# Patient Record
Sex: FEMALE | Race: WHITE | Employment: UNEMPLOYED | URBAN - METROPOLITAN AREA
[De-identification: names, ages, dates, MRNs, and addresses within clinical notes are randomized per-mention and may not be internally consistent; named-entity substitution may affect disease eponyms.]

---

## 2020-05-28 ENCOUNTER — HOME HEALTH ADMISSION (OUTPATIENT)
Dept: HOME HEALTH SERVICES | Facility: HOME HEALTH | Age: 70
End: 2020-05-28
Payer: COMMERCIAL

## 2020-05-31 ENCOUNTER — HOME CARE VISIT (OUTPATIENT)
Dept: HOME HEALTH SERVICES | Facility: HOME HEALTH | Age: 70
End: 2020-05-31
Payer: COMMERCIAL

## 2020-05-31 ENCOUNTER — HOME CARE VISIT (OUTPATIENT)
Dept: SCHEDULING | Facility: HOME HEALTH | Age: 70
End: 2020-05-31
Payer: COMMERCIAL

## 2020-05-31 PROCEDURE — G0299 HHS/HOSPICE OF RN EA 15 MIN: HCPCS

## 2020-05-31 PROCEDURE — 400013 HH SOC

## 2020-06-01 VITALS
SYSTOLIC BLOOD PRESSURE: 130 MMHG | DIASTOLIC BLOOD PRESSURE: 72 MMHG | OXYGEN SATURATION: 98 % | RESPIRATION RATE: 20 BRPM | TEMPERATURE: 98.8 F | HEART RATE: 98 BPM

## 2020-06-02 ENCOUNTER — HOME CARE VISIT (OUTPATIENT)
Dept: SCHEDULING | Facility: HOME HEALTH | Age: 70
End: 2020-06-02
Payer: COMMERCIAL

## 2020-06-02 ENCOUNTER — HOME CARE VISIT (OUTPATIENT)
Dept: HOME HEALTH SERVICES | Facility: HOME HEALTH | Age: 70
End: 2020-06-02
Payer: COMMERCIAL

## 2020-06-02 VITALS — DIASTOLIC BLOOD PRESSURE: 72 MMHG | SYSTOLIC BLOOD PRESSURE: 132 MMHG

## 2020-06-02 PROCEDURE — G0151 HHCP-SERV OF PT,EA 15 MIN: HCPCS

## 2020-06-02 PROCEDURE — G0152 HHCP-SERV OF OT,EA 15 MIN: HCPCS

## 2020-06-03 ENCOUNTER — HOME CARE VISIT (OUTPATIENT)
Dept: HOME HEALTH SERVICES | Facility: HOME HEALTH | Age: 70
End: 2020-06-03
Payer: COMMERCIAL

## 2020-06-03 ENCOUNTER — HOME CARE VISIT (OUTPATIENT)
Dept: SCHEDULING | Facility: HOME HEALTH | Age: 70
End: 2020-06-03
Payer: COMMERCIAL

## 2020-06-03 VITALS
RESPIRATION RATE: 16 BRPM | HEART RATE: 90 BPM | SYSTOLIC BLOOD PRESSURE: 130 MMHG | DIASTOLIC BLOOD PRESSURE: 70 MMHG | TEMPERATURE: 98 F | OXYGEN SATURATION: 95 %

## 2020-06-03 VITALS
SYSTOLIC BLOOD PRESSURE: 122 MMHG | HEART RATE: 89 BPM | TEMPERATURE: 98.5 F | DIASTOLIC BLOOD PRESSURE: 58 MMHG | OXYGEN SATURATION: 97 %

## 2020-06-03 PROCEDURE — A6252 ABSORPT DRG >16 <=48 W/O BDR: HCPCS

## 2020-06-03 PROCEDURE — A6213 FOAM DRG >16<=48 SQ IN W/BDR: HCPCS

## 2020-06-03 PROCEDURE — A6210 FOAM DRG >16<=48 SQ IN W/O B: HCPCS

## 2020-06-03 PROCEDURE — A6216 NON-STERILE GAUZE<=16 SQ IN: HCPCS

## 2020-06-03 PROCEDURE — A4216 STERILE WATER/SALINE, 10 ML: HCPCS

## 2020-06-03 PROCEDURE — A4452 WATERPROOF TAPE: HCPCS

## 2020-06-03 PROCEDURE — G0299 HHS/HOSPICE OF RN EA 15 MIN: HCPCS

## 2020-06-03 PROCEDURE — A6446 CONFORM BAND S W>=3" <5"/YD: HCPCS

## 2020-06-04 ENCOUNTER — HOME CARE VISIT (OUTPATIENT)
Dept: SCHEDULING | Facility: HOME HEALTH | Age: 70
End: 2020-06-04
Payer: COMMERCIAL

## 2020-06-04 VITALS
RESPIRATION RATE: 18 BRPM | SYSTOLIC BLOOD PRESSURE: 128 MMHG | OXYGEN SATURATION: 98 % | TEMPERATURE: 97.3 F | HEART RATE: 80 BPM | DIASTOLIC BLOOD PRESSURE: 70 MMHG

## 2020-06-04 PROCEDURE — G0151 HHCP-SERV OF PT,EA 15 MIN: HCPCS

## 2020-06-05 ENCOUNTER — HOME CARE VISIT (OUTPATIENT)
Dept: SCHEDULING | Facility: HOME HEALTH | Age: 70
End: 2020-06-05
Payer: COMMERCIAL

## 2020-06-05 VITALS
DIASTOLIC BLOOD PRESSURE: 68 MMHG | OXYGEN SATURATION: 97 % | TEMPERATURE: 99 F | HEART RATE: 85 BPM | SYSTOLIC BLOOD PRESSURE: 118 MMHG

## 2020-06-05 PROCEDURE — G0152 HHCP-SERV OF OT,EA 15 MIN: HCPCS

## 2020-06-09 ENCOUNTER — HOME CARE VISIT (OUTPATIENT)
Dept: SCHEDULING | Facility: HOME HEALTH | Age: 70
End: 2020-06-09
Payer: COMMERCIAL

## 2020-06-09 ENCOUNTER — HOME CARE VISIT (OUTPATIENT)
Dept: HOME HEALTH SERVICES | Facility: HOME HEALTH | Age: 70
End: 2020-06-09
Payer: COMMERCIAL

## 2020-06-09 VITALS
HEART RATE: 103 BPM | DIASTOLIC BLOOD PRESSURE: 62 MMHG | TEMPERATURE: 97 F | OXYGEN SATURATION: 96 % | SYSTOLIC BLOOD PRESSURE: 128 MMHG

## 2020-06-09 PROCEDURE — G0151 HHCP-SERV OF PT,EA 15 MIN: HCPCS

## 2020-06-09 PROCEDURE — G0299 HHS/HOSPICE OF RN EA 15 MIN: HCPCS

## 2020-06-10 VITALS — HEART RATE: 97 BPM | RESPIRATION RATE: 18 BRPM | TEMPERATURE: 97.9 F | OXYGEN SATURATION: 97 %

## 2020-06-11 ENCOUNTER — HOME CARE VISIT (OUTPATIENT)
Dept: SCHEDULING | Facility: HOME HEALTH | Age: 70
End: 2020-06-11
Payer: COMMERCIAL

## 2020-06-11 PROCEDURE — G0151 HHCP-SERV OF PT,EA 15 MIN: HCPCS

## 2020-06-12 ENCOUNTER — HOME CARE VISIT (OUTPATIENT)
Dept: SCHEDULING | Facility: HOME HEALTH | Age: 70
End: 2020-06-12
Payer: COMMERCIAL

## 2020-06-12 VITALS
HEART RATE: 91 BPM | TEMPERATURE: 98.2 F | OXYGEN SATURATION: 96 % | DIASTOLIC BLOOD PRESSURE: 58 MMHG | SYSTOLIC BLOOD PRESSURE: 110 MMHG

## 2020-06-12 PROCEDURE — A6454 SELF-ADHER BAND W>=3" <5"/YD: HCPCS

## 2020-06-12 PROCEDURE — G0299 HHS/HOSPICE OF RN EA 15 MIN: HCPCS

## 2020-06-13 VITALS
OXYGEN SATURATION: 95 % | TEMPERATURE: 98 F | HEART RATE: 90 BPM | DIASTOLIC BLOOD PRESSURE: 67 MMHG | RESPIRATION RATE: 18 BRPM | SYSTOLIC BLOOD PRESSURE: 118 MMHG

## 2020-06-14 ENCOUNTER — HOME CARE VISIT (OUTPATIENT)
Dept: HOME HEALTH SERVICES | Facility: HOME HEALTH | Age: 70
End: 2020-06-14
Payer: COMMERCIAL

## 2020-06-16 ENCOUNTER — HOME CARE VISIT (OUTPATIENT)
Dept: SCHEDULING | Facility: HOME HEALTH | Age: 70
End: 2020-06-16
Payer: COMMERCIAL

## 2020-06-16 VITALS
HEART RATE: 98 BPM | RESPIRATION RATE: 18 BRPM | TEMPERATURE: 96.8 F | DIASTOLIC BLOOD PRESSURE: 60 MMHG | SYSTOLIC BLOOD PRESSURE: 112 MMHG | OXYGEN SATURATION: 92 %

## 2020-06-16 VITALS
TEMPERATURE: 98.1 F | DIASTOLIC BLOOD PRESSURE: 60 MMHG | RESPIRATION RATE: 18 BRPM | OXYGEN SATURATION: 92 % | HEART RATE: 98 BPM | SYSTOLIC BLOOD PRESSURE: 112 MMHG

## 2020-06-16 PROCEDURE — A6216 NON-STERILE GAUZE<=16 SQ IN: HCPCS

## 2020-06-16 PROCEDURE — G0151 HHCP-SERV OF PT,EA 15 MIN: HCPCS

## 2020-06-16 PROCEDURE — G0299 HHS/HOSPICE OF RN EA 15 MIN: HCPCS

## 2020-06-18 ENCOUNTER — HOME CARE VISIT (OUTPATIENT)
Dept: SCHEDULING | Facility: HOME HEALTH | Age: 70
End: 2020-06-18
Payer: COMMERCIAL

## 2020-06-18 VITALS
HEART RATE: 94 BPM | TEMPERATURE: 97 F | SYSTOLIC BLOOD PRESSURE: 118 MMHG | OXYGEN SATURATION: 98 % | RESPIRATION RATE: 18 BRPM | DIASTOLIC BLOOD PRESSURE: 70 MMHG

## 2020-06-18 PROCEDURE — G0299 HHS/HOSPICE OF RN EA 15 MIN: HCPCS

## 2020-06-18 PROCEDURE — G0151 HHCP-SERV OF PT,EA 15 MIN: HCPCS

## 2020-06-19 PROCEDURE — A4216 STERILE WATER/SALINE, 10 ML: HCPCS

## 2020-06-19 PROCEDURE — A6454 SELF-ADHER BAND W>=3" <5"/YD: HCPCS

## 2020-06-19 PROCEDURE — A6210 FOAM DRG >16<=48 SQ IN W/O B: HCPCS

## 2020-06-19 PROCEDURE — A6213 FOAM DRG >16<=48 SQ IN W/BDR: HCPCS

## 2020-06-21 ENCOUNTER — HOME CARE VISIT (OUTPATIENT)
Dept: HOME HEALTH SERVICES | Facility: HOME HEALTH | Age: 70
End: 2020-06-21
Payer: COMMERCIAL

## 2020-06-21 VITALS — DIASTOLIC BLOOD PRESSURE: 58 MMHG | TEMPERATURE: 97.4 F | HEART RATE: 82 BPM | SYSTOLIC BLOOD PRESSURE: 106 MMHG

## 2020-06-23 ENCOUNTER — HOME CARE VISIT (OUTPATIENT)
Dept: SCHEDULING | Facility: HOME HEALTH | Age: 70
End: 2020-06-23
Payer: COMMERCIAL

## 2020-06-24 ENCOUNTER — HOME CARE VISIT (OUTPATIENT)
Dept: HOME HEALTH SERVICES | Facility: HOME HEALTH | Age: 70
End: 2020-06-24
Payer: COMMERCIAL

## 2020-12-16 ENCOUNTER — HOSPITAL ENCOUNTER (INPATIENT)
Age: 70
LOS: 6 days | Discharge: HOME HEALTH | DRG: 871 | End: 2020-12-22
Attending: EMERGENCY MEDICINE | Admitting: INTERNAL MEDICINE
Payer: MEDICARE

## 2020-12-16 ENCOUNTER — APPOINTMENT (OUTPATIENT)
Dept: GENERAL RADIOLOGY | Age: 70
DRG: 871 | End: 2020-12-16
Attending: EMERGENCY MEDICINE
Payer: MEDICARE

## 2020-12-16 DIAGNOSIS — R50.9 FEVER, UNSPECIFIED FEVER CAUSE: ICD-10-CM

## 2020-12-16 DIAGNOSIS — A41.9 SEPSIS WITHOUT ACUTE ORGAN DYSFUNCTION, DUE TO UNSPECIFIED ORGANISM (HCC): Primary | ICD-10-CM

## 2020-12-16 DIAGNOSIS — L89.153 PRESSURE INJURY OF SACRAL REGION, STAGE 3 (HCC): ICD-10-CM

## 2020-12-16 PROBLEM — L89.159 SACRAL DECUBITUS ULCER: Status: ACTIVE | Noted: 2020-12-16

## 2020-12-16 LAB
ALBUMIN SERPL-MCNC: 2.6 G/DL (ref 3.5–5)
ALBUMIN/GLOB SERPL: 0.5 {RATIO} (ref 1.1–2.2)
ALP SERPL-CCNC: 98 U/L (ref 45–117)
ALT SERPL-CCNC: 9 U/L (ref 12–78)
ANION GAP SERPL CALC-SCNC: 7 MMOL/L (ref 5–15)
APPEARANCE UR: ABNORMAL
AST SERPL-CCNC: 12 U/L (ref 15–37)
ATRIAL RATE: 95 BPM
BACTERIA URNS QL MICRO: ABNORMAL /HPF
BASOPHILS # BLD: 0 K/UL (ref 0–0.1)
BASOPHILS NFR BLD: 0 % (ref 0–1)
BILIRUB SERPL-MCNC: 0.5 MG/DL (ref 0.2–1)
BILIRUB UR QL: NEGATIVE
BUN SERPL-MCNC: 32 MG/DL (ref 6–20)
BUN/CREAT SERPL: 28 (ref 12–20)
CALCIUM SERPL-MCNC: 10.5 MG/DL (ref 8.5–10.1)
CALCULATED P AXIS, ECG09: 83 DEGREES
CALCULATED R AXIS, ECG10: -7 DEGREES
CALCULATED T AXIS, ECG11: 10 DEGREES
CHLORIDE SERPL-SCNC: 99 MMOL/L (ref 97–108)
CO2 SERPL-SCNC: 28 MMOL/L (ref 21–32)
COLOR UR: ABNORMAL
COVID-19 RAPID TEST, COVR: NOT DETECTED
CREAT SERPL-MCNC: 1.14 MG/DL (ref 0.55–1.02)
DIAGNOSIS, 93000: NORMAL
DIFFERENTIAL METHOD BLD: ABNORMAL
EOSINOPHIL # BLD: 0 K/UL (ref 0–0.4)
EOSINOPHIL NFR BLD: 0 % (ref 0–7)
EPITH CASTS URNS QL MICRO: ABNORMAL /LPF
ERYTHROCYTE [DISTWIDTH] IN BLOOD BY AUTOMATED COUNT: 13.7 % (ref 11.5–14.5)
GLOBULIN SER CALC-MCNC: 5.5 G/DL (ref 2–4)
GLUCOSE SERPL-MCNC: 128 MG/DL (ref 65–100)
GLUCOSE UR STRIP.AUTO-MCNC: NEGATIVE MG/DL
HCT VFR BLD AUTO: 36.6 % (ref 35–47)
HEALTH STATUS, XMCV2T: NORMAL
HGB BLD-MCNC: 11.8 G/DL (ref 11.5–16)
HGB UR QL STRIP: ABNORMAL
HYALINE CASTS URNS QL MICRO: ABNORMAL /LPF (ref 0–5)
IMM GRANULOCYTES # BLD AUTO: 0.1 K/UL (ref 0–0.04)
IMM GRANULOCYTES NFR BLD AUTO: 0 % (ref 0–0.5)
KETONES UR QL STRIP.AUTO: NEGATIVE MG/DL
LACTATE BLD-SCNC: 1.45 MMOL/L (ref 0.4–2)
LEUKOCYTE ESTERASE UR QL STRIP.AUTO: ABNORMAL
LYMPHOCYTES # BLD: 1 K/UL (ref 0.8–3.5)
LYMPHOCYTES NFR BLD: 7 % (ref 12–49)
MCH RBC QN AUTO: 27.2 PG (ref 26–34)
MCHC RBC AUTO-ENTMCNC: 32.2 G/DL (ref 30–36.5)
MCV RBC AUTO: 84.3 FL (ref 80–99)
MONOCYTES # BLD: 0.5 K/UL (ref 0–1)
MONOCYTES NFR BLD: 4 % (ref 5–13)
NEUTS SEG # BLD: 12.1 K/UL (ref 1.8–8)
NEUTS SEG NFR BLD: 89 % (ref 32–75)
NITRITE UR QL STRIP.AUTO: NEGATIVE
NRBC # BLD: 0 K/UL (ref 0–0.01)
NRBC BLD-RTO: 0 PER 100 WBC
P-R INTERVAL, ECG05: 188 MS
PH UR STRIP: 8.5 [PH] (ref 5–8)
PLATELET # BLD AUTO: 572 K/UL (ref 150–400)
PMV BLD AUTO: 8.9 FL (ref 8.9–12.9)
POTASSIUM SERPL-SCNC: 4 MMOL/L (ref 3.5–5.1)
PROT SERPL-MCNC: 8.1 G/DL (ref 6.4–8.2)
PROT UR STRIP-MCNC: 100 MG/DL
Q-T INTERVAL, ECG07: 360 MS
QRS DURATION, ECG06: 130 MS
QTC CALCULATION (BEZET), ECG08: 452 MS
RBC # BLD AUTO: 4.34 M/UL (ref 3.8–5.2)
RBC #/AREA URNS HPF: ABNORMAL /HPF (ref 0–5)
SODIUM SERPL-SCNC: 134 MMOL/L (ref 136–145)
SOURCE, COVRS: NORMAL
SP GR UR REFRACTOMETRY: 1.02 (ref 1–1.03)
SPECIMEN SOURCE, FCOV2M: NORMAL
SPECIMEN TYPE, XMCV1T: NORMAL
UA: UC IF INDICATED,UAUC: ABNORMAL
UROBILINOGEN UR QL STRIP.AUTO: 0.2 EU/DL (ref 0.2–1)
VENTRICULAR RATE, ECG03: 95 BPM
WBC # BLD AUTO: 13.7 K/UL (ref 3.6–11)
WBC URNS QL MICRO: >100 /HPF (ref 0–4)

## 2020-12-16 PROCEDURE — 74011000258 HC RX REV CODE- 258: Performed by: EMERGENCY MEDICINE

## 2020-12-16 PROCEDURE — 74011250636 HC RX REV CODE- 250/636: Performed by: EMERGENCY MEDICINE

## 2020-12-16 PROCEDURE — 36415 COLL VENOUS BLD VENIPUNCTURE: CPT

## 2020-12-16 PROCEDURE — 65660000000 HC RM CCU STEPDOWN

## 2020-12-16 PROCEDURE — 87186 SC STD MICRODIL/AGAR DIL: CPT

## 2020-12-16 PROCEDURE — 74011250636 HC RX REV CODE- 250/636: Performed by: INTERNAL MEDICINE

## 2020-12-16 PROCEDURE — 83605 ASSAY OF LACTIC ACID: CPT

## 2020-12-16 PROCEDURE — 87040 BLOOD CULTURE FOR BACTERIA: CPT

## 2020-12-16 PROCEDURE — 81001 URINALYSIS AUTO W/SCOPE: CPT

## 2020-12-16 PROCEDURE — 96375 TX/PRO/DX INJ NEW DRUG ADDON: CPT

## 2020-12-16 PROCEDURE — 96365 THER/PROPH/DIAG IV INF INIT: CPT

## 2020-12-16 PROCEDURE — 99285 EMERGENCY DEPT VISIT HI MDM: CPT

## 2020-12-16 PROCEDURE — 80053 COMPREHEN METABOLIC PANEL: CPT

## 2020-12-16 PROCEDURE — 74011250637 HC RX REV CODE- 250/637: Performed by: EMERGENCY MEDICINE

## 2020-12-16 PROCEDURE — 71045 X-RAY EXAM CHEST 1 VIEW: CPT

## 2020-12-16 PROCEDURE — 87077 CULTURE AEROBIC IDENTIFY: CPT

## 2020-12-16 PROCEDURE — 93005 ELECTROCARDIOGRAM TRACING: CPT

## 2020-12-16 PROCEDURE — 96366 THER/PROPH/DIAG IV INF ADDON: CPT

## 2020-12-16 PROCEDURE — 87086 URINE CULTURE/COLONY COUNT: CPT

## 2020-12-16 PROCEDURE — 85025 COMPLETE CBC W/AUTO DIFF WBC: CPT

## 2020-12-16 PROCEDURE — 87635 SARS-COV-2 COVID-19 AMP PRB: CPT

## 2020-12-16 RX ORDER — ONDANSETRON 2 MG/ML
4 INJECTION INTRAMUSCULAR; INTRAVENOUS
Status: DISCONTINUED | OUTPATIENT
Start: 2020-12-16 | End: 2020-12-22 | Stop reason: HOSPADM

## 2020-12-16 RX ORDER — ACETAMINOPHEN 325 MG/1
650 TABLET ORAL
Status: DISCONTINUED | OUTPATIENT
Start: 2020-12-16 | End: 2020-12-17 | Stop reason: SDUPTHER

## 2020-12-16 RX ORDER — SODIUM CHLORIDE 0.9 % (FLUSH) 0.9 %
5-40 SYRINGE (ML) INJECTION AS NEEDED
Status: DISCONTINUED | OUTPATIENT
Start: 2020-12-16 | End: 2020-12-22 | Stop reason: HOSPADM

## 2020-12-16 RX ORDER — SODIUM CHLORIDE 9 MG/ML
75 INJECTION, SOLUTION INTRAVENOUS CONTINUOUS
Status: DISCONTINUED | OUTPATIENT
Start: 2020-12-16 | End: 2020-12-16 | Stop reason: SDUPTHER

## 2020-12-16 RX ORDER — METRONIDAZOLE 500 MG/100ML
500 INJECTION, SOLUTION INTRAVENOUS EVERY 12 HOURS
Status: DISCONTINUED | OUTPATIENT
Start: 2020-12-16 | End: 2020-12-19

## 2020-12-16 RX ORDER — SODIUM CHLORIDE 0.9 % (FLUSH) 0.9 %
5-40 SYRINGE (ML) INJECTION EVERY 8 HOURS
Status: DISCONTINUED | OUTPATIENT
Start: 2020-12-16 | End: 2020-12-22 | Stop reason: HOSPADM

## 2020-12-16 RX ORDER — SODIUM CHLORIDE 9 MG/ML
125 INJECTION, SOLUTION INTRAVENOUS CONTINUOUS
Status: DISCONTINUED | OUTPATIENT
Start: 2020-12-16 | End: 2020-12-19

## 2020-12-16 RX ORDER — PROMETHAZINE HYDROCHLORIDE 25 MG/1
12.5 TABLET ORAL
Status: DISCONTINUED | OUTPATIENT
Start: 2020-12-16 | End: 2020-12-22 | Stop reason: HOSPADM

## 2020-12-16 RX ORDER — ACETAMINOPHEN 650 MG/1
650 SUPPOSITORY RECTAL
Status: DISCONTINUED | OUTPATIENT
Start: 2020-12-16 | End: 2020-12-22 | Stop reason: HOSPADM

## 2020-12-16 RX ORDER — AMLODIPINE BESYLATE 5 MG/1
10 TABLET ORAL DAILY
Status: DISCONTINUED | OUTPATIENT
Start: 2020-12-17 | End: 2020-12-22 | Stop reason: HOSPADM

## 2020-12-16 RX ORDER — POLYETHYLENE GLYCOL 3350 17 G/17G
17 POWDER, FOR SOLUTION ORAL DAILY PRN
Status: DISCONTINUED | OUTPATIENT
Start: 2020-12-16 | End: 2020-12-18

## 2020-12-16 RX ORDER — EZETIMIBE 10 MG/1
10 TABLET ORAL DAILY
Status: DISCONTINUED | OUTPATIENT
Start: 2020-12-17 | End: 2020-12-22 | Stop reason: HOSPADM

## 2020-12-16 RX ORDER — VANCOMYCIN/0.9 % SOD CHLORIDE 1.5G/250ML
1500 PLASTIC BAG, INJECTION (ML) INTRAVENOUS
Status: COMPLETED | OUTPATIENT
Start: 2020-12-16 | End: 2020-12-16

## 2020-12-16 RX ORDER — ENOXAPARIN SODIUM 100 MG/ML
40 INJECTION SUBCUTANEOUS DAILY
Status: DISCONTINUED | OUTPATIENT
Start: 2020-12-17 | End: 2020-12-22 | Stop reason: HOSPADM

## 2020-12-16 RX ORDER — ACETAMINOPHEN 325 MG/1
650 TABLET ORAL
Status: DISCONTINUED | OUTPATIENT
Start: 2020-12-16 | End: 2020-12-22 | Stop reason: HOSPADM

## 2020-12-16 RX ADMIN — SODIUM CHLORIDE 1000 ML: 900 INJECTION, SOLUTION INTRAVENOUS at 17:43

## 2020-12-16 RX ADMIN — ACETAMINOPHEN 650 MG: 325 TABLET ORAL at 20:06

## 2020-12-16 RX ADMIN — SODIUM CHLORIDE 1000 ML: 900 INJECTION, SOLUTION INTRAVENOUS at 13:54

## 2020-12-16 RX ADMIN — CEFEPIME HYDROCHLORIDE 2 G: 2 INJECTION, POWDER, FOR SOLUTION INTRAVENOUS at 13:54

## 2020-12-16 RX ADMIN — SODIUM CHLORIDE 75 ML/HR: 900 INJECTION, SOLUTION INTRAVENOUS at 21:56

## 2020-12-16 RX ADMIN — METRONIDAZOLE 500 MG: 500 INJECTION, SOLUTION INTRAVENOUS at 20:49

## 2020-12-16 RX ADMIN — SODIUM CHLORIDE 1500 MG: 9 INJECTION, SOLUTION INTRAVENOUS at 15:03

## 2020-12-16 NOTE — ED TRIAGE NOTES
Pt arrived to ED via EMS from a group home c/o lethgary and fever 103. Pt is AOX4 and monitored X3. Pt reports a hx of HTN and chronic knee pain. Pt reports that she has a wound on her bottom that she's been treating for about 1 month.

## 2020-12-16 NOTE — ED PROVIDER NOTES
EMERGENCY DEPARTMENT HISTORY AND PHYSICAL EXAM      Date: 12/16/2020  Patient Name: Eligah Gitelman    History of Presenting Illness     Chief Complaint   Patient presents with    Fever     Pt arrived by EMS from group home c/o fever 103 and lethergy since this morning. History Provided By: Patient    HPI: Eligah Gitelman, 78 y.o. female presents to the ED with cc of fever. Patient has a history of a sacral ulcer presents from a group home after a visiting nurse came to check on patient's wound and found patient to have a fever of 103. Patient reports she feels \"lethargic\" but denies any other complaints. Patient does report she has some bilateral \"knee pain\" as well as some pain in her buttock that has been there for weeks. Denies any headache, neck pain, chest pain, shortness of breath, cough, abdominal pain, nausea, vomiting or diarrhea. Does have chronic skin changes over her lower extremities. There are no other complaints, changes, or physical findings at this time. PCP: Abi Washburn, ANP-C    No current facility-administered medications on file prior to encounter. Current Outpatient Medications on File Prior to Encounter   Medication Sig Dispense Refill    ezetimibe (ZETIA) 10 mg tablet Take 10 mg by mouth daily.  furosemide (LASIX) 20 mg tablet Take 20 mg by mouth every other day.  amLODIPine (NORVASC) 10 mg tablet Take 10 mg by mouth daily. Past History     Past Medical History:  Past Medical History:   Diagnosis Date    HLD (hyperlipidemia)     Hypertension        Past Surgical History:  No past surgical history on file. Family History:  No family history on file. Social History:  Social History     Tobacco Use    Smoking status: Not on file   Substance Use Topics    Alcohol use: Not on file    Drug use: Not on file       Allergies:   Allergies   Allergen Reactions    Antihistamines - Alkylamine Unknown (comments)     reported on referral packet         Review of Systems   Review of Systems   Constitutional: Negative for activity change, chills and fever. HENT: Negative for facial swelling and voice change. Eyes: Negative for redness. Respiratory: Negative for cough, shortness of breath and wheezing. Cardiovascular: Negative for chest pain and leg swelling. Gastrointestinal: Negative for abdominal pain, diarrhea, nausea and vomiting. Genitourinary: Negative for decreased urine volume. Musculoskeletal: Positive for myalgias. Negative for gait problem. Skin: Positive for wound. Negative for pallor and rash. Neurological: Negative for tremors and facial asymmetry. Psychiatric/Behavioral: Negative for agitation. All other systems reviewed and are negative. Physical Exam   Physical Exam  Vitals signs and nursing note reviewed. Constitutional:       Comments: 78 YOF, resting in bed, no distress   HENT:      Head: Normocephalic and atraumatic. Neck:      Musculoskeletal: Normal range of motion. Cardiovascular:      Rate and Rhythm: Regular rhythm. Tachycardia present. Heart sounds: No murmur. No friction rub. No gallop. Pulmonary:      Effort: Pulmonary effort is normal.      Breath sounds: Normal breath sounds. Abdominal:      Palpations: Abdomen is soft. Tenderness: There is no abdominal tenderness. Musculoskeletal: Normal range of motion. Skin:     General: Skin is warm. Capillary Refill: Capillary refill takes less than 2 seconds. Comments: Over the sacrum there is a 3 x 5 cm stage III pressure ulcer with a purulent base and mild surrounding erythema. Over the bilateral lower extremities there is chronic appearing skin thickening, the left lower extremity is slightly more erythematous than the right lower extremity. Neurological:      General: No focal deficit present. Mental Status: She is alert.    Psychiatric:         Mood and Affect: Mood normal.         Diagnostic Study Results Labs -     Recent Results (from the past 12 hour(s))   CBC WITH AUTOMATED DIFF    Collection Time: 12/16/20  1:42 PM   Result Value Ref Range    WBC 13.7 (H) 3.6 - 11.0 K/uL    RBC 4.34 3.80 - 5.20 M/uL    HGB 11.8 11.5 - 16.0 g/dL    HCT 36.6 35.0 - 47.0 %    MCV 84.3 80.0 - 99.0 FL    MCH 27.2 26.0 - 34.0 PG    MCHC 32.2 30.0 - 36.5 g/dL    RDW 13.7 11.5 - 14.5 %    PLATELET 735 (H) 415 - 400 K/uL    MPV 8.9 8.9 - 12.9 FL    NRBC 0.0 0  WBC    ABSOLUTE NRBC 0.00 0.00 - 0.01 K/uL    NEUTROPHILS 89 (H) 32 - 75 %    LYMPHOCYTES 7 (L) 12 - 49 %    MONOCYTES 4 (L) 5 - 13 %    EOSINOPHILS 0 0 - 7 %    BASOPHILS 0 0 - 1 %    IMMATURE GRANULOCYTES 0 0.0 - 0.5 %    ABS. NEUTROPHILS 12.1 (H) 1.8 - 8.0 K/UL    ABS. LYMPHOCYTES 1.0 0.8 - 3.5 K/UL    ABS. MONOCYTES 0.5 0.0 - 1.0 K/UL    ABS. EOSINOPHILS 0.0 0.0 - 0.4 K/UL    ABS. BASOPHILS 0.0 0.0 - 0.1 K/UL    ABS. IMM. GRANS. 0.1 (H) 0.00 - 0.04 K/UL    DF AUTOMATED     METABOLIC PANEL, COMPREHENSIVE    Collection Time: 12/16/20  1:42 PM   Result Value Ref Range    Sodium 134 (L) 136 - 145 mmol/L    Potassium 4.0 3.5 - 5.1 mmol/L    Chloride 99 97 - 108 mmol/L    CO2 28 21 - 32 mmol/L    Anion gap 7 5 - 15 mmol/L    Glucose 128 (H) 65 - 100 mg/dL    BUN 32 (H) 6 - 20 MG/DL    Creatinine 1.14 (H) 0.55 - 1.02 MG/DL    BUN/Creatinine ratio 28 (H) 12 - 20      GFR est AA 57 (L) >60 ml/min/1.73m2    GFR est non-AA 47 (L) >60 ml/min/1.73m2    Calcium 10.5 (H) 8.5 - 10.1 MG/DL    Bilirubin, total 0.5 0.2 - 1.0 MG/DL    ALT (SGPT) 9 (L) 12 - 78 U/L    AST (SGOT) 12 (L) 15 - 37 U/L    Alk.  phosphatase 98 45 - 117 U/L    Protein, total 8.1 6.4 - 8.2 g/dL    Albumin 2.6 (L) 3.5 - 5.0 g/dL    Globulin 5.5 (H) 2.0 - 4.0 g/dL    A-G Ratio 0.5 (L) 1.1 - 2.2     POC LACTIC ACID    Collection Time: 12/16/20  1:49 PM   Result Value Ref Range    Lactic Acid (POC) 1.45 0.40 - 2.00 mmol/L   EKG, 12 LEAD, INITIAL    Collection Time: 12/16/20  2:17 PM   Result Value Ref Range Ventricular Rate 95 BPM    Atrial Rate 95 BPM    P-R Interval 188 ms    QRS Duration 130 ms    Q-T Interval 360 ms    QTC Calculation (Bezet) 452 ms    Calculated P Axis 83 degrees    Calculated R Axis -7 degrees    Calculated T Axis 10 degrees    Diagnosis       Normal sinus rhythm  Right bundle branch block  No previous ECGs available  Confirmed by Jennifer Chau MD, Sebastián Prim ([id number]) on 12/16/2020 4:24:20 PM     SARS-COV-2    Collection Time: 12/16/20  4:59 PM   Result Value Ref Range    Specimen source Nasopharyngeal      Specimen source Nasopharyngeal      COVID-19 rapid test Not detected NOTD      Specimen type NP Swab      Health status Symptomatic Testing     URINALYSIS W/ REFLEX CULTURE    Collection Time: 12/16/20  6:25 PM    Specimen: Urine   Result Value Ref Range    Color YELLOW/STRAW      Appearance TURBID (A) CLEAR      Specific gravity 1.019 1.003 - 1.030      pH (UA) 8.5 (H) 5.0 - 8.0      Protein 100 (A) NEG mg/dL    Glucose Negative NEG mg/dL    Ketone Negative NEG mg/dL    Bilirubin Negative NEG      Blood MODERATE (A) NEG      Urobilinogen 0.2 0.2 - 1.0 EU/dL    Nitrites Negative NEG      Leukocyte Esterase LARGE (A) NEG      WBC >100 (H) 0 - 4 /hpf    RBC 20-50 0 - 5 /hpf    Epithelial cells FEW FEW /lpf    Bacteria 1+ (A) NEG /hpf    UA:UC IF INDICATED URINE CULTURE ORDERED (A) CNI      Hyaline cast 2-5 0 - 5 /lpf       Radiologic Studies -   XR CHEST PORT   Final Result   IMPRESSION: Low lung volumes without acute cardiopulmonary process. CT Results  (Last 48 hours)    None        CXR Results  (Last 48 hours)               12/16/20 1415  XR CHEST PORT Final result    Impression:  IMPRESSION: Low lung volumes without acute cardiopulmonary process. Narrative:  EXAM:  XR CHEST PORT       INDICATION:   sepsis       COMPARISON: None. FINDINGS: AP radiograph of the chest was obtained. Low lung volumes. No evidence of focal consolidation. No pleural effusion or   pneumothorax. Heart, carroll, mediastinum are within normal limits. No acute   osseous abnormalities. Medical Decision Making   I am the first provider for this patient. I reviewed the vital signs, available nursing notes, past medical history, past surgical history, family history and social history. Vital Signs-Reviewed the patient's vital signs. Patient Vitals for the past 12 hrs:   Temp Pulse Resp BP SpO2   12/16/20 1900 100.2 °F (37.9 °C) (!) 101 23 (!) 137/38 100 %   12/16/20 1730  82 20 (!) 108/52 95 %   12/16/20 1623  99 20 (!) 109/51 97 %   12/16/20 1330  (!) 106 23 (!) 113/59 96 %   12/16/20 1319  (!) 113 23 118/61 96 %   12/16/20 1315  (!) 110 22 118/61 96 %   12/16/20 1311 99.4 °F (37.4 °C) (!) 109 20 116/62 96 %     Records Reviewed: Nursing Notes and Old Medical Records    Provider Notes (Medical Decision Making):     73-year-old female presents from a group home after being found to be febrile. Given patient's fever and tachycardia, with suspected skin infection, will call sepsis alert. Obtain labs and bolus fluids. Give cefepime and vancomycin empirically. Culture, chest x-ray. Anticipate admission. ED Course:   Initial assessment performed. The patients presenting problems have been discussed, and they are in agreement with the care plan formulated and outlined with them. I have encouraged them to ask questions as they arise throughout their visit. ED Course as of Dec 16 2134   Wed Dec 16, 2020   1456 There is a leukocytosis with left shift and thrombophilia likely acute phase reactant. CMP largely unremarkable. Lactic acid normal, given this we will not perform 30 cc/kg bolus. [MB]   1386 Patient's MAP was 66, will give second liter bolus. [MB]   2134 Also consider UTI as source given multiple white blood cells and 1+ bacteria. This will be covered with cefepime.     [MB]      ED Course User Index  [MB] Lesli Severance, MD       Critical Care Time:   CRITICAL CARE NOTE :    1:20 PM    IMPENDING DETERIORATION -Cardiovascular and Metabolic  ASSOCIATED RISK FACTORS - Hypotension and Dehydration  MANAGEMENT- Bedside Assessment  INTERPRETATION -  Xrays, ECG and Blood Pressure  INTERVENTIONS - hemodynamic mngmt, Metobolic interventions and 2 L IV fluid  CASE REVIEW - Hospitalist, Nursing and Family  TREATMENT RESPONSE -Stable  PERFORMED BY - Self    NOTES   :  I have spent 38 minutes of critical care time involved in lab review, consultations with specialist, family decision- making, bedside attention and documentation. This time excludes time spent in any separate billed procedures. During this entire length of time I was immediately available to the patient . Rizwana Andres MD      Disposition:    Admitted      Diagnosis     Clinical Impression:   1. Sepsis without acute organ dysfunction, due to unspecified organism ([address]    2. Pressure injury of sacral region, stage 3 ([address]    3. Fever, unspecified fever cause        Attestations:    Rizwana Andres MD    Please note that this dictation was completed with [employer], the computer voice recognition software. Quite often unanticipated grammatical, syntax, homophones, and other interpretive errors are inadvertently transcribed by the computer software. Please disregard these errors. Please excuse any errors that have escaped final proofreading. Thank you.

## 2020-12-17 ENCOUNTER — APPOINTMENT (OUTPATIENT)
Dept: GENERAL RADIOLOGY | Age: 70
DRG: 871 | End: 2020-12-17
Attending: ANESTHESIOLOGY
Payer: MEDICARE

## 2020-12-17 LAB
ANION GAP SERPL CALC-SCNC: 6 MMOL/L (ref 5–15)
BASOPHILS # BLD: 0 K/UL (ref 0–0.1)
BASOPHILS NFR BLD: 0 % (ref 0–1)
BUN SERPL-MCNC: 26 MG/DL (ref 6–20)
BUN/CREAT SERPL: 29 (ref 12–20)
CALCIUM SERPL-MCNC: 9.8 MG/DL (ref 8.5–10.1)
CHLORIDE SERPL-SCNC: 107 MMOL/L (ref 97–108)
CO2 SERPL-SCNC: 25 MMOL/L (ref 21–32)
CREAT SERPL-MCNC: 0.89 MG/DL (ref 0.55–1.02)
DIFFERENTIAL METHOD BLD: ABNORMAL
EOSINOPHIL # BLD: 0 K/UL (ref 0–0.4)
EOSINOPHIL NFR BLD: 0 % (ref 0–7)
ERYTHROCYTE [DISTWIDTH] IN BLOOD BY AUTOMATED COUNT: 14 % (ref 11.5–14.5)
GLUCOSE SERPL-MCNC: 83 MG/DL (ref 65–100)
HCT VFR BLD AUTO: 36.9 % (ref 35–47)
HGB BLD-MCNC: 11.3 G/DL (ref 11.5–16)
IMM GRANULOCYTES # BLD AUTO: 0 K/UL (ref 0–0.04)
IMM GRANULOCYTES NFR BLD AUTO: 0 % (ref 0–0.5)
INR PPP: 1.1 (ref 0.9–1.1)
LYMPHOCYTES # BLD: 0.8 K/UL (ref 0.8–3.5)
LYMPHOCYTES NFR BLD: 11 % (ref 12–49)
MAGNESIUM SERPL-MCNC: 2.1 MG/DL (ref 1.6–2.4)
MCH RBC QN AUTO: 26.8 PG (ref 26–34)
MCHC RBC AUTO-ENTMCNC: 30.6 G/DL (ref 30–36.5)
MCV RBC AUTO: 87.4 FL (ref 80–99)
MONOCYTES # BLD: 0.3 K/UL (ref 0–1)
MONOCYTES NFR BLD: 4 % (ref 5–13)
NEUTS SEG # BLD: 5.8 K/UL (ref 1.8–8)
NEUTS SEG NFR BLD: 85 % (ref 32–75)
NRBC # BLD: 0 K/UL (ref 0–0.01)
NRBC BLD-RTO: 0 PER 100 WBC
PLATELET # BLD AUTO: 418 K/UL (ref 150–400)
PMV BLD AUTO: 8.8 FL (ref 8.9–12.9)
POTASSIUM SERPL-SCNC: 3.7 MMOL/L (ref 3.5–5.1)
PROTHROMBIN TIME: 11.7 SEC (ref 9–11.1)
RBC # BLD AUTO: 4.22 M/UL (ref 3.8–5.2)
RBC MORPH BLD: ABNORMAL
SODIUM SERPL-SCNC: 138 MMOL/L (ref 136–145)
WBC # BLD AUTO: 6.9 K/UL (ref 3.6–11)

## 2020-12-17 PROCEDURE — 74011250636 HC RX REV CODE- 250/636: Performed by: INTERNAL MEDICINE

## 2020-12-17 PROCEDURE — C1751 CATH, INF, PER/CENT/MIDLINE: HCPCS

## 2020-12-17 PROCEDURE — 74011250637 HC RX REV CODE- 250/637: Performed by: INTERNAL MEDICINE

## 2020-12-17 PROCEDURE — 74011250636 HC RX REV CODE- 250/636: Performed by: NURSE PRACTITIONER

## 2020-12-17 PROCEDURE — 02HV33Z INSERTION OF INFUSION DEVICE INTO SUPERIOR VENA CAVA, PERCUTANEOUS APPROACH: ICD-10-PCS | Performed by: RADIOLOGY

## 2020-12-17 PROCEDURE — 36415 COLL VENOUS BLD VENIPUNCTURE: CPT

## 2020-12-17 PROCEDURE — 74011000258 HC RX REV CODE- 258: Performed by: INTERNAL MEDICINE

## 2020-12-17 PROCEDURE — 71045 X-RAY EXAM CHEST 1 VIEW: CPT

## 2020-12-17 PROCEDURE — 87205 SMEAR GRAM STAIN: CPT

## 2020-12-17 PROCEDURE — 85025 COMPLETE CBC W/AUTO DIFF WBC: CPT

## 2020-12-17 PROCEDURE — 2709999900 HC NON-CHARGEABLE SUPPLY

## 2020-12-17 PROCEDURE — 87077 CULTURE AEROBIC IDENTIFY: CPT

## 2020-12-17 PROCEDURE — 65660000000 HC RM CCU STEPDOWN

## 2020-12-17 PROCEDURE — 85610 PROTHROMBIN TIME: CPT

## 2020-12-17 PROCEDURE — 36556 INSERT NON-TUNNEL CV CATH: CPT

## 2020-12-17 PROCEDURE — 77030018842 HC SOL IRR SOD CL 9% BAXT -A

## 2020-12-17 PROCEDURE — 80048 BASIC METABOLIC PNL TOTAL CA: CPT

## 2020-12-17 PROCEDURE — 83735 ASSAY OF MAGNESIUM: CPT

## 2020-12-17 PROCEDURE — 74011250636 HC RX REV CODE- 250/636: Performed by: ANESTHESIOLOGY

## 2020-12-17 PROCEDURE — 87186 SC STD MICRODIL/AGAR DIL: CPT

## 2020-12-17 RX ORDER — ASCORBIC ACID 500 MG
1000 TABLET ORAL DAILY
COMMUNITY
End: 2022-06-16

## 2020-12-17 RX ORDER — OXYCODONE AND ACETAMINOPHEN 5; 325 MG/1; MG/1
1 TABLET ORAL
COMMUNITY
End: 2022-06-16

## 2020-12-17 RX ORDER — OXYCODONE AND ACETAMINOPHEN 10; 325 MG/1; MG/1
1 TABLET ORAL
Status: DISCONTINUED | OUTPATIENT
Start: 2020-12-17 | End: 2020-12-22 | Stop reason: HOSPADM

## 2020-12-17 RX ORDER — NOREPINEPHRINE BITARTRATE/D5W 8 MG/250ML
.5-12 PLASTIC BAG, INJECTION (ML) INTRAVENOUS
Status: DISCONTINUED | OUTPATIENT
Start: 2020-12-17 | End: 2020-12-19

## 2020-12-17 RX ORDER — MIDAZOLAM HYDROCHLORIDE 1 MG/ML
2 INJECTION, SOLUTION INTRAMUSCULAR; INTRAVENOUS ONCE
Status: COMPLETED | OUTPATIENT
Start: 2020-12-17 | End: 2020-12-17

## 2020-12-17 RX ORDER — NOREPINEPHRINE BITARTRATE/D5W 4MG/250ML
.5-16 PLASTIC BAG, INJECTION (ML) INTRAVENOUS
Status: DISCONTINUED | OUTPATIENT
Start: 2020-12-17 | End: 2020-12-17

## 2020-12-17 RX ORDER — MIDODRINE HYDROCHLORIDE 5 MG/1
5 TABLET ORAL EVERY 24 HOURS
COMMUNITY
End: 2020-12-22

## 2020-12-17 RX ORDER — TRAMADOL HYDROCHLORIDE 50 MG/1
50 TABLET ORAL
COMMUNITY
End: 2021-04-29

## 2020-12-17 RX ORDER — MIDAZOLAM HYDROCHLORIDE 1 MG/ML
2 INJECTION, SOLUTION INTRAMUSCULAR; INTRAVENOUS
Status: DISCONTINUED | OUTPATIENT
Start: 2020-12-17 | End: 2020-12-17

## 2020-12-17 RX ORDER — MIDAZOLAM HYDROCHLORIDE 1 MG/ML
INJECTION, SOLUTION INTRAMUSCULAR; INTRAVENOUS
Status: DISCONTINUED
Start: 2020-12-17 | End: 2020-12-17 | Stop reason: WASHOUT

## 2020-12-17 RX ORDER — DEXTROMETHORPHAN HYDROBROMIDE, GUAIFENESIN 5; 100 MG/5ML; MG/5ML
650 LIQUID ORAL
COMMUNITY
Start: 2021-04-29 | End: 2021-04-29 | Stop reason: CLARIF

## 2020-12-17 RX ORDER — LEVOTHYROXINE SODIUM 25 UG/1
25 TABLET ORAL
COMMUNITY
Start: 2021-04-29 | End: 2021-08-10

## 2020-12-17 RX ORDER — MIDAZOLAM HYDROCHLORIDE 1 MG/ML
1 INJECTION, SOLUTION INTRAMUSCULAR; INTRAVENOUS ONCE
Status: COMPLETED | OUTPATIENT
Start: 2020-12-17 | End: 2020-12-17

## 2020-12-17 RX ORDER — AMMONIUM LACTATE 12 G/100G
LOTION TOPICAL AS NEEDED
COMMUNITY
Start: 2021-04-29 | End: 2022-05-27 | Stop reason: ALTCHOICE

## 2020-12-17 RX ORDER — AMINO ACIDS/PROTEIN HYDROLYS 15G-100/30
30 LIQUID (ML) ORAL 2 TIMES DAILY
COMMUNITY
End: 2022-06-16

## 2020-12-17 RX ORDER — LANOLIN ALCOHOL/MO/W.PET/CERES
400 CREAM (GRAM) TOPICAL 2 TIMES DAILY
COMMUNITY
End: 2022-06-16

## 2020-12-17 RX ORDER — SENNOSIDES 8.6 MG/1
2 TABLET ORAL
COMMUNITY
End: 2022-06-16

## 2020-12-17 RX ADMIN — VANCOMYCIN HYDROCHLORIDE 1000 MG: 1 INJECTION, POWDER, LYOPHILIZED, FOR SOLUTION INTRAVENOUS at 09:33

## 2020-12-17 RX ADMIN — CEFEPIME HYDROCHLORIDE 2 G: 2 INJECTION, POWDER, FOR SOLUTION INTRAVENOUS at 00:31

## 2020-12-17 RX ADMIN — SODIUM CHLORIDE 1000 ML: 900 INJECTION, SOLUTION INTRAVENOUS at 00:38

## 2020-12-17 RX ADMIN — SODIUM CHLORIDE 1000 ML: 900 INJECTION, SOLUTION INTRAVENOUS at 11:02

## 2020-12-17 RX ADMIN — MIDAZOLAM HYDROCHLORIDE 1 MG: 1 INJECTION, SOLUTION INTRAMUSCULAR; INTRAVENOUS at 14:06

## 2020-12-17 RX ADMIN — Medication 10 ML: at 21:47

## 2020-12-17 RX ADMIN — OXYCODONE HYDROCHLORIDE AND ACETAMINOPHEN 1 TABLET: 10; 325 TABLET ORAL at 21:47

## 2020-12-17 RX ADMIN — ACETAMINOPHEN 650 MG: 325 TABLET ORAL at 06:49

## 2020-12-17 RX ADMIN — SODIUM CHLORIDE 125 ML/HR: 900 INJECTION, SOLUTION INTRAVENOUS at 17:59

## 2020-12-17 RX ADMIN — CEFEPIME HYDROCHLORIDE 2 G: 2 INJECTION, POWDER, FOR SOLUTION INTRAVENOUS at 14:36

## 2020-12-17 RX ADMIN — MIDAZOLAM HYDROCHLORIDE 2 MG: 1 INJECTION, SOLUTION INTRAMUSCULAR; INTRAVENOUS at 13:35

## 2020-12-17 RX ADMIN — Medication 40 ML: at 14:00

## 2020-12-17 RX ADMIN — METRONIDAZOLE 500 MG: 500 INJECTION, SOLUTION INTRAVENOUS at 09:39

## 2020-12-17 RX ADMIN — OXYCODONE HYDROCHLORIDE AND ACETAMINOPHEN 1 TABLET: 10; 325 TABLET ORAL at 14:32

## 2020-12-17 RX ADMIN — METRONIDAZOLE 500 MG: 500 INJECTION, SOLUTION INTRAVENOUS at 21:46
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
## 2020-12-17 NOTE — PROGRESS NOTES
TRANSFER - IN REPORT:    Verbal report received from Sneha (name) on Joslyn Villalta  being received from ED(unit) for routine progression of care      Report consisted of patients Situation, Background, Assessment and   Recommendations(SBAR). Information from the following report(s) SBAR, Kardex, Intake/Output, Recent Results and Quality Measures was reviewed with the receiving nurse. Opportunity for questions and clarification was provided. Assessment completed upon patients arrival to unit and care assumed.      Chest xray done and central line in place

## 2020-12-17 NOTE — PROGRESS NOTES
Pharmacy Automatic Renal Dosing Protocol - Antimicrobials    Indication for Antimicrobials: SSTI    Current Regimen of Each Antimicrobial:  Vancomycin 1500 mg x 1 then 1000 mg q18h (Start Date ; Day # 2)  Cefepime 2 IV every 12h (start: , day 2)  Metronidazole 500mg q 12h (start: , day 2)    Previous Antimicrobial Therapy:  Cefepime x 1 dose in ED      Goal Level: 15-20 until bacteremia ruled out  (AUC: 400 - 600 mg/hr/Liter/day)     Date Dose & Interval Measured (mcg/mL) Extrapolated (mcg/mL)                       Date & time of next level:  prior to 1900 dose    Significant Cultures:    blood: NGTD - prelim  : urine - prelim  : wound (buttock): prelim    Radiology / Imaging results: (X-ray, CT scan or MRI):     Paralysis, amputations, malnutrition: none    Labs:  Recent Labs     20  0707 20  1342   CREA 0.89 1.14*   BUN 26* 32*   WBC 6.9 13.7*     Temp (24hrs), Av.1 °F (37.8 °C), Min:99.1 °F (37.3 °C), Max:101.1 °F (38.4 °C)      Is the Patient on Dialysis? no    Creatinine Clearance (mL/min):   CrCl (Ideal Body Weight): 58.8    Impression/Plan:   Vancomycin 1000 mg q18h trough = 14.6    Continue renal dosed cefepime  Continue metronidazole q 12h  Scr improving, febrile (added cefepime/metronidazole)  Bmp daily and cbc every other day  Antimicrobial stop date TBD      Pharmacy will follow daily and adjust medications as appropriate for renal function and/or serum levels. Thank you,  Landry Wei, [address]    Recommended duration of therapy  [email or web address]/Clinical%20Companion/[email or web address]%20ABX%20therapy. docx    Renal Dosing  [email or web address]/Pharmacy/Clinical%20Companion/Renal%[id number]. pdf

## 2020-12-17 NOTE — PROGRESS NOTES
2:25 PM Received order from Dr. Raven Bolden to change parameters of levophed gtt to 0.5 - 12 mcg/min per stepdown titration parameters. See MAR. If patient would require more than 12 mcg/min patient will need CCU tx.
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
## 2020-12-17 NOTE — ED NOTES
Bedside shift change report given to LASHANDA Mayorga (oncoming nurse) by David Ceja RN (offgoing nurse). Report included the following information SBAR, Kardex, ED Summary, Procedure Summary, MAR and Recent Results.

## 2020-12-17 NOTE — ED NOTES
Bedside and Verbal shift change report given to [address] Lindsey (oncoming nurse) by Lorenzo Holguin RN (offgoing nurse). Report included the following information SBAR, Kardex and MAR.

## 2020-12-17 NOTE — PROGRESS NOTES
1315: Anesthesia at bedside placing central line. Total of 3 mg versed given. 1445: Port chest xray completed.

## 2020-12-17 NOTE — PROGRESS NOTES
Received message from patient's nurse Wilfrid Cronin stating :    BP trending down, most recent 83/47 (56). Currently getting NS 75ml/hour. Received 2 liters of NS earlier today at 1400 and 1500. Discussion / orders:    Admitted last night with sepsis secondary to infected decubitus sacral ulcer versus urinary tract infection. Has been started on cefepime and vancomycin. · Ordered another 1 L normal saline bolus  · Increased normal saline infusion rate from 75 mL an hour 225 mL an hour           Please note that this note was dictated using Dragon computer voice recognition software. Quite often unanticipated grammatical, syntax, homophones, and other interpretive errors are inadvertently transcribed by the computer software. Please disregard these errors. Please excuse any errors that have escaped final proofreading.

## 2020-12-17 NOTE — ROUTINE PROCESS
-Please complete MRI History and Safety Screening Form for this patient using [employer] only under Orders Requiring a Screening Form: 
 

## 2020-12-17 NOTE — H&P
Hospitalist Admission Note    NAME: Isak Escudero   :  1950   MRN:  [id number]     Date/Time:  2020 8:01 PM    Patient PCP: LOLY Solorio  ______________________________________________________________________  Given the patient's current clinical presentation, I have a high level of concern for decompensation if discharged from the emergency department. Complex decision making was performed, which includes reviewing the patient's available past medical records, laboratory results, and x-ray films. My assessment of this patient's clinical condition and my plan of care is as follows. Assessment / Plan:  Sepsis secondary to infected decubitus sacral ulcer versus UTI  We will admit to telemetry, will check blood cultures, wound cultures, urine cultures  Continue with IV vancomycin and cefepime started by the ED physician, will add Flagyl  Wound care consulted    Mild elevation of the creatinine  Start IV fluid  Hypertension  Hyperlipidemia  BP soft, hold Norvasc  Continue Zetia      Code Status: Full code  Surrogate Decision Maker:     DVT Prophylaxis: Lovenox  GI Prophylaxis: not indicated    Baseline: Ambulatory      Subjective:   CHIEF COMPLAINT: Fevers and chills and inability to bend her knees    HISTORY OF PRESENT ILLNESS:     Isak Escudero is a 78 y.o.  female who lives in a group home with past medical history hypertension, chronic decubitus ulcer stage III-IV who presents with difficulty bending her knees because of pain on her sacral area. IN  The ED, patient reported fever and chills and was found to have infected decubitus ulcer with some purulent drainage. He denies any associated symptoms such as nausea vomiting, cough but reported burning upon urination  Review of her vital signs showed that she was febrile, tachycardic.   Her labs revealed evaded white blood cell count with a left shift, mild elevation of her creatinine. Her UA was concerning for UTI. Rapid Covid test was negative    We were asked to admit for work up and evaluation of the above problems. No past medical history on file. No past surgical history on file. Social History     Tobacco Use    Smoking status: Not on file   Substance Use Topics    Alcohol use: Not on file        No family history on file. Allergies   Allergen Reactions    Antihistamines - Alkylamine Unknown (comments)     reported on referral packet        Prior to Admission medications    Medication Sig Start Date End Date Taking? Authorizing Provider   ezetimibe (ZETIA) 10 mg tablet Take 10 mg by mouth daily. Provider, Historical   furosemide (LASIX) 20 mg tablet Take 20 mg by mouth every other day. Provider, Historical   amLODIPine (NORVASC) 10 mg tablet Take 10 mg by mouth daily. Provider, Historical       REVIEW OF SYSTEMS:     I am not able to complete the review of systems because:    The patient is intubated and sedated    The patient has altered mental status due to his acute medical problems    The patient has baseline aphasia from prior stroke(s)    The patient has baseline dementia and is not reliable historian    The patient is in acute medical distress and unable to provide information           Total of 12 systems reviewed as follows:       POSITIVE= underlined text  Negative = text not underlined  General:  fever, chills, sweats, generalized weakness, weight loss/gain,      loss of appetite   Eyes:    blurred vision, eye pain, loss of vision, double vision  ENT:    rhinorrhea, pharyngitis   Respiratory:   cough, sputum production, SOB, PAL, wheezing, pleuritic pain   Cardiology:   chest pain, palpitations, orthopnea, PND, edema, syncope   Gastrointestinal:  abdominal pain , N/V, diarrhea, dysphagia, constipation, bleeding   Genitourinary:  frequency, urgency, dysuria, hematuria, incontinence   Muskuloskeletal :  arthralgia, myalgia, back pain  Hematology:  easy bruising, nose or gum bleeding, lymphadenopathy   Dermatological: Decubitus ulcer   Endocrine:   hot flashes or polydipsia   Neurological:  headache, dizziness, confusion, focal weakness, paresthesia,     Speech difficulties, memory loss, gait difficulty  Psychological: Feelings of anxiety, depression, agitation    Objective:   VITALS:    Visit Vitals  BP (!) 137/38 (BP 1 Location: Right arm, BP Patient Position: At rest;Supine)   Pulse (!) 101   Temp 100.2 °F (37.9 °C)   Resp 23   Ht 5' 7\" (1.702 m)   Wt 77.1 kg (170 lb)   SpO2 100%   BMI 26.63 kg/m²       PHYSICAL EXAM:    General:    Alert, cooperative, no distress, appears stated age. HEENT: Atraumatic, anicteric sclerae, pink conjunctivae     No oral ulcers, mucosa moist, throat clear, dentition fair  Neck:  Supple, symmetrical,  thyroid: non tender  Lungs:   Clear to auscultation bilaterally. No Wheezing or Rhonchi. No rales. Chest wall:  No tenderness  No Accessory muscle use. Heart:   Regular  rhythm,  No  murmur   No edema  Abdomen:   Soft, non-tender. Not distended. Bowel sounds normal  Extremities: No cyanosis. No clubbing,      Skin turgor normal, Capillary refill normal, Radial dial pulse 2+  Skin:     Draining sacral decubitus ulcer    Psych:  Fair  insight. Not depressed. Not anxious or agitated. Neurologic: EOMs intact. No facial asymmetry. No aphasia or slurred speech. Symmetrical strength, Sensation grossly intact.  Alert and oriented X 4.         _______________________________________________________________________  Care Plan discussed with:    Comments   Patient x    Family      RN x    Care Manager                    Consultant:      _______________________________________________________________________  Expected  Disposition:   Home with Family    HH/PT/OT/RN    SNF/LTC    [facility]    ________________________________________________________________________  TOTAL TIME:65 Minutes    Critical Care Provided Minutes non procedure based      Comments    x Reviewed previous records   >50% of visit spent in counseling and coordination of care x Discussion with patient and/or family and questions answered       ________________________________________________________________________  Signed: Logan Adkins MD    Procedures: see electronic medical records for all procedures/Xrays and details which were not copied into this note but were reviewed prior to creation of Plan. LAB DATA REVIEWED:    Recent Results (from the past 24 hour(s))   CBC WITH AUTOMATED DIFF    Collection Time: 12/16/20  1:42 PM   Result Value Ref Range    WBC 13.7 (H) 3.6 - 11.0 K/uL    RBC 4.34 3.80 - 5.20 M/uL    HGB 11.8 11.5 - 16.0 g/dL    HCT 36.6 35.0 - 47.0 %    MCV 84.3 80.0 - 99.0 FL    MCH 27.2 26.0 - 34.0 PG    MCHC 32.2 30.0 - 36.5 g/dL    RDW 13.7 11.5 - 14.5 %    PLATELET 253 (H) 011 - 400 K/uL    MPV 8.9 8.9 - 12.9 FL    NRBC 0.0 0  WBC    ABSOLUTE NRBC 0.00 0.00 - 0.01 K/uL    NEUTROPHILS 89 (H) 32 - 75 %    LYMPHOCYTES 7 (L) 12 - 49 %    MONOCYTES 4 (L) 5 - 13 %    EOSINOPHILS 0 0 - 7 %    BASOPHILS 0 0 - 1 %    IMMATURE GRANULOCYTES 0 0.0 - 0.5 %    ABS. NEUTROPHILS 12.1 (H) 1.8 - 8.0 K/UL    ABS. LYMPHOCYTES 1.0 0.8 - 3.5 K/UL    ABS. MONOCYTES 0.5 0.0 - 1.0 K/UL    ABS. EOSINOPHILS 0.0 0.0 - 0.4 K/UL    ABS. BASOPHILS 0.0 0.0 - 0.1 K/UL    ABS. IMM.  GRANS. 0.1 (H) 0.00 - 0.04 K/UL    DF AUTOMATED     METABOLIC PANEL, COMPREHENSIVE    Collection Time: 12/16/20  1:42 PM   Result Value Ref Range    Sodium 134 (L) 136 - 145 mmol/L    Potassium 4.0 3.5 - 5.1 mmol/L    Chloride 99 97 - 108 mmol/L    CO2 28 21 - 32 mmol/L    Anion gap 7 5 - 15 mmol/L    Glucose 128 (H) 65 - 100 mg/dL    BUN 32 (H) 6 - 20 MG/DL    Creatinine 1.14 (H) 0.55 - 1.02 MG/DL    BUN/Creatinine ratio 28 (H) 12 - 20      GFR est AA 57 (L) >60 ml/min/1.73m2    GFR est non-AA 47 (L) >60 ml/min/1.73m2    Calcium 10.5 (H) 8.5 - 10.1 MG/DL    Bilirubin, total 0.5 0.2 - 1.0 MG/DL    ALT (SGPT) 9 (L) 12 - 78 U/L    AST (SGOT) 12 (L) 15 - 37 U/L    Alk.  phosphatase 98 45 - 117 U/L    Protein, total 8.1 6.4 - 8.2 g/dL    Albumin 2.6 (L) 3.5 - 5.0 g/dL    Globulin 5.5 (H) 2.0 - 4.0 g/dL    A-G Ratio 0.5 (L) 1.1 - 2.2     POC LACTIC ACID    Collection Time: 12/16/20  1:49 PM   Result Value Ref Range    Lactic Acid (POC) 1.45 0.40 - 2.00 mmol/L   EKG, 12 LEAD, INITIAL    Collection Time: 12/16/20  2:17 PM   Result Value Ref Range    Ventricular Rate 95 BPM    Atrial Rate 95 BPM    P-R Interval 188 ms    QRS Duration 130 ms    Q-T Interval 360 ms    QTC Calculation (Bezet) 452 ms    Calculated P Axis 83 degrees    Calculated R Axis -7 degrees    Calculated T Axis 10 degrees    Diagnosis       Normal sinus rhythm  Right bundle branch block  No previous ECGs available  Confirmed by Joel Wlels MD, [employer] ([id number]) on 12/16/2020 4:24:20 PM     SARS-COV-2    Collection Time: 12/16/20  4:59 PM   Result Value Ref Range    Specimen source Nasopharyngeal      Specimen source Nasopharyngeal      COVID-19 rapid test Not detected NOTD      Specimen type NP Swab      Health status Symptomatic Testing     URINALYSIS W/ REFLEX CULTURE    Collection Time: 12/16/20  6:25 PM    Specimen: Urine   Result Value Ref Range    Color YELLOW/STRAW      Appearance TURBID (A) CLEAR      Specific gravity 1.019 1.003 - 1.030      pH (UA) 8.5 (H) 5.0 - 8.0      Protein 100 (A) NEG mg/dL    Glucose Negative NEG mg/dL    Ketone Negative NEG mg/dL    Bilirubin Negative NEG      Blood MODERATE (A) NEG      Urobilinogen 0.2 0.2 - 1.0 EU/dL    Nitrites Negative NEG      Leukocyte Esterase LARGE (A) NEG      WBC >100 (H) 0 - 4 /hpf    RBC 20-50 0 - 5 /hpf    Epithelial cells FEW FEW /lpf    Bacteria 1+ (A) NEG /hpf    UA:UC IF INDICATED URINE CULTURE ORDERED (A) CNI      Hyaline cast 2-5 0 - 5 /lpf

## 2020-12-17 NOTE — PROGRESS NOTES
Pharmacy Automatic Renal Dosing Protocol - Antimicrobials    Indication for Antimicrobials: SSTI    Current Regimen of Each Antimicrobial:  Vancomycin 1500 mg x 1 then 1000 mg q18h (Start Date ; Day # 1)    Previous Antimicrobial Therapy:  Cefepime x 1 dose in ED      Goal Level: 15-20 until bacteremia ruled out  (AUC: 400 - 600 mg/hr/Liter/day)     Date Dose & Interval Measured (mcg/mL) Extrapolated (mcg/mL)                       Date & time of next level: Prior to 2nd maintenance dose     Significant Cultures:    blood and urine pending       Radiology / Imaging results: (X-ray, CT scan or MRI):     Paralysis, amputations, malnutrition: ?    Labs:  Recent Labs     20  1342   CREA 1.14*   BUN 32*   WBC 13.7*     Temp (24hrs), Av.8 °F (37.7 °C), Min:99.4 °F (37.4 °C), Max:100.2 °F (37.9 °C)      Is the Patient on Dialysis? Creatinine Clearance (mL/min):   CrCl (Actual Body Weight): 57.5  CrCl (Adjusted Body Weight): 50.6  CrCl (Ideal Body Weight): 45.9    Impression/Plan:   Vancomycin 1000 mg q18h trough = 14.6    No notes , consulted for SSTI so currently on Vanc monotherapy. May need further coverage once more information is avail. Antimicrobial stop date TBD      Pharmacy will follow daily and adjust medications as appropriate for renal function and/or serum levels.     Thank you,  Carrillo Sharp, [facility]

## 2020-12-18 LAB
ANION GAP SERPL CALC-SCNC: 5 MMOL/L (ref 5–15)
BUN SERPL-MCNC: 20 MG/DL (ref 6–20)
BUN/CREAT SERPL: 29 (ref 12–20)
CALCIUM SERPL-MCNC: 9 MG/DL (ref 8.5–10.1)
CHLORIDE SERPL-SCNC: 112 MMOL/L (ref 97–108)
CO2 SERPL-SCNC: 23 MMOL/L (ref 21–32)
COMMENT, HOLDF: NORMAL
CREAT SERPL-MCNC: 0.68 MG/DL (ref 0.55–1.02)
DATE LAST DOSE: ABNORMAL
GLUCOSE SERPL-MCNC: 80 MG/DL (ref 65–100)
POTASSIUM SERPL-SCNC: 3.4 MMOL/L (ref 3.5–5.1)
REPORTED DOSE,DOSE: ABNORMAL UNITS
REPORTED DOSE/TIME,TMG: ABNORMAL
SAMPLES BEING HELD,HOLD: NORMAL
SODIUM SERPL-SCNC: 140 MMOL/L (ref 136–145)
VANCOMYCIN TROUGH SERPL-MCNC: 13.2 UG/ML (ref 5–10)

## 2020-12-18 PROCEDURE — 74011250636 HC RX REV CODE- 250/636: Performed by: INTERNAL MEDICINE

## 2020-12-18 PROCEDURE — 36415 COLL VENOUS BLD VENIPUNCTURE: CPT

## 2020-12-18 PROCEDURE — 80048 BASIC METABOLIC PNL TOTAL CA: CPT

## 2020-12-18 PROCEDURE — 74011250637 HC RX REV CODE- 250/637: Performed by: NURSE PRACTITIONER

## 2020-12-18 PROCEDURE — 80202 ASSAY OF VANCOMYCIN: CPT

## 2020-12-18 PROCEDURE — 74011000258 HC RX REV CODE- 258: Performed by: INTERNAL MEDICINE

## 2020-12-18 PROCEDURE — 65660000000 HC RM CCU STEPDOWN

## 2020-12-18 PROCEDURE — 74011250636 HC RX REV CODE- 250/636: Performed by: NURSE PRACTITIONER

## 2020-12-18 PROCEDURE — 74011250637 HC RX REV CODE- 250/637: Performed by: INTERNAL MEDICINE

## 2020-12-18 RX ORDER — POTASSIUM CHLORIDE 750 MG/1
20 TABLET, FILM COATED, EXTENDED RELEASE ORAL
Status: COMPLETED | OUTPATIENT
Start: 2020-12-18 | End: 2020-12-18

## 2020-12-18 RX ORDER — POLYETHYLENE GLYCOL 3350 17 G/17G
17 POWDER, FOR SOLUTION ORAL DAILY
Status: DISCONTINUED | OUTPATIENT
Start: 2020-12-18 | End: 2020-12-22 | Stop reason: HOSPADM

## 2020-12-18 RX ADMIN — EZETIMIBE 10 MG: 10 TABLET ORAL at 08:09

## 2020-12-18 RX ADMIN — Medication 10 ML: at 21:00

## 2020-12-18 RX ADMIN — Medication 10 ML: at 13:34

## 2020-12-18 RX ADMIN — VANCOMYCIN HYDROCHLORIDE 1000 MG: 1 INJECTION, POWDER, LYOPHILIZED, FOR SOLUTION INTRAVENOUS at 17:50

## 2020-12-18 RX ADMIN — VANCOMYCIN HYDROCHLORIDE 1000 MG: 1 INJECTION, POWDER, LYOPHILIZED, FOR SOLUTION INTRAVENOUS at 00:51

## 2020-12-18 RX ADMIN — CEFEPIME HYDROCHLORIDE 2 G: 2 INJECTION, POWDER, FOR SOLUTION INTRAVENOUS at 13:29

## 2020-12-18 RX ADMIN — POLYETHYLENE GLYCOL 3350 17 G: 17 POWDER, FOR SOLUTION ORAL at 23:29

## 2020-12-18 RX ADMIN — SODIUM CHLORIDE 125 ML/HR: 900 INJECTION, SOLUTION INTRAVENOUS at 10:31

## 2020-12-18 RX ADMIN — METRONIDAZOLE 500 MG: 500 INJECTION, SOLUTION INTRAVENOUS at 08:09

## 2020-12-18 RX ADMIN — POTASSIUM CHLORIDE 20 MEQ: 750 TABLET, EXTENDED RELEASE ORAL at 13:29

## 2020-12-18 RX ADMIN — OXYCODONE HYDROCHLORIDE AND ACETAMINOPHEN 1 TABLET: 10; 325 TABLET ORAL at 18:02

## 2020-12-18 RX ADMIN — AMLODIPINE BESYLATE 10 MG: 5 TABLET ORAL at 08:09

## 2020-12-18 RX ADMIN — METRONIDAZOLE 500 MG: 500 INJECTION, SOLUTION INTRAVENOUS at 20:32

## 2020-12-18 RX ADMIN — SODIUM CHLORIDE 125 ML/HR: 900 INJECTION, SOLUTION INTRAVENOUS at 20:35

## 2020-12-18 RX ADMIN — CEFEPIME HYDROCHLORIDE 2 G: 2 INJECTION, POWDER, FOR SOLUTION INTRAVENOUS at 00:51

## 2020-12-18 RX ADMIN — ENOXAPARIN SODIUM 40 MG: 40 INJECTION SUBCUTANEOUS at 08:09

## 2020-12-18 NOTE — PROGRESS NOTES
Transition of Care Plan:                      D/C back to [facility]  [facility]. F/U appointment  2nd IM Letter at d/c  [facility] to transport at d/c    Reason for Admission:   Sepsis with septic shock secondary to infected decubitus sacral ulcer and UTI POA                   RUR Score:  11%                   Plan for utilizing home health:     Open with [address] [place] [address]     PCP: First and Last name:  Dr. Melvin Francisco   Name of Practice:    Are you a current patient: Yes/No: Yes Approximate date of last visit:     Can you participate in a virtual visit with your PCP:  Yes                    Current Advanced Directive/Advance Care Plan:  Full code, No ACD on file. Richard Sheriff owner- contact- [phone]. Per Pt, has no siblings, parents are  and   in . Transition of Care Plan:                      D/C back to [facility] [address] [place] [address]. F/U appointment  2nd IM Letter at d/c  [facility] to transport at d/c    CM verified with pt, pt demographics, insurance info and emergency contact. Pt lives in a group home. Pt is a paraplegic and bed bound, is total assist with transfer, can pivot only and can stand only 3-4 mins. Per pt, she can't walk or stand, waiting on approval for part B. Pt is in the process of having approved a wheelchair, speciality bed. Pt has a special roberto lift that can lift pt in standing position and transfer to chair. Pt is dependent with ADL's, wears diapers and uses ambulance transportation. Has HH- open with [facility] care. Uses Mail order for meds and a local pharmacy. CM will continue to follow pt for D/C planning and arrange services as deemed neccessary    Care Management Interventions  PCP Verified by CM:  Yes  Last Visit to PCP: 20  Mode of Transport at Discharge: S  Transition of Care Consult (CM Consult): Home Health, [facility] [address])  [address] [address]: No  Reason Outside [place]: Patient already serviced by other home care/hospice agency  Current Support Network:  Other(Lies in a [address] [facility])  Confirm Follow Up Transport: Other (see comment)    [address]  Phone: [phone]

## 2020-12-18 NOTE — PROGRESS NOTES
Comprehensive Nutrition Assessment    Type and Reason for Visit: Initial, Wound    Nutrition Recommendations/Plan:   Continue Regular diet  Add ensure enlive BID    Nutrition Assessment:     Patient medically noted for sepsis and stage 4 PI's to sacrum and right heel. Patient reports her appetite is good; fairly normal compared to at home. She takes prostat BID at her group home for extra protein. She also likes ensure and is agreeable to this while in the hospital (chocolate or strawberry). Menu at bedside. Continue regular diet. Encourage intake of meals and protein sources. Estimated Daily Nutrient Needs:  Energy (kcal): 1732 kcal (BMR 1333 x 1. 3AF); Weight Used for Energy Requirements: Current  Protein (g): 100-116g (1.3-1.5 g/kg bw); Weight Used for Protein Requirements: Current  Fluid (ml/day): 1700 mL; Method Used for Fluid Requirements: 1 ml/kcal    Nutrition Related Findings:       BM 12/17  K+ 3.4, BG 80-  Norvasc, Zetia  MRI pending to rule out osteomyelitis     Wounds:    Stage IV       Current Nutrition Therapies:  DIET REGULAR  DIET ONE TIME MESSAGE    Anthropometric Measures:  · Height:  5' 7\" (170.2 cm)  · Current Body Wt:  77.1 kg (169 lb 15.6 oz)   · BMI Category: Overweight (BMI 25.0-29. 9)       Nutrition Diagnosis:   · Increased nutrient needs related to (wound healing) as evidenced by (stage 4 PI x 2)    Nutrition Interventions:   Food and/or Nutrient Delivery: Continue current diet, Start oral nutrition supplement  Nutrition Education and Counseling: No recommendations at this time  Coordination of Nutrition Care: No recommendation at this time    Goals:  PO intake >70% of meals/supplements next 5-7 days       Nutrition Monitoring and Evaluation:   Behavioral-Environmental Outcomes: None identified  Food/Nutrient Intake Outcomes: Food and nutrient intake, Supplement intake  Physical Signs/Symptoms Outcomes: Biochemical data, Skin, Weight    Discharge Planning:    Continue current diet, Continue oral nutrition supplement     Electronically signed by Santosh Portillo RD on 12/18/2020 at 11:52 AM    Contact: ext [phone]
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
## 2020-12-19 LAB
ANION GAP SERPL CALC-SCNC: 4 MMOL/L (ref 5–15)
BACTERIA SPEC CULT: ABNORMAL
BUN SERPL-MCNC: 16 MG/DL (ref 6–20)
BUN/CREAT SERPL: 24 (ref 12–20)
CALCIUM SERPL-MCNC: 9 MG/DL (ref 8.5–10.1)
CC UR VC: ABNORMAL
CHLORIDE SERPL-SCNC: 113 MMOL/L (ref 97–108)
CO2 SERPL-SCNC: 22 MMOL/L (ref 21–32)
CREAT SERPL-MCNC: 0.66 MG/DL (ref 0.55–1.02)
ERYTHROCYTE [DISTWIDTH] IN BLOOD BY AUTOMATED COUNT: 14 % (ref 11.5–14.5)
GLUCOSE SERPL-MCNC: 95 MG/DL (ref 65–100)
GRAM STN SPEC: ABNORMAL
HCT VFR BLD AUTO: 30.1 % (ref 35–47)
HGB BLD-MCNC: 9.6 G/DL (ref 11.5–16)
MCH RBC QN AUTO: 27 PG (ref 26–34)
MCHC RBC AUTO-ENTMCNC: 31.9 G/DL (ref 30–36.5)
MCV RBC AUTO: 84.8 FL (ref 80–99)
NRBC # BLD: 0 K/UL (ref 0–0.01)
NRBC BLD-RTO: 0 PER 100 WBC
PLATELET # BLD AUTO: 402 K/UL (ref 150–400)
PMV BLD AUTO: 9 FL (ref 8.9–12.9)
POTASSIUM SERPL-SCNC: 3.3 MMOL/L (ref 3.5–5.1)
RBC # BLD AUTO: 3.55 M/UL (ref 3.8–5.2)
SERVICE CMNT-IMP: ABNORMAL
SERVICE CMNT-IMP: ABNORMAL
SODIUM SERPL-SCNC: 139 MMOL/L (ref 136–145)
WBC # BLD AUTO: 6.4 K/UL (ref 3.6–11)

## 2020-12-19 PROCEDURE — 74011250636 HC RX REV CODE- 250/636: Performed by: INTERNAL MEDICINE

## 2020-12-19 PROCEDURE — 74011000258 HC RX REV CODE- 258: Performed by: INTERNAL MEDICINE

## 2020-12-19 PROCEDURE — 74011250637 HC RX REV CODE- 250/637: Performed by: INTERNAL MEDICINE

## 2020-12-19 PROCEDURE — 80048 BASIC METABOLIC PNL TOTAL CA: CPT

## 2020-12-19 PROCEDURE — 36415 COLL VENOUS BLD VENIPUNCTURE: CPT

## 2020-12-19 PROCEDURE — 2709999900 HC NON-CHARGEABLE SUPPLY

## 2020-12-19 PROCEDURE — 85027 COMPLETE CBC AUTOMATED: CPT

## 2020-12-19 PROCEDURE — 65270000029 HC RM PRIVATE

## 2020-12-19 RX ORDER — POTASSIUM CHLORIDE 750 MG/1
20 TABLET, FILM COATED, EXTENDED RELEASE ORAL
Status: COMPLETED | OUTPATIENT
Start: 2020-12-19 | End: 2020-12-19

## 2020-12-19 RX ADMIN — Medication 10 ML: at 13:08

## 2020-12-19 RX ADMIN — AMLODIPINE BESYLATE 10 MG: 5 TABLET ORAL at 08:18

## 2020-12-19 RX ADMIN — ACETAMINOPHEN 650 MG: 325 TABLET ORAL at 15:35

## 2020-12-19 RX ADMIN — ENOXAPARIN SODIUM 40 MG: 40 INJECTION SUBCUTANEOUS at 08:18

## 2020-12-19 RX ADMIN — VANCOMYCIN HYDROCHLORIDE 1000 MG: 1 INJECTION, POWDER, LYOPHILIZED, FOR SOLUTION INTRAVENOUS at 13:00

## 2020-12-19 RX ADMIN — METRONIDAZOLE 500 MG: 500 INJECTION, SOLUTION INTRAVENOUS at 08:18

## 2020-12-19 RX ADMIN — POTASSIUM CHLORIDE 20 MEQ: 750 TABLET, EXTENDED RELEASE ORAL at 13:00

## 2020-12-19 RX ADMIN — Medication 10 ML: at 21:51

## 2020-12-19 RX ADMIN — CEFEPIME HYDROCHLORIDE 2 G: 2 INJECTION, POWDER, FOR SOLUTION INTRAVENOUS at 02:26

## 2020-12-19 RX ADMIN — EZETIMIBE 10 MG: 10 TABLET ORAL at 08:18

## 2020-12-19 RX ADMIN — CEFEPIME HYDROCHLORIDE 2 G: 2 INJECTION, POWDER, FOR SOLUTION INTRAVENOUS at 13:01

## 2020-12-19 RX ADMIN — Medication 10 ML: at 06:00

## 2020-12-19 NOTE — PROGRESS NOTES
Pharmacy Automatic Renal Dosing Protocol - Antimicrobials    Indication for Antimicrobials: SSTI    Current Regimen of Each Antimicrobial:  Vancomycin 1000 mg IV every 18 hours (Start Date ; Day # 4)  Cefepime 2 IV every 12 hours (Start Date ; Day # 4)  Metronidazole 500 mg IV every 12 hours (Start Date ; Day # 4)    Previous Antimicrobial Therapy:  Cefepime x 1 dose in ED      Goal Level: 10-15 (AUC: 400 - 600 mg/hr/Liter/day)     Date Dose & Interval Measured (mcg/mL) Extrapolated (mcg/mL)    @ 1725 1 g every 18 hours 13.2 12.2                 Date & time of next level:  prior to midnight dose    Significant Cultures:    blood: NGTD x 2 days- prelim  : urine - POSSIBLE ENTEROCOCCUS SPECIES  - pending   : wound (buttock): light probable S. Aureus, scant proteus - prelim    Paralysis, amputations, malnutrition: none    Labs:  Recent Labs     20  0626 20  0409 20  0707 20  1342   CREA 0.66 0.68 0.89 1.14*   BUN 16 20 26* 32*   WBC 6.4  --  6.9 13.7*     Temp (24hrs), Av.5 °F (36.9 °C), Min:98 °F (36.7 °C), Max:98.7 °F (37.1 °C)      Is the Patient on Dialysis? no    Creatinine Clearance (mL/min):   CrCl (Ideal Body Weight): 74.8    Impression/Plan:   Vancomycin trough resulted as therapeutic at 12.2 mcg/mL. Will continue current regimen as appropriate for indication and renal function. Antimicrobial stop date TBD      Pharmacy will follow daily and adjust medications as appropriate for renal function and/or serum levels. Thank you,  MARLEN ScottD    Recommended duration of therapy  [email or web address]/Pharmacy/Clinical%20Companion/[email or web address]. docx    Renal Dosing  [email or web address]/Pharmacy/Clinical%20Companion/Renal%[email or web address]. pdf

## 2020-12-19 NOTE — PROGRESS NOTES
1900: Bedside and Verbal shift change report given to Shakeel Davis RN (oncoming nurse) by Palmira Jim RN (offgoing nurse). Report included the following information SBAR, Kardex, Procedure Summary, Intake/Output, MAR, Recent Results and Cardiac Rhythm NSR.     2300: Notified PREET Simpson about patient having constipation. NP ordered Miralax 17g. Will continue to monitor. 0730: End of Shift Note    Bedside shift change report given to Palmira Jim RN (oncoming nurse) by Shakeel Davis (offgoing nurse). Report included the following information SBAR, Kardex, Intake/Output, MAR, Accordion and Cardiac Rhythm NSR    Shift worked:  [id number]     Shift summary and any significant changes:     Patient c/o constipation. NP ordered Miralax. Concerns for physician to address:  NONE     Zone phone for oncoming shift:   [phone]       Activity:  Activity Level: Bed Rest  Number times ambulated in hallways past shift: 0  Number of times OOB to chair past shift: 0    Cardiac:   Cardiac Monitoring: Yes      Cardiac Rhythm: Normal sinus rhythm    Access:   Current line(s): PIV and central line     Genitourinary:   Urinary status: incontinent and external catheter    Respiratory:   O2 Device: Room air  Chronic home O2 use?: NO  Incentive spirometer at bedside: YES     GI:  Last Bowel Movement Date: 12/17/20  Current diet:  DIET REGULAR  DIET NUTRITIONAL SUPPLEMENTS Breakfast, Dinner; Ensure [employer]  Passing flatus: NO  Tolerating current diet: NO  % Diet Eaten: 0 %    Pain Management:   Patient states pain is manageable on current regimen: YES    Skin:  Kristian Score: 15  Interventions: turn team, speciality bed, float heels, foam dressing, internal/external urinary devices and nutritional support     Patient Safety:  Fall Score:  Total Score: 2  Interventions: bed/chair alarm and gripper socks       Length of Stay:  Expected LOS: 4d 19h  Actual LOS: 1900 [employer]

## 2020-12-19 NOTE — PROGRESS NOTES
TRANSFER - OUT REPORT:    Verbal report given to Mariely Verduzco RN(name) on Joslyn Villalta  being transferred to Gen Surg(unit) for routine progression of care       Report consisted of patients Situation, Background, Assessment and   Recommendations(SBAR). Information from the following report(s) SBAR, Intake/Output, Recent Results and Cardiac Rhythm SR was reviewed with the receiving nurse. Lines:   Peripheral IV 12/16/20 Right Antecubital (Active)   Site Assessment Clean, dry, & intact 12/19/20 1139   Phlebitis Assessment 0 12/19/20 1139   Infiltration Assessment 0 12/19/20 1139   Dressing Status Clean, dry, & intact 12/19/20 1139   Dressing Type Transparent;Tape 12/19/20 1139   Hub Color/Line Status Pink;Capped 12/19/20 1139   Alcohol Cap Used Yes 12/18/20 2031       Peripheral IV 12/17/20 Right Hand (Active)   Site Assessment Clean, dry, & intact 12/19/20 1139   Phlebitis Assessment 0 12/19/20 1139   Infiltration Assessment 0 12/19/20 1139   Dressing Status Clean, dry, & intact 12/19/20 1139   Dressing Type Transparent;Tape 12/19/20 1139   Hub Color/Line Status Pink;Capped 12/19/20 1139   Alcohol Cap Used Yes 12/18/20 2031        Opportunity for questions and clarification was provided.       Patient transported with:

## 2020-12-19 NOTE — PROGRESS NOTES
1628: Critical result received from Jesus. MD notified. 1644: No new orders received at this time. End of Shift Note    Bedside shift change report given to Hart Severe, RN (oncoming nurse) by Phillip Hemphill RN (offgoing nurse). Report included the following information SBAR, Kardex, ED Summary, Intake/Output, MAR, Recent Results and Cardiac Rhythm (NSR)    Shift worked:  7a-7p     Shift summary and any significant changes:     Pt transferred from PCU around 1500. Patient had blanka care performed. Patient rested in bed with one complaint of pain; see MAR. One small bowel movement. Concerns for physician to address:  n/a     Zone phone for oncoming shift:   [phone]       Activity:  Activity Level: Bed Rest  Number times ambulated in hallways past shift: 0  Number of times OOB to chair past shift: 0    Cardiac:   Cardiac Monitoring: Yes      Cardiac Rhythm: Normal sinus rhythm    Access:   Current line(s): PIV     Genitourinary:   Urinary status: incontinent    Respiratory:   O2 Device: Room air  Chronic home O2 use?: N/A  Incentive spirometer at bedside: N/A     GI:  Last Bowel Movement Date: 12/19/20  Current diet:  DIET REGULAR  DIET NUTRITIONAL SUPPLEMENTS Breakfast, Dinner; Ensure [employer]  Passing flatus: YES  Tolerating current diet: YES  % Diet Eaten: 100 %    Pain Management:   Patient states pain is manageable on current regimen: YES    Skin:  Kristian Score: 16  Interventions: float heels, foam dressing, limit briefs and internal/external urinary devices    Patient Safety:  Fall Score:  Total Score: 2  Interventions: bed/chair alarm, gripper socks, pt to call before getting OOB and stay with me (per policy)       Length of Stay:  Expected LOS: 4d 19h  Actual LOS: [address], RN

## 2020-12-19 NOTE — PROGRESS NOTES
TRANSFER - IN REPORT:    Verbal report received from LASHANDA Miller (name) on Joslyn Villalta  being received from PCU (unit) for routine progression of care      Report consisted of patients Situation, Background, Assessment and   Recommendations(SBAR). Information from the following report(s) SBAR, Kardex, ED Summary, Intake/Output, MAR, Recent Results and Cardiac Rhythm (NSR) was reviewed with the receiving nurse. Opportunity for questions and clarification was provided. Assessment completed upon patients arrival to unit and care assumed.
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
## 2020-12-19 NOTE — PROGRESS NOTES
Problem: Falls - Risk of  Goal: *Absence of Falls  Description: Document Edmonia Morning Fall Risk and appropriate interventions in the flowsheet.   Outcome: Progressing Towards Goal  Note: Fall Risk Interventions:            Medication Interventions: Evaluate medications/consider consulting pharmacy, Bed/chair exit alarm    Elimination Interventions: Call light in reach, Patient to call for help with toileting needs, Toileting schedule/hourly rounds              Problem: Sepsis: Day 6  Goal: Off Pathway (Use only if patient is Off Pathway)  Outcome: Progressing Towards Goal  Goal: *Oxygen saturation within defined limits  Outcome: Progressing Towards Goal  Goal: *Vital signs within defined limits  Outcome: Progressing Towards Goal  Goal: *Tolerating diet  Outcome: Progressing Towards Goal  Goal: *Demonstrates progressive activity  Outcome: Progressing Towards Goal  Goal: Influenza immunization  Outcome: Progressing Towards Goal  Goal: *Pneumococcal immunization  Outcome: Progressing Towards Goal  Goal: Activity/Safety  Outcome: Progressing Towards Goal  Goal: Diagnostic Test/Procedures  Outcome: Progressing Towards Goal  Goal: Nutrition/Diet  Outcome: Progressing Towards Goal  Goal: Discharge Planning  Outcome: Progressing Towards Goal  Goal: Medications  Outcome: Progressing Towards Goal  Goal: Respiratory  Outcome: Progressing Towards Goal  Goal: Treatments/Interventions/Procedures  Outcome: Progressing Towards Goal  Goal: Psychosocial  Outcome: Progressing Towards Goal

## 2020-12-20 LAB
ANION GAP SERPL CALC-SCNC: 4 MMOL/L (ref 5–15)
BUN SERPL-MCNC: 12 MG/DL (ref 6–20)
BUN/CREAT SERPL: 21 (ref 12–20)
CALCIUM SERPL-MCNC: 9.3 MG/DL (ref 8.5–10.1)
CHLORIDE SERPL-SCNC: 111 MMOL/L (ref 97–108)
CO2 SERPL-SCNC: 25 MMOL/L (ref 21–32)
CREAT SERPL-MCNC: 0.58 MG/DL (ref 0.55–1.02)
DATE LAST DOSE: ABNORMAL
GLUCOSE SERPL-MCNC: 95 MG/DL (ref 65–100)
POTASSIUM SERPL-SCNC: 3.4 MMOL/L (ref 3.5–5.1)
REPORTED DOSE,DOSE: ABNORMAL UNITS
REPORTED DOSE/TIME,TMG: 600
SODIUM SERPL-SCNC: 140 MMOL/L (ref 136–145)
VANCOMYCIN TROUGH SERPL-MCNC: 16.9 UG/ML (ref 5–10)

## 2020-12-20 PROCEDURE — 36415 COLL VENOUS BLD VENIPUNCTURE: CPT

## 2020-12-20 PROCEDURE — 74011000258 HC RX REV CODE- 258: Performed by: INTERNAL MEDICINE

## 2020-12-20 PROCEDURE — 74011250637 HC RX REV CODE- 250/637: Performed by: INTERNAL MEDICINE

## 2020-12-20 PROCEDURE — 65270000029 HC RM PRIVATE

## 2020-12-20 PROCEDURE — 80202 ASSAY OF VANCOMYCIN: CPT

## 2020-12-20 PROCEDURE — 74011250636 HC RX REV CODE- 250/636: Performed by: INTERNAL MEDICINE

## 2020-12-20 PROCEDURE — 80048 BASIC METABOLIC PNL TOTAL CA: CPT

## 2020-12-20 PROCEDURE — 74011250637 HC RX REV CODE- 250/637: Performed by: NURSE PRACTITIONER

## 2020-12-20 RX ADMIN — Medication 10 ML: at 05:17

## 2020-12-20 RX ADMIN — POLYETHYLENE GLYCOL 3350 17 G: 17 POWDER, FOR SOLUTION ORAL at 10:04

## 2020-12-20 RX ADMIN — EZETIMIBE 10 MG: 10 TABLET ORAL at 10:04

## 2020-12-20 RX ADMIN — Medication 10 ML: at 22:01

## 2020-12-20 RX ADMIN — ENOXAPARIN SODIUM 40 MG: 40 INJECTION SUBCUTANEOUS at 10:04

## 2020-12-20 RX ADMIN — CEFEPIME HYDROCHLORIDE 2 G: 2 INJECTION, POWDER, FOR SOLUTION INTRAVENOUS at 00:53

## 2020-12-20 RX ADMIN — AMLODIPINE BESYLATE 10 MG: 5 TABLET ORAL at 10:04

## 2020-12-20 RX ADMIN — OXYCODONE HYDROCHLORIDE AND ACETAMINOPHEN 1 TABLET: 10; 325 TABLET ORAL at 18:07

## 2020-12-20 RX ADMIN — CEFEPIME HYDROCHLORIDE 2 G: 2 INJECTION, POWDER, FOR SOLUTION INTRAVENOUS at 15:02

## 2020-12-20 RX ADMIN — Medication 10 ML: at 15:03

## 2020-12-20 RX ADMIN — VANCOMYCIN HYDROCHLORIDE 1000 MG: 1 INJECTION, POWDER, LYOPHILIZED, FOR SOLUTION INTRAVENOUS at 05:17

## 2020-12-20 RX ADMIN — VANCOMYCIN HYDROCHLORIDE 1000 MG: 1 INJECTION, POWDER, LYOPHILIZED, FOR SOLUTION INTRAVENOUS at 23:49

## 2020-12-20 NOTE — PROGRESS NOTES
End of Shift Note    Bedside shift change report given to Johnny Pardo RN (oncoming nurse) by Jeannie Gutierres RN (offgoing nurse). Report included the following information SBAR, Kardex, ED Summary, Intake/Output, MAR, Recent Results and Cardiac Rhythm (NSR)    Shift worked:  7a-7p     Shift summary and any significant changes:     Uneventful. Patient rested in bed all of shift. Both IVs on right arm infiltrated and removed. This nurse, Laurie Syed LPN, and Arlene DRUMMOND tried to gain access. Beronica Melton RN gained new access. Wound care performed this shift. two BMs. Using perwick for urine. Treated once for pain before switching to new bed and wound care performed; see MAR. Concerns for physician to address:  n/a     [employer] phone for oncoming shift:   [phone]       Activity:  Activity Level: Bed Rest  Number times ambulated in hallways past shift: 0  Number of times OOB to chair past shift: 0    Cardiac:   Cardiac Monitoring: Yes      Cardiac Rhythm: Normal sinus rhythm    Access:   Current line(s): no access     Genitourinary:   Urinary status: voiding and external catheter    Respiratory:   O2 Device: Room air  Chronic home O2 use?: N/A  Incentive spirometer at bedside: N/A     GI:  Last Bowel Movement Date: 12/20/20  Current diet:  DIET REGULAR  DIET NUTRITIONAL SUPPLEMENTS Breakfast, Dinner; Ensure [employer]  Passing flatus: YES  Tolerating current diet: YES  % Diet Eaten: 100 %    Pain Management:   Patient states pain is manageable on current regimen: YES    Skin:  Kristian Score: 16  Interventions: turn team, speciality bed, float heels, foam dressing and internal/external urinary devices    Patient Safety:  Fall Score:  Total Score: 2  Interventions: assistive device (walker, cane, etc), gripper socks and pt to call before getting OOB       Length of Stay:  Expected LOS: 4d 19h  Actual LOS: [address], RN

## 2020-12-20 NOTE — PROGRESS NOTES
Problem: Falls - Risk of  Goal: *Absence of Falls  Description: Document Tonny Adkins Fall Risk and appropriate interventions in the flowsheet. Outcome: Progressing Towards Goal  Note: Fall Risk Interventions:            Medication Interventions: Bed/chair exit alarm    Elimination Interventions: Bed/chair exit alarm, Call light in reach, Patient to call for help with toileting needs, Toileting schedule/hourly rounds              Problem: Patient Education: Go to Patient Education Activity  Goal: Patient/Family Education  Outcome: Progressing Towards Goal     Problem: Pressure Injury - Risk of  Goal: *Prevention of pressure injury  Description: Document Kristian Scale and appropriate interventions in the flowsheet.   Outcome: Progressing Towards Goal  Note: Pressure Injury Interventions:       Moisture Interventions: Absorbent underpads, Internal/External urinary devices    Activity Interventions: Assess need for specialty bed, PT/OT evaluation    Mobility Interventions: Assess need for specialty bed, Float heels    Nutrition Interventions: Document food/fluid/supplement intake    Friction and Shear Interventions: Apply protective barrier, creams and emollients, Feet elevated on foot rest, HOB 30 degrees or less, Minimize layers                Problem: Patient Education: Go to Patient Education Activity  Goal: Patient/Family Education  Outcome: Progressing Towards Goal     Problem: Sepsis: Day of Diagnosis  Goal: Off Pathway (Use only if patient is Off Pathway)  Outcome: Progressing Towards Goal  Goal: *Fluid resuscitation  Outcome: Progressing Towards Goal  Goal: *Paired blood cultures prior to first dose of antibiotic  Outcome: Progressing Towards Goal  Goal: *First dose of  appropriate antibiotic within 3 hours of arrival to ED, within 1 hour of arrival to ICU  Outcome: Progressing Towards Goal  Goal: *Lactic acid with first set of blood cultures  Outcome: Progressing Towards Goal  Goal: *Pneumococcal immunization (if eligible)  Outcome: Progressing Towards Goal  Goal: *Influenza immunization (if eligible)  Outcome: Progressing Towards Goal  Goal: Activity/Safety  Outcome: Progressing Towards Goal  Goal: Consults, if ordered  Outcome: Progressing Towards Goal  Goal: Diagnostic Test/Procedures  Outcome: Progressing Towards Goal  Goal: Nutrition/Diet  Outcome: Progressing Towards Goal  Goal: Discharge Planning  Outcome: Progressing Towards Goal  Goal: Medications  Outcome: Progressing Towards Goal  Goal: Respiratory  Outcome: Progressing Towards Goal  Goal: Treatments/Interventions/Procedures  Outcome: Progressing Towards Goal  Goal: Psychosocial  Outcome: Progressing Towards Goal     Problem: Sepsis: Day 2  Goal: Off Pathway (Use only if patient is Off Pathway)  Outcome: Progressing Towards Goal  Goal: *Central Venous Pressure maintained at 8-12 mm Hg  Outcome: Progressing Towards Goal  Goal: *Hemodynamically stable  Outcome: Progressing Towards Goal  Goal: *Tolerating diet  Outcome: Progressing Towards Goal  Goal: Activity/Safety  Outcome: Progressing Towards Goal  Goal: Consults, if ordered  Outcome: Progressing Towards Goal  Goal: Diagnostic Test/Procedures  Outcome: Progressing Towards Goal  Goal: Nutrition/Diet  Outcome: Progressing Towards Goal  Goal: Discharge Planning  Outcome: Progressing Towards Goal  Goal: Medications  Outcome: Progressing Towards Goal  Goal: Respiratory  Outcome: Progressing Towards Goal  Goal: Treatments/Interventions/Procedures  Outcome: Progressing Towards Goal  Goal: Psychosocial  Outcome: Progressing Towards Goal     Problem: Sepsis: Day 3  Goal: Off Pathway (Use only if patient is Off Pathway)  Outcome: Progressing Towards Goal  Goal: *Central Venous Pressure maintained at 8-12 mm Hg  Outcome: Progressing Towards Goal  Goal: *Oxygen saturation within defined limits  Outcome: Progressing Towards Goal  Goal: *Vital sign stability  Outcome: Progressing Towards Goal  Goal: *Tolerating diet  Outcome: Progressing Towards Goal  Goal: *Demonstrates progressive activity  Outcome: Progressing Towards Goal  Goal: Activity/Safety  Outcome: Progressing Towards Goal  Goal: Consults, if ordered  Outcome: Progressing Towards Goal  Goal: Diagnostic Test/Procedures  Outcome: Progressing Towards Goal  Goal: Nutrition/Diet  Outcome: Progressing Towards Goal  Goal: Discharge Planning  Outcome: Progressing Towards Goal  Goal: Medications  Outcome: Progressing Towards Goal  Goal: Respiratory  Outcome: Progressing Towards Goal  Goal: Treatments/Interventions/Procedures  Outcome: Progressing Towards Goal  Goal: Psychosocial  Outcome: Progressing Towards Goal     Problem: Sepsis: Day 4  Goal: Off Pathway (Use only if patient is Off Pathway)  Outcome: Progressing Towards Goal  Goal: Activity/Safety  Outcome: Progressing Towards Goal  Goal: Consults, if ordered  Outcome: Progressing Towards Goal  Goal: Diagnostic Test/Procedures  Outcome: Progressing Towards Goal  Goal: Nutrition/Diet  Outcome: Progressing Towards Goal  Goal: Discharge Planning  Outcome: Progressing Towards Goal  Goal: Medications  Outcome: Progressing Towards Goal  Goal: Respiratory  Outcome: Progressing Towards Goal  Goal: Treatments/Interventions/Procedures  Outcome: Progressing Towards Goal  Goal: Psychosocial  Outcome: Progressing Towards Goal  Goal: *Oxygen saturation within defined limits  Outcome: Progressing Towards Goal  Goal: *Hemodynamically stable  Outcome: Progressing Towards Goal  Goal: *Vital signs within defined limits  Outcome: Progressing Towards Goal  Goal: *Tolerating diet  Outcome: Progressing Towards Goal  Goal: *Demonstrates progressive activity  Outcome: Progressing Towards Goal  Goal: *Fluid volume maintenance  Outcome: Progressing Towards Goal     Problem: Sepsis: Day 5  Goal: Off Pathway (Use only if patient is Off Pathway)  Outcome: Progressing Towards Goal  Goal: *Oxygen saturation within defined limits  Outcome: Progressing Towards Goal  Goal: *Vital signs within defined limits  Outcome: Progressing Towards Goal  Goal: *Tolerating diet  Outcome: Progressing Towards Goal  Goal: *Demonstrates progressive activity  Outcome: Progressing Towards Goal  Goal: *Discharge plan identified  Outcome: Progressing Towards Goal  Goal: Activity/Safety  Outcome: Progressing Towards Goal  Goal: Consults, if ordered  Outcome: Progressing Towards Goal  Goal: Diagnostic Test/Procedures  Outcome: Progressing Towards Goal  Goal: Nutrition/Diet  Outcome: Progressing Towards Goal  Goal: Discharge Planning  Outcome: Progressing Towards Goal  Goal: Medications  Outcome: Progressing Towards Goal  Goal: Respiratory  Outcome: Progressing Towards Goal  Goal: Treatments/Interventions/Procedures  Outcome: Progressing Towards Goal  Goal: Psychosocial  Outcome: Progressing Towards Goal     Problem: Sepsis: Day 6  Goal: Off Pathway (Use only if patient is Off Pathway)  Outcome: Progressing Towards Goal  Goal: *Oxygen saturation within defined limits  Outcome: Progressing Towards Goal  Goal: *Vital signs within defined limits  Outcome: Progressing Towards Goal  Goal: *Tolerating diet  Outcome: Progressing Towards Goal  Goal: *Demonstrates progressive activity  Outcome: Progressing Towards Goal  Goal: Influenza immunization  Outcome: Progressing Towards Goal  Goal: *Pneumococcal immunization  Outcome: Progressing Towards Goal  Goal: Activity/Safety  Outcome: Progressing Towards Goal  Goal: Diagnostic Test/Procedures  Outcome: Progressing Towards Goal  Goal: Nutrition/Diet  Outcome: Progressing Towards Goal  Goal: Discharge Planning  Outcome: Progressing Towards Goal  Goal: Medications  Outcome: Progressing Towards Goal  Goal: Respiratory  Outcome: Progressing Towards Goal  Goal: Treatments/Interventions/Procedures  Outcome: Progressing Towards Goal  Goal: Psychosocial  Outcome: Progressing Towards Goal     Problem: Sepsis: Discharge Outcomes  Goal: *Vital signs within defined limits  Outcome: Progressing Towards Goal  Goal: *Tolerating diet  Outcome: Progressing Towards Goal  Goal: *Verbalizes understanding and describes prescribed diet  Outcome: Progressing Towards Goal  Goal: *Demonstrates progressive activity  Outcome: Progressing Towards Goal  Goal: *Describes follow-up/return visits to physicians  Outcome: Progressing Towards Goal  Goal: *Verbalizes name, dosage, time, side effects, and number of days to continue medications  Outcome: Progressing Towards Goal  Goal: *Influenza immunization (Oct-Mar only)  Outcome: Progressing Towards Goal  Goal: *Pneumococcal immunization  Outcome: Progressing Towards Goal  Goal: *Lungs clear or at baseline  Outcome: Progressing Towards Goal  Goal: *Oxygen saturation returns to baseline or 90% or better on room air  Outcome: Progressing Towards Goal  Goal: *Glycemic control  Outcome: Progressing Towards Goal  Goal: *Absence of deep venous thrombosis signs and symptoms(Stroke Metric)  Outcome: Progressing Towards Goal  Goal: *Describes available resources and support systems  Outcome: Progressing Towards Goal  Goal: *Optimal pain control at patient's stated goal  Outcome: Progressing Towards Goal

## 2020-12-20 NOTE — PROGRESS NOTES
End of Shift Note    Bedside shift change report given to Concha Apple (oncoming nurse) by Joy Medellin (offgoing nurse). Report included the following information SBAR, Kardex, ED Summary, Intake/Output, MAR and Recent Results    Shift worked:  0520-3581     Shift summary and any significant changes:     Pt tolerated all aspects of care. Pt able to turn herself and assist with wound care and incontinence care. Pt able to use purewick for urine. Pt stated she is not in any pain. No significant events to report. Concerns for physician to address:  none     Zone phone for oncoming shift:   [phone]       Activity:  Activity Level: Bed Rest  Number times ambulated in hallways past shift: 0  Number of times OOB to chair past shift: 0    Cardiac:   Cardiac Monitoring: Yes      Cardiac Rhythm: Normal sinus rhythm    Access:   Current line(s): PIV     Genitourinary:   Urinary status: external catheter    Respiratory:   O2 Device: Room air  Chronic home O2 use?: YES  Incentive spirometer at bedside: YES     GI:  Last Bowel Movement Date: 12/19/20  Current diet:  DIET REGULAR  DIET NUTRITIONAL SUPPLEMENTS Breakfast, Dinner; Ensure [employer]  Passing flatus: YES  Tolerating current diet: YES  % Diet Eaten: 100 %    Pain Management:   Patient states pain is manageable on current regimen: YES    Skin:  Kristian Score: 16  Interventions: turn team, speciality bed, float heels, foam dressing and internal/external urinary devices    Patient Safety:  Fall Score:  Total Score: 2  Interventions: bed/chair alarm, gripper socks and stay with me (per policy)       Length of Stay:  Expected LOS: 4d 19h  Actual LOS: [facility]

## 2020-12-20 NOTE — PROGRESS NOTES
Hospitalist Progress Note    NAME: Prabha Quach   :  1950   MRN:  [id number]       Assessment / Plan:    Sepsis with septic shock secondary to infected decubitus sacral ulcer and UTI POA  Unstageable sacral  ulcer POA  Blood pressure stable now  Patient was given resuscitative fluids on admission  Urine output is improved  Urine culture is growing greater than 100,000 colonies of pansensitive Enterococcus faecalis  Wound culture is growing MRSA and Proteus   continue vancomycin, cefepime, Flagyl was discontinued 2020  Patient does not want MRI done to evaluate for sacral osteomyelitis  Await sensitivities and de-escalate antibiotics accordingly  Discontinued triple-lumen catheter  We will need likely 7 to 10 days of antibiotics     Mild elevation of the creatinine  It is improved  Discontinue IV fluids her blood pressure has stabilized    Hypertension  Hyperlipidemia  Continue Zetia  And antihypertensive medications for now    Hypokalemia: We will replace orally    Functional paraplegia:  -Patient reports she has been mainly bedbound with transfers between wheelchair  -Unable to tell because of paraplegia          Code Status: Full code  Surrogate Decision Maker:      DVT Prophylaxis: Lovenox  GI Prophylaxis: not indicated     Baseline:  Non ambulatory       Subjective:     Chief Complaint / Reason for Physician Visit  \" Patient denies any active complaints, denies any dizziness, lethargic, chest pain or subjective fever chills. Blood pressure is improved\". Discussed with RN events overnight.      Review of Systems:  Symptom Y/N Comments  Symptom Y/N Comments   Fever/Chills n   Chest Pain n    Poor Appetite n   Edema n    Cough n   Abdominal Pain n    Sputum n   Joint Pain n    SOB/PAL n   Pruritis/Rash     Nausea/vomit n   Tolerating PT/OT     Diarrhea n   Tolerating Diet     Constipation n   Other       Could NOT obtain due to:      Objective:     VITALS:   Last 24hrs VS reviewed since prior progress note. Most recent are:  Patient Vitals for the past 24 hrs:   Temp Pulse Resp BP SpO2   12/20/20 1159 97.8 °F (36.6 °C) 63 18 127/67 100 %   12/20/20 0734 97.8 °F (36.6 °C) (!) 57 18 (!) 121/56 99 %   12/20/20 0510 98.9 °F (37.2 °C) 73 18 113/75 99 %   12/20/20 0022 98.7 °F (37.1 °C) 68 17 123/64 100 %   12/19/20 1910 98.6 °F (37 °C) 67 17 117/68 99 %   12/19/20 1525 98.4 °F (36.9 °C) 71 16 132/67 100 %       Intake/Output Summary (Last 24 hours) at 12/20/2020 1217  Last data filed at 12/20/2020 0952  Gross per 24 hour   Intake 1030 ml   Output 1200 ml   Net -170 ml      Face-to-face encounter was provided on December 20, 2020 with the following exam findings    PHYSICAL EXAM:  General: WD, WN. Alert, cooperative, no acute distress    EENT:  EOMI. Anicteric sclerae. MMM  Resp:  CTA bilaterally, no wheezing or rales. No accessory muscle use  CV:  Regular  rhythm,  No edema  GI:  Soft, Non distended, Non tender.  +Bowel sounds  Neurologic:  Alert and oriented X 3, normal speech,   Psych:   Good insight. Not anxious nor agitated  Skin:  Hyperkeratosis of the skin in bilateral lower extremities. Patient reports she is a 89 survivor and skin changes are due to being in the incidence  Sacral decubitus ulcer is noticed which is 7x4 cm with clean base but foul-smelling odor, ulcer is unstageable    Reviewed most current lab test results and cultures  YES  Reviewed most current radiology test results   YES  Review and summation of old records today    NO  Reviewed patient's current orders and MAR    YES  PMH/SH reviewed - no change compared to H&P  ________________________________________________________________________  Care Plan discussed with:    Comments   Patient x    Family      RN x    Care Manager     Consultant                        Multidiciplinary team rounds were held today with , nursing, pharmacist and clinical coordinator.   Patient's plan of care was discussed; medications were reviewed and discharge planning was addressed. ________________________________________________________________________  Total NON critical care TIME:  30   Minutes    Total CRITICAL CARE TIME Spent:   Minutes non procedure based      Comments   >50% of visit spent in counseling and coordination of care x    ________________________________________________________________________  Alain Lanes, MD     Procedures: see electronic medical records for all procedures/Xrays and details which were not copied into this note but were reviewed prior to creation of Plan. LABS:  I reviewed today's most current labs and imaging studies.   Pertinent labs include:  Recent Labs     12/19/20  0626   WBC 6.4   HGB 9.6*   HCT 30.1*   *     Recent Labs     12/20/20  0233 12/19/20  0626 12/18/20  0409    139 140   K 3.4* 3.3* 3.4*   * 113* 112*   CO2 25 22 23   GLU 95 95 80   BUN 12 16 20   CREA 0.58 0.66 0.68   CA 9.3 9.0 9.0       Signed: Alain Lanes, MD

## 2020-12-21 LAB
BACTERIA SPEC CULT: NORMAL
SERVICE CMNT-IMP: NORMAL

## 2020-12-21 PROCEDURE — 74011250637 HC RX REV CODE- 250/637: Performed by: INTERNAL MEDICINE

## 2020-12-21 PROCEDURE — 87635 SARS-COV-2 COVID-19 AMP PRB: CPT

## 2020-12-21 PROCEDURE — 74011250636 HC RX REV CODE- 250/636: Performed by: INTERNAL MEDICINE

## 2020-12-21 PROCEDURE — 65270000029 HC RM PRIVATE

## 2020-12-21 PROCEDURE — 74011250637 HC RX REV CODE- 250/637: Performed by: NURSE PRACTITIONER

## 2020-12-21 PROCEDURE — 74011000258 HC RX REV CODE- 258: Performed by: INTERNAL MEDICINE

## 2020-12-21 RX ORDER — DOXYCYCLINE HYCLATE 100 MG
100 TABLET ORAL EVERY 12 HOURS
Status: DISCONTINUED | OUTPATIENT
Start: 2020-12-21 | End: 2020-12-22 | Stop reason: HOSPADM

## 2020-12-21 RX ORDER — CEFDINIR 300 MG/1
300 CAPSULE ORAL EVERY 12 HOURS
Status: DISCONTINUED | OUTPATIENT
Start: 2020-12-21 | End: 2020-12-22 | Stop reason: HOSPADM

## 2020-12-21 RX ADMIN — CEFEPIME HYDROCHLORIDE 2 G: 2 INJECTION, POWDER, FOR SOLUTION INTRAVENOUS at 01:58

## 2020-12-21 RX ADMIN — OXYCODONE HYDROCHLORIDE AND ACETAMINOPHEN 1 TABLET: 10; 325 TABLET ORAL at 02:10

## 2020-12-21 RX ADMIN — CEFDINIR 300 MG: 300 CAPSULE ORAL at 11:21

## 2020-12-21 RX ADMIN — Medication 10 ML: at 06:35

## 2020-12-21 RX ADMIN — Medication 10 ML: at 13:01

## 2020-12-21 RX ADMIN — EZETIMIBE 10 MG: 10 TABLET ORAL at 08:48

## 2020-12-21 RX ADMIN — ENOXAPARIN SODIUM 40 MG: 40 INJECTION SUBCUTANEOUS at 08:47

## 2020-12-21 RX ADMIN — Medication 10 ML: at 22:12

## 2020-12-21 RX ADMIN — DOXYCYCLINE HYCLATE 100 MG: 100 TABLET, COATED ORAL at 11:21

## 2020-12-21 RX ADMIN — POLYETHYLENE GLYCOL 3350 17 G: 17 POWDER, FOR SOLUTION ORAL at 08:48

## 2020-12-21 RX ADMIN — CEFDINIR 300 MG: 300 CAPSULE ORAL at 22:09

## 2020-12-21 RX ADMIN — OXYCODONE HYDROCHLORIDE AND ACETAMINOPHEN 1 TABLET: 10; 325 TABLET ORAL at 18:35

## 2020-12-21 RX ADMIN — DOXYCYCLINE HYCLATE 100 MG: 100 TABLET, COATED ORAL at 22:09

## 2020-12-21 RX ADMIN — AMLODIPINE BESYLATE 10 MG: 5 TABLET ORAL at 08:47

## 2020-12-21 NOTE — PROGRESS NOTES
Pharmacy Automatic Renal Dosing Protocol - Antimicrobials    Indication for Antimicrobials: SSTI- unstageable sacral ulcer & UTI, in setting of functional paraplegia    Current Regimen of Each Antimicrobial:  Vancomycin 1000 mg IV every 18 hours (Start Date ; Day # 5)  Cefepime 2gm IV every 12 hours (Start Date ; Day # 5)    Previous Antimicrobial Therapy:  Cefepime x 1 dose in ED    Metronidazole 500 mg IV every 12 hours (Start Date ; Day # 5)    Goal Level: 10-15 (AUC: 400 - 600 mg/hr/Liter/day)     Date Dose & Interval Measured (mcg/mL) Extrapolated (mcg/mL)    @ 1725 1 g every 18 hours 13.2 12.2    1g iv q18h 16.9 15.6           Date & time of next level: to be ordered    Significant Cultures:    blood: NGTD x 2 days- prelim  : Urine = >100k E.faecalis [pan-sens] (FINAL)  : wound (buttock): Light MRSA (FINAL)    Paralysis, amputations, malnutrition: functional paraplegia (Cr x 1.4)    Labs:  Recent Labs     20  0233 20  0626 20  0409   CREA 0.58 0.66 0.68   BUN 20   WBC  --  6.4  --      Temp (24hrs), Av.3 °F (36.8 °C), Min:97.8 °F (36.6 °C), Max:98.9 °F (37.2 °C)      Is the Patient on Dialysis? no    Creatinine Clearance (mL/min):   (Ideal Body Weight): 61.6kg  Cr x 1.4 = 0.58 x 1.4 = 0.81  CrCl = 64 ml/min    Impression/Plan:   Vancomycin trough therapeutic. Continue Vancomycin 1gm IV q18h  Continue Cefepime 2gm IV q12h  Daily BMP  Antimicrobial stop date TBD      Pharmacy will follow daily and adjust medications as appropriate for renal function and/or serum levels.     Thank you,  Berneta Sacks, PHARMD

## 2020-12-21 NOTE — PROGRESS NOTES
Problem: Falls - Risk of  Goal: *Absence of Falls  Description: Document Veatrice Marker Fall Risk and appropriate interventions in the flowsheet. Outcome: Progressing Towards Goal  Note: Fall Risk Interventions:            Medication Interventions: Bed/chair exit alarm    Elimination Interventions: Bed/chair exit alarm              Problem: Patient Education: Go to Patient Education Activity  Goal: Patient/Family Education  Outcome: Progressing Towards Goal     Problem: Pressure Injury - Risk of  Goal: *Prevention of pressure injury  Description: Document Kristian Scale and appropriate interventions in the flowsheet.   Outcome: Progressing Towards Goal  Note: Pressure Injury Interventions:       Moisture Interventions: Absorbent underpads    Activity Interventions: Assess need for specialty bed    Mobility Interventions: Assess need for specialty bed    Nutrition Interventions: Document food/fluid/supplement intake    Friction and Shear Interventions: Apply protective barrier, creams and emollients                Problem: Patient Education: Go to Patient Education Activity  Goal: Patient/Family Education  Outcome: Progressing Towards Goal     Problem: Patient Education: Go to Patient Education Activity  Goal: Patient/Family Education  Outcome: Progressing Towards Goal     Problem: Sepsis: Day of Diagnosis  Goal: Off Pathway (Use only if patient is Off Pathway)  Outcome: Progressing Towards Goal  Goal: *Fluid resuscitation  Outcome: Progressing Towards Goal  Goal: *Paired blood cultures prior to first dose of antibiotic  Outcome: Progressing Towards Goal  Goal: *First dose of  appropriate antibiotic within 3 hours of arrival to ED, within 1 hour of arrival to ICU  Outcome: Progressing Towards Goal  Goal: *Lactic acid with first set of blood cultures  Outcome: Progressing Towards Goal  Goal: *Pneumococcal immunization (if eligible)  Outcome: Progressing Towards Goal  Goal: *Influenza immunization (if eligible)  Outcome: Progressing Towards Goal  Goal: Activity/Safety  Outcome: Progressing Towards Goal  Goal: Consults, if ordered  Outcome: Progressing Towards Goal  Goal: Diagnostic Test/Procedures  Outcome: Progressing Towards Goal  Goal: Nutrition/Diet  Outcome: Progressing Towards Goal  Goal: Discharge Planning  Outcome: Progressing Towards Goal  Goal: Medications  Outcome: Progressing Towards Goal  Goal: Respiratory  Outcome: Progressing Towards Goal  Goal: Treatments/Interventions/Procedures  Outcome: Progressing Towards Goal  Goal: Psychosocial  Outcome: Progressing Towards Goal     Problem: Sepsis: Day 2  Goal: Off Pathway (Use only if patient is Off Pathway)  Outcome: Progressing Towards Goal  Goal: *Central Venous Pressure maintained at 8-12 mm Hg  Outcome: Progressing Towards Goal  Goal: *Hemodynamically stable  Outcome: Progressing Towards Goal  Goal: *Tolerating diet  Outcome: Progressing Towards Goal  Goal: Activity/Safety  Outcome: Progressing Towards Goal  Goal: Consults, if ordered  Outcome: Progressing Towards Goal  Goal: Diagnostic Test/Procedures  Outcome: Progressing Towards Goal  Goal: Nutrition/Diet  Outcome: Progressing Towards Goal  Goal: Discharge Planning  Outcome: Progressing Towards Goal  Goal: Medications  Outcome: Progressing Towards Goal  Goal: Respiratory  Outcome: Progressing Towards Goal  Goal: Treatments/Interventions/Procedures  Outcome: Progressing Towards Goal  Goal: Psychosocial  Outcome: Progressing Towards Goal     Problem: Sepsis: Day 3  Goal: Off Pathway (Use only if patient is Off Pathway)  Outcome: Progressing Towards Goal  Goal: *Central Venous Pressure maintained at 8-12 mm Hg  Outcome: Progressing Towards Goal  Goal: *Oxygen saturation within defined limits  Outcome: Progressing Towards Goal  Goal: *Vital sign stability  Outcome: Progressing Towards Goal  Goal: *Tolerating diet  Outcome: Progressing Towards Goal  Goal: *Demonstrates progressive activity  Outcome: Progressing Towards Goal  Goal: Activity/Safety  Outcome: Progressing Towards Goal  Goal: Consults, if ordered  Outcome: Progressing Towards Goal  Goal: Diagnostic Test/Procedures  Outcome: Progressing Towards Goal  Goal: Nutrition/Diet  Outcome: Progressing Towards Goal  Goal: Discharge Planning  Outcome: Progressing Towards Goal  Goal: Medications  Outcome: Progressing Towards Goal  Goal: Respiratory  Outcome: Progressing Towards Goal  Goal: Treatments/Interventions/Procedures  Outcome: Progressing Towards Goal  Goal: Psychosocial  Outcome: Progressing Towards Goal     Problem: Sepsis: Day 4  Goal: Off Pathway (Use only if patient is Off Pathway)  Outcome: Progressing Towards Goal  Goal: Activity/Safety  Outcome: Progressing Towards Goal  Goal: Consults, if ordered  Outcome: Progressing Towards Goal  Goal: Diagnostic Test/Procedures  Outcome: Progressing Towards Goal  Goal: Nutrition/Diet  Outcome: Progressing Towards Goal  Goal: Discharge Planning  Outcome: Progressing Towards Goal  Goal: Medications  Outcome: Progressing Towards Goal  Goal: Respiratory  Outcome: Progressing Towards Goal  Goal: Treatments/Interventions/Procedures  Outcome: Progressing Towards Goal  Goal: Psychosocial  Outcome: Progressing Towards Goal  Goal: *Oxygen saturation within defined limits  Outcome: Progressing Towards Goal  Goal: *Hemodynamically stable  Outcome: Progressing Towards Goal  Goal: *Vital signs within defined limits  Outcome: Progressing Towards Goal  Goal: *Tolerating diet  Outcome: Progressing Towards Goal  Goal: *Demonstrates progressive activity  Outcome: Progressing Towards Goal  Goal: *Fluid volume maintenance  Outcome: Progressing Towards Goal     Problem: Sepsis: Day 5  Goal: Off Pathway (Use only if patient is Off Pathway)  Outcome: Progressing Towards Goal  Goal: *Oxygen saturation within defined limits  Outcome: Progressing Towards Goal  Goal: *Vital signs within defined limits  Outcome: Progressing Towards Goal  Goal: *Tolerating diet  Outcome: Progressing Towards Goal  Goal: *Demonstrates progressive activity  Outcome: Progressing Towards Goal  Goal: *Discharge plan identified  Outcome: Progressing Towards Goal  Goal: Activity/Safety  Outcome: Progressing Towards Goal  Goal: Consults, if ordered  Outcome: Progressing Towards Goal  Goal: Diagnostic Test/Procedures  Outcome: Progressing Towards Goal  Goal: Nutrition/Diet  Outcome: Progressing Towards Goal  Goal: Discharge Planning  Outcome: Progressing Towards Goal  Goal: Medications  Outcome: Progressing Towards Goal  Goal: Respiratory  Outcome: Progressing Towards Goal  Goal: Treatments/Interventions/Procedures  Outcome: Progressing Towards Goal  Goal: Psychosocial  Outcome: Progressing Towards Goal     Problem: Sepsis: Day 6  Goal: Off Pathway (Use only if patient is Off Pathway)  Outcome: Progressing Towards Goal  Goal: *Oxygen saturation within defined limits  Outcome: Progressing Towards Goal  Goal: *Vital signs within defined limits  Outcome: Progressing Towards Goal  Goal: *Tolerating diet  Outcome: Progressing Towards Goal  Goal: *Demonstrates progressive activity  Outcome: Progressing Towards Goal  Goal: Influenza immunization  Outcome: Progressing Towards Goal  Goal: *Pneumococcal immunization  Outcome: Progressing Towards Goal  Goal: Activity/Safety  Outcome: Progressing Towards Goal  Goal: Diagnostic Test/Procedures  Outcome: Progressing Towards Goal  Goal: Nutrition/Diet  Outcome: Progressing Towards Goal  Goal: Discharge Planning  Outcome: Progressing Towards Goal  Goal: Medications  Outcome: Progressing Towards Goal  Goal: Respiratory  Outcome: Progressing Towards Goal  Goal: Treatments/Interventions/Procedures  Outcome: Progressing Towards Goal  Goal: Psychosocial  Outcome: Progressing Towards Goal     Problem: Sepsis: Discharge Outcomes  Goal: *Vital signs within defined limits  Outcome: Progressing Towards Goal  Goal: *Tolerating diet  Outcome: Progressing Towards Goal  Goal: *Verbalizes understanding and describes prescribed diet  Outcome: Progressing Towards Goal  Goal: *Demonstrates progressive activity  Outcome: Progressing Towards Goal  Goal: *Describes follow-up/return visits to physicians  Outcome: Progressing Towards Goal  Goal: *Verbalizes name, dosage, time, side effects, and number of days to continue medications  Outcome: Progressing Towards Goal  Goal: *Influenza immunization (Oct-Mar only)  Outcome: Progressing Towards Goal  Goal: *Pneumococcal immunization  Outcome: Progressing Towards Goal  Goal: *Lungs clear or at baseline  Outcome: Progressing Towards Goal  Goal: *Oxygen saturation returns to baseline or 90% or better on room air  Outcome: Progressing Towards Goal  Goal: *Glycemic control  Outcome: Progressing Towards Goal  Goal: *Absence of deep venous thrombosis signs and symptoms(Stroke Metric)  Outcome: Progressing Towards Goal  Goal: *Describes available resources and support systems  Outcome: Progressing Towards Goal  Goal: *Optimal pain control at patient's stated goal  Outcome: Progressing Towards Goal   Patient is alert and oriented; able to voice needs. No c/o pain voiced at this time. Vanc trough completed and noted at 16.9. Specialty bed in place and working well. Patient offered to be turned or repositioned but she declined. Continues on IV abx with no adverse reactions noted. No acute distress.

## 2020-12-21 NOTE — PROGRESS NOTES
Hospitalist Progress Note    NAME: Philip De Souza   :  1950   MRN:  [id number]       Assessment / Plan:    Sepsis with septic shock secondary to infected decubitus sacral ulcer and UTI POA  Unstageable sacral  ulcer POA  Blood pressure stable now  Patient was given resuscitative fluids on admission  Urine output is improved  Urine culture is growing greater than 100,000 colonies of pansensitive Enterococcus faecalis   Wound culture is growing MRSA and Proteus   Status post vancomycin, cefepime(6 days), Flagyl was discontinued 2020  Patient does not want MRI done to evaluate for sacral osteomyelitis  We will transition antibiotics to oral doxycycline and cefdinir for 7 more days  Patient will need local wound care at group [place]  COVID-19 test pending for group home discharge  Likely discharge tomorrow once COVID-19 screening test is back     Mild elevation of the creatinine  It is improved  Discontinue IV fluids her blood pressure has stabilized    Hypertension  Hyperlipidemia  Continue Zetia  Avoid antihypertensive medications for now    Hypokalemia: We will replace orally    Functional paraplegia:  -Patient reports she has been mainly bedbound with transfers between wheelchair  -Unable to tell because of paraplegia          Code Status: Full code  Surrogate Decision Maker:      DVT Prophylaxis: Lovenox  GI Prophylaxis: not indicated     Baseline:  Non ambulatory       Subjective:     Chief Complaint / Reason for Physician Visit  \" Patient denies any active complaints, denies any dizziness, lethargic, chest pain or subjective fever chills. Blood pressure is improved\". Discussed with RN events overnight.      Review of Systems:  Symptom Y/N Comments  Symptom Y/N Comments   Fever/Chills n   Chest Pain n    Poor Appetite n   Edema n    Cough n   Abdominal Pain n    Sputum n   Joint Pain n    SOB/PAL n   Pruritis/Rash     Nausea/vomit n   Tolerating PT/OT     Diarrhea n Tolerating Diet     Constipation n   Other       Could NOT obtain due to:      Objective:     VITALS:   Last 24hrs VS reviewed since prior progress note. Most recent are:  Patient Vitals for the past 24 hrs:   Temp Pulse Resp BP SpO2   12/21/20 1225 97.9 °F (36.6 °C) 77 16 (!) 132/55 100 %   12/21/20 0730 97.9 °F (36.6 °C) 64 17 (!) 141/58 99 %   12/21/20 0418 97.4 °F (36.3 °C) 65 18 130/73 99 %   12/21/20 0035 98.2 °F (36.8 °C) 69 18 (!) 132/56 100 %   12/20/20 1950 98.4 °F (36.9 °C) 75 18 (!) 143/79 96 %   12/20/20 1500 98.1 °F (36.7 °C) 69 18 123/63 99 %       Intake/Output Summary (Last 24 hours) at 12/21/2020 1237  Last data filed at 12/21/2020 0730  Gross per 24 hour   Intake 720 ml   Output 1250 ml   Net -530 ml      Face-to-face encounter was provided on December 21, 2020 with the following exam findings    PHYSICAL EXAM:  General: WD, WN. Alert, cooperative, no acute distress    EENT:  EOMI. Anicteric sclerae. MMM  Resp:  CTA bilaterally, no wheezing or rales. No accessory muscle use  CV:  Regular  rhythm,  No edema  GI:  Soft, Non distended, Non tender.  +Bowel sounds  Neurologic:  Alert and oriented X 3, normal speech,   Psych:   Good insight. Not anxious nor agitated  Skin:  Hyperkeratosis of the skin in bilateral lower extremities.   Patient reports she is a 48 survivor and skin changes are due to being in the incidence  Sacral decubitus ulcer is noticed which is 7x4 cm with clean base, ulcer is unstageable    Reviewed most current lab test results and cultures  YES  Reviewed most current radiology test results   YES  Review and summation of old records today    NO  Reviewed patient's current orders and MAR    YES  PMH/SH reviewed - no change compared to H&P  ________________________________________________________________________  Care Plan discussed with:    Comments   Patient x    Family      RN x    Care Manager     Consultant                        Multidiciplinary team rounds were held today with , nursing, pharmacist and clinical coordinator. Patient's plan of care was discussed; medications were reviewed and discharge planning was addressed. ________________________________________________________________________  Total NON critical care TIME:  30   Minutes    Total CRITICAL CARE TIME Spent:   Minutes non procedure based      Comments   >50% of visit spent in counseling and coordination of care x    ________________________________________________________________________  Charmayne Hsu, MD     Procedures: see electronic medical records for all procedures/Xrays and details which were not copied into this note but were reviewed prior to creation of Plan. LABS:  I reviewed today's most current labs and imaging studies.   Pertinent labs include:  Recent Labs     12/19/20  0626   WBC 6.4   HGB 9.6*   HCT 30.1*   *     Recent Labs     12/20/20  0233 12/19/20  0626    139   K 3.4* 3.3*   * 113*   CO2 25 22   GLU 95 95   BUN 12 16   CREA 0.58 0.66   CA 9.3 9.0       Signed: Charmayne Hsu, MD

## 2020-12-22 VITALS
OXYGEN SATURATION: 98 % | WEIGHT: 170 LBS | DIASTOLIC BLOOD PRESSURE: 61 MMHG | BODY MASS INDEX: 26.68 KG/M2 | RESPIRATION RATE: 17 BRPM | HEIGHT: 67 IN | SYSTOLIC BLOOD PRESSURE: 116 MMHG | HEART RATE: 84 BPM | TEMPERATURE: 98.6 F

## 2020-12-22 LAB
HEALTH STATUS, XMCV2T: NORMAL
SARS-COV-2, COV2: NOT DETECTED
SOURCE, COVRS: NORMAL
SPECIMEN SOURCE, FCOV2M: NORMAL
SPECIMEN TYPE, XMCV1T: NORMAL

## 2020-12-22 PROCEDURE — 74011250637 HC RX REV CODE- 250/637: Performed by: INTERNAL MEDICINE

## 2020-12-22 PROCEDURE — 74011250637 HC RX REV CODE- 250/637: Performed by: NURSE PRACTITIONER

## 2020-12-22 PROCEDURE — 74011250636 HC RX REV CODE- 250/636: Performed by: INTERNAL MEDICINE

## 2020-12-22 RX ORDER — CEFDINIR 300 MG/1
300 CAPSULE ORAL EVERY 12 HOURS
Qty: 12 CAP | Refills: 0 | Status: SHIPPED | OUTPATIENT
Start: 2020-12-22 | End: 2020-12-28

## 2020-12-22 RX ORDER — DOXYCYCLINE HYCLATE 100 MG
100 TABLET ORAL EVERY 12 HOURS
Qty: 12 TAB | Refills: 0 | Status: SHIPPED | OUTPATIENT
Start: 2020-12-22 | End: 2020-12-28

## 2020-12-22 RX ADMIN — EZETIMIBE 10 MG: 10 TABLET ORAL at 09:33

## 2020-12-22 RX ADMIN — Medication 10 ML: at 06:00

## 2020-12-22 RX ADMIN — CEFDINIR 300 MG: 300 CAPSULE ORAL at 09:31

## 2020-12-22 RX ADMIN — DOXYCYCLINE HYCLATE 100 MG: 100 TABLET, COATED ORAL at 09:32

## 2020-12-22 RX ADMIN — POLYETHYLENE GLYCOL 3350 17 G: 17 POWDER, FOR SOLUTION ORAL at 09:33

## 2020-12-22 RX ADMIN — ENOXAPARIN SODIUM 40 MG: 40 INJECTION SUBCUTANEOUS at 09:31

## 2020-12-22 RX ADMIN — AMLODIPINE BESYLATE 10 MG: 5 TABLET ORAL at 09:34

## 2020-12-22 NOTE — DISCHARGE SUMMARY
Hospitalist Discharge Summary     Patient ID:  Sharlene Dover  [id number]  78 y.o.  1950 12/16/2020    PCP on record: Mao Graham    Admit date: 12/16/2020  Discharge date and time: 12/22/2020    DISCHARGE DIAGNOSIS:  Sepsis with septic shock secondary to infected decubitus sacral ulcer and UTI POA  Stage 4 sacral  ulcer POA  Mild elevation of the creatinine  Hypertension  Hyperlipidemia  Hypokalemia:   Functional paraplegia:    CONSULTATIONS:  None    Excerpted HPI from H&P of Remington Mccarthy MD:  Sharlene Dover is a 78 y.o.  female who lives in a group home with past medical history hypertension, chronic decubitus ulcer stage III-IV who presents with difficulty bending her knees because of pain on her sacral area. IN  The ED, patient reported fever and chills and was found to have infected decubitus ulcer with some purulent drainage. He denies any associated symptoms such as nausea vomiting, cough but reported burning upon urination  Review of her vital signs showed that she was febrile, tachycardic. Her labs revealed evaded white blood cell count with a left shift, mild elevation of her creatinine. Her UA was concerning for UTI. Rapid Covid test was negative     We were asked to admit for work up and evaluation of the above problems. ______________________________________________________________________  DISCHARGE SUMMARY/HOSPITAL COURSE:  for full details see H&P, daily progress notes, labs, consult notes.      Sepsis with septic shock secondary to infected decubitus sacral ulcer and UTI POA  Unstageable sacral  ulcer POA  Blood pressure stable now  Patient was given resuscitative fluids on admission  Urine output is improved  Urine culture is growing greater than 100,000 colonies of pansensitive Enterococcus faecalis   Wound culture is growing MRSA and Proteus   Status post vancomycin, cefepime(6 days), Flagyl was discontinued December 19, 2020  Patient does not want MRI done to evaluate for sacral osteomyelitis (bone not visible clinically)  We will transition antibiotics to oral doxycycline and cefdinir for 6 more days  Patient will need local wound care at group home  COVID-19 test negative  Dc back to group home    Mild elevation of the creatinine  It is improved  S/p IVF  Will stop lasix which is listed as home med     Hypertension  Hyperlipidemia  Continue Zetia       Hypokalemia: Replaced     Functional paraplegia:  -Patient reports she has been mainly bedbound with transfers between wheelchair  -Unable to tell because of paraplegia        _______________________________________________________________________  Patient seen and examined by me on discharge day. Pertinent Findings:  Gen:    Not in distress  Chest: Clear lungs  CVS:   Regular rhythm. No edema  Abd:  Soft, not distended, not tender  Neuro:  Alert, oriented x4  _______________________________________________________________________  DISCHARGE MEDICATIONS:   Current Discharge Medication List      START taking these medications    Details   cefdinir (OMNICEF) 300 mg capsule Take 1 Cap by mouth every twelve (12) hours for 6 days. Qty: 12 Cap, Refills: 0      doxycycline (VIBRA-TABS) 100 mg tablet Take 1 Tab by mouth every twelve (12) hours for 6 days. Qty: 12 Tab, Refills: 0         CONTINUE these medications which have NOT CHANGED    Details   levothyroxine (SYNTHROID) 25 mcg tablet Take 25 mcg by mouth Daily (before breakfast). magnesium oxide (MAG-OX) 400 mg tablet Take 400 mg by mouth two (2) times a day. ascorbic acid, vitamin C, (Vitamin C) 500 mg tablet Take 1,000 mg by mouth daily. acetaminophen (TYLENOL) 650 mg TbER Take 650 mg by mouth every six to eight (6-8) hours as needed for Pain. oxyCODONE-acetaminophen (PERCOCET) 5-325 mg per tablet Take 1 Tab by mouth every six (6) hours as needed for Pain.       senna (Senna) 8.6 mg tablet Take 2 Tabs by mouth two (2) times daily as needed for Constipation. traMADoL (ULTRAM) 50 mg tablet Take 50 mg by mouth every eight (8) hours as needed for Pain. ammonium lactate (LAC-HYDRIN) 12 % lotion Apply  to affected area as needed. Apply to both legs every evening for dry skin      amino acids-protein hydrolys (Pro-Stat AWC)  gram-kcal/30 mL liqd Take 30 mL by mouth two (2) times a day. For wound healing      ezetimibe (ZETIA) 10 mg tablet Take 10 mg by mouth daily. STOP taking these medications       apixaban (Eliquis) 5 mg tablet Comments:   Reason for Stopping:         midodrine (PROAMATINE) 5 mg tablet Comments:   Reason for Stopping:         furosemide (LASIX) 20 mg tablet Comments:   Reason for Stopping:             Medications were reconciled with patient and she does not take Eliquis, no reason in patient's medical history also noted which would require Eliquis     Patient Follow Up Instructions:    Activity: Activity as tolerated  Diet: Regular Diet  Wound Care: Keep wound clean and dry    Follow-up with PCP in one week   Follow-up tests/labs none  Follow-up Information     Follow up With Specialties Details Why Contact Rhonda Irizarry Nurse Practitioner In 1 week  [address] [place] [address]  [phone]          ________________________________________________________________    Risk of deterioration: High    Condition at Discharge:  Stable  __________________________________________________________________    Disposition  Home with family and home health services    ____________________________________________________________________    Code Status: Full Code  ___________________________________________________________________      Total time in minutes spent coordinating this discharge (includes going over instructions, follow-up, prescriptions, and preparing report for sign off to her PCP) :  40 minutes    Signed:  Chacho Whaley MD

## 2020-12-22 NOTE — PROGRESS NOTES
Report called to Matias murphy [facility] [employer] [facility]. Med list faxed as requested.

## 2020-12-22 NOTE — DISCHARGE INSTRUCTIONS
HOSPITALIST DISCHARGE INSTRUCTIONS    NAME: Ryan Butcher   :  1950   MRN:  [id number]     Date/Time:  2020 8:29 AM    ADMIT DATE: 2020     DISCHARGE DATE: 2020     DISCHARGE DIAGNOSIS:  Septic Shock  UTI  Infected sacral Ulcer    MEDICATIONS:  · It is important that you take the medication exactly as they are prescribed. · Keep your medication in the bottles provided by the pharmacist and keep a list of the medication names, dosages, and times to be taken in your wallet. · Do not take other medications without consulting your doctor. Pain Management: per above medications    What to do at Home    Recommended diet:  Regular Diet    Recommended activity: Activity as tolerated    If you have questions regarding the hospital related prescriptions or hospital related issues please call [facility] lynn Beltran at [phone]. If you experience any of the following symptoms then please call your primary care physician or return to the emergency room if you cannot get hold of your doctor:  Fever, chills, nausea, vomiting, diarrhea, change in mentation, falling, bleeding, shortness of breath. Follow Up:  Dr. Dario Molina, ANP-C  you are to call and set up an appointment to see them in 7-10 days. Information obtained by :  I understand that if any problems occur once I am at home I am to contact my physician. I understand and acknowledge receipt of the instructions indicated above.                                                                                                                                            Physician's or R.N.'s Signature                                                                  Date/Time                                                                                                                                              Patient or Representative Signature Date/Time

## 2020-12-22 NOTE — PROGRESS NOTES
Plan:  -Return to [facility] group home  -[address] transport        12:00PM  2nd IM notice provided and explained. Pt agreeable to d/c plan. ALLEN order sent to [facility]. Resumption confirmed in [employer]. Nursing called report to [facility]. Pt ready for d/c from CM perspective. 11:45AM  Return call from [facility]. Able to accept pt back today. Nursing call report to her at [phone]. Agreeable to 1:30PM transport. [facility] will be available to accept pt back. 11:21AM  VM from group home owner, [facility]. CM return call but had to leave a VM. 10:00AM  CM met with pt. Pt agreeable to d/c today and states she has no additional way to contact home owners. 9:30AM  D/c order acknowledged by CM. CM PC to pt's group home owner, [facility] ([phone]). Had to leave VM. Will f/u. CM available for any additional needs. Care Management Interventions  PCP Verified by CM: Yes  Last Visit to PCP: 12/09/20  Mode of Transport at Discharge: BLS  Transition of Care Consult (CM Consult): Discharge Planning, [address]: No  Reason Outside [place]: Patient already serviced by other home [address]:  Adult Group Home  Confirm Follow Up Transport: Other (see comment)  Discharge Location  Discharge Placement: Home with home health(Return to group home with [facility])        LORNA Diana  Care Manager

## 2020-12-22 NOTE — PROGRESS NOTES
Discharge instructions and education covered with patient by [facility]. Opportunity for questions and concerns provided. Patient verbalized understanding. Report called to [facility] home. IV removed. Patient belongings gathered and left with patient via [facility] transport.

## 2020-12-22 NOTE — PROGRESS NOTES
Problem: Falls - Risk of  Goal: *Absence of Falls  Description: Document Santa Lay Fall Risk and appropriate interventions in the flowsheet. Outcome: Progressing Towards Goal  Note: Fall Risk Interventions:            Medication Interventions: Bed/chair exit alarm    Elimination Interventions: Bed/chair exit alarm              Problem: Patient Education: Go to Patient Education Activity  Goal: Patient/Family Education  Outcome: Progressing Towards Goal     Problem: Pressure Injury - Risk of  Goal: *Prevention of pressure injury  Description: Document Kristian Scale and appropriate interventions in the flowsheet.   Outcome: Progressing Towards Goal  Note: Pressure Injury Interventions:       Moisture Interventions: Absorbent underpads    Activity Interventions: Pressure redistribution bed/mattress(bed type)    Mobility Interventions: Pressure redistribution bed/mattress (bed type)    Nutrition Interventions: Document food/fluid/supplement intake    Friction and Shear Interventions: Apply protective barrier, creams and emollients, Foam dressings/transparent film/skin sealants, HOB 30 degrees or less, Lift team/patient mobility team, Minimize layers                Problem: Patient Education: Go to Patient Education Activity  Goal: Patient/Family Education  Outcome: Progressing Towards Goal     Problem: Patient Education: Go to Patient Education Activity  Goal: Patient/Family Education  Outcome: Progressing Towards Goal     Problem: Sepsis: Day of Diagnosis  Goal: Off Pathway (Use only if patient is Off Pathway)  Outcome: Progressing Towards Goal  Goal: *Fluid resuscitation  Outcome: Progressing Towards Goal  Goal: *Paired blood cultures prior to first dose of antibiotic  Outcome: Progressing Towards Goal  Goal: *First dose of  appropriate antibiotic within 3 hours of arrival to ED, within 1 hour of arrival to ICU  Outcome: Progressing Towards Goal  Goal: *Lactic acid with first set of blood cultures  Outcome: Progressing Towards Goal  Goal: *Pneumococcal immunization (if eligible)  Outcome: Progressing Towards Goal  Goal: *Influenza immunization (if eligible)  Outcome: Progressing Towards Goal  Goal: Activity/Safety  Outcome: Progressing Towards Goal  Goal: Consults, if ordered  Outcome: Progressing Towards Goal  Goal: Diagnostic Test/Procedures  Outcome: Progressing Towards Goal  Goal: Nutrition/Diet  Outcome: Progressing Towards Goal  Goal: Discharge Planning  Outcome: Progressing Towards Goal  Goal: Medications  Outcome: Progressing Towards Goal  Goal: Respiratory  Outcome: Progressing Towards Goal  Goal: Treatments/Interventions/Procedures  Outcome: Progressing Towards Goal  Goal: Psychosocial  Outcome: Progressing Towards Goal     Problem: Sepsis: Day 2  Goal: Off Pathway (Use only if patient is Off Pathway)  Outcome: Progressing Towards Goal  Goal: *Central Venous Pressure maintained at 8-12 mm Hg  Outcome: Progressing Towards Goal  Goal: *Hemodynamically stable  Outcome: Progressing Towards Goal  Goal: *Tolerating diet  Outcome: Progressing Towards Goal  Goal: Activity/Safety  Outcome: Progressing Towards Goal  Goal: Consults, if ordered  Outcome: Progressing Towards Goal  Goal: Diagnostic Test/Procedures  Outcome: Progressing Towards Goal  Goal: Nutrition/Diet  Outcome: Progressing Towards Goal  Goal: Discharge Planning  Outcome: Progressing Towards Goal  Goal: Medications  Outcome: Progressing Towards Goal  Goal: Respiratory  Outcome: Progressing Towards Goal  Goal: Treatments/Interventions/Procedures  Outcome: Progressing Towards Goal  Goal: Psychosocial  Outcome: Progressing Towards Goal     Problem: Sepsis: Day 3  Goal: Off Pathway (Use only if patient is Off Pathway)  Outcome: Progressing Towards Goal  Goal: *Central Venous Pressure maintained at 8-12 mm Hg  Outcome: Progressing Towards Goal  Goal: *Oxygen saturation within defined limits  Outcome: Progressing Towards Goal  Goal: *Vital sign stability  Outcome: Progressing Towards Goal  Goal: *Tolerating diet  Outcome: Progressing Towards Goal  Goal: *Demonstrates progressive activity  Outcome: Progressing Towards Goal  Goal: Activity/Safety  Outcome: Progressing Towards Goal  Goal: Consults, if ordered  Outcome: Progressing Towards Goal  Goal: Diagnostic Test/Procedures  Outcome: Progressing Towards Goal  Goal: Nutrition/Diet  Outcome: Progressing Towards Goal  Goal: Discharge Planning  Outcome: Progressing Towards Goal  Goal: Medications  Outcome: Progressing Towards Goal  Goal: Respiratory  Outcome: Progressing Towards Goal  Goal: Treatments/Interventions/Procedures  Outcome: Progressing Towards Goal  Goal: Psychosocial  Outcome: Progressing Towards Goal     Problem: Sepsis: Day 4  Goal: Off Pathway (Use only if patient is Off Pathway)  Outcome: Progressing Towards Goal  Goal: Activity/Safety  Outcome: Progressing Towards Goal  Goal: Consults, if ordered  Outcome: Progressing Towards Goal  Goal: Diagnostic Test/Procedures  Outcome: Progressing Towards Goal  Goal: Nutrition/Diet  Outcome: Progressing Towards Goal  Goal: Discharge Planning  Outcome: Progressing Towards Goal  Goal: Medications  Outcome: Progressing Towards Goal  Goal: Respiratory  Outcome: Progressing Towards Goal  Goal: Treatments/Interventions/Procedures  Outcome: Progressing Towards Goal  Goal: Psychosocial  Outcome: Progressing Towards Goal  Goal: *Oxygen saturation within defined limits  Outcome: Progressing Towards Goal  Goal: *Hemodynamically stable  Outcome: Progressing Towards Goal  Goal: *Vital signs within defined limits  Outcome: Progressing Towards Goal  Goal: *Tolerating diet  Outcome: Progressing Towards Goal  Goal: *Demonstrates progressive activity  Outcome: Progressing Towards Goal  Goal: *Fluid volume maintenance  Outcome: Progressing Towards Goal     Problem: Sepsis: Day 5  Goal: Off Pathway (Use only if patient is Off Pathway)  Outcome: Progressing Towards Goal  Goal: *Oxygen saturation within defined limits  Outcome: Progressing Towards Goal  Goal: *Vital signs within defined limits  Outcome: Progressing Towards Goal  Goal: *Tolerating diet  Outcome: Progressing Towards Goal  Goal: *Demonstrates progressive activity  Outcome: Progressing Towards Goal  Goal: *Discharge plan identified  Outcome: Progressing Towards Goal  Goal: Activity/Safety  Outcome: Progressing Towards Goal  Goal: Consults, if ordered  Outcome: Progressing Towards Goal  Goal: Diagnostic Test/Procedures  Outcome: Progressing Towards Goal  Goal: Nutrition/Diet  Outcome: Progressing Towards Goal  Goal: Discharge Planning  Outcome: Progressing Towards Goal  Goal: Medications  Outcome: Progressing Towards Goal  Goal: Respiratory  Outcome: Progressing Towards Goal  Goal: Treatments/Interventions/Procedures  Outcome: Progressing Towards Goal  Goal: Psychosocial  Outcome: Progressing Towards Goal     Problem: Sepsis: Day 6  Goal: Off Pathway (Use only if patient is Off Pathway)  Outcome: Progressing Towards Goal  Goal: *Oxygen saturation within defined limits  Outcome: Progressing Towards Goal  Goal: *Vital signs within defined limits  Outcome: Progressing Towards Goal  Goal: *Tolerating diet  Outcome: Progressing Towards Goal  Goal: *Demonstrates progressive activity  Outcome: Progressing Towards Goal  Goal: Influenza immunization  Outcome: Progressing Towards Goal  Goal: *Pneumococcal immunization  Outcome: Progressing Towards Goal  Goal: Activity/Safety  Outcome: Progressing Towards Goal  Goal: Diagnostic Test/Procedures  Outcome: Progressing Towards Goal  Goal: Nutrition/Diet  Outcome: Progressing Towards Goal  Goal: Discharge Planning  Outcome: Progressing Towards Goal  Goal: Medications  Outcome: Progressing Towards Goal  Goal: Respiratory  Outcome: Progressing Towards Goal  Goal: Treatments/Interventions/Procedures  Outcome: Progressing Towards Goal  Goal: Psychosocial  Outcome: Progressing Towards Goal     Problem: Sepsis: Discharge Outcomes  Goal: *Vital signs within defined limits  Outcome: Progressing Towards Goal  Goal: *Tolerating diet  Outcome: Progressing Towards Goal  Goal: *Verbalizes understanding and describes prescribed diet  Outcome: Progressing Towards Goal  Goal: *Demonstrates progressive activity  Outcome: Progressing Towards Goal  Goal: *Describes follow-up/return visits to physicians  Outcome: Progressing Towards Goal  Goal: *Verbalizes name, dosage, time, side effects, and number of days to continue medications  Outcome: Progressing Towards Goal  Goal: *Influenza immunization (Oct-Mar only)  Outcome: Progressing Towards Goal  Goal: *Pneumococcal immunization  Outcome: Progressing Towards Goal  Goal: *Lungs clear or at baseline  Outcome: Progressing Towards Goal  Goal: *Oxygen saturation returns to baseline or 90% or better on room air  Outcome: Progressing Towards Goal  Goal: *Glycemic control  Outcome: Progressing Towards Goal  Goal: *Absence of deep venous thrombosis signs and symptoms(Stroke Metric)  Outcome: Progressing Towards Goal  Goal: *Describes available resources and support systems  Outcome: Progressing Towards Goal  Goal: *Optimal pain control at patient's stated goal  Outcome: Progressing Towards Goal   Patient is alert and oriented; continues to use specialty bed. Being assisted with turning and repositioning by turn team but most recently has been refusing to turn. Denies any pain or discomfort. Refusing to be stuck for lab draws. MD stated yesterday morning tat no labs were needed because he was discharging her per morning nurse. Will notify next shift of refusal. No acute distress. Possible discharge today.

## 2020-12-22 NOTE — INTERDISCIPLINARY ROUNDS
Interdisciplinary Rounds were completed on 12/22/20 for this patient. Rounds included MD, nursing, clinical care leader, pharmacy, and case management. Plan of care discussed. See clinical pathway and/or care plan for interventions and desired outcomes.

## 2020-12-22 NOTE — PROGRESS NOTES
End of Shift Note    Bedside shift change report given to Deidra Garner RN (oncoming nurse) by Trcaey Hassan RN (offgoing nurse). Report included the following information SBAR, Kardex, ED Summary, Intake/Output, MAR, Recent Results and Cardiac Rhythm (NSR)    Shift worked:  7a-7p     Shift summary and any significant changes:    Patient rested in bed all of shift. Using perwick. Wound care done by Terry Lafleur LPN. Tolerating diet well. Covid rule out performed and sent in morning. Concerns for physician to address:  n/a     [facility] phone for oncoming shift:   [phone]       Activity:  Activity Level: Bed Rest  Number times ambulated in hallways past shift: 0  Number of times OOB to chair past shift: 0    Cardiac:   Cardiac Monitoring: Yes      Cardiac Rhythm: Normal sinus rhythm    Access:   Current line(s): PIV     Genitourinary:   Urinary status: voiding, incontinent and external catheter    Respiratory:   O2 Device: Room air  Chronic home O2 use?: N/A  Incentive spirometer at bedside: N/A     GI:  Last Bowel Movement Date: 12/21/20  Current diet:  DIET REGULAR  DIET NUTRITIONAL SUPPLEMENTS Breakfast, Dinner; Ensure [employer]  Passing flatus: YES  Tolerating current diet: YES  % Diet Eaten: 10 %    Pain Management:   Patient states pain is manageable on current regimen: YES    Skin:  Kristian Score: 16  Interventions: turn team, speciality bed, float heels, foam dressing and internal/external urinary devices    Patient Safety:  Fall Score:  Total Score: 2  Interventions: bed/chair alarm       Length of Stay:  Expected LOS: 4d 19h  Actual LOS: Wild Alatorre RN

## 2020-12-23 ENCOUNTER — PATIENT OUTREACH (OUTPATIENT)
Dept: CASE MANAGEMENT | Age: 70
End: 2020-12-23

## 2021-04-17 ENCOUNTER — APPOINTMENT (OUTPATIENT)
Dept: CT IMAGING | Age: 71
DRG: 871 | End: 2021-04-17
Attending: EMERGENCY MEDICINE
Payer: MEDICARE

## 2021-04-17 ENCOUNTER — APPOINTMENT (OUTPATIENT)
Dept: GENERAL RADIOLOGY | Age: 71
DRG: 871 | End: 2021-04-17
Attending: EMERGENCY MEDICINE
Payer: MEDICARE

## 2021-04-17 ENCOUNTER — HOSPITAL ENCOUNTER (INPATIENT)
Age: 71
LOS: 12 days | Discharge: HOSPICE | DRG: 871 | End: 2021-04-29
Attending: EMERGENCY MEDICINE | Admitting: STUDENT IN AN ORGANIZED HEALTH CARE EDUCATION/TRAINING PROGRAM
Payer: MEDICARE

## 2021-04-17 DIAGNOSIS — E78.2 MIXED HYPERLIPIDEMIA: ICD-10-CM

## 2021-04-17 DIAGNOSIS — L08.9 PRESSURE INJURY, STAGE 4, WITH INFECTION (HCC): ICD-10-CM

## 2021-04-17 DIAGNOSIS — Z71.89 ADVANCED CARE PLANNING/COUNSELING DISCUSSION: ICD-10-CM

## 2021-04-17 DIAGNOSIS — L89.159 PRESSURE INJURY OF SKIN OF SACRAL REGION, UNSPECIFIED INJURY STAGE: Primary | ICD-10-CM

## 2021-04-17 DIAGNOSIS — N17.9 SEPSIS WITH ACUTE RENAL FAILURE WITHOUT SEPTIC SHOCK, DUE TO UNSPECIFIED ORGANISM, UNSPECIFIED ACUTE RENAL FAILURE TYPE (HCC): ICD-10-CM

## 2021-04-17 DIAGNOSIS — I95.89 OTHER SPECIFIED HYPOTENSION: ICD-10-CM

## 2021-04-17 DIAGNOSIS — L89.94 PRESSURE INJURY, STAGE 4, WITH INFECTION (HCC): ICD-10-CM

## 2021-04-17 DIAGNOSIS — Z71.89 DNR (DO NOT RESUSCITATE) DISCUSSION: ICD-10-CM

## 2021-04-17 DIAGNOSIS — R65.20 SEPSIS WITH ACUTE RENAL FAILURE WITHOUT SEPTIC SHOCK, DUE TO UNSPECIFIED ORGANISM, UNSPECIFIED ACUTE RENAL FAILURE TYPE (HCC): ICD-10-CM

## 2021-04-17 DIAGNOSIS — A41.9 SEVERE SEPSIS (HCC): ICD-10-CM

## 2021-04-17 DIAGNOSIS — R77.8 ELEVATED TROPONIN: ICD-10-CM

## 2021-04-17 DIAGNOSIS — I42.8 OTHER CARDIOMYOPATHY (HCC): ICD-10-CM

## 2021-04-17 DIAGNOSIS — I10 ESSENTIAL HYPERTENSION: ICD-10-CM

## 2021-04-17 DIAGNOSIS — A41.9 SEPSIS WITH ACUTE RENAL FAILURE WITHOUT SEPTIC SHOCK, DUE TO UNSPECIFIED ORGANISM, UNSPECIFIED ACUTE RENAL FAILURE TYPE (HCC): ICD-10-CM

## 2021-04-17 DIAGNOSIS — R65.20 SEVERE SEPSIS (HCC): ICD-10-CM

## 2021-04-17 DIAGNOSIS — I42.9 CARDIOMYOPATHY, UNSPECIFIED TYPE (HCC): ICD-10-CM

## 2021-04-17 PROBLEM — L89.90 INFECTED DECUBITUS ULCER: Status: ACTIVE | Noted: 2021-04-17

## 2021-04-17 LAB
ALBUMIN SERPL-MCNC: 2.3 G/DL (ref 3.5–5)
ALBUMIN/GLOB SERPL: 0.4 {RATIO} (ref 1.1–2.2)
ALP SERPL-CCNC: 101 U/L (ref 45–117)
ALT SERPL-CCNC: 8 U/L (ref 12–78)
AMORPH CRY URNS QL MICRO: ABNORMAL
ANION GAP SERPL CALC-SCNC: 5 MMOL/L (ref 5–15)
APPEARANCE UR: ABNORMAL
AST SERPL-CCNC: 11 U/L (ref 15–37)
BACTERIA URNS QL MICRO: NEGATIVE /HPF
BASOPHILS # BLD: 0 K/UL (ref 0–0.1)
BASOPHILS NFR BLD: 0 % (ref 0–1)
BILIRUB SERPL-MCNC: 0.6 MG/DL (ref 0.2–1)
BILIRUB UR QL: NEGATIVE
BUN SERPL-MCNC: 22 MG/DL (ref 6–20)
BUN/CREAT SERPL: 13 (ref 12–20)
CALCIUM SERPL-MCNC: 9.6 MG/DL (ref 8.5–10.1)
CHLORIDE SERPL-SCNC: 98 MMOL/L (ref 97–108)
CO2 SERPL-SCNC: 29 MMOL/L (ref 21–32)
COLOR UR: ABNORMAL
CREAT SERPL-MCNC: 1.63 MG/DL (ref 0.55–1.02)
DIFFERENTIAL METHOD BLD: ABNORMAL
EOSINOPHIL # BLD: 0 K/UL (ref 0–0.4)
EOSINOPHIL NFR BLD: 0 % (ref 0–7)
EPITH CASTS URNS QL MICRO: ABNORMAL /LPF
ERYTHROCYTE [DISTWIDTH] IN BLOOD BY AUTOMATED COUNT: 16.7 % (ref 11.5–14.5)
GLOBULIN SER CALC-MCNC: 5.6 G/DL (ref 2–4)
GLUCOSE SERPL-MCNC: 142 MG/DL (ref 65–100)
GLUCOSE UR STRIP.AUTO-MCNC: NEGATIVE MG/DL
HCT VFR BLD AUTO: 32.7 % (ref 35–47)
HGB BLD-MCNC: 10.5 G/DL (ref 11.5–16)
HGB UR QL STRIP: ABNORMAL
IMM GRANULOCYTES # BLD AUTO: 0.2 K/UL (ref 0–0.04)
IMM GRANULOCYTES NFR BLD AUTO: 1 % (ref 0–0.5)
KETONES UR QL STRIP.AUTO: NEGATIVE MG/DL
LACTATE BLD-SCNC: 1.73 MMOL/L (ref 0.4–2)
LEUKOCYTE ESTERASE UR QL STRIP.AUTO: ABNORMAL
LYMPHOCYTES # BLD: 0.8 K/UL (ref 0.8–3.5)
LYMPHOCYTES NFR BLD: 4 % (ref 12–49)
MCH RBC QN AUTO: 26.3 PG (ref 26–34)
MCHC RBC AUTO-ENTMCNC: 32.1 G/DL (ref 30–36.5)
MCV RBC AUTO: 82 FL (ref 80–99)
MONOCYTES # BLD: 0.8 K/UL (ref 0–1)
MONOCYTES NFR BLD: 4 % (ref 5–13)
NEUTS SEG # BLD: 18.2 K/UL (ref 1.8–8)
NEUTS SEG NFR BLD: 91 % (ref 32–75)
NITRITE UR QL STRIP.AUTO: NEGATIVE
NRBC # BLD: 0 K/UL (ref 0–0.01)
NRBC BLD-RTO: 0 PER 100 WBC
PH UR STRIP: 8 [PH] (ref 5–8)
PLATELET # BLD AUTO: 574 K/UL (ref 150–400)
PMV BLD AUTO: 8.7 FL (ref 8.9–12.9)
POTASSIUM SERPL-SCNC: 4.4 MMOL/L (ref 3.5–5.1)
PROT SERPL-MCNC: 7.9 G/DL (ref 6.4–8.2)
PROT UR STRIP-MCNC: 100 MG/DL
RBC # BLD AUTO: 3.99 M/UL (ref 3.8–5.2)
RBC #/AREA URNS HPF: ABNORMAL /HPF (ref 0–5)
RBC MORPH BLD: ABNORMAL
SODIUM SERPL-SCNC: 132 MMOL/L (ref 136–145)
SP GR UR REFRACTOMETRY: 1.02 (ref 1–1.03)
TROPONIN I SERPL-MCNC: <0.05 NG/ML
UA: UC IF INDICATED,UAUC: ABNORMAL
UROBILINOGEN UR QL STRIP.AUTO: 0.2 EU/DL (ref 0.2–1)
WBC # BLD AUTO: 20 K/UL (ref 3.6–11)
WBC URNS QL MICRO: ABNORMAL /HPF (ref 0–4)

## 2021-04-17 PROCEDURE — 87205 SMEAR GRAM STAIN: CPT

## 2021-04-17 PROCEDURE — 99285 EMERGENCY DEPT VISIT HI MDM: CPT

## 2021-04-17 PROCEDURE — 85025 COMPLETE CBC W/AUTO DIFF WBC: CPT

## 2021-04-17 PROCEDURE — 74011000258 HC RX REV CODE- 258: Performed by: EMERGENCY MEDICINE

## 2021-04-17 PROCEDURE — 65270000029 HC RM PRIVATE

## 2021-04-17 PROCEDURE — 83605 ASSAY OF LACTIC ACID: CPT

## 2021-04-17 PROCEDURE — 74176 CT ABD & PELVIS W/O CONTRAST: CPT

## 2021-04-17 PROCEDURE — 96375 TX/PRO/DX INJ NEW DRUG ADDON: CPT

## 2021-04-17 PROCEDURE — 71045 X-RAY EXAM CHEST 1 VIEW: CPT

## 2021-04-17 PROCEDURE — 93005 ELECTROCARDIOGRAM TRACING: CPT

## 2021-04-17 PROCEDURE — 36415 COLL VENOUS BLD VENIPUNCTURE: CPT

## 2021-04-17 PROCEDURE — 96365 THER/PROPH/DIAG IV INF INIT: CPT

## 2021-04-17 PROCEDURE — 74011250636 HC RX REV CODE- 250/636: Performed by: EMERGENCY MEDICINE

## 2021-04-17 PROCEDURE — 96361 HYDRATE IV INFUSION ADD-ON: CPT

## 2021-04-17 PROCEDURE — 80053 COMPREHEN METABOLIC PANEL: CPT

## 2021-04-17 PROCEDURE — 84484 ASSAY OF TROPONIN QUANT: CPT

## 2021-04-17 PROCEDURE — 74011250637 HC RX REV CODE- 250/637: Performed by: EMERGENCY MEDICINE

## 2021-04-17 PROCEDURE — 81001 URINALYSIS AUTO W/SCOPE: CPT

## 2021-04-17 PROCEDURE — 87040 BLOOD CULTURE FOR BACTERIA: CPT

## 2021-04-17 RX ORDER — VANCOMYCIN/0.9 % SOD CHLORIDE 1.5G/250ML
1500 PLASTIC BAG, INJECTION (ML) INTRAVENOUS
Status: COMPLETED | OUTPATIENT
Start: 2021-04-17 | End: 2021-04-17

## 2021-04-17 RX ORDER — SODIUM CHLORIDE 0.9 % (FLUSH) 0.9 %
5-10 SYRINGE (ML) INJECTION AS NEEDED
Status: DISCONTINUED | OUTPATIENT
Start: 2021-04-17 | End: 2021-04-24 | Stop reason: SDUPTHER

## 2021-04-17 RX ORDER — ACETAMINOPHEN 500 MG
1000 TABLET ORAL ONCE
Status: COMPLETED | OUTPATIENT
Start: 2021-04-17 | End: 2021-04-17

## 2021-04-17 RX ORDER — ACETAMINOPHEN 325 MG/1
650 TABLET ORAL
Status: DISCONTINUED | OUTPATIENT
Start: 2021-04-17 | End: 2021-04-18 | Stop reason: SDUPTHER

## 2021-04-17 RX ADMIN — SODIUM CHLORIDE 1000 ML: 9 INJECTION, SOLUTION INTRAVENOUS at 17:26

## 2021-04-17 RX ADMIN — SODIUM CHLORIDE 1000 ML: 9 INJECTION, SOLUTION INTRAVENOUS at 23:20

## 2021-04-17 RX ADMIN — CEFEPIME HYDROCHLORIDE 2 G: 2 INJECTION, POWDER, FOR SOLUTION INTRAVENOUS at 21:27

## 2021-04-17 RX ADMIN — VANCOMYCIN HYDROCHLORIDE 1500 MG: 10 INJECTION, POWDER, LYOPHILIZED, FOR SOLUTION INTRAVENOUS at 21:27

## 2021-04-17 RX ADMIN — ACETAMINOPHEN 1000 MG: 500 TABLET, FILM COATED ORAL at 17:26

## 2021-04-17 NOTE — ED TRIAGE NOTES
Per caretaker Gabyyonathan Lisa ([phone]), unable to respond to questions like usual starting this afternoon around lunchtime.  Poor appetite, shivering

## 2021-04-17 NOTE — Clinical Note
TRANSFER - OUT REPORT:     Verbal report given to: Aneesh Dennis RN. Report consisted of patient's Situation, Background, Assessment and   Recommendations(SBAR). Opportunity for questions and clarification was provided. Patient transported with a Registered Nurse. [facility]

## 2021-04-17 NOTE — ED NOTES
Noted improvement in mental status.  More responsive to questions, answering in full and appropriate sentences

## 2021-04-17 NOTE — Clinical Note
History and physical documented and up to date, allergies reviewed, lab results reviewed, pre-procedure education provided, patient verbalized understanding of procedure, procedural consent signed and patient is NPO. Please call pt for appt

## 2021-04-18 LAB
ANION GAP SERPL CALC-SCNC: 7 MMOL/L (ref 5–15)
ATRIAL RATE: 120 BPM
BASOPHILS # BLD: 0 K/UL (ref 0–0.1)
BASOPHILS NFR BLD: 0 % (ref 0–1)
BUN SERPL-MCNC: 23 MG/DL (ref 6–20)
BUN/CREAT SERPL: 16 (ref 12–20)
CALCIUM SERPL-MCNC: 8.8 MG/DL (ref 8.5–10.1)
CALCULATED P AXIS, ECG09: 20 DEGREES
CALCULATED R AXIS, ECG10: -22 DEGREES
CALCULATED T AXIS, ECG11: 22 DEGREES
CHLORIDE SERPL-SCNC: 105 MMOL/L (ref 97–108)
CO2 SERPL-SCNC: 23 MMOL/L (ref 21–32)
CREAT SERPL-MCNC: 1.43 MG/DL (ref 0.55–1.02)
DIAGNOSIS, 93000: NORMAL
DIFFERENTIAL METHOD BLD: ABNORMAL
EOSINOPHIL # BLD: 0 K/UL (ref 0–0.4)
EOSINOPHIL NFR BLD: 0 % (ref 0–7)
ERYTHROCYTE [DISTWIDTH] IN BLOOD BY AUTOMATED COUNT: 17 % (ref 11.5–14.5)
GLUCOSE SERPL-MCNC: 92 MG/DL (ref 65–100)
HCT VFR BLD AUTO: 26 % (ref 35–47)
HGB BLD-MCNC: 8.3 G/DL (ref 11.5–16)
IMM GRANULOCYTES # BLD AUTO: 0 K/UL (ref 0–0.04)
IMM GRANULOCYTES NFR BLD AUTO: 0 % (ref 0–0.5)
LYMPHOCYTES # BLD: 1.6 K/UL (ref 0.8–3.5)
LYMPHOCYTES NFR BLD: 10 % (ref 12–49)
MAGNESIUM SERPL-MCNC: 2.1 MG/DL (ref 1.6–2.4)
MCH RBC QN AUTO: 26 PG (ref 26–34)
MCHC RBC AUTO-ENTMCNC: 31.9 G/DL (ref 30–36.5)
MCV RBC AUTO: 81.5 FL (ref 80–99)
MONOCYTES # BLD: 0.2 K/UL (ref 0–1)
MONOCYTES NFR BLD: 1 % (ref 5–13)
NEUTS SEG # BLD: 13.8 K/UL (ref 1.8–8)
NEUTS SEG NFR BLD: 89 % (ref 32–75)
NRBC # BLD: 0 K/UL (ref 0–0.01)
NRBC BLD-RTO: 0 PER 100 WBC
P-R INTERVAL, ECG05: 172 MS
PHOSPHATE SERPL-MCNC: 2.1 MG/DL (ref 2.6–4.7)
PLATELET # BLD AUTO: 337 K/UL (ref 150–400)
PMV BLD AUTO: 9.8 FL (ref 8.9–12.9)
POTASSIUM SERPL-SCNC: 4.3 MMOL/L (ref 3.5–5.1)
Q-T INTERVAL, ECG07: 324 MS
QRS DURATION, ECG06: 126 MS
QTC CALCULATION (BEZET), ECG08: 457 MS
RBC # BLD AUTO: 3.19 M/UL (ref 3.8–5.2)
RBC MORPH BLD: ABNORMAL
SODIUM SERPL-SCNC: 135 MMOL/L (ref 136–145)
VENTRICULAR RATE, ECG03: 120 BPM
WBC # BLD AUTO: 15.6 K/UL (ref 3.6–11)

## 2021-04-18 PROCEDURE — 99252 IP/OBS CONSLTJ NEW/EST SF 35: CPT | Performed by: SURGERY

## 2021-04-18 PROCEDURE — 83735 ASSAY OF MAGNESIUM: CPT

## 2021-04-18 PROCEDURE — 85025 COMPLETE CBC W/AUTO DIFF WBC: CPT

## 2021-04-18 PROCEDURE — 74011250637 HC RX REV CODE- 250/637: Performed by: INTERNAL MEDICINE

## 2021-04-18 PROCEDURE — 2709999900 HC NON-CHARGEABLE SUPPLY

## 2021-04-18 PROCEDURE — 80048 BASIC METABOLIC PNL TOTAL CA: CPT

## 2021-04-18 PROCEDURE — 77030038269 HC DRN EXT URIN PURWCK BARD -A

## 2021-04-18 PROCEDURE — 74011000258 HC RX REV CODE- 258: Performed by: STUDENT IN AN ORGANIZED HEALTH CARE EDUCATION/TRAINING PROGRAM

## 2021-04-18 PROCEDURE — 65270000029 HC RM PRIVATE

## 2021-04-18 PROCEDURE — 74011250636 HC RX REV CODE- 250/636: Performed by: INTERNAL MEDICINE

## 2021-04-18 PROCEDURE — 77030040392 HC DRSG OPTIFOAM MDII -A

## 2021-04-18 PROCEDURE — 77030018842 HC SOL IRR SOD CL 9% BAXT -A

## 2021-04-18 PROCEDURE — 74011250636 HC RX REV CODE- 250/636: Performed by: STUDENT IN AN ORGANIZED HEALTH CARE EDUCATION/TRAINING PROGRAM

## 2021-04-18 PROCEDURE — 74011250637 HC RX REV CODE- 250/637: Performed by: STUDENT IN AN ORGANIZED HEALTH CARE EDUCATION/TRAINING PROGRAM

## 2021-04-18 PROCEDURE — 36415 COLL VENOUS BLD VENIPUNCTURE: CPT

## 2021-04-18 PROCEDURE — 84100 ASSAY OF PHOSPHORUS: CPT

## 2021-04-18 RX ORDER — POLYETHYLENE GLYCOL 3350 17 G/17G
17 POWDER, FOR SOLUTION ORAL 2 TIMES DAILY
Status: DISCONTINUED | OUTPATIENT
Start: 2021-04-18 | End: 2021-04-26

## 2021-04-18 RX ORDER — SODIUM CHLORIDE 9 MG/ML
125 INJECTION, SOLUTION INTRAVENOUS CONTINUOUS
Status: DISCONTINUED | OUTPATIENT
Start: 2021-04-18 | End: 2021-04-21

## 2021-04-18 RX ORDER — LEVOTHYROXINE SODIUM 25 UG/1
25 TABLET ORAL
Status: DISCONTINUED | OUTPATIENT
Start: 2021-04-19 | End: 2021-04-29 | Stop reason: HOSPADM

## 2021-04-18 RX ORDER — POLYETHYLENE GLYCOL 3350 17 G/17G
17 POWDER, FOR SOLUTION ORAL DAILY PRN
Status: DISCONTINUED | OUTPATIENT
Start: 2021-04-18 | End: 2021-04-26

## 2021-04-18 RX ORDER — ACETAMINOPHEN 325 MG/1
650 TABLET ORAL
Status: DISCONTINUED | OUTPATIENT
Start: 2021-04-18 | End: 2021-04-19

## 2021-04-18 RX ORDER — SODIUM CHLORIDE 0.9 % (FLUSH) 0.9 %
5-40 SYRINGE (ML) INJECTION EVERY 8 HOURS
Status: DISCONTINUED | OUTPATIENT
Start: 2021-04-18 | End: 2021-04-24 | Stop reason: SDUPTHER

## 2021-04-18 RX ORDER — PROMETHAZINE HYDROCHLORIDE 25 MG/1
12.5 TABLET ORAL
Status: DISCONTINUED | OUTPATIENT
Start: 2021-04-18 | End: 2021-04-29 | Stop reason: HOSPADM

## 2021-04-18 RX ORDER — LANOLIN ALCOHOL/MO/W.PET/CERES
400 CREAM (GRAM) TOPICAL 2 TIMES DAILY
Status: DISCONTINUED | OUTPATIENT
Start: 2021-04-18 | End: 2021-04-29 | Stop reason: HOSPADM

## 2021-04-18 RX ORDER — BISACODYL 5 MG
5 TABLET, DELAYED RELEASE (ENTERIC COATED) ORAL DAILY
Status: DISCONTINUED | OUTPATIENT
Start: 2021-04-18 | End: 2021-04-26

## 2021-04-18 RX ORDER — SODIUM CHLORIDE 0.9 % (FLUSH) 0.9 %
5-10 SYRINGE (ML) INJECTION AS NEEDED
Status: DISCONTINUED | OUTPATIENT
Start: 2021-04-18 | End: 2021-04-24 | Stop reason: SDUPTHER

## 2021-04-18 RX ORDER — HEPARIN SODIUM 5000 [USP'U]/ML
5000 INJECTION, SOLUTION INTRAVENOUS; SUBCUTANEOUS EVERY 12 HOURS
Status: DISCONTINUED | OUTPATIENT
Start: 2021-04-18 | End: 2021-04-21

## 2021-04-18 RX ORDER — ACETAMINOPHEN 650 MG/1
650 SUPPOSITORY RECTAL
Status: DISCONTINUED | OUTPATIENT
Start: 2021-04-18 | End: 2021-04-19

## 2021-04-18 RX ORDER — ONDANSETRON 2 MG/ML
4 INJECTION INTRAMUSCULAR; INTRAVENOUS
Status: DISCONTINUED | OUTPATIENT
Start: 2021-04-18 | End: 2021-04-29 | Stop reason: HOSPADM

## 2021-04-18 RX ORDER — TRAMADOL HYDROCHLORIDE 50 MG/1
50 TABLET ORAL
Status: DISCONTINUED | OUTPATIENT
Start: 2021-04-18 | End: 2021-04-20

## 2021-04-18 RX ORDER — SODIUM CHLORIDE 0.9 % (FLUSH) 0.9 %
5-40 SYRINGE (ML) INJECTION AS NEEDED
Status: DISCONTINUED | OUTPATIENT
Start: 2021-04-18 | End: 2021-04-24 | Stop reason: SDUPTHER

## 2021-04-18 RX ADMIN — AMPICILLIN SODIUM AND SULBACTAM SODIUM 3 G: 2; 1 INJECTION, POWDER, FOR SOLUTION INTRAMUSCULAR; INTRAVENOUS at 17:14

## 2021-04-18 RX ADMIN — SODIUM CHLORIDE 75 ML/HR: 9 INJECTION, SOLUTION INTRAVENOUS at 13:41

## 2021-04-18 RX ADMIN — HEPARIN SODIUM 5000 UNITS: 5000 INJECTION INTRAVENOUS; SUBCUTANEOUS at 06:44

## 2021-04-18 RX ADMIN — ACETAMINOPHEN 650 MG: 325 TABLET ORAL at 22:10

## 2021-04-18 RX ADMIN — AMPICILLIN SODIUM AND SULBACTAM SODIUM 3 G: 2; 1 INJECTION, POWDER, FOR SOLUTION INTRAMUSCULAR; INTRAVENOUS at 23:00

## 2021-04-18 RX ADMIN — TRAMADOL HYDROCHLORIDE 50 MG: 50 TABLET, FILM COATED ORAL at 14:01

## 2021-04-18 RX ADMIN — Medication 10 ML: at 21:12

## 2021-04-18 RX ADMIN — AMPICILLIN SODIUM AND SULBACTAM SODIUM 3 G: 2; 1 INJECTION, POWDER, FOR SOLUTION INTRAMUSCULAR; INTRAVENOUS at 12:18

## 2021-04-18 RX ADMIN — MAGNESIUM OXIDE 400 MG (241.3 MG MAGNESIUM) TABLET 400 MG: TABLET at 16:07

## 2021-04-18 RX ADMIN — VANCOMYCIN HYDROCHLORIDE 750 MG: 750 INJECTION, POWDER, LYOPHILIZED, FOR SOLUTION INTRAVENOUS at 14:02

## 2021-04-18 RX ADMIN — HEPARIN SODIUM 5000 UNITS: 5000 INJECTION INTRAVENOUS; SUBCUTANEOUS at 17:14

## 2021-04-18 RX ADMIN — Medication 10 ML: at 06:44

## 2021-04-18 RX ADMIN — AMPICILLIN SODIUM AND SULBACTAM SODIUM 3 G: 2; 1 INJECTION, POWDER, FOR SOLUTION INTRAMUSCULAR; INTRAVENOUS at 06:44

## 2021-04-18 RX ADMIN — ACETAMINOPHEN 650 MG: 325 TABLET ORAL at 16:07

## 2021-04-18 RX ADMIN — POLYETHYLENE GLYCOL 3350 17 G: 17 POWDER, FOR SOLUTION ORAL at 16:08

## 2021-04-18 RX ADMIN — Medication 10 ML: at 13:21

## 2021-04-18 NOTE — PROGRESS NOTES
Problem: Falls - Risk of  Goal: *Absence of Falls  Description: Document Lear Shaker Fall Risk and appropriate interventions in the flowsheet.   Outcome: Progressing Towards Goal  Note: Fall Risk Interventions:                 Elimination Interventions: Call light in reach

## 2021-04-18 NOTE — PROGRESS NOTES
End of Shift Note    Bedside shift change report given to LASHANDA Oliver (oncoming nurse) by Radha Quinones RN (offgoing nurse). Report included the following information SBAR, Kardex, Intake/Output, MAR and Recent Results    Shift worked: 7a to 7p     Shift summary and any significant changes:    Started on IV fluids. Surgery consult done. Wound care to see pt on Monday. Sacral dressing changed per JENNI Obregon moist gauze packing then foam dressing. Concerns for physician to address: None     [facility] phone for oncoming shift:  [phone]     Activity:  Activity Level: Bed Rest  Number times ambulated in hallways past shift: 0  Number of times OOB to chair past shift: 0    Cardiac:   Cardiac Monitoring: No      Cardiac Rhythm: Sinus tachycardia    Access:   Current line(s): PIV     Genitourinary:   Urinary status: incontinent and external catheter    Respiratory:   O2 Device: None (Room air)  Chronic home O2 use?: NO  Incentive spirometer at bedside: NO     GI:  Last Bowel Movement Date: 04/16/21  Current diet:  DIET REGULAR  DIET ONE TIME MESSAGE  Passing flatus: YES  Tolerating current diet: YES       Pain Management:   Patient states pain is manageable on current regimen: YES    Skin:  Kristian Score: 15  Interventions: float heels and increase time out of bed    Patient Safety:  Fall Score:  Total Score: 1  Interventions: gripper socks and pt to call before getting OOB       Length of Stay:  Expected LOS: - - -  Actual LOS: [place], RN

## 2021-04-18 NOTE — CONSULTS
Surgery History and Physical    Subjective:      Andreas Eng is a 73 y.o. female who has been bedbound in SNF x 4 years. She is brought to the ED for altered mental status and had purulent drainage from the chronic sacral wound. We are consulted for consideration of surgical management. Pt is alert and appropriate today and has no new complaint. Past Medical History:   Diagnosis Date    HLD (hyperlipidemia)     Hypertension      No past surgical history on file. History reviewed. No pertinent family history. Social History     Tobacco Use    Smoking status: Not on file   Substance Use Topics    Alcohol use: Not on file      Prior to Admission medications    Medication Sig Start Date End Date Taking? Authorizing Provider   levothyroxine (SYNTHROID) 25 mcg tablet Take 25 mcg by mouth Daily (before breakfast). Provider, Historical   magnesium oxide (MAG-OX) 400 mg tablet Take 400 mg by mouth two (2) times a day. Provider, Historical   ascorbic acid, vitamin C, (Vitamin C) 500 mg tablet Take 1,000 mg by mouth daily. Provider, Historical   acetaminophen (TYLENOL) 650 mg TbER Take 650 mg by mouth every six to eight (6-8) hours as needed for Pain. Provider, Historical   oxyCODONE-acetaminophen (PERCOCET) 5-325 mg per tablet Take 1 Tab by mouth every six (6) hours as needed for Pain. Provider, Historical   senna (Senna) 8.6 mg tablet Take 2 Tabs by mouth two (2) times daily as needed for Constipation. Provider, Historical   traMADoL (ULTRAM) 50 mg tablet Take 50 mg by mouth every eight (8) hours as needed for Pain. Provider, Historical   ammonium lactate (LAC-HYDRIN) 12 % lotion Apply  to affected area as needed. Apply to both legs every evening for dry skin    Provider, Historical   amino acids-protein hydrolys (Pro-Stat AWC)  gram-kcal/30 mL liqd Take 30 mL by mouth two (2) times a day.  For wound healing    Provider, Historical   ezetimibe (ZETIA) 10 mg tablet Take 10 mg by mouth daily. Provider, Historical      Allergies   Allergen Reactions    Antihistamines - Alkylamine Unknown (comments)     reported on referral packet       Review of Systems   Constitutional: Negative for chills, diaphoresis and fever. Respiratory: Negative for shortness of breath and wheezing. Cardiovascular: Negative for chest pain and palpitations. Gastrointestinal: Negative for abdominal pain, diarrhea, nausea and vomiting. Musculoskeletal: Negative for myalgias. Hematological: Does not bruise/bleed easily. All other systems reviewed and are negative. Objective:     Patient Vitals for the past 8 hrs:   BP Temp Pulse Resp SpO2   21 0818 131/61 99.3 °F (37.4 °C) 97 21 96 %       Temp (24hrs), Av.5 °F (37.5 °C), Min:98 °F (36.7 °C), Max:102.8 °F (39.3 °C)      Physical Exam  Constitutional:       General: She is not in acute distress. Appearance: She is well-developed. HENT:      Head: Normocephalic and atraumatic. Eyes:      General: No scleral icterus. Pupils: Pupils are equal, round, and reactive to light. Cardiovascular:      Rate and Rhythm: Normal rate and regular rhythm. Heart sounds: Normal heart sounds. Pulmonary:      Breath sounds: Normal breath sounds. No wheezing or rales. Abdominal:      General: Bowel sounds are normal. There is no distension. Palpations: Abdomen is soft. There is no mass. Tenderness: There is no abdominal tenderness. There is no guarding or rebound. Musculoskeletal: Normal range of motion. Lymphadenopathy:      Cervical: No cervical adenopathy. Skin:         Neurological:      General: No focal deficit present. Mental Status: She is alert and oriented to person, place, and time. Assessment:     72 yo bed bound woman with chronic sacral ulcer. Wound is pristine appearing today. No active drainage and no necrotic tissue or evidence of undrained collections.   On Unasyn and vanc    Plan: Recommend normal saline packing today and wound care consult tomorrow. Nothing to add from surgical standpoint. Continue antibiotics pending culture results.

## 2021-04-18 NOTE — PROGRESS NOTES
Pharmacy Automatic Renal Dosing Protocol - Antimicrobials    Indication for Antimicrobials: SSTI, UTI     Current Regimen of Each Antimicrobial:  Vancomycin 1500mg IV, then 750 mg IV q18hr (Start Date ; day 2 of 7)  Unasyn 3 gm IV q6hr (Start Date ; day 1 of 7)    Previous Antimicrobial Therapy:    Cefepime 1gm IV x1 started  day 1    Vancomycin Trough Goal Level:  15 - 20 until Cx resulted (AUC: 400 - 600 mg/hr/Liter/day)     Date Dose & Interval Measured (mcg/mL) Extrapolated (mcg/mL)                       Significant Cultures:    WCx - pending   BCx - pending   UA - turbid    Radiology / Imaging results: (X-ray, CT scan or MRI): -    Paralysis, amputations, malnutrition: none noted    Labs:  Recent Labs     21  1730 21  1650   CREA 1.63*  --    BUN 22*  --    WBC  --  20.0*     Temp (24hrs), Av.1 °F (37.8 °C), Min:98 °F (36.7 °C), Max:102.8 °F (39.3 °C)    Creatinine Clearance (mL/min):   CrCl (Actual Body Weight): 39.7  CrCl (Adjusted Body Weight): 34.9  CrCl (Ideal Body Weight): 31.7    Impression/Plan:   Vancomycin 1500mg IV loading dose was given. Will order vancomycin 750mg IV q18h (projected to provide a trough of 16.8 mcg/ml; )  BMP daily  Duration of therapy: 7 days     Pharmacy will follow daily and adjust medications as appropriate for renal function and/or serum levels.     Thank you,  Michelle Duron, PHARMD

## 2021-04-18 NOTE — H&P
.                  Hospitalist Admission Note    NAME: Hayley Farrar   :  1950   MRN:  [id number]     Date/Time:  2021 11:40 PM    Patient PCP: LOLY Wheatley  ______________________________________________________________________  Given the patient's current clinical presentation, I have a high level of concern for decompensation if discharged from the emergency department. Complex decision making was performed, which includes reviewing the patient's available past medical records, laboratory results, and x-ray films. My assessment of this patient's clinical condition and my plan of care is as follows. Assessment / Plan:    Infected decubitus ulcer  Severe sepsis (leukocytosis, acute kidney injury, hypotension)  Patient is bedbound for about 4 years now  Circumstances of her mobility disorder is vague  CT abdomen and pelvis reported: \"1. Soft tissue swelling overlying the coccyx without abscess or overt  osteomyelitis. 2. Bilateral nephrolithiasis. 3. Bladder stones. 4. Cholelithiasis. 5. Rectal impaction\"  Acute kidney injury  admit to surgical floor  Received 1 L of normal saline, will give another liter of crystalloids (lactated Ringer)  Continue with vancomycin and cefepime  We will continue with IV hydration  require documents from [facility] Recheck CBC and kidney function in the morning  Surgical consultation  bowel regimen added    Hypertension   hyperlipidemia        Code Status: Full code  Surrogate Decision Maker: her care giver patient is unsure of the last name    Per nursing notes they have communicated with her earlier caretaker Bernard Monte ([phone]). I attempted to call but I was unable to reach her        DVT Prophylaxis: Subcu heparin  GI Prophylaxis: not indicated    Baseline: Bedbound      Subjective:   CHIEF COMPLAINT: Fever chills    HISTORY OF PRESENT ILLNESS:     Hayley Farrar is a 73 y.o.    female past history of hypertension, hyperlipidemia, bedbound for the last 3 to 4 years       Has been bed bound for the past 4 years, recalls having an operation on her back at [facility] after a fall at home was not aware of how she had that fall and for how long she was on the floor. She has recently moved into her current residence since November and before was at [facility] rehab center where attempts to rehabilitation including electrical stimulation had failed to allow her to be able to mobilize independently. Now resident of the [facility] [address] services. Her care giver Ms. Gonzalez Oxxxxx unsure of the last name    Just 2 days ago started to have chills, mainly in the evenings, denied any pain other than her chronic back pains. Has chronic constipation for which takes a stool softener. Otherwise has denied any respiratory symptoms no chest pain no shortness of breath, no palpitations, no urinary symptoms      We were asked to admit for work up and evaluation of the above problems. Past Medical History:   Diagnosis Date    HLD (hyperlipidemia)     Hypertension         No past surgical history on file. Social History     Tobacco Use    Smoking status: Not on file   Substance Use Topics    Alcohol use: Not on file    no smoking or alcohol intake    History reviewed. No pertinent family history. denies history of premature heart disease  Allergies   Allergen Reactions    Antihistamines - Alkylamine Unknown (comments)     reported on referral packet        Prior to Admission medications    Medication Sig Start Date End Date Taking? Authorizing Provider   levothyroxine (SYNTHROID) 25 mcg tablet Take 25 mcg by mouth Daily (before breakfast). Provider, Historical   magnesium oxide (MAG-OX) 400 mg tablet Take 400 mg by mouth two (2) times a day. Provider, Historical   ascorbic acid, vitamin C, (Vitamin C) 500 mg tablet Take 1,000 mg by mouth daily.     Provider, Historical   acetaminophen (TYLENOL) 650 mg TbER Take 650 mg by mouth every six to eight (6-8) hours as needed for Pain. Provider, Historical   oxyCODONE-acetaminophen (PERCOCET) 5-325 mg per tablet Take 1 Tab by mouth every six (6) hours as needed for Pain. Provider, Historical   senna (Senna) 8.6 mg tablet Take 2 Tabs by mouth two (2) times daily as needed for Constipation. Provider, Historical   traMADoL (ULTRAM) 50 mg tablet Take 50 mg by mouth every eight (8) hours as needed for Pain. Provider, Historical   ammonium lactate (LAC-HYDRIN) 12 % lotion Apply  to affected area as needed. Apply to both legs every evening for dry skin    Provider, Historical   amino acids-protein hydrolys (Pro-Stat AWC)  gram-kcal/30 mL liqd Take 30 mL by mouth two (2) times a day. For wound healing    Provider, Historical   ezetimibe (ZETIA) 10 mg tablet Take 10 mg by mouth daily. Provider, Historical       REVIEW OF SYSTEMS:     I am not able to complete the review of systems because:    The patient is intubated and sedated    The patient has altered mental status due to his acute medical problems    The patient has baseline aphasia from prior stroke(s)    The patient has baseline dementia and is not reliable historian    The patient is in acute medical distress and unable to provide information           Total of 12 systems reviewed as follows:       POSITIVE= underlined text  Negative = text not underlined  General:  fever, chills, sweats, generalized weakness, weight loss/gain,      loss of appetite   Eyes:    blurred vision, eye pain, loss of vision, double vision  ENT:    rhinorrhea, pharyngitis   Respiratory:   cough, sputum production, SOB, PAL, wheezing, pleuritic pain   Cardiology:   chest pain, palpitations, orthopnea, PND, edema, syncope   Gastrointestinal:  abdominal pain , N/V, diarrhea, dysphagia, constipation, bleeding   Genitourinary:  frequency, urgency, dysuria, hematuria, incontinence   Muskuloskeletal :  arthralgia, myalgia, back pain chronic  Hematology:  easy bruising, nose or gum bleeding, lymphadenopathy   Dermatological: rash, ulceration, pruritis, color change / jaundice  Endocrine:   hot flashes or polydipsia   Neurological:  headache, dizziness, confusion, focal weakness, paresthesia,     Speech difficulties, memory loss, gait difficulty  Psychological: Feelings of anxiety, depression, agitation    Objective:   VITALS:    Visit Vitals  BP (!) 101/48   Pulse 76   Temp 98 °F (36.7 °C)   Resp 20   Ht 5' 7\" (1.702 m)   Wt 77.1 kg (170 lb)   SpO2 94%   BMI 26.63 kg/m²       PHYSICAL EXAM:    General:    Alert, cooperative, no distress, appears stated age. HEENT: Atraumatic, anicteric sclerae, pink conjunctivae     No oral ulcers, mucosa moist, throat clear, dentition fair  Neck:  Supple, symmetrical,  thyroid: non tender  Lungs:   Clear to auscultation bilaterally. No Wheezing or Rhonchi. No rales. Chest wall:  No tenderness  No Accessory muscle use. Heart:   Regular  rhythm,  No  murmur   No edema  Abdomen:   Soft, non-tender. Not distended. Bowel sounds normal  Extremities: No cyanosis. No clubbing,      Skin turgor normal, Capillary refill normal, Radial dial pulse 2+  Skin:     Bilateral lower extremities with seborrheic changes          Deep decubitus ulcer 10x10 cm with purulent exudate    Psych:  Good insight. Not depressed. Not anxious or agitated. Neurologic: EOMs intact. No facial asymmetry. No aphasia or slurred speech. Bilateral lower extremities 4/5 in strength, , Sensation grossly intact.  Alert and oriented X 4.     _______________________________________________________________________  Care Plan discussed with:    Comments   Patient X    Family      RN X    Care Manager                    Consultant:      _______________________________________________________________________  Expected  Disposition:   Home with Family    HH/PT/OT/RN    SNF/LTC    [facility] ________________________________________________________________________  TOTAL TIME:  52 Minutes    Critical Care Provided     Minutes non procedure based      Comments    X Reviewed previous records   >50% of visit spent in counseling and coordination of care X Discussion with patient and/or family and questions answered       ________________________________________________________________________  Signed: Gerald Gan MD    Procedures: see electronic medical records for all procedures/Xrays and details which were not copied into this note but were reviewed prior to creation of Plan. LAB DATA REVIEWED:    Recent Results (from the past 24 hour(s))   EKG, 12 LEAD, INITIAL    Collection Time: 04/17/21  4:25 PM   Result Value Ref Range    Ventricular Rate 120 BPM    Atrial Rate 120 BPM    P-R Interval 172 ms    QRS Duration 126 ms    Q-T Interval 324 ms    QTC Calculation (Bezet) 457 ms    Calculated P Axis 20 degrees    Calculated R Axis -22 degrees    Calculated T Axis 22 degrees    Diagnosis       Sinus tachycardia  Right bundle branch block  Cannot rule out Inferior infarct (cited on or before 17-APR-2021)  When compared with ECG of 16-DEC-2020 14:17,  No significant change was found     CBC WITH AUTOMATED DIFF    Collection Time: 04/17/21  4:50 PM   Result Value Ref Range    WBC 20.0 (H) 3.6 - 11.0 K/uL    RBC 3.99 3.80 - 5.20 M/uL    HGB 10.5 (L) 11.5 - 16.0 g/dL    HCT 32.7 (L) 35.0 - 47.0 %    MCV 82.0 80.0 - 99.0 FL    MCH 26.3 26.0 - 34.0 PG    MCHC 32.1 30.0 - 36.5 g/dL    RDW 16.7 (H) 11.5 - 14.5 %    PLATELET 624 (H) 111 - 400 K/uL    MPV 8.7 (L) 8.9 - 12.9 FL    NRBC 0.0 0  WBC    ABSOLUTE NRBC 0.00 0.00 - 0.01 K/uL    NEUTROPHILS 91 (H) 32 - 75 %    LYMPHOCYTES 4 (L) 12 - 49 %    MONOCYTES 4 (L) 5 - 13 %    EOSINOPHILS 0 0 - 7 %    BASOPHILS 0 0 - 1 %    IMMATURE GRANULOCYTES 1 (H) 0.0 - 0.5 %    ABS. NEUTROPHILS 18.2 (H) 1.8 - 8.0 K/UL    ABS. LYMPHOCYTES 0.8 0.8 - 3.5 K/UL    ABS. MONOCYTES 0.8 0.0 - 1.0 K/UL    ABS. EOSINOPHILS 0.0 0.0 - 0.4 K/UL    ABS. BASOPHILS 0.0 0.0 - 0.1 K/UL    ABS. IMM. GRANS. 0.2 (H) 0.00 - 0.04 K/UL    DF SMEAR SCANNED      RBC COMMENTS ANISOCYTOSIS  1+       POC LACTIC ACID    Collection Time: 04/17/21  4:54 PM   Result Value Ref Range    Lactic Acid (POC) 1.73 0.40 - 9.36 mmol/L   METABOLIC PANEL, COMPREHENSIVE    Collection Time: 04/17/21  5:30 PM   Result Value Ref Range    Sodium 132 (L) 136 - 145 mmol/L    Potassium 4.4 3.5 - 5.1 mmol/L    Chloride 98 97 - 108 mmol/L    CO2 29 21 - 32 mmol/L    Anion gap 5 5 - 15 mmol/L    Glucose 142 (H) 65 - 100 mg/dL    BUN 22 (H) 6 - 20 MG/DL    Creatinine 1.63 (H) 0.55 - 1.02 MG/DL    BUN/Creatinine ratio 13 12 - 20      GFR est AA 38 (L) >60 ml/min/1.73m2    GFR est non-AA 31 (L) >60 ml/min/1.73m2    Calcium 9.6 8.5 - 10.1 MG/DL    Bilirubin, total 0.6 0.2 - 1.0 MG/DL    ALT (SGPT) 8 (L) 12 - 78 U/L    AST (SGOT) 11 (L) 15 - 37 U/L    Alk.  phosphatase 101 45 - 117 U/L    Protein, total 7.9 6.4 - 8.2 g/dL    Albumin 2.3 (L) 3.5 - 5.0 g/dL    Globulin 5.6 (H) 2.0 - 4.0 g/dL    A-G Ratio 0.4 (L) 1.1 - 2.2     TROPONIN I    Collection Time: 04/17/21  5:30 PM   Result Value Ref Range    Troponin-I, Qt. <0.05 <0.05 ng/mL   URINALYSIS W/ REFLEX CULTURE    Collection Time: 04/17/21  6:54 PM    Specimen: Urine   Result Value Ref Range    Color DARK YELLOW      Appearance TURBID (A) CLEAR      Specific gravity 1.017 1.003 - 1.030      pH (UA) 8.0 5.0 - 8.0      Protein 100 (A) NEG mg/dL    Glucose Negative NEG mg/dL    Ketone Negative NEG mg/dL    Bilirubin Negative NEG      Blood LARGE (A) NEG      Urobilinogen 0.2 0.2 - 1.0 EU/dL    Nitrites Negative NEG      Leukocyte Esterase LARGE (A) NEG      WBC 5-10 0 - 4 /hpf    RBC 0-5 0 - 5 /hpf    Epithelial cells FEW FEW /lpf    Bacteria Negative NEG /hpf    UA:UC IF INDICATED CULTURE NOT INDICATED BY UA RESULT CNI      Amorphous Crystals 2+ (A) NEG

## 2021-04-18 NOTE — PROGRESS NOTES
Hospitalist Progress Note    NAME: Bart Barrett   :  1950   MRN:  [id number]       Assessment / Plan:  Infected decubitus ulcer  Severe sepsis (leukocytosis, acute kidney injury, hypotension)  Patient is bedbound for about 4 years now    CT abdomen and pelvis reported: \"1. Soft tissue swelling overlying the coccyx without abscess or overt  osteomyelitis. 2. Bilateral nephrolithiasis. 3. Bladder stones. 4. Cholelithiasis. 5. Rectal impaction\"  Patient has had this decubitus ulcer for the last 4 years, was getting wound care through home health services and her caregiver. Continue with IV antibiotics with vancomycin and Unasyn  Blood cultures negative to date, wound cultures pending  Wound care consulted, might need debridement  Continue bowel regiment    Acute kidney injury, most likely prerenal  Creatinine improving with IV fluid  Continue with normal saline at 75 cc/h  Repeat BMP    Hypertension   Hyperlipidemia  Hypothyroidism  No BP meds seen in the med rec  Continue with Zetia with pharmacy substitution  Continue with levothyroxine        Code Status: Full code  Surrogate Decision Maker: her care giver patient is unsure of the last name      DVT Prophylaxis: Subcu heparin  GI Prophylaxis: not indicated         25.0 - 29.9 Overweight / Body mass index is 26.63 kg/m². Subjective:     Chief Complaint / Reason for Physician Visit  FU infected decub ulcer . No acute complaints . Persistent low grade fevers . C/o pain on her decubitus ulcer Discussed with RN events overnight. Review of Systems:  Symptom Y/N Comments  Symptom Y/N Comments   Fever/Chills y   Chest Pain n    Poor Appetite    Edema     Cough    Abdominal Pain n    Sputum    Joint Pain     SOB/PAL n   Pruritis/Rash     Nausea/vomit    Tolerating PT/OT     Diarrhea    Tolerating Diet y    Constipation    Other       Could NOT obtain due to:      Objective:     VITALS:   Last 24hrs VS reviewed since prior progress note.  Most recent are:  Patient Vitals for the past 24 hrs:   Temp Pulse Resp BP SpO2   04/18/21 1221 99.2 °F (37.3 °C) 82 20 138/75 100 %   04/18/21 0818 99.3 °F (37.4 °C) 97 21 131/61 96 %   04/18/21 0200 99.5 °F (37.5 °C) 97 20 134/63 96 %   04/18/21 0100 98.3 °F (36.8 °C) 94 21 128/61 100 %   04/18/21 0015 98.3 °F (36.8 °C) 98 22 117/72 96 %   04/17/21 2258 98 °F (36.7 °C) 76 20 (!) 101/48 94 %   04/17/21 1930 99 °F (37.2 °C) 95 16 (!) 104/57 94 %   04/17/21 1900  (!) 106 15 103/60 95 %   04/17/21 1830  (!) 102  (!) 105/58 93 %   04/17/21 1815 (!) 100.7 °F (38.2 °C)       04/17/21 1800  (!) 118 15 109/68 94 %   04/17/21 1730  (!) 115 21 (!) 115/57 95 %   04/17/21 1700  (!) 113 19 111/63 94 %   04/17/21 1630  (!) 124  113/66 94 %   04/17/21 1607 (!) 102.8 °F (39.3 °C) (!) 125 18 136/76 94 %       Intake/Output Summary (Last 24 hours) at 4/18/2021 1329  Last data filed at 4/18/2021 1218  Gross per 24 hour   Intake 2800 ml   Output    Net 2800 ml        I had a face to face encounter and independently examined this patient on 4/18/2021, as outlined below:  PHYSICAL EXAM:  General: WD, WN. Alert, cooperative, no acute distress    EENT:  EOMI. Anicteric sclerae. MMM  Resp:  CTA bilaterally, no wheezing or rales. No accessory muscle use  CV:  Regular  rhythm,  No edema  GI:  Soft, Non distended, Non tender. +Bowel sounds  Neurologic:  Alert and oriented X 3, normal speech,   Psych:   Good insight.  Not anxious nor agitated  Skin:        Reviewed most current lab test results and cultures  YES  Reviewed most current radiology test results   YES  Review and summation of old records today    NO  Reviewed patient's current orders and MAR    YES  PMH/SH reviewed - no change compared to H&P  ________________________________________________________________________  Care Plan discussed with:    Comments   Patient x    Family      RN x    Care Manager     Consultant                        Multidiciplinary team rounds were held today with , nursing, pharmacist and clinical coordinator. Patient's plan of care was discussed; medications were reviewed and discharge planning was addressed. ________________________________________________________________________  Total NON critical care TIME: 35  Minutes    Total CRITICAL CARE TIME Spent:   Minutes non procedure based      Comments   >50% of visit spent in counseling and coordination of care     ________________________________________________________________________  Chantelle Dennis MD     Procedures: see electronic medical records for all procedures/Xrays and details which were not copied into this note but were reviewed prior to creation of Plan. LABS:  I reviewed today's most current labs and imaging studies.   Pertinent labs include:  Recent Labs     04/18/21 0317 04/17/21  1650   WBC 15.6* 20.0*   HGB 8.3* 10.5*   HCT 26.0* 32.7*    574*     Recent Labs     04/18/21 0317 04/17/21  1730   * 132*   K 4.3 4.4    98   CO2 23 29   GLU 92 142*   BUN 23* 22*   CREA 1.43* 1.63*   CA 8.8 9.6   MG 2.1  --    PHOS 2.1*  --    ALB  --  2.3*   TBILI  --  0.6   ALT  --  8*       Signed: Chantelle Dennis MD

## 2021-04-18 NOTE — PROGRESS NOTES
Pharmacy Automatic Renal Dosing Protocol - Antimicrobials    Indication for Antimicrobials: SSTI, UTI     Current Regimen of Each Antimicrobial:  Vancomycin 1500mg IV loading dose and Cefepime 1gm IV x1     Previous Antimicrobial Therapy:    Vancomycin Trough Goal Level:  15 - 20  (AUC: 400 - 600 mg/hr/Liter/day)     Date Dose & Interval Measured (mcg/mL) Extrapolated (mcg/mL)                         Significant Cultures:    WCx - pending   BCx - pending   UA - turbid    Radiology / Imaging results: (X-ray, CT scan or MRI): -    Paralysis, amputations, malnutrition: none noted    Labs:  Recent Labs     21  1730 21  1650   CREA 1.63*  --    BUN 22*  --    WBC  --  20.0*     Temp (24hrs), Av.8 °F (38.8 °C), Min:100.7 °F (38.2 °C), Max:102.8 °F (39.3 °C)    Creatinine Clearance (mL/min):   CrCl (Actual Body Weight): 39.7  CrCl (Adjusted Body Weight): 34.9  CrCl (Ideal Body Weight): 31.7    Impression/Plan:   Provided Vancomycin 1500mg IV loading dose  Adjusted Cefepime to 2gm IC x1 for ED  Admission orders pending       Pharmacy will follow daily and adjust medications as appropriate for renal function and/or serum levels.     Thank you,  Koffi Mckeon, [facility]

## 2021-04-18 NOTE — ED NOTES
Patient is being transferred to [facility] General Surgery, Room # 2139. Report given to LASHANDA Oliver on [employer] for routine progression of care. Report consisted of the following information SBAR, Kardex, ED Summary, MAR and Recent Results. Patient transferred to receiving unit by: Benito (RN or tech name). Outstanding consults needed: No     Next labs due: No     The following personal items will be sent with the patient during transfer to the floor: All valuables:    Cardiac monitoring ordered: No     The following CURRENT information was reported to the receiving RN:    Code status: Full Code at time of transfer    Last set of vital signs:  Vital Signs  Level of Consciousness: Alert (0) (04/18/21 0100)  Temp: 98.3 °F (36.8 °C) (04/18/21 0100)  Temp Source: Oral (04/18/21 0100)  Pulse (Heart Rate): 94 (04/18/21 0100)  Cardiac Rhythm: Sinus tachycardia (04/17/21 1644)  Resp Rate: 21 (04/18/21 0100)  BP: 128/61 (04/18/21 0100)  MAP (Monitor): 81 (04/18/21 0100)  MAP (Calculated): 83 (04/18/21 0100)  MEWS Score: 2 (04/18/21 0100)         Oxygen Therapy  O2 Sat (%): 100 % (04/18/21 0100)  Pulse via Oximetry: 95 beats per minute (04/18/21 0100)      Last pain assessment:         Wounds: Yes    Urinary catheter: incontinent  Is there a soliz order: No     LDAs:       Peripheral IV 04/17/21 Left Forearm (Active)         Opportunity for questions and clarification was provided.     Kathy Davis

## 2021-04-18 NOTE — ED PROVIDER NOTES
EMERGENCY DEPARTMENT HISTORY AND PHYSICAL EXAM      Date: 4/17/2021  Patient Name: Vikas Francis    History of Presenting Illness     Chief Complaint   Patient presents with    Altered mental status     per caretaker, \"not acting herself\" today, unable to answer questions appropriate to her baseline cognitive status       History Provided By: Patient and Caregiver    HPI: Joslyn Villalta, 73 y.o. female presents to the ED with cc of AMS. Patient presented to the emergency department by EMS. She lives in a group home with several other people and normally is talkative. Staff noted today that she was not acting her normal self and seemed to be confused and she was sent to the emergency department for evaluation. There have been no reports of any recent trauma. Per EMS there have been no reports of fever or chills. There had been a decrease in p.o. intake today. There have been no cough or shortness of breath. EMS states there was no reported vomiting or diarrhea. Patient does have a history of urinary tract infections and the staff felt that this may be contributing to her change in mental status. Patient unable to provide any history. There are no other complaints, changes, or physical findings at this time. PCP: Wade Perez, ANP-C    No current facility-administered medications on file prior to encounter. Current Outpatient Medications on File Prior to Encounter   Medication Sig Dispense Refill    levothyroxine (SYNTHROID) 25 mcg tablet Take 25 mcg by mouth Daily (before breakfast).  magnesium oxide (MAG-OX) 400 mg tablet Take 400 mg by mouth two (2) times a day.  ascorbic acid, vitamin C, (Vitamin C) 500 mg tablet Take 1,000 mg by mouth daily.  acetaminophen (TYLENOL) 650 mg TbER Take 650 mg by mouth every six to eight (6-8) hours as needed for Pain.       oxyCODONE-acetaminophen (PERCOCET) 5-325 mg per tablet Take 1 Tab by mouth every six (6) hours as needed for Pain.      senna (Senna) 8.6 mg tablet Take 2 Tabs by mouth two (2) times daily as needed for Constipation.  traMADoL (ULTRAM) 50 mg tablet Take 50 mg by mouth every eight (8) hours as needed for Pain.  ammonium lactate (LAC-HYDRIN) 12 % lotion Apply  to affected area as needed. Apply to both legs every evening for dry skin      amino acids-protein hydrolys (Pro-Stat AWC)  gram-kcal/30 mL liqd Take 30 mL by mouth two (2) times a day. For wound healing      ezetimibe (ZETIA) 10 mg tablet Take 10 mg by mouth daily. Past History     Past Medical History:  Past Medical History:   Diagnosis Date    HLD (hyperlipidemia)     Hypertension        Past Surgical History:  No past surgical history on file. Family History:  History reviewed. No pertinent family history. Social History:  Social History     Tobacco Use    Smoking status: Not on file   Substance Use Topics    Alcohol use: Not on file    Drug use: Not on file       Allergies: Allergies   Allergen Reactions    Antihistamines - Alkylamine Unknown (comments)     reported on referral packet         Review of Systems   Review of Systems   Unable to perform ROS: Mental status change       Physical Exam   Physical Exam  Vitals signs and nursing note reviewed. Constitutional:       General: She is not in acute distress. Appearance: She is well-developed. She is not diaphoretic. HENT:      Head: Normocephalic and atraumatic. Mouth/Throat:      Pharynx: No oropharyngeal exudate. Eyes:      Conjunctiva/sclera: Conjunctivae normal.      Pupils: Pupils are equal, round, and reactive to light. Neck:      Musculoskeletal: Normal range of motion and neck supple. Vascular: No JVD. Trachea: No tracheal deviation. Cardiovascular:      Rate and Rhythm: Regular rhythm. Tachycardia present. Heart sounds: Normal heart sounds. No murmur. Pulmonary:      Effort: Pulmonary effort is normal. No respiratory distress. Breath sounds: Normal breath sounds. No stridor. No wheezing or rales. Chest:      Chest wall: No tenderness. Abdominal:      General: There is no distension. Palpations: Abdomen is soft. Tenderness: There is no abdominal tenderness. There is no guarding or rebound. Musculoskeletal: Normal range of motion. General: No tenderness. Comments: Sacral decubitus wound, with small amount of drainage will send wound Cx. Skin:     General: Skin is warm and dry. Neurological:      Mental Status: She is alert and oriented to person, place, and time. Cranial Nerves: No cranial nerve deficit.       Comments: No gross motor or sensory deficits    Psychiatric:         Behavior: Behavior normal.         Diagnostic Study Results     Labs -     Recent Results (from the past 12 hour(s))   EKG, 12 LEAD, INITIAL    Collection Time: 04/17/21  4:25 PM   Result Value Ref Range    Ventricular Rate 120 BPM    Atrial Rate 120 BPM    P-R Interval 172 ms    QRS Duration 126 ms    Q-T Interval 324 ms    QTC Calculation (Bezet) 457 ms    Calculated P Axis 20 degrees    Calculated R Axis -22 degrees    Calculated T Axis 22 degrees    Diagnosis       Sinus tachycardia  Right bundle branch block  Cannot rule out Inferior infarct (cited on or before 17-APR-2021)  When compared with ECG of 16-DEC-2020 14:17,  No significant change was found     CBC WITH AUTOMATED DIFF    Collection Time: 04/17/21  4:50 PM   Result Value Ref Range    WBC 20.0 (H) 3.6 - 11.0 K/uL    RBC 3.99 3.80 - 5.20 M/uL    HGB 10.5 (L) 11.5 - 16.0 g/dL    HCT 32.7 (L) 35.0 - 47.0 %    MCV 82.0 80.0 - 99.0 FL    MCH 26.3 26.0 - 34.0 PG    MCHC 32.1 30.0 - 36.5 g/dL    RDW 16.7 (H) 11.5 - 14.5 %    PLATELET 385 (H) 380 - 400 K/uL    MPV 8.7 (L) 8.9 - 12.9 FL    NRBC 0.0 0  WBC    ABSOLUTE NRBC 0.00 0.00 - 0.01 K/uL    NEUTROPHILS 91 (H) 32 - 75 %    LYMPHOCYTES 4 (L) 12 - 49 %    MONOCYTES 4 (L) 5 - 13 %    EOSINOPHILS 0 0 - 7 % BASOPHILS 0 0 - 1 %    IMMATURE GRANULOCYTES 1 (H) 0.0 - 0.5 %    ABS. NEUTROPHILS 18.2 (H) 1.8 - 8.0 K/UL    ABS. LYMPHOCYTES 0.8 0.8 - 3.5 K/UL    ABS. MONOCYTES 0.8 0.0 - 1.0 K/UL    ABS. EOSINOPHILS 0.0 0.0 - 0.4 K/UL    ABS. BASOPHILS 0.0 0.0 - 0.1 K/UL    ABS. IMM. GRANS. 0.2 (H) 0.00 - 0.04 K/UL    DF SMEAR SCANNED      RBC COMMENTS ANISOCYTOSIS  1+       POC LACTIC ACID    Collection Time: 04/17/21  4:54 PM   Result Value Ref Range    Lactic Acid (POC) 1.73 0.40 - 4.25 mmol/L   METABOLIC PANEL, COMPREHENSIVE    Collection Time: 04/17/21  5:30 PM   Result Value Ref Range    Sodium 132 (L) 136 - 145 mmol/L    Potassium 4.4 3.5 - 5.1 mmol/L    Chloride 98 97 - 108 mmol/L    CO2 29 21 - 32 mmol/L    Anion gap 5 5 - 15 mmol/L    Glucose 142 (H) 65 - 100 mg/dL    BUN 22 (H) 6 - 20 MG/DL    Creatinine 1.63 (H) 0.55 - 1.02 MG/DL    BUN/Creatinine ratio 13 12 - 20      GFR est AA 38 (L) >60 ml/min/1.73m2    GFR est non-AA 31 (L) >60 ml/min/1.73m2    Calcium 9.6 8.5 - 10.1 MG/DL    Bilirubin, total 0.6 0.2 - 1.0 MG/DL    ALT (SGPT) 8 (L) 12 - 78 U/L    AST (SGOT) 11 (L) 15 - 37 U/L    Alk.  phosphatase 101 45 - 117 U/L    Protein, total 7.9 6.4 - 8.2 g/dL    Albumin 2.3 (L) 3.5 - 5.0 g/dL    Globulin 5.6 (H) 2.0 - 4.0 g/dL    A-G Ratio 0.4 (L) 1.1 - 2.2     TROPONIN I    Collection Time: 04/17/21  5:30 PM   Result Value Ref Range    Troponin-I, Qt. <0.05 <0.05 ng/mL   URINALYSIS W/ REFLEX CULTURE    Collection Time: 04/17/21  6:54 PM    Specimen: Urine   Result Value Ref Range    Color DARK YELLOW      Appearance TURBID (A) CLEAR      Specific gravity 1.017 1.003 - 1.030      pH (UA) 8.0 5.0 - 8.0      Protein 100 (A) NEG mg/dL    Glucose Negative NEG mg/dL    Ketone Negative NEG mg/dL    Bilirubin Negative NEG      Blood LARGE (A) NEG      Urobilinogen 0.2 0.2 - 1.0 EU/dL    Nitrites Negative NEG      Leukocyte Esterase LARGE (A) NEG      WBC 5-10 0 - 4 /hpf    RBC 0-5 0 - 5 /hpf    Epithelial cells FEW FEW /lpf Bacteria Negative NEG /hpf    UA:UC IF INDICATED CULTURE NOT INDICATED BY UA RESULT CNI      Amorphous Crystals 2+ (A) NEG       Radiologic Studies -   CT ABD PELV WO CONT   Final Result   1. Soft tissue swelling overlying the coccyx without abscess or overt   osteomyelitis. 2. Bilateral nephrolithiasis. 3. Bladder stones. 4. Cholelithiasis. 5. Rectal impaction. XR CHEST PORT   Final Result   No acute process. CT Results  (Last 48 hours)               04/17/21 2111  CT ABD PELV WO CONT Final result    Impression:  1. Soft tissue swelling overlying the coccyx without abscess or overt   osteomyelitis. 2. Bilateral nephrolithiasis. 3. Bladder stones. 4. Cholelithiasis. 5. Rectal impaction. Narrative:  INDICATION: Sacral decubitus, fever 102        EXAM: CT Abdomen and Pelvis without IV contrast. No oral contrast.   CT dose reduction was achieved through use of a standardized protocol tailored   for this examination and automatic exposure control for dose modulation. FINDINGS:   There is a cutaneous/subcutaneous soft tissue swelling overlying the coccyx   containing air bubble, without organized fluid collection or overt bony erosion. There are numerous stones in the bilateral renal collecting systems without   hydronephrosis. There are multiple stones in the urinary bladder. Rectum is moderately stool distended. Small bowel is not dilated. Appendix is   normal.       There is cholelithiasis without inflammation. Liver shows no apparent significant finding without contrast. Pancreas, adrenal   glands, spleen and aorta show no significant enlargement. No intra-abdominal inflammation is seen. There is no ascites, pneumoperitoneum   or significant adenopathy. CXR Results  (Last 48 hours)               04/17/21 1631  XR CHEST PORT Final result    Impression:  No acute process. Narrative:   Indication: Decreased cognitive status       Comparison: 12/17/2020       Portable exam of the chest obtained at 4/17/2021 demonstrates normal heart size. There is no acute process in the lung fields. Degenerative changes are seen in   the shoulders bilaterally. Medical Decision Making   I am the first provider for this patient. I reviewed the vital signs, available nursing notes, past medical history, past surgical history, family history and social history. Vital Signs-Reviewed the patient's vital signs. Patient Vitals for the past 12 hrs:   Temp Pulse Resp BP SpO2   04/18/21 0100 98.3 °F (36.8 °C) 94 21 128/61 100 %   04/18/21 0015 98.3 °F (36.8 °C) 98 22 117/72 96 %   04/17/21 2258 98 °F (36.7 °C) 76 20 (!) 101/48 94 %   04/17/21 1930 99 °F (37.2 °C) 95 16 (!) 104/57 94 %   04/17/21 1900  (!) 106 15 103/60 95 %   04/17/21 1830  (!) 102  (!) 105/58 93 %   04/17/21 1815 (!) 100.7 °F (38.2 °C)       04/17/21 1800  (!) 118 15 109/68 94 %   04/17/21 1730  (!) 115 21 (!) 115/57 95 %   04/17/21 1700  (!) 113 19 111/63 94 %   04/17/21 1630  (!) 124  113/66 94 %   04/17/21 1607 (!) 102.8 °F (39.3 °C) (!) 125 18 136/76 94 %       EKG interpretation: (Preliminary)  Sinus tach, rate 120, RBBB, normal axis/pr, no acute ST changes, Spann Passy, DO      Records Reviewed: Nursing Notes, Old Medical Records, Ambulance Run Sheet, Previous Radiology Studies and Previous Laboratory Studies, pt with prior sacral wound, last admission in 12/2020    Provider Notes (Medical Decision Making):   DDX- UTI, Pneumonia, dehydration, Sacral decubitus with cellulitis, osteomyelitis, electrolyte abnormality. ED Course:   Initial assessment performed. The patients presenting problems have been discussed, and they are in agreement with the care plan formulated and outlined with them. I have encouraged them to ask questions as they arise throughout their visit.         At the time of initial patient evaluation it was noted that she is tachycardic and thought to be febrile had nurse checked temperature which was elevated. Will order IV fluids in addition to Tylenol and will begin assessment for source of her fever. Heart rate did improve with Tylenol and IV fluids. Urine not remarkable for infection likely source would be her sacral wound. .     Pt with no sig UTI, There is sacral decubitus wound, CT does not show gas in the soft tissue. Pt had been started in IV Ab here in the ED, will admit. Patient's heart rate did improve with IV fluids she has not had any maps below 65 or lactate greater than 4. Consult: Case discussed with hospitalist. Pt will be evaluated and admitted. CRITICAL CARE NOTE :    IMPENDING DETERIORATION -Cardiovascular, CNS, Metabolic and Renal  ASSOCIATED RISK FACTORS - Dysrhythmia, Metabolic changes, Dehydration and CNS Decompensation  MANAGEMENT- Bedside Assessment and Supervision of Care  INTERPRETATION -  Xrays, CT Scan, ECG, Blood Pressure, Cardiac Output Measures  and labs  INTERVENTIONS - hemodynamic mngmt, Metobolic interventions and IV Ab  CASE REVIEW - Hospitalist/Intensivist and Nursing  TREATMENT RESPONSE -Improved  PERFORMED BY - Self    NOTES   :  I have spent 40 minutes of critical care time involved in lab review, consultations with specialist, family decision- making, bedside attention and documentation. During this entire length of time I was immediately available to the patient . Octavio Paiz DO    Disposition:  Admit       Diagnosis     Clinical Impression:   1. Pressure injury of skin of sacral region, unspecified injury stage    2. Severe sepsis [facility])        Attestations:    Octavio Paiz, DO    Please note that this dictation was completed with [employer], the computer voice recognition software. Quite often unanticipated grammatical, syntax, homophones, and other interpretive errors are inadvertently transcribed by the computer software. Please disregard these errors.   Please excuse any errors that have escaped final proofreading. Thank you.

## 2021-04-18 NOTE — PROGRESS NOTES
Pharmacy Automatic Renal Dosing Protocol - Antimicrobials    Indication for Antimicrobials: SSTI, UTI     Current Regimen of Each Antimicrobial:  Vancomycin 1500mg IV loading dose and Cefepime 1gm IV x1 started  day 1    Previous Antimicrobial Therapy:  none    Vancomycin Trough Goal Level:  15 - 20 until Cx resulted (AUC: 400 - 600 mg/hr/Liter/day)     Date Dose & Interval Measured (mcg/mL) Extrapolated (mcg/mL)                       Significant Cultures:    WCx - pending   BCx - pending   UA - turbid    Radiology / Imaging results: (X-ray, CT scan or MRI): -    Paralysis, amputations, malnutrition: none noted    Labs:  Recent Labs     21  1730 21  1650   CREA 1.63*  --    BUN 22*  --    WBC  --  20.0*     Temp (24hrs), Av.8 °F (38.8 °C), Min:100.7 °F (38.2 °C), Max:102.8 °F (39.3 °C)    Creatinine Clearance (mL/min):   CrCl (Actual Body Weight): 39.7  CrCl (Adjusted Body Weight): 34.9  CrCl (Ideal Body Weight): 31.7    Impression/Plan:   Provided Vancomycin 1500mg IV loading dose  If Vancomycin continued on admission 750mg IV q18h is projected to provide a trough of 16.8  thus goal level should be achieved  Adjusted Cefepime to 2gm IC x1 for ED  Admission orders pending       Pharmacy will follow daily and adjust medications as appropriate for renal function and/or serum levels.     Thank you,  Sade Jaramillo, [facility]

## 2021-04-18 NOTE — PROGRESS NOTES
Pharmacy Automatic Renal Dosing Protocol - Antimicrobials    Indication for Antimicrobials: SSTI, UTI     Current Regimen of Each Antimicrobial:  Vancomycin 1500mg IV, then 750 mg IV q18hr (Start Date ; day 3 of 7)  Unasyn 3 gm IV q6hr (Start Date ; day 2 of 7)    Previous Antimicrobial Therapy:    Cefepime 1gm IV x1 started  day 1    Vancomycin Trough Goal Level:  15 - 20 until Cx resulted (AUC: 400 - 600 mg/hr/Liter/day)     Date Dose & Interval Measured (mcg/mL) Extrapolated (mcg/mL)                       Significant Cultures:    WCx - pending   BCx - pending   UA - turbid    Radiology / Imaging results: (X-ray, CT scan or MRI): -    Paralysis, amputations, malnutrition: none noted    Labs:  Recent Labs     21  0317 21  1730 21  1650   CREA 1.43* 1.63*  --    BUN 23* 22*  --    WBC 15.6*  --  20.0*     Temp (24hrs), Av.5 °F (37.5 °C), Min:98 °F (36.7 °C), Max:102.8 °F (39.3 °C)    Creatinine Clearance (mL/min):   CrCl (Actual Body Weight): 45.2  CrCl (Adjusted Body Weight): 39.7  CrCl (Ideal Body Weight): 36.1    Impression/Plan:   · Vancomycin 1500mg IV loading dose was given. Will order vancomycin 750mg IV q18h (projected to provide a trough of 16.8 mcg/ml; )  · BMP daily  · Vancomycin trough  @ 0300 - not entered at this time  · Duration of therapy: 7 days     Pharmacy will follow daily and adjust medications as appropriate for renal function and/or serum levels.     Thank you,  Lindell Lefort, [facility]

## 2021-04-19 ENCOUNTER — APPOINTMENT (OUTPATIENT)
Dept: ULTRASOUND IMAGING | Age: 71
DRG: 871 | End: 2021-04-19
Attending: INTERNAL MEDICINE
Payer: MEDICARE

## 2021-04-19 LAB
ANION GAP SERPL CALC-SCNC: 7 MMOL/L (ref 5–15)
BUN SERPL-MCNC: 25 MG/DL (ref 6–20)
BUN/CREAT SERPL: 16 (ref 12–20)
CALCIUM SERPL-MCNC: 8.8 MG/DL (ref 8.5–10.1)
CHLORIDE SERPL-SCNC: 103 MMOL/L (ref 97–108)
CO2 SERPL-SCNC: 24 MMOL/L (ref 21–32)
CREAT SERPL-MCNC: 1.61 MG/DL (ref 0.55–1.02)
GLUCOSE SERPL-MCNC: 101 MG/DL (ref 65–100)
POTASSIUM SERPL-SCNC: 3.8 MMOL/L (ref 3.5–5.1)
SODIUM SERPL-SCNC: 134 MMOL/L (ref 136–145)

## 2021-04-19 PROCEDURE — 76770 US EXAM ABDO BACK WALL COMP: CPT

## 2021-04-19 PROCEDURE — 74011250637 HC RX REV CODE- 250/637: Performed by: STUDENT IN AN ORGANIZED HEALTH CARE EDUCATION/TRAINING PROGRAM

## 2021-04-19 PROCEDURE — 65270000029 HC RM PRIVATE

## 2021-04-19 PROCEDURE — 80048 BASIC METABOLIC PNL TOTAL CA: CPT

## 2021-04-19 PROCEDURE — 74011250636 HC RX REV CODE- 250/636: Performed by: STUDENT IN AN ORGANIZED HEALTH CARE EDUCATION/TRAINING PROGRAM

## 2021-04-19 PROCEDURE — 36415 COLL VENOUS BLD VENIPUNCTURE: CPT

## 2021-04-19 PROCEDURE — 74011000258 HC RX REV CODE- 258: Performed by: STUDENT IN AN ORGANIZED HEALTH CARE EDUCATION/TRAINING PROGRAM

## 2021-04-19 PROCEDURE — 74011250637 HC RX REV CODE- 250/637: Performed by: NURSE PRACTITIONER

## 2021-04-19 PROCEDURE — 74011250636 HC RX REV CODE- 250/636: Performed by: INTERNAL MEDICINE

## 2021-04-19 PROCEDURE — 87040 BLOOD CULTURE FOR BACTERIA: CPT

## 2021-04-19 PROCEDURE — 74011250637 HC RX REV CODE- 250/637: Performed by: INTERNAL MEDICINE

## 2021-04-19 RX ORDER — IBUPROFEN 600 MG/1
600 TABLET ORAL ONCE
Status: COMPLETED | OUTPATIENT
Start: 2021-04-19 | End: 2021-04-19

## 2021-04-19 RX ORDER — ACETAMINOPHEN 325 MG/1
650 TABLET ORAL
Status: DISCONTINUED | OUTPATIENT
Start: 2021-04-19 | End: 2021-04-29 | Stop reason: HOSPADM

## 2021-04-19 RX ORDER — ACETAMINOPHEN 650 MG/1
650 SUPPOSITORY RECTAL
Status: DISCONTINUED | OUTPATIENT
Start: 2021-04-19 | End: 2021-04-29 | Stop reason: HOSPADM

## 2021-04-19 RX ADMIN — AMPICILLIN SODIUM AND SULBACTAM SODIUM 3 G: 2; 1 INJECTION, POWDER, FOR SOLUTION INTRAMUSCULAR; INTRAVENOUS at 06:37

## 2021-04-19 RX ADMIN — ACETAMINOPHEN 650 MG: 325 TABLET ORAL at 17:51

## 2021-04-19 RX ADMIN — Medication 10 ML: at 21:30

## 2021-04-19 RX ADMIN — VANCOMYCIN HYDROCHLORIDE 750 MG: 750 INJECTION, POWDER, LYOPHILIZED, FOR SOLUTION INTRAVENOUS at 08:50

## 2021-04-19 RX ADMIN — ACETAMINOPHEN 650 MG: 325 TABLET ORAL at 06:40

## 2021-04-19 RX ADMIN — MAGNESIUM OXIDE 400 MG (241.3 MG MAGNESIUM) TABLET 400 MG: TABLET at 17:29

## 2021-04-19 RX ADMIN — POLYETHYLENE GLYCOL 3350 17 G: 17 POWDER, FOR SOLUTION ORAL at 17:29

## 2021-04-19 RX ADMIN — SODIUM CHLORIDE 75 ML/HR: 9 INJECTION, SOLUTION INTRAVENOUS at 06:37

## 2021-04-19 RX ADMIN — LACTULOSE 15 ML: 20 SOLUTION ORAL at 16:10

## 2021-04-19 RX ADMIN — MAGNESIUM OXIDE 400 MG (241.3 MG MAGNESIUM) TABLET 400 MG: TABLET at 08:51

## 2021-04-19 RX ADMIN — POLYETHYLENE GLYCOL 3350 17 G: 17 POWDER, FOR SOLUTION ORAL at 08:50

## 2021-04-19 RX ADMIN — AMPICILLIN SODIUM AND SULBACTAM SODIUM 3 G: 2; 1 INJECTION, POWDER, FOR SOLUTION INTRAMUSCULAR; INTRAVENOUS at 17:29

## 2021-04-19 RX ADMIN — HEPARIN SODIUM 5000 UNITS: 5000 INJECTION INTRAVENOUS; SUBCUTANEOUS at 17:29

## 2021-04-19 RX ADMIN — AMPICILLIN SODIUM AND SULBACTAM SODIUM 3 G: 2; 1 INJECTION, POWDER, FOR SOLUTION INTRAMUSCULAR; INTRAVENOUS at 12:24

## 2021-04-19 RX ADMIN — HEPARIN SODIUM 5000 UNITS: 5000 INJECTION INTRAVENOUS; SUBCUTANEOUS at 06:36

## 2021-04-19 RX ADMIN — Medication 10 ML: at 06:39

## 2021-04-19 RX ADMIN — BISACODYL 5 MG: 5 TABLET, COATED ORAL at 08:50

## 2021-04-19 RX ADMIN — LEVOTHYROXINE SODIUM 25 MCG: 0.03 TABLET ORAL at 06:40

## 2021-04-19 RX ADMIN — Medication 10 ML: at 13:54

## 2021-04-19 RX ADMIN — IBUPROFEN 600 MG: 600 TABLET, FILM COATED ORAL at 00:39

## 2021-04-19 NOTE — PROGRESS NOTES
Physical Therapy Screening:  Services are not indicated at this time. Per chart pt is bed bound. An InBasket screening referral was triggered for physical therapy based on results obtained during the nursing admission assessment. The patients chart was reviewed and the patient is not appropriate for a skilled therapy evaluation at this time. Please consult physical therapy if any therapy needs arise. Thank you.     Mirza Washington, PT, DPT

## 2021-04-19 NOTE — WOUND CARE
Wound care consult for the sacrum / coccyx wound that was present on admission. Pt. Has been immobile for 4 years / bedbound for \"unknown\" reasons. Pt. Stated, \"they cannot figure it out\" Pt. Known to this wound care nurse from past care provided. Patient also has some old heel pressure wounds that are completely epithelialized / scarred. 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 The wound needs to be packed. No s/sx of infection in or around the wound. No odor or excess drainage noted. Sacrum wound: 4-17-21 Treatment for the Sacrum / coccyx:  Cleansed with NS and wiped with gauze. The wound is painful to palpate. The wound was packed with NS wet Kerlix gauze today but tomorrow we will change it to [facility] Blue packing. Covered with foam.  
Plan: Continue this wound care throughout her admission. Off load the sacrum by turning every 1-2 hours and float the heels.   
Eboni Koch RN, BSN, [employer]

## 2021-04-19 NOTE — PROGRESS NOTES
0015 patient's temp 99.0, patient reported chills, shivering, BP elevated at 152/118 and pulse of 103. Tylenol last given at 2210. On call NP Radha notified, new order for motrin x1 dose, tylenol changed from every 6 to every 4 hours, blood cultures. Patient is a hard stick and only able to get blood cultures before the vein blew, unable to get morning metabolic panel. End of Shift Note    Bedside shift change report given to [address] [place] [address] (oncoming nurse) by Jesus Angela RN (offgoing nurse). Report included the following information SBAR, Kardex, Intake/Output, MAR and Recent Results    Shift worked: 2197-2095     Shift summary and any significant changes:    Unable to get morning metabolic panel, PICC team called and voicemail left. Concerns for physician to address: None   Zone phone for oncoming shift:  [phone]     Activity:  Activity Level: Bed Rest  Number times ambulated in hallways past shift: 0  Number of times OOB to chair past shift: 0    Cardiac:   Cardiac Monitoring: No      Cardiac Rhythm: Sinus tachycardia    Access:   Current line(s): PIV     Genitourinary:   Urinary status: incontinent    Respiratory:   O2 Device: None (Room air)  Chronic home O2 use?: NO  Incentive spirometer at bedside: NO     GI:  Last Bowel Movement Date: 04/16/21  Current diet:  DIET REGULAR  DIET ONE TIME MESSAGE  Passing flatus: YES  Tolerating current diet: YES       Pain Management:   Patient states pain is manageable on current regimen: YES    Skin:  Kristian Score: 15  Interventions: float heels and increase time out of bed    Patient Safety:  Fall Score:  Total Score: 2  Interventions: gripper socks and pt to call before getting OOB       Length of Stay:  Expected LOS: - - -  Actual LOS: 2      Jesus Angela RN

## 2021-04-19 NOTE — PROGRESS NOTES
Hospitalist Progress Note    NAME: Bren Hills   :  1950   MRN:  [id number]       Assessment / Plan:  Infected decubitus ulcer  Severe sepsis (leukocytosis, acute kidney injury, hypotension)  Patient is bedbound for about 4 years now    CT abdomen and pelvis reported: \"1. Soft tissue swelling overlying the coccyx without abscess or overt  osteomyelitis. 2. Bilateral nephrolithiasis. 3. Bladder stones. 4. Cholelithiasis. 5. Rectal impaction\"  Patient has had this decubitus ulcer for the last 4 years, was getting wound care through home health services and her caregiver. Continue with IV antibiotics with vancomycin and Unasyn  Blood cultures negative to date, wound cultures  Showed mixed veronica   Wound care consulted, evaluated by surgery who did not retain any indication for debridement, recommended packing and wound care  Continue bowel regiment  Patient still with no bowel movement, will order lactulose in addition to the other laxative    Acute kidney injury, most likely prerenal  Hypotension   Creatinine improving with IV fluid initially, now is worsening,   Will increase IV fluid to 125 cc/h  Repeat BMP  Check renal ultrasound to rule out any obstruction  Creatinine worsens , will consult nephrology    Hypertension   Hyperlipidemia  Hypothyroidism  No BP meds seen in the med rec  Continue with Zetia with pharmacy substitution  Continue with levothyroxine  BP low         Code Status: Full code  Surrogate Decision Maker: her care giver patient is unsure of the last name      DVT Prophylaxis: Subcu heparin  GI Prophylaxis: not indicated         25.0 - 29.9 Overweight / Body mass index is 26.93 kg/m². Subjective:     Chief Complaint / Reason for Physician Visit  FU infected decub ulcer . Complains of not having a bowel movement     Discussed with RN events overnight.      Review of Systems:  Symptom Y/N Comments  Symptom Y/N Comments   Fever/Chills y   Chest Pain n    Poor Appetite    Edema Cough    Abdominal Pain n    Sputum    Joint Pain     SOB/PAL n   Pruritis/Rash     Nausea/vomit    Tolerating PT/OT     Diarrhea    Tolerating Diet y    Constipation y   Other       Could NOT obtain due to:      Objective:     VITALS:   Last 24hrs VS reviewed since prior progress note. Most recent are:  Patient Vitals for the past 24 hrs:   Temp Pulse Resp BP SpO2   04/19/21 0900    94/61    04/19/21 0755 97.6 °F (36.4 °C) 71 17 (!) 83/56 98 %   04/19/21 0334 99.2 °F (37.3 °C) 83 17 97/62 96 %   04/19/21 0010 99 °F (37.2 °C) (!) 103 18 (!) 152/118 98 %   04/18/21 1937 98.6 °F (37 °C) 77 17 109/62 98 %   04/18/21 1716 99.8 °F (37.7 °C)       04/18/21 1607 100.2 °F (37.9 °C)       04/18/21 1537 98.7 °F (37.1 °C) 78 18 135/72 98 %   04/18/21 1221 99.2 °F (37.3 °C) 82 20 138/75 100 %       Intake/Output Summary (Last 24 hours) at 4/19/2021 1000  Last data filed at 4/19/2021 0900  Gross per 24 hour   Intake 2073.75 ml   Output 550 ml   Net 1523.75 ml        I had a face to face encounter and independently examined this patient on 4/19/2021, as outlined below:  PHYSICAL EXAM:  General: WD, WN. Alert, cooperative, no acute distress    EENT:  EOMI. Anicteric sclerae. MMM  Resp:  CTA bilaterally, no wheezing or rales. No accessory muscle use  CV:  Regular  rhythm,  No edema  GI:  Soft, Non distended, Non tender. +Bowel sounds  Neurologic:  Alert and oriented X 3, normal speech,   Psych:   Good insight.  Not anxious nor agitated  Skin:        Reviewed most current lab test results and cultures  YES  Reviewed most current radiology test results   YES  Review and summation of old records today    NO  Reviewed patient's current orders and MAR    YES  PMH/SH reviewed - no change compared to H&P  ________________________________________________________________________  Care Plan discussed with:    Comments   Patient x    Family      RN x    Care Manager     Consultant                       x Multidiciplinary team rounds were held today with , nursing, pharmacist and clinical coordinator. Patient's plan of care was discussed; medications were reviewed and discharge planning was addressed. ________________________________________________________________________  Total NON critical care TIME: 35  Minutes    Total CRITICAL CARE TIME Spent:   Minutes non procedure based      Comments   >50% of visit spent in counseling and coordination of care     ________________________________________________________________________  Gautam Arnold MD     Procedures: see electronic medical records for all procedures/Xrays and details which were not copied into this note but were reviewed prior to creation of Plan. LABS:  I reviewed today's most current labs and imaging studies.   Pertinent labs include:  Recent Labs     04/18/21 0317 04/17/21  1650   WBC 15.6* 20.0*   HGB 8.3* 10.5*   HCT 26.0* 32.7*    574*     Recent Labs     04/18/21 0317 04/17/21  1730   * 132*   K 4.3 4.4    98   CO2 23 29   GLU 92 142*   BUN 23* 22*   CREA 1.43* 1.63*   CA 8.8 9.6   MG 2.1  --    PHOS 2.1*  --    ALB  --  2.3*   TBILI  --  0.6   ALT  --  8*       Signed: Gautam Arnold MD

## 2021-04-19 NOTE — PROGRESS NOTES
Received notification from bedside RN about patient with regards to: reports chills, shivering, persistent elevated temp s/p Tylenol at 2210  VS: /118.  , RR 18, O2 sat 98% on RA    Intervention given: Blood culture, Motrin x 1 dose ordered, Tylenol frequency changed from q6 to q4

## 2021-04-19 NOTE — PROGRESS NOTES
End of Shift Note    Bedside shift change report given to Benito RN (oncoming nurse) by Danna Salmeron (offgoing nurse). Report included the following information SBAR, Kardex, Intake/Output and MAR    Shift worked: 3958-3587   Shift summary and any significant changes: Tolerated diet, wound care completed per orders. BM, moved to speciality bed. Concerns for physician to address:  none      Zone phone for oncoming shift:  none       Activity:  Activity Level: Bed Rest  Number times ambulated in hallways past shift: 0  Number of times OOB to chair past shift: 0    Cardiac:   Cardiac Monitoring: No      Cardiac Rhythm: Sinus tachycardia    Access:   Current line(s): PIV     Genitourinary:   Urinary status: external catheter    Respiratory:   O2 Device: None (Room air)  Chronic home O2 use?: NO  Incentive spirometer at bedside: YES     GI:  Last Bowel Movement Date: 04/16/21  Current diet:  DIET REGULAR  DIET ONE TIME MESSAGE  Passing flatus: YES  Tolerating current diet: YES       Pain Management:   Patient states pain is manageable on current regimen: YES    Skin:  Kristian Score: 15  Interventions: turn team, speciality bed, float heels and PT/OT consult    Patient Safety:  Fall Score:  Total Score: 2  Interventions: bed/chair alarm       Length of Stay:  Expected LOS: 3d 12h  Actual LOS: [facility]

## 2021-04-20 LAB
ANION GAP SERPL CALC-SCNC: 8 MMOL/L (ref 5–15)
BACTERIA SPEC CULT: NORMAL
BASOPHILS # BLD: 0 K/UL (ref 0–0.1)
BASOPHILS NFR BLD: 0 % (ref 0–1)
BUN SERPL-MCNC: 32 MG/DL (ref 6–20)
BUN/CREAT SERPL: 17 (ref 12–20)
CALCIUM SERPL-MCNC: 8.4 MG/DL (ref 8.5–10.1)
CHLORIDE SERPL-SCNC: 105 MMOL/L (ref 97–108)
CO2 SERPL-SCNC: 23 MMOL/L (ref 21–32)
CREAT SERPL-MCNC: 1.87 MG/DL (ref 0.55–1.02)
DATE LAST DOSE: ABNORMAL
DIFFERENTIAL METHOD BLD: ABNORMAL
EOSINOPHIL # BLD: 0 K/UL (ref 0–0.4)
EOSINOPHIL NFR BLD: 0 % (ref 0–7)
ERYTHROCYTE [DISTWIDTH] IN BLOOD BY AUTOMATED COUNT: 17.2 % (ref 11.5–14.5)
GLUCOSE SERPL-MCNC: 126 MG/DL (ref 65–100)
GRAM STN SPEC: NORMAL
GRAM STN SPEC: NORMAL
HCT VFR BLD AUTO: 24 % (ref 35–47)
HGB BLD-MCNC: 7.7 G/DL (ref 11.5–16)
IMM GRANULOCYTES # BLD AUTO: 0 K/UL (ref 0–0.04)
IMM GRANULOCYTES NFR BLD AUTO: 0 % (ref 0–0.5)
LYMPHOCYTES # BLD: 0.4 K/UL (ref 0.8–3.5)
LYMPHOCYTES NFR BLD: 2 % (ref 12–49)
MCH RBC QN AUTO: 26.6 PG (ref 26–34)
MCHC RBC AUTO-ENTMCNC: 32.1 G/DL (ref 30–36.5)
MCV RBC AUTO: 82.8 FL (ref 80–99)
MONOCYTES # BLD: 0.4 K/UL (ref 0–1)
MONOCYTES NFR BLD: 2 % (ref 5–13)
NEUTS BAND NFR BLD MANUAL: 4 %
NEUTS SEG # BLD: 18.6 K/UL (ref 1.8–8)
NEUTS SEG NFR BLD: 92 % (ref 32–75)
NRBC # BLD: 0 K/UL (ref 0–0.01)
NRBC BLD-RTO: 0 PER 100 WBC
PLATELET # BLD AUTO: 421 K/UL (ref 150–400)
PMV BLD AUTO: 9.6 FL (ref 8.9–12.9)
POTASSIUM SERPL-SCNC: 3.7 MMOL/L (ref 3.5–5.1)
RBC # BLD AUTO: 2.9 M/UL (ref 3.8–5.2)
RBC MORPH BLD: ABNORMAL
REPORTED DOSE,DOSE: ABNORMAL UNITS
REPORTED DOSE/TIME,TMG: ABNORMAL
SERVICE CMNT-IMP: NORMAL
SODIUM SERPL-SCNC: 136 MMOL/L (ref 136–145)
VANCOMYCIN TROUGH SERPL-MCNC: 18 UG/ML (ref 5–10)
WBC # BLD AUTO: 19.4 K/UL (ref 3.6–11)

## 2021-04-20 PROCEDURE — 74011250637 HC RX REV CODE- 250/637: Performed by: INTERNAL MEDICINE

## 2021-04-20 PROCEDURE — 74011250636 HC RX REV CODE- 250/636: Performed by: INTERNAL MEDICINE

## 2021-04-20 PROCEDURE — 84300 ASSAY OF URINE SODIUM: CPT

## 2021-04-20 PROCEDURE — 80202 ASSAY OF VANCOMYCIN: CPT

## 2021-04-20 PROCEDURE — 65270000029 HC RM PRIVATE

## 2021-04-20 PROCEDURE — 85025 COMPLETE CBC W/AUTO DIFF WBC: CPT

## 2021-04-20 PROCEDURE — 77030019905 HC CATH URETH INTMIT MDII -A

## 2021-04-20 PROCEDURE — 74011250636 HC RX REV CODE- 250/636: Performed by: STUDENT IN AN ORGANIZED HEALTH CARE EDUCATION/TRAINING PROGRAM

## 2021-04-20 PROCEDURE — 51798 US URINE CAPACITY MEASURE: CPT

## 2021-04-20 PROCEDURE — 87205 SMEAR GRAM STAIN: CPT

## 2021-04-20 PROCEDURE — 80048 BASIC METABOLIC PNL TOTAL CA: CPT

## 2021-04-20 PROCEDURE — 36415 COLL VENOUS BLD VENIPUNCTURE: CPT

## 2021-04-20 PROCEDURE — 74011000258 HC RX REV CODE- 258: Performed by: STUDENT IN AN ORGANIZED HEALTH CARE EDUCATION/TRAINING PROGRAM

## 2021-04-20 RX ORDER — LINEZOLID 2 MG/ML
600 INJECTION, SOLUTION INTRAVENOUS EVERY 12 HOURS
Status: DISCONTINUED | OUTPATIENT
Start: 2021-04-20 | End: 2021-04-21

## 2021-04-20 RX ORDER — ADHESIVE BANDAGE
30 BANDAGE TOPICAL ONCE
Status: DISCONTINUED | OUTPATIENT
Start: 2021-04-20 | End: 2021-04-20

## 2021-04-20 RX ADMIN — AMPICILLIN SODIUM AND SULBACTAM SODIUM 3 G: 2; 1 INJECTION, POWDER, FOR SOLUTION INTRAMUSCULAR; INTRAVENOUS at 22:28

## 2021-04-20 RX ADMIN — SODIUM CHLORIDE 125 ML/HR: 9 INJECTION, SOLUTION INTRAVENOUS at 00:03

## 2021-04-20 RX ADMIN — VANCOMYCIN HYDROCHLORIDE 750 MG: 750 INJECTION, POWDER, LYOPHILIZED, FOR SOLUTION INTRAVENOUS at 12:00

## 2021-04-20 RX ADMIN — AMPICILLIN SODIUM AND SULBACTAM SODIUM 3 G: 2; 1 INJECTION, POWDER, FOR SOLUTION INTRAMUSCULAR; INTRAVENOUS at 14:19

## 2021-04-20 RX ADMIN — SODIUM CHLORIDE 125 ML/HR: 9 INJECTION, SOLUTION INTRAVENOUS at 10:28

## 2021-04-20 RX ADMIN — AMPICILLIN SODIUM AND SULBACTAM SODIUM 3 G: 2; 1 INJECTION, POWDER, FOR SOLUTION INTRAMUSCULAR; INTRAVENOUS at 06:01

## 2021-04-20 RX ADMIN — HEPARIN SODIUM 5000 UNITS: 5000 INJECTION INTRAVENOUS; SUBCUTANEOUS at 06:02

## 2021-04-20 RX ADMIN — AMPICILLIN SODIUM AND SULBACTAM SODIUM 3 G: 2; 1 INJECTION, POWDER, FOR SOLUTION INTRAMUSCULAR; INTRAVENOUS at 00:03

## 2021-04-20 RX ADMIN — HEPARIN SODIUM 5000 UNITS: 5000 INJECTION INTRAVENOUS; SUBCUTANEOUS at 17:39

## 2021-04-20 RX ADMIN — LINEZOLID 600 MG: 600 INJECTION, SOLUTION INTRAVENOUS at 15:15

## 2021-04-20 RX ADMIN — MAGNESIUM OXIDE 400 MG (241.3 MG MAGNESIUM) TABLET 400 MG: TABLET at 09:48

## 2021-04-20 RX ADMIN — MAGNESIUM OXIDE 400 MG (241.3 MG MAGNESIUM) TABLET 400 MG: TABLET at 17:39

## 2021-04-20 RX ADMIN — VANCOMYCIN HYDROCHLORIDE 750 MG: 750 INJECTION, POWDER, LYOPHILIZED, FOR SOLUTION INTRAVENOUS at 11:55

## 2021-04-20 RX ADMIN — Medication 10 ML: at 06:02

## 2021-04-20 RX ADMIN — LEVOTHYROXINE SODIUM 25 MCG: 0.03 TABLET ORAL at 06:33

## 2021-04-20 RX ADMIN — Medication 10 ML: at 22:28

## 2021-04-20 NOTE — PROGRESS NOTES
Physician Progress Note      Gustavo Roman  CSN #:                  [id number]  :                       1950  ADMIT DATE:       2021 4:02 PM  [address] [place] DATE:  RESPONDING  PROVIDER #:        Rocío Larson MD          QUERY TEXT:    Patient admitted with Fever and chills. Per H&P dated , noted to also have Infected decubitus ulcer. If possible, please document in progress notes and discharge summary the location, present on admission status and stage of the pressure ulcer: The medical record reflects the following:  Risk Factors: Hx: Bedbound for the last 3-4 years  Clinical Indicators: tmax: 102.8; hr: ; rr: 20-22). .. .. Wava Prim wbc: 20.0; bands: 18.2; .... Wava Prim CT ABD/PELVIS: soft tissue swelling overlying the coccyx w/o abscess or overt OM. .... Wava Prim Wava Prim Per Wound Care RN note: The sacrum coccyx wound is a stage 4 pressure injury that is undermined from 10 o'clock - 3 o'clock with the deepest at 12 o'clock being 2 cm. Treatment: IVF bolus; IV ABx; IP Admit; Labs; Surgical consult;  Wound Care consult; bowel regimen added    Thank you,    Doron Levin  [facility]      Stage 1:  Non-blanchable erythema of intact skin  Stage 2:  Abrasion, Blister, Partial-thickness skin loss, with exposed dermis  Stage 3:  Full-thickness skin loss with damage or necrosis of subcutaneous tissue  Stage 4:  Full-thickness skin & soft tissue loss through to underlying muscle, tendon or bone  Unstageable: Obscured full-thickness skin & tissue loss  Options provided:  -- Stage 1 Pressure Ulcer of *** (location) present on admission  -- Stage 2 Pressure Ulcer of*** (location) present on admission  -- Stage 3 Pressure Ulcer of *** (location) present on admission  -- Stage 4 Pressure Ulcer of *** (location) present on admission  -- Other - I will add my own diagnosis  -- Disagree - Not applicable / Not valid  -- Disagree - Clinically unable to determine / Unknown  -- Refer to Clinical Documentation Reviewer    PROVIDER RESPONSE TEXT:    This patient has a stage 4 pressure ulcer of the *** (location) which was present on admission.     Query created by: Jayce Glynn on 4/20/2021 3:33 PM      Electronically signed by:  Megan Gao MD 4/20/2021 3:50 PM

## 2021-04-20 NOTE — CONSULTS
Perfect served with a consult. Consult written for Dr. Jaimie Jackson. Her nurse is aware to call Dr. Jaimie Jackson.     Lavonne Dillard

## 2021-04-20 NOTE — PROGRESS NOTES
Comprehensive Nutrition Assessment    Type and Reason for Visit: Initial, Wound    Nutrition Recommendations/Plan:   Continue Regular diet as tolerated  RD to add Ensure Enlive BID     Nutrition Assessment:   Pt admitted with infected decubitus ulcer. PMH: HTN, bed bound. Chart reviewed. MST negative for poor appetite or wt loss PTA. Wt stable per last admission. 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 Pt enjoyed ensure enlive last admission, will resume BID to provide an additional 40g Protein daily. Labs reviewed, WNL. Will monitor appetite and PO intake. Estimated Daily Nutrient Needs:  Energy (kcal): MSJ 1750 (1337 x 1.3); Weight Used for Energy Requirements: Current  Protein (g): 101-117g (1.3-1.5gPro/kg); Weight Used for Protein Requirements: Current  Fluid (ml/day): 1750mL; Method Used for Fluid Requirements: 1 ml/kcal      Nutrition Related Findings:  Meds: unasyn, dulcolax, zyvox, magox, miralax, [email or web address]. BM 4/20      Wounds:    Stage IV   (sacrum)     Current Nutrition Therapies:  DIET REGULAR  DIET ONE TIME MESSAGE  DIET NUTRITIONAL SUPPLEMENTS Breakfast, Dinner; Ensure [employer]    Anthropometric Measures:  · Height:  5' 7\" (170.2 cm)  · Current Body Wt:  78 kg (171 lb 15.3 oz)   · Ideal Body Wt:  135 lbs:  127.4 %   · BMI Category:  Normal weight (BMI 22.0-24.9) age over 72       Nutrition Diagnosis:   · Increased nutrient needs related to other (specify)(stage 4 wound) as evidenced by other (specify)(est protein needs 101-117g daily.)      Nutrition Interventions:   Food and/or Nutrient Delivery: Continue current diet, Start oral nutrition supplement  Nutrition Education and Counseling: No recommendations at this time  Coordination of Nutrition Care: Continue to monitor while inpatient    Goals:  Pt will consume >70% of meals/supplements in 3-5 days.        Nutrition Monitoring and Evaluation:   Behavioral-Environmental Outcomes: None identified  Food/Nutrient Intake Outcomes: Food and nutrient intake, Supplement intake  Physical Signs/Symptoms Outcomes: Biochemical data, Nutrition focused physical findings, Skin, Weight, GI status    Discharge Planning:    Continue current diet, Continue oral nutrition supplement     Electronically signed by Vidhya Tran RD, [address]  on 4/20/2021 at 3:53 PM    Contact: [facility]

## 2021-04-20 NOTE — PROGRESS NOTES
Hospitalist Progress Note    NAME: Mayra Galvan   :  1950   MRN:  [id number]       Assessment / Plan:  Acute kidney injury, most likely prerenal versus ATN from vancomycin  Hypotension   Creatinine improving with IV fluid initially, now is worsening,   C/w  IV fluid to 125 cc/h  Repeat BMP  renal ultrasound showed no hydronephrosis but showed nephrolithiasis   creatinine worsens , will consult nephrology  Will DC vancomycin and switch to linezolid    Infected decubitus ulcer stage IV on the sacral/coccyx area  Severe sepsis (leukocytosis, acute kidney injury, hypotension)  Patient is bedbound for about 4 years now  Persistent leukocytosis    CT abdomen and pelvis reported: \"1. Soft tissue swelling overlying the coccyx without abscess or overt  osteomyelitis. 2. Bilateral nephrolithiasis. 3. Bladder stones. 4. Cholelithiasis. 5. Rectal impaction\"  Patient has had this decubitus ulcer for the last 4 years, was getting wound care through home health services and her caregiver. Vancomycin switched to linezolid c/w  Unasyn in addition to linezolid. blood cultures negative to date, wound cultures  Showed mixed veronica   Wound care consulted, evaluated by surgery who did not retain any indication for debridement, recommended packing and wound care  Continue bowel regiment  Patient still with no bowel movement, c/w lactulose in addition to the other laxative  Patient white blood cell count has been worsening,  had a initial improvement , now is worsening, will consult ID  Hypertension   Hyperlipidemia  Hypothyroidism  No BP meds seen in the med rec  Continue with Zetia with pharmacy substitution  Continue with levothyroxine  BP low         Code Status: Full code  Surrogate Decision Maker: her care giver patient is unsure of the last name      DVT Prophylaxis: Subcu heparin  GI Prophylaxis: not indicated         25.0 - 29.9 Overweight / Body mass index is 26.93 kg/m².      Subjective:     Chief Complaint / Reason for Physician Visit  FU infected decub ulcer . No acute issues except for constipation     Discussed with RN events overnight. Review of Systems:  Symptom Y/N Comments  Symptom Y/N Comments   Fever/Chills y   Chest Pain n    Poor Appetite    Edema     Cough    Abdominal Pain n    Sputum    Joint Pain     SOB/PAL n   Pruritis/Rash     Nausea/vomit    Tolerating PT/OT     Diarrhea    Tolerating Diet y    Constipation y   Other       Could NOT obtain due to:      Objective:     VITALS:   Last 24hrs VS reviewed since prior progress note. Most recent are:  Patient Vitals for the past 24 hrs:   Temp Pulse Resp BP SpO2   04/20/21 1055 98.2 °F (36.8 °C) 94 20 101/69 95 %   04/20/21 0755 97.2 °F (36.2 °C) 91 16 102/70 96 %   04/20/21 0400 97.8 °F (36.6 °C) 81 17 97/66 98 %   04/20/21 0006 98.4 °F (36.9 °C) (!) 107 17 (!) 84/63 97 %   04/19/21 2015 97.6 °F (36.4 °C) 80 17 98/64 95 %   04/19/21 1935 97.6 °F (36.4 °C) 82 17 97/60 94 %   04/19/21 1745 100.1 °F (37.8 °C)           Intake/Output Summary (Last 24 hours) at 4/20/2021 1541  Last data filed at 4/20/2021 5125  Gross per 24 hour   Intake 1550 ml   Output 1000 ml   Net 550 ml        I had a face to face encounter and independently examined this patient on 4/20/2021, as outlined below:  PHYSICAL EXAM:  General: WD, WN. Alert, cooperative, no acute distress    EENT:  EOMI. Anicteric sclerae. MMM  Resp:  CTA bilaterally, no wheezing or rales. No accessory muscle use  CV:  Regular  rhythm,  No edema  GI:  Soft, Non distended, Non tender. +Bowel sounds  Neurologic:  Alert and oriented X 3, normal speech,   Psych:   Good insight.  Not anxious nor agitated  Skin:        Reviewed most current lab test results and cultures  YES  Reviewed most current radiology test results   YES  Review and summation of old records today    NO  Reviewed patient's current orders and MAR    YES  PMH/SH reviewed - no change compared to H&P  ________________________________________________________________________  Care Plan discussed with:    Comments   Patient x    Family      RN x    Care Manager     Consultant                       x Multidiciplinary team rounds were held today with , nursing, pharmacist and clinical coordinator. Patient's plan of care was discussed; medications were reviewed and discharge planning was addressed. ________________________________________________________________________  Total NON critical care TIME: 35  Minutes    Total CRITICAL CARE TIME Spent:   Minutes non procedure based      Comments   >50% of visit spent in counseling and coordination of care     ________________________________________________________________________  Gato Wagner MD     Procedures: see electronic medical records for all procedures/Xrays and details which were not copied into this note but were reviewed prior to creation of Plan. LABS:  I reviewed today's most current labs and imaging studies.   Pertinent labs include:  Recent Labs     04/20/21  1123 04/18/21 0317 04/17/21  1650   WBC 19.4* 15.6* 20.0*   HGB 7.7* 8.3* 10.5*   HCT 24.0* 26.0* 32.7*   * 337 574*     Recent Labs     04/20/21  1123 04/19/21  1049 04/18/21  0317 04/17/21  1730    134* 135* 132*   K 3.7 3.8 4.3 4.4    103 105 98   CO2 23 24 23 29   * 101* 92 142*   BUN 32* 25* 23* 22*   CREA 1.87* 1.61* 1.43* 1.63*   CA 8.4* 8.8 8.8 9.6   MG  --   --  2.1  --    PHOS  --   --  2.1*  --    ALB  --   --   --  2.3*   TBILI  --   --   --  0.6   ALT  --   --   --  8*       Signed: Gato Wagner MD

## 2021-04-20 NOTE — PROGRESS NOTES
Transition of Care Plan:    RUR:19%  [facility]  Follow up appointments: PCP, Specialist  DME needed:TBD  Transportation at [address] [facility] or means to access home:  Group home     IM Medicare letter:2nd IMM Letter  Caregiver Contact:Lisa-Caregiver at [facility] Jemma [address]  Discharge Caregiver contacted prior to discharge? Reason for Admission:   Fever Chills                    RUR Score:     19%             PCP: First and Last name:   Dami Bonner     Name of Practice:    Are you a current patient: Yes/No: Yes   Approximate date of last visit: Last week   Can you participate in a virtual visit if needed: Yes    Do you (patient/family) have any concerns for transition/discharge? Pt has no concerns for transition/discharge              Plan for utilizing home health:   Receiving services from [facility]:  Full Code      Healthcare Decision Maker:   Click here to complete [address] including selection of the [address] Relationship (ie \"Primary\")            Primary Decision Maker: ELISE, [address] (ACP) Conversation      Date of Conversation: 4/20/21  Conducted with: Patient with [address]:     Primary Decision Maker: Ade6 Catherine Mejía - [phone]  Click here to [address] [facility] including selection of the Healthcare Decision Maker Relationship (ie \"Primary\")  Today we discussed [address]. The patient is considering options.     Content/Action Overview:   DECLINED ACP conversation - will revisit periodically   Reviewed DNR/DNI and patient elects Full Code (Attempt Resuscitation)         Length of Voluntary ACP Conversation in minutes:  <16 minutes (Non-Billable)    Halley Vinny Mora           Transition of Care Plan:          Pt is a 74 yo female who was admitted to [facility] with a dx of fever chills. CM confirmed demographics with pt. Pt lives at [facility] which is a private group home. The home is a one story home with a ramp to enter. No DME reported. Pt is receiving home health services with [facility]. Pt is bed bound. Pt has a hx of being admitted to a SNF, [facility]. At the time of d/c pt will need assistance with arranging transportation. CM will continue to follow and assist with d/c planning. Care Management Interventions  PCP Verified by CM: Yes(Cristi Ramsey)  Mode of Transport at Discharge: BLS(Pt will need assistance with transportation)  Transition of Care Consult (CM Consult): Discharge Planning, Group Home(Pt to be d/c back to the group home)  Physical Therapy Consult: No  Occupational Therapy Consult: No  Speech Therapy Consult: No  Current Support Network: Adult Group Home(Ms. Salcedo is very supportive and invovled)  Confirm Follow Up Transport: Other (see comment)(Ms. Salcedo drives pt to her medical appointments)  Discharge Location  Discharge Placement: Unable to determine at this time    [place], [place].   Care Manager [facility]  [phone]

## 2021-04-20 NOTE — PROGRESS NOTES
End of Shift Note    Bedside shift change report given to [address] Mel[address] Orta (oncoming nurse) by Essie Nicole RN (offgoing nurse). Report included the following information SBAR, Kardex, Intake/Output, MAR and Recent Results    Shift worked: [id number]     Shift summary and any significant changes:    Unable to draw labs this morning, patient is a hard stick, morning Vancomycin trough level and dose changed to [id number] this morning by pharmacist. Patient turned x2 hours, patient complaint she doesn't feel comfortable with pillow under her, educated patent on importance of turning Q2 hours. Patient had a BM during shift. US done yesterday night. PICC team called and voicemail left for lab draw. Concerns for physician to address: None   Zone phone for oncoming shift:  [phone]     Activity:  Activity Level: Bed Rest  Number times ambulated in hallways past shift: 0  Number of times OOB to chair past shift: 0    Cardiac:   Cardiac Monitoring: No      Cardiac Rhythm: Sinus tachycardia    Access:   Current line(s): PIV     Genitourinary:   Urinary status: voiding    Respiratory:   O2 Device: None (Room air)  Chronic home O2 use?: NO  Incentive spirometer at bedside: NO     GI:  Last Bowel Movement Date: 04/16/21  Current diet:  DIET REGULAR  DIET ONE TIME MESSAGE  Passing flatus: YES  Tolerating current diet: YES       Pain Management:   Patient states pain is manageable on current regimen: YES    Skin:  Kristian Score: 15  Interventions: float heels and increase time out of bed    Patient Safety:  Fall Score:  Total Score: 2  Interventions: gripper socks and pt to call before getting OOB       Length of Stay:  Expected LOS: 3d 12h  Actual LOS: 3      Essie Nicole RN

## 2021-04-20 NOTE — CONSULTS
NEPHROLOGY CONSULT NOTE           Patient: Gareth Shirley MRN: [id number]  PCP: Nikky Cook   :     1950  Age:   73 y.o. Sex:  female      Referring physician: Rossy Ramos MD      REASON FOR CONSULT:  CHELA    HPI:  Gareth Shirley is a 73 y.o. female who has been bedbound in SNF x 4 years. She is brought to the ED for altered mental status and had purulent drainage from the chronic sacral wound. Just 2 days ago started to have chills, mainly in the evenings, denied any pain other than her chronic back pains. She has been treated for sacral decubitus with vancomycin which has been D/C when vanc trough was found elevated at 18. She was also given Ibuprofen yesterday 600 mg. Her B/L Cr is at  0.9 at baseline and might have some CKD in background due to multiple kidney stones. She does have paraplegia and has B/L  nephrolithiasis along with bladder stones. Her Cr on arrival was 1.6 and now has worsened to 1.8. Renal Consult was requested for further eval.    Review of Systems  All systems reviewed and were negative except for above    Past Medical History:   Diagnosis Date    HLD (hyperlipidemia)     Hypertension        No past surgical history on file.     Allergies   Allergen Reactions    Antihistamines - Alkylamine Unknown (comments)     reported on referral packet       Current Facility-Administered Medications   Medication Dose Route Frequency    linezolid in dextrose 5% (ZYVOX) IVPB premix in D5W 600 mg  600 mg IntraVENous Q12H    acetaminophen (TYLENOL) tablet 650 mg  650 mg Oral Q4H PRN    Or    acetaminophen (TYLENOL) suppository 650 mg  650 mg Rectal Q6H PRN    lactulose (CHRONULAC) 10 gram/15 mL solution 15 mL  10 g Oral DAILY    sodium chloride (NS) flush 5-10 mL  5-10 mL IntraVENous PRN    ampicillin-sulbactam (UNASYN) 3 g in 0.9% sodium chloride (MBP/ADV) 100 mL MBP  3 g IntraVENous Q6H    sodium chloride (NS) flush 5-40 mL  5-40 mL IntraVENous Q8H    sodium chloride (NS) flush 5-40 mL  5-40 mL IntraVENous PRN    polyethylene glycol (MIRALAX) packet 17 g  17 g Oral DAILY PRN    promethazine (PHENERGAN) tablet 12.5 mg  12.5 mg Oral Q6H PRN    Or    ondansetron (ZOFRAN) injection 4 mg  4 mg IntraVENous Q6H PRN    heparin (porcine) injection 5,000 Units  5,000 Units SubCUTAneous Q12H    polyethylene glycol (MIRALAX) packet 17 g  17 g Oral BID    bisacodyL (DULCOLAX) tablet 5 mg  5 mg Oral DAILY    0.9% sodium chloride infusion  125 mL/hr IntraVENous CONTINUOUS    levothyroxine (SYNTHROID) tablet 25 mcg  25 mcg Oral ACB    magnesium oxide (MAG-OX) tablet 400 mg  400 mg Oral BID    sodium chloride (NS) flush 5-10 mL  5-10 mL IntraVENous PRN       History reviewed. No pertinent family history.     Social History     Socioeconomic History    Marital status:      Spouse name: Not on file    Number of children: Not on file    Years of education: Not on file    Highest education level: Not on file   Occupational History    Not on file   Social Needs    Financial resource strain: Not on file    Food insecurity     Worry: Not on file     Inability: Not on file    Transportation needs     Medical: Not on file     Non-medical: Not on file   Tobacco Use    Smoking status: Not on file   Substance and Sexual Activity    Alcohol use: Not on file    Drug use: Not on file    Sexual activity: Not on file   Lifestyle    Physical activity     Days per week: Not on file     Minutes per session: Not on file    Stress: Not on file   Relationships    Social connections     Talks on phone: Not on file     Gets together: Not on file     Attends Advent service: Not on file     Active member of club or organization: Not on file     Attends meetings of clubs or organizations: Not on file     Relationship status: Not on file    Intimate partner violence     Fear of current or ex partner: Not on file     Emotionally abused: Not on file     Physically abused: Not on file     Forced sexual activity: Not on file   Other Topics Concern    Not on file   Social History Narrative    Not on file       Vitals:    04/20/21 0755 04/20/21 1055 04/20/21 1546 04/20/21 1549   BP: 102/70 101/69 114/69    Pulse: 91 94 94    Resp: 16 20 20    Temp: 97.2 °F (36.2 °C) 98.2 °F (36.8 °C) 99.2 °F (37.3 °C)    SpO2: 96% 95% 97%    Weight:       Height:    5' 7\" (1.702 m)         Intake/Output Summary (Last 24 hours) at 4/20/2021 1715  Last data filed at 4/20/2021 2075  Gross per 24 hour   Intake 1550 ml   Output 1000 ml   Net 550 ml       PHYSICAL EXAM:  GENERAL : Lying down in bed with no acute distress  HEENT: AT NC PEERLA   NECK: Supple no JVP  CVS: S1 S2 RRR, no murmur or gallops heard  RS: CTABL, no rhonchi,or wheezing heard  ABDOMEN: soft NT ND positive BS  EXTREMITY: No edema clubbing or cyanosis, pedal pulse +  NEUROLOGY: AAA X 3  VASCULAR ACCESS:  none      LABS/STUDIES:  BMP:   Lab Results   Component Value Date/Time     04/20/2021 11:23 AM    K 3.7 04/20/2021 11:23 AM     04/20/2021 11:23 AM    CO2 23 04/20/2021 11:23 AM    AGAP 8 04/20/2021 11:23 AM     (H) 04/20/2021 11:23 AM    BUN 32 (H) 04/20/2021 11:23 AM    CREA 1.87 (H) 04/20/2021 11:23 AM    GFRAA 32 (L) 04/20/2021 11:23 AM    GFRNA 27 (L) 04/20/2021 11:23 AM        CBC:    Lab Results   Component Value Date/Time    WBC 19.4 (H) 04/20/2021 11:23 AM    HGB 7.7 (L) 04/20/2021 11:23 AM    HCT 24.0 (L) 04/20/2021 11:23 AM     (H) 04/20/2021 11:23 AM       No results found for: MCACR, MCA1, MCA2, MCA3, MCAU, MCAU2, MCALPOCT    Lab Results   Component Value Date/Time    Color DARK YELLOW 04/17/2021 06:54 PM    Appearance TURBID (A) 04/17/2021 06:54 PM    Specific gravity 1.017 04/17/2021 06:54 PM    pH (UA) 8.0 04/17/2021 06:54 PM    Protein 100 (A) 04/17/2021 06:54 PM    Glucose Negative 04/17/2021 06:54 PM    Ketone Negative 04/17/2021 06:54 PM    Bilirubin Negative 04/17/2021 06:54 PM    Urobilinogen 0.2 04/17/2021 06:54 PM    Nitrites Negative 04/17/2021 06:54 PM    Leukocyte Esterase LARGE (A) 04/17/2021 06:54 PM    Epithelial cells FEW 04/17/2021 06:54 PM    Bacteria Negative 04/17/2021 06:54 PM    WBC 5-10 04/17/2021 06:54 PM    RBC 0-5 04/17/2021 06:54 PM         ASSESSMENT/PLAN:  No new Assessment & Plan notes have been filed under this hospital service since the last note was generated. Service: Internal Medicine    CHELA stage 2:  -suspect multifactorial from vanc toxicity/ NSAID induced and labile hemodynamics as BP was also as 84/63 mmhg.  -Will order for straight cath and get fresh UA with culture. -Reviewed previous UA which showed hematuria. -Renal US shows B/l Nephrolithiasis but no hydro. -Agree with IVF for now. -Will also get urine for eosinophils.  - no acute need for RRT at this time    Sepsis:  -Sec to infected sacral decubitus.  -Wound care following  -antibiotics were changed. B/L Nephrolithiasis:  -Non obstructive at this time.  -normal calcium. Hypotension:  -Sec to sepsis  -Continue with IV fluids.  -Avoid all BP meds for now. Thank you for letting us participate in care of this patient. Karlene Niño MD  4/20/2021    [facility]:  [email or web address]  [facility] office:  [address]  [place], [address] [place] [address]  Phone: [phone]  Fax :     [phone]     [place] office:  [address] [facility], [address] [place] [address]  Phone - [phone]  Fax - [phone]

## 2021-04-21 PROBLEM — E78.5 HLD (HYPERLIPIDEMIA): Status: ACTIVE | Noted: 2021-04-21

## 2021-04-21 PROBLEM — I10 HTN (HYPERTENSION): Status: ACTIVE | Noted: 2021-04-21

## 2021-04-21 LAB
ANION GAP SERPL CALC-SCNC: 11 MMOL/L (ref 5–15)
APPEARANCE UR: ABNORMAL
APTT PPP: 29.5 SEC (ref 22.1–31)
BACTERIA URNS QL MICRO: ABNORMAL /HPF
BASOPHILS # BLD: 0 K/UL (ref 0–0.1)
BASOPHILS # BLD: 0 K/UL (ref 0–0.1)
BASOPHILS NFR BLD: 0 % (ref 0–1)
BASOPHILS NFR BLD: 0 % (ref 0–1)
BILIRUB UR QL: NEGATIVE
BUN SERPL-MCNC: 33 MG/DL (ref 6–20)
BUN/CREAT SERPL: 20 (ref 12–20)
CALCIUM SERPL-MCNC: 8 MG/DL (ref 8.5–10.1)
CALCULATED R AXIS, ECG10: -7 DEGREES
CALCULATED T AXIS, ECG11: -9 DEGREES
CHLORIDE SERPL-SCNC: 104 MMOL/L (ref 97–108)
CO2 SERPL-SCNC: 19 MMOL/L (ref 21–32)
COLOR UR: ABNORMAL
CREAT SERPL-MCNC: 1.66 MG/DL (ref 0.55–1.02)
DIAGNOSIS, 93000: NORMAL
DIFFERENTIAL METHOD BLD: ABNORMAL
DIFFERENTIAL METHOD BLD: ABNORMAL
EOSINOPHIL # BLD: 0 K/UL (ref 0–0.4)
EOSINOPHIL # BLD: 0 K/UL (ref 0–0.4)
EOSINOPHIL #/AREA URNS HPF: NEGATIVE /[HPF]
EOSINOPHIL NFR BLD: 0 % (ref 0–7)
EOSINOPHIL NFR BLD: 0 % (ref 0–7)
EPITH CASTS URNS QL MICRO: ABNORMAL /LPF
ERYTHROCYTE [DISTWIDTH] IN BLOOD BY AUTOMATED COUNT: 17.3 % (ref 11.5–14.5)
ERYTHROCYTE [DISTWIDTH] IN BLOOD BY AUTOMATED COUNT: 17.4 % (ref 11.5–14.5)
GLUCOSE SERPL-MCNC: 131 MG/DL (ref 65–100)
GLUCOSE UR STRIP.AUTO-MCNC: NEGATIVE MG/DL
HCT VFR BLD AUTO: 24.4 % (ref 35–47)
HCT VFR BLD AUTO: 24.8 % (ref 35–47)
HGB BLD-MCNC: 7.9 G/DL (ref 11.5–16)
HGB BLD-MCNC: 7.9 G/DL (ref 11.5–16)
HGB UR QL STRIP: ABNORMAL
IMM GRANULOCYTES # BLD AUTO: 0 K/UL (ref 0–0.04)
IMM GRANULOCYTES # BLD AUTO: 0.1 K/UL (ref 0–0.04)
IMM GRANULOCYTES NFR BLD AUTO: 0 % (ref 0–0.5)
IMM GRANULOCYTES NFR BLD AUTO: 1 % (ref 0–0.5)
KETONES UR QL STRIP.AUTO: ABNORMAL MG/DL
LEUKOCYTE ESTERASE UR QL STRIP.AUTO: ABNORMAL
LYMPHOCYTES # BLD: 0.8 K/UL (ref 0.8–3.5)
LYMPHOCYTES # BLD: 0.9 K/UL (ref 0.8–3.5)
LYMPHOCYTES NFR BLD: 5 % (ref 12–49)
LYMPHOCYTES NFR BLD: 5 % (ref 12–49)
MCH RBC QN AUTO: 26 PG (ref 26–34)
MCH RBC QN AUTO: 26.2 PG (ref 26–34)
MCHC RBC AUTO-ENTMCNC: 31.9 G/DL (ref 30–36.5)
MCHC RBC AUTO-ENTMCNC: 32.4 G/DL (ref 30–36.5)
MCV RBC AUTO: 80.8 FL (ref 80–99)
MCV RBC AUTO: 81.6 FL (ref 80–99)
MONOCYTES # BLD: 0.3 K/UL (ref 0–1)
MONOCYTES # BLD: 0.8 K/UL (ref 0–1)
MONOCYTES NFR BLD: 2 % (ref 5–13)
MONOCYTES NFR BLD: 5 % (ref 5–13)
NEUTS SEG # BLD: 14.2 K/UL (ref 1.8–8)
NEUTS SEG # BLD: 14.6 K/UL (ref 1.8–8)
NEUTS SEG NFR BLD: 89 % (ref 32–75)
NEUTS SEG NFR BLD: 93 % (ref 32–75)
NITRITE UR QL STRIP.AUTO: NEGATIVE
NRBC # BLD: 0.02 K/UL (ref 0–0.01)
NRBC # BLD: 0.03 K/UL (ref 0–0.01)
NRBC BLD-RTO: 0.1 PER 100 WBC
NRBC BLD-RTO: 0.2 PER 100 WBC
PH UR STRIP: 6.5 [PH] (ref 5–8)
PLATELET # BLD AUTO: 339 K/UL (ref 150–400)
PLATELET # BLD AUTO: 382 K/UL (ref 150–400)
PMV BLD AUTO: 10 FL (ref 8.9–12.9)
PMV BLD AUTO: 10.2 FL (ref 8.9–12.9)
POTASSIUM SERPL-SCNC: 3.5 MMOL/L (ref 3.5–5.1)
PROT UR STRIP-MCNC: 100 MG/DL
Q-T INTERVAL, ECG07: 352 MS
QRS DURATION, ECG06: 136 MS
QTC CALCULATION (BEZET), ECG08: 541 MS
RBC # BLD AUTO: 3.02 M/UL (ref 3.8–5.2)
RBC # BLD AUTO: 3.04 M/UL (ref 3.8–5.2)
RBC #/AREA URNS HPF: ABNORMAL /HPF (ref 0–5)
RBC MORPH BLD: ABNORMAL
SODIUM SERPL-SCNC: 134 MMOL/L (ref 136–145)
SODIUM UR-SCNC: 51 MMOL/L
SP GR UR REFRACTOMETRY: 1.02 (ref 1–1.03)
THERAPEUTIC RANGE,PTTT: NORMAL SECS (ref 58–77)
TROPONIN I SERPL-MCNC: 3.78 NG/ML
UA: UC IF INDICATED,UAUC: ABNORMAL
UROBILINOGEN UR QL STRIP.AUTO: 0.2 EU/DL (ref 0.2–1)
VENTRICULAR RATE, ECG03: 142 BPM
WBC # BLD AUTO: 15.3 K/UL (ref 3.6–11)
WBC # BLD AUTO: 16.3 K/UL (ref 3.6–11)
WBC URNS QL MICRO: ABNORMAL /HPF (ref 0–4)

## 2021-04-21 PROCEDURE — 87086 URINE CULTURE/COLONY COUNT: CPT

## 2021-04-21 PROCEDURE — 74011250636 HC RX REV CODE- 250/636: Performed by: NURSE PRACTITIONER

## 2021-04-21 PROCEDURE — 74011250636 HC RX REV CODE- 250/636: Performed by: STUDENT IN AN ORGANIZED HEALTH CARE EDUCATION/TRAINING PROGRAM

## 2021-04-21 PROCEDURE — 93005 ELECTROCARDIOGRAM TRACING: CPT

## 2021-04-21 PROCEDURE — 99253 IP/OBS CNSLTJ NEW/EST LOW 45: CPT | Performed by: STUDENT IN AN ORGANIZED HEALTH CARE EDUCATION/TRAINING PROGRAM

## 2021-04-21 PROCEDURE — 74011000258 HC RX REV CODE- 258: Performed by: STUDENT IN AN ORGANIZED HEALTH CARE EDUCATION/TRAINING PROGRAM

## 2021-04-21 PROCEDURE — 84484 ASSAY OF TROPONIN QUANT: CPT

## 2021-04-21 PROCEDURE — 85025 COMPLETE CBC W/AUTO DIFF WBC: CPT

## 2021-04-21 PROCEDURE — 65270000029 HC RM PRIVATE

## 2021-04-21 PROCEDURE — 36415 COLL VENOUS BLD VENIPUNCTURE: CPT

## 2021-04-21 PROCEDURE — 74011000250 HC RX REV CODE- 250: Performed by: INTERNAL MEDICINE

## 2021-04-21 PROCEDURE — 2709999900 HC NON-CHARGEABLE SUPPLY

## 2021-04-21 PROCEDURE — 74011250636 HC RX REV CODE- 250/636: Performed by: INTERNAL MEDICINE

## 2021-04-21 PROCEDURE — 81001 URINALYSIS AUTO W/SCOPE: CPT

## 2021-04-21 PROCEDURE — 80048 BASIC METABOLIC PNL TOTAL CA: CPT

## 2021-04-21 PROCEDURE — 74011250637 HC RX REV CODE- 250/637: Performed by: INTERNAL MEDICINE

## 2021-04-21 PROCEDURE — 85730 THROMBOPLASTIN TIME PARTIAL: CPT

## 2021-04-21 PROCEDURE — 99223 1ST HOSP IP/OBS HIGH 75: CPT | Performed by: INTERNAL MEDICINE

## 2021-04-21 RX ORDER — DILTIAZEM HYDROCHLORIDE 5 MG/ML
10 INJECTION INTRAVENOUS ONCE
Status: COMPLETED | OUTPATIENT
Start: 2021-04-21 | End: 2021-04-21

## 2021-04-21 RX ORDER — HEPARIN SODIUM 5000 [USP'U]/ML
4000 INJECTION, SOLUTION INTRAVENOUS; SUBCUTANEOUS AS NEEDED
Status: DISCONTINUED | OUTPATIENT
Start: 2021-04-22 | End: 2021-04-23

## 2021-04-21 RX ORDER — SODIUM CHLORIDE 9 MG/ML
500 INJECTION, SOLUTION INTRAVENOUS ONCE
Status: COMPLETED | OUTPATIENT
Start: 2021-04-21 | End: 2021-04-22

## 2021-04-21 RX ORDER — HEPARIN SODIUM 10000 [USP'U]/100ML
12-25 INJECTION, SOLUTION INTRAVENOUS
Status: DISCONTINUED | OUTPATIENT
Start: 2021-04-21 | End: 2021-04-23

## 2021-04-21 RX ORDER — HEPARIN SODIUM 5000 [USP'U]/ML
2000 INJECTION, SOLUTION INTRAVENOUS; SUBCUTANEOUS AS NEEDED
Status: DISCONTINUED | OUTPATIENT
Start: 2021-04-22 | End: 2021-04-23

## 2021-04-21 RX ADMIN — DEXTROSE MONOHYDRATE 5 MG/HR: 50 INJECTION, SOLUTION INTRAVENOUS at 22:18

## 2021-04-21 RX ADMIN — AMPICILLIN SODIUM AND SULBACTAM SODIUM 3 G: 2; 1 INJECTION, POWDER, FOR SOLUTION INTRAMUSCULAR; INTRAVENOUS at 21:14

## 2021-04-21 RX ADMIN — SODIUM CHLORIDE 500 ML: 9 INJECTION, SOLUTION INTRAVENOUS at 17:07

## 2021-04-21 RX ADMIN — SODIUM CHLORIDE 125 ML/HR: 9 INJECTION, SOLUTION INTRAVENOUS at 02:49

## 2021-04-21 RX ADMIN — MAGNESIUM OXIDE 400 MG (241.3 MG MAGNESIUM) TABLET 400 MG: TABLET at 08:46

## 2021-04-21 RX ADMIN — LINEZOLID 600 MG: 600 INJECTION, SOLUTION INTRAVENOUS at 15:17

## 2021-04-21 RX ADMIN — DILTIAZEM HYDROCHLORIDE 10 MG: 5 INJECTION INTRAVENOUS at 13:08

## 2021-04-21 RX ADMIN — SODIUM CHLORIDE 500 ML: 9 INJECTION, SOLUTION INTRAVENOUS at 13:18

## 2021-04-21 RX ADMIN — AMPICILLIN SODIUM AND SULBACTAM SODIUM 3 G: 2; 1 INJECTION, POWDER, FOR SOLUTION INTRAMUSCULAR; INTRAVENOUS at 04:04

## 2021-04-21 RX ADMIN — LEVOTHYROXINE SODIUM 25 MCG: 0.03 TABLET ORAL at 06:37

## 2021-04-21 RX ADMIN — LINEZOLID 600 MG: 600 INJECTION, SOLUTION INTRAVENOUS at 04:04

## 2021-04-21 RX ADMIN — Medication 10 ML: at 13:21

## 2021-04-21 RX ADMIN — HEPARIN SODIUM 5000 UNITS: 5000 INJECTION INTRAVENOUS; SUBCUTANEOUS at 05:47

## 2021-04-21 RX ADMIN — HEPARIN SODIUM AND DEXTROSE 12 UNITS/KG/HR: 10000; 5 INJECTION INTRAVENOUS at 18:21

## 2021-04-21 RX ADMIN — AMPICILLIN SODIUM AND SULBACTAM SODIUM 3 G: 2; 1 INJECTION, POWDER, FOR SOLUTION INTRAMUSCULAR; INTRAVENOUS at 08:46

## 2021-04-21 RX ADMIN — MAGNESIUM OXIDE 400 MG (241.3 MG MAGNESIUM) TABLET 400 MG: TABLET at 18:03

## 2021-04-21 RX ADMIN — Medication 10 ML: at 05:47

## 2021-04-21 RX ADMIN — AMPICILLIN SODIUM AND SULBACTAM SODIUM 3 G: 2; 1 INJECTION, POWDER, FOR SOLUTION INTRAMUSCULAR; INTRAVENOUS at 14:38

## 2021-04-21 NOTE — CONSULTS
[facility]     Date of  Admission: 2021  4:02 PM     Admission type:Emergency    Consult for: Elevated troponin, Tachycardia   Consult by: Dr. Jillian Elizabeth:     Elise Sheikh is a 73 y.o. female with a PMH significant for HLD, HTN, bedbound 4 years, stage IV sacarl decubitus ulcer since 2018, chronic constipation admitted for Infected decubitus ulcer [L89.90, L08.9]. Per admitting provider note, the patient presented on 2021 with a chief complaint of fever and chills. She told ER provider she had a fall and back surgery at [facility] years ago, no details concerning how fell or how long she was on the ground. Now resident of the [facility] [address] services. Her care giver is MsNaa Jenn Briceño. Upon assessment, the patient continued to ask for the temperature of the room to be turned up. She continues to endorse chills. She interrupts history, and asked for another blanket. She denies any current chest pain, discomfort, tightness, palpitations, irregular heart rate, feeling like her heart is racing, or diaphoresis. She denies any leg edema or shortness of breath. States she lives alone and has a caregiver. Denies nausea, vomiting, or potential exposure to covid 19. She denies any prior cardiac history or work-up.      Cardiac risk factors: family history (states her grandmother  of MI), dyslipidemia, sedentary life style, hypertension, post-menopausal.    Patient Active Problem List    Diagnosis Date Noted    HTN (hypertension) 2021    HLD (hyperlipidemia) 2021    Infected decubitus ulcer 2021    Sacral decubitus ulcer 2020    Sepsis ([address] 2020      Jean Bo  Past Medical History:   Diagnosis Date    HLD (hyperlipidemia)     Hypertension       Social History     Socioeconomic History    Marital status:      Spouse name: Not on file    Number of children: Not on file    Years of education: Not on file    Highest education level: Not on file     Allergies   Allergen Reactions    Antihistamines - Alkylamine Unknown (comments)     reported on referral packet      History reviewed. No pertinent family history.    Current Facility-Administered Medications   Medication Dose Route Frequency    linezolid in dextrose 5% (ZYVOX) IVPB premix in D5W 600 mg  600 mg IntraVENous Q12H    acetaminophen (TYLENOL) tablet 650 mg  650 mg Oral Q4H PRN    Or    acetaminophen (TYLENOL) suppository 650 mg  650 mg Rectal Q6H PRN    sodium chloride (NS) flush 5-10 mL  5-10 mL IntraVENous PRN    ampicillin-sulbactam (UNASYN) 3 g in 0.9% sodium chloride (MBP/ADV) 100 mL MBP  3 g IntraVENous Q6H    sodium chloride (NS) flush 5-40 mL  5-40 mL IntraVENous Q8H    sodium chloride (NS) flush 5-40 mL  5-40 mL IntraVENous PRN    polyethylene glycol (MIRALAX) packet 17 g  17 g Oral DAILY PRN    promethazine (PHENERGAN) tablet 12.5 mg  12.5 mg Oral Q6H PRN    Or    ondansetron (ZOFRAN) injection 4 mg  4 mg IntraVENous Q6H PRN    heparin (porcine) injection 5,000 Units  5,000 Units SubCUTAneous Q12H    polyethylene glycol (MIRALAX) packet 17 g  17 g Oral BID    bisacodyL (DULCOLAX) tablet 5 mg  5 mg Oral DAILY    levothyroxine (SYNTHROID) tablet 25 mcg  25 mcg Oral ACB    magnesium oxide (MAG-OX) tablet 400 mg  400 mg Oral BID    sodium chloride (NS) flush 5-10 mL  5-10 mL IntraVENous PRN        Review of Symptoms:   11 systems reviewed, negative other than as stated in the HPI        Objective:      Visit Vitals  /67   Pulse (!) 139   Temp 97.3 °F (36.3 °C)   Resp 18   Ht 5' 7\" (1.702 m)   Wt 80 kg (176 lb 5.9 oz)   SpO2 93%   BMI 27.62 kg/m²       Physical Assessment:   General Appearance:  alert, appears older than stated age, laying in bed   Eyes: sclera anicteric  Mouth/Throat: moist mucous membranes; oral pharynx clear  Neck: supple; no JVD or bruit  Pulmonary:  clear to auscultation bilaterally; good effort  Cardiovascular: rapid rate and rhythm; no murmur, click, rub, or gallop  Abdomen: soft, non-tender, non-distended; bowel sounds normal  Musculoskeletal: no swelling MOLINA  Extremities: no edema; palpable distal pulses   Skin: warm and dry  Neuro: minimally interactive, oriented to person, place, time, and situation   Psych: flat affect       Data Review:   Recent Labs     04/21/21  0850 04/20/21  1123   WBC 15.3* 19.4*   HGB 7.9* 7.7*   HCT 24.8* 24.0*    421*     Recent Labs     04/21/21  0850 04/20/21  1123 04/19/21  1049   * 136 134*   K 3.5 3.7 3.8    105 103   CO2 19* 23 24   * 126* 101*   BUN 33* 32* 25*   CREA 1.66* 1.87* 1.61*   CA 8.0* 8.4* 8.8       Recent Labs     04/21/21  1325   TROIQ 3.78*         Intake/Output Summary (Last 24 hours) at 4/21/2021 1533  Last data filed at 4/21/2021 1438  Gross per 24 hour   Intake 2100 ml   Output 1150 ml   Net 950 ml        Cardiographics    Telemetry:  's   ECG: ST RBBB, compared to prior EKG now ST, no acute changes   Echocardiogram: pending   CXRAY: \" No acute process\"        Assessment:       Principal Problem:    Sepsis ([address] (12/16/2020)    Active Problems:    Infected decubitus ulcer (4/17/2021)      HTN (hypertension) (4/21/2021)      HLD (hyperlipidemia) (4/21/2021)         Plan:   Zulma Alejandro is a 73 y.o. female with a PMH significant for HLD, HTN, bedbound 4 years, stage IV sacarl decubitus ulcer since 2018, chronic constipation admitted for Infected decubitus ulcer [L89.90, L08.9].     Sinus tachycardia in presence of Sepsis d/t infected Decubitus sacral ulcer:  Obtain ECHO  Continue to monitor telemetry  Administer fluid bolus 500 cc now  Abx, treatment for sepsis as per protocol  Noted she is also hypotensive at times, may consider moving to unit or higher acuity floor to start on pressor support if needed to maintain hemodynamic stability as treat her sepsis  EKG reviewed, no acute changes Elevated troponin: initial troponin 3.78  No acute ACS symptoms  No acute ECG changes  Trend troponin  Obtain ECHO  Continue to monitor tele    Hx HTN: currently periods of hypotension  Hold her PTA anti-hypertensives, although by chart reivew does not appear she was on any medications PTA    HLD:   Continue Zetia     CHELA: Cr 1.87   Appears baseline is around 1.14  Hydrate, monitor  Noted nephrology following     Hx hypothyroidism:   Check TSH   On levothyroxine     Thank you for consulting RCA, will follow. Chelsy Donato, PREET   DNP,APRN,AG-ACNP-BC      Patient seen and examined by me with nurse practitioner. I personally performed all components of the history, physical, and medical decision making and agree with the assessment and plan as noted. EKG and tele reviewed. Most likely due to underlying sepsis. IV fluid. Monitor. Troponin elevation most likely due to sepsis and renal insuff. Follow Echo. No clinical S/S to suggest ACS. Add IV heparin with monitoring of H&H. No obvious bleeding.      Reddy Robison MD

## 2021-04-21 NOTE — PROGRESS NOTES
Hospitalist Progress Note    NAME: Enoc Luther   :  1950   MRN:  [id number]       Assessment / Plan:  Tachycardia , sinus 120-140 , borderline BP  21  Bolus 500 ml IVF ,cardizem one time 10 mg , ekg, tele monitoring and troponin. Acute kidney injury, most likely prerenal versus ATN from vancomycin  Hypotension   Nephrology on board now and appreciated help   IVF stopped , will follow up repeat UA   Holding BP meds   vancomycin  switched to linezolid  Infected decubitus ulcer stage IV on the sacral/coccyx area  Severe sepsis (leukocytosis, acute kidney injury, hypotension)  Patient is bedbound for about 4 years now  Persistent leukocytosis  CT abdomen and pelvis reported: \"  1. Soft tissue swelling overlying the coccyx without abscess or overt  osteomyelitis. 2. Bilateral nephrolithiasis. 3. Bladder stones. 4. Cholelithiasis. 5. Rectal impaction  Patient has had this decubitus ulcer for the last 4 years, was getting wound care ON Unasyn and linezolid. blood cultures negative to date, wound cultures  Showed mixed veronica   Wound care consulted, evaluated by surgery who did not retain any indication for debridement, recommended packing and wound care  Hypertension   Hyperlipidemia  Hypothyroidism  No BP meds seen in the med rec  Continue with Zetia with pharmacy substitution  Continue with levothyroxine  Now on midodrine   Code Status: Full code  Surrogate Decision Maker: her care giver patient is unsure of the last name  DVT Prophylaxis: Subcu heparin  GI Prophylaxis: not indicated  25.0 - 29.9 Overweight / Body mass index is 27.62 kg/m². Subjective:     Chief Complaint / Reason for Physician Visit  FU infected decub ulcer . No acute issues except for constipation     Discussed with RN events overnight.      Review of Systems:  Symptom Y/N Comments  Symptom Y/N Comments   Fever/Chills y   Chest Pain n    Poor Appetite    Edema     Cough    Abdominal Pain n    Sputum    Joint Pain SOB/PAL n   Pruritis/Rash     Nausea/vomit    Tolerating PT/OT     Diarrhea    Tolerating Diet y    Constipation y   Other       Could NOT obtain due to:      Objective:     VITALS:   Last 24hrs VS reviewed since prior progress note. Most recent are:  Patient Vitals for the past 24 hrs:   Temp Pulse Resp BP SpO2   04/21/21 0355 98.1 °F (36.7 °C) (!) 120 20 117/85 96 %   04/21/21 0101 98.4 °F (36.9 °C) (!) 102 19 101/65 92 %   04/20/21 2043 99 °F (37.2 °C) 90 20 116/70 98 %   04/20/21 1546 99.2 °F (37.3 °C) 94 20 114/69 97 %   04/20/21 1055 98.2 °F (36.8 °C) 94 20 101/69 95 %   04/20/21 0755 97.2 °F (36.2 °C) 91 16 102/70 96 %       Intake/Output Summary (Last 24 hours) at 4/21/2021 0741  Last data filed at 4/21/2021 0646  Gross per 24 hour   Intake 2000 ml   Output 1150 ml   Net 850 ml        I had a face to face encounter and independently examined this patient on 4/21/2021, as outlined below:  PHYSICAL EXAM:  General: WD, WN. Alert, cooperative, no acute distress    EENT:  EOMI. Anicteric sclerae. MMM  Resp:  CTA bilaterally, no wheezing or rales. No accessory muscle use  CV:  Regular  rhythm,  No edema  GI:  Soft, Non distended, Non tender. +Bowel sounds  Neurologic:  Alert and oriented X 3, normal speech,   Psych:   Good insight. Not anxious nor agitated      Reviewed most current lab test results and cultures  YES  Reviewed most current radiology test results   YES  Review and summation of old records today    NO  Reviewed patient's current orders and MAR    YES  PMH/SH reviewed - no change compared to H&P  ________________________________________________________________________  Care Plan discussed with:    Comments   Patient x    Family      RN x    Care Manager     Consultant                       x Perryiciplinary team rounds were held today with , nursing, pharmacist and clinical coordinator.   Patient's plan of care was discussed; medications were reviewed and discharge planning was addressed. ________________________________________________________________________  Total NON critical care TIME: 35  Minutes    Total CRITICAL CARE TIME Spent:   Minutes non procedure based      Comments   >50% of visit spent in counseling and coordination of care     ________________________________________________________________________  Kaylin Melendrez MD     Procedures: see electronic medical records for all procedures/Xrays and details which were not copied into this note but were reviewed prior to creation of Plan. LABS:  I reviewed today's most current labs and imaging studies. Pertinent labs include:  Recent Labs     04/20/21  1123   WBC 19.4*   HGB 7.7*   HCT 24.0*   *     Recent Labs     04/20/21  1123 04/19/21  1049    134*   K 3.7 3.8    103   CO2 23 24   * 101*   BUN 32* 25*   CREA 1.87* 1.61*   CA 8.4* 8.8       Signed:  Kaylin Melendrez MD

## 2021-04-21 NOTE — PROGRESS NOTES
Bedside and Verbal shift change report given to  (oncoming nurse) by Miller Langley RN (offgoing nurse). Report included the following information SBAR, Kardex, Intake/Output, MAR, Recent Results and Med Rec Status.

## 2021-04-21 NOTE — PROGRESS NOTES
Pt with troponin of 3.78, next troponin ordered with am labs. Pt going in and out of Afib with RVR, HR ranging from 90s to 146, /84, BP was 98/54 earlier when elevated HR started (10mg diltiazem push and 500cc NS bolus given at that time). Pt seen by Luca RRT RN. Call to Cardiology answering service to inquire if pt requires further medication (dilt or amio gtt) or a higher level of care, spoke with Monica Calderón who will page on call MD.   Pt seen by Dr Mildred Figueroa.

## 2021-04-21 NOTE — PROGRESS NOTES
Nephrology Progress Note  [facility]     [facility]. [facility]  Phone - [phone]   Patient: Randee Estrella    YOB: 1950        Date- 4/21/2021   Admit Date: 4/17/2021  CC: Follow up for CHELA      IMPRESSION & PLAN:   CHELA stage 2:  -suspect multifactorial from vanc toxicity/ NSAID induced and labile hemodynamics as BP was also as 84/63 mmhg. -B/l Cr at 0.6  -Cr has improved from 1.87 to 1.6 again  -Will D/C IV fluids  -Repeat UA was not from straight cath so could be contaminated. -Will continue to follow for now.     Sepsis:  -Sec to infected sacral decubitus.  -Wound care following  -antibiotics were changed. -WBC improving     B/L Nephrolithiasis:  -Non obstructive at this time.  -normal calcium.     Hypotension:  -Sec to sepsis  -s/p IVf  -Better  -Avoid all BP meds for now. -Will add midodrine     Subjective: Interval History:   -Feels better  -Cr improving    - BP still labile  Objective:   Vitals:    04/20/21 2043 04/21/21 0101 04/21/21 0355 04/21/21 0854   BP: 116/70 101/65 117/85 108/73   Pulse: 90 (!) 102 (!) 120 (!) 143   Resp: 20 19 20 18   Temp: 99 °F (37.2 °C) 98.4 °F (36.9 °C) 98.1 °F (36.7 °C) 97.6 °F (36.4 °C)   SpO2: 98% 92% 96% 93%   Weight:   80 kg (176 lb 5.9 oz)    Height:          04/20 0701 - 04/21 0700  In: 2000 [I.V.:2000]  Out: 1150 [Urine:1150]  Last 3 Recorded Weights in this Encounter    04/17/21 1637 04/18/21 2259 04/21/21 0355   Weight: 77.1 kg (170 lb) 78 kg (171 lb 15.3 oz) 80 kg (176 lb 5.9 oz)      Physical exam:   GEN: NAD  NECK- Supple, no mass  RESP: No wheezing, Clear b/l  CVS: S1,S2  RRR  NEURO: Normal speech, Non focal  EXT: No Edema   PSYCH: Normal Mood    Chart reviewed. Pertinent Notes reviewed.      Data Review :  Recent Labs     04/21/21  0850 04/20/21  1123 04/19/21  1049   * 136 134*   K 3.5 3.7 3.8    105 103   CO2 19* 23 24   BUN 33* 32* 25*   CREA 1.66* 1.87* 1.61*   * 126* 101*   CA 8.0* 8.4* 8.8     Recent Labs     04/21/21  0850 04/20/21  1123   WBC 15.3* 19.4*   HGB 7.9* 7.7*   HCT 24.8* 24.0*    421*     No results for input(s): FE, TIBC, PSAT, FERR in the last 72 hours.    Medication list  reviewed  Current Facility-Administered Medications   Medication Dose Route Frequency    linezolid in dextrose 5% (ZYVOX) IVPB premix in D5W 600 mg  600 mg IntraVENous Q12H    acetaminophen (TYLENOL) tablet 650 mg  650 mg Oral Q4H PRN    Or    acetaminophen (TYLENOL) suppository 650 mg  650 mg Rectal Q6H PRN    sodium chloride (NS) flush 5-10 mL  5-10 mL IntraVENous PRN    ampicillin-sulbactam (UNASYN) 3 g in 0.9% sodium chloride (MBP/ADV) 100 mL MBP  3 g IntraVENous Q6H    sodium chloride (NS) flush 5-40 mL  5-40 mL IntraVENous Q8H    sodium chloride (NS) flush 5-40 mL  5-40 mL IntraVENous PRN    polyethylene glycol (MIRALAX) packet 17 g  17 g Oral DAILY PRN    promethazine (PHENERGAN) tablet 12.5 mg  12.5 mg Oral Q6H PRN    Or    ondansetron (ZOFRAN) injection 4 mg  4 mg IntraVENous Q6H PRN    heparin (porcine) injection 5,000 Units  5,000 Units SubCUTAneous Q12H    polyethylene glycol (MIRALAX) packet 17 g  17 g Oral BID    bisacodyL (DULCOLAX) tablet 5 mg  5 mg Oral DAILY    levothyroxine (SYNTHROID) tablet 25 mcg  25 mcg Oral ACB    magnesium oxide (MAG-OX) tablet 400 mg  400 mg Oral BID    sodium chloride (NS) flush 5-10 mL  5-10 mL IntraVENous PRN          MD [facility]  [facility], [address] [place] [address], [address]  Phone - [phone]               Fax - [phone]

## 2021-04-21 NOTE — CONSULTS
Infectious Disease Consult Note    Reason for Consult: Leukocytosis, sacral decubitus ulcer  Date of Consultation: April 21, 2021  Date of Admission: 4/17/2021  Referring Physician: Dr. Mariah Abraham      HPI:  Joselyn Henson is a 73y.o. year old female with history of bedbound status for 4 years due to unclear cause, sacral ulcer since 2018. She presented on 4/17 for lethargy and confusion noted at his [facility]. On presentation, patient was febrile to 102.8F. WBC was 20.0. Blood cultures from 4/17 negative so far. The sacral decubitus ulcer was evaluated to be Stage 4, but no acute signs of inflammation. CXR showed no acute process. U/A showed  WBCs. Found to have CHELA of Scr 1.63 from prior normal. CT abdomen-pelvis showed soft tissue swelling overlying the coccyx without abscess or overt osteomyelitis, but showed stones in the bladder and kidneys. Renal US showed right-sided nephrolithiasis. Patient has been on vancomycin 4/17 to 4/20, Unasyn 4/18 to present. Due to worsening serum creatinine, vanco switched to linezolid. At present, patient reports feeling better since coming in. Past Medical History:  Past Medical History:   Diagnosis Date    HLD (hyperlipidemia)     Hypertension          Surgical History:  No past surgical history on file. Family History:   History reviewed. No pertinent family history. Social History:     · Lives in [facility]. Allergies: Allergies   Allergen Reactions    Antihistamines - Alkylamine Unknown (comments)     reported on referral packet         Review of Systems:    See HPI. Medications:  No current facility-administered medications on file prior to encounter. Current Outpatient Medications on File Prior to Encounter   Medication Sig Dispense Refill    levothyroxine (SYNTHROID) 25 mcg tablet Take 25 mcg by mouth Daily (before breakfast).  magnesium oxide (MAG-OX) 400 mg tablet Take 400 mg by mouth two (2) times a day.       ascorbic acid, vitamin C, (Vitamin C) 500 mg tablet Take 1,000 mg by mouth daily.  acetaminophen (TYLENOL) 650 mg TbER Take 650 mg by mouth every six to eight (6-8) hours as needed for Pain.  oxyCODONE-acetaminophen (PERCOCET) 5-325 mg per tablet Take 1 Tab by mouth every six (6) hours as needed for Pain.  traMADoL (ULTRAM) 50 mg tablet Take 50 mg by mouth every eight (8) hours as needed for Pain.  ammonium lactate (LAC-HYDRIN) 12 % lotion Apply  to affected area as needed. Apply to both legs every evening for dry skin      ezetimibe (ZETIA) 10 mg tablet Take 10 mg by mouth daily.  senna (Senna) 8.6 mg tablet Take 2 Tabs by mouth two (2) times daily as needed for Constipation.  amino acids-protein hydrolys (Pro-Stat AWC)  gram-kcal/30 mL liqd Take 30 mL by mouth two (2) times a day.  For wound healing             Physical Exam:    Vitals:   Patient Vitals for the past 24 hrs:   Temp Pulse Resp BP SpO2   04/21/21 0854 97.6 °F (36.4 °C) (!) 143 18 108/73 93 %   04/21/21 0355 98.1 °F (36.7 °C) (!) 120 20 117/85 96 %   04/21/21 0101 98.4 °F (36.9 °C) (!) 102 19 101/65 92 %   04/20/21 2043 99 °F (37.2 °C) 90 20 116/70 98 %   04/20/21 1546 99.2 °F (37.3 °C) 94 20 114/69 97 %   04/20/21 1055 98.2 °F (36.8 °C) 94 20 101/69 95 %   ·   · GEN: NAD  · HEENT: Normocephalic, atraumatic, PERRL, no scleral icterus  · CV: S1, S2 heard regularly, no murmurs  · Lungs: Clear to auscultation bilaterally  · Abdomen: soft, non distended, non tender  · Genitourinary:  no soliz  · Extremities: no edema  · Neuro: Alert, oriented to time, place and situation, moves all extremities to commands, verbal   · Skin: no rash  · Psych: good affect, good eye contact, non tearful   · Lines: PIVs      Labs:   Recent Results (from the past 24 hour(s))   METABOLIC PANEL, BASIC    Collection Time: 04/20/21 11:23 AM   Result Value Ref Range    Sodium 136 136 - 145 mmol/L    Potassium 3.7 3.5 - 5.1 mmol/L    Chloride 105 97 - 108 mmol/L CO2 23 21 - 32 mmol/L    Anion gap 8 5 - 15 mmol/L    Glucose 126 (H) 65 - 100 mg/dL    BUN 32 (H) 6 - 20 MG/DL    Creatinine 1.87 (H) 0.55 - 1.02 MG/DL    BUN/Creatinine ratio 17 12 - 20      GFR est AA 32 (L) >60 ml/min/1.73m2    GFR est non-AA 27 (L) >60 ml/min/1.73m2    Calcium 8.4 (L) 8.5 - 10.1 MG/DL   VANCOMYCIN, TROUGH    Collection Time: 04/20/21 11:23 AM   Result Value Ref Range    Vancomycin,trough 18.0 (H) 5.0 - 10.0 ug/mL    Reported dose date NOT PROVIDED      Reported dose time: NOT PROVIDED      Reported dose: NOT PROVIDED UNITS   CBC WITH AUTOMATED DIFF    Collection Time: 04/20/21 11:23 AM   Result Value Ref Range    WBC 19.4 (H) 3.6 - 11.0 K/uL    RBC 2.90 (L) 3.80 - 5.20 M/uL    HGB 7.7 (L) 11.5 - 16.0 g/dL    HCT 24.0 (L) 35.0 - 47.0 %    MCV 82.8 80.0 - 99.0 FL    MCH 26.6 26.0 - 34.0 PG    MCHC 32.1 30.0 - 36.5 g/dL    RDW 17.2 (H) 11.5 - 14.5 %    PLATELET 947 (H) 265 - 400 K/uL    MPV 9.6 8.9 - 12.9 FL    NRBC 0.0 0  WBC    ABSOLUTE NRBC 0.00 0.00 - 0.01 K/uL    NEUTROPHILS 92 (H) 32 - 75 %    BAND NEUTROPHILS 4 %    LYMPHOCYTES 2 (L) 12 - 49 %    MONOCYTES 2 (L) 5 - 13 %    EOSINOPHILS 0 0 - 7 %    BASOPHILS 0 0 - 1 %    IMMATURE GRANULOCYTES 0 0.0 - 0.5 %    ABS. NEUTROPHILS 18.6 (H) 1.8 - 8.0 K/UL    ABS. LYMPHOCYTES 0.4 (L) 0.8 - 3.5 K/UL    ABS. MONOCYTES 0.4 0.0 - 1.0 K/UL    ABS. EOSINOPHILS 0.0 0.0 - 0.4 K/UL    ABS. BASOPHILS 0.0 0.0 - 0.1 K/UL    ABS. IMM.  GRANS. 0.0 0.00 - 0.04 K/UL    DF MANUAL      RBC COMMENTS ANISOCYTOSIS  1+       SODIUM, UR, RANDOM    Collection Time: 04/20/21  8:30 PM   Result Value Ref Range    Sodium,urine random 51 MMOL/L   URINALYSIS W/ REFLEX CULTURE    Collection Time: 04/21/21  2:53 AM    Specimen: Urine   Result Value Ref Range    Color YELLOW/STRAW      Appearance TURBID (A) CLEAR      Specific gravity 1.025 1.003 - 1.030      pH (UA) 6.5 5.0 - 8.0      Protein 100 (A) NEG mg/dL    Glucose Negative NEG mg/dL    Ketone TRACE (A) NEG mg/dL Bilirubin Negative NEG      Blood MODERATE (A) NEG      Urobilinogen 0.2 0.2 - 1.0 EU/dL    Nitrites Negative NEG      Leukocyte Esterase LARGE (A) NEG      WBC  0 - 4 /hpf    RBC 20-50 0 - 5 /hpf    Epithelial cells FEW FEW /lpf    Bacteria 4+ (A) NEG /hpf    UA:UC IF INDICATED URINE CULTURE ORDERED (A) CNI     CBC WITH AUTOMATED DIFF    Collection Time: 04/21/21  8:50 AM   Result Value Ref Range    WBC 15.3 (H) 3.6 - 11.0 K/uL    RBC 3.04 (L) 3.80 - 5.20 M/uL    HGB 7.9 (L) 11.5 - 16.0 g/dL    HCT 24.8 (L) 35.0 - 47.0 %    MCV 81.6 80.0 - 99.0 FL    MCH 26.0 26.0 - 34.0 PG    MCHC 31.9 30.0 - 36.5 g/dL    RDW 17.4 (H) 11.5 - 14.5 %    PLATELET 566 118 - 718 K/uL    MPV 10.0 8.9 - 12.9 FL    NRBC 0.1 (H) 0  WBC    ABSOLUTE NRBC 0.02 (H) 0.00 - 0.01 K/uL    NEUTROPHILS PENDING %    LYMPHOCYTES PENDING %    MONOCYTES PENDING %    EOSINOPHILS PENDING %    BASOPHILS PENDING %    IMMATURE GRANULOCYTES PENDING %    ABS. NEUTROPHILS PENDING K/UL    ABS. LYMPHOCYTES PENDING K/UL    ABS. MONOCYTES PENDING K/UL    ABS. EOSINOPHILS PENDING K/UL    ABS. BASOPHILS PENDING K/UL    ABS. IMM. GRANS. PENDING K/UL    DF PENDING        Microbiology Data:     See HPI      Assessment/Recommendations:   72 yo F:    - Sepsis due to unclear source. Sacral ulcer not infected on exam and imaging. Possibly urinary system as the source. - CHELA on admission 2/2 sepsis. CT and renal US showed kidney stones and right hydronephrosis, but no mention of obstructive uropathy. Continue to monitor Scr. Leukocytosis is trending down as of 4/21, but if persists, would pursue CT abd-pelvis with IV contrast to see if any pyelonephritis. - Continue Unasyn for now, day 4 today. - Will stop linezolid as low suspicion for MRSA. - Hx of Bedbound status due to unknown cause per the patient. Will follow. Thank for the opportunity to participate in the care of this patient. Please contact with questions or concerns. Jacquelyn Denis MD  Infectious Diseases

## 2021-04-21 NOTE — PROGRESS NOTES
End of Shift Note    Bedside shift change report given to Falguni Alicea (oncoming nurse) by Melvyn Sandifer, RN (offgoing nurse). Report included the following information SBAR, Kardex, Intake/Output, MAR and Recent Results    Shift worked:  7913-3414     Shift summary and any significant changes:    Patient continue to have large BM, incontinence care performed, CHG bath given. Patient's bladder scan showed 496ml, straight cath performed and able to get 450cc of urine. Purewick placed back. Lab unable to run UA and culture due to urine sitting for too long, straight cath performed and able to get 300ml of urine, labs sent down. Wound care performed        Concerns for physician to address: None     Zone phone for oncoming shift:  [phone]     Activity:  Activity Level: Bed Rest  Number times ambulated in hallways past shift: 0  Number of times OOB to chair past shift: 0    Cardiac:   Cardiac Monitoring: No      Cardiac Rhythm: Sinus tachycardia    Access:   Current line(s): PIV     Genitourinary:   Urinary status: incontinent and external catheter    Respiratory:   O2 Device: None (Room air)  Chronic home O2 use?: NO  Incentive spirometer at bedside: NO     GI:  Last Bowel Movement Date: 04/20/21  Current diet:  DIET REGULAR  DIET ONE TIME MESSAGE  DIET NUTRITIONAL SUPPLEMENTS Breakfast, Dinner; Ensure [employer]  Passing flatus: YES  Tolerating current diet: YES       Pain Management:   Patient states pain is manageable on current regimen: YES    Skin:  Kristian Score: 15  Interventions: float heels and increase time out of bed    Patient Safety:  Fall Score:  Total Score: 2  Interventions: gripper socks and pt to call before getting OOB       Length of Stay:  Expected LOS: 3d 12h  Actual LOS: 4      Melvyn Sandifer, RN

## 2021-04-21 NOTE — PROGRESS NOTES
End of Shift Note    Bedside shift change report given to Valerio Law RN (oncoming nurse) by Annelise Francisco RN (offgoing nurse). Report included the following information SBAR, Kardex, Intake/Output, MAR and Recent Results    Shift worked:  5454-5414     Shift summary and any significant changes:    16:16- Wound care to sacrum completed per order. 17:07- Normal saline bolus started per order. 18:21- Heparin drip started, notified pharmacy. 19:45- Heart rate continued to be elevated, call to rapid response nurse. 19:54- EKG completed per order. 20:20- Paged on-call cardiology to inform of EKG results. 20:35- Spoke to Dr. Radhika Diana. Orders received for diltiazem drip.   21:15- Order received for central line. 22:27- Anesthesia notified of order   22:50- Dr. Maria D Cartwright, anesthesia in with patient. Patient refusing line at this time stating \"I need rest, it's uncomfortable. \" Patient educated on need for additional access to be able to administer IV antibiotics and drips. Patient verbalized understanding and would not like to proceed with central line. NP Purveyor notified.     Concerns for physician to address:  heartrate     Zone phone for oncoming shift:   [phone]       Activity:  Activity Level: Bed Rest  Number times ambulated in hallways past shift: 0  Number of times OOB to chair past shift: 0    Cardiac:   Cardiac Monitoring: Yes      Cardiac Rhythm: Normal sinus rhythm, Sinus tachycardia    Access:   Current line(s): PIV     Genitourinary:   Urinary status: voiding, incontinent and external catheter    Respiratory:   O2 Device: None (Room air)  Chronic home O2 use?: N/A  Incentive spirometer at bedside: N/A     GI:  Last Bowel Movement Date: 04/21/21  Current diet:  DIET REGULAR  DIET ONE TIME MESSAGE  DIET NUTRITIONAL SUPPLEMENTS Breakfast, Dinner; Ensure [employer]  Passing flatus: YES       Pain Management:   Patient states pain is manageable on current regimen: YES    Skin:  Kristian Score: 14  Interventions: turn team and speciality bed    Patient Safety:  Fall Score:  Total Score: 2     Length of Stay:  Expected LOS: 3d 12h  Actual LOS: [address] RN

## 2021-04-22 ENCOUNTER — APPOINTMENT (OUTPATIENT)
Dept: NON INVASIVE DIAGNOSTICS | Age: 71
DRG: 871 | End: 2021-04-22
Attending: NURSE PRACTITIONER
Payer: MEDICARE

## 2021-04-22 LAB
ANION GAP SERPL CALC-SCNC: 9 MMOL/L (ref 5–15)
APTT PPP: 31.6 SEC (ref 22.1–31)
APTT PPP: 34.7 SEC (ref 22.1–31)
APTT PPP: 42.6 SEC (ref 22.1–31)
BACTERIA SPEC CULT: NORMAL
BASOPHILS # BLD: 0 K/UL (ref 0–0.1)
BASOPHILS NFR BLD: 0 % (ref 0–1)
BUN SERPL-MCNC: 33 MG/DL (ref 6–20)
BUN/CREAT SERPL: 22 (ref 12–20)
CALCIUM SERPL-MCNC: 8.2 MG/DL (ref 8.5–10.1)
CALCULATED R AXIS, ECG10: -14 DEGREES
CALCULATED T AXIS, ECG11: -9 DEGREES
CHLORIDE SERPL-SCNC: 104 MMOL/L (ref 97–108)
CK SERPL-CCNC: 49 U/L (ref 26–192)
CO2 SERPL-SCNC: 21 MMOL/L (ref 21–32)
COMMENT, HOLDF: NORMAL
CREAT SERPL-MCNC: 1.5 MG/DL (ref 0.55–1.02)
DIAGNOSIS, 93000: NORMAL
DIFFERENTIAL METHOD BLD: ABNORMAL
ECHO AO ROOT DIAM: 3.41 CM
ECHO AV AREA PEAK VELOCITY: 2.43 CM2
ECHO AV AREA VTI: 2.25 CM2
ECHO AV AREA/BSA PEAK VELOCITY: 1.3 CM2/M2
ECHO AV AREA/BSA VTI: 1.2 CM2/M2
ECHO AV MEAN GRADIENT: 1.94 MMHG
ECHO AV PEAK GRADIENT: 3.36 MMHG
ECHO AV PEAK VELOCITY: 91.64 CM/S
ECHO AV VTI: 16.82 CM
ECHO LA MAJOR AXIS: 3.76 CM
ECHO LA MINOR AXIS: 1.96 CM
ECHO LV E' LATERAL VELOCITY: 7.59 CM/S
ECHO LV E' SEPTAL VELOCITY: 3.84 CM/S
ECHO LV EDV A4C: 118.88 ML
ECHO LV EDV INDEX A4C: 62.1 ML/M2
ECHO LV EJECTION FRACTION A4C: 46 PERCENT
ECHO LV ESV A4C: 64.43 ML
ECHO LV ESV INDEX A4C: 33.6 ML/M2
ECHO LV INTERNAL DIMENSION DIASTOLIC: 3.82 CM (ref 3.9–5.3)
ECHO LV INTERNAL DIMENSION SYSTOLIC: 3.68 CM
ECHO LV IVSD: 1.07 CM (ref 0.6–0.9)
ECHO LV MASS 2D: 159.3 G (ref 67–162)
ECHO LV MASS INDEX 2D: 83.2 G/M2 (ref 43–95)
ECHO LV POSTERIOR WALL DIASTOLIC: 1.38 CM (ref 0.6–0.9)
ECHO LVOT CARDIAC OUTPUT: 3 LITER/MINUTE
ECHO LVOT DIAM: 2.07 CM
ECHO LVOT PEAK GRADIENT: 1.73 MMHG
ECHO LVOT PEAK VELOCITY: 65.8 CM/S
ECHO LVOT SV: 37.8 ML
ECHO LVOT VTI: 11.2 CM
ECHO MV A VELOCITY: 53.13 CM/S
ECHO MV AREA PHT: 3.54 CM2
ECHO MV AREA VTI: 2.57 CM2
ECHO MV E DECELERATION TIME (DT): 169.73 MS
ECHO MV E VELOCITY: 54.83 CM/S
ECHO MV E/A RATIO: 1.03
ECHO MV E/E' LATERAL: 7.22
ECHO MV E/E' RATIO (AVERAGED): 10.75
ECHO MV E/E' SEPTAL: 14.28
ECHO MV MAX VELOCITY: 64.88 CM/S
ECHO MV MEAN GRADIENT: 0.64 MMHG
ECHO MV PEAK GRADIENT: 1.68 MMHG
ECHO MV PRESSURE HALF TIME (PHT): 62.19 MS
ECHO MV VTI: 14.72 CM
ECHO PV MAX VELOCITY: 63.26 CM/S
ECHO PV PEAK INSTANTANEOUS GRADIENT SYSTOLIC: 1.6 MMHG
ECHO TV REGURGITANT MAX VELOCITY: 246.43 CM/S
ECHO TV REGURGITANT PEAK GRADIENT: 24.38 MMHG
EOSINOPHIL # BLD: 0 K/UL (ref 0–0.4)
EOSINOPHIL NFR BLD: 0 % (ref 0–7)
ERYTHROCYTE [DISTWIDTH] IN BLOOD BY AUTOMATED COUNT: 17.3 % (ref 11.5–14.5)
GLUCOSE SERPL-MCNC: 141 MG/DL (ref 65–100)
HCT VFR BLD AUTO: 22.1 % (ref 35–47)
HCT VFR BLD AUTO: 24.3 % (ref 35–47)
HGB BLD-MCNC: 7.2 G/DL (ref 11.5–16)
HGB BLD-MCNC: 7.8 G/DL (ref 11.5–16)
IMM GRANULOCYTES # BLD AUTO: 0.1 K/UL (ref 0–0.04)
IMM GRANULOCYTES NFR BLD AUTO: 1 % (ref 0–0.5)
LVOT MG: 1.06 MMHG
LYMPHOCYTES # BLD: 1.3 K/UL (ref 0.8–3.5)
LYMPHOCYTES NFR BLD: 8 % (ref 12–49)
MAGNESIUM SERPL-MCNC: 2.4 MG/DL (ref 1.6–2.4)
MCH RBC QN AUTO: 25.8 PG (ref 26–34)
MCHC RBC AUTO-ENTMCNC: 32.1 G/DL (ref 30–36.5)
MCV RBC AUTO: 80.5 FL (ref 80–99)
MONOCYTES # BLD: 0.7 K/UL (ref 0–1)
MONOCYTES NFR BLD: 5 % (ref 5–13)
NEUTS SEG # BLD: 13 K/UL (ref 1.8–8)
NEUTS SEG NFR BLD: 86 % (ref 32–75)
NRBC # BLD: 0.04 K/UL (ref 0–0.01)
NRBC BLD-RTO: 0.3 PER 100 WBC
PHOSPHATE SERPL-MCNC: 1.7 MG/DL (ref 2.6–4.7)
PLATELET # BLD AUTO: 323 K/UL (ref 150–400)
PMV BLD AUTO: 10.3 FL (ref 8.9–12.9)
POTASSIUM SERPL-SCNC: 3.2 MMOL/L (ref 3.5–5.1)
Q-T INTERVAL, ECG07: 342 MS
QRS DURATION, ECG06: 130 MS
QTC CALCULATION (BEZET), ECG08: 518 MS
RBC # BLD AUTO: 3.02 M/UL (ref 3.8–5.2)
SAMPLES BEING HELD,HOLD: NORMAL
SERVICE CMNT-IMP: NORMAL
SODIUM SERPL-SCNC: 134 MMOL/L (ref 136–145)
THERAPEUTIC RANGE,PTTT: ABNORMAL SECS (ref 58–77)
TROPONIN I SERPL-MCNC: 4.25 NG/ML
TSH SERPL DL<=0.05 MIU/L-ACNC: 1.64 UIU/ML (ref 0.36–3.74)
VENTRICULAR RATE, ECG03: 138 BPM
WBC # BLD AUTO: 15.2 K/UL (ref 3.6–11)

## 2021-04-22 PROCEDURE — 74011250636 HC RX REV CODE- 250/636: Performed by: STUDENT IN AN ORGANIZED HEALTH CARE EDUCATION/TRAINING PROGRAM

## 2021-04-22 PROCEDURE — 85730 THROMBOPLASTIN TIME PARTIAL: CPT

## 2021-04-22 PROCEDURE — 74011000258 HC RX REV CODE- 258: Performed by: STUDENT IN AN ORGANIZED HEALTH CARE EDUCATION/TRAINING PROGRAM

## 2021-04-22 PROCEDURE — 82550 ASSAY OF CK (CPK): CPT

## 2021-04-22 PROCEDURE — 93306 TTE W/DOPPLER COMPLETE: CPT

## 2021-04-22 PROCEDURE — 84484 ASSAY OF TROPONIN QUANT: CPT

## 2021-04-22 PROCEDURE — 74011250636 HC RX REV CODE- 250/636: Performed by: INTERNAL MEDICINE

## 2021-04-22 PROCEDURE — 80048 BASIC METABOLIC PNL TOTAL CA: CPT

## 2021-04-22 PROCEDURE — 36415 COLL VENOUS BLD VENIPUNCTURE: CPT

## 2021-04-22 PROCEDURE — 74011250637 HC RX REV CODE- 250/637: Performed by: INTERNAL MEDICINE

## 2021-04-22 PROCEDURE — 65660000000 HC RM CCU STEPDOWN

## 2021-04-22 PROCEDURE — 85025 COMPLETE CBC W/AUTO DIFF WBC: CPT

## 2021-04-22 PROCEDURE — 85018 HEMOGLOBIN: CPT

## 2021-04-22 PROCEDURE — 99233 SBSQ HOSP IP/OBS HIGH 50: CPT | Performed by: INTERNAL MEDICINE

## 2021-04-22 PROCEDURE — 84443 ASSAY THYROID STIM HORMONE: CPT

## 2021-04-22 PROCEDURE — 83735 ASSAY OF MAGNESIUM: CPT

## 2021-04-22 PROCEDURE — 84100 ASSAY OF PHOSPHORUS: CPT

## 2021-04-22 RX ORDER — POTASSIUM CHLORIDE 750 MG/1
40 TABLET, FILM COATED, EXTENDED RELEASE ORAL
Status: COMPLETED | OUTPATIENT
Start: 2021-04-22 | End: 2021-04-22

## 2021-04-22 RX ORDER — SODIUM,POTASSIUM PHOSPHATES 280-250MG
2 POWDER IN PACKET (EA) ORAL 4 TIMES DAILY
Status: COMPLETED | OUTPATIENT
Start: 2021-04-22 | End: 2021-04-23

## 2021-04-22 RX ORDER — POTASSIUM CHLORIDE 750 MG/1
40 TABLET, FILM COATED, EXTENDED RELEASE ORAL DAILY
Status: DISPENSED | OUTPATIENT
Start: 2021-04-22 | End: 2021-04-25

## 2021-04-22 RX ADMIN — POTASSIUM & SODIUM PHOSPHATES POWDER PACK 280-160-250 MG 2 PACKET: 280-160-250 PACK at 17:57

## 2021-04-22 RX ADMIN — Medication 10 ML: at 22:47

## 2021-04-22 RX ADMIN — POTASSIUM & SODIUM PHOSPHATES POWDER PACK 280-160-250 MG 2 PACKET: 280-160-250 PACK at 12:02

## 2021-04-22 RX ADMIN — AMPICILLIN SODIUM AND SULBACTAM SODIUM 3 G: 2; 1 INJECTION, POWDER, FOR SOLUTION INTRAMUSCULAR; INTRAVENOUS at 09:02

## 2021-04-22 RX ADMIN — PHENYLEPHRINE HYDROCHLORIDE 10 MCG/MIN: 10 INJECTION INTRAVENOUS at 18:16

## 2021-04-22 RX ADMIN — Medication: at 22:45

## 2021-04-22 RX ADMIN — PIPERACILLIN AND TAZOBACTAM 3.38 G: 3; .375 INJECTION, POWDER, LYOPHILIZED, FOR SOLUTION INTRAVENOUS at 16:20

## 2021-04-22 RX ADMIN — POTASSIUM & SODIUM PHOSPHATES POWDER PACK 280-160-250 MG 2 PACKET: 280-160-250 PACK at 22:44

## 2021-04-22 RX ADMIN — POTASSIUM CHLORIDE 40 MEQ: 750 TABLET, FILM COATED, EXTENDED RELEASE ORAL at 09:08

## 2021-04-22 RX ADMIN — MAGNESIUM OXIDE 400 MG (241.3 MG MAGNESIUM) TABLET 400 MG: TABLET at 17:57

## 2021-04-22 RX ADMIN — Medication 10 ML: at 09:20

## 2021-04-22 RX ADMIN — LEVOTHYROXINE SODIUM 25 MCG: 0.03 TABLET ORAL at 09:03

## 2021-04-22 RX ADMIN — HEPARIN SODIUM 4000 UNITS: 5000 INJECTION INTRAVENOUS; SUBCUTANEOUS at 02:01

## 2021-04-22 RX ADMIN — HEPARIN SODIUM AND DEXTROSE 20 UNITS/KG/HR: 10000; 5 INJECTION INTRAVENOUS at 13:57

## 2021-04-22 RX ADMIN — MAGNESIUM OXIDE 400 MG (241.3 MG MAGNESIUM) TABLET 400 MG: TABLET at 09:03

## 2021-04-22 RX ADMIN — PIPERACILLIN AND TAZOBACTAM 3.38 G: 3; .375 INJECTION, POWDER, LYOPHILIZED, FOR SOLUTION INTRAVENOUS at 22:45

## 2021-04-22 RX ADMIN — Medication 10 ML: at 05:20

## 2021-04-22 RX ADMIN — HEPARIN SODIUM 4000 UNITS: 5000 INJECTION INTRAVENOUS; SUBCUTANEOUS at 13:50

## 2021-04-22 RX ADMIN — AMPICILLIN SODIUM AND SULBACTAM SODIUM 3 G: 2; 1 INJECTION, POWDER, FOR SOLUTION INTRAMUSCULAR; INTRAVENOUS at 01:21

## 2021-04-22 RX ADMIN — PHENYLEPHRINE HYDROCHLORIDE 15 MCG/MIN: 10 INJECTION INTRAVENOUS at 19:32

## 2021-04-22 RX ADMIN — Medication 10 ML: at 13:53

## 2021-04-22 NOTE — PROGRESS NOTES
Infectious Disease Progress Note         Interval:  Afib with RVR last evening. Subjective:   She feels OK, a bit better each day. Reporting diarrhea, loose and watery since past few days, 5 the day before, 5 yesterday, 1 so far today. On more than one laxatives for past few days. Reporting pain at the sacral ulcer. Objective:    Vitals:   Patient Vitals for the past 24 hrs:   Temp Pulse Resp BP SpO2   04/22/21 0505  82  93/61    04/22/21 0350 97.5 °F (36.4 °C) 83 17 (!) 91/56 91 %   04/22/21 0333  82  (!) 81/56    04/22/21 0314  83  (!) 78/54    04/22/21 0219  78  (!) 86/56    04/22/21 0203  83  (!) 83/55    04/22/21 0007 98.1 °F (36.7 °C) 91 18 92/62 92 %   04/21/21 2223  (!) 101  92/67    04/21/21 2004 98.9 °F (37.2 °C) 100 19 (!) 90/56 90 %   04/21/21 1705 99 °F (37.2 °C) (!) 138 20 131/83 95 %   04/21/21 1557  (!) 140  120/84    04/21/21 1339  (!) 139  104/67    04/21/21 1320  95  99/65    04/21/21 1314  93  (!) 88/61    04/21/21 1253  (!) 145  111/75    04/21/21 1235  (!) 139  98/67    04/21/21 1234  (!) 139      04/21/21 1131 97.3 °F (36.3 °C) (!) 143 18 94/66 93 %   04/21/21 0854 97.6 °F (36.4 °C) (!) 143 18 108/73 93 %       Physical Exam:  ? GEN: NAD  ? HEENT: Normocephalic, atraumatic, PERRL, no scleral icterus  ? CV: S1, S2 heard regularly, no murmurs  ? Lungs: Clear to auscultation bilaterally  ? Abdomen: soft, non distended, non tender  ? Genitourinary:  no soliz, no right flank tenderness. ? Extremities: no edema  ? Neuro: Alert, oriented to time, place and situation, moves all extremities to commands, verbal   ? Skin: no rash  ? Psych: good affect, good eye contact, non tearful   ?  Lines: PIVs        Labs:  Recent Results (from the past 24 hour(s))   METABOLIC PANEL, BASIC    Collection Time: 04/21/21  8:50 AM   Result Value Ref Range    Sodium 134 (L) 136 - 145 mmol/L    Potassium 3.5 3.5 - 5.1 mmol/L    Chloride 104 97 - 108 mmol/L CO2 19 (L) 21 - 32 mmol/L    Anion gap 11 5 - 15 mmol/L    Glucose 131 (H) 65 - 100 mg/dL    BUN 33 (H) 6 - 20 MG/DL    Creatinine 1.66 (H) 0.55 - 1.02 MG/DL    BUN/Creatinine ratio 20 12 - 20      GFR est AA 37 (L) >60 ml/min/1.73m2    GFR est non-AA 31 (L) >60 ml/min/1.73m2    Calcium 8.0 (L) 8.5 - 10.1 MG/DL   CBC WITH AUTOMATED DIFF    Collection Time: 04/21/21  8:50 AM   Result Value Ref Range    WBC 15.3 (H) 3.6 - 11.0 K/uL    RBC 3.04 (L) 3.80 - 5.20 M/uL    HGB 7.9 (L) 11.5 - 16.0 g/dL    HCT 24.8 (L) 35.0 - 47.0 %    MCV 81.6 80.0 - 99.0 FL    MCH 26.0 26.0 - 34.0 PG    MCHC 31.9 30.0 - 36.5 g/dL    RDW 17.4 (H) 11.5 - 14.5 %    PLATELET 779 804 - 151 K/uL    MPV 10.0 8.9 - 12.9 FL    NRBC 0.1 (H) 0  WBC    ABSOLUTE NRBC 0.02 (H) 0.00 - 0.01 K/uL    NEUTROPHILS 93 (H) 32 - 75 %    LYMPHOCYTES 5 (L) 12 - 49 %    MONOCYTES 2 (L) 5 - 13 %    EOSINOPHILS 0 0 - 7 %    BASOPHILS 0 0 - 1 %    IMMATURE GRANULOCYTES 0 0.0 - 0.5 %    ABS. NEUTROPHILS 14.2 (H) 1.8 - 8.0 K/UL    ABS. LYMPHOCYTES 0.8 0.8 - 3.5 K/UL    ABS. MONOCYTES 0.3 0.0 - 1.0 K/UL    ABS. EOSINOPHILS 0.0 0.0 - 0.4 K/UL    ABS. BASOPHILS 0.0 0.0 - 0.1 K/UL    ABS. IMM.  GRANS. 0.0 0.00 - 0.04 K/UL    DF MANUAL      RBC COMMENTS ANISOCYTOSIS  1+       EKG, 12 LEAD, INITIAL    Collection Time: 04/21/21 12:22 PM   Result Value Ref Range    Ventricular Rate 142 BPM    QRS Duration 136 ms    Q-T Interval 352 ms    QTC Calculation (Bezet) 541 ms    Calculated R Axis -7 degrees    Calculated T Axis -9 degrees    Diagnosis       Sinus tachycardia  Right bundle branch block  Confirmed by Chris Cobian ([id number]) on 4/21/2021 9:50:39 PM     TROPONIN I    Collection Time: 04/21/21  1:25 PM   Result Value Ref Range    Troponin-I, Qt. 3.78 (H) <0.05 ng/mL   PTT    Collection Time: 04/21/21  6:12 PM   Result Value Ref Range    aPTT 29.5 22.1 - 31.0 sec    aPTT, therapeutic range     58.0 - 77.0 SECS   CBC WITH AUTOMATED DIFF    Collection Time: 04/21/21 6:12 PM   Result Value Ref Range    WBC 16.3 (H) 3.6 - 11.0 K/uL    RBC 3.02 (L) 3.80 - 5.20 M/uL    HGB 7.9 (L) 11.5 - 16.0 g/dL    HCT 24.4 (L) 35.0 - 47.0 %    MCV 80.8 80.0 - 99.0 FL    MCH 26.2 26.0 - 34.0 PG    MCHC 32.4 30.0 - 36.5 g/dL    RDW 17.3 (H) 11.5 - 14.5 %    PLATELET 550 956 - 552 K/uL    MPV 10.2 8.9 - 12.9 FL    NRBC 0.2 (H) 0  WBC    ABSOLUTE NRBC 0.03 (H) 0.00 - 0.01 K/uL    NEUTROPHILS 89 (H) 32 - 75 %    LYMPHOCYTES 5 (L) 12 - 49 %    MONOCYTES 5 5 - 13 %    EOSINOPHILS 0 0 - 7 %    BASOPHILS 0 0 - 1 %    IMMATURE GRANULOCYTES 1 (H) 0.0 - 0.5 %    ABS. NEUTROPHILS 14.6 (H) 1.8 - 8.0 K/UL    ABS. LYMPHOCYTES 0.9 0.8 - 3.5 K/UL    ABS. MONOCYTES 0.8 0.0 - 1.0 K/UL    ABS. EOSINOPHILS 0.0 0.0 - 0.4 K/UL    ABS. BASOPHILS 0.0 0.0 - 0.1 K/UL    ABS. IMM.  GRANS. 0.1 (H) 0.00 - 0.04 K/UL    DF AUTOMATED     EKG, 12 LEAD, INITIAL    Collection Time: 04/21/21  7:54 PM   Result Value Ref Range    Ventricular Rate 138 BPM    QRS Duration 130 ms    Q-T Interval 342 ms    QTC Calculation (Bezet) 518 ms    Calculated R Axis -14 degrees    Calculated T Axis -9 degrees    Diagnosis       Atrial fibrillation with rapid ventricular response  Right bundle branch block  Cannot rule out Inferior infarct , age undetermined  Abnormal ECG  When compared with ECG of 21-APR-2021 12:22,  MANUAL COMPARISON REQUIRED, DATA IS UNCONFIRMED     PTT    Collection Time: 04/22/21 12:26 AM   Result Value Ref Range    aPTT 31.6 (H) 22.1 - 31.0 sec    aPTT, therapeutic range     58.0 - 77.0 SECS   TSH 3RD GENERATION    Collection Time: 04/22/21 12:26 AM   Result Value Ref Range    TSH 1.64 0.36 - 3.74 uIU/mL   TROPONIN I    Collection Time: 04/22/21 12:26 AM   Result Value Ref Range    Troponin-I, Qt. 4.25 (H) <0.05 ng/mL   CK W/ REFLX CKMB    Collection Time: 04/22/21 12:26 AM   Result Value Ref Range    CK 49 26 - 192 U/L   CBC WITH AUTOMATED DIFF    Collection Time: 04/22/21 12:26 AM   Result Value Ref Range    WBC 15.2 (H) 3.6 - 11.0 K/uL    RBC 3.02 (L) 3.80 - 5.20 M/uL    HGB 7.8 (L) 11.5 - 16.0 g/dL    HCT 24.3 (L) 35.0 - 47.0 %    MCV 80.5 80.0 - 99.0 FL    MCH 25.8 (L) 26.0 - 34.0 PG    MCHC 32.1 30.0 - 36.5 g/dL    RDW 17.3 (H) 11.5 - 14.5 %    PLATELET 362 046 - 558 K/uL    MPV 10.3 8.9 - 12.9 FL    NRBC 0.3 (H) 0  WBC    ABSOLUTE NRBC 0.04 (H) 0.00 - 0.01 K/uL    NEUTROPHILS 86 (H) 32 - 75 %    LYMPHOCYTES 8 (L) 12 - 49 %    MONOCYTES 5 5 - 13 %    EOSINOPHILS 0 0 - 7 %    BASOPHILS 0 0 - 1 %    IMMATURE GRANULOCYTES 1 (H) 0.0 - 0.5 %    ABS. NEUTROPHILS 13.0 (H) 1.8 - 8.0 K/UL    ABS. LYMPHOCYTES 1.3 0.8 - 3.5 K/UL    ABS. MONOCYTES 0.7 0.0 - 1.0 K/UL    ABS. EOSINOPHILS 0.0 0.0 - 0.4 K/UL    ABS. BASOPHILS 0.0 0.0 - 0.1 K/UL    ABS. IMM. GRANS. 0.1 (H) 0.00 - 0.04 K/UL    DF AUTOMATED     METABOLIC PANEL, BASIC    Collection Time: 04/22/21 12:26 AM   Result Value Ref Range    Sodium 134 (L) 136 - 145 mmol/L    Potassium 3.2 (L) 3.5 - 5.1 mmol/L    Chloride 104 97 - 108 mmol/L    CO2 21 21 - 32 mmol/L    Anion gap 9 5 - 15 mmol/L    Glucose 141 (H) 65 - 100 mg/dL    BUN 33 (H) 6 - 20 MG/DL    Creatinine 1.50 (H) 0.55 - 1.02 MG/DL    BUN/Creatinine ratio 22 (H) 12 - 20      GFR est AA 42 (L) >60 ml/min/1.73m2    GFR est non-AA 34 (L) >60 ml/min/1.73m2    Calcium 8.2 (L) 8.5 - 10.1 MG/DL   SAMPLES BEING HELD    Collection Time: 04/22/21 12:26 AM   Result Value Ref Range    SAMPLES BEING HELD LV     COMMENT        Add-on orders for these samples will be processed based on acceptable specimen integrity and analyte stability, which may vary by analyte. Micro:  4/17: blood cultures negative  4/19: blood cultures negative so far  4/17: Sacral wound cultures negative          Assessment:  74 yo F:     - Sepsis due to unclear source. Sacral ulcer not infected on exam and imaging. Possibly urinary system as the source. - CHELA on admission 2/2 sepsis.  CT and renal US showed kidney stones and right hydronephrosis, but no mention of obstructive uropathy. Continue to monitor Scr. Leukocytosis is trending down as of 4/21, but if persists, would pursue.   - Continue Unasyn for now, day 4 today. - Will stop linezolid as low suspicion for MRSA. - Hx of Bedbound status due to unknown cause per the patient.        Recommendations:  - Obtain repeat CT abdomen-pelvis if leukocytosis persists to assess for any pyelonephritis. Can be without contrast given CHELA  - Will change antibiotics to zosyn for now, empiric, due to ongoing leukocytosis. Will stop unasyn.   - Patient endorsing watery diarrhea, but also on >1 laxatives. Will recommend stopping the laxatives, monitor diarrhea and if continues, send for C.diff testing.   - Continue wound care for sacral ulcer. From 4/23, please call the ID on call with any questions. Thank you for the opportunity to participate in the care of this patient. Please contact with questions or concerns.       Lexus Obrien MD  Infectious Diseases

## 2021-04-22 NOTE — PROGRESS NOTES
Hospitalist Progress Note    NAME: Zulma Alejandro   :  1950   MRN:  [id number]       Assessment / Plan:  Tachycardia , sinus 120-140 , borderline BP  21  Elevated troponin, EKG showed A. fib with RVR cardiology on board. Patient is now on heparin drip. She was on a Cardizem drip for tachycardia. We will follow-up with cardiology. Follow-up with echocardiogram.  We will closely follow-up low blood pressure as well. Patient with sepsis with hypotension will need to move to stepdown transfer order to stepdown placed. We will continue to follow with ID as well. Acute kidney injury, most likely prerenal versus ATN from vancomycin  Hypotension   Nephrology on board now and appreciated help   IVF stopped , will follow up repeat UA   Holding BP meds   Infected decubitus ulcer stage IV on the sacral/coccyx area  Severe sepsis (leukocytosis, acute kidney injury, hypotension)  Patient is bedbound for about 4 years now  Persistent leukocytosis  CT abdomen and pelvis reported: \"  1. Soft tissue swelling overlying the coccyx without abscess or overt  osteomyelitis. 2. Bilateral nephrolithiasis. 3. Bladder stones. 4. Cholelithiasis. 5. Rectal impaction  Patient has had this decubitus ulcer for the last 4 years, was getting wound care ON Unasyn   blood cultures negative to date, wound cultures  Showed mixed veronica   Wound care consulted, evaluated by surgery who did not retain any indication for debridement, recommended packing and wound care  Hypertension   Hyperlipidemia  Hypothyroidism  No BP meds seen in the med rec  Continue with Zetia with pharmacy substitution  Continue with levothyroxine  Now on midodrine   Code Status: Full code  Surrogate Decision Maker: her care giver patient is unsure of the last name  DVT Prophylaxis: Subcu heparin  GI Prophylaxis: not indicated  25.0 - 29.9 Overweight / Body mass index is 27.69 kg/m².      Subjective:     Chief Complaint / Reason for Physician Visit  Patient was seen and examined. No chest pain or shortness of breath complaint. No acute issues except for constipation     Discussed with RN events overnight. Review of Systems:  Symptom Y/N Comments  Symptom Y/N Comments   Fever/Chills y   Chest Pain n    Poor Appetite    Edema     Cough    Abdominal Pain n    Sputum    Joint Pain     SOB/PAL n   Pruritis/Rash     Nausea/vomit    Tolerating PT/OT     Diarrhea    Tolerating Diet y    Constipation y   Other       Could NOT obtain due to:      Objective:     VITALS:   Last 24hrs VS reviewed since prior progress note. Most recent are:  Patient Vitals for the past 24 hrs:   Temp Pulse Resp BP SpO2   04/22/21 0908 97.6 °F (36.4 °C) 89 18 (!) 88/62 94 %   04/22/21 0505  82  93/61    04/22/21 0350 97.5 °F (36.4 °C) 83 17 (!) 91/56 91 %   04/22/21 0333  82  (!) 81/56    04/22/21 0314  83  (!) 78/54    04/22/21 0219  78  (!) 86/56    04/22/21 0203  83  (!) 83/55    04/22/21 0007 98.1 °F (36.7 °C) 91 18 92/62 92 %   04/21/21 2223  (!) 101  92/67    04/21/21 2004 98.9 °F (37.2 °C) 100 19 (!) 90/56 90 %   04/21/21 1705 99 °F (37.2 °C) (!) 138 20 131/83 95 %   04/21/21 1557  (!) 140  120/84    04/21/21 1339  (!) 139  104/67    04/21/21 1320  95  99/65    04/21/21 1314  93  (!) 88/61    04/21/21 1253  (!) 145  111/75    04/21/21 1235  (!) 139  98/67    04/21/21 1234  (!) 139      04/21/21 1131 97.3 °F (36.3 °C) (!) 143 18 94/66 93 %       Intake/Output Summary (Last 24 hours) at 4/22/2021 9567  Last data filed at 4/21/2021 2355  Gross per 24 hour   Intake 753.44 ml   Output    Net 753.44 ml        I had a face to face encounter and independently examined this patient on 4/22/2021, as outlined below:  PHYSICAL EXAM:  General: WD, WN. Alert, cooperative, no acute distress    EENT:  EOMI. Anicteric sclerae. MMM  Resp:  CTA bilaterally, no wheezing or rales.   No accessory muscle use  CV:  Regular  rhythm,  No edema  GI:  Soft, Non distended, Non tender. +Bowel sounds  Neurologic:  Alert and oriented X 3, normal speech,   Psych:   Good insight. Not anxious nor agitated      Reviewed most current lab test results and cultures  YES  Reviewed most current radiology test results   YES  Review and summation of old records today    NO  Reviewed patient's current orders and MAR    YES  PMH/SH reviewed - no change compared to H&P  ________________________________________________________________________  Care Plan discussed with:    Comments   Patient x    Family      RN x    Care Manager     Consultant                       x Multidiciplinary team rounds were held today with , nursing, pharmacist and clinical coordinator. Patient's plan of care was discussed; medications were reviewed and discharge planning was addressed. ________________________________________________________________________  Total NON critical care TIME: 35  Minutes    Total CRITICAL CARE TIME Spent:   Minutes non procedure based      Comments   >50% of visit spent in counseling and coordination of care     ________________________________________________________________________  Danyelle Vincent MD     Procedures: see electronic medical records for all procedures/Xrays and details which were not copied into this note but were reviewed prior to creation of Plan. LABS:  I reviewed today's most current labs and imaging studies. Pertinent labs include:  Recent Labs     04/22/21  0026 04/21/21  1812 04/21/21  0850   WBC 15.2* 16.3* 15.3*   HGB 7.8* 7.9* 7.9*   HCT 24.3* 24.4* 24.8*    339 382     Recent Labs     04/22/21  0026 04/21/21  0850 04/20/21  1123   * 134* 136   K 3.2* 3.5 3.7    104 105   CO2 21 19* 23   * 131* 126*   BUN 33* 33* 32*   CREA 1.50* 1.66* 1.87*   CA 8.2* 8.0* 8.4*       Signed:  Danyelle Vincent MD

## 2021-04-22 NOTE — PROGRESS NOTES
End of Shift Note    Bedside shift change report given to Nikita (oncoming nurse) by Ashley Ny (offgoing nurse). Report included the following information SBAR, Kardex and MAR    Shift worked:  night     Shift summary and any significant changes:     0222 contacted cardiology regarding hypotension and being out of dilt gtt paraments. Verbal order received from Dr. Silvestre Sosa  to decrease rate of dilt gtt to 2.5 mg/hr and continue overnight if pressures maintain 80s/50s, he also stated to stop the drip if she went any lower than that and stated that he did not need me to notify him if I had to stop the drip. Also notified him of troponin being elevated at 4.25. Dilt drip was not resumed as BP fell even lower with systolic in the 02M. HR maintained through the rest of the night in 70s-80s. BP coming back up into the 45R systolic. Patient was asymptomatic during period f hypotension.      Still on heparin drip, next PTT due at 8AM.      Concerns for physician to address:       Zone phone for oncoming shift:          Activity:  Activity Level: Bed Rest  Number times ambulated in hallways past shift: 0  Number of times OOB to chair past shift: 0    Cardiac:   Cardiac Monitoring: Yes      Cardiac Rhythm: Normal sinus rhythm, Bundle Branch Block    Access:   Current line(s): PIV     Genitourinary:   Urinary status: voiding    Respiratory:   O2 Device: None (Room air)  Chronic home O2 use?: NO  Incentive spirometer at bedside: YES     GI:  Last Bowel Movement Date: 04/21/21  Current diet:  DIET REGULAR  DIET ONE TIME MESSAGE  DIET NUTRITIONAL SUPPLEMENTS Breakfast, Dinner; Ensure [employer]  Passing flatus: YES  Tolerating current diet: YES       Pain Management:   Patient states pain is manageable on current regimen: YES    Skin:  Kristian Score: 14  Interventions: turn team, speciality bed, float heels, foam dressing, limit briefs, internal/external urinary devices and nutritional support     Patient Safety:  Fall Score:  Total Score: 2  Interventions: bed/chair alarm, assistive device (walker, cane, etc), gripper socks, pt to call before getting OOB and stay with me (per policy)       Length of Stay:  Expected LOS: 4d 19h  Actual LOS: [facility]

## 2021-04-22 NOTE — PROGRESS NOTES
TRANSFER - OUT REPORT:    Verbal report given to [facility] [place] on Joslyn Villalta  being transferred to PCU for routine progression of care       Report consisted of patients Situation, Background, Assessment and   Recommendations(SBAR). Information from the following report(s) SBAR, Kardex, Intake/Output, MAR, Accordion and Recent Results was reviewed with the receiving nurse. Lines:   Peripheral IV 04/21/21 Left Forearm (Active)   Site Assessment Clean, dry, & intact 04/22/21 0007   Phlebitis Assessment 0 04/22/21 0007   Infiltration Assessment 0 04/22/21 0007   Dressing Status Clean, dry, & intact 04/22/21 0007   Dressing Type Transparent 04/22/21 0007   Hub Color/Line Status Blue; Infusing 04/22/21 0007       Peripheral IV 04/21/21 Left;Upper Arm (Active)   Site Assessment Clean, dry, & intact 04/22/21 0007   Phlebitis Assessment 0 04/22/21 0007   Infiltration Assessment 0 04/22/21 0007   Dressing Status Clean, dry, & intact 04/22/21 0007   Dressing Type Transparent 04/22/21 0007   Hub Color/Line Status Pink; Infusing 04/22/21 0007   Alcohol Cap Used Yes 04/21/21 1616       Peripheral IV 04/22/21 Right Antecubital (Active)        Opportunity for questions and clarification was provided.       Patient transported with:   [employer]

## 2021-04-22 NOTE — PROGRESS NOTES
Transition of Care Plan:    RUR:19%  Disposition:[facility]   Follow up appointments: Follow up with PCP   DME needed:TBD   Transportation [address] or means to access home:      [address] access   IM Medicare letter:2nd IM Medicare Letter   Caregiver Contact:Lisa-Caregiver at [address]: [phone]  Discharge Caregiver contacted prior to discharge? Caregiver will be contacted     CM: Lindsey Morris is currently working with pt in the Gen Surg Unit. Pt is known to be a resident at [facility]. CM will contact caregiver at group home prior to d/c. Pt will need resumption order for [id number]. Pt will require covid test at the time of d/c. CM will enter FYI to physician to enter covid result order.     Lindsey Morris, LORNA, [address] [place] [address]

## 2021-04-22 NOTE — PROGRESS NOTES
[address] [facility]  [phone]      Cardiology Progress Note      4/22/2021 9:21 AM    Admit Date: 4/17/2021    Admit Diagnosis:   Infected decubitus ulcer [L89.90, L08.9]    Subjective:     Joslyn Villalta reports feeling better this am. No further chills, in fact she is uncovered and states \"I feel better, I am warm now\". BP remains low. Upper 80 to low 90's.  Now in NSR 80's  Awaiting ECHO  H/H stable, troponin trended up to 4.25, no ACS symptoms, continues on heparin gtt     Visit Vitals  BP (!) 88/62   Pulse 89   Temp 97.6 °F (36.4 °C)   Resp 18   Ht 5' 7\" (1.702 m)   Wt 80.2 kg (176 lb 12.8 oz)   SpO2 94%   BMI 27.69 kg/m²       Current Facility-Administered Medications   Medication Dose Route Frequency    dilTIAZem (CARDIZEM) 100 mg in dextrose 5% (MBP/ADV) 100 mL infusion  2.5 mg/hr IntraVENous CONTINUOUS    potassium chloride SR (KLOR-CON 10) tablet 40 mEq  40 mEq Oral DAILY    heparin 25,000 units in D5W 250 ml infusion  12-25 Units/kg/hr IntraVENous TITRATE    heparin (porcine) injection 2,000 Units  2,000 Units IntraVENous PRN    Or    heparin (porcine) injection 4,000 Units  4,000 Units IntraVENous PRN    acetaminophen (TYLENOL) tablet 650 mg  650 mg Oral Q4H PRN    Or    acetaminophen (TYLENOL) suppository 650 mg  650 mg Rectal Q6H PRN    sodium chloride (NS) flush 5-10 mL  5-10 mL IntraVENous PRN    ampicillin-sulbactam (UNASYN) 3 g in 0.9% sodium chloride (MBP/ADV) 100 mL MBP  3 g IntraVENous Q6H    sodium chloride (NS) flush 5-40 mL  5-40 mL IntraVENous Q8H    sodium chloride (NS) flush 5-40 mL  5-40 mL IntraVENous PRN    polyethylene glycol (MIRALAX) packet 17 g  17 g Oral DAILY PRN    promethazine (PHENERGAN) tablet 12.5 mg  12.5 mg Oral Q6H PRN    Or    ondansetron (ZOFRAN) injection 4 mg  4 mg IntraVENous Q6H PRN    polyethylene glycol (MIRALAX) packet 17 g  17 g Oral BID    bisacodyL (DULCOLAX) tablet 5 mg  5 mg Oral DAILY    levothyroxine (SYNTHROID) tablet 25 mcg  25 mcg Oral ACB    magnesium oxide (MAG-OX) tablet 400 mg  400 mg Oral BID    sodium chloride (NS) flush 5-10 mL  5-10 mL IntraVENous PRN       Objective:      Physical Exam:  General Appearance:  alert, appears older than stated age, laying in bed   Eyes: sclera anicteric  Mouth/Throat: moist mucous membranes; oral pharynx clear  Neck: supple; no JVD or bruit  Pulmonary:  clear to auscultation bilaterally; good effort  Cardiovascular: rapid rate and rhythm; no murmur, click, rub, or gallop  Abdomen: soft, non-tender, non-distended; bowel sounds normal  Musculoskeletal: no swelling MOLINA  Extremities: no edema; palpable distal pulses   Skin: warm and dry, discolored   Neuro: minimally interactive, oriented to person, place, time, and situation   Psych: flat affect     Data Review:   Recent Labs     04/22/21  0026 04/21/21  1812 04/21/21  0850   WBC 15.2* 16.3* 15.3*   HGB 7.8* 7.9* 7.9*   HCT 24.3* 24.4* 24.8*    339 382     Recent Labs     04/22/21  0026 04/21/21  0850 04/20/21  1123   * 134* 136   K 3.2* 3.5 3.7    104 105   CO2 21 19* 23   * 131* 126*   BUN 33* 33* 32*   CREA 1.50* 1.66* 1.87*   CA 8.2* 8.0* 8.4*       Recent Labs     04/22/21  0026 04/21/21  1325   TROIQ 4.25* 3.78*         Intake/Output Summary (Last 24 hours) at 4/22/2021 9618  Last data filed at 4/21/2021 2355  Gross per 24 hour   Intake 753.44 ml   Output    Net 753.44 ml      Cardiographics     Telemetry:  SR 80's  ECG: ST RBBB, compared to prior EKG now ST, no acute changes   Echocardiogram: pending   CXRAY: \" No acute process\"       Assessment:     Principal Problem:    Sepsis ([address] (12/16/2020)    Active Problems:    Infected decubitus ulcer (4/17/2021)      HTN (hypertension) (4/21/2021)      HLD (hyperlipidemia) (4/21/2021)        Plan:   Elise Sheikh is a 73 y.o. female with a PMH significant for HLD, HTN, bedbound 4 years, stage IV sacarl decubitus ulcer since 2018, chronic constipation admitted for Infected decubitus ulcer [L89.90, L08.9].    Sinus tachycardia in presence of Sepsis d/t infected Decubitus sacral ulcer: improved, now NSR 80's  Obtain ECHO-awaiting result  Continue to monitor telemetry  Abx, treatment for sepsis as per protocol  Noted she is also hypotensive at times, may consider moving to unit or higher acuity floor to start on pressor support if needed to maintain hemodynamic stability as treat her sepsis. She is asymptomatic with lower BPs  EKG reviewed, no acute changes      Elevated troponin: initial troponin 3.78, up to 4.25   No acute ACS symptoms-likely due to underlying sepsis and renal insuff. No acute ECG changes  Obtain ECHO  Continue to monitor tele  Continue IV heparin, no signs of bleeding, monitor H/H     Hx HTN: currently periods of hypotension  Hold her PTA anti-hypertensives, although by chart reivew does not appear she was on any medications PTA     HLD:   Continue Zetia      CHELA: Cr 1.87 peak, now 1.50 improving    Appears baseline is around 1.14  Hydrate, monitor  Noted nephrology following      Hx hypothyroidism:   Check TSH-WNL 1.64  On levothyroxine       Arsenio Haji, PREET   DNP,APRN,AG-ACNP-BC       Patient seen and examined by me with nurse practitioner. I personally performed all components of the history, physical, and medical decision making and agree with the assessment and plan as noted. Also appear to having SVT episodes. cardizem helped last night, however had to stop due to low BP. F/u Echo. Will d/w patient after cardiac cath vs leximyoview afterwards.      Frandy Kolb MD

## 2021-04-22 NOTE — PROGRESS NOTES
Nephrology Progress Note  [facility]     [facility]  Phone - [phone]   Patient: Bard Coffey    YOB: 1950        Date- 4/22/2021   Admit Date: 4/17/2021  CC: Follow up for chela     IMPRESSION & PLAN:   CHELA:  -suspect multifactorial from vanc toxicity/ NSAID induced and labile hemodynamics as BP was also as 84/63 mmhg.     Hypokalemia:  - kcl 40 meq po    Hypophosphatemia  - neutraphos po    Sepsis:  -Sec to infected sacral decubitus.  -Wound care following       B/L Nephrolithiasis:  -Non obstructive at this time.  -normal calcium.     Hypotension:  -Sec to sepsis  -s/p IVf  -Better     Subjective: Interval History:   -Feels better  bp low  k low  Phos low  Cr stable  Objective:   Vitals:    04/22/21 0333 04/22/21 0350 04/22/21 0505 04/22/21 0613   BP: (!) 81/56 (!) 91/56 93/61    Pulse: 82 83 82    Resp:  17     Temp:  97.5 °F (36.4 °C)     SpO2:  91%     Weight:    80.2 kg (176 lb 12.8 oz)   Height:          04/21 0701 - 04/22 0700  In: 753.4 [I.V.:753.4]  Out: -   Last 3 Recorded Weights in this Encounter    04/18/21 2259 04/21/21 0355 04/22/21 0613   Weight: 78 kg (171 lb 15.3 oz) 80 kg (176 lb 5.9 oz) 80.2 kg (176 lb 12.8 oz)      Physical exam:   GEN: NAD  NECK- Supple, no mass  RESP: No wheezing, clear b/l  CVS: S1,S2  RRR  NEURO: Normal speech, non focal  EXT: No Edema   PSYCH: Normal Mood    Chart reviewed. Pertinent Notes reviewed. Data Review :  Recent Labs     04/22/21  0026 04/21/21  0850 04/20/21  1123   * 134* 136   K 3.2* 3.5 3.7    104 105   CO2 21 19* 23   BUN 33* 33* 32*   CREA 1.50* 1.66* 1.87*   * 131* 126*   CA 8.2* 8.0* 8.4*     Recent Labs     04/22/21  0026 04/21/21  1812 04/21/21  0850   WBC 15.2* 16.3* 15.3*   HGB 7.8* 7.9* 7.9*   HCT 24.3* 24.4* 24.8*    339 382     No results for input(s): FE, TIBC, PSAT, FERR in the last 72 hours.    Medication list  reviewed  Current Facility-Administered Medications   Medication Dose Route Frequency    dilTIAZem (CARDIZEM) 100 mg in dextrose 5% (MBP/ADV) 100 mL infusion  2.5 mg/hr IntraVENous CONTINUOUS    potassium chloride SR (KLOR-CON 10) tablet 40 mEq  40 mEq Oral NOW    heparin 25,000 units in D5W 250 ml infusion  12-25 Units/kg/hr IntraVENous TITRATE    heparin (porcine) injection 2,000 Units  2,000 Units IntraVENous PRN    Or    heparin (porcine) injection 4,000 Units  4,000 Units IntraVENous PRN    acetaminophen (TYLENOL) tablet 650 mg  650 mg Oral Q4H PRN    Or    acetaminophen (TYLENOL) suppository 650 mg  650 mg Rectal Q6H PRN    sodium chloride (NS) flush 5-10 mL  5-10 mL IntraVENous PRN    ampicillin-sulbactam (UNASYN) 3 g in 0.9% sodium chloride (MBP/ADV) 100 mL MBP  3 g IntraVENous Q6H    sodium chloride (NS) flush 5-40 mL  5-40 mL IntraVENous Q8H    sodium chloride (NS) flush 5-40 mL  5-40 mL IntraVENous PRN    polyethylene glycol (MIRALAX) packet 17 g  17 g Oral DAILY PRN    promethazine (PHENERGAN) tablet 12.5 mg  12.5 mg Oral Q6H PRN    Or    ondansetron (ZOFRAN) injection 4 mg  4 mg IntraVENous Q6H PRN    polyethylene glycol (MIRALAX) packet 17 g  17 g Oral BID    bisacodyL (DULCOLAX) tablet 5 mg  5 mg Oral DAILY    levothyroxine (SYNTHROID) tablet 25 mcg  25 mcg Oral ACB    magnesium oxide (MAG-OX) tablet 400 mg  400 mg Oral BID    sodium chloride (NS) flush 5-10 mL  5-10 mL IntraVENous PRN          Estefany Wesley MD              [address] [facility]  [facility] / [facility], [address]  [place], [address]  Phone - [phone]               Fax - [phone]

## 2021-04-22 NOTE — PROGRESS NOTES
Received message from patient's nurse Alexander Royal stating :    Patient's heart rate elevated in 130-140's. EKG completed. Resulted \"a-fib with RVR. Right bundle branch block. Cannot rule out inferior infarct, age undetermined. abnormal ECG. \"  Spoke to Dr. Alisha Graves, cardiology. Received order for diltiazem drip. Concerned about IV access. Patient is a hard stick. Currently has 2 PIV's which will take the heparin drip and diltiazem drip. Will need additional access for IV antibiotics. Discussion / orders:    Placed order for central line insertion due to poor IV access and poor lines. Please note that this note was dictated using Dragon computer voice recognition software. Quite often unanticipated grammatical, syntax, homophones, and other interpretive errors are inadvertently transcribed by the computer software. Please disregard these errors. Please excuse any errors that have escaped final proofreading.

## 2021-04-22 NOTE — PROGRESS NOTES
Spiritual Care Assessment/Progress Note  [facility]      NAME: Anthony Watt      MRN: [id number]  AGE: 73 y.o.  SEX: female  Mormonism Affiliation: No preference   Language: English     4/22/2021     Total Time (in minutes): 10     Spiritual Assessment begun in [facility]AL SURGERY through conversation with:         [x]Patient        [] Family    [] Friend(s)        Reason for Consult: Initial/Spiritual assessment, patient floor     Spiritual beliefs: (Please include comment if needed)     [x] Identifies with a misti tradition: Sunday Barragan        [] Supported by a misti community:            [] Claims no spiritual orientation:           [] Seeking spiritual identity:                [] Adheres to an individual form of spirituality:           [] Not able to assess:                           Identified resources for coping:      [x] Prayer                               [] Music                  [] Guided Imagery     [x] Family/friends                 [] Pet visits     [] Devotional reading                         [] Unknown     [] Other:                                               Interventions offered during this visit: (See comments for more details)    Patient Interventions: Prayer (actual), Prayer (assurance of), Affirmation of emotions/emotional suffering, Affirmation of misti, Iconic (affirming the presence of God/Higher Power), Coping skills reviewed/reinforced, Initial visit, Initial/Spiritual assessment, patient floor           Plan of Care:     [] Support spiritual and/or cultural needs    [] Support AMD and/or advance care planning process      [] Support grieving process   [] Coordinate Rites and/or Rituals    [] Coordination with community clergy   [] No spiritual needs identified at this time   [] Detailed Plan of Care below (See Comments)  [] Make referral to Music Therapy  [] Make referral to Pet Therapy     [] Make referral to Addiction services  [] Make referral to [facility] Passages  [] Make referral to Spiritual Care Partner  [] No future visits requested        [x] Follow up upon further referrals     Comments:     Initial Spiritual Care Assessment visit for Ms. Villalta in 2139. Patient was alert, no family was present. Patient shared about her current medical symptoms and concerns, revealed that many friends are praying for her though her rest of family left.  affirmed her strong prayer support system, encouraged her to say in believing the power of prayer.  prayed for her healing and recovery, she was thankful and asked  to keep praying for her. Advised of  availability.       [id number] [facility] Yulia Gooden., M.S., Th.M.  Spiritual Care Provider   Paging Service [phone])

## 2021-04-23 PROBLEM — R77.8 ELEVATED TROPONIN: Status: ACTIVE | Noted: 2021-04-23

## 2021-04-23 PROBLEM — I42.9 CARDIOMYOPATHY (HCC): Status: ACTIVE | Noted: 2021-04-23

## 2021-04-23 PROBLEM — D64.9 ANEMIA: Status: ACTIVE | Noted: 2021-04-23

## 2021-04-23 LAB
ANION GAP SERPL CALC-SCNC: 8 MMOL/L (ref 5–15)
APTT PPP: 52.8 SEC (ref 22.1–31)
BACTERIA SPEC CULT: NORMAL
BACTERIA SPEC CULT: NORMAL
BASOPHILS # BLD: 0 K/UL (ref 0–0.1)
BASOPHILS NFR BLD: 0 % (ref 0–1)
BUN SERPL-MCNC: 35 MG/DL (ref 6–20)
BUN/CREAT SERPL: 31 (ref 12–20)
C DIFF GDH STL QL: NEGATIVE
C DIFF TOX A+B STL QL IA: NEGATIVE
CALCIUM SERPL-MCNC: 8.1 MG/DL (ref 8.5–10.1)
CHLORIDE SERPL-SCNC: 106 MMOL/L (ref 97–108)
CO2 SERPL-SCNC: 22 MMOL/L (ref 21–32)
CREAT SERPL-MCNC: 1.14 MG/DL (ref 0.55–1.02)
DIFFERENTIAL METHOD BLD: ABNORMAL
EOSINOPHIL # BLD: 0.2 K/UL (ref 0–0.4)
EOSINOPHIL NFR BLD: 1 % (ref 0–7)
ERYTHROCYTE [DISTWIDTH] IN BLOOD BY AUTOMATED COUNT: 17.6 % (ref 11.5–14.5)
FERRITIN SERPL-MCNC: 1974 NG/ML (ref 8–252)
FOLATE SERPL-MCNC: 8.1 NG/ML (ref 5–21)
GLUCOSE SERPL-MCNC: 101 MG/DL (ref 65–100)
HAPTOGLOB SERPL-MCNC: 468 MG/DL (ref 30–200)
HCT VFR BLD AUTO: 22.3 % (ref 35–47)
HEMOCCULT STL QL: POSITIVE
HGB BLD-MCNC: 7 G/DL (ref 11.5–16)
IMM GRANULOCYTES # BLD AUTO: 0.4 K/UL (ref 0–0.04)
IMM GRANULOCYTES NFR BLD AUTO: 2 % (ref 0–0.5)
INTERPRETATION: NORMAL
LDH SERPL L TO P-CCNC: 178 U/L (ref 81–246)
LYMPHOCYTES # BLD: 2.8 K/UL (ref 0.8–3.5)
LYMPHOCYTES NFR BLD: 16 % (ref 12–49)
MCH RBC QN AUTO: 25.5 PG (ref 26–34)
MCHC RBC AUTO-ENTMCNC: 31.4 G/DL (ref 30–36.5)
MCV RBC AUTO: 81.4 FL (ref 80–99)
MONOCYTES # BLD: 1.2 K/UL (ref 0–1)
MONOCYTES NFR BLD: 7 % (ref 5–13)
NEUTS SEG # BLD: 12.9 K/UL (ref 1.8–8)
NEUTS SEG NFR BLD: 74 % (ref 32–75)
NRBC # BLD: 0.04 K/UL (ref 0–0.01)
NRBC BLD-RTO: 0.2 PER 100 WBC
PLATELET # BLD AUTO: 423 K/UL (ref 150–400)
PMV BLD AUTO: 10.4 FL (ref 8.9–12.9)
POTASSIUM SERPL-SCNC: 3.3 MMOL/L (ref 3.5–5.1)
RBC # BLD AUTO: 2.74 M/UL (ref 3.8–5.2)
RBC MORPH BLD: ABNORMAL
RETICS # AUTO: 0.04 M/UL (ref 0.02–0.08)
RETICS/RBC NFR AUTO: 1.3 % (ref 0.7–2.1)
SERVICE CMNT-IMP: NORMAL
SERVICE CMNT-IMP: NORMAL
SODIUM SERPL-SCNC: 136 MMOL/L (ref 136–145)
THERAPEUTIC RANGE,PTTT: ABNORMAL SECS (ref 58–77)
VIT B12 SERPL-MCNC: 320 PG/ML (ref 193–986)
WBC # BLD AUTO: 17.5 K/UL (ref 3.6–11)

## 2021-04-23 PROCEDURE — C1894 INTRO/SHEATH, NON-LASER: HCPCS | Performed by: INTERNAL MEDICINE

## 2021-04-23 PROCEDURE — 77030030195 HC CATH ANGI DX PRF4 MRTM -A: Performed by: INTERNAL MEDICINE

## 2021-04-23 PROCEDURE — 85045 AUTOMATED RETICULOCYTE COUNT: CPT

## 2021-04-23 PROCEDURE — 85025 COMPLETE CBC W/AUTO DIFF WBC: CPT

## 2021-04-23 PROCEDURE — 65660000000 HC RM CCU STEPDOWN

## 2021-04-23 PROCEDURE — 82746 ASSAY OF FOLIC ACID SERUM: CPT

## 2021-04-23 PROCEDURE — 84630 ASSAY OF ZINC: CPT

## 2021-04-23 PROCEDURE — 77030015766: Performed by: INTERNAL MEDICINE

## 2021-04-23 PROCEDURE — 77030019569 HC BND COMPR RAD TERU -B: Performed by: INTERNAL MEDICINE

## 2021-04-23 PROCEDURE — 80048 BASIC METABOLIC PNL TOTAL CA: CPT

## 2021-04-23 PROCEDURE — 87324 CLOSTRIDIUM AG IA: CPT

## 2021-04-23 PROCEDURE — 82607 VITAMIN B-12: CPT

## 2021-04-23 PROCEDURE — 77030010221 HC SPLNT WR POS TELE -B: Performed by: INTERNAL MEDICINE

## 2021-04-23 PROCEDURE — 2709999900 HC NON-CHARGEABLE SUPPLY

## 2021-04-23 PROCEDURE — 77030008543 HC TBNG MON PRSS MRTM -A: Performed by: INTERNAL MEDICINE

## 2021-04-23 PROCEDURE — 74011000636 HC RX REV CODE- 636: Performed by: INTERNAL MEDICINE

## 2021-04-23 PROCEDURE — C1876 STENT, NON-COA/NON-COV W/DEL: HCPCS | Performed by: INTERNAL MEDICINE

## 2021-04-23 PROCEDURE — 83615 LACTATE (LD) (LDH) ENZYME: CPT

## 2021-04-23 PROCEDURE — 74011000258 HC RX REV CODE- 258: Performed by: STUDENT IN AN ORGANIZED HEALTH CARE EDUCATION/TRAINING PROGRAM

## 2021-04-23 PROCEDURE — 82728 ASSAY OF FERRITIN: CPT

## 2021-04-23 PROCEDURE — 74011000250 HC RX REV CODE- 250: Performed by: INTERNAL MEDICINE

## 2021-04-23 PROCEDURE — B2111ZZ FLUOROSCOPY OF MULTIPLE CORONARY ARTERIES USING LOW OSMOLAR CONTRAST: ICD-10-PCS | Performed by: INTERNAL MEDICINE

## 2021-04-23 PROCEDURE — 83010 ASSAY OF HAPTOGLOBIN QUANT: CPT

## 2021-04-23 PROCEDURE — 82525 ASSAY OF COPPER: CPT

## 2021-04-23 PROCEDURE — 93458 L HRT ARTERY/VENTRICLE ANGIO: CPT | Performed by: INTERNAL MEDICINE

## 2021-04-23 PROCEDURE — 36415 COLL VENOUS BLD VENIPUNCTURE: CPT

## 2021-04-23 PROCEDURE — 77030028837 HC SYR ANGI PWR INJ COEU -A: Performed by: INTERNAL MEDICINE

## 2021-04-23 PROCEDURE — 4A023N7 MEASUREMENT OF CARDIAC SAMPLING AND PRESSURE, LEFT HEART, PERCUTANEOUS APPROACH: ICD-10-PCS | Performed by: INTERNAL MEDICINE

## 2021-04-23 PROCEDURE — 74011250637 HC RX REV CODE- 250/637: Performed by: INTERNAL MEDICINE

## 2021-04-23 PROCEDURE — 82784 ASSAY IGA/IGD/IGG/IGM EACH: CPT

## 2021-04-23 PROCEDURE — 77030004549 HC CATH ANGI DX PRF MRTM -A: Performed by: INTERNAL MEDICINE

## 2021-04-23 PROCEDURE — 77030018842 HC SOL IRR SOD CL 9% BAXT -A

## 2021-04-23 PROCEDURE — 74011250636 HC RX REV CODE- 250/636: Performed by: STUDENT IN AN ORGANIZED HEALTH CARE EDUCATION/TRAINING PROGRAM

## 2021-04-23 PROCEDURE — 82272 OCCULT BLD FECES 1-3 TESTS: CPT

## 2021-04-23 PROCEDURE — 99152 MOD SED SAME PHYS/QHP 5/>YRS: CPT | Performed by: INTERNAL MEDICINE

## 2021-04-23 PROCEDURE — C1769 GUIDE WIRE: HCPCS | Performed by: INTERNAL MEDICINE

## 2021-04-23 PROCEDURE — 99233 SBSQ HOSP IP/OBS HIGH 50: CPT | Performed by: INTERNAL MEDICINE

## 2021-04-23 PROCEDURE — 74011250636 HC RX REV CODE- 250/636: Performed by: INTERNAL MEDICINE

## 2021-04-23 PROCEDURE — B2151ZZ FLUOROSCOPY OF LEFT HEART USING LOW OSMOLAR CONTRAST: ICD-10-PCS | Performed by: INTERNAL MEDICINE

## 2021-04-23 PROCEDURE — 77030019698 HC SYR ANGI MDLON MRTM -A: Performed by: INTERNAL MEDICINE

## 2021-04-23 PROCEDURE — 89055 LEUKOCYTE ASSESSMENT FECAL: CPT

## 2021-04-23 PROCEDURE — 85730 THROMBOPLASTIN TIME PARTIAL: CPT

## 2021-04-23 RX ORDER — LIDOCAINE HYDROCHLORIDE 10 MG/ML
INJECTION, SOLUTION EPIDURAL; INFILTRATION; INTRACAUDAL; PERINEURAL AS NEEDED
Status: DISCONTINUED | OUTPATIENT
Start: 2021-04-23 | End: 2021-04-23 | Stop reason: HOSPADM

## 2021-04-23 RX ORDER — SODIUM CHLORIDE 0.9 % (FLUSH) 0.9 %
5-40 SYRINGE (ML) INJECTION EVERY 8 HOURS
Status: DISCONTINUED | OUTPATIENT
Start: 2021-04-23 | End: 2021-04-29 | Stop reason: HOSPADM

## 2021-04-23 RX ORDER — VERAPAMIL HYDROCHLORIDE 2.5 MG/ML
INJECTION, SOLUTION INTRAVENOUS AS NEEDED
Status: DISCONTINUED | OUTPATIENT
Start: 2021-04-23 | End: 2021-04-23 | Stop reason: HOSPADM

## 2021-04-23 RX ORDER — FENTANYL CITRATE 50 UG/ML
INJECTION, SOLUTION INTRAMUSCULAR; INTRAVENOUS AS NEEDED
Status: DISCONTINUED | OUTPATIENT
Start: 2021-04-23 | End: 2021-04-23 | Stop reason: HOSPADM

## 2021-04-23 RX ORDER — HEPARIN SODIUM 200 [USP'U]/100ML
INJECTION, SOLUTION INTRAVENOUS
Status: DISCONTINUED | OUTPATIENT
Start: 2021-04-23 | End: 2021-04-23

## 2021-04-23 RX ORDER — SODIUM CHLORIDE 0.9 % (FLUSH) 0.9 %
5-40 SYRINGE (ML) INJECTION AS NEEDED
Status: DISCONTINUED | OUTPATIENT
Start: 2021-04-23 | End: 2021-04-29 | Stop reason: HOSPADM

## 2021-04-23 RX ORDER — SODIUM CHLORIDE 9 MG/ML
75 INJECTION, SOLUTION INTRAVENOUS CONTINUOUS
Status: DISCONTINUED | OUTPATIENT
Start: 2021-04-23 | End: 2021-04-23

## 2021-04-23 RX ORDER — MIDAZOLAM HYDROCHLORIDE 1 MG/ML
INJECTION, SOLUTION INTRAMUSCULAR; INTRAVENOUS AS NEEDED
Status: DISCONTINUED | OUTPATIENT
Start: 2021-04-23 | End: 2021-04-23 | Stop reason: HOSPADM

## 2021-04-23 RX ORDER — HEPARIN SODIUM 1000 [USP'U]/ML
INJECTION, SOLUTION INTRAVENOUS; SUBCUTANEOUS AS NEEDED
Status: DISCONTINUED | OUTPATIENT
Start: 2021-04-23 | End: 2021-04-23

## 2021-04-23 RX ORDER — LOPERAMIDE HYDROCHLORIDE 2 MG/1
2 CAPSULE ORAL
Status: DISCONTINUED | OUTPATIENT
Start: 2021-04-23 | End: 2021-04-29 | Stop reason: HOSPADM

## 2021-04-23 RX ADMIN — Medication 40 ML: at 23:06

## 2021-04-23 RX ADMIN — Medication 10 ML: at 06:09

## 2021-04-23 RX ADMIN — LEVOTHYROXINE SODIUM 25 MCG: 0.03 TABLET ORAL at 09:24

## 2021-04-23 RX ADMIN — Medication: at 18:00

## 2021-04-23 RX ADMIN — PHENYLEPHRINE HYDROCHLORIDE 10 MCG/MIN: 10 INJECTION INTRAVENOUS at 09:32

## 2021-04-23 RX ADMIN — POTASSIUM CHLORIDE 40 MEQ: 750 TABLET, FILM COATED, EXTENDED RELEASE ORAL at 09:24

## 2021-04-23 RX ADMIN — MAGNESIUM OXIDE 400 MG (241.3 MG MAGNESIUM) TABLET 400 MG: TABLET at 18:29

## 2021-04-23 RX ADMIN — HEPARIN SODIUM AND DEXTROSE 22 UNITS/KG/HR: 10000; 5 INJECTION INTRAVENOUS at 02:36

## 2021-04-23 RX ADMIN — Medication: at 09:00

## 2021-04-23 RX ADMIN — PHENYLEPHRINE HYDROCHLORIDE 15 MCG/MIN: 10 INJECTION INTRAVENOUS at 07:59

## 2021-04-23 RX ADMIN — PIPERACILLIN AND TAZOBACTAM 3.38 G: 3; .375 INJECTION, POWDER, LYOPHILIZED, FOR SOLUTION INTRAVENOUS at 06:08

## 2021-04-23 RX ADMIN — HEPARIN SODIUM 2000 UNITS: 5000 INJECTION INTRAVENOUS; SUBCUTANEOUS at 00:02

## 2021-04-23 RX ADMIN — MAGNESIUM OXIDE 400 MG (241.3 MG MAGNESIUM) TABLET 400 MG: TABLET at 09:24

## 2021-04-23 RX ADMIN — PIPERACILLIN AND TAZOBACTAM 3.38 G: 3; .375 INJECTION, POWDER, LYOPHILIZED, FOR SOLUTION INTRAVENOUS at 23:07

## 2021-04-23 RX ADMIN — SODIUM CHLORIDE 75 ML/HR: 9 INJECTION, SOLUTION INTRAVENOUS at 09:23

## 2021-04-23 RX ADMIN — POTASSIUM & SODIUM PHOSPHATES POWDER PACK 280-160-250 MG 2 PACKET: 280-160-250 PACK at 09:26

## 2021-04-23 NOTE — PROGRESS NOTES
0700 shift change report given to natacha (oncoming nurse) by Maximilian Pham (offgoing nurse). Report included the following information SBAR, Kardex and Intake/Output. 0516 hematology consult called, heparin stopped, padilla rate change to 15 mcg/min    0909 nephrology ordered NS @ 75 with verbal order to stop 3 hrs post cath. 400 [address] unable to draw labs, voicemail left with PICC team regarding labs    [phone] Pt has had 3-4 BM cleaned up with rectal tube in place. Pt stool is continuous and very thin liquid with water like appearance.

## 2021-04-23 NOTE — PROGRESS NOTES
Nephrology Progress Note  [place]     [facility]. [facility]  Phone - [phone]   Patient: Zoie Wilkins    YOB: 1950        Date- 4/23/2021   Admit Date: 4/17/2021  CC: Follow up for anushka     IMPRESSION & PLAN:   Acute kidney injury on CKD:  -suspect multifactorial from vanc toxicity/ NSAID induced and labile hemodynamics as BP was also as 84/63 mmhg.  -Cr  Continues to improve and is almost at Baseline  - Will start on IV NS 75 ml/hour to prevent KORTNEY as LHC is planned for today.  -Can stop IVF 3 hours post LHC.     Hypokalemia:  - Replete    Hypophosphatemia  - neutraphos po    Systolic HF:  -EF 16-85%  -Possible ICM  -mildly elevated trop  -Plan for LHC today. Sepsis:  -Sec to infected sacral decubitus.  -Wound care following     B/L Nephrolithiasis:  -Non obstructive at this time.  -normal calcium.     Hypotension:  -Sec to sepsis  -s/p IVf  -Better     Subjective: Interval History:   -Feels better  -Cr back to baseline  -Planned for LHC today  Objective:   Vitals:    04/23/21 0700 04/23/21 0715 04/23/21 0730 04/23/21 1014   BP: 96/80 (!) 103/54 (!) 104/59 (!) 95/57   Pulse: 67 64 63 62   Resp: 25 16 24 19   Temp:    98.1 °F (36.7 °C)   SpO2: 90% 90% 93% 90%   Weight:       Height:          04/22 0701 - 04/23 0700  In: 340 [P.O.:340]  Out: 100 [Urine:100]  Last 3 Recorded Weights in this Encounter    04/21/21 0355 04/22/21 0613 04/22/21 1034   Weight: 80 kg (176 lb 5.9 oz) 80.2 kg (176 lb 12.8 oz) 79.8 kg (176 lb)      Physical exam:   GEN: NAD  NECK- Supple, no mass  RESP: No wheezing, clear b/l  CVS: S1,S2  RRR  NEURO: Normal speech, non focal  EXT: No Edema     Chart reviewed. Pertinent Notes reviewed.      Data Review :  Recent Labs     04/23/21  0359 04/22/21  0026 04/21/21  0850    134* 134*   K 3.3* 3.2* 3.5    104 104   CO2 22 21 19*   BUN 35* 33* 33*   CREA 1.14* 1.50* 1.66*   * 141* 131*   CA 8.1* 8.2* 8.0*   MG  --  2.4 --    PHOS  --  1.7*  --      Recent Labs     04/23/21  0359 04/22/21  1153 04/22/21  0026 04/21/21  1812   WBC 17.5*  --  15.2* 16.3*   HGB 7.0* 7.2* 7.8* 7.9*   HCT 22.3* 22.1* 24.3* 24.4*   *  --  323 339     No results for input(s): FE, TIBC, PSAT, FERR in the last 72 hours.    Medication list  reviewed  Current Facility-Administered Medications   Medication Dose Route Frequency    0.9% sodium chloride infusion  75 mL/hr IntraVENous CONTINUOUS    potassium chloride SR (KLOR-CON 10) tablet 40 mEq  40 mEq Oral DAILY    piperacillin-tazobactam (ZOSYN) 3.375 g in 0.9% sodium chloride (MBP/ADV) 100 mL MBP  3.375 g IntraVENous Q8H    PHENYLephrine (NAVIN-SYNEPHRINE) 30 mg in 0.9% sodium chloride 250 mL infusion   mcg/min IntraVENous TITRATE    zinc oxide-cod liver oil (DESITIN) 40 % paste   Topical BID    acetaminophen (TYLENOL) tablet 650 mg  650 mg Oral Q4H PRN    Or    acetaminophen (TYLENOL) suppository 650 mg  650 mg Rectal Q6H PRN    sodium chloride (NS) flush 5-10 mL  5-10 mL IntraVENous PRN    sodium chloride (NS) flush 5-40 mL  5-40 mL IntraVENous Q8H    sodium chloride (NS) flush 5-40 mL  5-40 mL IntraVENous PRN    polyethylene glycol (MIRALAX) packet 17 g  17 g Oral DAILY PRN    promethazine (PHENERGAN) tablet 12.5 mg  12.5 mg Oral Q6H PRN    Or    ondansetron (ZOFRAN) injection 4 mg  4 mg IntraVENous Q6H PRN    polyethylene glycol (MIRALAX) packet 17 g  17 g Oral BID    bisacodyL (DULCOLAX) tablet 5 mg  5 mg Oral DAILY    levothyroxine (SYNTHROID) tablet 25 mcg  25 mcg Oral ACB    magnesium oxide (MAG-OX) tablet 400 mg  400 mg Oral BID    sodium chloride (NS) flush 5-10 mL  5-10 mL IntraVENous PRN          Jordy Arora MD              [facility]  [facility], [address]  [place], [address]  Phone - [phone]               Fax - [phone]

## 2021-04-23 NOTE — PROGRESS NOTES
4/23/2021 7:59 AM    Admit Date: 4/17/2021    Admit Diagnosis: Infected decubitus ulcer [L89.90, L08.9]    Subjective:     Joslyn Villalta   denies chest pain, chest pressure/discomfort, dyspnea, palpitations, irregular heart beats, near-syncope, syncope, fatigue, orthopnea, paroxysmal nocturnal dyspnea, exertional chest pressure/discomfort.     Visit Vitals  BP (!) 104/59   Pulse 63   Temp 98.5 °F (36.9 °C)   Resp 24   Ht 5' 7\" (1.702 m)   Wt 176 lb (79.8 kg)   SpO2 93%   BMI 27.57 kg/m²     Current Facility-Administered Medications   Medication Dose Route Frequency    potassium chloride SR (KLOR-CON 10) tablet 40 mEq  40 mEq Oral DAILY    potassium, sodium phosphates (NEUTRA-PHOS) packet 2 Packet  2 Packet Oral QID    piperacillin-tazobactam (ZOSYN) 3.375 g in 0.9% sodium chloride (MBP/ADV) 100 mL MBP  3.375 g IntraVENous Q8H    PHENYLephrine (NAVIN-SYNEPHRINE) 30 mg in 0.9% sodium chloride 250 mL infusion   mcg/min IntraVENous TITRATE    zinc oxide-cod liver oil (DESITIN) 40 % paste   Topical BID    heparin (porcine) injection 2,000 Units  2,000 Units IntraVENous PRN    Or    heparin (porcine) injection 4,000 Units  4,000 Units IntraVENous PRN    acetaminophen (TYLENOL) tablet 650 mg  650 mg Oral Q4H PRN    Or    acetaminophen (TYLENOL) suppository 650 mg  650 mg Rectal Q6H PRN    sodium chloride (NS) flush 5-10 mL  5-10 mL IntraVENous PRN    sodium chloride (NS) flush 5-40 mL  5-40 mL IntraVENous Q8H    sodium chloride (NS) flush 5-40 mL  5-40 mL IntraVENous PRN    polyethylene glycol (MIRALAX) packet 17 g  17 g Oral DAILY PRN    promethazine (PHENERGAN) tablet 12.5 mg  12.5 mg Oral Q6H PRN    Or    ondansetron (ZOFRAN) injection 4 mg  4 mg IntraVENous Q6H PRN    polyethylene glycol (MIRALAX) packet 17 g  17 g Oral BID    bisacodyL (DULCOLAX) tablet 5 mg  5 mg Oral DAILY    levothyroxine (SYNTHROID) tablet 25 mcg  25 mcg Oral ACB    magnesium oxide (MAG-OX) tablet 400 mg 400 mg Oral BID    sodium chloride (NS) flush 5-10 mL  5-10 mL IntraVENous PRN         Objective:      Visit Vitals  BP (!) 104/59   Pulse 63   Temp 98.5 °F (36.9 °C)   Resp 24   Ht 5' 7\" (1.702 m)   Wt 176 lb (79.8 kg)   SpO2 93%   BMI 27.57 kg/m²       Physical Exam:  Resting comfortably. No resp distress. Abdomen: soft, non-tender. Bowel sounds normal.   Extremities: extremities normal, atraumatic, no cyanosis or edema  Heart: regular rate and rhythm, S1, S2 normal, no murmur, click, rub or gallop  Lungs: clear to auscultation bilaterally    Data Review:   Labs:    Recent Results (from the past 24 hour(s))   ECHO ADULT COMPLETE    Collection Time: 04/22/21 11:08 AM   Result Value Ref Range    IVSd 1.07 (A) 0.60 - 0.90 cm    LVIDd 3.82 (A) 3.90 - 5.30 cm    LVIDs 3.68 cm    LVOT d 2.07 cm    LVPWd 1.38 (A) 0.60 - 0.90 cm    LV Ejection Fraction MOD 4C 46 percent    LV ED Vol A4C 118. 88 mL    LV ES Vol A4C 64.43 mL    LVOT Cardiac Output 3.00 liter/minute    LVOT Peak Gradient 1.73 mmHg    Left Ventricular Outflow Tract Mean Gradient 1.06 mmHg    LVOT SV 37.8 mL    LVOT Peak Velocity 65.80 cm/s    LVOT VTI 11.20 cm    Left Atrium Major Axis 3.76 cm    Aortic Valve Area by Continuity of Peak Velocity 2.43 cm2    Aortic Valve Area by Continuity of VTI 2.25 cm2    AoV PG 3.36 mmHg    Aortic Valve Systolic Mean Gradient 8.07 mmHg    Aortic Valve Systolic Peak Velocity 69.94 cm/s    AoV VTI 16.82 cm    MV A Ellis 53.13 cm/s    Mitral Valve E Wave Deceleration Time 169.73 ms    MV E Ellis 54.83 cm/s    E/E' ratio (averaged) 10.75     E/E' lateral 7.22     E/E' septal 14.28     LV E' Lateral Velocity 7.59 cm/s    LV E' Septal Velocity 3.84 cm/s    MVA VTI 2.57 cm2    MV Peak Gradient 1.68 mmHg    MV Mean Gradient 0.64 mmHg    Mitral Valve Pressure Half-time 62.19 ms    Mitral Valve Max Velocity 64.88 cm/s    Mitral Valve Annulus Velocity Time Integral 14.72 cm    MVA (PHT) 3.54 cm2    Pulmonic Valve Systolic Peak Instantaneous Gradient 1.60 mmHg    Pulmonic Valve Max Velocity 63.26 cm/s    Triscuspid Valve Regurgitation Peak Gradient 24.38 mmHg    TR Max Velocity 246.43 cm/s    Ao Root D 3.41 cm    MV E/A 1.03     LV Mass .3 67.0 - 162.0 g    LV Mass AL Index 83.2 43.0 - 95.0 g/m2    Left Atrium Minor Axis 1.96 cm    LVED Vol Index A4C 62.1 mL/m2    LVES Vol Index A4C 33.6 mL/m2    KALEE/BSA Pk Ellis 1.3 cm2/m2    KALEE/BSA VTI 1.2 cm2/m2   PTT    Collection Time: 04/22/21 11:53 AM   Result Value Ref Range    aPTT 34.7 (H) 22.1 - 31.0 sec    aPTT, therapeutic range     58.0 - 77.0 SECS   HGB & HCT    Collection Time: 04/22/21 11:53 AM   Result Value Ref Range    HGB 7.2 (L) 11.5 - 16.0 g/dL    HCT 22.1 (L) 35.0 - 47.0 %   PTT    Collection Time: 04/22/21 10:53 PM   Result Value Ref Range    aPTT 42.6 (H) 22.1 - 31.0 sec    aPTT, therapeutic range     58.0 - 36.2 SECS   METABOLIC PANEL, BASIC    Collection Time: 04/23/21  3:59 AM   Result Value Ref Range    Sodium 136 136 - 145 mmol/L    Potassium 3.3 (L) 3.5 - 5.1 mmol/L    Chloride 106 97 - 108 mmol/L    CO2 22 21 - 32 mmol/L    Anion gap 8 5 - 15 mmol/L    Glucose 101 (H) 65 - 100 mg/dL    BUN 35 (H) 6 - 20 MG/DL    Creatinine 1.14 (H) 0.55 - 1.02 MG/DL    BUN/Creatinine ratio 31 (H) 12 - 20      GFR est AA 57 (L) >60 ml/min/1.73m2    GFR est non-AA 47 (L) >60 ml/min/1.73m2    Calcium 8.1 (L) 8.5 - 10.1 MG/DL   PTT    Collection Time: 04/23/21  3:59 AM   Result Value Ref Range    aPTT 52.8 (H) 22.1 - 31.0 sec    aPTT, therapeutic range     58.0 - 77.0 SECS   CBC WITH AUTOMATED DIFF    Collection Time: 04/23/21  3:59 AM   Result Value Ref Range    WBC 17.5 (H) 3.6 - 11.0 K/uL    RBC 2.74 (L) 3.80 - 5.20 M/uL    HGB 7.0 (L) 11.5 - 16.0 g/dL    HCT 22.3 (L) 35.0 - 47.0 %    MCV 81.4 80.0 - 99.0 FL    MCH 25.5 (L) 26.0 - 34.0 PG    MCHC 31.4 30.0 - 36.5 g/dL    RDW 17.6 (H) 11.5 - 14.5 %    PLATELET 468 (H) 428 - 400 K/uL    MPV 10.4 8.9 - 12.9 FL    NRBC 0.2 (H) 0  WBC ABSOLUTE NRBC 0.04 (H) 0.00 - 0.01 K/uL    NEUTROPHILS 74 32 - 75 %    LYMPHOCYTES 16 12 - 49 %    MONOCYTES 7 5 - 13 %    EOSINOPHILS 1 0 - 7 %    BASOPHILS 0 0 - 1 %    IMMATURE GRANULOCYTES 2 (H) 0.0 - 0.5 %    ABS. NEUTROPHILS 12.9 (H) 1.8 - 8.0 K/UL    ABS. LYMPHOCYTES 2.8 0.8 - 3.5 K/UL    ABS. MONOCYTES 1.2 (H) 0.0 - 1.0 K/UL    ABS. EOSINOPHILS 0.2 0.0 - 0.4 K/UL    ABS. BASOPHILS 0.0 0.0 - 0.1 K/UL    ABS. IMM. GRANS. 0.4 (H) 0.00 - 0.04 K/UL    DF SMEAR SCANNED      RBC COMMENTS ANISOCYTOSIS  1+           Telemetry: SR      Assessment:     Principal Problem:    Sepsis ([facility] [address] (12/16/2020)    Active Problems:    Infected decubitus ulcer (4/17/2021)      HTN (hypertension) (4/21/2021)      HLD (hyperlipidemia) (4/21/2021)      Cardiomyopathy ([facility] [address] (4/23/2021)      Anemia (4/23/2021)      Elevated troponin (4/23/2021)        Plan:     Sinus tachycardia and short run of SVT in presence of Sepsis d/t infected Decubitus sacral ulcer: improved, now NSR 80's  Now stable. Not much room for BB or CCB due to hypotension.      Cardiomyopathy, Elevated troponin: initial troponin 3.78, up to 4.25   Echo noted. Needs cardiac cath. However anemic. Check stools for hemeoccult. Get heme evaluation. If cleared will proceed with cardiac cath this afternoon, otherwise Monday. Keep NPO for now. D/w patient. Dc IV heparin.      Hx HTN: currently periods of hypotension  Wean pressor off. Keep MAP > 60. Cardiomyopathy probably contributing to low pressure along with sepsis.      HLD:   Continue Zetia      CHELA:  nephrology following   Cr better.      Hx hypothyroidism:   Check TSH-WNL 1.64  On levothyroxine

## 2021-04-23 NOTE — ROUTINE PROCESS
1730- Pt in from cath lab. VSS. Right radial TR band in place. 1920- TR band removed. VSS Pt refused to be turned for sacral care. Excoriation noted to inner thighs. Report to [facility] for change of shift.
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
## 2021-04-23 NOTE — PROGRESS NOTES
Transition of Care Plan:     RUR:21%  Disposition:[facility]  Follow up appointments: Follow up with PCP   DME needed:TBD   Transportation at [address] or means to access home:      [address] access   IM Medicare letter:2nd  Medicare Letter   Caregiver Contact:Lisa-Caregiver at [address]: [phone]  Discharge Caregiver contacted prior to discharge? Pt will need a rapid test at time of d/c     Pt is not medically stable for d/c due to [facility] is planned for today. CM sent information to [facility] via [employer]. CM will continue to follow and assist with d/c planning. Christina Hurley.[email or web address].   Care Manager [facility]  [phone]

## 2021-04-23 NOTE — PROGRESS NOTES
Comprehensive Nutrition Assessment    Type and Reason for Visit: Reassess    Nutrition Recommendations/Plan:  Resume a Regular diet s/p procedure  Resume ensure enlive BID  Discontinue NPO orders for 4/25 (Sunday, suspect it was meant for Monday but appears cardiac cath will be performed today)    Nutrition Assessment:     Chart reviewed; medically noted for infected PI, CHELA, hypotension, and CM. Noted plans for LHC today. NPO orders placed this afternoon. Noted it was previously undecided if cardiac cath would be today vs Monday. Patient also has NPO orders for 4/25; suspect it was meant to start at midnight for 4/26. Nursing at bedside providing care at time of attempted visit. PO mostly 50-75% of meals per flowsheets. Has been receiving ensure enlive BID for extra kcal/protein to aid in wound healing. Resume diet and supplements s/p procedure. Patient Vitals for the past 168 hrs:   % Diet Eaten   04/22/21 1700 51 - 75%   04/19/21 1754 0   04/19/21 1224 51 - 75%   04/19/21 0850 51 - 75%   04/18/21 1218 51 - 75%   04/18/21 0923 51 - 75%       Estimated Daily Nutrient Needs:  Energy (kcal): 1629 kcal (BMR 1358 x 1. 2AF); Weight Used for Energy Requirements: Current  Protein (g): 96-120g (1.2-1.5 g/kg bw); Weight Used for Protein Requirements: Current  Fluid (ml/day): 1650 mL; Method Used for Fluid Requirements: 1 ml/kcal    Nutrition Related Findings:   K+ 3.3, -[phone]  BM 4/23  Dulcolax, Synthroid, MagOx, miralax, KCl      Wounds:    Stage IV       Current Nutrition Therapies:  DIET ONE TIME MESSAGE  DIET NUTRITIONAL SUPPLEMENTS Breakfast, Dinner; Ensure [employer]  DIET NPO  DIET NPO    Anthropometric Measures:  · Height:  5' 7\" (170.2 cm)  · Current Body Wt:  79.8 kg (175 lb 14.8 oz)    · BMI Category: Overweight (BMI 25.0-29. 9)       Nutrition Diagnosis:   · Increased nutrient needs related to (wound healing) as evidenced by (stage 4 PI)    Nutrition Interventions:   Food and/or Nutrient Delivery: Start oral diet, Continue oral nutrition supplement  Nutrition Education and Counseling: No recommendations at this time  Coordination of Nutrition Care: No recommendation at this time    Goals:  Pt will consume >70% of meals/supplements in 3-5 days.        Nutrition Monitoring and Evaluation:   Behavioral-Environmental Outcomes: None identified  Food/Nutrient Intake Outcomes: Food and nutrient intake, Supplement intake  Physical Signs/Symptoms Outcomes: Biochemical data, Skin, Weight    Discharge Planning:    Continue current diet, Continue oral nutrition supplement     Electronically signed by Jefry Villanueva RD on 4/23/2021 at 1:52 PM    Contact: ext [phone]

## 2021-04-23 NOTE — PROGRESS NOTES
Hospitalist Progress Note    NAME: Bart Barrett   :  1950   MRN:  [id number]       Assessment / Plan:  Tachycardia , sinus 120-140 , borderline BP    With run of svt   cardiomyopathy   Unremarkable  echocardiogram.   BP is okay now , planned cardiac cath and hem onch on board for low hgb before the cath     Acute kidney injury, most likely prerenal versus ATN from vancomycin  Hypotension   Nephrology on board now and appreciated help   Continue current care   ? Infected decubitus ulcer stage IV on the sacral/coccyx area  Severe sepsis (leukocytosis, acute kidney injury, hypotension)  Patient is bedbound for about 4 years now  Persistent leukocytosis  CT abdomen and pelvis on  in chart . Patient has had this decubitus ulcer for the last 4 years, was getting wound care   ON zosyn , ID on board   Ct abdomen pelvis looking for source   blood cultures negative to date, wound cultures  Showed mixed veronica   Wound care , evaluated by surgery who did not retain any indication for debridement, recommended packing and wound care  Hypertension   Hyperlipidemia  Hypothyroidism  No BP meds seen in the med rec  Continue with Zetia with pharmacy substitution  Continue with levothyroxine  Now on midodrine   Code Status: Full code  Surrogate Decision Maker: her care giver patient is unsure of the last name  DVT Prophylaxis: Subcu heparin  GI Prophylaxis: not indicated  25.0 - 29.9 Overweight / Body mass index is 27.57 kg/m². Subjective:     Chief Complaint / Reason for Physician Visit  Patient was seen and examined. No chest pain or shortness of breath complaint. No acute issues except for constipation     Discussed with RN events overnight.      Review of Systems:  Symptom Y/N Comments  Symptom Y/N Comments   Fever/Chills y   Chest Pain n    Poor Appetite    Edema     Cough    Abdominal Pain n    Sputum    Joint Pain     SOB/PAL n   Pruritis/Rash     Nausea/vomit    Tolerating PT/OT     Diarrhea Tolerating Diet y    Constipation y   Other       Could NOT obtain due to:      Objective:     VITALS:   Last 24hrs VS reviewed since prior progress note. Most recent are:  Patient Vitals for the past 24 hrs:   Temp Pulse Resp BP SpO2   04/23/21 0430 98.5 °F (36.9 °C) 60 26 (!) 96/54 95 %   04/23/21 0000 98.2 °F (36.8 °C) 68 21 (!) 102/56    04/22/21 2030 98 °F (36.7 °C) 67 19 99/65 94 %   04/22/21 1932  70  (!) 95/54    04/22/21 1837 96.9 °F (36.1 °C) 69 19 (!) 93/52 96 %   04/22/21 1830  70 21 (!) 93/52    04/22/21 1816  69 24 (!) 89/59    04/22/21 1700  71 29 (!) 87/50 94 %   04/22/21 1659 97.7 °F (36.5 °C) 74 20 (!) 87/50 95 %   04/22/21 1510 98 °F (36.7 °C) 73 19 94/66 95 %   04/22/21 1034    (!) 85/60    04/22/21 1026 97.2 °F (36.2 °C) 82 18 (!) 85/60 96 %   04/22/21 0908 97.6 °F (36.4 °C) 89 18 (!) 88/62 94 %       Intake/Output Summary (Last 24 hours) at 4/23/2021 0726  Last data filed at 4/22/2021 1700  Gross per 24 hour   Intake 340 ml   Output 100 ml   Net 240 ml        I had a face to face encounter and independently examined this patient on 4/23/2021, as outlined below:  PHYSICAL EXAM:  General: WD, WN. Alert, cooperative, no acute distress    EENT:  EOMI. Anicteric sclerae. MMM  Resp:  CTA bilaterally, no wheezing or rales. No accessory muscle use  CV:  Regular  rhythm,  No edema  GI:  Soft, Non distended, Non tender. +Bowel sounds  Neurologic:  Alert and oriented X 3, normal speech,   Psych:   Good insight.  Not anxious nor agitated      Reviewed most current lab test results and cultures  YES  Reviewed most current radiology test results   YES  Review and summation of old records today    NO  Reviewed patient's current orders and MAR    YES  PMH/ reviewed - no change compared to H&P  ________________________________________________________________________  Care Plan discussed with:    Comments   Patient x    Family      RN x    Care Manager     Consultant                       x Multidiciplinary team rounds were held today with , nursing, pharmacist and clinical coordinator. Patient's plan of care was discussed; medications were reviewed and discharge planning was addressed. ________________________________________________________________________  Total NON critical care TIME: 35  Minutes    Total CRITICAL CARE TIME Spent:   Minutes non procedure based      Comments   >50% of visit spent in counseling and coordination of care     ________________________________________________________________________  Erin Rosales MD     Procedures: see electronic medical records for all procedures/Xrays and details which were not copied into this note but were reviewed prior to creation of Plan. LABS:  I reviewed today's most current labs and imaging studies. Pertinent labs include:  Recent Labs     04/23/21  0359 04/22/21  1153 04/22/21  0026 04/21/21  1812   WBC 17.5*  --  15.2* 16.3*   HGB 7.0* 7.2* 7.8* 7.9*   HCT 22.3* 22.1* 24.3* 24.4*   *  --  323 339     Recent Labs     04/23/21  0359 04/22/21  0026 04/21/21  0850    134* 134*   K 3.3* 3.2* 3.5    104 104   CO2 22 21 19*   * 141* 131*   BUN 35* 33* 33*   CREA 1.14* 1.50* 1.66*   CA 8.1* 8.2* 8.0*   MG  --  2.4  --    PHOS  --  1.7*  --        Signed:  Erin Rosales MD

## 2021-04-23 NOTE — PROGRESS NOTES
End of Shift Note    Bedside shift change report given to Gerry Quinn RN  (oncoming nurse) by Lalo Patel RN (offgoing nurse). Report included the following information SBAR, Kardex, Recent Results and Cardiac Rhythm NSR     Shift worked:  7p - 7a      Shift summary and any significant changes:     pt is A/Ox4. Denies pain or sob. Rectal tube in place for frequent loose/watery stool. Phenyleprine & herparin infusing. Repeat aPTT due at 10:00. SBP 90's - 100's. Goal MAP >65. Concerns for physician to address:  am HGB 7.0. Zone phone for oncoming shift:   [phone]       Activity:  Activity Level: Bed Rest  Number times ambulated in hallways past shift: 0  Number of times OOB to chair past shift: 0    Cardiac:   Cardiac Monitoring: Yes      Cardiac Rhythm: Normal sinus rhythm    Access:   Current line(s): PIV     Genitourinary:   Urinary status: voiding and external catheter    Respiratory:   O2 Device: None (Room air)  Chronic home O2 use?: NO  Incentive spirometer at bedside: NO     GI:  Last Bowel Movement Date: 04/23/21  Current diet:  DIET REGULAR  DIET ONE TIME MESSAGE  DIET NUTRITIONAL SUPPLEMENTS Breakfast, Dinner; Ensure [employer]  DIET NPO  Passing flatus: YES  Tolerating current diet: YES       Pain Management:   Patient states pain is manageable on current regimen: YES    Skin:  Kristian Score: 13  Interventions: limit briefs and internal/external urinary devices    Patient Safety:  Fall Score:  Total Score: 2  Interventions: bed/chair alarm and pt to call before getting OOB       Length of Stay:  Expected LOS: 4d 19h  Actual LOS: [address], RN

## 2021-04-23 NOTE — PROGRESS NOTES
[address] [facility] IN REPORT:    Verbal report received from [facility] Hiram RN(name) on Joslyn Villalta  being received from Gen surg(unit) for routine progression of care      Report consisted of patients Situation, Background, Assessment and   Recommendations(SBAR). Information from the following report(s) SBAR, Kardex, ED Summary, Intake/Output, MAR, Recent Results and Cardiac Rhythm NSR was reviewed with the receiving nurse. Opportunity for questions and clarification was provided. Assessment completed upon patients arrival to unit and care assumed. Primary Nurse Carola Crowe and Liz Jean-Baptiste RN performed a dual skin assessment on this patient Impairment noted- see wound doc flow sheet  Kristian score is 13    1713 Messaged Dr. Oneida Flores about patient's BP 87/50, 85/60. Can patient start on padilla drip and verbal order given at this time. Order placed    1 Messaged Dr. Oneida Flores about patient having frequent loose stools and can the patient get an order for fecal management system. Verbal order given and order placed. 1800 Patient refused fecal tube at this time. 1816 Started phenylephrine 10 mcg at this time. BP 89/59    Bedside shift change report given to LASHANDA urena (oncoming nurse) by Nam Grace RN (offgoing nurse). Report included the following information SBAR, Kardex, ED Summary, Intake/Output, MAR, Recent Results and Cardiac Rhythm NSR.

## 2021-04-24 LAB
ANION GAP SERPL CALC-SCNC: 7 MMOL/L (ref 5–15)
BASOPHILS # BLD: 0 K/UL (ref 0–0.1)
BASOPHILS NFR BLD: 0 % (ref 0–1)
BUN SERPL-MCNC: 30 MG/DL (ref 6–20)
BUN/CREAT SERPL: 34 (ref 12–20)
CALCIUM SERPL-MCNC: 7.8 MG/DL (ref 8.5–10.1)
CHLORIDE SERPL-SCNC: 108 MMOL/L (ref 97–108)
CO2 SERPL-SCNC: 21 MMOL/L (ref 21–32)
CREAT SERPL-MCNC: 0.89 MG/DL (ref 0.55–1.02)
DIFFERENTIAL METHOD BLD: ABNORMAL
EOSINOPHIL # BLD: 0.2 K/UL (ref 0–0.4)
EOSINOPHIL NFR BLD: 1 % (ref 0–7)
ERYTHROCYTE [DISTWIDTH] IN BLOOD BY AUTOMATED COUNT: 18.3 % (ref 11.5–14.5)
GLUCOSE SERPL-MCNC: 102 MG/DL (ref 65–100)
HCT VFR BLD AUTO: 26.3 % (ref 35–47)
HGB BLD-MCNC: 7.8 G/DL (ref 11.5–16)
IMM GRANULOCYTES # BLD AUTO: 0.5 K/UL (ref 0–0.04)
IMM GRANULOCYTES NFR BLD AUTO: 3 % (ref 0–0.5)
LYMPHOCYTES # BLD: 3.5 K/UL (ref 0.8–3.5)
LYMPHOCYTES NFR BLD: 20 % (ref 12–49)
MCH RBC QN AUTO: 26 PG (ref 26–34)
MCHC RBC AUTO-ENTMCNC: 29.7 G/DL (ref 30–36.5)
MCV RBC AUTO: 87.7 FL (ref 80–99)
MONOCYTES # BLD: 1.2 K/UL (ref 0–1)
MONOCYTES NFR BLD: 7 % (ref 5–13)
NEUTS SEG # BLD: 11.9 K/UL (ref 1.8–8)
NEUTS SEG NFR BLD: 69 % (ref 32–75)
NRBC # BLD: 0.22 K/UL (ref 0–0.01)
NRBC BLD-RTO: 1.3 PER 100 WBC
PLATELET # BLD AUTO: 409 K/UL (ref 150–400)
PMV BLD AUTO: 10.1 FL (ref 8.9–12.9)
POTASSIUM SERPL-SCNC: 5 MMOL/L (ref 3.5–5.1)
RBC # BLD AUTO: 3 M/UL (ref 3.8–5.2)
RBC MORPH BLD: ABNORMAL
RBC MORPH BLD: ABNORMAL
SODIUM SERPL-SCNC: 136 MMOL/L (ref 136–145)
WBC # BLD AUTO: 17.3 K/UL (ref 3.6–11)
WBC #/AREA STL HPF: NORMAL /HPF (ref 0–4)
WBC MORPH BLD: ABNORMAL

## 2021-04-24 PROCEDURE — 74011250637 HC RX REV CODE- 250/637: Performed by: NURSE PRACTITIONER

## 2021-04-24 PROCEDURE — 99232 SBSQ HOSP IP/OBS MODERATE 35: CPT | Performed by: INTERNAL MEDICINE

## 2021-04-24 PROCEDURE — 36415 COLL VENOUS BLD VENIPUNCTURE: CPT

## 2021-04-24 PROCEDURE — 77030038269 HC DRN EXT URIN PURWCK BARD -A

## 2021-04-24 PROCEDURE — 74011250637 HC RX REV CODE- 250/637: Performed by: INTERNAL MEDICINE

## 2021-04-24 PROCEDURE — 80048 BASIC METABOLIC PNL TOTAL CA: CPT

## 2021-04-24 PROCEDURE — 2709999900 HC NON-CHARGEABLE SUPPLY

## 2021-04-24 PROCEDURE — 74011250636 HC RX REV CODE- 250/636: Performed by: STUDENT IN AN ORGANIZED HEALTH CARE EDUCATION/TRAINING PROGRAM

## 2021-04-24 PROCEDURE — 85025 COMPLETE CBC W/AUTO DIFF WBC: CPT

## 2021-04-24 PROCEDURE — 77030040392 HC DRSG OPTIFOAM MDII -A

## 2021-04-24 PROCEDURE — 65660000000 HC RM CCU STEPDOWN

## 2021-04-24 PROCEDURE — 74011000258 HC RX REV CODE- 258: Performed by: STUDENT IN AN ORGANIZED HEALTH CARE EDUCATION/TRAINING PROGRAM

## 2021-04-24 RX ORDER — OXYCODONE HYDROCHLORIDE 5 MG/1
5 TABLET ORAL
Status: DISCONTINUED | OUTPATIENT
Start: 2021-04-24 | End: 2021-04-29 | Stop reason: HOSPADM

## 2021-04-24 RX ORDER — OXYCODONE HYDROCHLORIDE 5 MG/1
10 TABLET ORAL
Status: DISCONTINUED | OUTPATIENT
Start: 2021-04-24 | End: 2021-04-29 | Stop reason: HOSPADM

## 2021-04-24 RX ADMIN — Medication: at 17:47

## 2021-04-24 RX ADMIN — OXYCODONE 5 MG: 5 TABLET ORAL at 15:22

## 2021-04-24 RX ADMIN — POTASSIUM CHLORIDE 40 MEQ: 750 TABLET, FILM COATED, EXTENDED RELEASE ORAL at 09:20

## 2021-04-24 RX ADMIN — MAGNESIUM OXIDE 400 MG (241.3 MG MAGNESIUM) TABLET 400 MG: TABLET at 17:47

## 2021-04-24 RX ADMIN — Medication 10 ML: at 15:26

## 2021-04-24 RX ADMIN — ACETAMINOPHEN 650 MG: 325 TABLET ORAL at 06:45

## 2021-04-24 RX ADMIN — Medication 10 ML: at 22:45

## 2021-04-24 RX ADMIN — PIPERACILLIN AND TAZOBACTAM 3.38 G: 3; .375 INJECTION, POWDER, LYOPHILIZED, FOR SOLUTION INTRAVENOUS at 06:50

## 2021-04-24 RX ADMIN — LEVOTHYROXINE SODIUM 25 MCG: 0.03 TABLET ORAL at 09:20

## 2021-04-24 RX ADMIN — PIPERACILLIN AND TAZOBACTAM 3.38 G: 3; .375 INJECTION, POWDER, LYOPHILIZED, FOR SOLUTION INTRAVENOUS at 15:22

## 2021-04-24 RX ADMIN — Medication: at 09:21

## 2021-04-24 RX ADMIN — PIPERACILLIN AND TAZOBACTAM 3.38 G: 3; .375 INJECTION, POWDER, LYOPHILIZED, FOR SOLUTION INTRAVENOUS at 22:45

## 2021-04-24 RX ADMIN — MAGNESIUM OXIDE 400 MG (241.3 MG MAGNESIUM) TABLET 400 MG: TABLET at 09:20

## 2021-04-24 RX ADMIN — ACETAMINOPHEN 650 MG: 325 TABLET ORAL at 15:22

## 2021-04-24 RX ADMIN — OXYCODONE 5 MG: 5 TABLET ORAL at 06:43

## 2021-04-24 NOTE — CONSULTS
GI Consultation Note Brooke Clemente)    NAME: Velia Call : 1950 MRN: [id number]   ATTG: Papa Eason MD PCP: Elizabet Reis  Date/Time:  2021 5:38 PM  Subjective:   REASON FOR CONSULT:      Doron Rodriguez is a 73 y.o.  female  With known CMP, chronic decubitus ulcer, and chronic constipation, who I was asked to see for evaluation of guaiac positive stool and diarrhea. She was admitted 21 with infected decubitus ulcer, noting that she [facility] at [facility]F [facility]. Imaging noted fecal stasis and impaction on admission and she was treated with various laxatives. She has been started on vancomycin  to  then switched to linezolid because of rising Cr, butr since discontinued. She is on Unasyn  to present. Gian Mode testing for fecal WBC and C. Dif has been negative. he is noted to have a progressive normochromic normocytic anemia since admission. She is noted to have previously recieved some NSAIIDS. She is in the midst of a cardiac w/u. She denies abd pain, N/V, or increased GERD. Past Medical History:   Diagnosis Date    HLD (hyperlipidemia)     Hypertension       No past surgical history on file. Social History     Tobacco Use    Smoking status: Not on file   Substance Use Topics    Alcohol use: Not on file      History reviewed. No pertinent family history. Allergies   Allergen Reactions    Antihistamines - Alkylamine Unknown (comments)     reported on referral packet      Home Medications:  Prior to Admission Medications   Prescriptions Last Dose Informant Patient Reported? Taking?   acetaminophen (TYLENOL) 650 mg TbER 2021 at Unknown time Care Giver Yes Yes   Sig: Take 650 mg by mouth every six to eight (6-8) hours as needed for Pain. amino acids-protein hydrolys (Pro-Stat AWC)  gram-kcal/30 mL liqd Not Taking at Unknown time  Yes No   Sig: Take 30 mL by mouth two (2) times a day.  For wound healing   ammonium lactate (LAC-HYDRIN) 12 % lotion 4/17/2021 at Unknown time Care Giver Yes Yes   Sig: Apply  to affected area as needed. Apply to both legs every evening for dry skin   ascorbic acid, vitamin C, (Vitamin C) 500 mg tablet 4/17/2021 at Unknown time Care Giver Yes Yes   Sig: Take 1,000 mg by mouth daily. ezetimibe (ZETIA) 10 mg tablet 4/17/2021 at Unknown time Care Giver Yes Yes   Sig: Take 10 mg by mouth daily. levothyroxine (SYNTHROID) 25 mcg tablet 4/17/2021 at Unknown time Care Giver Yes Yes   Sig: Take 25 mcg by mouth Daily (before breakfast). magnesium oxide (MAG-OX) 400 mg tablet 4/17/2021 at Unknown time Care Giver Yes Yes   Sig: Take 400 mg by mouth two (2) times a day. oxyCODONE-acetaminophen (PERCOCET) 5-325 mg per tablet 4/17/2021 at Unknown time Care Giver Yes Yes   Sig: Take 1 Tab by mouth every six (6) hours as needed for Pain. senna (Senna) 8.6 mg tablet Not Taking at Unknown time Care Giver Yes No   Sig: Take 2 Tabs by mouth two (2) times daily as needed for Constipation. traMADoL (ULTRAM) 50 mg tablet 4/17/2021 at Unknown time Care Giver Yes Yes   Sig: Take 50 mg by mouth every eight (8) hours as needed for Pain.       Facility-Administered Medications: None     Hospital medications:  Current Facility-Administered Medications   Medication Dose Route Frequency    oxyCODONE IR (ROXICODONE) tablet 5 mg  5 mg Oral Q4H PRN    oxyCODONE IR (ROXICODONE) tablet 10 mg  10 mg Oral Q4H PRN    loperamide (IMODIUM) capsule 2 mg  2 mg Oral Q4H PRN    sodium chloride (NS) flush 5-40 mL  5-40 mL IntraVENous Q8H    sodium chloride (NS) flush 5-40 mL  5-40 mL IntraVENous PRN    potassium chloride SR (KLOR-CON 10) tablet 40 mEq  40 mEq Oral DAILY    piperacillin-tazobactam (ZOSYN) 3.375 g in 0.9% sodium chloride (MBP/ADV) 100 mL MBP  3.375 g IntraVENous Q8H    PHENYLephrine (NAVIN-SYNEPHRINE) 30 mg in 0.9% sodium chloride 250 mL infusion   mcg/min IntraVENous TITRATE    zinc oxide-cod liver oil (DESITIN) 40 % paste Topical BID    acetaminophen (TYLENOL) tablet 650 mg  650 mg Oral Q4H PRN    Or    acetaminophen (TYLENOL) suppository 650 mg  650 mg Rectal Q6H PRN    polyethylene glycol (MIRALAX) packet 17 g  17 g Oral DAILY PRN    promethazine (PHENERGAN) tablet 12.5 mg  12.5 mg Oral Q6H PRN    Or    ondansetron (ZOFRAN) injection 4 mg  4 mg IntraVENous Q6H PRN    polyethylene glycol (MIRALAX) packet 17 g  17 g Oral BID    bisacodyL (DULCOLAX) tablet 5 mg  5 mg Oral DAILY    levothyroxine (SYNTHROID) tablet 25 mcg  25 mcg Oral ACB    magnesium oxide (MAG-OX) tablet 400 mg  400 mg Oral BID     REVIEW OF SYSTEMS:     [x]    Total of 11 systems reviewed as follows:  Const:  negative fever, negative chills, negative weight loss  Eyes:   negative diplopia or visual changes, negative eye pain  ENT:   negative coryza, negative sore throat  Resp:   negative cough, hemoptysis, dyspnea  Cards:  negative for chest pain, palpitations, lower extremity edema  :  negative for frequency, dysuria and hematuria  Skin:   negative for rash and pruritus  Heme:  negative for easy bruising and gum/nose bleeding  MS:  negative for myalgias, arthralgias, back pain and muscle weakness  Neurolo:  negative for headaches, dizziness, vertigo, memory problems   Psych:  negative for feelings of anxiety, depression     Pertinent Positives include :    Objective:   VITALS:    Visit Vitals  /67   Pulse 66   Temp 97.1 °F (36.2 °C)   Resp 14   Ht 5' 7\" (1.702 m)   Wt 80.6 kg (177 lb 12.8 oz)   SpO2 98%   BMI 27.85 kg/m²     Temp (24hrs), Av.5 °F (36.4 °C), Min:97.1 °F (36.2 °C), Max:98 °F (36.7 °C)    PHYSICAL EXAM:   General:    Alert, cooperative, no distress, appears stated age. Head:   Normocephalic, without obvious abnormality, atraumatic. Eyes:   Conjunctivae clear, anicteric sclerae. Pupils are equal  Nose:  Nares normal. No drainage or sinus tenderness.   Throat:    Lips, mucosa, and tongue normal.  No Thrush  Neck:  Supple, symmetrical,  no adenopathy, thyroid: non tender  Back:    Symmetric,  No CVA tenderness. Lungs:   CTA bilaterally. No wheezing/rhonchi/rales. Chest wall:  No tenderness or deformity. No Accessory muscle use. Heart:   Regular rate and rhythm,  no murmur, rub or gallop. Abdomen:   Soft, non-tender. Not distended. Bowel sounds normal. No masses  Extremities: Atraumatic, No cyanosis. No edema. No clubbing  Skin:     Texture, turgor normal. No rashes/lesions/jaundice  Lymph: Cervical, supraclavicular normal.  Psych:  Good insight. Not depressed. Not anxious or agitated. Neurologic: EOMs intact. No facial asymmetry aphasia/ slurred speech. Decreased LE strength, A/O X 3.      LAB DATA REVIEWED:    Recent Results (from the past 48 hour(s))   PTT    Collection Time: 04/22/21 10:53 PM   Result Value Ref Range    aPTT 42.6 (H) 22.1 - 31.0 sec    aPTT, therapeutic range     58.0 - 12.4 SECS   METABOLIC PANEL, BASIC    Collection Time: 04/23/21  3:59 AM   Result Value Ref Range    Sodium 136 136 - 145 mmol/L    Potassium 3.3 (L) 3.5 - 5.1 mmol/L    Chloride 106 97 - 108 mmol/L    CO2 22 21 - 32 mmol/L    Anion gap 8 5 - 15 mmol/L    Glucose 101 (H) 65 - 100 mg/dL    BUN 35 (H) 6 - 20 MG/DL    Creatinine 1.14 (H) 0.55 - 1.02 MG/DL    BUN/Creatinine ratio 31 (H) 12 - 20      GFR est AA 57 (L) >60 ml/min/1.73m2    GFR est non-AA 47 (L) >60 ml/min/1.73m2    Calcium 8.1 (L) 8.5 - 10.1 MG/DL   PTT    Collection Time: 04/23/21  3:59 AM   Result Value Ref Range    aPTT 52.8 (H) 22.1 - 31.0 sec    aPTT, therapeutic range     58.0 - 77.0 SECS   CBC WITH AUTOMATED DIFF    Collection Time: 04/23/21  3:59 AM   Result Value Ref Range    WBC 17.5 (H) 3.6 - 11.0 K/uL    RBC 2.74 (L) 3.80 - 5.20 M/uL    HGB 7.0 (L) 11.5 - 16.0 g/dL    HCT 22.3 (L) 35.0 - 47.0 %    MCV 81.4 80.0 - 99.0 FL    MCH 25.5 (L) 26.0 - 34.0 PG    MCHC 31.4 30.0 - 36.5 g/dL    RDW 17.6 (H) 11.5 - 14.5 %    PLATELET 209 (H) 949 - 400 K/uL    MPV 10.4 8.9 - 12.9 FL    NRBC 0.2 (H) 0  WBC    ABSOLUTE NRBC 0.04 (H) 0.00 - 0.01 K/uL    NEUTROPHILS 74 32 - 75 %    LYMPHOCYTES 16 12 - 49 %    MONOCYTES 7 5 - 13 %    EOSINOPHILS 1 0 - 7 %    BASOPHILS 0 0 - 1 %    IMMATURE GRANULOCYTES 2 (H) 0.0 - 0.5 %    ABS. NEUTROPHILS 12.9 (H) 1.8 - 8.0 K/UL    ABS. LYMPHOCYTES 2.8 0.8 - 3.5 K/UL    ABS. MONOCYTES 1.2 (H) 0.0 - 1.0 K/UL    ABS. EOSINOPHILS 0.2 0.0 - 0.4 K/UL    ABS. BASOPHILS 0.0 0.0 - 0.1 K/UL    ABS. IMM.  GRANS. 0.4 (H) 0.00 - 0.04 K/UL    DF SMEAR SCANNED      RBC COMMENTS ANISOCYTOSIS  1+       VITAMIN B12    Collection Time: 04/23/21  9:36 AM   Result Value Ref Range    Vitamin B12 320 193 - 986 pg/mL   FOLATE    Collection Time: 04/23/21  9:36 AM   Result Value Ref Range    Folate 8.1 5.0 - 21.0 ng/mL   FERRITIN    Collection Time: 04/23/21  9:36 AM   Result Value Ref Range    Ferritin 1,974 (H) 8 - 252 NG/ML   HAPTOGLOBIN    Collection Time: 04/23/21  9:36 AM   Result Value Ref Range    Haptoglobin 468 (H) 30 - 200 mg/dL   LD    Collection Time: 04/23/21  9:36 AM   Result Value Ref Range     81 - 246 U/L   RETICULOCYTE COUNT    Collection Time: 04/23/21  9:36 AM   Result Value Ref Range    Reticulocyte count 1.3 0.7 - 2.1 %    Absolute Retic Cnt. 0.0370 0.0164 - 0.0776 M/ul   OCCULT BLOOD, STOOL    Collection Time: 04/23/21 10:54 AM   Result Value Ref Range    Occult blood, stool Positive (A) NEG     C. DIFFICILE AG & TOXIN A/B    Collection Time: 04/23/21  1:30 PM   Result Value Ref Range    GDH ANTIGEN Negative NEG      C. difficile toxin Negative NEG      INTERPRETATION NEGATIVE FOR TOXIGENIC C. DIFFICILE NTXCD     WBC, STOOL    Collection Time: 04/23/21  1:32 PM   Result Value Ref Range    White blood cells, stool 0 TO 4 0 - 4 /HPF   CBC WITH AUTOMATED DIFF    Collection Time: 04/24/21  1:35 PM   Result Value Ref Range    WBC 17.3 (H) 3.6 - 11.0 K/uL    RBC 3.00 (L) 3.80 - 5.20 M/uL    HGB 7.8 (L) 11.5 - 16.0 g/dL    HCT 26.3 (L) 35.0 - 47.0 %    MCV 87.7 80.0 - 99.0 FL    MCH 26.0 26.0 - 34.0 PG    MCHC 29.7 (L) 30.0 - 36.5 g/dL    RDW 18.3 (H) 11.5 - 14.5 %    PLATELET 265 (H) 604 - 400 K/uL    MPV 10.1 8.9 - 12.9 FL    NRBC 1.3 (H) 0  WBC    ABSOLUTE NRBC 0.22 (H) 0.00 - 0.01 K/uL    NEUTROPHILS 69 32 - 75 %    LYMPHOCYTES 20 12 - 49 %    MONOCYTES 7 5 - 13 %    EOSINOPHILS 1 0 - 7 %    BASOPHILS 0 0 - 1 %    IMMATURE GRANULOCYTES 3 (H) 0.0 - 0.5 %    ABS. NEUTROPHILS 11.9 (H) 1.8 - 8.0 K/UL    ABS. LYMPHOCYTES 3.5 0.8 - 3.5 K/UL    ABS. MONOCYTES 1.2 (H) 0.0 - 1.0 K/UL    ABS. EOSINOPHILS 0.2 0.0 - 0.4 K/UL    ABS. BASOPHILS 0.0 0.0 - 0.1 K/UL    ABS. IMM. GRANS. 0.5 (H) 0.00 - 0.04 K/UL    DF SMEAR SCANNED      RBC COMMENTS POLYCHROMASIA  1+        RBC COMMENTS HYPOCHROMIA  1+        WBC COMMENTS REACTIVE LYMPHS     METABOLIC PANEL, BASIC    Collection Time: 04/24/21  1:35 PM   Result Value Ref Range    Sodium 136 136 - 145 mmol/L    Potassium 5.0 3.5 - 5.1 mmol/L    Chloride 108 97 - 108 mmol/L    CO2 21 21 - 32 mmol/L    Anion gap 7 5 - 15 mmol/L    Glucose 102 (H) 65 - 100 mg/dL    BUN 30 (H) 6 - 20 MG/DL    Creatinine 0.89 0.55 - 1.02 MG/DL    BUN/Creatinine ratio 34 (H) 12 - 20      GFR est AA >60 >60 ml/min/1.73m2    GFR est non-AA >60 >60 ml/min/1.73m2    Calcium 7.8 (L) 8.5 - 10.1 MG/DL     IMAGING RESULTS:   [x]      I have personally reviewed the actual   []    CXR  [x]    CT  []     US  CT Abdomen and Pelvis without IV contrast. No oral contrast.4/17/21  FINDINGS:  There is a cutaneous/subcutaneous soft tissue swelling overlying the coccyx  containing air bubble, without organized fluid collection or overt bony erosion. There are numerous stones in the bilateral renal collecting systems without  hydronephrosis. There are multiple stones in the urinary bladder. Rectum is moderately stool distended. Small bowel is not dilated. Appendix is  normal.  There is cholelithiasis without inflammation.   Liver shows no apparent significant finding without contrast. Pancreas, adrenal  glands, spleen and aorta show no significant enlargement. No intra-abdominal inflammation is seen. There is no ascites, pneumoperitoneum  or significant adenopathy. IMPRESSION  1. Soft tissue swelling overlying the coccyx without abscess or overt  osteomyelitis. 2. Bilateral nephrolithiasis. 3. Bladder stones. 4. Cholelithiasis. 5. Rectal impaction. Recommendations/Plan:      Principal Problem:    Sepsis ([address] (12/16/2020)    Active Problems:    Infected decubitus ulcer (4/17/2021)      HTN (hypertension) (4/21/2021)      HLD (hyperlipidemia) (4/21/2021)      Cardiomyopathy ([address] (4/23/2021)      Anemia (4/23/2021)      Elevated troponin (4/23/2021)       ___________________________________________________  RECOMMENDATIONS:    7QK F with chronic constipation, experiencing loose stool in setting of significant antibiotic exposures without c.dif or inflammatory markers noted in stool. Her guaiac positive status could be related to her diarrhea, sacral decub or use of NSAIDs. The anemia is in the midst of a hematology w/u and includes a broad differential as outlined by Houston Moritz on 4/23/21. DDx includes anemia of chronic dz, early Fe def, hemolytic anemia, renal failure, mulitple myeloma.    Plan:  1)  Investigation of these findings should be deferred until the hematology w/u is completed and cardiac status is optimized  2) Further, given the state of her infected sacral decubiti, it would be best to avoid colonoscopy at this time    Discussed Code Status:    [x]    Full Code      []    DNR    ___________________________________________________  Care Plan discussed with:    [x]    Patient   []    Family   [x]    Nursing   [x]    Attending  Total Time :  50   minutes   ___________________________________________________  GI: Laurita Seth MD

## 2021-04-24 NOTE — PROGRESS NOTES
Cardiology Progress Note              [address] [facility], [facility] [address] [facility]  [phone]    4/24/2021 8:01 AM    Admit Date: 4/17/2021    Admit Diagnosis: Infected decubitus ulcer [L89.90, L08.9]    Subjective:     Joslyn Villalta   denies chest pain.     Visit Vitals  BP (!) 106/58 (BP 1 Location: Right upper arm, BP Patient Position: At rest)   Pulse 63   Temp 97.5 °F (36.4 °C)   Resp 14   Ht 5' 7\" (1.702 m)   Wt 177 lb 12.8 oz (80.6 kg)   SpO2 99%   BMI 27.85 kg/m²     Current Facility-Administered Medications   Medication Dose Route Frequency    oxyCODONE IR (ROXICODONE) tablet 5 mg  5 mg Oral Q4H PRN    oxyCODONE IR (ROXICODONE) tablet 10 mg  10 mg Oral Q4H PRN    loperamide (IMODIUM) capsule 2 mg  2 mg Oral Q4H PRN    sodium chloride (NS) flush 5-40 mL  5-40 mL IntraVENous Q8H    sodium chloride (NS) flush 5-40 mL  5-40 mL IntraVENous PRN    potassium chloride SR (KLOR-CON 10) tablet 40 mEq  40 mEq Oral DAILY    piperacillin-tazobactam (ZOSYN) 3.375 g in 0.9% sodium chloride (MBP/ADV) 100 mL MBP  3.375 g IntraVENous Q8H    PHENYLephrine (NAVIN-SYNEPHRINE) 30 mg in 0.9% sodium chloride 250 mL infusion   mcg/min IntraVENous TITRATE    zinc oxide-cod liver oil (DESITIN) 40 % paste   Topical BID    acetaminophen (TYLENOL) tablet 650 mg  650 mg Oral Q4H PRN    Or    acetaminophen (TYLENOL) suppository 650 mg  650 mg Rectal Q6H PRN    sodium chloride (NS) flush 5-10 mL  5-10 mL IntraVENous PRN    sodium chloride (NS) flush 5-40 mL  5-40 mL IntraVENous Q8H    sodium chloride (NS) flush 5-40 mL  5-40 mL IntraVENous PRN    polyethylene glycol (MIRALAX) packet 17 g  17 g Oral DAILY PRN    promethazine (PHENERGAN) tablet 12.5 mg  12.5 mg Oral Q6H PRN    Or    ondansetron (ZOFRAN) injection 4 mg  4 mg IntraVENous Q6H PRN    polyethylene glycol (MIRALAX) packet 17 g  17 g Oral BID    bisacodyL (DULCOLAX) tablet 5 mg  5 mg Oral DAILY    levothyroxine (SYNTHROID) tablet 25 mcg  25 mcg Oral ACB    magnesium oxide (MAG-OX) tablet 400 mg  400 mg Oral BID    sodium chloride (NS) flush 5-10 mL  5-10 mL IntraVENous PRN         Objective:      Visit Vitals  BP (!) 106/58 (BP 1 Location: Right upper arm, BP Patient Position: At rest)   Pulse 63   Temp 97.5 °F (36.4 °C)   Resp 14   Ht 5' 7\" (1.702 m)   Wt 177 lb 12.8 oz (80.6 kg)   SpO2 99%   BMI 27.85 kg/m²       Physical Exam:  General:  Alert, cooperative, well noursished, well developed, appears stated age   Eyes:  Sclera anicteric. Pupils equally round and reactive to light. Mouth/Throat: Mucous membranes normal, oral pharynx clear   Neck: Supple   Lungs:   Clear to auscultation bilaterally, good effort   CV:  Regular rate and rhythm,no murmur, click, rub or gallop   Abdomen:   Soft, non-tender.  bowel sounds normal. non-distended   Extremities: No cyanosis or edema   Skin: Skin color, texture, turgor normal. no acute rash or lesions, +sacral decub ulcer stage iv   Lymph nodes: Cervical and supraclavicular normal   Musculoskeletal: No swelling or deformity       Psych: Alert and oriented, normal mood affect given the setting         Data Review:   Labs:    Recent Labs     04/23/21  0359 04/22/21  1153 04/22/21  0026 04/21/21  1812   WBC 17.5*  --  15.2* 16.3*   HGB 7.0* 7.2* 7.8* 7.9*   HCT 22.3* 22.1* 24.3* 24.4*   *  --  323 339     Recent Labs     04/23/21  0359 04/22/21  0026 04/21/21  0850    134* 134*   K 3.3* 3.2* 3.5    104 104   CO2 22 21 19*   * 141* 131*   BUN 35* 33* 33*   CREA 1.14* 1.50* 1.66*   CA 8.1* 8.2* 8.0*   MG  --  2.4  --    PHOS  --  1.7*  --        Recent Labs     04/22/21  0026 04/21/21  1325   TROIQ 4.25* 3.78*         Intake/Output Summary (Last 24 hours) at 4/24/2021 0801  Last data filed at 4/24/2021 0532  Gross per 24 hour   Intake 900 ml   Output 500 ml   Net 400 ml        Telemetry: nsr, rbbb    Echo - lvef 20-25    Cath - no cad    Assessment:     Principal Problem:    Sepsis ([facility] [address] (12/16/2020)    Active Problems:    Infected decubitus ulcer (4/17/2021)      HTN (hypertension) (4/21/2021)      HLD (hyperlipidemia) (4/21/2021)      Cardiomyopathy ([address] (4/23/2021)      Anemia (4/23/2021)      Elevated troponin (4/23/2021)        Plan:     Joslyn Maintanis is in sinus rhythm. No cad per cath. Possible takusubos. Cannot treat medically for cardiomyopathy secondary to hypotension. Cont iv abx for infection. Would recommend lifevest prior to dc.  F/u in 1-2 weeks as David Briceno MD, [facility]    4/24/2021

## 2021-04-24 NOTE — PROGRESS NOTES
Sent refill request for padilla     0016 received padilla from pharmacy. Will start infusion    0030  Patient refused every 2 hour turning. 2871  Patient agreed to be cleaned up and be turned. \"Im awake now\", Patient repositioned, turned and blanka care done, new purewick in place, desitin cream applied to groin area to prevent chafing which is present on admission    Paged hospitalist on call NP Ermelinda for a stronger pain medication that patient requested for wound care and patient care    [phone] Hospital [facility] Facility: [facility] From: Catina Rowley RE: Faina Staff 1950 RM: [id number] good morning, patient requested \"oxycodone\" for wound care and patient care. According to patient she takes oxycodone. Need Callback: NO CALLBACK REQ IVCU  Also, patient's blood work is ordered this morning but according to the patient [facility] team are able to get' her blood work since patient has non-existent veins. Patient requested for late blood work when [facility] team arrives today. May I put in [facility] team request for blood work?

## 2021-04-24 NOTE — PROGRESS NOTES
Received message from patient's nurse Augustina Lara stating :    good morning, patient requested \"oxycodone\" for wound care and patient care. According to patient she takes oxycodone. Also, patient's blood work is ordered this morning but according to the patient [facility] team are able to get' her blood work since patient has non-existent veins. Patient requested for late blood work when [facility] team arrives today. May I put in [facility] team request for blood work? Discussion / orders:    · Ordered Oxycodone 5 mg by mouth every 4 hours as needed for moderate pain and 10 mg by mouth every 4 hours as needed for severe pain  · Nursing to notify PICC team for lab draw           Please note that this note was dictated using Dragon computer voice recognition software. Quite often unanticipated grammatical, syntax, homophones, and other interpretive errors are inadvertently transcribed by the computer software. Please disregard these errors. Please excuse any errors that have escaped final proofreading.

## 2021-04-24 NOTE — PROGRESS NOTES
Hospitalist Progress Note    NAME: Cammie Preston   :  1950   MRN:  [id number]       Assessment / Plan:  Tachycardia , sinus 120-140 , borderline BP    With run of svt   cardiomyopathy   Anemia acute on chronic   Unremarkable  echocardiogram.   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 Cards recommend  lifevest prior to dc. F/u in 1-2 weeks as   hem onch on board for low hgb   Occult blood positive , GI consulted     Acute kidney injury, most likely prerenal versus ATN from vancomycin  Hypotension   Nephrology on board now and appreciated help   Continue current care   ? Infected decubitus ulcer stage IV on the sacral/coccyx area  Severe sepsis (leukocytosis, acute kidney injury, hypotension)  Patient is bedbound for about 4 years now  Persistent leukocytosis  diarrhea suspected from ABX C. Diff negative , on imodium , rectal tube   CT abdomen and pelvis on  in chart . Patient has had this decubitus ulcer for the last 4 years, was getting wound care   ON zosyn ,ID on board   blood cultures negative to date, wound cultures Showed mixed veronica   Wound care , evaluated by surgery who did not retain any indication for debridement, recommended packing and wound care  Hypertension   Hyperlipidemia  Hypothyroidism  No BP meds seen in the med rec  Continue with Zetia with pharmacy substitution  Continue with levothyroxine  Now on midodrine   Code Status: Full code  Surrogate Decision Maker: her care giver patient is unsure of the last name  DVT Prophylaxis: Subcu heparin  GI Prophylaxis: not indicated  25.0 - 29.9 Overweight / Body mass index is 27.85 kg/m². Subjective:     Chief Complaint / Reason for Physician Visit  No CP or SOB complained for me today   No other issues    Discussed with RN events overnight.      Review of Systems:  Symptom Y/N Comments  Symptom Y/N Comments   Fever/Chills y   Chest Pain n    Poor Appetite    Edema Cough    Abdominal Pain n    Sputum    Joint Pain     SOB/PAL n   Pruritis/Rash     Nausea/vomit    Tolerating PT/OT     Diarrhea    Tolerating Diet y    Constipation y   Other       Could NOT obtain due to:      Objective:     VITALS:   Last 24hrs VS reviewed since prior progress note.  Most recent are:  Patient Vitals for the past 24 hrs:   Temp Pulse Resp BP SpO2   04/24/21 0600  62  108/62 99 %   04/24/21 0532  63   100 %   04/24/21 0500  62  108/61    04/24/21 0400 97.4 °F (36.3 °C) (!) 56 18 (!) 92/56 100 %   04/24/21 0300  60  (!) 95/52    04/24/21 0200  (!) 58  (!) 102/53    04/24/21 0127  61  109/75    04/24/21 0057  (!) 56  (!) 112/54    04/24/21 0023 97.8 °F (36.6 °C) 65 14 (!) 99/57 99 %   04/24/21 0022  66   99 %   04/24/21 0000  (!) 55  (!) 89/51 96 %   04/23/21 2302  (!) 57  (!) 90/50 95 %   04/23/21 2030  62  97/60 95 %   04/23/21 1930 98 °F (36.7 °C) 67 14 109/62 99 %   04/23/21 1915  69 22 107/61 100 %   04/23/21 1900  82 (!) 35 113/65 (!) 81 %   04/23/21 1845  68 24 113/63    04/23/21 1830  (!) 116 26 (!) 106/59    04/23/21 1735  61  (!) 97/49 98 %   04/23/21 1730    (!) 100/48    04/23/21 1725  69 22 (!) 102/57 98 %   04/23/21 1720  63 22 101/61 98 %   04/23/21 1715  63 14 106/61 97 %   04/23/21 1710  60 22 (!) 88/49 100 %   04/23/21 1705  (!) 57 24  98 %   04/23/21 1700  (!) 56 17 (!) 97/54 97 %   04/23/21 1655  61 22  97 %   04/23/21 1650  63 23  97 %   04/23/21 1645  (!) 59 24 (!) 104/47 100 %   04/23/21 1630  62 22 (!) 94/48 99 %   04/23/21 1615 97 °F (36.1 °C) 61 19 (!) 96/50 92 %   04/23/21 1505    127/77    04/23/21 1130  60 21 100/60 91 %   04/23/21 1115  64 19 (!) 100/51 90 %   04/23/21 1100  65 23 106/60 90 %   04/23/21 1045  80 24  98 %   04/23/21 1030  75 22  95 %   04/23/21 1015  67 21 (!) 99/55 (!) 88 %   04/23/21 1014 98.1 °F (36.7 °C) 62 19 (!) 95/57 90 %   04/23/21 1000  60 21 (!) 102/55 90 %   04/23/21 0945  69 24 (!) 90/55    04/23/21 0930  65 21 101/61 91 %   04/23/21 0915  63 27 (!) 98/56 91 %   04/23/21 0900  61 16 (!) 95/52 (!) 89 %   04/23/21 0845  63 20 (!) 98/54 (!) 89 %   04/23/21 0830  62 20 (!) 94/56 90 %   04/23/21 0815  63 23 (!) 96/54 (!) 89 %   04/23/21 0800  61 21 (!) 100/57 (!) 89 %       Intake/Output Summary (Last 24 hours) at 4/24/2021 0755  Last data filed at 4/24/2021 0532  Gross per 24 hour   Intake 900 ml   Output 500 ml   Net 400 ml        I had a face to face encounter and independently examined this patient on 4/24/2021, as outlined below:  PHYSICAL EXAM:  General: WD, WN. Alert, cooperative, no acute distress    EENT:  EOMI. Anicteric sclerae. MMM  Resp:  CTA bilaterally, no wheezing or rales. No accessory muscle use  CV:  Regular  rhythm,  No edema  GI:  Soft, Non distended, Non tender. +Bowel sounds  Neurologic:  Alert and oriented X 3, normal speech,   Psych:   Good insight. Not anxious nor agitated      Reviewed most current lab test results and cultures  YES  Reviewed most current radiology test results   YES  Review and summation of old records today    NO  Reviewed patient's current orders and MAR    YES  PMH/SH reviewed - no change compared to H&P  ________________________________________________________________________  Care Plan discussed with:    Comments   Patient x    Family      RN x    Care Manager     Consultant                       x Multidiciplinary team rounds were held today with , nursing, pharmacist and clinical coordinator. Patient's plan of care was discussed; medications were reviewed and discharge planning was addressed.      ________________________________________________________________________  Total NON critical care TIME: 35  Minutes    Total CRITICAL CARE TIME Spent:   Minutes non procedure based      Comments   >50% of visit spent in counseling and coordination of care ________________________________________________________________________  Therese Nagy MD     Procedures: see electronic medical records for all procedures/Xrays and details which were not copied into this note but were reviewed prior to creation of Plan. LABS:  I reviewed today's most current labs and imaging studies. Pertinent labs include:  Recent Labs     04/23/21  0359 04/22/21  1153 04/22/21  0026 04/21/21  1812   WBC 17.5*  --  15.2* 16.3*   HGB 7.0* 7.2* 7.8* 7.9*   HCT 22.3* 22.1* 24.3* 24.4*   *  --  323 339     Recent Labs     04/23/21  0359 04/22/21  0026 04/21/21  0850    134* 134*   K 3.3* 3.2* 3.5    104 104   CO2 22 21 19*   * 141* 131*   BUN 35* 33* 33*   CREA 1.14* 1.50* 1.66*   CA 8.1* 8.2* 8.0*   MG  --  2.4  --    PHOS  --  1.7*  --        Signed:  Therese Nagy MD

## 2021-04-25 LAB
ANION GAP SERPL CALC-SCNC: 7 MMOL/L (ref 5–15)
BACTERIA SPEC CULT: NORMAL
BASOPHILS # BLD: 0 K/UL (ref 0–0.1)
BASOPHILS NFR BLD: 0 % (ref 0–1)
BUN SERPL-MCNC: 27 MG/DL (ref 6–20)
BUN/CREAT SERPL: 37 (ref 12–20)
CALCIUM SERPL-MCNC: 8.2 MG/DL (ref 8.5–10.1)
CHLORIDE SERPL-SCNC: 110 MMOL/L (ref 97–108)
CO2 SERPL-SCNC: 21 MMOL/L (ref 21–32)
CREAT SERPL-MCNC: 0.73 MG/DL (ref 0.55–1.02)
DIFFERENTIAL METHOD BLD: ABNORMAL
EOSINOPHIL # BLD: 0.5 K/UL (ref 0–0.4)
EOSINOPHIL NFR BLD: 3 % (ref 0–7)
ERYTHROCYTE [DISTWIDTH] IN BLOOD BY AUTOMATED COUNT: 18 % (ref 11.5–14.5)
GLUCOSE SERPL-MCNC: 96 MG/DL (ref 65–100)
HCT VFR BLD AUTO: 27.5 % (ref 35–47)
HGB BLD-MCNC: 8.2 G/DL (ref 11.5–16)
IMM GRANULOCYTES # BLD AUTO: 0 K/UL (ref 0–0.04)
IMM GRANULOCYTES NFR BLD AUTO: 0 % (ref 0–0.5)
LYMPHOCYTES # BLD: 3.3 K/UL (ref 0.8–3.5)
LYMPHOCYTES NFR BLD: 18 % (ref 12–49)
MCH RBC QN AUTO: 25.9 PG (ref 26–34)
MCHC RBC AUTO-ENTMCNC: 29.8 G/DL (ref 30–36.5)
MCV RBC AUTO: 86.8 FL (ref 80–99)
MONOCYTES # BLD: 1.1 K/UL (ref 0–1)
MONOCYTES NFR BLD: 6 % (ref 5–13)
NEUTS BAND NFR BLD MANUAL: 2 %
NEUTS SEG # BLD: 13.4 K/UL (ref 1.8–8)
NEUTS SEG NFR BLD: 71 % (ref 32–75)
NRBC # BLD: 0.26 K/UL (ref 0–0.01)
NRBC BLD-RTO: 1.4 PER 100 WBC
PLATELET # BLD AUTO: 407 K/UL (ref 150–400)
PMV BLD AUTO: 10 FL (ref 8.9–12.9)
POTASSIUM SERPL-SCNC: 4 MMOL/L (ref 3.5–5.1)
RBC # BLD AUTO: 3.17 M/UL (ref 3.8–5.2)
RBC MORPH BLD: ABNORMAL
SERVICE CMNT-IMP: NORMAL
SODIUM SERPL-SCNC: 138 MMOL/L (ref 136–145)
WBC # BLD AUTO: 18.3 K/UL (ref 3.6–11)

## 2021-04-25 PROCEDURE — 77030013575 HC DRSG HYDROFERA HOLL -B

## 2021-04-25 PROCEDURE — 74011250636 HC RX REV CODE- 250/636: Performed by: STUDENT IN AN ORGANIZED HEALTH CARE EDUCATION/TRAINING PROGRAM

## 2021-04-25 PROCEDURE — 65660000000 HC RM CCU STEPDOWN

## 2021-04-25 PROCEDURE — 77030040392 HC DRSG OPTIFOAM MDII -A

## 2021-04-25 PROCEDURE — 74011250637 HC RX REV CODE- 250/637: Performed by: INTERNAL MEDICINE

## 2021-04-25 PROCEDURE — 74011000258 HC RX REV CODE- 258: Performed by: STUDENT IN AN ORGANIZED HEALTH CARE EDUCATION/TRAINING PROGRAM

## 2021-04-25 PROCEDURE — 74011250636 HC RX REV CODE- 250/636: Performed by: INTERNAL MEDICINE

## 2021-04-25 PROCEDURE — 36415 COLL VENOUS BLD VENIPUNCTURE: CPT

## 2021-04-25 PROCEDURE — 74011250637 HC RX REV CODE- 250/637: Performed by: NURSE PRACTITIONER

## 2021-04-25 PROCEDURE — 99232 SBSQ HOSP IP/OBS MODERATE 35: CPT | Performed by: INTERNAL MEDICINE

## 2021-04-25 PROCEDURE — 77030038269 HC DRN EXT URIN PURWCK BARD -A

## 2021-04-25 PROCEDURE — 80048 BASIC METABOLIC PNL TOTAL CA: CPT

## 2021-04-25 PROCEDURE — 85025 COMPLETE CBC W/AUTO DIFF WBC: CPT

## 2021-04-25 RX ORDER — MIDODRINE HYDROCHLORIDE 5 MG/1
5 TABLET ORAL
Status: DISCONTINUED | OUTPATIENT
Start: 2021-04-25 | End: 2021-04-25

## 2021-04-25 RX ADMIN — PIPERACILLIN AND TAZOBACTAM 3.38 G: 3; .375 INJECTION, POWDER, LYOPHILIZED, FOR SOLUTION INTRAVENOUS at 15:41

## 2021-04-25 RX ADMIN — PIPERACILLIN AND TAZOBACTAM 3.38 G: 3; .375 INJECTION, POWDER, LYOPHILIZED, FOR SOLUTION INTRAVENOUS at 06:41

## 2021-04-25 RX ADMIN — MAGNESIUM OXIDE 400 MG (241.3 MG MAGNESIUM) TABLET 400 MG: TABLET at 18:10

## 2021-04-25 RX ADMIN — OXYCODONE 5 MG: 5 TABLET ORAL at 08:54

## 2021-04-25 RX ADMIN — PHENYLEPHRINE HYDROCHLORIDE 15 MCG/MIN: 10 INJECTION INTRAVENOUS at 01:40

## 2021-04-25 RX ADMIN — Medication: at 18:11

## 2021-04-25 RX ADMIN — PIPERACILLIN AND TAZOBACTAM 3.38 G: 3; .375 INJECTION, POWDER, LYOPHILIZED, FOR SOLUTION INTRAVENOUS at 23:25

## 2021-04-25 RX ADMIN — MAGNESIUM OXIDE 400 MG (241.3 MG MAGNESIUM) TABLET 400 MG: TABLET at 08:54

## 2021-04-25 RX ADMIN — PHENYLEPHRINE HYDROCHLORIDE 10 MCG/MIN: 10 INJECTION INTRAVENOUS at 01:05

## 2021-04-25 RX ADMIN — Medication 10 ML: at 06:09

## 2021-04-25 RX ADMIN — LEVOTHYROXINE SODIUM 25 MCG: 0.03 TABLET ORAL at 08:54

## 2021-04-25 RX ADMIN — PHENYLEPHRINE HYDROCHLORIDE 15 MCG/MIN: 10 INJECTION INTRAVENOUS at 00:18

## 2021-04-25 RX ADMIN — Medication 10 ML: at 15:36

## 2021-04-25 RX ADMIN — OXYCODONE 5 MG: 5 TABLET ORAL at 14:15

## 2021-04-25 RX ADMIN — Medication 10 ML: at 23:25

## 2021-04-25 RX ADMIN — ACETAMINOPHEN 650 MG: 325 TABLET ORAL at 14:15

## 2021-04-25 RX ADMIN — Medication: at 08:55

## 2021-04-25 RX ADMIN — ACETAMINOPHEN 650 MG: 325 TABLET ORAL at 08:54

## 2021-04-25 NOTE — PROGRESS NOTES
Hospitalist Progress Note    NAME: Samantha Blum   :  1950   MRN:  [id number]       Assessment / Plan:  Tachycardia , sinus 120-140 , borderline BP    With run of svt   cardiomyopathy   Anemia acute on chronic   Unremarkable  echocardiogram.   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 Cards recommend  lifevest prior to dc. F/u in 1-2 weeks as   hem onch on board for low hgb   Occult blood positive , GI help appreciated     Acute kidney injury, most likely prerenal versus ATN from vancomycin  Hypotension   Nephrology on board now and appreciated help   Continue current care   ? Infected decubitus ulcer stage IV on the sacral/coccyx area  Severe sepsis (leukocytosis, acute kidney injury, hypotension)  Patient is bedbound for about 4 years now  Persistent leukocytosis  Diarrhea suspected from ABX C. Diff negative , on imodium , rectal tube   CT abdomen and pelvis on  in chart . Patient has had this decubitus ulcer for the last 4 years, was getting wound care   ON zosyn ,ID on board   blood cultures negative to date, wound cultures Showed mixed veronica   Wound care , evaluated by surgery who did not retain any indication for debridement, recommended packing and wound care  Hypertension   Hyperlipidemia  Hypothyroidism  No BP meds seen in the med rec  Continue with Zetia with pharmacy substitution  Continue with levothyroxine  Now on midodrine   Code Status: Full code  Surrogate Decision Maker: her care giver patient is unsure of the last name  DVT Prophylaxis: Subcu heparin  GI Prophylaxis: not indicated  25.0 - 29.9 Overweight / Body mass index is 27.85 kg/m². Subjective:     Chief Complaint / Reason for Physician Visit  Patient was seen and examined she still having the diarrhea. But no other complaints. No fever or chills. Patient did not complain of shortness of breath or chest pain. Rectal tube still in place.    Discussed with RN events overnight. Review of Systems:  Symptom Y/N Comments  Symptom Y/N Comments   Fever/Chills y   Chest Pain n    Poor Appetite    Edema     Cough    Abdominal Pain n    Sputum    Joint Pain     SOB/PAL n   Pruritis/Rash     Nausea/vomit    Tolerating PT/OT     Diarrhea    Tolerating Diet y    Constipation y   Other       Could NOT obtain due to:      Objective:     VITALS:   Last 24hrs VS reviewed since prior progress note.  Most recent are:  Patient Vitals for the past 24 hrs:   Temp Pulse Resp BP SpO2   04/25/21 0700 97.5 °F (36.4 °C) 60 14 (!) 111/52 96 %   04/25/21 0400  61  (!) 107/55    04/25/21 0330 97.6 °F (36.4 °C) (!) 59 16 (!) 102/58 98 %   04/25/21 0326  60  (!) 104/54    04/25/21 0300  (!) 53  (!) 103/56    04/25/21 0226  60  109/61    04/25/21 0200  (!) 56  (!) 100/57    04/25/21 0151  (!) 53  (!) 91/56    04/25/21 0137  (!) 55  (!) 88/50    04/25/21 0100  (!) 56  (!) 106/54    04/25/21 0058  (!) 44      04/25/21 0044  (!) 54  (!) 102/55    04/25/21 0000  (!) 58  (!) 102/55    04/24/21 2300 97.4 °F (36.3 °C) (!) 58 14 (!) 106/54 97 %   04/24/21 2200  (!) 57  (!) 94/55    04/24/21 2119  69  (!) 95/52    04/24/21 2100  68  (!) 111/54    04/24/21 2000  65  116/63    04/24/21 1900 97.1 °F (36.2 °C) (!) 59 16 (!) 101/57 97 %   04/24/21 1800  61  (!) 107/52    04/24/21 1700  (!) 59  (!) 91/46    04/24/21 1600  66  116/67    04/24/21 1500 97.1 °F (36.2 °C) 70 14 (!) 106/53 98 %   04/24/21 1400  70  115/60    04/24/21 1316  (!) 57  107/67    04/24/21 1126 97.2 °F (36.2 °C) 67 12 (!) 92/58 98 %   04/24/21 1000  (!) 59  103/63 97 %   04/24/21 0900  (!) 59  106/61 97 %   04/24/21 0800  68  111/64 97 %       Intake/Output Summary (Last 24 hours) at 4/25/2021 0755  Last data filed at 4/25/2021 0400  Gross per 24 hour   Intake 300 ml   Output    Net 300 ml        I had a face to face encounter and independently examined this patient on 4/25/2021, as outlined below:  PHYSICAL EXAM:  General: WD, WN. Alert, cooperative, no acute distress    EENT:  EOMI. Anicteric sclerae. MMM  Resp:  CTA bilaterally, no wheezing or rales. No accessory muscle use  CV:  Regular  rhythm,  No edema  GI:  Soft, Non distended, Non tender. +Bowel sounds  Neurologic:  Alert and oriented X 3, normal speech,   Psych:   Good insight. Not anxious nor agitated      Reviewed most current lab test results and cultures  YES  Reviewed most current radiology test results   YES  Review and summation of old records today    NO  Reviewed patient's current orders and MAR    YES  PMH/SH reviewed - no change compared to H&P  ________________________________________________________________________  Care Plan discussed with:    Comments   Patient x    Family      RN x    Care Manager     Consultant                       x Multidiciplinary team rounds were held today with , nursing, pharmacist and clinical coordinator. Patient's plan of care was discussed; medications were reviewed and discharge planning was addressed. ________________________________________________________________________  Total NON critical care TIME: 35  Minutes    Total CRITICAL CARE TIME Spent:   Minutes non procedure based      Comments   >50% of visit spent in counseling and coordination of care     ________________________________________________________________________  Humera Richard MD     Procedures: see electronic medical records for all procedures/Xrays and details which were not copied into this note but were reviewed prior to creation of Plan. LABS:  I reviewed today's most current labs and imaging studies.   Pertinent labs include:  Recent Labs     04/25/21  0500 04/24/21  1335 04/23/21  0359   WBC 18.3* 17.3* 17.5*   HGB 8.2* 7.8* 7.0*   HCT 27.5* 26.3* 22.3*   * 409* 423*     Recent Labs     04/25/21  0500 04/24/21  1335 04/23/21  0359    136 136   K 4.0 5.0 3.3*   * 108 106   CO2 21 21 22   GLU 96 102* 101*   BUN 27* 30* 35*   CREA 0.73 0.89 1.14*   CA 8.2* 7.8* 8.1*       Signed:  Adelaide Melgzoa MD

## 2021-04-25 NOTE — PROGRESS NOTES
-Hematology / Oncology ([facility]) -  -Primary Oncologist-   -CC-  F/u anemia    -S-  No events    -O-      Patient Vitals for the past 24 hrs:   Temp Pulse Resp BP SpO2   04/25/21 1100 97.1 °F (36.2 °C) (!) 58 14 (!) 99/54 98 %   04/25/21 1029  (!) 57  (!) 91/49    04/25/21 1000  65  (!) 95/50    04/25/21 0900  62  (!) 110/56    04/25/21 0848  61  108/64    04/25/21 0700 97.5 °F (36.4 °C) 60 14 (!) 111/52 96 %   04/25/21 0400  61  (!) 107/55    04/25/21 0330 97.6 °F (36.4 °C) (!) 59 16 (!) 102/58 98 %   04/25/21 0326  60  (!) 104/54    04/25/21 0300  (!) 53  (!) 103/56    04/25/21 0226  60  109/61    04/25/21 0200  (!) 56  (!) 100/57    04/25/21 0151  (!) 53  (!) 91/56    04/25/21 0137  (!) 55  (!) 88/50    04/25/21 0100  (!) 56  (!) 106/54    04/25/21 0058  (!) 44      04/25/21 0044  (!) 54  (!) 102/55    04/25/21 0000  (!) 58  (!) 102/55    04/24/21 2300 97.4 °F (36.3 °C) (!) 58 14 (!) 106/54 97 %   04/24/21 2200  (!) 57  (!) 94/55    04/24/21 2119  69  (!) 95/52    04/24/21 2100  68  (!) 111/54    04/24/21 2000  65  116/63    04/24/21 1900 97.1 °F (36.2 °C) (!) 59 16 (!) 101/57 97 %   04/24/21 1800  61  (!) 107/52    04/24/21 1700  (!) 59  (!) 91/46    04/24/21 1600  66  116/67    04/24/21 1500 97.1 °F (36.2 °C) 70 14 (!) 106/53 98 %   04/24/21 1400  70  115/60    04/24/21 1316  (!) 57  107/67      No intake/output data recorded. Gen: nad  Chest: bilateral breath sounds present  Cardiac: rrr  Abd: s/nt    -Labs-    Recent Labs     04/25/21  0500 04/24/21  1335 04/23/21  0359 04/22/21  2253   WBC 18.3* 17.3* 17.5*  --    HGB 8.2* 7.8* 7.0*  --    * 409* 423*  --    ANEU 13.4* 11.9* 12.9*  --    APTT  --   --  52.8* 42.6*    136 136  --    K 4.0 5.0 3.3*  --    GLU 96 102* 101*  --    BUN 27* 30* 35*  --    CREA 0.73 0.89 1.14*  --    CA 8.2* 7.8* 8.1*  --        -Imaging-       -Assessment + Plan-     1.   Anemia  -- initial anemia labs neg  -- noted to be hemoccult positive - GI on board  -- We will f/u Monday on additional labs sent

## 2021-04-25 NOTE — PROGRESS NOTES
Bedside shift change report given to Cris Rebolledo RN (oncoming nurse) by Pranav Roman RN (offgoing nurse). Report included the following information SBAR, Kardex, Procedure Summary, Intake/Output, MAR, Accordion, Recent Results, Med Rec Status, Cardiac Rhythm NSR, Alarm Parameters , Pre Procedure Checklist, Procedure Verification, Quality Measures and Dual Neuro Assessment.

## 2021-04-25 NOTE — PROGRESS NOTES
Pt. Insisted to have PICC team collect blood for AM lab. However, PICC team will not be available on Sunday. Pt. Agree to let nursing staff to try to collect blood only twice. Pt. Refused arterial stick.

## 2021-04-25 NOTE — PROGRESS NOTES
[facility] End of Shift Note  Tele:  Significant tele event? other Sinus [place] with HR: mid 40s bpm noted when pt. sleeping  Time/details of tele event: in flow sheet    I&O/ Daily Weight:  Strict I&O ordered? YES  Fluid restriction ordered: NO  PO intake since midnight: N/A    Daily weight ordered: NO  Today's weight:     Yesterday's weight: 80.65 kg   Significant weight gain reported to MD? N/A    Procedure:  NPO order for upcoming test/procedure present? N/A  Procedure/test: N/A  Date of procedure: N/A    Discharge:  Times ambulated in hallways past shift: 0        Times OOB to chair past shift: 0  Plan/Discharge barriers identified?:   Has this patient received a stent this admission? NO  If yes, verify patient has aspirin, antiplatelet, statin, BB, ACE/ARB (for EF < 40%) ordered. N/A  If not ordered, which medication is missing? N/A N/A     Other concerns:  Any nurse/patient concerns to be addressed by MD? N/A    Bedside shift change report given to Hailee Umaña RN (oncoming nurse) by Jesse Olson (offgoing nurse). Report included the following information SBAR, Kardex, Procedure Summary, Intake/Output, MAR, Accordion, Recent Results, Med Rec Status, Cardiac Rhythm NSR, Sinus [place], PVC, Alarm Parameters , Pre Procedure Checklist, Procedure Verification, Quality Measures and Dual Neuro Assessment.      Alejandra Phan

## 2021-04-25 NOTE — PROGRESS NOTES
Cardiology Progress Note              [address], [address] [facility]  [phone]    4/25/2021 8:53 AM    Admit Date: 4/17/2021    Admit Diagnosis: Infected decubitus ulcer [L89.90, L08.9]    Subjective:     Joslyn Villalta   admits to diarrhea.     Visit Vitals  /64   Pulse 61   Temp 97.5 °F (36.4 °C)   Resp 14   Ht 5' 7\" (1.702 m)   Wt 177 lb 12.8 oz (80.6 kg)   SpO2 96%   BMI 27.85 kg/m²     Current Facility-Administered Medications   Medication Dose Route Frequency    oxyCODONE IR (ROXICODONE) tablet 5 mg  5 mg Oral Q4H PRN    oxyCODONE IR (ROXICODONE) tablet 10 mg  10 mg Oral Q4H PRN    loperamide (IMODIUM) capsule 2 mg  2 mg Oral Q4H PRN    sodium chloride (NS) flush 5-40 mL  5-40 mL IntraVENous Q8H    sodium chloride (NS) flush 5-40 mL  5-40 mL IntraVENous PRN    potassium chloride SR (KLOR-CON 10) tablet 40 mEq  40 mEq Oral DAILY    piperacillin-tazobactam (ZOSYN) 3.375 g in 0.9% sodium chloride (MBP/ADV) 100 mL MBP  3.375 g IntraVENous Q8H    PHENYLephrine (NAVIN-SYNEPHRINE) 30 mg in 0.9% sodium chloride 250 mL infusion   mcg/min IntraVENous TITRATE    zinc oxide-cod liver oil (DESITIN) 40 % paste   Topical BID    acetaminophen (TYLENOL) tablet 650 mg  650 mg Oral Q4H PRN    Or    acetaminophen (TYLENOL) suppository 650 mg  650 mg Rectal Q6H PRN    polyethylene glycol (MIRALAX) packet 17 g  17 g Oral DAILY PRN    promethazine (PHENERGAN) tablet 12.5 mg  12.5 mg Oral Q6H PRN    Or    ondansetron (ZOFRAN) injection 4 mg  4 mg IntraVENous Q6H PRN    polyethylene glycol (MIRALAX) packet 17 g  17 g Oral BID    bisacodyL (DULCOLAX) tablet 5 mg  5 mg Oral DAILY    levothyroxine (SYNTHROID) tablet 25 mcg  25 mcg Oral ACB    magnesium oxide (MAG-OX) tablet 400 mg  400 mg Oral BID         Objective:      Visit Vitals  /64   Pulse 61   Temp 97.5 °F (36.4 °C)   Resp 14   Ht 5' 7\" (1.702 m)   Wt 177 lb 12.8 oz (80.6 kg)   SpO2 96%   BMI 27.85 kg/m²       Physical Exam:  General:  Alert, cooperative, well noursished, well developed, appears stated age   Eyes:  Sclera anicteric. Pupils equally round and reactive to light. Mouth/Throat: Mucous membranes normal, oral pharynx clear   Neck: Supple   Lungs:   Clear to auscultation bilaterally, good effort   CV:  Regular rate and rhythm,no murmur, click, rub or gallop   Abdomen:   Soft, non-tender. bowel sounds normal. non-distended   Extremities: No cyanosis or edema   Skin: stage iv sacral decub ulcer   Lymph nodes: Cervical and supraclavicular normal   Musculoskeletal: No swelling or deformity, decreased le strength       Psych: Alert and oriented, normal mood affect given the setting         Data Review:   Labs:    Recent Labs     04/25/21  0500 04/24/21  1335 04/23/21  0359   WBC 18.3* 17.3* 17.5*   HGB 8.2* 7.8* 7.0*   HCT 27.5* 26.3* 22.3*   * 409* 423*     Recent Labs     04/25/21  0500 04/24/21  1335 04/23/21  0359    136 136   K 4.0 5.0 3.3*   * 108 106   CO2 21 21 22   GLU 96 102* 101*   BUN 27* 30* 35*   CREA 0.73 0.89 1.14*   CA 8.2* 7.8* 8.1*       No results for input(s): TROIQ, CPK, CKMB in the last 72 hours. Intake/Output Summary (Last 24 hours) at 4/25/2021 0853  Last data filed at 4/25/2021 0400  Gross per 24 hour   Intake 300 ml   Output    Net 300 ml        Telemetry: nsr    Assessment:     Principal Problem:    Sepsis ([address] (12/16/2020)    Active Problems:    Infected decubitus ulcer (4/17/2021)      HTN (hypertension) (4/21/2021)      HLD (hyperlipidemia) (4/21/2021)      Cardiomyopathy ([address] (4/23/2021)      Anemia (4/23/2021)      Elevated troponin (4/23/2021)        Plan:     Joslyn Villalta is on padilla gtt. Will attempt to wean during the day. Hypotension secondary to sepsis. Cont iv abx. Unable to tolerate med rx for cardiomyopathy. In light of her cardiomyopathy, I think sbp in the 80s/90s would be acceptable.  Dr Roscoe Burkitt to see in the am    Lena Barrientos MD, [facility] [place], [facility]    4/25/2021

## 2021-04-25 NOTE — PROGRESS NOTES
GI Progress Note Neff PatelTor Villalta XSB:[id number] XCJ:[id number]   Olga Bey MD  PCP: Jace Dalal  Date/Time:  4/25/2021 10:15 AM   Assessment:   · Chronic constipation- was on Miralax as OP  · Acute diarrhea- developed after being on multiple abx; w/u negative to date with no evidence of c.dif or inflammation  · Guaiac positive stool- multiple considerations including use of NSAIDs, flexiseal, etc.  Cannot exclude gastritis or polyp but not appropriate to w/u this pt who has never CMP, is intolerant of medicinal therapies and remains on pressors  · Anemia- in midst of eval with Hematology     Plan:   · Optimize cardiac status  · Stop Miralax order  · Await final evaluation by Hematology  · Would not do EGD or colonoscopy at this time  · We will see if notes from her [facility] stay with prior GI eval can be made available for review     Subjective:   Discussed with RN events overnight. REmains on Zachray gtt. Continued loose stool in Flexiseal    Complaint Y/N Description   Abdominal Pain n    Hematemesis n    Hematochezia n    Melena n    Constipation n    Diarrhea y    Dyspepsia n    Dysphagia n    Jaundiced n    Nausea/vomiting n      Review of Systems:  Symptom Y/N Comments  Symptom Y/N Comments   Fever/Chills n   Chest Pain n    Cough n   Headaches n    Sputum n   Joint Pain n    SOB/PAL n   Pruritis/Rash n    Tolerating Diet y cardiac  Other       Could NOT obtain due to:      Objective:   VITALS:   Last 24hrs VS reviewed since prior progress note. Most recent are:  Visit Vitals  BP (!) 95/50   Pulse 65   Temp 97.5 °F (36.4 °C)   Resp 14   Ht 5' 7\" (1.702 m)   Wt 80.6 kg (177 lb 12.8 oz)   SpO2 96%   BMI 27.85 kg/m²       Intake/Output Summary (Last 24 hours) at 4/25/2021 1015  Last data filed at 4/25/2021 0400  Gross per 24 hour   Intake 300 ml   Output    Net 300 ml     PHYSICAL EXAM:  General: WD, WN. Alert, cooperative, no acute distress    HEENT: NC, Atraumatic. PERRL. Anicteric sclerae. Lungs:  CTA Bilaterally. No Wheezing/Rhonchi/Rales. Heart:  Regular  rhythm,  No murmur/Rub/Gallops  Abdomen: Soft, Non distended, Non tender.  +BS  Extremities: No c/c/e  Neurologic:  CN 2-12 gi, A/O X 3. No acute neurological distress. Weak b/l LE  Psych:   Good insight. Not anxious nor agitated. Lab and Radiology Data Reviewed: (see below)    Medications Reviewed: (see below)  PMH/SH reviewed - no change compared to H&P  ________________________________________________________________________  Total time spent with patient: 15 minutes ________________________________________________________________________  Care Plan discussed with:  Patient y   Family     RN y              Consultant:       Alvin Barrett MD     Procedures: see electronic medical records for all procedures/Xrays and details which were not copied into this note but were reviewed prior to creation of Plan. LABS:  Recent Labs     04/25/21  0500 04/24/21  1335   WBC 18.3* 17.3*   HGB 8.2* 7.8*   HCT 27.5* 26.3*   * 409*     Recent Labs     04/25/21  0500 04/24/21  1335 04/23/21  0359    136 136   K 4.0 5.0 3.3*   * 108 106   CO2 21 21 22   BUN 27* 30* 35*   CREA 0.73 0.89 1.14*   GLU 96 102* 101*   CA 8.2* 7.8* 8.1*     No results for input(s): AP, TBIL, TP, ALB, GLOB, GGT, AML, LPSE in the last 72 hours. No lab exists for component: SGOT, GPT, AMYP, HLPSE  Recent Labs     04/23/21  0359 04/22/21  2253 04/22/21  1153   APTT 52.8* 42.6* 34.7*      Recent Labs     04/23/21  0936   FERR 1,974*      Lab Results   Component Value Date/Time    Folate 8.1 04/23/2021 09:36 AM     No results for input(s): PH, PCO2, PO2 in the last 72 hours. No results for input(s): CPK, CKMB in the last 72 hours.     No lab exists for component: TROPONINI  Lab Results   Component Value Date/Time    Color YELLOW/STRAW 04/21/2021 02:53 AM    Appearance TURBID (A) 04/21/2021 02:53 AM    Specific gravity 1.025 04/21/2021 02:53 AM pH (UA) 6.5 04/21/2021 02:53 AM    Protein 100 (A) 04/21/2021 02:53 AM    Glucose Negative 04/21/2021 02:53 AM    Ketone TRACE (A) 04/21/2021 02:53 AM    Bilirubin Negative 04/21/2021 02:53 AM    Urobilinogen 0.2 04/21/2021 02:53 AM    Nitrites Negative 04/21/2021 02:53 AM    Leukocyte Esterase LARGE (A) 04/21/2021 02:53 AM    Epithelial cells FEW 04/21/2021 02:53 AM    Bacteria 4+ (A) 04/21/2021 02:53 AM    WBC  04/21/2021 02:53 AM    RBC 20-50 04/21/2021 02:53 AM       MEDICATIONS:  Current Facility-Administered Medications   Medication Dose Route Frequency    oxyCODONE IR (ROXICODONE) tablet 5 mg  5 mg Oral Q4H PRN    oxyCODONE IR (ROXICODONE) tablet 10 mg  10 mg Oral Q4H PRN    loperamide (IMODIUM) capsule 2 mg  2 mg Oral Q4H PRN    sodium chloride (NS) flush 5-40 mL  5-40 mL IntraVENous Q8H    sodium chloride (NS) flush 5-40 mL  5-40 mL IntraVENous PRN    piperacillin-tazobactam (ZOSYN) 3.375 g in 0.9% sodium chloride (MBP/ADV) 100 mL MBP  3.375 g IntraVENous Q8H    PHENYLephrine (NAVIN-SYNEPHRINE) 30 mg in 0.9% sodium chloride 250 mL infusion   mcg/min IntraVENous TITRATE    zinc oxide-cod liver oil (DESITIN) 40 % paste   Topical BID    acetaminophen (TYLENOL) tablet 650 mg  650 mg Oral Q4H PRN    Or    acetaminophen (TYLENOL) suppository 650 mg  650 mg Rectal Q6H PRN    polyethylene glycol (MIRALAX) packet 17 g  17 g Oral DAILY PRN    promethazine (PHENERGAN) tablet 12.5 mg  12.5 mg Oral Q6H PRN    Or    ondansetron (ZOFRAN) injection 4 mg  4 mg IntraVENous Q6H PRN    polyethylene glycol (MIRALAX) packet 17 g  17 g Oral BID    bisacodyL (DULCOLAX) tablet 5 mg  5 mg Oral DAILY    levothyroxine (SYNTHROID) tablet 25 mcg  25 mcg Oral ACB    magnesium oxide (MAG-OX) tablet 400 mg  400 mg Oral BID

## 2021-04-25 NOTE — PROGRESS NOTES
Attempted follow up visit on [facility] for ongoing pastoral support. Unable to visit with patient as she was actively receiving nursing care.    available upon referral by nurse or by patient request.       JANE Pete, [facility], Staff [address]    [address] Paging Service  [phone]

## 2021-04-26 ENCOUNTER — APPOINTMENT (OUTPATIENT)
Dept: GENERAL RADIOLOGY | Age: 71
DRG: 871 | End: 2021-04-26
Attending: NURSE PRACTITIONER
Payer: MEDICARE

## 2021-04-26 LAB
ANION GAP SERPL CALC-SCNC: 6 MMOL/L (ref 5–15)
BASOPHILS # BLD: 0 K/UL (ref 0–0.1)
BASOPHILS NFR BLD: 0 % (ref 0–1)
BUN SERPL-MCNC: 22 MG/DL (ref 6–20)
BUN/CREAT SERPL: 24 (ref 12–20)
CALCIUM SERPL-MCNC: 8.2 MG/DL (ref 8.5–10.1)
CHLORIDE SERPL-SCNC: 110 MMOL/L (ref 97–108)
CO2 SERPL-SCNC: 24 MMOL/L (ref 21–32)
CREAT SERPL-MCNC: 0.92 MG/DL (ref 0.55–1.02)
DIFFERENTIAL METHOD BLD: ABNORMAL
EOSINOPHIL # BLD: 0.5 K/UL (ref 0–0.4)
EOSINOPHIL NFR BLD: 3 % (ref 0–7)
ERYTHROCYTE [DISTWIDTH] IN BLOOD BY AUTOMATED COUNT: 18.3 % (ref 11.5–14.5)
GLUCOSE SERPL-MCNC: 94 MG/DL (ref 65–100)
HCT VFR BLD AUTO: 27.3 % (ref 35–47)
HGB BLD-MCNC: 8.4 G/DL (ref 11.5–16)
IMM GRANULOCYTES # BLD AUTO: 0 K/UL (ref 0–0.04)
IMM GRANULOCYTES NFR BLD AUTO: 0 % (ref 0–0.5)
LYMPHOCYTES # BLD: 4.2 K/UL (ref 0.8–3.5)
LYMPHOCYTES NFR BLD: 24 % (ref 12–49)
MCH RBC QN AUTO: 26.3 PG (ref 26–34)
MCHC RBC AUTO-ENTMCNC: 30.8 G/DL (ref 30–36.5)
MCV RBC AUTO: 85.6 FL (ref 80–99)
MONOCYTES # BLD: 0.3 K/UL (ref 0–1)
MONOCYTES NFR BLD: 2 % (ref 5–13)
NEUTS BAND NFR BLD MANUAL: 1 %
NEUTS SEG # BLD: 12.3 K/UL (ref 1.8–8)
NEUTS SEG NFR BLD: 70 % (ref 32–75)
NRBC # BLD: 0.16 K/UL (ref 0–0.01)
NRBC BLD-RTO: 0.9 PER 100 WBC
PLATELET # BLD AUTO: 413 K/UL (ref 150–400)
PMV BLD AUTO: 9.5 FL (ref 8.9–12.9)
POTASSIUM SERPL-SCNC: 4 MMOL/L (ref 3.5–5.1)
RBC # BLD AUTO: 3.19 M/UL (ref 3.8–5.2)
RBC MORPH BLD: ABNORMAL
SODIUM SERPL-SCNC: 140 MMOL/L (ref 136–145)
WBC # BLD AUTO: 17.3 K/UL (ref 3.6–11)

## 2021-04-26 PROCEDURE — 74011250637 HC RX REV CODE- 250/637: Performed by: INTERNAL MEDICINE

## 2021-04-26 PROCEDURE — 74011250636 HC RX REV CODE- 250/636: Performed by: STUDENT IN AN ORGANIZED HEALTH CARE EDUCATION/TRAINING PROGRAM

## 2021-04-26 PROCEDURE — 74011250637 HC RX REV CODE- 250/637: Performed by: NURSE PRACTITIONER

## 2021-04-26 PROCEDURE — 85025 COMPLETE CBC W/AUTO DIFF WBC: CPT

## 2021-04-26 PROCEDURE — 36415 COLL VENOUS BLD VENIPUNCTURE: CPT

## 2021-04-26 PROCEDURE — 74011000258 HC RX REV CODE- 258: Performed by: STUDENT IN AN ORGANIZED HEALTH CARE EDUCATION/TRAINING PROGRAM

## 2021-04-26 PROCEDURE — 99223 1ST HOSP IP/OBS HIGH 75: CPT | Performed by: INTERNAL MEDICINE

## 2021-04-26 PROCEDURE — 99233 SBSQ HOSP IP/OBS HIGH 50: CPT | Performed by: INTERNAL MEDICINE

## 2021-04-26 PROCEDURE — 74018 RADEX ABDOMEN 1 VIEW: CPT

## 2021-04-26 PROCEDURE — 65660000000 HC RM CCU STEPDOWN

## 2021-04-26 PROCEDURE — 80048 BASIC METABOLIC PNL TOTAL CA: CPT

## 2021-04-26 PROCEDURE — 77030020291 HC FLEXSEAL FMS BMS -C

## 2021-04-26 PROCEDURE — 74011250636 HC RX REV CODE- 250/636: Performed by: INTERNAL MEDICINE

## 2021-04-26 RX ADMIN — Medication 10 ML: at 23:12

## 2021-04-26 RX ADMIN — ACETAMINOPHEN 650 MG: 325 TABLET ORAL at 12:37

## 2021-04-26 RX ADMIN — MAGNESIUM OXIDE 400 MG (241.3 MG MAGNESIUM) TABLET 400 MG: TABLET at 18:33

## 2021-04-26 RX ADMIN — Medication 10 ML: at 06:37

## 2021-04-26 RX ADMIN — PHENYLEPHRINE HYDROCHLORIDE 10 MCG/MIN: 10 INJECTION INTRAVENOUS at 04:08

## 2021-04-26 RX ADMIN — PIPERACILLIN AND TAZOBACTAM 3.38 G: 3; .375 INJECTION, POWDER, LYOPHILIZED, FOR SOLUTION INTRAVENOUS at 23:12

## 2021-04-26 RX ADMIN — LEVOTHYROXINE SODIUM 25 MCG: 0.03 TABLET ORAL at 09:05

## 2021-04-26 RX ADMIN — Medication 10 ML: at 13:50

## 2021-04-26 RX ADMIN — OXYCODONE 5 MG: 5 TABLET ORAL at 12:38

## 2021-04-26 RX ADMIN — Medication: at 09:06

## 2021-04-26 RX ADMIN — MAGNESIUM OXIDE 400 MG (241.3 MG MAGNESIUM) TABLET 400 MG: TABLET at 09:05

## 2021-04-26 RX ADMIN — PIPERACILLIN AND TAZOBACTAM 3.38 G: 3; .375 INJECTION, POWDER, LYOPHILIZED, FOR SOLUTION INTRAVENOUS at 06:36

## 2021-04-26 RX ADMIN — PIPERACILLIN AND TAZOBACTAM 3.38 G: 3; .375 INJECTION, POWDER, LYOPHILIZED, FOR SOLUTION INTRAVENOUS at 15:31

## 2021-04-26 NOTE — PROGRESS NOTES
GI PROGRESS NOTE  Lior Dasilva, PREET  [phone] NP in-hospital cell phone M-F until 4:30  After 5pm or on weekends, please call  for physician on call    Beatriz Villalta :1950 [id number]   ATTG: Dr José Miguel Morrow  PCP: [facility]  Date/Time:  2021 10:15 AM     Primary GI: Dr. Terrie Pool - Dr Sony Araujo covering    Assessment:   Chronic constipation Now with Acute Diaarhea  - On outpatient Miralax  - Diarrhea started after being on multiple abx for DU at sacrum  - Negative C. Diff and WBC  - Loose stool, with flexiseal now  - New with admission, get ensure daily - ?this causing diarrhea?  - Guaiac positive stool- multiple considerations including use of NSAIDs, flexiseal, etc.  Cannot exclude gastritis or polyp but not appropriate to w/u this pt who has never CMP, is intolerant of medicinal therapies  Anemia- in midst of eval with Hematology     Plan:   · Will get KUB to assess stool burden  · If KUB okay, will start metamucil daily  · Optimize cardiac status  · Await final evaluation by Hematology  · Would not do EGD or colonoscopy at this time    Will continue to follow. Plan discussed with Dr Sony Araujo. Subjective:   Discussed with RN events overnight. Doing Okay today, no abdominal pain just loose stools that leak around flexiseal    Complaint Y/N Description   Abdominal Pain n    Hematemesis n    Hematochezia n    Melena n    Constipation n    Diarrhea y    Dyspepsia n    Dysphagia n    Jaundiced n    Nausea/vomiting n      Review of Systems:  Symptom Y/N Comments  Symptom Y/N Comments   Fever/Chills    Chest Pain     Cough    Headaches     Sputum    Joint Pain     SOB/PAL    Pruritis/Rash     Tolerating Diet y   Other       Could NOT obtain due to:      Objective:   VITALS:   Last 24hrs VS reviewed since prior progress note.  Most recent are:  Visit Vitals  /61   Pulse 69   Temp 98 °F (36.7 °C)   Resp 14   Ht 5' 7\" (1.702 m)   Wt 80.6 kg (177 lb 12.8 oz)   SpO2 95% BMI 27.85 kg/m²       Intake/Output Summary (Last 24 hours) at 4/26/2021 1051  Last data filed at 4/26/2021 0913  Gross per 24 hour   Intake 200 ml   Output 1850 ml   Net -1650 ml     PHYSICAL EXAM:  General: WD, WN. Alert, cooperative, no acute distress    HEENT: NC, Atraumatic. Anicteric sclerae. Lungs:  CTA Bilaterally. No Wheezing/Rhonchi/Rales. Heart:  Regular  rhythm,  No murmur No Rubs, No Gallops  Abdomen: Soft, Non distended, Non tender.  +Bowel sounds, no HSM  Extremities: No c/c/e. BLE weakness. Neurologic:  Alert and oriented X 3. Psych:   Good insight. Not anxious nor agitated. Lab and Radiology Data Reviewed: (see below)    Medications Reviewed: (see below)  PMH/SH reviewed - no change compared to H&P  ________________________________________________________________________  Total time spent with patient: 20 minutes ________________________________________________________________________  Care Plan discussed with:  Patient y   Family     RN y              Consultant: Jose Elias Frausto NP     Procedures: see electronic medical records for all procedures/Xrays and details which were not copied into this note but were reviewed prior to creation of Plan. LABS:  Recent Labs     04/25/21  0500 04/24/21  1335   WBC 18.3* 17.3*   HGB 8.2* 7.8*   HCT 27.5* 26.3*   * 409*     Recent Labs     04/25/21  0500 04/24/21  1335    136   K 4.0 5.0   * 108   CO2 21 21   BUN 27* 30*   CREA 0.73 0.89   GLU 96 102*   CA 8.2* 7.8*     No results for input(s): AP, TBIL, TP, ALB, GLOB, GGT, AML, LPSE in the last 72 hours. No lab exists for component: SGOT, GPT, AMYP, HLPSE  No results for input(s): INR, PTP, APTT, INREXT, INREXT in the last 72 hours. No results for input(s): FE, TIBC, PSAT, FERR in the last 72 hours. Lab Results   Component Value Date/Time    Folate 8.1 04/23/2021 09:36 AM     No results for input(s): PH, PCO2, PO2 in the last 72 hours.   No results for input(s): CPK, CKMB in the last 72 hours.     No lab exists for component: TROPONINI  Lab Results   Component Value Date/Time    Color YELLOW/STRAW 04/21/2021 02:53 AM    Appearance TURBID (A) 04/21/2021 02:53 AM    Specific gravity 1.025 04/21/2021 02:53 AM    pH (UA) 6.5 04/21/2021 02:53 AM    Protein 100 (A) 04/21/2021 02:53 AM    Glucose Negative 04/21/2021 02:53 AM    Ketone TRACE (A) 04/21/2021 02:53 AM    Bilirubin Negative 04/21/2021 02:53 AM    Urobilinogen 0.2 04/21/2021 02:53 AM    Nitrites Negative 04/21/2021 02:53 AM    Leukocyte Esterase LARGE (A) 04/21/2021 02:53 AM    Epithelial cells FEW 04/21/2021 02:53 AM    Bacteria 4+ (A) 04/21/2021 02:53 AM    WBC  04/21/2021 02:53 AM    RBC 20-50 04/21/2021 02:53 AM       MEDICATIONS:  Current Facility-Administered Medications   Medication Dose Route Frequency    oxyCODONE IR (ROXICODONE) tablet 5 mg  5 mg Oral Q4H PRN    oxyCODONE IR (ROXICODONE) tablet 10 mg  10 mg Oral Q4H PRN    loperamide (IMODIUM) capsule 2 mg  2 mg Oral Q4H PRN    sodium chloride (NS) flush 5-40 mL  5-40 mL IntraVENous Q8H    sodium chloride (NS) flush 5-40 mL  5-40 mL IntraVENous PRN    piperacillin-tazobactam (ZOSYN) 3.375 g in 0.9% sodium chloride (MBP/ADV) 100 mL MBP  3.375 g IntraVENous Q8H    zinc oxide-cod liver oil (DESITIN) 40 % paste   Topical BID    acetaminophen (TYLENOL) tablet 650 mg  650 mg Oral Q4H PRN    Or    acetaminophen (TYLENOL) suppository 650 mg  650 mg Rectal Q6H PRN    promethazine (PHENERGAN) tablet 12.5 mg  12.5 mg Oral Q6H PRN    Or    ondansetron (ZOFRAN) injection 4 mg  4 mg IntraVENous Q6H PRN    levothyroxine (SYNTHROID) tablet 25 mcg  25 mcg Oral ACB    magnesium oxide (MAG-OX) tablet 400 mg  400 mg Oral BID

## 2021-04-26 NOTE — PROGRESS NOTES
Pt. Refused blood draw with nursing staff and requested for PICC team to collect AM lab. Pt. Refused turning. Pt. Refused lift to measure her weight. Weight from bed scale is inaccurate.

## 2021-04-26 NOTE — PROGRESS NOTES
Hospitalist Progress Note    NAME: Jasmin Garcia   :  1950   MRN:  [id number]       Assessment / Plan:  Tachycardia , sinus 120-140 , borderline BP    With run of svt   cardiomyopathy   Anemia acute on chronic   Unremarkable  echocardiogram.   BP is okay now , on and off presser , moving patient to PCU   S/p cardiac cath and no blocked coronaries   cardiomyopathy secondary to hypotension. Cards recommend  lifevest prior to dc. F/u in 1-2 weeks as   hem onch on board for low hgb   Occult blood positive , GI help appreciated     Acute kidney injury, most likely prerenal versus ATN from vancomycin  Hypotension   Nephrology on board now and appreciated help   Continue current care   ? Infected decubitus ulcer stage IV on the sacral/coccyx area  Severe sepsis (leukocytosis, acute kidney injury, hypotension)  Patient is bedbound for about 4 years now  Persistent leukocytosis  Diarrhea suspected from ABX C. Diff negative , on imodium , rectal tube   All stool softnesrs and laxatives were not being given and they are all stopped ! CT abdomen and pelvis on  in chart . Patient has had this decubitus ulcer for the last 4 years, was getting wound care   ON zosyn ,ID on board   blood cultures negative to date, wound cultures Showed mixed veronica   Wound care , evaluated by surgery who did not retain any indication for debridement, recommended packing and wound care  Hypertension   Hyperlipidemia  Hypothyroidism  Continue with Zetia with pharmacy substitution  Continue with levothyroxine  Now on midodrine   Code Status: Full code  Surrogate Decision Maker: her care giver patient is unsure of the last name  DVT Prophylaxis: Subcu heparin  GI Prophylaxis: not indicated  25.0 - 29.9 Overweight / Body mass index is 27.85 kg/m². Tried calling family member/relative and left voice mail to call back.       Subjective:     Chief Complaint / Reason for Physician Visit  Patient was seen and examined she still having the diarrhea. But no other complaints. No fever or chills. Patient did not complain of shortness of breath or chest pain. Rectal tube still in place. Discussed with RN events overnight. Review of Systems:  Symptom Y/N Comments  Symptom Y/N Comments   Fever/Chills y   Chest Pain n    Poor Appetite    Edema     Cough    Abdominal Pain n    Sputum    Joint Pain     SOB/PAL n   Pruritis/Rash     Nausea/vomit    Tolerating PT/OT     Diarrhea    Tolerating Diet y    Constipation y   Other       Could NOT obtain due to:      Objective:     VITALS:   Last 24hrs VS reviewed since prior progress note.  Most recent are:  Patient Vitals for the past 24 hrs:   Temp Pulse Resp BP SpO2   04/26/21 0430  (!) 51  (!) 104/50    04/26/21 0415  (!) 56  111/63    04/26/21 0409  (!) 45  102/66    04/26/21 0400  (!) 50  113/65    04/26/21 0300 98 °F (36.7 °C) (!) 53 14 (!) 117/55 95 %   04/26/21 0200  (!) 53  (!) 110/57    04/26/21 0100  (!) 49 14 (!) 96/55 95 %   04/26/21 0028  (!) 47      04/26/21 0000  (!) 48  (!) 97/53    04/25/21 2345  (!) 49      04/25/21 2337  (!) 39      04/25/21 2335  (!) 51      04/25/21 2332  (!) 39      04/25/21 2300 98.1 °F (36.7 °C) (!) 48 14 (!) 99/57 95 %   04/25/21 2253  (!) 48      04/25/21 2200  (!) 56  (!) 107/54    04/25/21 2100  62  109/64    04/25/21 2000 98.1 °F (36.7 °C) 61 12 (!) 96/53 96 %   04/25/21 1900  (!) 58  (!) 111/56    04/25/21 1800  60  111/62    04/25/21 1700  68  (!) 110/59    04/25/21 1600  (!) 57  (!) 104/55    04/25/21 1531 97.8 °F (36.6 °C) 67 12 (!) 95/53 96 %   04/25/21 1500  61  (!) 93/47    04/25/21 1400  (!) 58  (!) 89/44    04/25/21 1311  66  (!) 97/58    04/25/21 1200  64  (!) 101/44    04/25/21 1100 97.1 °F (36.2 °C) (!) 58 14 (!) 99/54 98 %   04/25/21 1029  (!) 57  (!) 91/49    04/25/21 1000  65  (!) 95/50    04/25/21 0900  62  (!) 110/56    04/25/21 0848  61  108/64  Intake/Output Summary (Last 24 hours) at 4/26/2021 0723  Last data filed at 4/26/2021 0424  Gross per 24 hour   Intake 200 ml   Output 1000 ml   Net -800 ml        I had a face to face encounter and independently examined this patient on 4/26/2021, as outlined below:  PHYSICAL EXAM:  General: WD, WN. Alert, cooperative, no acute distress    EENT:  EOMI. Anicteric sclerae. MMM  Resp:  CTA bilaterally, no wheezing or rales. No accessory muscle use  CV:  Regular  rhythm,  No edema  GI:  Soft, Non distended, Non tender. +Bowel sounds  Neurologic:  Alert and oriented X 3, normal speech,   Psych:   Good insight. Not anxious nor agitated      Reviewed most current lab test results and cultures  YES  Reviewed most current radiology test results   YES  Review and summation of old records today    NO  Reviewed patient's current orders and MAR    YES  PMH/SH reviewed - no change compared to H&P  ________________________________________________________________________  Care Plan discussed with:    Comments   Patient x    Family      RN x    Care Manager     Consultant                       x Multidiciplinary team rounds were held today with , nursing, pharmacist and clinical coordinator. Patient's plan of care was discussed; medications were reviewed and discharge planning was addressed. ________________________________________________________________________  Total NON critical care TIME: 35  Minutes    Total CRITICAL CARE TIME Spent:   Minutes non procedure based      Comments   >50% of visit spent in counseling and coordination of care     ________________________________________________________________________  Gautam Bruner MD     Procedures: see electronic medical records for all procedures/Xrays and details which were not copied into this note but were reviewed prior to creation of Plan. LABS:  I reviewed today's most current labs and imaging studies.   Pertinent labs include:  Recent Labs     04/25/21  0500 04/24/21  1335   WBC 18.3* 17.3*   HGB 8.2* 7.8*   HCT 27.5* 26.3*   * 409*     Recent Labs     04/25/21  0500 04/24/21  1335    136   K 4.0 5.0   * 108   CO2 21 21   GLU 96 102*   BUN 27* 30*   CREA 0.73 0.89   CA 8.2* 7.8*       Signed:  Kenny Hollins MD

## 2021-04-26 NOTE — PROGRESS NOTES
4/26/2021 0900 AM    Admit Date: 4/17/2021    Admit Diagnosis: Infected decubitus ulcer [L89.90, L08.9]    Subjective:     Joslyn Villalta  denies chest pain, chest pressure/discomfort, dyspnea, palpitations, irregular heart beats, near-syncope, syncope, fatigue, orthopnea, paroxysmal nocturnal dyspnea, exertional chest pressure/discomfort. She states \" I am starting to feel better\"     VSSpadilla dc'd this am.   H/H stable, WBC still elevated     Visit Vitals  /61   Pulse 69   Temp 98 °F (36.7 °C)   Resp 14   Ht 5' 7\" (1.702 m)   Wt 80.6 kg (177 lb 12.8 oz)   SpO2 95%   BMI 27.85 kg/m²     Current Facility-Administered Medications   Medication Dose Route Frequency    oxyCODONE IR (ROXICODONE) tablet 5 mg  5 mg Oral Q4H PRN    oxyCODONE IR (ROXICODONE) tablet 10 mg  10 mg Oral Q4H PRN    loperamide (IMODIUM) capsule 2 mg  2 mg Oral Q4H PRN    sodium chloride (NS) flush 5-40 mL  5-40 mL IntraVENous Q8H    sodium chloride (NS) flush 5-40 mL  5-40 mL IntraVENous PRN    piperacillin-tazobactam (ZOSYN) 3.375 g in 0.9% sodium chloride (MBP/ADV) 100 mL MBP  3.375 g IntraVENous Q8H    zinc oxide-cod liver oil (DESITIN) 40 % paste   Topical BID    acetaminophen (TYLENOL) tablet 650 mg  650 mg Oral Q4H PRN    Or    acetaminophen (TYLENOL) suppository 650 mg  650 mg Rectal Q6H PRN    promethazine (PHENERGAN) tablet 12.5 mg  12.5 mg Oral Q6H PRN    Or    ondansetron (ZOFRAN) injection 4 mg  4 mg IntraVENous Q6H PRN    levothyroxine (SYNTHROID) tablet 25 mcg  25 mcg Oral ACB    magnesium oxide (MAG-OX) tablet 400 mg  400 mg Oral BID         Objective:      Visit Vitals  /61   Pulse 69   Temp 98 °F (36.7 °C)   Resp 14   Ht 5' 7\" (1.702 m)   Wt 80.6 kg (177 lb 12.8 oz)   SpO2 95%   BMI 27.85 kg/m²       Physical Exam:  Resting comfortably. No resp distress. Abdomen: soft, non-tender.  Bowel sounds normal.   Extremities: extremities normal, atraumatic, no cyanosis or edema  Heart: regular rate and rhythm, S1, S2 normal, no murmur, click, rub or gallop  Lungs: clear to auscultation bilaterally    Data Review:   Labs:    No results found for this or any previous visit (from the past 24 hour(s)). Telemetry: NSR  ECHO: 4/22/2021  · LV: Estimated LVEF is 20 - 25%. Normal cavity size and wall thickness. Severely reduced systolic function. · MV: Mitral valve leaflet calcification. Mild mitral annular calcification. · TV: Moderate tricuspid valve regurgitation is present. Assessment:     Principal Problem:    Sepsis ([address] (12/16/2020)    Active Problems:    Infected decubitus ulcer (4/17/2021)      HTN (hypertension) (4/21/2021)      HLD (hyperlipidemia) (4/21/2021)      Cardiomyopathy ([address] (4/23/2021)      Anemia (4/23/2021)      Elevated troponin (4/23/2021)        Plan:     Sinus tachycardia and short run of SVT in presence of Sepsis d/t infected Decubitus sacral ulcer: improved, now NSR 80's  Now stable. Not much room for BB or CCB due to hypotension.      Cardiomyopathy, Elevated troponin: initial troponin 3.78, up to 4.25   No ACS symptoms this am  Echo noted- EF 20-25%  Cardiac cath 4/23/2021 no CAD. Possible takusubo. Will need lifevest prior to DC if ok with patient   Not much room for GDMT d/t hypotension, will monitor this admission      Hx HTN: currently periods of hypotension  Wean pressor off-padilla dc'd. Keep MAP > 60. Cardiomyopathy probably contributing to low pressure along with sepsis.      HLD:   Continue Zetia      CHELA: resolved   nephrology following   Cr WNL    Hx hypothyroidism:   Check TSH-WNL 1.64  On levothyroxine     Ami Burroughs,PREET  DNP,APRN,AG-ACNP-BC      Patient seen and examined by me with nurse practitioner. I personally performed all components of the history, physical, and medical decision making and agree with the assessment and plan as noted. Takotsubo cardiomyopathy. Add BB/ACEi once BP permits. Dc pressor. Keep MAP > 60.  No need for lifevest. Frandy Kolb MD

## 2021-04-26 NOTE — PROGRESS NOTES
Infectious Disease Progress Note         Interval:  NAEO  Cdiff negative  CHELA resolved. Subjective:   She feels well. No flank pain          Objective:    Vitals:   Patient Vitals for the past 24 hrs:   Temp Pulse Resp BP SpO2   04/26/21 1333  67  (!) 110/59    04/26/21 1219 98 °F (36.7 °C) 61 17 104/61 96 %   04/26/21 1030  61  (!) 93/59    04/26/21 1004  69  (!) 100/46    04/26/21 1000  70      04/26/21 0930  66  (!) 108/49    04/26/21 0900  (!) 57  (!) 99/50    04/26/21 0830  64  (!) 118/51    04/26/21 0800  69  116/61    04/26/21 0730 98.1 °F (36.7 °C) (!) 55 15 (!) 108/55 95 %   04/26/21 0700  (!) 53  (!) 104/56    04/26/21 0530  (!) 54  (!) 99/53    04/26/21 0500  (!) 52  (!) 98/52    04/26/21 0430  (!) 51  (!) 104/50    04/26/21 0415  (!) 56  111/63    04/26/21 0409  (!) 45  102/66    04/26/21 0400  (!) 50  113/65    04/26/21 0300 98 °F (36.7 °C) (!) 53 14 (!) 117/55 95 %   04/26/21 0200  (!) 53  (!) 110/57    04/26/21 0100  (!) 49 14 (!) 96/55 95 %   04/26/21 0028  (!) 47      04/26/21 0000  (!) 48  (!) 97/53    04/25/21 2345  (!) 49      04/25/21 2337  (!) 39      04/25/21 2335  (!) 51      04/25/21 2332  (!) 39      04/25/21 2300 98.1 °F (36.7 °C) (!) 48 14 (!) 99/57 95 %   04/25/21 2253  (!) 48      04/25/21 2200  (!) 56  (!) 107/54    04/25/21 2100  62  109/64    04/25/21 2000 98.1 °F (36.7 °C) 61 12 (!) 96/53 96 %   04/25/21 1900  (!) 58  (!) 111/56    04/25/21 1800  60  111/62    04/25/21 1700  68  (!) 110/59    04/25/21 1600  (!) 57  (!) 104/55    04/25/21 1531 97.8 °F (36.6 °C) 67 12 (!) 95/53 96 %   04/25/21 1500  61  (!) 93/47        Physical Exam:  ? GEN: NAD  ? HEENT: Normocephalic, atraumatic, PERRL, no scleral icterus  ? CV: HDS  ? Lungs: on room air  ? Abdomen: soft, non distended, non tender  ? Genitourinary:  no soliz, no CVA tenderness. ? Extremities: no edema  ?  Neuro: Alert, oriented to time, place and situation, moves all extremities to commands, verbal   ? Skin: no rash  ? Psych: good affect, good eye contact, non tearful   ? Lines: PIVs, rectal tube        Labs:  Recent Results (from the past 24 hour(s))   CBC WITH AUTOMATED DIFF    Collection Time: 04/26/21 10:29 AM   Result Value Ref Range    WBC 17.3 (H) 3.6 - 11.0 K/uL    RBC 3.19 (L) 3.80 - 5.20 M/uL    HGB 8.4 (L) 11.5 - 16.0 g/dL    HCT 27.3 (L) 35.0 - 47.0 %    MCV 85.6 80.0 - 99.0 FL    MCH 26.3 26.0 - 34.0 PG    MCHC 30.8 30.0 - 36.5 g/dL    RDW 18.3 (H) 11.5 - 14.5 %    PLATELET 633 (H) 240 - 400 K/uL    MPV 9.5 8.9 - 12.9 FL    NRBC 0.9 (H) 0  WBC    ABSOLUTE NRBC 0.16 (H) 0.00 - 0.01 K/uL    NEUTROPHILS 70 32 - 75 %    BAND NEUTROPHILS 1 %    LYMPHOCYTES 24 12 - 49 %    MONOCYTES 2 (L) 5 - 13 %    EOSINOPHILS 3 0 - 7 %    BASOPHILS 0 0 - 1 %    IMMATURE GRANULOCYTES 0 0.0 - 0.5 %    ABS. NEUTROPHILS 12.3 (H) 1.8 - 8.0 K/UL    ABS. LYMPHOCYTES 4.2 (H) 0.8 - 3.5 K/UL    ABS. MONOCYTES 0.3 0.0 - 1.0 K/UL    ABS. EOSINOPHILS 0.5 (H) 0.0 - 0.4 K/UL    ABS. BASOPHILS 0.0 0.0 - 0.1 K/UL    ABS. IMM. GRANS. 0.0 0.00 - 0.04 K/UL    DF MANUAL      RBC COMMENTS ANISOCYTOSIS  1+       METABOLIC PANEL, BASIC    Collection Time: 04/26/21 10:29 AM   Result Value Ref Range    Sodium 140 136 - 145 mmol/L    Potassium 4.0 3.5 - 5.1 mmol/L    Chloride 110 (H) 97 - 108 mmol/L    CO2 24 21 - 32 mmol/L    Anion gap 6 5 - 15 mmol/L    Glucose 94 65 - 100 mg/dL    BUN 22 (H) 6 - 20 MG/DL    Creatinine 0.92 0.55 - 1.02 MG/DL    BUN/Creatinine ratio 24 (H) 12 - 20      GFR est AA >60 >60 ml/min/1.73m2    GFR est non-AA >60 >60 ml/min/1.73m2    Calcium 8.2 (L) 8.5 - 10.1 MG/DL           Micro:  4/17: blood cultures negative  4/19: blood cultures negative so far  4/17: Sacral wound cultures negative          Assessment:  72 yo F:     - Sepsis due to unclear source. Sacral ulcer not infected on exam and imaging.   Off pressors.  - Rectal impaction reslving  - Neutrophilic leukocytosis on admission: persisting  - CHELA on admission 2/2 sepsis: resolved  - Hx of Bedbound status due to unknown cause per the patient.        Recommendations:  - persistent leukocytosis (neutrophilic) of unclear cause. No clinical signs of infectious process. Continue to monitor.  - Consider CT pelvis with IV contrast to see if any fluid collections/ other infectious process given recent rectal impaction.   - Continue zosyn for now. If above is negative, then will stop anti-infective and clinically monitor.   - Continue wound care for sacral ulcer. Will follow. Thank you for the opportunity to participate in the care of this patient. Please contact with questions or concerns.       Lily Doe MD  Infectious Diseases

## 2021-04-26 NOTE — PROGRESS NOTES
1407: discussed lifevest option in detail with the patient. She stated she is not interested. She expressed understanding of her low EF and the risk for lethal arrhythmias and stated \"if it happens, it happens, it's ok and that's how I go\".      RASHAD Cordova DNP,APRN,AG-ACNP-BC

## 2021-04-26 NOTE — PROGRESS NOTES
-Hematology / Oncology ([facility]) -  -Primary Oncologist-   -CC-  F/u anemia    Discussed with nurse    -S-  No events, off padilla gtt since 0800, loose stools in flexiseal.    -O-      Patient Vitals for the past 24 hrs:   Temp Pulse Resp BP SpO2   04/26/21 0800  69  116/61    04/26/21 0730  (!) 55  (!) 108/55    04/26/21 0700  (!) 53  (!) 104/56    04/26/21 0530  (!) 54  (!) 99/53    04/26/21 0500  (!) 52  (!) 98/52    04/26/21 0430  (!) 51  (!) 104/50    04/26/21 0415  (!) 56  111/63    04/26/21 0409  (!) 45  102/66    04/26/21 0400  (!) 50  113/65    04/26/21 0300 98 °F (36.7 °C) (!) 53 14 (!) 117/55 95 %   04/26/21 0200  (!) 53  (!) 110/57    04/26/21 0100  (!) 49 14 (!) 96/55 95 %   04/26/21 0028  (!) 47      04/26/21 0000  (!) 48  (!) 97/53    04/25/21 2345  (!) 49      04/25/21 2337  (!) 39      04/25/21 2335  (!) 51      04/25/21 2332  (!) 39      04/25/21 2300 98.1 °F (36.7 °C) (!) 48 14 (!) 99/57 95 %   04/25/21 2253  (!) 48      04/25/21 2200  (!) 56  (!) 107/54    04/25/21 2100  62  109/64    04/25/21 2000 98.1 °F (36.7 °C) 61 12 (!) 96/53 96 %   04/25/21 1900  (!) 58  (!) 111/56    04/25/21 1800  60  111/62    04/25/21 1700  68  (!) 110/59    04/25/21 1600  (!) 57  (!) 104/55    04/25/21 1531 97.8 °F (36.6 °C) 67 12 (!) 95/53 96 %   04/25/21 1500  61  (!) 93/47    04/25/21 1400  (!) 58  (!) 89/44    04/25/21 1311  66  (!) 97/58    04/25/21 1200  64  (!) 101/44    04/25/21 1100 97.1 °F (36.2 °C) (!) 58 14 (!) 99/54 98 %     04/26 0701 - 04/26 1900  In: -   Out: 850 [Urine:100]  Gen: nad  Chest: bilateral breath sounds present  Cardiac: rrr  Abd: s/nt    -Labs-    Recent Labs     04/25/21  0500 04/24/21  1335   WBC 18.3* 17.3*   HGB 8.2* 7.8*   * 409*   ANEU 13.4* 11.9*    136   K 4.0 5.0   GLU 96 102*   BUN 27* 30*   CREA 0.73 0.89   CA 8.2* 7.8*       -Imaging-       -Assessment + Plan-     1.   Anemia, Normocytic:  -- initial anemia labs neg, acute on chronic anemia  - AKD likely contributing, +/- nutritional deficiencies. Will order cmp in am, last albumin very low at 2.3  - cbc pending from today, will follow up on results. -- noted to be hemoccult positive - GI on board, no plans for scope for now  -- Pending labs: zinc, cu, spep/SHELLY    2. Cardiomyopathy:  - Cath on 4/23, no CAD.     3. CHELA on CKD:  - Improved

## 2021-04-26 NOTE — PROGRESS NOTES
[facility] End of Shift Note  Tele:  Significant tele event? none  Time/details of tele event: N/A    I&O/ Daily Weight:  Strict I&O ordered? NO  Fluid restriction ordered: YES  PO intake since midnight: N/A    Daily weight ordered: NO  Today's weight: N/A    Yesterday's weight: N/A   Significant weight gain reported to MD? N/A    Procedure:  NPO order for upcoming test/procedure present? N/A  Procedure/test: N/A  Date of procedure: N/A    Discharge:  Times ambulated in hallways past shift:       Times OOB to chair past shift:   Plan/Discharge barriers identified?:   Has this patient received a stent this admission? If yes, verify patient has aspirin, antiplatelet, statin, BB, ACE/ARB (for EF < 40%) ordered. If not ordered, which medication is missing? Other concerns:  Any nurse/patient concerns to be addressed by MD?     Bedside shift change report given to Cuca Rangel RN (oncoming nurse) by Magaly Park (offgoing nurse). Report included the following information SBAR, Kardex, ED Summary, Intake/Output, MAR, Accordion, Recent Results, Med Rec Status, Cardiac Rhythm NSR, Sinus Artis, PVC, Inverted T wave, Alarm Parameters , Pre Procedure Checklist, Procedure Verification, Quality Measures and Dual Neuro Assessment.      Alejandra Phan

## 2021-04-26 NOTE — PROGRESS NOTES
Bedside shift change report given to Rafal Renyolds RN (oncoming nurse) by Vivien Rivera RN (offgoing nurse). Report included the following information SBAR, Kardex, Procedure Summary, Intake/Output, MAR, Accordion, Recent Results, Med Rec Status, Cardiac Rhythm NSR, Sinus Artis, inverted T wave, PVC, Alarm Parameters , Pre Procedure Checklist, Procedure Verification, Quality Measures and Dual Neuro Assessment.

## 2021-04-26 NOTE — PROGRESS NOTES
Transition of Care Plan:     RUR:18 %  Disposition:[facility] ([facility])  and [place]  Follow up appointments: Follow up with PCP   DME needed:TBD   Transportation at Discharge:BLS transport   [address] or means to access home: Group Home access   IM Medicare letter: 2nd IM Medicare Letter to be given prior to discharge  Caregiver Contact:Lisa-Caregiver at [address]: [phone]  Discharge Caregiver contacted prior to discharge?  yes  Pt will need a rapid COVID test at time of d/c     Contacted Lisa from [facility]. CM informed that we do not know anticipated discharge at this time but wanting to ensure a smooth transition. States that she will accept the patient. Also, she will accept a rapid COVID 19 test within 48 hours of discharge. Unit CM will continue to follow. Pt transferred to room 2272. Handoff given to [address] via phone.     Carren Denver, RN, BSN, [facility] Care Manager  [phone]

## 2021-04-26 NOTE — CONSULTS
Palliative Medicine Consult  Devin: [phone] ([id number])    Patient Name: Carla Montilla  YOB: 1950    Date of Initial Consult: 21  Reason for Consult: End stage disease  Requesting Provider: Santana Tran MD  Primary Care Physician: [facility]     SUMMARY:   Carla Montilla is a 73 y.o. female with a past history of hypertension, hyperlipidemia, bedbound for last 3 to 4 years secondary to a fall and back surgery, resident of long-term [facility], presented with fever chills  admitted on 2021 from long-term care/home with a diagnosis of septic shock with hypotension with infected decubitus ulcer, acute kidney injury. Other issues noted during this hospitalization are,  cardiac cath 423/2021 no CAD possible takusubo EF 20 to 25% severely reduced systolic function, unable to tolerate cardiac meds because of hypotension currently on vasopressors. Palliative care is consulted for care decision for end-stage cardiomyopathy currently she is a full code. Social history: Is , does not have any children. Is  does not have any siblings. She had been bedbound since since  after a fall with chronic sacral decubitus more recently she was transferred in 2022 with private group home, with her only and her caregiver/group home owner. She is retired in 2020 as a  from [id number]. Her only support is her caregiver Lonny Leonardo,  The first contact point listed in the chart is Savanah Franco who is her boss from previous job. PALLIATIVE DIAGNOSES:   1. Advance New Onset Cardiomyopathy at negative possible takusubo  2. Diarrhea  3. Debility due to chronic illness  4. Chronic sacral decubitus  5. Septic shock with A RF resolved  6. Hypotension limiting medical management for cardiomyopathy  7. Advance care planning/DNR discussion       PLAN:   1.  Reviewed all the medical records, including labs medication administration record prior to the visit.  2. Met with the patient who is pleasant engages in conversation and connected well. 3. She is currently worried about ongoing diarrhea understand that it may be due to antibiotics. 4. She gave me information regarding her journey since 2018 at [facility] after the fall, developed chronic sacral decubitus, with decline in quality of life to the point that she that she she could not walk or live alone and, had to give up her apartment, moved to group home. 5. We talked about the big picture of illness, mainly her weak heart/low EF, possibly related to underlying stress, though she denied being under stress, she notes that she has been living in this condition for last 2 years and\" I have learned to deal with the situation\"  6. Advance care planning: We discussed importance of appointing a medical POA specifically in her situation, she does not have any legal next of kin, she shared that the only person she can trust or think of, to make medical decision is her group home owner/her caregiver Ms. Vanna Blevins, also talked about living will is an important piece of the advanced directive document. She plans to discuss with Ms. Vanna Blevins if she would take the responsibility of making decisions on behalf of the patient, we agreed to follow-up tomorrow. 7. We also talked about nonmeaningful outcome of CPR/resuscitation when and if her heart stops or she dies within the context of her very weak heart. She is wants to be full code however wants to think over. 8. Today was my first meeting with her I felt that I have connected with her and build a rapport with her, I plan to continue with goals of care discussion, and to help her understand more about her cardiomyopathy and further impact on her quality of life, given we are limited in medical intervention due to her low blood pressure pressure.   9. Psychosocial: She notes that she is coping well, she usually lays in the bed, and keep herself busy reading or using computer, she is [address] and prays to keep going. Listening presence and emotional support offered. 10. Plan coordinated with cardiology nurse practitioner Evgeny Paige   11. nitial consult note routed to primary continuity provider and/or primary health care team members  12. Communicated plan of care with: Palliative IDTXimena 192 Team     GOALS OF CARE / TREATMENT PREFERENCES:     GOALS OF CARE:  Patient/Health Care Proxy Stated Goals: (active treatment of medical issues)    TREATMENT PREFERENCES:   Code Status: Full Code    Advance Care Planning:  [x] [facility] Interdisciplinary Team has updated the ACP Navigator with [address] and Patient Capacity      Primary Decision Maker: Leonel Fan - [phone]  Advance Care Planning 4/18/2021   Patient's Healthcare Decision Maker is: Verbal statement (Legal Next of Kin remains as decision maker)   Confirm Advance Directive None   Patient Would Like to Complete Advance Directive -       Medical Interventions: (ongoing discussion)     Other Instructions: Other:    As far as possible, the palliative care team has discussed with patient / health care proxy about goals of care / treatment preferences for patient. HISTORY:     History obtained from: Chart, patient and bedside nurse. CHIEF COMPLAINT: Concern about diarrhea. HPI/SUBJECTIVE:    The patient is:   [] Verbal and participatory  Overall she is feeling fair denies any pain, very concerned about the diarrhea started in during this hospitalization, rectal tube is uncomfortable, she notes nausea on and off with medication. Next  She denies any chest pain, shortness of breath,. She denies any anxiety or depression.     Clinical Pain Assessment (nonverbal scale for severity on nonverbal patients):   Clinical Pain Assessment  Severity: 0          Duration: for how long has pt been experiencing pain (e.g., 2 days, 1 month, years)  Frequency: how often pain is an issue (e.g., several times per day, once every few days, constant)     FUNCTIONAL ASSESSMENT:     Palliative Performance Scale (PPS):  PPS: 40       PSYCHOSOCIAL/SPIRITUAL SCREENING:     Palliative IDT has assessed this patient for cultural preferences / practices and a referral made as appropriate to needs (Cultural Services, Patient Advocacy, Ethics, etc.)    Any spiritual / Congregational concerns:  [] Yes /  [x] No    Caregiver Burnout:  [] Yes /  [x] No /  [] No Caregiver Present      Anticipatory grief assessment:   [x] Normal  / [] Maladaptive       ESAS Anxiety: Anxiety: 0    ESAS Depression: Depression: 0        REVIEW OF SYSTEMS:     Positive and pertinent negative findings in ROS are noted above in HPI. The following systems were [x] reviewed / [] unable to be reviewed as noted in HPI  Other findings are noted below. Systems: constitutional, ears/nose/mouth/throat, respiratory, gastrointestinal, genitourinary, musculoskeletal, integumentary, neurologic, psychiatric, endocrine. Positive findings noted below. Modified ESAS Completed by: provider   Fatigue: 7 Drowsiness: 0   Depression: 0 Pain: 0   Anxiety: 0 Nausea: 2(With pills)   Anorexia: 5 Dyspnea: 0     Constipation: No     Stool Occurrence(s): 0        PHYSICAL EXAM:     From RN flowsheet:  Wt Readings from Last 3 Encounters:   12/16/20 170 lb (77.1 kg)     Blood pressure (!) 110/59, pulse 67, temperature 98 °F (36.7 °C), resp. rate 17, height 5' 7\" (1.702 m), weight 177 lb 12.8 oz (80.6 kg), SpO2 96 %. Pain Scale 1: Numeric (0 - 10)  Pain Intensity 1: 6  Pain Onset 1: [place]  Pain Location 1: Leg  Pain Orientation 1: Left, Right  Pain Description 1: Aching  Pain Intervention(s) 1: Medication (see MAR)  Last bowel movement, if known:     Constitutional: Alert awake oriented, pleasant, engages in conversation.   Eyes: pupils equal, anicteric  ENMT: no nasal discharge, moist mucous membranes  Cardiovascular: regular rhythm  Respiratory: breathing not labored, symmetric  Gastrointestinal: soft non-tender, +bowel sounds, rectal tube with diarrhea. Musculoskeletal: no deformity, no tenderness to palpation  Skin: warm, dry. Discoloration of the lower part of legs and both feet no edema, stage IV sacral decubitus I have not examined. Neurologic: following commands, moving all extremities  Psychiatric: full affect, no hallucinations  Other:       HISTORY:     Principal Problem:    Sepsis ([facility] [address] (12/16/2020)    Active Problems:    Infected decubitus ulcer (4/17/2021)      HTN (hypertension) (4/21/2021)      HLD (hyperlipidemia) (4/21/2021)      Cardiomyopathy ([facility] [address] (4/23/2021)      Anemia (4/23/2021)      Elevated troponin (4/23/2021)      Past Medical History:   Diagnosis Date    HLD (hyperlipidemia)     Hypertension       No past surgical history on file. History reviewed. No pertinent family history. History reviewed, no pertinent family history.   Social History     Tobacco Use    Smoking status: Not on file   Substance Use Topics    Alcohol use: Not on file     Allergies   Allergen Reactions    Antihistamines - Alkylamine Unknown (comments)     reported on referral packet      Current Facility-Administered Medications   Medication Dose Route Frequency    oxyCODONE IR (ROXICODONE) tablet 5 mg  5 mg Oral Q4H PRN    oxyCODONE IR (ROXICODONE) tablet 10 mg  10 mg Oral Q4H PRN    loperamide (IMODIUM) capsule 2 mg  2 mg Oral Q4H PRN    sodium chloride (NS) flush 5-40 mL  5-40 mL IntraVENous Q8H    sodium chloride (NS) flush 5-40 mL  5-40 mL IntraVENous PRN    piperacillin-tazobactam (ZOSYN) 3.375 g in 0.9% sodium chloride (MBP/ADV) 100 mL MBP  3.375 g IntraVENous Q8H    zinc oxide-cod liver oil (DESITIN) 40 % paste   Topical BID    acetaminophen (TYLENOL) tablet 650 mg  650 mg Oral Q4H PRN    Or    acetaminophen (TYLENOL) suppository 650 mg  650 mg Rectal Q6H PRN    promethazine (PHENERGAN) tablet 12.5 mg  12.5 mg Oral Q6H PRN    Or    ondansetron (ZOFRAN) injection 4 mg  4 mg IntraVENous Q6H PRN    levothyroxine (SYNTHROID) tablet 25 mcg  25 mcg Oral ACB    magnesium oxide (MAG-OX) tablet 400 mg  400 mg Oral BID          LAB AND IMAGING FINDINGS:     Lab Results   Component Value Date/Time    WBC 17.3 (H) 04/26/2021 10:29 AM    HGB 8.4 (L) 04/26/2021 10:29 AM    PLATELET 281 (H) 62/47/9511 10:29 AM     Lab Results   Component Value Date/Time    Sodium 140 04/26/2021 10:29 AM    Potassium 4.0 04/26/2021 10:29 AM    Chloride 110 (H) 04/26/2021 10:29 AM    CO2 24 04/26/2021 10:29 AM    BUN 22 (H) 04/26/2021 10:29 AM    Creatinine 0.92 04/26/2021 10:29 AM    Calcium 8.2 (L) 04/26/2021 10:29 AM    Magnesium 2.4 04/22/2021 12:26 AM    Phosphorus 1.7 (L) 04/22/2021 12:26 AM      Lab Results   Component Value Date/Time    Alk. phosphatase 101 04/17/2021 05:30 PM    Protein, total 7.9 04/17/2021 05:30 PM    Albumin 2.3 (L) 04/17/2021 05:30 PM    Globulin 5.6 (H) 04/17/2021 05:30 PM     Lab Results   Component Value Date/Time    INR 1.1 12/17/2020 07:07 AM    Prothrombin time 11.7 (H) 12/17/2020 07:07 AM    aPTT 52.8 (H) 04/23/2021 03:59 AM      Lab Results   Component Value Date/Time    Ferritin 1,974 (H) 04/23/2021 09:36 AM      No results found for: PH, PCO2, PO2  No components found for: GLPOC   No results found for: CPK, CKMB             Total time: 70 mins  Counseling / coordination time, spent as noted above: 40 mins  > 50% counseling / coordination?: yes  Prolonged service was provided for  []30 min   []75 min in face to face time in the presence of the patient, spent as noted above. Time Start:   Time End:   Note: this can only be billed with 91190 (initial) or 64532 (follow up). If multiple start / stop times, list each separately.

## 2021-04-26 NOTE — CARDIO/PULMONARY
Cardiac Rehab Note: chart review/referral  
 
Pt is a 72 yo s/p cardiac cath without intervention. EF 25%  on 4/22/21 per echo. Per chart review pt not interested in Life[facility]est on d/c.  
 
Smoking history assessed. Patient is a unknown smoker. Smoking Cessation Program link has not been added to the AVS. Patient not seen at this time due to current condition as indicated in chart.

## 2021-04-26 NOTE — PROGRESS NOTES
Nephrology Progress Note  Wild Gama     [email or web address]. [email or web address]  Phone - [phone]   Patient: Carla Montilla    YOB: 1950        Date- 4/26/2021   Admit Date: 4/17/2021  CC: Follow up for CHELA     IMPRESSION & PLAN:   CHELA on CKD:  -suspect multifactorial from vanc toxicity/ NSAID induced and labile hemodynamics -   - CHELA resolved       Hypokalemia:  - improved    Hypophosphatemia      Systolic HF:  -EF 07-33%    Sepsis:  -Sec to infected sacral decubitus.  -Wound care following      B/L Nephrolithiasis:  -Non obstructive at this time.  -normal calcium.     Hypotension:           Subjective: Interval History:   NO LABS DONE TODAY  BP IMPROVED    Objective:   Vitals:    04/26/21 0530 04/26/21 0700 04/26/21 0730 04/26/21 0800   BP: (!) 99/53 (!) 104/56 (!) 108/55 116/61   Pulse: (!) 54 (!) 53 (!) 55 69   Resp:       Temp:       SpO2:       Weight:       Height:          04/25 0701 - 04/26 0700  In: 200 [P.O.:200]  Out: 1000 [Urine:1000]  Last 3 Recorded Weights in this Encounter    04/22/21 0613 04/22/21 1034 04/24/21 0014   Weight: 80.2 kg (176 lb 12.8 oz) 79.8 kg (176 lb) 80.6 kg (177 lb 12.8 oz)      Physical exam:   GEN: nad  NECK- Supple, no mass  RESP: No wheezing, CLEAR b/l  CVS: S1,S2  RRR  NEURO: Normal speech, non focal  EXT: No Edema     Chart reviewed. Pertinent Notes reviewed. Data Review :  Recent Labs     04/25/21  0500 04/24/21  1335    136   K 4.0 5.0   * 108   CO2 21 21   BUN 27* 30*   CREA 0.73 0.89   GLU 96 102*   CA 8.2* 7.8*     Recent Labs     04/25/21  0500 04/24/21  1335   WBC 18.3* 17.3*   HGB 8.2* 7.8*   HCT 27.5* 26.3*   * 409*     No results for input(s): FE, TIBC, PSAT, FERR in the last 72 hours.    Medication list  reviewed  Current Facility-Administered Medications   Medication Dose Route Frequency    oxyCODONE IR (ROXICODONE) tablet 5 mg  5 mg Oral Q4H PRN    oxyCODONE IR (ROXICODONE) tablet 10 mg  10 mg Oral Q4H PRN    loperamide (IMODIUM) capsule 2 mg  2 mg Oral Q4H PRN    sodium chloride (NS) flush 5-40 mL  5-40 mL IntraVENous Q8H    sodium chloride (NS) flush 5-40 mL  5-40 mL IntraVENous PRN    piperacillin-tazobactam (ZOSYN) 3.375 g in 0.9% sodium chloride (MBP/ADV) 100 mL MBP  3.375 g IntraVENous Q8H    zinc oxide-cod liver oil (DESITIN) 40 % paste   Topical BID    acetaminophen (TYLENOL) tablet 650 mg  650 mg Oral Q4H PRN    Or    acetaminophen (TYLENOL) suppository 650 mg  650 mg Rectal Q6H PRN    promethazine (PHENERGAN) tablet 12.5 mg  12.5 mg Oral Q6H PRN    Or    ondansetron (ZOFRAN) injection 4 mg  4 mg IntraVENous Q6H PRN    levothyroxine (SYNTHROID) tablet 25 mcg  25 mcg Oral ACB    magnesium oxide (MAG-OX) tablet 400 mg  400 mg Oral BID          Mckinley Pandya MD              [facility]  [facility] / [employer]  [address]ian [address]  [place], [address]  Phone - [phone]               Fax - [phone]

## 2021-04-27 ENCOUNTER — HOSPICE ADMISSION (OUTPATIENT)
Dept: HOSPICE | Facility: HOSPICE | Age: 71
End: 2021-04-27
Payer: MEDICARE

## 2021-04-27 LAB
ALBUMIN SERPL ELPH-MCNC: 1.9 G/DL (ref 2.9–4.4)
ALBUMIN/GLOB SERPL: 0.6 {RATIO} (ref 0.7–1.7)
ALPHA1 GLOB SERPL ELPH-MCNC: 0.4 G/DL (ref 0–0.4)
ALPHA2 GLOB SERPL ELPH-MCNC: 1.2 G/DL (ref 0.4–1)
B-GLOBULIN SERPL ELPH-MCNC: 0.7 G/DL (ref 0.7–1.3)
COPPER SERPL-MCNC: 154 UG/DL (ref 80–158)
GAMMA GLOB SERPL ELPH-MCNC: 1 G/DL (ref 0.4–1.8)
GLOBULIN SER-MCNC: 3.3 G/DL (ref 2.2–3.9)
IGA SERPL-MCNC: 294 MG/DL (ref 87–352)
IGG SERPL-MCNC: 1068 MG/DL (ref 586–1602)
IGM SERPL-MCNC: 70 MG/DL (ref 26–217)
INTERPRETATION SERPL IEP-IMP: ABNORMAL
KAPPA LC FREE SER-MCNC: 55.2 MG/L (ref 3.3–19.4)
KAPPA LC FREE/LAMBDA FREE SER: 1.09 {RATIO} (ref 0.26–1.65)
LAMBDA LC FREE SERPL-MCNC: 50.7 MG/L (ref 5.7–26.3)
M PROTEIN SERPL ELPH-MCNC: ABNORMAL G/DL
PROT SERPL-MCNC: 5.2 G/DL (ref 6–8.5)
ZINC SERPL-MCNC: 40 UG/DL (ref 44–115)

## 2021-04-27 PROCEDURE — 74011250637 HC RX REV CODE- 250/637: Performed by: INTERNAL MEDICINE

## 2021-04-27 PROCEDURE — 65660000000 HC RM CCU STEPDOWN

## 2021-04-27 PROCEDURE — 99233 SBSQ HOSP IP/OBS HIGH 50: CPT | Performed by: STUDENT IN AN ORGANIZED HEALTH CARE EDUCATION/TRAINING PROGRAM

## 2021-04-27 PROCEDURE — 74011250637 HC RX REV CODE- 250/637: Performed by: NURSE PRACTITIONER

## 2021-04-27 PROCEDURE — 74011000258 HC RX REV CODE- 258: Performed by: STUDENT IN AN ORGANIZED HEALTH CARE EDUCATION/TRAINING PROGRAM

## 2021-04-27 PROCEDURE — 74011250636 HC RX REV CODE- 250/636: Performed by: STUDENT IN AN ORGANIZED HEALTH CARE EDUCATION/TRAINING PROGRAM

## 2021-04-27 PROCEDURE — 99233 SBSQ HOSP IP/OBS HIGH 50: CPT | Performed by: INTERNAL MEDICINE

## 2021-04-27 RX ORDER — CARVEDILOL 3.12 MG/1
3.12 TABLET ORAL 2 TIMES DAILY WITH MEALS
Status: DISCONTINUED | OUTPATIENT
Start: 2021-04-27 | End: 2021-04-29 | Stop reason: HOSPADM

## 2021-04-27 RX ADMIN — ACETAMINOPHEN 650 MG: 325 TABLET ORAL at 08:14

## 2021-04-27 RX ADMIN — ACETAMINOPHEN 650 MG: 325 TABLET ORAL at 15:53

## 2021-04-27 RX ADMIN — CARVEDILOL 3.12 MG: 3.12 TABLET, FILM COATED ORAL at 17:12

## 2021-04-27 RX ADMIN — MAGNESIUM OXIDE 400 MG (241.3 MG MAGNESIUM) TABLET 400 MG: TABLET at 08:14

## 2021-04-27 RX ADMIN — Medication 10 ML: at 06:41

## 2021-04-27 RX ADMIN — LOPERAMIDE HYDROCHLORIDE 2 MG: 2 CAPSULE ORAL at 08:14

## 2021-04-27 RX ADMIN — PSYLLIUM HUSK 1 PACKET: 3.4 POWDER ORAL at 12:42

## 2021-04-27 RX ADMIN — OXYCODONE 5 MG: 5 TABLET ORAL at 15:53

## 2021-04-27 RX ADMIN — Medication: at 15:54

## 2021-04-27 RX ADMIN — CARVEDILOL 3.12 MG: 3.12 TABLET, FILM COATED ORAL at 12:42

## 2021-04-27 RX ADMIN — PIPERACILLIN AND TAZOBACTAM 3.38 G: 3; .375 INJECTION, POWDER, LYOPHILIZED, FOR SOLUTION INTRAVENOUS at 06:41

## 2021-04-27 RX ADMIN — LEVOTHYROXINE SODIUM 25 MCG: 0.03 TABLET ORAL at 08:14

## 2021-04-27 RX ADMIN — Medication 10 ML: at 14:00

## 2021-04-27 RX ADMIN — OXYCODONE 5 MG: 5 TABLET ORAL at 08:14

## 2021-04-27 RX ADMIN — MAGNESIUM OXIDE 400 MG (241.3 MG MAGNESIUM) TABLET 400 MG: TABLET at 17:12

## 2021-04-27 NOTE — PROGRESS NOTES
Hospitalist Progress Note    NAME: Daylin Tipton   :  1950   MRN:  [id number]       Assessment / Plan:  Supraventricular tachycardia  cardiomyopathy   Anemia acute on chronic   Echo systolic contraction of 20 to 25%  S/p cardiac cath and no blocked coronaries   cardiomyopathy secondary to hypotension. Cards recommend  lifevest prior to dc patient is being transitioned to hospice  Continue Coreg with holding parameters    Acute kidney injury, most likely prerenal versus ATN from vancomycin  Hypotension   Creatinine is now back to baseline. Decubitus ulcer stage IV on the sacral/coccyx area  Severe sepsis  Patient is bedbound for about 4 years now  Persistent leukocytosis  Diarrhea suspected from ABX C. Diff negative , on imodium , rectal tube   All stool softnesrs and laxatives were not being given and they are all stopped ! CT abdomen and pelvis on  in chart . Patient has had this decubitus ulcer for the last 4 years, was getting wound care   Zosyn stopped by ID. Per ID, wound is not the source. blood cultures negative to date, wound cultures Showed mixed veronica   Wound care , evaluated by surgery who did not retain any indication for debridement, recommended packing and wound care  Now being transitioned to home with home hospice    Hypertension   Hyperlipidemia  Hypothyroidism  Continue with levothyroxine      Disposition:  To home with hospice on       Code Status: Full code  Surrogate Decision Maker: her care giver patient is unsure of the last name    DVT Prophylaxis:  SCDs  GI Prophylaxis: not indicated  25.0 - 29.9 Overweight / Body mass index is 27.85 kg/m². Subjective:     Chief Complaint / Reason for Physician Visit  Hospice consultation placed and accepted by hospice team  WBC still up at 17 K        Objective:     VITALS:   Last 24hrs VS reviewed since prior progress note.  Most recent are:  Patient Vitals for the past 24 hrs:   Temp Pulse Resp BP SpO2 04/27/21 1504 98 °F (36.7 °C)       04/27/21 1500  74 20 (!) 118/51    04/27/21 0816 97.4 °F (36.3 °C)       04/27/21 0800  (!) 55  (!) 109/58    04/27/21 0400 97.7 °F (36.5 °C) (!) 56 15 (!) 107/50 97 %   04/27/21 0000 97.3 °F (36.3 °C) 68 19 (!) 119/52 98 %   04/26/21 2030 98.2 °F (36.8 °C) 68 30 (!) 107/54 98 %   04/26/21 1834 97.4 °F (36.3 °C) 73 22 118/76 100 %       Intake/Output Summary (Last 24 hours) at 4/27/2021 1645  Last data filed at 4/27/2021 1505  Gross per 24 hour   Intake 650 ml   Output 700 ml   Net -50 ml        I had a face to face encounter and independently examined this patient on 4/27/2021, as outlined below:  PHYSICAL EXAM:  General: WD, WN. Alert, cooperative, no acute distress    EENT:  EOMI. Anicteric sclerae. MMM  Resp:  CTA bilaterally, no wheezing or rales. No accessory muscle use  CV:  Regular  rhythm,  No edema  GI:  Soft, Non distended, Non tender. +Bowel sounds  Neurologic:  Alert and awake, normal speech,   Psych:   Not anxious nor agitated      Reviewed most current lab test results and cultures  YES  Reviewed most current radiology test results   YES  Review and summation of old records today    NO  Reviewed patient's current orders and MAR    YES  PMH/SH reviewed - no change compared to H&P  ________________________________________________________________________  Care Plan discussed with:    Comments   Patient x    Family      RN x    Care Manager     Consultant                       x Multidiciplinary team rounds were held today with , nursing, pharmacist and clinical coordinator. Patient's plan of care was discussed; medications were reviewed and discharge planning was addressed.      ________________________________________________________________________  Total NON critical care TIME: 35  Minutes    Total CRITICAL CARE TIME Spent:   Minutes non procedure based      Comments   >50% of visit spent in counseling and coordination of care ________________________________________________________________________  Tapan Domingo MD     Procedures: see electronic medical records for all procedures/Xrays and details which were not copied into this note but were reviewed prior to creation of Plan. LABS:  I reviewed today's most current labs and imaging studies.   Pertinent labs include:  Recent Labs     04/26/21  1029 04/25/21  0500   WBC 17.3* 18.3*   HGB 8.4* 8.2*   HCT 27.3* 27.5*   * 407*     Recent Labs     04/26/21  1029 04/25/21  0500    138   K 4.0 4.0   * 110*   CO2 24 21   GLU 94 96   BUN 22* 27*   CREA 0.92 0.73   CA 8.2* 8.2*       Signed: Tapan Domingo MD

## 2021-04-27 NOTE — PROGRESS NOTES
Transition of Care Plan:     RUR:18 %  Disposition:Group Home ([facility])  [facility]  Follow up appointments: Follow up with PCP   DME needed: Renee Nunes  Transportation at Discharge [facility] to  on Thursday April 29, 2021 for 11:30 am.  [address] or means to access home: Group Home access   IM Medicare letter: 2nd  Medicare Letter to be given prior to discharge  Caregiver Contact:Lisa-Caregiver at [address]: [phone]  Discharge Caregiver contacted prior to discharge?  CM spoke to pt and pt's caregiver Julienne Killian at [phone]    CM requested for rapid COVID to be done today Tuesday April 27, 2021      1600 pm-CM has arranged for transportation with [facility] for Thursday April 29, 2021 for 11:30 am.     1424 pm-CM spoke to pt regarding hospice services and she [facility].  [facility]. Pt has the potential of being d/c back to [facility] in a couple of days. Pt sign the Freedom of Choice for post acute care providers and is on pt's chart. CM will continue to follow and assist with d/c planning. Antonio Pruitt.   Care Manager [facility]  [phone]

## 2021-04-27 NOTE — PROGRESS NOTES
Infectious Disease Progress Note         Interval:  NAEO    Subjective:   She feels well. No flank pain. Sacral wound seen again today. Pain at baseline. Objective:    Vitals:   Patient Vitals for the past 24 hrs:   Temp Pulse Resp BP SpO2   04/27/21 0816 97.4 °F (36.3 °C)       04/27/21 0800  (!) 55  (!) 109/58    04/27/21 0400 97.7 °F (36.5 °C) (!) 56 15 (!) 107/50 97 %   04/27/21 0000 97.3 °F (36.3 °C) 68 19 (!) 119/52 98 %   04/26/21 2030 98.2 °F (36.8 °C) 68 30 (!) 107/54 98 %   04/26/21 1834 97.4 °F (36.3 °C) 73 22 118/76 100 %   04/26/21 1605     99 %   04/26/21 1513 98.3 °F (36.8 °C) 68 19 (!) 108/53 100 %   04/26/21 1333  67  (!) 110/59    04/26/21 1219 98 °F (36.7 °C) 61 17 104/61 96 %       Physical Exam:  ? GEN: NAD  ? HEENT: Normocephalic, atraumatic, PERRL, no scleral icterus  ? CV: HDS  ? Lungs: on room air  ? Abdomen: soft, non distended, non tender  ? Genitourinary:  no soliz, no CVA tenderness. ? Extremities: no edema  ? Neuro: Alert, oriented to time, place and situation, moves all extremities to commands, verbal   ? Skin: sacral ulcer is clean. No surrounding inflammation, no fluctuance. Pain is at baseline. ? Psych: good affect, good eye contact, non tearful   ? Lines: PIVs, rectal tube        Labs:  No results found for this or any previous visit (from the past 24 hour(s)). Micro:  4/17: blood cultures negative  4/19: blood cultures negative so far  4/17: Sacral wound cultures negative          Assessment:  74 yo F:     - Sepsis due to unclear source. Sacral ulcer not infected on exam and imaging. Off pressors.  - Rectal impaction resolving  - Neutrophilic leukocytosis on admission: persisting  - CHELA on admission 2/2 sepsis: resolved  - Hx of Bedbound status due to unknown cause per the patient.        Recommendations:  - persistent leukocytosis (neutrophilic) of unclear cause. No clinical signs of infectious process.  Continue to monitor.  - Will stop zosyn today  - Continue wound care for sacral ulcer.   - Consider Hematology evaluation for the persistent leukocytosis. We will sign off now, please recall as needed. Thank you for the opportunity to participate in the care of this patient. Please contact with questions or concerns.       Siva Richmond MD  Infectious Diseases

## 2021-04-27 NOTE — PROGRESS NOTES
GI PROGRESS NOTE  Criselda Gil NP  [phone] NP in-hospital cell phone M-F until 4:30  After 5pm or on weekends, please call  for physician on call    Brigido Villalta :1950 ZRD:[id number]   ATTG: Dr Jhoana Wilkinson  PCP: Jace Dalal  Date/Time:  2021 11:30 AM     Primary GI: Dr. Nely Zendejas - Dr Theron Kenney covering    Assessment:   Chronic constipation Now with Acute Diaarhea  - Flexiseal out today, no BM today  - Diarrhea started after being on multiple abx for DU at sacrum  - Negative C. Diff and WBC  - New with admission, get ensure daily - ?this causing diarrhea?  - KUB yesterday - Nonspecific gas pattern. - Guaiac positive stool- multiple considerations including use of NSAIDs, flexiseal, etc.  Cannot exclude gastritis or polyp but not appropriate to w/u this pt who has never CMP, is intolerant of medicinal therapies  Anemia- in midst of eval with Hematology     Plan:   · BID metamucil  · Await final evaluation by Hematology  · Would not do EGD or colonoscopy at this time - can be done outpatient for anemia as well an bx for diarrhea if persistent can be take at that time. Nothing further to add. Will sign off. Call for further needs. Plan discussed with Dr Theron Kenney. Subjective:   Discussed with RN events overnight. States she is feeling better today without flexiseal in and no BM. Complaint Y/N Description   Abdominal Pain n    Hematemesis n    Hematochezia n    Melena n    Constipation n    Diarrhea y    Dyspepsia n    Dysphagia n    Jaundiced n    Nausea/vomiting n      Review of Systems:  Symptom Y/N Comments  Symptom Y/N Comments   Fever/Chills    Chest Pain     Cough    Headaches     Sputum    Joint Pain     SOB/PAL    Pruritis/Rash     Tolerating Diet y   Other       Could NOT obtain due to:      Objective:   VITALS:   Last 24hrs VS reviewed since prior progress note.  Most recent are:  Visit Vitals  BP (!) 118/51   Pulse 74   Temp 98 °F (36.7 °C)   Resp 20 Ht 5' 7\" (1.702 m)   Wt 80.6 kg (177 lb 12.8 oz)   SpO2 97%   BMI 27.85 kg/m²       Intake/Output Summary (Last 24 hours) at 4/27/2021 1517  Last data filed at 4/27/2021 1505  Gross per 24 hour   Intake 650 ml   Output 700 ml   Net -50 ml     PHYSICAL EXAM:  General: WD, WN. Alert, cooperative, no acute distress    HEENT: NC, Atraumatic. Anicteric sclerae. Lungs:  CTA Bilaterally. No Wheezing/Rhonchi/Rales. Heart:  Regular  rhythm,  No murmur No Rubs, No Gallops  Abdomen: Soft, Non distended, Non tender.  +Bowel sounds, no HSM  Extremities: No c/c/e. BLE weakness s/p injury years ago. Neurologic:  Alert and oriented X 3. Psych:   Good insight. Not anxious nor agitated. Lab and Radiology Data Reviewed: (see below)    Medications Reviewed: (see below)  PMH/SH reviewed - no change compared to H&P  ________________________________________________________________________  Total time spent with patient: 20 minutes ________________________________________________________________________  Care Plan discussed with:  Patient y   Family     RN y              Consultant: Mariaa García NP     Procedures: see electronic medical records for all procedures/Xrays and details which were not copied into this note but were reviewed prior to creation of Plan. LABS:  Recent Labs     04/26/21  1029 04/25/21  0500   WBC 17.3* 18.3*   HGB 8.4* 8.2*   HCT 27.3* 27.5*   * 407*     Recent Labs     04/26/21  1029 04/25/21  0500    138   K 4.0 4.0   * 110*   CO2 24 21   BUN 22* 27*   CREA 0.92 0.73   GLU 94 96   CA 8.2* 8.2*     No results for input(s): AP, TBIL, TP, ALB, GLOB, GGT, AML, LPSE in the last 72 hours. No lab exists for component: SGOT, GPT, AMYP, HLPSE  No results for input(s): INR, PTP, APTT, INREXT, INREXT in the last 72 hours. No results for input(s): FE, TIBC, PSAT, FERR in the last 72 hours.    Lab Results   Component Value Date/Time    Folate 8.1 04/23/2021 09:36 AM     No results for input(s): PH, PCO2, PO2 in the last 72 hours. No results for input(s): CPK, CKMB in the last 72 hours.     No lab exists for component: TROPONINI  Lab Results   Component Value Date/Time    Color YELLOW/STRAW 04/21/2021 02:53 AM    Appearance TURBID (A) 04/21/2021 02:53 AM    Specific gravity 1.025 04/21/2021 02:53 AM    pH (UA) 6.5 04/21/2021 02:53 AM    Protein 100 (A) 04/21/2021 02:53 AM    Glucose Negative 04/21/2021 02:53 AM    Ketone TRACE (A) 04/21/2021 02:53 AM    Bilirubin Negative 04/21/2021 02:53 AM    Urobilinogen 0.2 04/21/2021 02:53 AM    Nitrites Negative 04/21/2021 02:53 AM    Leukocyte Esterase LARGE (A) 04/21/2021 02:53 AM    Epithelial cells FEW 04/21/2021 02:53 AM    Bacteria 4+ (A) 04/21/2021 02:53 AM    WBC  04/21/2021 02:53 AM    RBC 20-50 04/21/2021 02:53 AM       MEDICATIONS:  Current Facility-Administered Medications   Medication Dose Route Frequency    carvediloL (COREG) tablet 3.125 mg  3.125 mg Oral BID WITH MEALS    psyllium husk-aspartame (METAMUCIL FIBER) packet 1 Packet  1 Packet Oral BID    oxyCODONE IR (ROXICODONE) tablet 5 mg  5 mg Oral Q4H PRN    oxyCODONE IR (ROXICODONE) tablet 10 mg  10 mg Oral Q4H PRN    loperamide (IMODIUM) capsule 2 mg  2 mg Oral Q4H PRN    sodium chloride (NS) flush 5-40 mL  5-40 mL IntraVENous Q8H    sodium chloride (NS) flush 5-40 mL  5-40 mL IntraVENous PRN    zinc oxide-cod liver oil (DESITIN) 40 % paste   Topical BID    acetaminophen (TYLENOL) tablet 650 mg  650 mg Oral Q4H PRN    Or    acetaminophen (TYLENOL) suppository 650 mg  650 mg Rectal Q6H PRN    promethazine (PHENERGAN) tablet 12.5 mg  12.5 mg Oral Q6H PRN    Or    ondansetron (ZOFRAN) injection 4 mg  4 mg IntraVENous Q6H PRN    levothyroxine (SYNTHROID) tablet 25 mcg  25 mcg Oral ACB    magnesium oxide (MAG-OX) tablet 400 mg  400 mg Oral BID

## 2021-04-27 NOTE — HOSPICE
[facility]  Note:    Consents Reviewed: Yes  Person Reviewed/Signed with: Joslyn Villalta  Right to NCD Reviewed: Yes  NCD Requested: Yes  Admission Nurse/Intake Notified: Yes   Planned Start of Care Date: 4/29/2021  Hospice Witness Representative: Ena ROTHMAN reviewed and signed consents with pt. Pt signed DDNR.     LORNA Mckenzie  [facility]   [phone]

## 2021-04-27 NOTE — HOSPICE
[facility] Help to Those in Need  [phone]     Patient Name: Joann Malik  YOB: 1950  Age: 73 y.o. [facility] RN Note:    Dx: Cardiomyopathy with EF 20%     Clinical Notes:  met with patient for info session. She agrees with hospice POC     Consents signed and scanned. Admission scheduled for Thursday 4/29/21 3pm    Please set up transport for 12 noon on 4/29/21    CTI for cardiomyopathy from Dr Berry Listen     DME: HME to deliver hospital bed with low air loss mattress and OBT 4/28 5-7pm     Meds: SRX to be delivered by Kimmy Tirado    Thank you for the opportunity to be of service to this patient.

## 2021-04-27 NOTE — PROGRESS NOTES
Palliative Medicine  [place]: [phone]  [facility]: [phone] ([phone])    Follow up visit to discuss AMD and DDNR, conversation that was started yesterday with Dr Adal Abreu. Patient watching TV, is alert, oriented, very capable of decision making, is clear about her goals. Her desire is to be able to [facility] better\", her goals are to be able to \"feed myself, stay at home, watch TV, be able to have a better bed and not fight with Medicare at every step\". She shared that she does not have Medicare Part D and has had to justify every expenditure for her medical needs. Patient's goals are to stay home (her group home, where she is the only resident and where she has been since November 2020). Her caregiver there is Matias. Patient feels she is well cared for there. Patient has no living relatives. She has been bed bound since 2018. Patient's goals are consistent with comfort and hospice philosophy. She shared that she has an NP who comes to see her at home and that she has met with a hospice agency in the past, but was told that she did not qualify for their services are her issues were \"acute\". Based on her current medical information, it appears that she may have a qualifying cardiac issue, with EF 20-25%. She would like [facility] hospice to evaluate her, for hospice at home, should she qualify. Patient shared that she has not had a chance to speak to her caregiver Matias yet, to see if she would be willing to serve in the capacity of [facility]. She has another person (a former co-worker) who she is also considering. We discussed beginning to speak to them about her wishes for the future and EOL. [facility] will provide patient with a copy of \"Five Wishes\" so she can begin to delineate her wishes. She indicated that she has thought about her wishes in the past, but she is unclear on some of them. Patient not quite ready to sign DDNR yet.  Have informed her that she can complete both AMD and DDNR with NP at home. She would benefit from completing these here, but it appears that she needs a little more time to think things through and share her wishes with her desired mPOAs. Will give patient a sample of Five Wishes, for her to think more about her health care wishes. Hospice consult will be placed by Palliative team. Have sent PS to Dr Yasmin Servin regarding patient's wish for hospice evaluation, for home.

## 2021-04-27 NOTE — PROGRESS NOTES
Nephrology Progress Note  Wild Gama     [facility]. [facility]  Phone - [phone]   Patient: Randee Estrella    YOB: 1950        Date- 4/27/2021   Admit Date: 4/17/2021  CC: Follow up for anushka    IMPRESSION & PLAN:   ANUSHKA  on CKD:  -suspect multifactorial from vanc toxicity/ NSAID induced and labile hemodynamics -   - ANUSHKA resolved       Hypokalemia:  - improved    Hypophosphatemia      Systolic HF:  -EF 27-14%  - CARDIOLOGY following  - okay to add diuretics if needed    Sepsis:  -Sec to infected sacral decubitus.  -Wound care following      B/L Nephrolithiasis:  -Non obstructive at this time.  -normal calcium.     Hypotension:      NOTHING MUCH TO ADD  WE WILL SIGN OFF. CALL US IF NEEDED. Subjective: Interval History:   CR STABLE  K NORMAL  BP ON LOW SIDE    Objective:   Vitals:    04/27/21 0000 04/27/21 0400 04/27/21 0800 04/27/21 0816   BP: (!) 119/52 (!) 107/50 (!) 109/58    Pulse: 68 (!) 56 (!) 55    Resp: 19 15     Temp: 97.3 °F (36.3 °C) 97.7 °F (36.5 °C)  97.4 °F (36.3 °C)   SpO2: 98% 97%     Weight:       Height:          04/26 0701 - 04/27 0700  In: 120 [P.O.:120]  Out: 1100 [Urine:350; Drains:750]  Last 3 Recorded Weights in this Encounter    04/22/21 0613 04/22/21 1034 04/24/21 0014   Weight: 80.2 kg (176 lb 12.8 oz) 79.8 kg (176 lb) 80.6 kg (177 lb 12.8 oz)      Physical exam:   GEN: nad  NECK- Supple, no mass  RESP: No wheezing, clear b/l  CVS: S1,S2  RRR  NEURO: non focal    Chart reviewed. Pertinent Notes reviewed.      Data Review :  Recent Labs     04/26/21  1029 04/25/21  0500 04/24/21  1335    138 136   K 4.0 4.0 5.0   * 110* 108   CO2 24 21 21   BUN 22* 27* 30*   CREA 0.92 0.73 0.89   GLU 94 96 102*   CA 8.2* 8.2* 7.8*     Recent Labs     04/26/21  1029 04/25/21  0500 04/24/21  1335   WBC 17.3* 18.3* 17.3*   HGB 8.4* 8.2* 7.8*   HCT 27.3* 27.5* 26.3*   * 407* 409*     No results for input(s): FE, TIBC, PSAT, FERR in the last 72 hours.    Medication list  reviewed  Current Facility-Administered Medications   Medication Dose Route Frequency    carvediloL (COREG) tablet 3.125 mg  3.125 mg Oral BID WITH MEALS    oxyCODONE IR (ROXICODONE) tablet 5 mg  5 mg Oral Q4H PRN    oxyCODONE IR (ROXICODONE) tablet 10 mg  10 mg Oral Q4H PRN    loperamide (IMODIUM) capsule 2 mg  2 mg Oral Q4H PRN    sodium chloride (NS) flush 5-40 mL  5-40 mL IntraVENous Q8H    sodium chloride (NS) flush 5-40 mL  5-40 mL IntraVENous PRN    piperacillin-tazobactam (ZOSYN) 3.375 g in 0.9% sodium chloride (MBP/ADV) 100 mL MBP  3.375 g IntraVENous Q8H    zinc oxide-cod liver oil (DESITIN) 40 % paste   Topical BID    acetaminophen (TYLENOL) tablet 650 mg  650 mg Oral Q4H PRN    Or    acetaminophen (TYLENOL) suppository 650 mg  650 mg Rectal Q6H PRN    promethazine (PHENERGAN) tablet 12.5 mg  12.5 mg Oral Q6H PRN    Or    ondansetron (ZOFRAN) injection 4 mg  4 mg IntraVENous Q6H PRN    levothyroxine (SYNTHROID) tablet 25 mcg  25 mcg Oral ACB    magnesium oxide (MAG-OX) tablet 400 mg  400 mg Oral BID          [facility]  [facility] / [facility], [address]  [place], [address]  Phone - [phone]               Fax - [phone]

## 2021-04-27 NOTE — PROGRESS NOTES
-Hematology / Oncology ([facility]) -  -Primary Oncologist-   -CC-  F/u anemia        -S-  No events ON,  loose stools in flexiseal.    -O-      Patient Vitals for the past 24 hrs:   Temp Pulse Resp BP SpO2   04/27/21 0816 97.4 °F (36.3 °C)       04/27/21 0800  (!) 55  (!) 109/58    04/27/21 0400 97.7 °F (36.5 °C) (!) 56 15 (!) 107/50 97 %   04/27/21 0000 97.3 °F (36.3 °C) 68 19 (!) 119/52 98 %   04/26/21 2030 98.2 °F (36.8 °C) 68 30 (!) 107/54 98 %   04/26/21 1834 97.4 °F (36.3 °C) 73 22 118/76 100 %   04/26/21 1605     99 %   04/26/21 1513 98.3 °F (36.8 °C) 68 19 (!) 108/53 100 %   04/26/21 1333  67  (!) 110/59    04/26/21 1219 98 °F (36.7 °C) 61 17 104/61 96 %     04/27 0701 - 04/27 1900  In: 250 [P.O.:200; I.V.:50]  Out: 300 [Urine:300]  Gen: nad  Chest: bilateral breath sounds present  Cardiac: rrr  Abd: s/nt    -Labs-    Recent Labs     04/26/21  1029 04/25/21  0500 04/24/21  1335   WBC 17.3* 18.3* 17.3*   HGB 8.4* 8.2* 7.8*   * 407* 409*   ANEU 12.3* 13.4* 11.9*    138 136   K 4.0 4.0 5.0   GLU 94 96 102*   BUN 22* 27* 30*   CREA 0.92 0.73 0.89   CA 8.2* 8.2* 7.8*       -Imaging-       -Assessment + Plan-     1. Anemia, Normocytic:  -- initial anemia labs neg, acute on chronic anemia  - AKD likely contributing, +/- nutritional deficiencies. Will order cmp in am, last albumin very low at 2.3  - cbc pending from today, yesterday hgb stable at 8.4  -- noted to be hemoccult positive - GI on board, no plans for scope for now  -- Pending labs: zinc, cu, spep/SHELLY    2. Thrombocytosis:  - Has had chronic thrombocytosis, would send jak2 but holding since hospice referral placed. 3. Cardiomyopathy:  - Cath on 4/23, no CAD.     4. CHELA on CKD:  - Improved

## 2021-04-27 NOTE — PROGRESS NOTES
End of Shift Note    Bedside shift change report given to Keely Ward RN (oncoming nurse) by Paul Mtz RN (offgoing nurse).   Report included the following information SBAR, Kardex, Recent Results and Cardiac Rhythm SR

## 2021-04-27 NOTE — ROUTINE PROCESS
Fiber held per pat request.  Kaylin Seals given one dose of Imodium this am and one dose fiber this afternoon. FMS discontinued in am, no BM today.

## 2021-04-27 NOTE — HOSPICE
[facility]  [phone]     Patient Name: Kb Thornton  YOB: 1950  Age: 73 y.o. [facility] RN Note:  Hospice consult received, reviewing chart. Will follow up with Unit Nurse and Care Manager to discuss plan of care, patient status and discharge disposition within the hour. Thank you for the opportunity to be of service to this patient.     Keshawn Villanueva RN  Clinical Nurse Liaison   [facility]  (E)[phone] (P) [phone]

## 2021-04-27 NOTE — PROGRESS NOTES
4/27/2021 1115 AM    Admit Date: 4/17/2021    Admit Diagnosis: Infected decubitus ulcer [L89.90, L08.9]    Subjective:     Joslyn Villalta  denies chest pain, chest pressure/discomfort, dyspnea, palpitations, irregular heart beats, near-syncope, syncope, fatigue, orthopnea, paroxysmal nocturnal dyspnea, exertional chest pressure/discomfort. She states \" I feel good\"     VSS  No new labs. Visit Vitals  BP (!) 109/58   Pulse (!) 55   Temp 97.4 °F (36.3 °C)   Resp 15   Ht 5' 7\" (1.702 m)   Wt 80.6 kg (177 lb 12.8 oz)   SpO2 97%   BMI 27.85 kg/m²     Current Facility-Administered Medications   Medication Dose Route Frequency    carvediloL (COREG) tablet 3.125 mg  3.125 mg Oral BID WITH MEALS    psyllium husk-aspartame (METAMUCIL FIBER) packet 1 Packet  1 Packet Oral BID    oxyCODONE IR (ROXICODONE) tablet 5 mg  5 mg Oral Q4H PRN    oxyCODONE IR (ROXICODONE) tablet 10 mg  10 mg Oral Q4H PRN    loperamide (IMODIUM) capsule 2 mg  2 mg Oral Q4H PRN    sodium chloride (NS) flush 5-40 mL  5-40 mL IntraVENous Q8H    sodium chloride (NS) flush 5-40 mL  5-40 mL IntraVENous PRN    piperacillin-tazobactam (ZOSYN) 3.375 g in 0.9% sodium chloride (MBP/ADV) 100 mL MBP  3.375 g IntraVENous Q8H    zinc oxide-cod liver oil (DESITIN) 40 % paste   Topical BID    acetaminophen (TYLENOL) tablet 650 mg  650 mg Oral Q4H PRN    Or    acetaminophen (TYLENOL) suppository 650 mg  650 mg Rectal Q6H PRN    promethazine (PHENERGAN) tablet 12.5 mg  12.5 mg Oral Q6H PRN    Or    ondansetron (ZOFRAN) injection 4 mg  4 mg IntraVENous Q6H PRN    levothyroxine (SYNTHROID) tablet 25 mcg  25 mcg Oral ACB    magnesium oxide (MAG-OX) tablet 400 mg  400 mg Oral BID         Objective:      Visit Vitals  BP (!) 109/58   Pulse (!) 55   Temp 97.4 °F (36.3 °C)   Resp 15   Ht 5' 7\" (1.702 m)   Wt 80.6 kg (177 lb 12.8 oz)   SpO2 97%   BMI 27.85 kg/m²       Physical Exam:  Resting comfortably. No resp distress.    Abdomen: soft, non-tender. Bowel sounds normal.   Extremities: extremities normal, atraumatic, no cyanosis or edema  Heart: regular rate and rhythm, S1, S2 normal, no murmur, click, rub or gallop  Lungs: clear to auscultation bilaterally    Data Review:   Labs:    No results found for this or any previous visit (from the past 24 hour(s)). Telemetry: NSR  ECHO: 4/22/2021  · LV: Estimated LVEF is 20 - 25%. Normal cavity size and wall thickness. Severely reduced systolic function. · MV: Mitral valve leaflet calcification. Mild mitral annular calcification. · TV: Moderate tricuspid valve regurgitation is present. Assessment:     Principal Problem:    Sepsis ([address] (12/16/2020)    Active Problems:    Infected decubitus ulcer (4/17/2021)      HTN (hypertension) (4/21/2021)      HLD (hyperlipidemia) (4/21/2021)      Cardiomyopathy ([address] (4/23/2021)      Anemia (4/23/2021)      Elevated troponin (4/23/2021)        Plan:     Sinus tachycardia and short run of SVT in presence of Sepsis d/t infected Decubitus sacral ulcer: improved, now NSR 80's  Now stable. Trialing low dose coreg 3.125 mg PO BID, monitor, hold parameters in place      Cardiomyopathy, Elevated troponin: initial troponin 3.78, up to 4.25   No ACS symptoms again this am  Echo noted- EF 20-25%  Cardiac cath 4/23/2021 no CAD. Possible takusubo. Will need lifevest prior to DC if ok with patient-patient stated she does not wish to proceed with lifevest at this time. Not much room for GDMT d/t hypotension, will monitor this admission, starting low dose coreg with hold parameters      Hx HTN: currently periods of hypotension  Weaned pressor off-padilla dc'd. Keep MAP > 60.  Cardiomyopathy probably contributing to low pressure along with sepsis.      HLD:   Continue Zetia      CHELA: resolved   nephrology following   Cr WNL    Hx hypothyroidism:   Check TSH-WNL 1.64  On levothyroxine     Whit Burroughs,PREET  DNP,APRN,AG-ACNP-BC      Patient seen and examined by me with nurse practitioner. I personally performed all components of the history, physical, and medical decision making and agree with the assessment and plan as noted. Since BP slightly better and not on any pressor, will try low dose coreg.  Not interested in Desi Wong MD

## 2021-04-28 LAB
ALBUMIN SERPL-MCNC: 1.6 G/DL (ref 3.5–5)
ANION GAP SERPL CALC-SCNC: 5 MMOL/L (ref 5–15)
BASOPHILS # BLD: 0 K/UL (ref 0–0.1)
BASOPHILS NFR BLD: 0 % (ref 0–1)
BUN SERPL-MCNC: 15 MG/DL (ref 6–20)
BUN/CREAT SERPL: 21 (ref 12–20)
CALCIUM SERPL-MCNC: 8.4 MG/DL (ref 8.5–10.1)
CHLORIDE SERPL-SCNC: 110 MMOL/L (ref 97–108)
CO2 SERPL-SCNC: 24 MMOL/L (ref 21–32)
COVID-19 RAPID TEST, COVR: NOT DETECTED
CREAT SERPL-MCNC: 0.73 MG/DL (ref 0.55–1.02)
DIFFERENTIAL METHOD BLD: ABNORMAL
EOSINOPHIL # BLD: 0.5 K/UL (ref 0–0.4)
EOSINOPHIL NFR BLD: 3 % (ref 0–7)
ERYTHROCYTE [DISTWIDTH] IN BLOOD BY AUTOMATED COUNT: 19.5 % (ref 11.5–14.5)
GLUCOSE SERPL-MCNC: 87 MG/DL (ref 65–100)
HCT VFR BLD AUTO: 28.1 % (ref 35–47)
HGB BLD-MCNC: 8.5 G/DL (ref 11.5–16)
IMM GRANULOCYTES # BLD AUTO: 0 K/UL (ref 0–0.04)
IMM GRANULOCYTES NFR BLD AUTO: 0 % (ref 0–0.5)
LYMPHOCYTES # BLD: 3.4 K/UL (ref 0.8–3.5)
LYMPHOCYTES NFR BLD: 20 % (ref 12–49)
MCH RBC QN AUTO: 26.4 PG (ref 26–34)
MCHC RBC AUTO-ENTMCNC: 30.2 G/DL (ref 30–36.5)
MCV RBC AUTO: 87.3 FL (ref 80–99)
METAMYELOCYTES NFR BLD MANUAL: 2 %
MONOCYTES # BLD: 0.7 K/UL (ref 0–1)
MONOCYTES NFR BLD: 4 % (ref 5–13)
NEUTS BAND NFR BLD MANUAL: 1 %
NEUTS SEG # BLD: 11.9 K/UL (ref 1.8–8)
NEUTS SEG NFR BLD: 70 % (ref 32–75)
NRBC # BLD: 0.08 K/UL (ref 0–0.01)
NRBC BLD-RTO: 0.5 PER 100 WBC
PHOSPHATE SERPL-MCNC: 2.7 MG/DL (ref 2.6–4.7)
PLATELET # BLD AUTO: 431 K/UL (ref 150–400)
PMV BLD AUTO: 8.7 FL (ref 8.9–12.9)
POTASSIUM SERPL-SCNC: 4.6 MMOL/L (ref 3.5–5.1)
PROCALCITONIN SERPL-MCNC: 0.22 NG/ML
RBC # BLD AUTO: 3.22 M/UL (ref 3.8–5.2)
RBC MORPH BLD: ABNORMAL
RBC MORPH BLD: ABNORMAL
SODIUM SERPL-SCNC: 139 MMOL/L (ref 136–145)
SOURCE, COVRS: NORMAL
WBC # BLD AUTO: 16.8 K/UL (ref 3.6–11)

## 2021-04-28 PROCEDURE — 99233 SBSQ HOSP IP/OBS HIGH 50: CPT | Performed by: INTERNAL MEDICINE

## 2021-04-28 PROCEDURE — 74011250637 HC RX REV CODE- 250/637: Performed by: NURSE PRACTITIONER

## 2021-04-28 PROCEDURE — 65660000000 HC RM CCU STEPDOWN

## 2021-04-28 PROCEDURE — 74011250637 HC RX REV CODE- 250/637: Performed by: INTERNAL MEDICINE

## 2021-04-28 PROCEDURE — 87635 SARS-COV-2 COVID-19 AMP PRB: CPT

## 2021-04-28 PROCEDURE — 84145 PROCALCITONIN (PCT): CPT

## 2021-04-28 PROCEDURE — 36415 COLL VENOUS BLD VENIPUNCTURE: CPT

## 2021-04-28 PROCEDURE — 85025 COMPLETE CBC W/AUTO DIFF WBC: CPT

## 2021-04-28 PROCEDURE — 80069 RENAL FUNCTION PANEL: CPT

## 2021-04-28 RX ADMIN — LEVOTHYROXINE SODIUM 25 MCG: 0.03 TABLET ORAL at 08:40

## 2021-04-28 RX ADMIN — MULTIPLE VITAMINS W/ MINERALS TAB 1 TABLET: TAB at 14:29

## 2021-04-28 RX ADMIN — Medication: at 17:00

## 2021-04-28 RX ADMIN — Medication 10 ML: at 14:30

## 2021-04-28 RX ADMIN — Medication: at 08:40

## 2021-04-28 RX ADMIN — CARVEDILOL 3.12 MG: 3.12 TABLET, FILM COATED ORAL at 16:59

## 2021-04-28 RX ADMIN — Medication 10 ML: at 21:25

## 2021-04-28 RX ADMIN — Medication 10 ML: at 00:01

## 2021-04-28 RX ADMIN — CARVEDILOL 3.12 MG: 3.12 TABLET, FILM COATED ORAL at 08:40

## 2021-04-28 RX ADMIN — MAGNESIUM OXIDE 400 MG (241.3 MG MAGNESIUM) TABLET 400 MG: TABLET at 08:39

## 2021-04-28 RX ADMIN — Medication 10 ML: at 05:02

## 2021-04-28 RX ADMIN — MAGNESIUM OXIDE 400 MG (241.3 MG MAGNESIUM) TABLET 400 MG: TABLET at 17:00

## 2021-04-28 RX ADMIN — PSYLLIUM HUSK 1 PACKET: 3.4 POWDER ORAL at 17:00

## 2021-04-28 NOTE — ROUTINE PROCESS
End of Shift Note    Bedside shift change report given to Chris Singleton (oncoming nurse) by Dominique Krishna (offgoing nurse). Report included the following information SBAR, Kardex, ED Summary, Intake/Output, MAR, Recent Results, Med Rec Status, Cardiac Rhythm NSR, Alarm Parameters , Quality Measures and Dual Neuro Assessment    Shift worked:  Night     Shift summary and any significant changes:     Pt stable during shift. Wound care completed. Concerns for physician to address:  . ... Zone phone for oncoming shift:   [phone]       Activity:  Activity Level: Bed Rest  Number times ambulated in hallways past shift: 0  Number of times OOB to chair past shift: 0    Cardiac:   Cardiac Monitoring: Yes      Cardiac Rhythm: Sinus bradycardia    Access:   Current line(s): PIV     Genitourinary:   Urinary status: external catheter    Respiratory:   O2 Device: None (Room air)  Chronic home O2 use?: NO  Incentive spirometer at bedside: NO     GI:  Last Bowel Movement Date: 04/27/21  Current diet:  DIET ONE TIME MESSAGE  DIET NUTRITIONAL SUPPLEMENTS Breakfast, Dinner; Ensure [employer]  DIET CARDIAC Regular  DIET ONE TIME MESSAGE  Passing flatus: YES  Tolerating current diet: YES       Pain Management:   Patient states pain is manageable on current regimen: YES    Skin:  Kristian Score: 14  Interventions: turn team, speciality bed, float heels, foam dressing and internal/external urinary devices    Patient Safety:  Fall Score:  Total Score: 2  Interventions: bed/chair alarm, gripper socks and pt to call before getting OOB       Length of Stay:  Expected LOS: 4d 19h  Actual LOS: [address] [facility]

## 2021-04-28 NOTE — PROGRESS NOTES
-Hematology / Oncology ([facility]) -  -Primary Oncologist-   -CC-  F/u anemia        -S-  No events ON, no new complaints. -O-      Patient Vitals for the past 24 hrs:   Temp Pulse Resp BP SpO2   04/28/21 1040 98 °F (36.7 °C) 68 20 (!) 110/48    04/28/21 0756 98.1 °F (36.7 °C) (!) 59 17 (!) 108/48 100 %   04/28/21 0259 98 °F (36.7 °C) (!) 59 18 (!) 103/50 97 %   04/27/21 2244 97.8 °F (36.6 °C) 67 23 (!) 96/49 97 %   04/27/21 1920 98.2 °F (36.8 °C) 68 19 105/60 97 %   04/27/21 1504 98 °F (36.7 °C)       04/27/21 1500  74 20 (!) 118/51      No intake/output data recorded. Gen: nad  Chest: bilateral breath sounds present  Cardiac: rrr  Abd: s/nt    -Labs-    Recent Labs     04/28/21  0459 04/26/21  1029   WBC 16.8* 17.3*   HGB 8.5* 8.4*   * 413*   ANEU 11.9* 12.3*    140   K 4.6 4.0   GLU 87 94   BUN 15 22*   CREA 0.73 0.92   CA 8.4* 8.2*   PHOS 2.7  --        -Imaging-       -Assessment + Plan-     1. Anemia, Normocytic:  -- initial anemia labs neg, acute on chronic anemia  - AKD likely contributing, +/- nutritional deficiencies. Albumin very low at 1.6 supporting nutritional deficiencies, zinc low,  - no monoclonal protein and negative SIFE (no MM)  - hgb stable at 8.5  -- noted to be hemoccult positive - GI on board, no plans for scope for now  -will start zinc given deficiency      2. Thrombocytosis:  - Has had chronic thrombocytosis, would send jak2 but holding since hospice referral placed. 3. Cardiomyopathy:  - Cath on 4/23, no CAD.     4. CHELA on CKD:  - Improved

## 2021-04-28 NOTE — PROGRESS NOTES
4/28/2021 7:48 AM    Admit Date: 4/17/2021    Admit Diagnosis: Infected decubitus ulcer [L89.90, L08.9]    Subjective:     Joslyn Villalta   denies chest pain, chest pressure/discomfort, dyspnea, palpitations, irregular heart beats, near-syncope, syncope, fatigue, orthopnea, paroxysmal nocturnal dyspnea. Visit Vitals  BP (!) 103/50   Pulse (!) 59   Temp 98 °F (36.7 °C)   Resp 18   Ht 5' 7\" (1.702 m)   Wt 181 lb (82.1 kg)   SpO2 97%   BMI 28.35 kg/m²     Current Facility-Administered Medications   Medication Dose Route Frequency    carvediloL (COREG) tablet 3.125 mg  3.125 mg Oral BID WITH MEALS    psyllium husk-aspartame (METAMUCIL FIBER) packet 1 Packet  1 Packet Oral BID    oxyCODONE IR (ROXICODONE) tablet 5 mg  5 mg Oral Q4H PRN    oxyCODONE IR (ROXICODONE) tablet 10 mg  10 mg Oral Q4H PRN    loperamide (IMODIUM) capsule 2 mg  2 mg Oral Q4H PRN    sodium chloride (NS) flush 5-40 mL  5-40 mL IntraVENous Q8H    sodium chloride (NS) flush 5-40 mL  5-40 mL IntraVENous PRN    zinc oxide-cod liver oil (DESITIN) 40 % paste   Topical BID    acetaminophen (TYLENOL) tablet 650 mg  650 mg Oral Q4H PRN    Or    acetaminophen (TYLENOL) suppository 650 mg  650 mg Rectal Q6H PRN    promethazine (PHENERGAN) tablet 12.5 mg  12.5 mg Oral Q6H PRN    Or    ondansetron (ZOFRAN) injection 4 mg  4 mg IntraVENous Q6H PRN    levothyroxine (SYNTHROID) tablet 25 mcg  25 mcg Oral ACB    magnesium oxide (MAG-OX) tablet 400 mg  400 mg Oral BID         Objective:      Visit Vitals  BP (!) 103/50   Pulse (!) 59   Temp 98 °F (36.7 °C)   Resp 18   Ht 5' 7\" (1.702 m)   Wt 181 lb (82.1 kg)   SpO2 97%   BMI 28.35 kg/m²       Physical Exam:  Resting comfortably. No resp distress.    Extremities: extremities normal, atraumatic, no cyanosis or edema  Heart: regular rate and rhythm, S1, S2 normal, no murmur, click, rub or gallop  Lungs: clear to auscultation bilaterally  Neurologic: Grossly normal    Data Review: Labs:    Recent Results (from the past 24 hour(s))   RENAL FUNCTION PANEL    Collection Time: 04/28/21  4:59 AM   Result Value Ref Range    Sodium 139 136 - 145 mmol/L    Potassium 4.6 3.5 - 5.1 mmol/L    Chloride 110 (H) 97 - 108 mmol/L    CO2 24 21 - 32 mmol/L    Anion gap 5 5 - 15 mmol/L    Glucose 87 65 - 100 mg/dL    BUN 15 6 - 20 MG/DL    Creatinine 0.73 0.55 - 1.02 MG/DL    BUN/Creatinine ratio 21 (H) 12 - 20      GFR est AA >60 >60 ml/min/1.73m2    GFR est non-AA >60 >60 ml/min/1.73m2    Calcium 8.4 (L) 8.5 - 10.1 MG/DL    Phosphorus 2.7 2.6 - 4.7 MG/DL    Albumin 1.6 (L) 3.5 - 5.0 g/dL   CBC WITH AUTOMATED DIFF    Collection Time: 04/28/21  4:59 AM   Result Value Ref Range    WBC 16.8 (H) 3.6 - 11.0 K/uL    RBC 3.22 (L) 3.80 - 5.20 M/uL    HGB 8.5 (L) 11.5 - 16.0 g/dL    HCT 28.1 (L) 35.0 - 47.0 %    MCV 87.3 80.0 - 99.0 FL    MCH 26.4 26.0 - 34.0 PG    MCHC 30.2 30.0 - 36.5 g/dL    RDW 19.5 (H) 11.5 - 14.5 %    PLATELET 353 (H) 297 - 400 K/uL    MPV 8.7 (L) 8.9 - 12.9 FL    NRBC 0.5 (H) 0  WBC    ABSOLUTE NRBC 0.08 (H) 0.00 - 0.01 K/uL    NEUTROPHILS 70 32 - 75 %    BAND NEUTROPHILS 1 %    LYMPHOCYTES 20 12 - 49 %    MONOCYTES 4 (L) 5 - 13 %    EOSINOPHILS 3 0 - 7 %    BASOPHILS 0 0 - 1 %    METAMYELOCYTES 2 %    IMMATURE GRANULOCYTES 0 0.0 - 0.5 %    ABS. NEUTROPHILS 11.9 (H) 1.8 - 8.0 K/UL    ABS. LYMPHOCYTES 3.4 0.8 - 3.5 K/UL    ABS. MONOCYTES 0.7 0.0 - 1.0 K/UL    ABS. EOSINOPHILS 0.5 (H) 0.0 - 0.4 K/UL    ABS. BASOPHILS 0.0 0.0 - 0.1 K/UL    ABS. IMM.  GRANS. 0.0 0.00 - 0.04 K/UL    DF MANUAL      RBC COMMENTS ANISOCYTOSIS  1+        RBC COMMENTS HYPOCHROMIA  1+       PROCALCITONIN    Collection Time: 04/28/21  4:59 AM   Result Value Ref Range    Procalcitonin 0.22 ng/mL       Telemetry: SR      Assessment:     Principal Problem:    Sepsis ([address] (12/16/2020)    Active Problems:    Infected decubitus ulcer (4/17/2021)      HTN (hypertension) (4/21/2021)      HLD (hyperlipidemia) (4/21/2021)      Cardiomyopathy ([facility] (4/23/2021)      Anemia (4/23/2021)      Elevated troponin (4/23/2021)        Plan:     SVT in presence of Sepsis d/t infected Decubitus sacral ulcer:  No further episodes. Remains in SR. On low dose coreg.       Takotsubo cardiomyopathy:   Tolerating low dose coreg.  No room for ACEi or ARB due to low BP.       HLD:   Continue Zetia      CHELA: resolved

## 2021-04-28 NOTE — PROGRESS NOTES
Hospitalist Progress Note    NAME: Zoie Wilkins   :  1950   MRN:  [id number]       Assessment / Plan:  Supraventricular tachycardia  cardiomyopathy   Anemia acute on chronic   Echo systolic contraction of 20 to 25%  S/p cardiac cath and no blocked coronaries   cardiomyopathy secondary to hypotension. Cards recommend  lifevest prior to dc but patient is being transitioned to hospice  Continue Coreg with holding parameters    Acute kidney injury, most likely prerenal versus ATN from vancomycin  Hypotension   Creatinine is now back to baseline. Decubitus ulcer stage IV on the sacral/coccyx area  Severe sepsis  Patient is bedbound for about 4 years now  Persistent leukocytosis  Diarrhea suspected from ABX C. Diff negative , on imodium , rectal tube   All stool softnesrs and laxatives were not being given and they are all stopped ! CT abdomen and pelvis on  in chart . Patient has had this decubitus ulcer for the last 4 years, was getting wound care   Zosyn stopped by ID. Per ID, wound is not the source. blood cultures negative to date, wound cultures Showed mixed veronica   Wound care , evaluated by surgery who did not retain any indication for debridement, recommended packing and wound care  Now being transitioned to home with home hospice    Hypertension   Hyperlipidemia  Hypothyroidism  Continue with levothyroxine    Leukocytosis  -ID recommended that infection is not the source of leukocytosis and recommends further work-up by oncology  -Discussed with oncologist today-leukocytosis could be related to multifactorial etiology including chronic decubitus ulcers  -Oncologist felt that there is no further work-up needed from a hematology standpoint for leukocytosis.   -Suggest getting a CBC in 1 week on outpatient basis if patient is agreeable      Disposition:  To home with hospice on       Code Status: Full code  Surrogate Decision Maker: her care giver patient is unsure of the last name    DVT Prophylaxis:  SCDs  GI Prophylaxis: not indicated  25.0 - 29.9 Overweight / Body mass index is 28.35 kg/m². Subjective:     Chief Complaint / Reason for Physician Visit  Reports feeling better. No shortness of breath. Objective:     VITALS:   Last 24hrs VS reviewed since prior progress note. Most recent are:  Patient Vitals for the past 24 hrs:   Temp Pulse Resp BP SpO2   04/28/21 1500 98.4 °F (36.9 °C) 65 14 (!) 107/49 98 %   04/28/21 1040 98 °F (36.7 °C) 68 20 (!) 110/48 100 %   04/28/21 0756 98.1 °F (36.7 °C) (!) 59 17 (!) 108/48 100 %   04/28/21 0259 98 °F (36.7 °C) (!) 59 18 (!) 103/50 97 %   04/27/21 2244 97.8 °F (36.6 °C) 67 23 (!) 96/49 97 %   04/27/21 1920 98.2 °F (36.8 °C) 68 19 105/60 97 %       Intake/Output Summary (Last 24 hours) at 4/28/2021 1529  Last data filed at 4/28/2021 0411  Gross per 24 hour   Intake    Output 560 ml   Net -560 ml        I had a face to face encounter and independently examined this patient on 4/28/2021, as outlined below:  PHYSICAL EXAM:  General: WD, WN. Alert, cooperative, no acute distress    EENT:  EOMI. Anicteric sclerae. MMM  Resp:  CTA bilaterally, no wheezing or rales. No accessory muscle use  CV:  Regular  rhythm,  No edema  GI:  Soft, Non distended, Non tender. +Bowel sounds  Neurologic:  Alert and awake, normal speech,   Psych:   Not anxious nor agitated      Reviewed most current lab test results and cultures  YES  Reviewed most current radiology test results   YES  Review and summation of old records today    NO  Reviewed patient's current orders and MAR    YES  PMH/SH reviewed - no change compared to H&P  ________________________________________________________________________  Care Plan discussed with:    Comments   Patient x    Family      RN x    Care Manager     Consultant                       x Multidiciplinary team rounds were held today with , nursing, pharmacist and clinical coordinator.   Patient's plan of care was discussed; medications were reviewed and discharge planning was addressed. ________________________________________________________________________  Total NON critical care TIME: 35  Minutes    Total CRITICAL CARE TIME Spent:   Minutes non procedure based      Comments   >50% of visit spent in counseling and coordination of care     ________________________________________________________________________  Valentin Smith MD     Procedures: see electronic medical records for all procedures/Xrays and details which were not copied into this note but were reviewed prior to creation of Plan. LABS:  I reviewed today's most current labs and imaging studies.   Pertinent labs include:  Recent Labs     04/28/21 0459 04/26/21  1029   WBC 16.8* 17.3*   HGB 8.5* 8.4*   HCT 28.1* 27.3*   * 413*     Recent Labs     04/28/21 0459 04/26/21  1029    140   K 4.6 4.0   * 110*   CO2 24 24   GLU 87 94   BUN 15 22*   CREA 0.73 0.92   CA 8.4* 8.2*   PHOS 2.7  --    ALB 1.6*  --        Signed: Valentin Smith MD

## 2021-04-28 NOTE — PROGRESS NOTES
End of Shift Note    Bedside shift change report given to Al (oncoming nurse) by Hussein Pereyra RN (offgoing nurse). Report included the following information SBAR, Kardex, ED Summary, Recent Results, Med Rec Status and Cardiac Rhythm NSAR    Shift worked:  7a-7p     Shift summary and any significant changes:     d/c with home hospice tomorrow     Concerns for physician to address:       Zone phone for oncoming shift:          Activity:  Activity Level: Bed Rest  Number times ambulated in hallways past shift: 0  Number of times OOB to chair past shift: 0    Cardiac:   Cardiac Monitoring: Yes      Cardiac Rhythm: Normal sinus rhythm    Access:   Current line(s): PIV     Genitourinary:   Urinary status: incontinent    Respiratory:   O2 Device: None (Room air)  Chronic home O2 use?: NO  Incentive spirometer at bedside: NO     GI:  Last Bowel Movement Date: 04/28/21  Current diet:  DIET NUTRITIONAL SUPPLEMENTS Breakfast, Dinner; Ensure [employer]  DIET REGULAR  Passing flatus: YES  Tolerating current diet: YES       Pain Management:   Patient states pain is manageable on current regimen: YES    Skin:  Kristian Score: 13  Interventions: turn team    Patient Safety:  Fall Score:  Total Score: 2  Interventions: bed/chair alarm       Length of Stay:  Expected LOS: 4d 19h  Actual LOS: 11      Hussein Pereyra RN

## 2021-04-28 NOTE — PROGRESS NOTES
Transition of Care Plan:     RUR:18 %  Disposition:Group Home ([facility])  and with [facility]  Follow up appointments: Follow up with PCP   DME needed: Renee Nunes  Transportation at Discharge [facility] to  on Thursday April 29, 2021 for 11:30 am.  Rigoberto Oneal or means to access home: Group Home access   IM Medicare letter: 2nd IM Medicare Letter to be given prior to discharge  Caregiver Contact:Lisa-Caregiver at [address]: [phone]  Discharge Caregiver contacted prior to discharge?  CM spoke to pt and pt's caregiver [facility] at [phone]      Pt is schedule for d/c tomorrow Thursday April 29, 2021 back to [facility]. CM has schedule for transportation with [facility] at 11:30 am.    Medicare pt has received, reviewed, and signed 2nd IM letter informing them of their right to appeal the discharge. Signed copy has been placed on pt bedside chart. CM will continue to follow and assist with d/c planning. Roque Bryant.Karlos.   Care Manager [facility]  [phone]

## 2021-04-28 NOTE — ACP (ADVANCE CARE PLANNING)
Primary Decision Maker: Ivania Vickers \"Lisa\" - Caregiver - [phone]  Advance Care Planning 4/28/2021   Patient's Healthcare Decision Maker is: Named in scanned ACP document   Confirm Advance Directive Yes, on file   Patient Would Like to Complete Advance Directive -   Does the patient have other document types Do Not Resuscitate     Patient seen with hospice SW, Magnolia Andrews, patient has decided to go home with hospice services. Patient and Massimo Cross shared that patient decided to sign DDNR and is now ready to complete AMD. Patient seems pleased that she can now have more services that would be aligned with her goals. AMD completed. We called patient's Faustina Green (her name is difficult to pronounce and she goes by [facility]) who agreed to be patient's mPOA. She was made aware of patient's wishes for AMD and is aware that she will be returning to Lisa's home with the services of hospice. Patient does not want life prolonging measures at end of life, she is not an organ donor. Copies of AMD made, placed in hard chart for scanning, original given to patient. Patient signed DDNR with hospice team, they will place this in patient's hard chart for scanning. Patient thankful for the support that she received. Also gave patient a copy of [employer] Wishes, explained that this is not a legal document but could help her to clarify her wishes for the future and share them with her mPOA.

## 2021-04-28 NOTE — PROGRESS NOTES
Comprehensive Nutrition Assessment    Type and Reason for Visit: Reassess    Nutrition Recommendations/Plan:  Regular diet   Continue ensure enlive BID    Nutrition Assessment:     Chart reviewed; medically noted for CM, hypotension, and CHELA. Currently receiving a cardiac diet. Eating 50-75% of meals. Noted plans to discharge patient with hospice services tomorrow. Would liberalize to a regular diet given goals of care. Encourage intake of meals. Patient Vitals for the past 168 hrs:   % Diet Eaten   04/27/21 1337 51 - 75%   04/27/21 0818 26 - 50%   04/26/21 0800 51 - 75%   04/23/21 2030 51 - 75%   04/22/21 1700 51 - 75%     Estimated Daily Nutrient Needs:  Energy (kcal): 1653 kcal (BMR 1378 x 1. 2AF); Weight Used for Energy Requirements: Current  Protein (g): 107g (1.3 g/kg bw); Weight Used for Protein Requirements: Current  Fluid (ml/day): 1650 ml; Method Used for Fluid Requirements: 1 ml/kcal    Nutrition Related Findings:    BM 4/27  K+ 4.6, BG 83-84-  Coreg, Synthroid, Mag-ox, Metamucil       Wounds:    Stage IV       Current Nutrition Therapies:  DIET ONE TIME MESSAGE  DIET NUTRITIONAL SUPPLEMENTS Breakfast, Dinner; Ensure [employer]  DIET CARDIAC Regular  DIET ONE TIME MESSAGE    Anthropometric Measures:  · Height:  5' 7\" (170.2 cm)  · Current Body Wt:  82.1 kg (181 lb)    · BMI Category: Overweight (BMI 25.0-29. 9)       Nutrition Diagnosis:   · Increased nutrient needs related to (wound healing) as evidenced by (stage 4 PI)    Nutrition Interventions:   Food and/or Nutrient Delivery: Continue current diet, Continue oral nutrition supplement  Nutrition Education and Counseling: No recommendations at this time  Coordination of Nutrition Care: No recommendation at this time    Goals:  PO intake >70% of meals/supplement next 5-7 days       Nutrition Monitoring and Evaluation:   Behavioral-Environmental Outcomes: None identified  Food/Nutrient Intake Outcomes: Food and nutrient intake, Supplement intake  Physical Signs/Symptoms Outcomes: Skin    Discharge Planning:    Continue current diet, Continue oral nutrition supplement     Electronically signed by Torito Naqvi RD on 4/28/2021 at 9:04 AM    Contact: ext [phone]

## 2021-04-29 ENCOUNTER — HOME CARE VISIT (OUTPATIENT)
Dept: SCHEDULING | Facility: HOME HEALTH | Age: 71
End: 2021-04-29
Payer: MEDICARE

## 2021-04-29 VITALS
WEIGHT: 194.4 LBS | OXYGEN SATURATION: 99 % | HEIGHT: 67 IN | SYSTOLIC BLOOD PRESSURE: 115 MMHG | HEART RATE: 68 BPM | DIASTOLIC BLOOD PRESSURE: 63 MMHG | TEMPERATURE: 98.1 F | BODY MASS INDEX: 30.51 KG/M2 | RESPIRATION RATE: 20 BRPM

## 2021-04-29 VITALS
DIASTOLIC BLOOD PRESSURE: 58 MMHG | OXYGEN SATURATION: 98 % | RESPIRATION RATE: 16 BRPM | SYSTOLIC BLOOD PRESSURE: 114 MMHG | HEART RATE: 68 BPM

## 2021-04-29 LAB
ANION GAP SERPL CALC-SCNC: 5 MMOL/L (ref 5–15)
BUN SERPL-MCNC: 17 MG/DL (ref 6–20)
BUN/CREAT SERPL: 20 (ref 12–20)
CALCIUM SERPL-MCNC: 8.4 MG/DL (ref 8.5–10.1)
CHLORIDE SERPL-SCNC: 109 MMOL/L (ref 97–108)
CO2 SERPL-SCNC: 24 MMOL/L (ref 21–32)
CREAT SERPL-MCNC: 0.84 MG/DL (ref 0.55–1.02)
ERYTHROCYTE [DISTWIDTH] IN BLOOD BY AUTOMATED COUNT: 19.9 % (ref 11.5–14.5)
GLUCOSE SERPL-MCNC: 96 MG/DL (ref 65–100)
HCT VFR BLD AUTO: 27 % (ref 35–47)
HGB BLD-MCNC: 8.2 G/DL (ref 11.5–16)
MCH RBC QN AUTO: 26.3 PG (ref 26–34)
MCHC RBC AUTO-ENTMCNC: 30.4 G/DL (ref 30–36.5)
MCV RBC AUTO: 86.5 FL (ref 80–99)
NRBC # BLD: 0.03 K/UL (ref 0–0.01)
NRBC BLD-RTO: 0.2 PER 100 WBC
PLATELET # BLD AUTO: 403 K/UL (ref 150–400)
PMV BLD AUTO: 9.1 FL (ref 8.9–12.9)
POTASSIUM SERPL-SCNC: 4.5 MMOL/L (ref 3.5–5.1)
RBC # BLD AUTO: 3.12 M/UL (ref 3.8–5.2)
SODIUM SERPL-SCNC: 138 MMOL/L (ref 136–145)
WBC # BLD AUTO: 16.1 K/UL (ref 3.6–11)

## 2021-04-29 PROCEDURE — 74011250637 HC RX REV CODE- 250/637: Performed by: INTERNAL MEDICINE

## 2021-04-29 PROCEDURE — 80048 BASIC METABOLIC PNL TOTAL CA: CPT

## 2021-04-29 PROCEDURE — 77030038269 HC DRN EXT URIN PURWCK BARD -A

## 2021-04-29 PROCEDURE — 74011250637 HC RX REV CODE- 250/637: Performed by: NURSE PRACTITIONER

## 2021-04-29 PROCEDURE — G0299 HHS/HOSPICE OF RN EA 15 MIN: HCPCS

## 2021-04-29 PROCEDURE — 0651 HSPC ROUTINE HOME CARE

## 2021-04-29 PROCEDURE — 85027 COMPLETE CBC AUTOMATED: CPT

## 2021-04-29 PROCEDURE — 77010033678 HC OXYGEN DAILY

## 2021-04-29 PROCEDURE — 36415 COLL VENOUS BLD VENIPUNCTURE: CPT

## 2021-04-29 PROCEDURE — 3336500001 HSPC ELECTION

## 2021-04-29 RX ORDER — CARVEDILOL 3.12 MG/1
3.12 TABLET ORAL 2 TIMES DAILY WITH MEALS
Qty: 60 TAB | Refills: 0 | Status: SHIPPED | OUTPATIENT
Start: 2021-04-29 | End: 2021-05-29

## 2021-04-29 RX ADMIN — Medication: at 03:45

## 2021-04-29 RX ADMIN — CARVEDILOL 3.12 MG: 3.12 TABLET, FILM COATED ORAL at 09:38

## 2021-04-29 RX ADMIN — MAGNESIUM OXIDE 400 MG (241.3 MG MAGNESIUM) TABLET 400 MG: TABLET at 09:38

## 2021-04-29 RX ADMIN — LEVOTHYROXINE SODIUM 25 MCG: 0.03 TABLET ORAL at 09:38

## 2021-04-29 RX ADMIN — MULTIPLE VITAMINS W/ MINERALS TAB 1 TABLET: TAB at 09:50

## 2021-04-29 RX ADMIN — Medication 10 ML: at 05:03

## 2021-04-29 RX ADMIN — Medication: at 09:40

## 2021-04-29 RX ADMIN — PSYLLIUM HUSK 1 PACKET: 3.4 POWDER ORAL at 09:38

## 2021-04-29 NOTE — PROGRESS NOTES
-Hematology / Oncology ([facility]) -  -Primary Oncologist-   -CC-  F/u anemia        -S-  No events ON, no new complaints. Leaving with home hospice today    -O-      Patient Vitals for the past 24 hrs:   Temp Pulse Resp BP SpO2   04/29/21 1052 98.1 °F (36.7 °C) 68 20 115/63 99 %   04/29/21 0717 97.9 °F (36.6 °C) 67 15 113/60 99 %   04/29/21 0343 98.3 °F (36.8 °C) 65 11 (!) 102/52 100 %   04/28/21 2255 98.2 °F (36.8 °C) 66 19 107/63    04/28/21 1900 98.2 °F (36.8 °C) 69 19 (!) 107/52 98 %   04/28/21 1500 98.4 °F (36.9 °C) 65 14 (!) 107/49 98 %     04/29 0701 - 04/29 1900  In: 400 [P.O.:400]  Out: -   Gen: nad  Chest: bilateral breath sounds present  Cardiac: rrr  Abd: s/nt    -Labs-    Recent Labs     04/29/21  0357 04/28/21  0459   WBC 16.1* 16.8*   HGB 8.2* 8.5*   * 431*   ANEU  --  11.9*    139   K 4.5 4.6   GLU 96 87   BUN 17 15   CREA 0.84 0.73   CA 8.4* 8.4*   PHOS  --  2.7       -Imaging-       -Assessment + Plan-     1. Anemia, Normocytic:  -- initial anemia labs neg, acute on chronic anemia  - AKD likely contributing, +/- nutritional deficiencies. Albumin very low at 1.6 supporting nutritional deficiencies, zinc low  - no monoclonal protein and negative SIFE (no MM)  - hgb stable at 8.2  -- noted to be hemoccult positive - GI was consulted, no plans for scope for now  - started zinc given deficiency      2. Thrombocytosis:  - Has had chronic thrombocytosis, will not pursue further w/u given going home on hospice    3. Cardiomyopathy:  - Cath on 4/23, no CAD.     4. CHELA on CKD:  - Improved

## 2021-04-29 NOTE — HOSPICE
[address] [facility] Help to Those in Need  [phone]     Patient Name: Vikas Francis  YOB: 1950  Age: 73 y.o. [facility] RN Note:  Checked in on patient. She is ready to go home. Wound care will be completed prior to her leaving. Verified DME is in the home and hospice medications delivered. Packed up supplies for her to take home. Thank you for the opportunity to be of service to this patient.     Micah Isaac RN  Clinical Nurse Liaison   [facility]  X)[phone] (d) [phone]

## 2021-04-29 NOTE — PROGRESS NOTES
Transition of Care Plan:     RUR:18 %  Disposition:Group Home ([facility])  and with [employer]  Follow up appointments: Follow up with PCP   DME needed: Renee Nunes  Transportation at [address] to  at11:30 am.  Salazar Patel or means to access home: Group Home access   IM Medicare letter: 2nd IM Medicare Letter to be given prior to discharge  Caregiver Contact:Lisa-Caregiver at [address]: [phone]  Discharge Caregiver contacted prior to discharge?  CM spoke to pt and pt's caregiver Matias at [phone]      Pt is ready for d/c from a  standpoint. RN informed. Care Management Interventions  PCP Verified by CM: Yes(Cristi Hudson)  Mode of Transport at Discharge: BLS(AMS to transport)  Hospital Transport Time of Discharge: [facility] (CM Consult): Discharge Planning, Home Hospice(D/C back to the group home with [facility] [facility])  [employer]: Yes  Physical Therapy Consult: No  Occupational Therapy Consult: No  Speech Therapy Consult: No  Current Support Network: Adult Group Home(Ms. Salcedo is very supportive and invovled)  Confirm Follow Up Transport: Other (see comment)(Ms. Salcedo drives pt to her medical appointments)  The Plan for Transition of Care is Related to the Following Treatment Goals : Hospice   The Patient and/or Patient Representative was Provided with a Choice of Provider and Agrees with the Discharge Plan?: Yes  Name of the Patient Representative Who was Provided with a Choice of Provider and Agrees with the Discharge Plan: Pt  Freedom of Choice List was Provided with Basic Dialogue that Supports the Patient's Individualized Plan of Care/Goals, Treatment Preferences and Shares the Quality Data Associated with the Providers?: Yes  Discharge Location  Discharge Placement: Home with hospice    SARAY Pendleton   Care Manager [address] [facility]  [phone]      MANJIT added information to pt's AVS.

## 2021-04-29 NOTE — DISCHARGE INSTRUCTIONS
Patient Discharge Instructions    Eugenio Keto / [id number] : 1950    Admitted 2021 Discharged: 2021         DISCHARGE DIAGNOSIS:   Supraventricular tachycardia  cardiomyopathy   Anemia acute on chronic   Acute kidney injury, most likely prerenal versus ATN from vancomycin  Hypotension   Decubitus ulcer stage IV on the sacral/coccyx area  Persistent leukocytosis  Diarrhea   Hypertension   Hyperlipidemia  Hypothyroidism  Leukocytosis            Take Home Medications     {Medication reconciliation information is now added to the patient's AVS automatically when it is printed. There is no need to use this SmartLink in discharge instructions. Highlight this text and delete it to clear this message}      General drug facts      If you have a very bad allergy, wear an allergy ID at all times.  It is important that you take the medication exactly as they are prescribed.  Keep your medication in the bottles provided by the pharmacist.  Kaia Jacobsonbe a list of all your drugs (prescription, natural products, vitamins, OTC) with you. Give this list to your doctor.  Do not take other medications without consulting your doctor.   Do not share your drugs with others and do not take anyone else's drugs.  Keep all drugs out of the reach of children and pets.   Most drugs may be thrown away in household trash after mixing with coffee grounds or lisette litter and sealing in a plastic bag.   Keep a list Call your doctor for help with any side effects. If in the [place] you may also call the FDA at 5-380-ANQ-8876     Talk with the doctor before starting any new drug, including OTC, natural products, or vitamins. What to do at Home    1. Recommended diet: Regular     2. Recommended activity: Activity as tolerated    3. If you experience any of the following symptoms then please call your primary care physician or return to the emergency room if you cannot get hold of your doctor:    4.  Wound Care: Daily Wound Care for the Sacrum / coccyx wound:  Cleanse the wound with NS and wipe with gauze. Pack the wound with a NS moist Hydrofera Blue dressing and then cover with a foam dressing. Date / time each dressing. 5. Lab work: Cbc in 1 week. 6.Bring these papers with you to your follow up appointments. The papers will help your doctors be sure to continue the care plan from the hospital.      If you have questions regarding the hospital related prescriptions or hospital related issues please call [facility] at [phone]. You can always direct your questions to your primary care doctor if you are unable to reach your hospital physician; your PCP works as an extension of your hospital doctor just like your hospital doctor is an extension of your PCP for your time at the [facility], [facility])      Follow-up with:   PCP: Amira Ortiz  Follow-up Information     Follow up With Specialties Details Why [address]  This is your hospice services. A nurse will be out to visit you today. If you have any questions please call Rishabh Aguila 64    Amira Ortiz Nurse Practitioner   [address]  [phone]      Amira Ortiz Nurse Practitioner Schedule an appointment as soon as possible for a visit in 1 week  [address]      Oli Haynes MD Hematology and Oncology Schedule an appointment as soon as possible for a visit in 1 week  [address]  [phone]             Please call for your own appointment        Information obtained by :  I understand that if any problems occur once I am at home I am to contact my physician. I understand and acknowledge receipt of the instructions indicated above. Physician's or R.N.'s Signature                                                                  Date/Time                                                                                                                                              Patient or Representative Signature                                                          Date/Time

## 2021-04-29 NOTE — PROGRESS NOTES
I have reviewed discharge instructions with the patient. The patient verbalized understanding.  Patient sent home with belongings

## 2021-04-29 NOTE — DISCHARGE SUMMARY
Hospitalist Discharge Summary     Patient ID:  Sincere Laws  [id number]  73 y.o.  1950 4/17/2021    PCP on record: Nicholson Abelardo    Admit date: 4/17/2021  Discharge date and time: 4/29/2021    DISCHARGE DIAGNOSIS:    Supraventricular tachycardia  cardiomyopathy   Anemia acute on chronic   Acute kidney injury, most likely prerenal versus ATN from vancomycin  Hypotension   Decubitus ulcer stage IV on the sacral/coccyx area  Persistent leukocytosis  Diarrhea   Hypertension   Hyperlipidemia  Hypothyroidism  Leukocytosis    CONSULTATIONS:  IP CONSULT TO GENERAL SURGERY  IP CONSULT TO NEPHROLOGY  IP CONSULT TO INFECTIOUS DISEASES  IP CONSULT TO NEPHROLOGY  IP CONSULT TO HEMATOLOGY  IP CONSULT TO GASTROENTEROLOGY  IP CONSULT TO CARDIOLOGY    Excerpted HPI from H&P of Gavin Garcia MD:  Sincere Laws is a 73 y.o.  female past history of hypertension, hyperlipidemia, bedbound for the last 3 to 4 years         Has been bed bound for the past 4 years, recalls having an operation on her back at [address] [place] [address] after a fall at home was not aware of how she had that fall and for how long she was on the floor. She has recently moved into her current residence since November and before was at [facility] rehab center where attempts to rehabilitation including electrical stimulation had failed to allow her to be able to mobilize independently. Now resident of the [facility] [address] services. Her care giver Ms. Mary Paul Oxxxxx unsure of the last name     Just 2 days ago started to have chills, mainly in the evenings, denied any pain other than her chronic back pains.      Has chronic constipation for which takes a stool softener.      Otherwise has denied any respiratory symptoms no chest pain no shortness of breath, no palpitations, no urinary symptoms    ______________________________________________________________________  DISCHARGE SUMMARY/HOSPITAL COURSE:  for full details see H&P, daily progress notes, labs, consult notes. Hospital course problem wise:    Supraventricular tachycardia  cardiomyopathy   Anemia acute on chronic  -Patient was admitted hospital and diagnosed to have supraventricular tachycardia along with cardiomyopathy. Ejection fraction was around 20 to 25%. Patient was evaluated by cardiology. Cardiology recommended placement of a LifeVest but patient preferred to go home with home hospice. She was placed on Coreg for supraventricular tachycardia. Cardiology felt most likely related to Tsakosubo cardiomyopathy but goal-directed medical therapy cannot be given due to hypotension. Patient will need to follow-up with cardiology for further adjustments of medications.       Acute kidney injury, most likely prerenal versus ATN from vancomycin  Hypotension   Creatinine is now back to baseline.        Decubitus ulcer stage IV on the sacral/coccyx area  Severe sepsis  Patient is bedbound for about 4 years now  Persistent leukocytosis  Diarrhea suspected from ABX C. Diff negative , on imodium , rectal tube   All stool softnesrs and laxatives were not being given and they are all stopped ! CT abdomen and pelvis on 04/17 in chart . Patient has had this decubitus ulcer for the last 4 years, was getting wound care   Zosyn stopped by ID. Per ID, wound is not the source.   blood cultures negative to date, wound cultures Showed mixed veronica   Wound care , evaluated by surgery who did not retain any indication for debridement, recommended packing and wound care  Now being transitioned to home with home hospice    Hypertension   Hyperlipidemia  Hypothyroidism  Continue with levothyroxine     Leukocytosis  -ID recommended that infection is not the source of leukocytosis and recommends further work-up by oncology  -Discussed with oncologist today-leukocytosis could be related to multifactorial etiology including chronic decubitus ulcers  -Oncologist felt that there is no further work-up needed from a hematology standpoint for leukocytosis. -Suggest getting a CBC in 1 week on outpatient basis if patient is agreeable     Patient seen and examined today, vitals are stable, lab work is at baseline  Is being discharged to home with home hospice. Case management arranged for hospice to take over upon patient's arrival to home.        _______________________________________________________________________  Patient seen and examined by me on discharge day. Pertinent Findings:  Gen:    Not in distress  Chest: Clear lungs  CVS:   Regular rhythm. No edema  Abd:  Soft, not distended, not tender  Neuro:  Alert, awake  _______________________________________________________________________  DISCHARGE MEDICATIONS:   Discharge Medication List as of 4/29/2021 11:00 AM      START taking these medications    Details   carvediloL (COREG) 3.125 mg tablet Take 1 Tab by mouth two (2) times daily (with meals) for 30 days. , Print, Disp-60 Tab, R-0         CONTINUE these medications which have NOT CHANGED    Details   levothyroxine (SYNTHROID) 25 mcg tablet Take 25 mcg by mouth Daily (before breakfast). , Historical Med      magnesium oxide (MAG-OX) 400 mg tablet Take 400 mg by mouth two (2) times a day., Historical Med      ascorbic acid, vitamin C, (Vitamin C) 500 mg tablet Take 1,000 mg by mouth daily. , Historical Med      acetaminophen (TYLENOL) 650 mg TbER Take 650 mg by mouth every six to eight (6-8) hours as needed for Pain., Historical Med      oxyCODONE-acetaminophen (PERCOCET) 5-325 mg per tablet Take 1 Tab by mouth every six (6) hours as needed for Pain., Historical Med      ammonium lactate (LAC-HYDRIN) 12 % lotion Apply  to affected area as needed. Apply to both legs every evening for dry skin, Historical Med      ezetimibe (ZETIA) 10 mg tablet Take 10 mg by mouth daily. , Historical Med      senna (Senna) 8.6 mg tablet Take 2 Tabs by mouth two (2) times daily as needed for Constipation. , Historical Med      amino acids-protein hydrolys (Pro-Stat AWC)  gram-kcal/30 mL liqd Take 30 mL by mouth two (2) times a day. For wound healing, Historical Med         STOP taking these medications       traMADoL (ULTRAM) 50 mg tablet Comments:   Reason for Stopping:                 Patient Follow Up Instructions:   1. Recommended diet: Regular     2. Recommended activity: Activity as tolerated    3. If you experience any of the following symptoms then please call your primary care physician or return to the emergency room if you cannot get hold of your doctor:    4. Wound Care: Daily Wound Care for the Sacrum / coccyx wound:  Cleanse the wound with NS and wipe with gauze. Pack the wound with a NS moist Hydrofera Blue dressing and then cover with a foam dressing. Date / time each dressing. 5. Lab work: Cbc in 1 week. 6.Bring these papers with you to your follow up appointments. The papers will help your doctors be sure to continue the care plan from the hospital.        Follow-up Information     Follow up With Specialties Details Why [address] [facility]  This is your hospice services. A nurse will be out to visit you today.  If you have any questions please call Rishabh Aguila 64    Tracie Philip Nurse Practitioner   [address] [facility] [address]      Tracie Philip Nurse Practitioner Schedule an appointment as soon as possible for a visit in 1 week  [address] [address]      Georgette Red MD Hematology and Oncology Schedule an appointment as soon as possible for a visit in 1 week  [address]   [address]  [address] [facility]  [phone]          ________________________________________________________________    Risk of deterioration: High    Condition at Discharge: Stable  __________________________________________________________________    Disposition  Home with hospice services    ____________________________________________________________________    Code Status: DNR/DNI  ___________________________________________________________________      Total time in minutes spent coordinating this discharge (includes going over instructions, follow-up, prescriptions, and preparing report for sign off to her PCP) :  35  minutes    Signed:  Tapan Domingo MD

## 2021-04-30 ENCOUNTER — HOME CARE VISIT (OUTPATIENT)
Dept: HOSPICE | Facility: HOSPICE | Age: 71
End: 2021-04-30
Payer: MEDICARE

## 2021-04-30 ENCOUNTER — HOME CARE VISIT (OUTPATIENT)
Dept: SCHEDULING | Facility: HOME HEALTH | Age: 71
End: 2021-04-30
Payer: MEDICARE

## 2021-04-30 PROCEDURE — G0299 HHS/HOSPICE OF RN EA 15 MIN: HCPCS

## 2021-04-30 PROCEDURE — G0155 HHCP-SVS OF CSW,EA 15 MIN: HCPCS

## 2021-04-30 PROCEDURE — 0651 HSPC ROUTINE HOME CARE

## 2021-05-01 VITALS
OXYGEN SATURATION: 99 % | HEART RATE: 64 BPM | SYSTOLIC BLOOD PRESSURE: 110 MMHG | DIASTOLIC BLOOD PRESSURE: 50 MMHG | RESPIRATION RATE: 14 BRPM

## 2021-05-01 PROCEDURE — 0651 HSPC ROUTINE HOME CARE

## 2021-05-02 PROCEDURE — 0651 HSPC ROUTINE HOME CARE

## 2021-05-03 ENCOUNTER — HOME CARE VISIT (OUTPATIENT)
Dept: SCHEDULING | Facility: HOME HEALTH | Age: 71
End: 2021-05-03
Payer: MEDICARE

## 2021-05-03 ENCOUNTER — HOME CARE VISIT (OUTPATIENT)
Dept: HOSPICE | Facility: HOSPICE | Age: 71
End: 2021-05-03
Payer: MEDICARE

## 2021-05-03 PROCEDURE — G0299 HHS/HOSPICE OF RN EA 15 MIN: HCPCS

## 2021-05-03 PROCEDURE — 0651 HSPC ROUTINE HOME CARE

## 2021-05-03 PROCEDURE — HOSPICE MEDICATION HC HH HOSPICE MEDICATION

## 2021-05-04 ENCOUNTER — HOME CARE VISIT (OUTPATIENT)
Dept: SCHEDULING | Facility: HOME HEALTH | Age: 71
End: 2021-05-04
Payer: MEDICARE

## 2021-05-04 ENCOUNTER — HOME CARE VISIT (OUTPATIENT)
Dept: HOSPICE | Facility: HOSPICE | Age: 71
End: 2021-05-04
Payer: MEDICARE

## 2021-05-04 PROCEDURE — 0651 HSPC ROUTINE HOME CARE

## 2021-05-05 ENCOUNTER — HOME CARE VISIT (OUTPATIENT)
Dept: SCHEDULING | Facility: HOME HEALTH | Age: 71
End: 2021-05-05
Payer: MEDICARE

## 2021-05-05 VITALS
OXYGEN SATURATION: 96 % | HEART RATE: 61 BPM | SYSTOLIC BLOOD PRESSURE: 110 MMHG | RESPIRATION RATE: 14 BRPM | DIASTOLIC BLOOD PRESSURE: 48 MMHG

## 2021-05-05 PROCEDURE — 0651 HSPC ROUTINE HOME CARE

## 2021-05-05 PROCEDURE — G0299 HHS/HOSPICE OF RN EA 15 MIN: HCPCS

## 2021-05-06 ENCOUNTER — HOME CARE VISIT (OUTPATIENT)
Dept: SCHEDULING | Facility: HOME HEALTH | Age: 71
End: 2021-05-06
Payer: MEDICARE

## 2021-05-06 ENCOUNTER — HOME CARE VISIT (OUTPATIENT)
Dept: HOSPICE | Facility: HOSPICE | Age: 71
End: 2021-05-06
Payer: MEDICARE

## 2021-05-06 PROCEDURE — 0651 HSPC ROUTINE HOME CARE

## 2021-05-06 PROCEDURE — G0156 HHCP-SVS OF AIDE,EA 15 MIN: HCPCS

## 2021-05-06 PROCEDURE — G0155 HHCP-SVS OF CSW,EA 15 MIN: HCPCS

## 2021-05-07 ENCOUNTER — HOME CARE VISIT (OUTPATIENT)
Dept: HOSPICE | Facility: HOSPICE | Age: 71
End: 2021-05-07
Payer: MEDICARE

## 2021-05-07 ENCOUNTER — HOME CARE VISIT (OUTPATIENT)
Dept: SCHEDULING | Facility: HOME HEALTH | Age: 71
End: 2021-05-07
Payer: MEDICARE

## 2021-05-07 PROCEDURE — 0651 HSPC ROUTINE HOME CARE

## 2021-05-07 PROCEDURE — G0299 HHS/HOSPICE OF RN EA 15 MIN: HCPCS

## 2021-05-07 PROCEDURE — G0156 HHCP-SVS OF AIDE,EA 15 MIN: HCPCS

## 2021-05-08 PROCEDURE — 0651 HSPC ROUTINE HOME CARE

## 2021-05-09 VITALS
SYSTOLIC BLOOD PRESSURE: 116 MMHG | TEMPERATURE: 98 F | DIASTOLIC BLOOD PRESSURE: 68 MMHG | HEART RATE: 58 BPM | RESPIRATION RATE: 16 BRPM | OXYGEN SATURATION: 99 %

## 2021-05-09 PROCEDURE — 0651 HSPC ROUTINE HOME CARE

## 2021-05-10 ENCOUNTER — HOME CARE VISIT (OUTPATIENT)
Dept: HOSPICE | Facility: HOSPICE | Age: 71
End: 2021-05-10
Payer: MEDICARE

## 2021-05-10 VITALS
SYSTOLIC BLOOD PRESSURE: 120 MMHG | HEART RATE: 59 BPM | RESPIRATION RATE: 20 BRPM | DIASTOLIC BLOOD PRESSURE: 62 MMHG | OXYGEN SATURATION: 97 % | TEMPERATURE: 97.1 F

## 2021-05-10 PROCEDURE — 0651 HSPC ROUTINE HOME CARE

## 2021-05-10 PROCEDURE — G0299 HHS/HOSPICE OF RN EA 15 MIN: HCPCS

## 2021-05-11 ENCOUNTER — HOME CARE VISIT (OUTPATIENT)
Dept: SCHEDULING | Facility: HOME HEALTH | Age: 71
End: 2021-05-11
Payer: MEDICARE

## 2021-05-11 PROCEDURE — 0651 HSPC ROUTINE HOME CARE

## 2021-05-11 PROCEDURE — G0156 HHCP-SVS OF AIDE,EA 15 MIN: HCPCS

## 2021-05-12 ENCOUNTER — HOME CARE VISIT (OUTPATIENT)
Dept: HOSPICE | Facility: HOSPICE | Age: 71
End: 2021-05-12
Payer: MEDICARE

## 2021-05-12 ENCOUNTER — HOME CARE VISIT (OUTPATIENT)
Dept: SCHEDULING | Facility: HOME HEALTH | Age: 71
End: 2021-05-12
Payer: MEDICARE

## 2021-05-12 PROCEDURE — 0651 HSPC ROUTINE HOME CARE

## 2021-05-13 ENCOUNTER — HOME CARE VISIT (OUTPATIENT)
Dept: SCHEDULING | Facility: HOME HEALTH | Age: 71
End: 2021-05-13
Payer: MEDICARE

## 2021-05-13 PROCEDURE — 0651 HSPC ROUTINE HOME CARE

## 2021-05-13 PROCEDURE — G0156 HHCP-SVS OF AIDE,EA 15 MIN: HCPCS

## 2021-05-14 ENCOUNTER — HOME CARE VISIT (OUTPATIENT)
Dept: SCHEDULING | Facility: HOME HEALTH | Age: 71
End: 2021-05-14
Payer: MEDICARE

## 2021-05-14 VITALS
OXYGEN SATURATION: 100 % | SYSTOLIC BLOOD PRESSURE: 117 MMHG | TEMPERATURE: 97.9 F | DIASTOLIC BLOOD PRESSURE: 51 MMHG | RESPIRATION RATE: 16 BRPM | HEART RATE: 59 BPM

## 2021-05-14 PROCEDURE — G0300 HHS/HOSPICE OF LPN EA 15 MIN: HCPCS

## 2021-05-14 PROCEDURE — G0156 HHCP-SVS OF AIDE,EA 15 MIN: HCPCS

## 2021-05-14 PROCEDURE — 0651 HSPC ROUTINE HOME CARE

## 2021-05-15 PROCEDURE — 0651 HSPC ROUTINE HOME CARE

## 2021-05-16 PROCEDURE — 0651 HSPC ROUTINE HOME CARE

## 2021-05-17 ENCOUNTER — HOME CARE VISIT (OUTPATIENT)
Dept: SCHEDULING | Facility: HOME HEALTH | Age: 71
End: 2021-05-17
Payer: MEDICARE

## 2021-05-17 ENCOUNTER — HOME CARE VISIT (OUTPATIENT)
Dept: HOSPICE | Facility: HOSPICE | Age: 71
End: 2021-05-17
Payer: MEDICARE

## 2021-05-17 PROCEDURE — 0651 HSPC ROUTINE HOME CARE

## 2021-05-17 PROCEDURE — G0299 HHS/HOSPICE OF RN EA 15 MIN: HCPCS

## 2021-05-18 ENCOUNTER — HOME CARE VISIT (OUTPATIENT)
Dept: SCHEDULING | Facility: HOME HEALTH | Age: 71
End: 2021-05-18
Payer: MEDICARE

## 2021-05-18 ENCOUNTER — HOME CARE VISIT (OUTPATIENT)
Dept: HOSPICE | Facility: HOSPICE | Age: 71
End: 2021-05-18
Payer: MEDICARE

## 2021-05-18 VITALS
OXYGEN SATURATION: 97 % | HEART RATE: 64 BPM | SYSTOLIC BLOOD PRESSURE: 128 MMHG | DIASTOLIC BLOOD PRESSURE: 56 MMHG | RESPIRATION RATE: 14 BRPM

## 2021-05-18 PROCEDURE — G0156 HHCP-SVS OF AIDE,EA 15 MIN: HCPCS

## 2021-05-18 PROCEDURE — 0651 HSPC ROUTINE HOME CARE

## 2021-05-19 ENCOUNTER — HOME CARE VISIT (OUTPATIENT)
Dept: SCHEDULING | Facility: HOME HEALTH | Age: 71
End: 2021-05-19
Payer: MEDICARE

## 2021-05-19 ENCOUNTER — HOME CARE VISIT (OUTPATIENT)
Dept: HOSPICE | Facility: HOSPICE | Age: 71
End: 2021-05-19
Payer: MEDICARE

## 2021-05-19 PROCEDURE — 0651 HSPC ROUTINE HOME CARE

## 2021-05-19 PROCEDURE — HOSPICE MEDICATION HC HH HOSPICE MEDICATION

## 2021-05-19 PROCEDURE — G0300 HHS/HOSPICE OF LPN EA 15 MIN: HCPCS

## 2021-05-20 ENCOUNTER — HOME CARE VISIT (OUTPATIENT)
Dept: SCHEDULING | Facility: HOME HEALTH | Age: 71
End: 2021-05-20
Payer: MEDICARE

## 2021-05-20 PROCEDURE — G0156 HHCP-SVS OF AIDE,EA 15 MIN: HCPCS

## 2021-05-20 PROCEDURE — 0651 HSPC ROUTINE HOME CARE

## 2021-05-21 ENCOUNTER — HOME CARE VISIT (OUTPATIENT)
Dept: SCHEDULING | Facility: HOME HEALTH | Age: 71
End: 2021-05-21
Payer: MEDICARE

## 2021-05-21 VITALS
HEART RATE: 61 BPM | TEMPERATURE: 98 F | RESPIRATION RATE: 16 BRPM | OXYGEN SATURATION: 100 % | DIASTOLIC BLOOD PRESSURE: 53 MMHG | SYSTOLIC BLOOD PRESSURE: 107 MMHG

## 2021-05-21 VITALS — DIASTOLIC BLOOD PRESSURE: 54 MMHG | SYSTOLIC BLOOD PRESSURE: 110 MMHG | HEART RATE: 63 BPM | RESPIRATION RATE: 18 BRPM

## 2021-05-21 PROCEDURE — G0156 HHCP-SVS OF AIDE,EA 15 MIN: HCPCS

## 2021-05-21 PROCEDURE — 0651 HSPC ROUTINE HOME CARE

## 2021-05-21 PROCEDURE — G0300 HHS/HOSPICE OF LPN EA 15 MIN: HCPCS

## 2021-05-21 NOTE — HOSPICE
Greeted by caregiver Matias. She reports no concerns. Patient resting quietly in bed  watching TV,having just eaten breakfast. Patient is alert and oriented x 4, she denies pain or sob. Patient reports pain managed with current medication. assessment completed as noted. Patient position self on right side for wound care to sacral. wound care completed, patient tolerated well. No concerns voiced by patient. Patient position self for comfort, assisted with pillow placement for comfort.  encourage patient and caregiver to call with any concerns

## 2021-05-22 PROCEDURE — HOSPICE MEDICATION HC HH HOSPICE MEDICATION

## 2021-05-22 PROCEDURE — 0651 HSPC ROUTINE HOME CARE

## 2021-05-23 PROCEDURE — 0651 HSPC ROUTINE HOME CARE

## 2021-05-24 ENCOUNTER — HOME CARE VISIT (OUTPATIENT)
Dept: HOSPICE | Facility: HOSPICE | Age: 71
End: 2021-05-24
Payer: MEDICARE

## 2021-05-24 ENCOUNTER — HOME CARE VISIT (OUTPATIENT)
Dept: SCHEDULING | Facility: HOME HEALTH | Age: 71
End: 2021-05-24
Payer: MEDICARE

## 2021-05-24 PROCEDURE — G0300 HHS/HOSPICE OF LPN EA 15 MIN: HCPCS

## 2021-05-24 PROCEDURE — 0651 HSPC ROUTINE HOME CARE

## 2021-05-25 ENCOUNTER — HOME CARE VISIT (OUTPATIENT)
Dept: SCHEDULING | Facility: HOME HEALTH | Age: 71
End: 2021-05-25
Payer: MEDICARE

## 2021-05-25 PROCEDURE — 0651 HSPC ROUTINE HOME CARE

## 2021-05-25 PROCEDURE — G0156 HHCP-SVS OF AIDE,EA 15 MIN: HCPCS

## 2021-05-26 ENCOUNTER — HOME CARE VISIT (OUTPATIENT)
Dept: SCHEDULING | Facility: HOME HEALTH | Age: 71
End: 2021-05-26
Payer: MEDICARE

## 2021-05-26 VITALS — HEART RATE: 64 BPM | RESPIRATION RATE: 18 BRPM | SYSTOLIC BLOOD PRESSURE: 118 MMHG | DIASTOLIC BLOOD PRESSURE: 54 MMHG

## 2021-05-26 PROCEDURE — 0651 HSPC ROUTINE HOME CARE

## 2021-05-26 PROCEDURE — G0300 HHS/HOSPICE OF LPN EA 15 MIN: HCPCS

## 2021-05-27 ENCOUNTER — HOME CARE VISIT (OUTPATIENT)
Dept: SCHEDULING | Facility: HOME HEALTH | Age: 71
End: 2021-05-27
Payer: MEDICARE

## 2021-05-27 PROCEDURE — 0651 HSPC ROUTINE HOME CARE

## 2021-05-27 PROCEDURE — G0156 HHCP-SVS OF AIDE,EA 15 MIN: HCPCS

## 2021-05-28 ENCOUNTER — HOME CARE VISIT (OUTPATIENT)
Dept: SCHEDULING | Facility: HOME HEALTH | Age: 71
End: 2021-05-28
Payer: MEDICARE

## 2021-05-28 VITALS
RESPIRATION RATE: 14 BRPM | DIASTOLIC BLOOD PRESSURE: 60 MMHG | OXYGEN SATURATION: 97 % | SYSTOLIC BLOOD PRESSURE: 130 MMHG | HEART RATE: 58 BPM

## 2021-05-28 PROCEDURE — G0156 HHCP-SVS OF AIDE,EA 15 MIN: HCPCS

## 2021-05-28 PROCEDURE — HOSPICE MEDICATION HC HH HOSPICE MEDICATION

## 2021-05-28 PROCEDURE — G0299 HHS/HOSPICE OF RN EA 15 MIN: HCPCS

## 2021-05-28 PROCEDURE — 0651 HSPC ROUTINE HOME CARE

## 2021-05-29 PROCEDURE — 0651 HSPC ROUTINE HOME CARE

## 2021-05-30 ENCOUNTER — HOME CARE VISIT (OUTPATIENT)
Dept: HOSPICE | Facility: HOSPICE | Age: 71
End: 2021-05-30
Payer: MEDICARE

## 2021-05-30 PROCEDURE — 0651 HSPC ROUTINE HOME CARE

## 2021-05-31 ENCOUNTER — HOME CARE VISIT (OUTPATIENT)
Dept: HOSPICE | Facility: HOSPICE | Age: 71
End: 2021-05-31
Payer: MEDICARE

## 2021-05-31 PROCEDURE — 0651 HSPC ROUTINE HOME CARE

## 2021-06-01 ENCOUNTER — HOME CARE VISIT (OUTPATIENT)
Dept: SCHEDULING | Facility: HOME HEALTH | Age: 71
End: 2021-06-01
Payer: MEDICARE

## 2021-06-01 PROCEDURE — 0651 HSPC ROUTINE HOME CARE

## 2021-06-01 PROCEDURE — G0156 HHCP-SVS OF AIDE,EA 15 MIN: HCPCS

## 2021-06-02 ENCOUNTER — HOME CARE VISIT (OUTPATIENT)
Dept: SCHEDULING | Facility: HOME HEALTH | Age: 71
End: 2021-06-02
Payer: MEDICARE

## 2021-06-02 PROCEDURE — 0651 HSPC ROUTINE HOME CARE

## 2021-06-02 PROCEDURE — G0300 HHS/HOSPICE OF LPN EA 15 MIN: HCPCS

## 2021-06-03 ENCOUNTER — HOME CARE VISIT (OUTPATIENT)
Dept: SCHEDULING | Facility: HOME HEALTH | Age: 71
End: 2021-06-03
Payer: MEDICARE

## 2021-06-03 VITALS — DIASTOLIC BLOOD PRESSURE: 60 MMHG | HEART RATE: 68 BPM | RESPIRATION RATE: 18 BRPM | SYSTOLIC BLOOD PRESSURE: 122 MMHG

## 2021-06-03 PROCEDURE — 0651 HSPC ROUTINE HOME CARE

## 2021-06-03 PROCEDURE — G0156 HHCP-SVS OF AIDE,EA 15 MIN: HCPCS

## 2021-06-04 ENCOUNTER — HOME CARE VISIT (OUTPATIENT)
Dept: SCHEDULING | Facility: HOME HEALTH | Age: 71
End: 2021-06-04
Payer: MEDICARE

## 2021-06-04 PROCEDURE — G0156 HHCP-SVS OF AIDE,EA 15 MIN: HCPCS

## 2021-06-04 PROCEDURE — G0299 HHS/HOSPICE OF RN EA 15 MIN: HCPCS

## 2021-06-04 PROCEDURE — HOSPICE MEDICATION HC HH HOSPICE MEDICATION

## 2021-06-04 PROCEDURE — 0651 HSPC ROUTINE HOME CARE

## 2021-06-05 PROCEDURE — 0651 HSPC ROUTINE HOME CARE

## 2021-06-06 PROCEDURE — 0651 HSPC ROUTINE HOME CARE

## 2021-06-07 ENCOUNTER — HOME CARE VISIT (OUTPATIENT)
Dept: SCHEDULING | Facility: HOME HEALTH | Age: 71
End: 2021-06-07
Payer: MEDICARE

## 2021-06-07 VITALS
SYSTOLIC BLOOD PRESSURE: 132 MMHG | DIASTOLIC BLOOD PRESSURE: 56 MMHG | TEMPERATURE: 97.7 F | RESPIRATION RATE: 18 BRPM | HEART RATE: 63 BPM | OXYGEN SATURATION: 97 %

## 2021-06-07 PROCEDURE — HOSPICE MEDICATION HC HH HOSPICE MEDICATION

## 2021-06-07 PROCEDURE — G0156 HHCP-SVS OF AIDE,EA 15 MIN: HCPCS

## 2021-06-07 PROCEDURE — 0651 HSPC ROUTINE HOME CARE

## 2021-06-07 PROCEDURE — G0300 HHS/HOSPICE OF LPN EA 15 MIN: HCPCS

## 2021-06-08 ENCOUNTER — HOME CARE VISIT (OUTPATIENT)
Dept: SCHEDULING | Facility: HOME HEALTH | Age: 71
End: 2021-06-08
Payer: MEDICARE

## 2021-06-08 ENCOUNTER — HOME CARE VISIT (OUTPATIENT)
Dept: HOSPICE | Facility: HOSPICE | Age: 71
End: 2021-06-08
Payer: MEDICARE

## 2021-06-08 PROCEDURE — G0156 HHCP-SVS OF AIDE,EA 15 MIN: HCPCS

## 2021-06-08 PROCEDURE — 0651 HSPC ROUTINE HOME CARE

## 2021-06-09 ENCOUNTER — HOME CARE VISIT (OUTPATIENT)
Dept: SCHEDULING | Facility: HOME HEALTH | Age: 71
End: 2021-06-09
Payer: MEDICARE

## 2021-06-09 PROCEDURE — G0300 HHS/HOSPICE OF LPN EA 15 MIN: HCPCS

## 2021-06-09 PROCEDURE — 0651 HSPC ROUTINE HOME CARE

## 2021-06-10 ENCOUNTER — HOME CARE VISIT (OUTPATIENT)
Dept: SCHEDULING | Facility: HOME HEALTH | Age: 71
End: 2021-06-10
Payer: MEDICARE

## 2021-06-10 VITALS
DIASTOLIC BLOOD PRESSURE: 60 MMHG | OXYGEN SATURATION: 98 % | SYSTOLIC BLOOD PRESSURE: 128 MMHG | HEART RATE: 97 BPM | RESPIRATION RATE: 18 BRPM

## 2021-06-10 PROCEDURE — G0156 HHCP-SVS OF AIDE,EA 15 MIN: HCPCS

## 2021-06-10 PROCEDURE — 0651 HSPC ROUTINE HOME CARE

## 2021-06-11 ENCOUNTER — HOME CARE VISIT (OUTPATIENT)
Dept: SCHEDULING | Facility: HOME HEALTH | Age: 71
End: 2021-06-11
Payer: MEDICARE

## 2021-06-11 ENCOUNTER — HOME CARE VISIT (OUTPATIENT)
Dept: HOSPICE | Facility: HOSPICE | Age: 71
End: 2021-06-11
Payer: MEDICARE

## 2021-06-11 PROCEDURE — 0651 HSPC ROUTINE HOME CARE

## 2021-06-11 PROCEDURE — G0299 HHS/HOSPICE OF RN EA 15 MIN: HCPCS

## 2021-06-12 PROCEDURE — 0651 HSPC ROUTINE HOME CARE

## 2021-06-13 PROCEDURE — 0651 HSPC ROUTINE HOME CARE

## 2021-06-14 ENCOUNTER — HOME CARE VISIT (OUTPATIENT)
Dept: SCHEDULING | Facility: HOME HEALTH | Age: 71
End: 2021-06-14
Payer: MEDICARE

## 2021-06-14 VITALS
OXYGEN SATURATION: 100 % | HEART RATE: 56 BPM | RESPIRATION RATE: 16 BRPM | DIASTOLIC BLOOD PRESSURE: 68 MMHG | SYSTOLIC BLOOD PRESSURE: 140 MMHG

## 2021-06-14 PROCEDURE — 0651 HSPC ROUTINE HOME CARE

## 2021-06-14 PROCEDURE — G0300 HHS/HOSPICE OF LPN EA 15 MIN: HCPCS

## 2021-06-14 PROCEDURE — HOSPICE MEDICATION HC HH HOSPICE MEDICATION

## 2021-06-14 PROCEDURE — G0156 HHCP-SVS OF AIDE,EA 15 MIN: HCPCS

## 2021-06-15 ENCOUNTER — HOME CARE VISIT (OUTPATIENT)
Dept: HOSPICE | Facility: HOSPICE | Age: 71
End: 2021-06-15
Payer: MEDICARE

## 2021-06-15 PROCEDURE — 0651 HSPC ROUTINE HOME CARE

## 2021-06-16 ENCOUNTER — HOME CARE VISIT (OUTPATIENT)
Dept: SCHEDULING | Facility: HOME HEALTH | Age: 71
End: 2021-06-16
Payer: MEDICARE

## 2021-06-16 PROCEDURE — G0156 HHCP-SVS OF AIDE,EA 15 MIN: HCPCS

## 2021-06-16 PROCEDURE — 0651 HSPC ROUTINE HOME CARE

## 2021-06-16 PROCEDURE — G0300 HHS/HOSPICE OF LPN EA 15 MIN: HCPCS

## 2021-06-17 PROCEDURE — 0651 HSPC ROUTINE HOME CARE

## 2021-06-18 ENCOUNTER — HOME CARE VISIT (OUTPATIENT)
Dept: SCHEDULING | Facility: HOME HEALTH | Age: 71
End: 2021-06-18
Payer: MEDICARE

## 2021-06-18 PROCEDURE — 0651 HSPC ROUTINE HOME CARE

## 2021-06-18 PROCEDURE — G0156 HHCP-SVS OF AIDE,EA 15 MIN: HCPCS

## 2021-06-18 PROCEDURE — G0299 HHS/HOSPICE OF RN EA 15 MIN: HCPCS

## 2021-06-19 PROCEDURE — 0651 HSPC ROUTINE HOME CARE

## 2021-06-20 PROCEDURE — 0651 HSPC ROUTINE HOME CARE

## 2021-06-21 ENCOUNTER — HOME CARE VISIT (OUTPATIENT)
Dept: SCHEDULING | Facility: HOME HEALTH | Age: 71
End: 2021-06-21
Payer: MEDICARE

## 2021-06-21 PROCEDURE — HOSPICE MEDICATION HC HH HOSPICE MEDICATION

## 2021-06-21 PROCEDURE — 0651 HSPC ROUTINE HOME CARE

## 2021-06-21 PROCEDURE — G0156 HHCP-SVS OF AIDE,EA 15 MIN: HCPCS

## 2021-06-21 PROCEDURE — G0300 HHS/HOSPICE OF LPN EA 15 MIN: HCPCS

## 2021-06-22 ENCOUNTER — HOME CARE VISIT (OUTPATIENT)
Dept: HOSPICE | Facility: HOSPICE | Age: 71
End: 2021-06-22
Payer: MEDICARE

## 2021-06-22 PROCEDURE — 0651 HSPC ROUTINE HOME CARE

## 2021-06-23 ENCOUNTER — HOME CARE VISIT (OUTPATIENT)
Dept: SCHEDULING | Facility: HOME HEALTH | Age: 71
End: 2021-06-23
Payer: MEDICARE

## 2021-06-23 PROCEDURE — G0300 HHS/HOSPICE OF LPN EA 15 MIN: HCPCS

## 2021-06-23 PROCEDURE — G0156 HHCP-SVS OF AIDE,EA 15 MIN: HCPCS

## 2021-06-23 PROCEDURE — 0651 HSPC ROUTINE HOME CARE

## 2021-06-24 PROCEDURE — 0651 HSPC ROUTINE HOME CARE

## 2021-06-25 ENCOUNTER — HOME CARE VISIT (OUTPATIENT)
Dept: SCHEDULING | Facility: HOME HEALTH | Age: 71
End: 2021-06-25
Payer: MEDICARE

## 2021-06-25 ENCOUNTER — HOME CARE VISIT (OUTPATIENT)
Dept: HOSPICE | Facility: HOSPICE | Age: 71
End: 2021-06-25
Payer: MEDICARE

## 2021-06-25 VITALS — RESPIRATION RATE: 18 BRPM | SYSTOLIC BLOOD PRESSURE: 120 MMHG | DIASTOLIC BLOOD PRESSURE: 60 MMHG | HEART RATE: 69 BPM

## 2021-06-25 PROCEDURE — G0299 HHS/HOSPICE OF RN EA 15 MIN: HCPCS

## 2021-06-25 PROCEDURE — 0651 HSPC ROUTINE HOME CARE

## 2021-06-25 PROCEDURE — G0156 HHCP-SVS OF AIDE,EA 15 MIN: HCPCS

## 2021-06-25 PROCEDURE — HOSPICE MEDICATION HC HH HOSPICE MEDICATION

## 2021-06-25 PROCEDURE — G0155 HHCP-SVS OF CSW,EA 15 MIN: HCPCS

## 2021-06-26 PROCEDURE — 0651 HSPC ROUTINE HOME CARE

## 2021-06-27 PROCEDURE — 0651 HSPC ROUTINE HOME CARE

## 2021-06-28 ENCOUNTER — HOME CARE VISIT (OUTPATIENT)
Dept: SCHEDULING | Facility: HOME HEALTH | Age: 71
End: 2021-06-28
Payer: MEDICARE

## 2021-06-28 VITALS — RESPIRATION RATE: 16 BRPM | TEMPERATURE: 98.1 F | OXYGEN SATURATION: 98 % | HEART RATE: 62 BPM

## 2021-06-28 PROCEDURE — 0651 HSPC ROUTINE HOME CARE

## 2021-06-28 PROCEDURE — G0156 HHCP-SVS OF AIDE,EA 15 MIN: HCPCS

## 2021-06-28 PROCEDURE — G0300 HHS/HOSPICE OF LPN EA 15 MIN: HCPCS

## 2021-06-29 ENCOUNTER — HOME CARE VISIT (OUTPATIENT)
Dept: HOSPICE | Facility: HOSPICE | Age: 71
End: 2021-06-29
Payer: MEDICARE

## 2021-06-29 PROCEDURE — 0651 HSPC ROUTINE HOME CARE

## 2021-06-30 ENCOUNTER — HOME CARE VISIT (OUTPATIENT)
Dept: SCHEDULING | Facility: HOME HEALTH | Age: 71
End: 2021-06-30
Payer: MEDICARE

## 2021-06-30 PROCEDURE — G0300 HHS/HOSPICE OF LPN EA 15 MIN: HCPCS

## 2021-06-30 PROCEDURE — 0651 HSPC ROUTINE HOME CARE

## 2021-06-30 PROCEDURE — G0156 HHCP-SVS OF AIDE,EA 15 MIN: HCPCS

## 2021-07-01 ENCOUNTER — HOME CARE VISIT (OUTPATIENT)
Dept: HOSPICE | Facility: HOSPICE | Age: 71
End: 2021-07-01
Payer: MEDICARE

## 2021-07-01 VITALS — DIASTOLIC BLOOD PRESSURE: 60 MMHG | HEART RATE: 62 BPM | SYSTOLIC BLOOD PRESSURE: 110 MMHG | RESPIRATION RATE: 18 BRPM

## 2021-07-01 PROCEDURE — 0651 HSPC ROUTINE HOME CARE

## 2021-07-01 NOTE — HOSPICE
Patient resting quietly in bed watching TV. Patient reports pain control with current medication. Patient reports having loose stool x1 today. Patient denies abd pain or nausea. No other concerns voiced.  encourage to call with any question or concerns

## 2021-07-02 ENCOUNTER — HOME CARE VISIT (OUTPATIENT)
Dept: SCHEDULING | Facility: HOME HEALTH | Age: 71
End: 2021-07-02
Payer: MEDICARE

## 2021-07-02 PROCEDURE — G0156 HHCP-SVS OF AIDE,EA 15 MIN: HCPCS

## 2021-07-02 PROCEDURE — 0651 HSPC ROUTINE HOME CARE

## 2021-07-02 PROCEDURE — G0299 HHS/HOSPICE OF RN EA 15 MIN: HCPCS

## 2021-07-03 PROCEDURE — 0651 HSPC ROUTINE HOME CARE

## 2021-07-04 PROCEDURE — 0651 HSPC ROUTINE HOME CARE

## 2021-07-05 ENCOUNTER — HOME CARE VISIT (OUTPATIENT)
Dept: SCHEDULING | Facility: HOME HEALTH | Age: 71
End: 2021-07-05
Payer: MEDICARE

## 2021-07-05 VITALS — SYSTOLIC BLOOD PRESSURE: 110 MMHG | DIASTOLIC BLOOD PRESSURE: 58 MMHG | HEART RATE: 53 BPM | RESPIRATION RATE: 18 BRPM

## 2021-07-05 PROCEDURE — HOSPICE MEDICATION HC HH HOSPICE MEDICATION

## 2021-07-05 PROCEDURE — 0651 HSPC ROUTINE HOME CARE

## 2021-07-05 PROCEDURE — G0300 HHS/HOSPICE OF LPN EA 15 MIN: HCPCS

## 2021-07-05 PROCEDURE — G0156 HHCP-SVS OF AIDE,EA 15 MIN: HCPCS

## 2021-07-06 PROCEDURE — 0651 HSPC ROUTINE HOME CARE

## 2021-07-07 ENCOUNTER — HOME CARE VISIT (OUTPATIENT)
Dept: SCHEDULING | Facility: HOME HEALTH | Age: 71
End: 2021-07-07
Payer: MEDICARE

## 2021-07-07 ENCOUNTER — HOME CARE VISIT (OUTPATIENT)
Dept: HOSPICE | Facility: HOSPICE | Age: 71
End: 2021-07-07
Payer: MEDICARE

## 2021-07-07 VITALS
HEART RATE: 56 BPM | OXYGEN SATURATION: 99 % | DIASTOLIC BLOOD PRESSURE: 53 MMHG | TEMPERATURE: 97.8 F | RESPIRATION RATE: 16 BRPM | SYSTOLIC BLOOD PRESSURE: 130 MMHG

## 2021-07-07 PROCEDURE — 0651 HSPC ROUTINE HOME CARE

## 2021-07-07 PROCEDURE — G0156 HHCP-SVS OF AIDE,EA 15 MIN: HCPCS

## 2021-07-07 PROCEDURE — G0300 HHS/HOSPICE OF LPN EA 15 MIN: HCPCS

## 2021-07-07 NOTE — HOSPICE
Patient resting quietly in bed , alert and oriented x3. Patient denies pain or sob at this time. assessment completed. Wound care completed per instruction. No concerns voiced by patient or caregiver.  encourage both patient and caregiver to call with any concerns

## 2021-07-08 PROCEDURE — 0651 HSPC ROUTINE HOME CARE

## 2021-07-09 ENCOUNTER — HOME CARE VISIT (OUTPATIENT)
Dept: SCHEDULING | Facility: HOME HEALTH | Age: 71
End: 2021-07-09
Payer: MEDICARE

## 2021-07-09 PROCEDURE — G0299 HHS/HOSPICE OF RN EA 15 MIN: HCPCS

## 2021-07-09 PROCEDURE — 0651 HSPC ROUTINE HOME CARE

## 2021-07-09 PROCEDURE — G0156 HHCP-SVS OF AIDE,EA 15 MIN: HCPCS

## 2021-07-10 PROCEDURE — 0651 HSPC ROUTINE HOME CARE

## 2021-07-11 PROCEDURE — 0651 HSPC ROUTINE HOME CARE

## 2021-07-12 ENCOUNTER — HOME CARE VISIT (OUTPATIENT)
Dept: SCHEDULING | Facility: HOME HEALTH | Age: 71
End: 2021-07-12
Payer: MEDICARE

## 2021-07-12 PROCEDURE — G0156 HHCP-SVS OF AIDE,EA 15 MIN: HCPCS

## 2021-07-12 PROCEDURE — 0651 HSPC ROUTINE HOME CARE

## 2021-07-12 PROCEDURE — G0300 HHS/HOSPICE OF LPN EA 15 MIN: HCPCS

## 2021-07-13 PROCEDURE — 0651 HSPC ROUTINE HOME CARE

## 2021-07-13 NOTE — HOSPICE
Patient resting quietly in bed , alert and oriented x4. Patient denies pain or sob at this time. Patient requesting new mattress. Mattress is concave in the middle of bed. CM notifed bed concerns had not been resolved. CM to follow up with [employer] to coordinate visit for mattress replacement. assessment completed. No other concerns voiced.  encourage to call with any concerns

## 2021-07-14 ENCOUNTER — HOME CARE VISIT (OUTPATIENT)
Dept: SCHEDULING | Facility: HOME HEALTH | Age: 71
End: 2021-07-14
Payer: MEDICARE

## 2021-07-14 ENCOUNTER — HOME CARE VISIT (OUTPATIENT)
Dept: HOSPICE | Facility: HOSPICE | Age: 71
End: 2021-07-14
Payer: MEDICARE

## 2021-07-14 VITALS
OXYGEN SATURATION: 99 % | DIASTOLIC BLOOD PRESSURE: 50 MMHG | SYSTOLIC BLOOD PRESSURE: 132 MMHG | RESPIRATION RATE: 16 BRPM | TEMPERATURE: 98.1 F | HEART RATE: 56 BPM

## 2021-07-14 PROCEDURE — 0651 HSPC ROUTINE HOME CARE

## 2021-07-14 PROCEDURE — G0156 HHCP-SVS OF AIDE,EA 15 MIN: HCPCS

## 2021-07-14 PROCEDURE — G0300 HHS/HOSPICE OF LPN EA 15 MIN: HCPCS

## 2021-07-15 ENCOUNTER — HOME CARE VISIT (OUTPATIENT)
Dept: HOSPICE | Facility: HOSPICE | Age: 71
End: 2021-07-15
Payer: MEDICARE

## 2021-07-15 PROCEDURE — 0651 HSPC ROUTINE HOME CARE

## 2021-07-16 ENCOUNTER — HOME CARE VISIT (OUTPATIENT)
Dept: HOSPICE | Facility: HOSPICE | Age: 71
End: 2021-07-16
Payer: MEDICARE

## 2021-07-16 ENCOUNTER — HOME CARE VISIT (OUTPATIENT)
Dept: SCHEDULING | Facility: HOME HEALTH | Age: 71
End: 2021-07-16
Payer: MEDICARE

## 2021-07-16 PROCEDURE — G0156 HHCP-SVS OF AIDE,EA 15 MIN: HCPCS

## 2021-07-16 PROCEDURE — 0651 HSPC ROUTINE HOME CARE

## 2021-07-16 PROCEDURE — G0300 HHS/HOSPICE OF LPN EA 15 MIN: HCPCS

## 2021-07-17 VITALS
DIASTOLIC BLOOD PRESSURE: 70 MMHG | SYSTOLIC BLOOD PRESSURE: 120 MMHG | HEART RATE: 57 BPM | OXYGEN SATURATION: 98 % | RESPIRATION RATE: 18 BRPM

## 2021-07-17 PROCEDURE — 0651 HSPC ROUTINE HOME CARE

## 2021-07-17 PROCEDURE — HOSPICE MEDICATION HC HH HOSPICE MEDICATION

## 2021-07-17 NOTE — HOSPICE
Patient resting quietly in bed, alert and oriented x3. Patient denies pain or sob at this time. Assessment completed as noted. incontinent care and wound care completed. Patient position with pillow for comfort. Patient reports pain control with current medication.  encourage patient and caregiver to call with any concerns

## 2021-07-18 PROCEDURE — 0651 HSPC ROUTINE HOME CARE

## 2021-07-19 ENCOUNTER — HOME CARE VISIT (OUTPATIENT)
Dept: SCHEDULING | Facility: HOME HEALTH | Age: 71
End: 2021-07-19
Payer: MEDICARE

## 2021-07-19 PROCEDURE — 0651 HSPC ROUTINE HOME CARE

## 2021-07-19 PROCEDURE — G0156 HHCP-SVS OF AIDE,EA 15 MIN: HCPCS

## 2021-07-19 PROCEDURE — G0300 HHS/HOSPICE OF LPN EA 15 MIN: HCPCS

## 2021-07-20 PROCEDURE — 0651 HSPC ROUTINE HOME CARE

## 2021-07-21 ENCOUNTER — HOME CARE VISIT (OUTPATIENT)
Dept: SCHEDULING | Facility: HOME HEALTH | Age: 71
End: 2021-07-21
Payer: MEDICARE

## 2021-07-21 VITALS — SYSTOLIC BLOOD PRESSURE: 140 MMHG | DIASTOLIC BLOOD PRESSURE: 80 MMHG | RESPIRATION RATE: 18 BRPM | HEART RATE: 67 BPM

## 2021-07-21 PROCEDURE — 0651 HSPC ROUTINE HOME CARE

## 2021-07-21 PROCEDURE — G0156 HHCP-SVS OF AIDE,EA 15 MIN: HCPCS

## 2021-07-21 PROCEDURE — G0300 HHS/HOSPICE OF LPN EA 15 MIN: HCPCS

## 2021-07-21 NOTE — HOSPICE
Patient resting quietly in bed, alert and oriented x3. Patient denies pain or sob. assessment completed. No new concerns voiced by patient or caregiver.  encourage caregiver to call with any concerns

## 2021-07-22 VITALS — RESPIRATION RATE: 18 BRPM | DIASTOLIC BLOOD PRESSURE: 64 MMHG | HEART RATE: 77 BPM | SYSTOLIC BLOOD PRESSURE: 130 MMHG

## 2021-07-22 PROCEDURE — 0651 HSPC ROUTINE HOME CARE

## 2021-07-22 NOTE — HOSPICE
Patient resting quietly in bed, alert and oriented x3. Patient denies pain or sob at this time. assessment completed. No concerns voiced by patient or caregiver. wound care completed.  encourage patient and caregiver to call with any  question or change in status

## 2021-07-23 ENCOUNTER — HOME CARE VISIT (OUTPATIENT)
Dept: HOSPICE | Facility: HOSPICE | Age: 71
End: 2021-07-23
Payer: MEDICARE

## 2021-07-23 ENCOUNTER — HOME CARE VISIT (OUTPATIENT)
Dept: SCHEDULING | Facility: HOME HEALTH | Age: 71
End: 2021-07-23
Payer: MEDICARE

## 2021-07-23 PROCEDURE — G0299 HHS/HOSPICE OF RN EA 15 MIN: HCPCS

## 2021-07-23 PROCEDURE — G0156 HHCP-SVS OF AIDE,EA 15 MIN: HCPCS

## 2021-07-23 PROCEDURE — 0651 HSPC ROUTINE HOME CARE

## 2021-07-24 PROCEDURE — 0651 HSPC ROUTINE HOME CARE

## 2021-07-25 PROCEDURE — 0651 HSPC ROUTINE HOME CARE

## 2021-07-25 NOTE — HOSPICE
Hospice RN visit with patient for wound care. Caregiver Arlene present, patient in bed, alert x 4, denies current pain. Wound care completed, patient reports more of a \"pulling\" sensation at wound site, states it often feels like foam dressing is being pulled off, especially uncomfortable for her when turning. Discussed with patient only using large 9x9 sacral foam dressings (not the smaller 7x7s) over next week to see if this helps and spreads out adhesive area of bandage over wider area. Patient agreed. Discussed with patient current mattress which is an alternating pressure mattress but patient states it has not been moving at all. She can hear \"motor\" starting  at times but feels no changes underneath her. Mattress control panel is lit for \"alternating pressure\", no movement detected. Call placed to E to follow up for possible malfunction. No medication refills needed at this visit, RNCM ordered supplies for delivery - zinc oxide.

## 2021-07-25 NOTE — HOSPICE
Hospice RN visit with patient. On arrival, caregiver Arlene present. Patient in bed, finishing bath with Miley Baker. Patient alert x 4, denies pain, no cough, no shortness of breath. Wound care completed, wound looks good, less deep. Pulling pain reported by patient last visit has resolved. RNCM will order needed supplies for delivery - 100 pack aloe wipes. No med refills needed this visit. Recertification 1/83/8327  Joslyn Villalta, 73 y.o. Cardiomyopathy    Recertification assessment done on 7/9/2021. Changes since admission include:  increased pain at site of sacral wound. Hospice has worked over the past 2 weeks to address this new pain with monitoring effects of SRK meds and attempting to fix or change patient's mattress. Hospice arranged 2 visits by [employer] to change patient's mattress,  existing mattress was repaired on 7/17. Patient benefits from 3x/week nursing visits for wound care to sacrum and also greatly enjoys  and music therapy visits. Patient's sacral wound has decreased slightly in depth but not in length or width. Plan for next 2 weeks:  continue nursing visits for wound care, adjust medications as needed and appropriate. Patient continues to demonstrate physical decline that is managed with hospice services and is recommended to be recertified for ongoing hospice care into her second benefit period. Patients will be considered to be in the terminal stage of heart disease (life expectancy of six months or less) if they meet the following criteria. (1 and 2 should be present. Factors from 3 will add supporting documentation.):    __X______  1. At the time of initial certification or recertification for hospice, the patient is or has been                           already optimally treated for heart disease or is not a candidate for a surgical procedure or                           has declined a procedure.  (Optimally treated means that patients who are not on vasodilators have a medical reason for refusing these drugs, e.g., hypotension or renal                           disease.)  ________  2. The patient is classified as New York Heart Association (NYHA) Class IV and may have                           significant symptoms of heart failure or angina at rest. (Class IV patients with heart disease                           have an inability to carry on any physical activity without discomfort. Symptoms of heart                           failure or of the anginal syndrome may be present even at rest. If any physical activity is                           undertaken, discomfort is increased.) Significant congestive heart failure may be                          documented by an ejection fraction of ?20%, but is not required if not already available.  ________  3. Documentation of the following factors will support but is not required to establish                           eligibility for hospice care:                            ________  a. Treatment resistant symptomatic supraventricular or ventricular arrhythmias;                          ________  b. History of cardiac arrest or resuscitation;                          ____X____  c. History of unexplained syncope;                          ________  d. Brain embolism of cardiac origin;                          ________  e. Concomitant HIV disease.

## 2021-07-26 ENCOUNTER — HOME CARE VISIT (OUTPATIENT)
Dept: SCHEDULING | Facility: HOME HEALTH | Age: 71
End: 2021-07-26
Payer: MEDICARE

## 2021-07-26 VITALS
TEMPERATURE: 98.1 F | OXYGEN SATURATION: 99 % | DIASTOLIC BLOOD PRESSURE: 72 MMHG | RESPIRATION RATE: 16 BRPM | HEART RATE: 68 BPM | SYSTOLIC BLOOD PRESSURE: 128 MMHG

## 2021-07-26 PROCEDURE — G0299 HHS/HOSPICE OF RN EA 15 MIN: HCPCS

## 2021-07-26 PROCEDURE — 0651 HSPC ROUTINE HOME CARE

## 2021-07-27 ENCOUNTER — HOME CARE VISIT (OUTPATIENT)
Dept: HOSPICE | Facility: HOSPICE | Age: 71
End: 2021-07-27
Payer: MEDICARE

## 2021-07-27 PROCEDURE — 0651 HSPC ROUTINE HOME CARE

## 2021-07-27 NOTE — HOSPICE
Music Therapy Progress Note  Jsolyn Villalta    Orthodoxy Affiliation: [place]  Language: English    Date: 7/15/2021    Mental Status:   [ X ] Alert [  ] Janene Rowels [  ]  Confused  [  ] Minimally responsive  [  ] Sleeping    Communication Status: [ X ] Verbal [  ] Nonverbal     Physical Status:   [  ] Oxygen in use  [  ] Hard of Hearing [  ] Vision Impaired   [  ] Ambulatory  [  ] Ambulatory with assistance Janelle.Brunner ] Non-ambulatory     Music Preferences, Background: _[place]    Clinical Problem addressed: _Spiritual and emotional support at end of life    Goal(s) met in session: Increased self-expression  Physical/Pain management (Scale of 1-10):  No pain reported  Pre-session rating ___________    Post-session rating __________  [  ] Increased relaxation               [  ] Affected breathing patterns  [  ] Decreased muscle tension               [  ] Decreased agitation  [  ] Affected heart rate    [  ] Increased alertness     Emotional/Psychological:  Janelle.Brunner ] Increased self-expression   [  ] Decreased aggressive behavior   [  ] Decreased feelings of stress              Janelle.Brunner ] Discussed healthy coping skills     [  ] Improved mood    [  ] Decreased withdrawn behavior     Social:  [  ] Decreased feelings of isolation/loneliness Janelle.Brunner ] Positive social interaction    [  ] Provided support and/or comfort for family/friends    Spiritual:  Janelle.Brunner ] Spiritual support    [  ] Expressed peace  Janelle.Brunner ] Expressed misti    [  ] Discussed beliefs    Techniques Utilized (Check all that apply):   [  ] Procedural support MT [  ] Music for relaxation             Janelle.Brunner ] Patient preferred music  [  ] Rosy analysis  [  ] Song choice              [  ] Music for validation  [  ] Entrainment              [  ] Movement to music             [  ] Guided visualization  [  ] Oneida Willett  [  ] Patient instrument playing [  ] Noe White writing  [ X ] Singing   [  ] Samantha Rivera              [  ] Sensory stimulation  [  ] Active Listening  Janelle.Brunner ] Music for spiritual support [  ] Making of CDs as gifts    Session Observations:  Ms. Kera Armstrong was visited in her residence, in conjunction with her  visit. 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 Joslyn is looking forward to sharing her recording with friends. Discussion on her adventures as a teenager during the [facility] era were recorded as well, as Ms. Villalta has begun life review and has many life events to share. Thank you for the opportunity to provide music therapy to Ms. Joslyn Villalta.   Aanid Schmidt, [facility]   [address] Certified  Spiritual Care Services  Referral- based service

## 2021-07-28 ENCOUNTER — HOME CARE VISIT (OUTPATIENT)
Dept: SCHEDULING | Facility: HOME HEALTH | Age: 71
End: 2021-07-28
Payer: MEDICARE

## 2021-07-28 VITALS
OXYGEN SATURATION: 98 % | RESPIRATION RATE: 16 BRPM | DIASTOLIC BLOOD PRESSURE: 66 MMHG | HEART RATE: 58 BPM | TEMPERATURE: 97.8 F | SYSTOLIC BLOOD PRESSURE: 130 MMHG

## 2021-07-28 PROCEDURE — G0156 HHCP-SVS OF AIDE,EA 15 MIN: HCPCS

## 2021-07-28 PROCEDURE — G0300 HHS/HOSPICE OF LPN EA 15 MIN: HCPCS

## 2021-07-28 PROCEDURE — 0651 HSPC ROUTINE HOME CARE

## 2021-07-28 NOTE — HOSPICE
On arrival patient a&o x4 laying in bed. Patient denied pain. Patient with no difficulties voiding. Last bm was today. Vitals taken. Assessment completed. Wound care completed to sacral wound. Patient tolerated procedure well.   No other needs at this time

## 2021-07-29 PROCEDURE — 0651 HSPC ROUTINE HOME CARE

## 2021-07-30 ENCOUNTER — HOME CARE VISIT (OUTPATIENT)
Dept: SCHEDULING | Facility: HOME HEALTH | Age: 71
End: 2021-07-30
Payer: MEDICARE

## 2021-07-30 PROCEDURE — G0156 HHCP-SVS OF AIDE,EA 15 MIN: HCPCS

## 2021-07-30 PROCEDURE — 0651 HSPC ROUTINE HOME CARE

## 2021-07-30 PROCEDURE — G0300 HHS/HOSPICE OF LPN EA 15 MIN: HCPCS

## 2021-07-30 NOTE — HOSPICE
wound care visit. On arrival patient laying in bed a&o x4. Patient denied pain. Wound care completed to sacrum. Patient tolerated procedure well.  no further needs at this time

## 2021-07-31 PROCEDURE — 0651 HSPC ROUTINE HOME CARE

## 2021-08-01 PROCEDURE — 0651 HSPC ROUTINE HOME CARE

## 2021-08-02 ENCOUNTER — HOME CARE VISIT (OUTPATIENT)
Dept: SCHEDULING | Facility: HOME HEALTH | Age: 71
End: 2021-08-02
Payer: MEDICARE

## 2021-08-02 ENCOUNTER — HOME CARE VISIT (OUTPATIENT)
Dept: HOSPICE | Facility: HOSPICE | Age: 71
End: 2021-08-02
Payer: MEDICARE

## 2021-08-02 VITALS — SYSTOLIC BLOOD PRESSURE: 116 MMHG | DIASTOLIC BLOOD PRESSURE: 72 MMHG | RESPIRATION RATE: 16 BRPM | HEART RATE: 68 BPM

## 2021-08-02 PROCEDURE — G0156 HHCP-SVS OF AIDE,EA 15 MIN: HCPCS

## 2021-08-02 PROCEDURE — G0300 HHS/HOSPICE OF LPN EA 15 MIN: HCPCS

## 2021-08-02 PROCEDURE — HOSPICE MEDICATION HC HH HOSPICE MEDICATION

## 2021-08-02 PROCEDURE — 0651 HSPC ROUTINE HOME CARE

## 2021-08-02 NOTE — HOSPICE
MSW missed visit with patient for the month. MSW will follow up with patient to schedule visit for next month.

## 2021-08-02 NOTE — HOSPICE
patient lying in bed watching television, denies c/o pain or SOB  patient states she usually has some discomfort during wound care which is controlled with Oxycodone 5mg BID  patient asks if she should abruptly stop Oxycodone, this nurse encourages continuing with Oxycodone prn for adequate pain control  wound care completed sacral decub following current orders and patient tolerates without complaint  refills-Oxycodone 5mg- [facility] next day  supplies- none needed patient has an adequate supply

## 2021-08-03 PROCEDURE — 0651 HSPC ROUTINE HOME CARE

## 2021-08-04 ENCOUNTER — HOME CARE VISIT (OUTPATIENT)
Dept: SCHEDULING | Facility: HOME HEALTH | Age: 71
End: 2021-08-04
Payer: MEDICARE

## 2021-08-04 PROCEDURE — 0651 HSPC ROUTINE HOME CARE

## 2021-08-04 PROCEDURE — G0300 HHS/HOSPICE OF LPN EA 15 MIN: HCPCS

## 2021-08-04 PROCEDURE — G0156 HHCP-SVS OF AIDE,EA 15 MIN: HCPCS

## 2021-08-04 NOTE — HOSPICE
wound care visit. On arrival patient laying in bed a&o x4. Patient denied pain. Patient stated appetite is adequate. Incontinent of both bowel and bladder. Wound care completed to sacral area. Patient tolerated procedure well.   No other needs at this time

## 2021-08-05 PROCEDURE — 0651 HSPC ROUTINE HOME CARE

## 2021-08-06 ENCOUNTER — HOME CARE VISIT (OUTPATIENT)
Dept: SCHEDULING | Facility: HOME HEALTH | Age: 71
End: 2021-08-06
Payer: MEDICARE

## 2021-08-06 ENCOUNTER — HOME CARE VISIT (OUTPATIENT)
Dept: HOSPICE | Facility: HOSPICE | Age: 71
End: 2021-08-06
Payer: MEDICARE

## 2021-08-06 PROCEDURE — G0156 HHCP-SVS OF AIDE,EA 15 MIN: HCPCS

## 2021-08-06 PROCEDURE — G0299 HHS/HOSPICE OF RN EA 15 MIN: HCPCS

## 2021-08-06 PROCEDURE — 0651 HSPC ROUTINE HOME CARE

## 2021-08-07 PROCEDURE — 0651 HSPC ROUTINE HOME CARE

## 2021-08-07 NOTE — HOSPICE
Hospice RN visit with patient. On arrival, caregiver Arlene present. Patient in bed, alert x 4, denies pain. Wound care completed, last BM yesterday. Patient reports mattress is acting up again, she feels the \"hole\" in mattress under her sacrum. When chekcing the mattress is back on alternating pressure. Switched panel to High pressure which was the solution to problem last time. Indicator light is orange however. RNCM will place call to HME to assess and repair. RNCM will order needed supplies - gloves, wipes. No medication refills needed this visit.

## 2021-08-08 PROCEDURE — 0651 HSPC ROUTINE HOME CARE

## 2021-08-09 ENCOUNTER — HOME CARE VISIT (OUTPATIENT)
Dept: HOSPICE | Facility: HOSPICE | Age: 71
End: 2021-08-09
Payer: MEDICARE

## 2021-08-09 ENCOUNTER — HOME CARE VISIT (OUTPATIENT)
Dept: SCHEDULING | Facility: HOME HEALTH | Age: 71
End: 2021-08-09
Payer: MEDICARE

## 2021-08-09 VITALS
TEMPERATURE: 97.9 F | RESPIRATION RATE: 14 BRPM | DIASTOLIC BLOOD PRESSURE: 76 MMHG | SYSTOLIC BLOOD PRESSURE: 124 MMHG | HEART RATE: 58 BPM

## 2021-08-09 PROCEDURE — G0299 HHS/HOSPICE OF RN EA 15 MIN: HCPCS

## 2021-08-09 PROCEDURE — 0651 HSPC ROUTINE HOME CARE

## 2021-08-09 PROCEDURE — G0156 HHCP-SVS OF AIDE,EA 15 MIN: HCPCS

## 2021-08-09 PROCEDURE — HOSPICE MEDICATION HC HH HOSPICE MEDICATION

## 2021-08-09 NOTE — HOSPICE
ARrived at group home; owner's son present. Patient laying in bed, patient denied any pain or shortness of breath. She stated that she takes oxycodone in AM and PM and that controls her pain. Patient denied any issues with sleep and stated that her appetite has been normal and she eats 3 meals a day. Wound care completed, see asssessment. Patient had moderate/large BM. Medications reconciled, coreg ordered #[id number]  RNCM called E to service low air loss mattress. REminded patient to call hospice number with any needs or concerns.

## 2021-08-10 ENCOUNTER — HOME CARE VISIT (OUTPATIENT)
Dept: HOSPICE | Facility: HOSPICE | Age: 71
End: 2021-08-10
Payer: MEDICARE

## 2021-08-10 PROCEDURE — 0651 HSPC ROUTINE HOME CARE

## 2021-08-11 ENCOUNTER — HOME CARE VISIT (OUTPATIENT)
Dept: SCHEDULING | Facility: HOME HEALTH | Age: 71
End: 2021-08-11
Payer: MEDICARE

## 2021-08-11 VITALS
DIASTOLIC BLOOD PRESSURE: 82 MMHG | TEMPERATURE: 98.1 F | SYSTOLIC BLOOD PRESSURE: 130 MMHG | HEART RATE: 64 BPM | RESPIRATION RATE: 13 BRPM

## 2021-08-11 PROCEDURE — 0651 HSPC ROUTINE HOME CARE

## 2021-08-11 PROCEDURE — G0156 HHCP-SVS OF AIDE,EA 15 MIN: HCPCS

## 2021-08-11 PROCEDURE — G0299 HHS/HOSPICE OF RN EA 15 MIN: HCPCS

## 2021-08-11 NOTE — HOSPICE
Arrived at patient's home; patient laying in hospital bed. patient denied any pain or shortness of breath. Wound care completed; see assessment. Incontinence care completed. Salvador Lazcano arrived to deliver low air loss mattress. Patient transferred on roberto lift and transferred back on new mattress. Patient stated that she felt like mattress did not have enough air in mattress. Kaylie' team member stated that sometimes mattress takes time to fill up completely. Discussed with patient options about mattress. Patient stated that she  felt like she was on the bar on the bed but was okay to try mattress overnight and will call hospice in AM if mattress still does not feel completely firm. Updated RNCM about DME. Medications reconciled, none needed at this time. Reminded patient to call hospice number with any needs or concerns.

## 2021-08-12 ENCOUNTER — HOME CARE VISIT (OUTPATIENT)
Dept: HOSPICE | Facility: HOSPICE | Age: 71
End: 2021-08-12
Payer: MEDICARE

## 2021-08-12 ENCOUNTER — HOME CARE VISIT (OUTPATIENT)
Dept: SCHEDULING | Facility: HOME HEALTH | Age: 71
End: 2021-08-12
Payer: MEDICARE

## 2021-08-12 PROCEDURE — 0651 HSPC ROUTINE HOME CARE

## 2021-08-12 PROCEDURE — G0155 HHCP-SVS OF CSW,EA 15 MIN: HCPCS

## 2021-08-13 ENCOUNTER — HOME CARE VISIT (OUTPATIENT)
Dept: SCHEDULING | Facility: HOME HEALTH | Age: 71
End: 2021-08-13
Payer: MEDICARE

## 2021-08-13 ENCOUNTER — HOME CARE VISIT (OUTPATIENT)
Dept: HOSPICE | Facility: HOSPICE | Age: 71
End: 2021-08-13
Payer: MEDICARE

## 2021-08-13 VITALS
RESPIRATION RATE: 14 BRPM | OXYGEN SATURATION: 92 % | DIASTOLIC BLOOD PRESSURE: 70 MMHG | HEART RATE: 80 BPM | SYSTOLIC BLOOD PRESSURE: 124 MMHG

## 2021-08-13 PROCEDURE — 0651 HSPC ROUTINE HOME CARE

## 2021-08-13 PROCEDURE — G0156 HHCP-SVS OF AIDE,EA 15 MIN: HCPCS

## 2021-08-13 PROCEDURE — G0299 HHS/HOSPICE OF RN EA 15 MIN: HCPCS

## 2021-08-14 PROCEDURE — HOSPICE MEDICATION HC HH HOSPICE MEDICATION

## 2021-08-14 PROCEDURE — 0651 HSPC ROUTINE HOME CARE

## 2021-08-14 NOTE — HOSPICE
MSW made routine visit with patient. Patient observed sitting up in bed, alert and oriented x 4. Patient shared that she is coping appropriately at this time, but is concerned about finding new housing as her adult home is closing. MSW provided patient with list of some adult homes with some of the prices of the facilities. Patient appreciative of information, shared that she has also gotten some recommendations for some other places as well. Patient will continue to check on places and MSW will continue to provide support to patient with plans for her move. Patient will update MSW when she knows a date for when she has to move or has found a new home.

## 2021-08-15 PROCEDURE — 0651 HSPC ROUTINE HOME CARE

## 2021-08-16 ENCOUNTER — HOME CARE VISIT (OUTPATIENT)
Dept: HOSPICE | Facility: HOSPICE | Age: 71
End: 2021-08-16
Payer: MEDICARE

## 2021-08-16 ENCOUNTER — HOME CARE VISIT (OUTPATIENT)
Dept: SCHEDULING | Facility: HOME HEALTH | Age: 71
End: 2021-08-16
Payer: MEDICARE

## 2021-08-16 PROCEDURE — 0651 HSPC ROUTINE HOME CARE

## 2021-08-16 PROCEDURE — G0299 HHS/HOSPICE OF RN EA 15 MIN: HCPCS

## 2021-08-16 PROCEDURE — G0156 HHCP-SVS OF AIDE,EA 15 MIN: HCPCS

## 2021-08-16 NOTE — HOSPICE
Hospice RN visit with patient. Caregiver Arlene present, patient in bed, alert x 4, denies pain. Wound care completed, RNCM will order needed supplies for delivery - calcium alginate. RNCM emptied pillbox that we filled on Friday per patient's request because she states it is the wrong box. Kyle Guerra has correct pillbox, already filled. No meds needed for refill.

## 2021-08-16 NOTE — HOSPICE
is visiting for a routine pastoral visit. Joslyn shared about her current coping, current situation, and status.  provided communion, prayer and hymns sung. Pastoral visitation, dialogue, and support provided.

## 2021-08-17 PROCEDURE — 0651 HSPC ROUTINE HOME CARE

## 2021-08-18 ENCOUNTER — HOME CARE VISIT (OUTPATIENT)
Dept: HOSPICE | Facility: HOSPICE | Age: 71
End: 2021-08-18
Payer: MEDICARE

## 2021-08-18 ENCOUNTER — HOME CARE VISIT (OUTPATIENT)
Dept: SCHEDULING | Facility: HOME HEALTH | Age: 71
End: 2021-08-18
Payer: MEDICARE

## 2021-08-18 VITALS
SYSTOLIC BLOOD PRESSURE: 138 MMHG | HEART RATE: 65 BPM | DIASTOLIC BLOOD PRESSURE: 82 MMHG | TEMPERATURE: 97.4 F | RESPIRATION RATE: 14 BRPM

## 2021-08-18 PROCEDURE — 0651 HSPC ROUTINE HOME CARE

## 2021-08-18 PROCEDURE — HOSPICE MEDICATION HC HH HOSPICE MEDICATION

## 2021-08-18 PROCEDURE — G0299 HHS/HOSPICE OF RN EA 15 MIN: HCPCS

## 2021-08-18 PROCEDURE — G0156 HHCP-SVS OF AIDE,EA 15 MIN: HCPCS

## 2021-08-18 NOTE — HOSPICE
Arrived at patient's home; PCG Arlene present. Patient in hospital bed. Patient stated that she has been taking oxycodone once in AM and once in PM and pain in well managed. She stated that she also is taking one senna in the AM every other day. Patient denied any pain or shortness of breath. Patient stated that her mattress was much better than the previous one because it was firmer. Patient stated that her appetite has been consistent. Wound care completed. See assessment. Medications reconciled, coreg ordered via [facility]. No supplies needed. Reminded patient to call hospice number with any needs or concerns.

## 2021-08-19 PROCEDURE — 0651 HSPC ROUTINE HOME CARE

## 2021-08-20 ENCOUNTER — HOME CARE VISIT (OUTPATIENT)
Dept: SCHEDULING | Facility: HOME HEALTH | Age: 71
End: 2021-08-20
Payer: MEDICARE

## 2021-08-20 VITALS
RESPIRATION RATE: 18 BRPM | HEART RATE: 62 BPM | OXYGEN SATURATION: 98 % | SYSTOLIC BLOOD PRESSURE: 120 MMHG | DIASTOLIC BLOOD PRESSURE: 70 MMHG

## 2021-08-20 PROCEDURE — G0300 HHS/HOSPICE OF LPN EA 15 MIN: HCPCS

## 2021-08-20 PROCEDURE — G0156 HHCP-SVS OF AIDE,EA 15 MIN: HCPCS

## 2021-08-20 PROCEDURE — 0651 HSPC ROUTINE HOME CARE

## 2021-08-20 NOTE — HOSPICE
Patient resting quietly in bed, alert and oriented x4. Patient denies pain or sob at this time. assessment completed. No new concerns voiced by patient or caregiver.  encourage to call with any concerns

## 2021-08-21 PROCEDURE — 0651 HSPC ROUTINE HOME CARE

## 2021-08-22 PROCEDURE — 0651 HSPC ROUTINE HOME CARE

## 2021-08-23 ENCOUNTER — HOME CARE VISIT (OUTPATIENT)
Dept: SCHEDULING | Facility: HOME HEALTH | Age: 71
End: 2021-08-23
Payer: MEDICARE

## 2021-08-23 ENCOUNTER — HOME CARE VISIT (OUTPATIENT)
Dept: HOSPICE | Facility: HOSPICE | Age: 71
End: 2021-08-23
Payer: MEDICARE

## 2021-08-23 PROCEDURE — 0651 HSPC ROUTINE HOME CARE

## 2021-08-23 PROCEDURE — G0156 HHCP-SVS OF AIDE,EA 15 MIN: HCPCS

## 2021-08-23 PROCEDURE — G0300 HHS/HOSPICE OF LPN EA 15 MIN: HCPCS

## 2021-08-24 VITALS — SYSTOLIC BLOOD PRESSURE: 130 MMHG | DIASTOLIC BLOOD PRESSURE: 68 MMHG | HEART RATE: 59 BPM | RESPIRATION RATE: 18 BRPM

## 2021-08-24 PROCEDURE — 0651 HSPC ROUTINE HOME CARE

## 2021-08-24 NOTE — HOSPICE
Patient resting quietly in bed, alert and oriented x4. Patient denies pain or sob. She reports last bm this am. assessment and wound care completed. No new concerns voiced.  sacral wound healing well. encourage patient to call with any concerns

## 2021-08-25 ENCOUNTER — HOME CARE VISIT (OUTPATIENT)
Dept: SCHEDULING | Facility: HOME HEALTH | Age: 71
End: 2021-08-25
Payer: MEDICARE

## 2021-08-25 ENCOUNTER — HOME CARE VISIT (OUTPATIENT)
Dept: HOSPICE | Facility: HOSPICE | Age: 71
End: 2021-08-25
Payer: MEDICARE

## 2021-08-25 PROCEDURE — G0156 HHCP-SVS OF AIDE,EA 15 MIN: HCPCS

## 2021-08-25 PROCEDURE — G0299 HHS/HOSPICE OF RN EA 15 MIN: HCPCS

## 2021-08-25 PROCEDURE — 0651 HSPC ROUTINE HOME CARE

## 2021-08-26 PROCEDURE — HOSPICE MEDICATION HC HH HOSPICE MEDICATION

## 2021-08-26 PROCEDURE — 0651 HSPC ROUTINE HOME CARE

## 2021-08-26 NOTE — HOSPICE
Hospice RN visit with patient. On arrival, patient's caregiver Summer Padron present (back from trip). Patient in bed, alert x 4, denies pain, lungs clear, no cough, no trouble swallowing, last BM today. Patient states she is going to only take 2 Senna once day now as she is having BMs too frequently. Wound care completed, wound steadily improving. RNCM will order needed meds for refill - Senna (too early to order Coreg refill). RNCM will order needed supplies for delivery - Chux, wipes, sacral foam dressings, Poise pads. RNCM spoke to Summer Padron about possible patient move to another location. Summer Padron states she is in process to hire another caregiver for current location and hopes to not need to move patient at all. She will keep hospice posted.

## 2021-08-26 NOTE — HOSPICE
Music Therapy Progress Note  Joslyn Villalta  Online Support Group Session  Patient Email: [email or web address]   Congregational Affiliation: [place]  Language: English     Date: 8/26/21    Mental Status:   Andrzej.Challenger  ] Alert [  ] Maria Luisa.García [  ]  Confused  [  ] Minimally responsive  [  ] Sleeping    Communication Status: [  ] Verbal [  ] Nonverbal     Physical Status:   [  ] Oxygen in use  [  ] Hard of Hearing [  ] Vision Impaired   [  ] Ambulatory  [  ] Ambulatory with assistance [ X ] Non-ambulatory     Music Preferences, Background: __Varied    Clinical Problem addressed: Peer support     Goal(s) met in session: Connection with others  Physical/Pain management (Scale of 1-10):  No report of pain  Pre-session rating ___________    Post-session rating __________  [  ] Increased relaxation               [  ] Affected breathing patterns  [  ] Decreased muscle tension               [  ] Decreased agitation  [  ] Affected heart rate    [  ] Increased alertness     Emotional/Psychological:  [ X ] Increased self-expression   [  ] Decreased aggressive behavior   [  ] Decreased feelings of stress              [  ] Discussed healthy coping skills     [  ] Improved mood    [  ] Decreased withdrawn behavior     Social:  [  ] Decreased feelings of isolation/loneliness [ X ] Positive social interaction    [  ] Provided support and/or comfort for family/friends    Spiritual:  [ X ] Spiritual support    [  ] Expressed peace  [  ] Expressed misti    [  ] Discussed beliefs    Techniques Utilized (Check all that apply):   [  ] Procedural support MT [  ] Music for relaxation             [  ] Patient preferred music  [  ] Rosy analysis  [  ] Alcus Grooms choice              [  ] Music for validation  [  ] Entrainment              [  ] Movement to music             [  ] Guided visualization  [  ] Skip aMlone  [  ] Patient instrument playing [  ] Alcus Grooms writing  [  ] Delene Pitcher along   [  ] Yasmin Gonzalez              [  ] Sensory stimulation  [  ] Active Listening  [X] Music for spiritual support [  ] Making of CDs as gifts    Session Observations:  Ms. Cameron Negro attended the online music therapy support group. She is bedbound, so this group provides social opportunities from home. She engages in discussion and music listening, and sings with the songs she knows. She has a vast repertoire of music, and expressed gratitude for the company and the opportunity to speak with others. Thank you for the opportunity to provide music therapy to Ms. Joslyn Villalta.   Anaid Puente, [facility]   Music [address] Certified  Spiritual Care Services  Referral- based service

## 2021-08-27 ENCOUNTER — HOME CARE VISIT (OUTPATIENT)
Dept: HOSPICE | Facility: HOSPICE | Age: 71
End: 2021-08-27
Payer: MEDICARE

## 2021-08-27 ENCOUNTER — HOME CARE VISIT (OUTPATIENT)
Dept: SCHEDULING | Facility: HOME HEALTH | Age: 71
End: 2021-08-27
Payer: MEDICARE

## 2021-08-27 PROCEDURE — G0299 HHS/HOSPICE OF RN EA 15 MIN: HCPCS

## 2021-08-27 PROCEDURE — 0651 HSPC ROUTINE HOME CARE

## 2021-08-27 PROCEDURE — G0156 HHCP-SVS OF AIDE,EA 15 MIN: HCPCS

## 2021-08-27 NOTE — HOSPICE
Hospice RN visit with patient. Patient in bed, alert x 4, denies current pain. Wound care completed, wound steadily improving. No supplies needed for delivery, no med refills needed at this visit.

## 2021-08-28 PROCEDURE — 0651 HSPC ROUTINE HOME CARE

## 2021-08-29 PROCEDURE — 0651 HSPC ROUTINE HOME CARE

## 2021-08-30 ENCOUNTER — HOME CARE VISIT (OUTPATIENT)
Dept: SCHEDULING | Facility: HOME HEALTH | Age: 71
End: 2021-08-30
Payer: MEDICARE

## 2021-08-30 VITALS — RESPIRATION RATE: 18 BRPM | SYSTOLIC BLOOD PRESSURE: 122 MMHG | DIASTOLIC BLOOD PRESSURE: 60 MMHG | HEART RATE: 63 BPM

## 2021-08-30 PROCEDURE — G0300 HHS/HOSPICE OF LPN EA 15 MIN: HCPCS

## 2021-08-30 PROCEDURE — HOSPICE MEDICATION HC HH HOSPICE MEDICATION

## 2021-08-30 PROCEDURE — G0156 HHCP-SVS OF AIDE,EA 15 MIN: HCPCS

## 2021-08-30 PROCEDURE — 0651 HSPC ROUTINE HOME CARE

## 2021-08-30 NOTE — HOSPICE
Patient resting quietly in bed, alert and oriented x4. Patient denies pain or sob at this time. Patient incontinent of large stool, care provided. Wound care completed per orders. No new concerns voicedby patient or caregiver.  encourage to call with any question or concerns

## 2021-08-31 PROCEDURE — 0651 HSPC ROUTINE HOME CARE

## 2021-09-01 ENCOUNTER — HOME CARE VISIT (OUTPATIENT)
Dept: SCHEDULING | Facility: HOME HEALTH | Age: 71
End: 2021-09-01
Payer: MEDICARE

## 2021-09-01 PROCEDURE — 0651 HSPC ROUTINE HOME CARE

## 2021-09-01 PROCEDURE — G0156 HHCP-SVS OF AIDE,EA 15 MIN: HCPCS

## 2021-09-01 PROCEDURE — G0299 HHS/HOSPICE OF RN EA 15 MIN: HCPCS

## 2021-09-01 NOTE — HOSPICE
Hospice RN visit with patient. On arrival, patient's caregiver Zeeshan Matthews present (back from trip). Patient in bed, alert x 4, denies pain, lungs clear, no cough, no trouble swallowing, last BM today. Patient states she is going to only take 2 Senna once day now as she is having BMs too frequently. Wound care completed, wound steadily improving. RNCM will order needed meds for refill - Senna (too early to order Coreg refill). RNCM will order needed supplies for delivery - Chux, wipes, sacral foam dressings, Poise pads. RNCM spoke to Zeeshan Matthews about possible patient move to another location. Zeeshan Byron states she is in process to hire another caregiver for current location and hopes to not need to move patient at all. She will keep hospice posted.

## 2021-09-02 ENCOUNTER — HOME CARE VISIT (OUTPATIENT)
Dept: HOSPICE | Facility: HOSPICE | Age: 71
End: 2021-09-02
Payer: MEDICARE

## 2021-09-02 VITALS
DIASTOLIC BLOOD PRESSURE: 62 MMHG | HEART RATE: 54 BPM | TEMPERATURE: 96.3 F | RESPIRATION RATE: 20 BRPM | SYSTOLIC BLOOD PRESSURE: 118 MMHG | OXYGEN SATURATION: 98 %

## 2021-09-02 PROCEDURE — 0651 HSPC ROUTINE HOME CARE

## 2021-09-02 NOTE — HOSPICE
RN routine visit   Patient received lying in hospital bed awake and alert  Respnds verbally appropriately  No S/S of pain or shortness of breath during visit   Patient denies having pain or shortness of breath on room air   02 sat= 98%   Skin warm and dry  Continues to have good intake at meals  No swallowing issues   Patient reports sacral dressing does not need to be changed this visit  It is dry and intact     Caregiver, Maritza Guerra reports no new issues currently and does not need medication refills or medical supplies this visit    To continue with [facility] hospice plan of care     Informed quique to call hospice with concerns / needs   Understanding verbalized

## 2021-09-03 ENCOUNTER — HOME CARE VISIT (OUTPATIENT)
Dept: HOSPICE | Facility: HOSPICE | Age: 71
End: 2021-09-03
Payer: MEDICARE

## 2021-09-03 ENCOUNTER — HOME CARE VISIT (OUTPATIENT)
Dept: SCHEDULING | Facility: HOME HEALTH | Age: 71
End: 2021-09-03
Payer: MEDICARE

## 2021-09-03 PROCEDURE — G0299 HHS/HOSPICE OF RN EA 15 MIN: HCPCS

## 2021-09-03 PROCEDURE — G0156 HHCP-SVS OF AIDE,EA 15 MIN: HCPCS

## 2021-09-03 PROCEDURE — 0651 HSPC ROUTINE HOME CARE

## 2021-09-04 PROCEDURE — 0651 HSPC ROUTINE HOME CARE

## 2021-09-04 NOTE — HOSPICE
Hospice RN visit with patient. Patient in bed, alert x 4, denies pain. Wound care completed to sacrum. RNCM will order needed supplies for delivery - wipes, diapers, gloves.   No med refills needed at this visit

## 2021-09-05 PROCEDURE — 0651 HSPC ROUTINE HOME CARE

## 2021-09-06 ENCOUNTER — HOME CARE VISIT (OUTPATIENT)
Dept: HOSPICE | Facility: HOSPICE | Age: 71
End: 2021-09-06
Payer: MEDICARE

## 2021-09-06 PROCEDURE — 0651 HSPC ROUTINE HOME CARE

## 2021-09-07 ENCOUNTER — HOME CARE VISIT (OUTPATIENT)
Dept: HOSPICE | Facility: HOSPICE | Age: 71
End: 2021-09-07
Payer: MEDICARE

## 2021-09-07 PROCEDURE — 0651 HSPC ROUTINE HOME CARE

## 2021-09-08 ENCOUNTER — HOME CARE VISIT (OUTPATIENT)
Dept: SCHEDULING | Facility: HOME HEALTH | Age: 71
End: 2021-09-08
Payer: MEDICARE

## 2021-09-08 ENCOUNTER — HOME CARE VISIT (OUTPATIENT)
Dept: HOSPICE | Facility: HOSPICE | Age: 71
End: 2021-09-08
Payer: MEDICARE

## 2021-09-08 VITALS
DIASTOLIC BLOOD PRESSURE: 75 MMHG | OXYGEN SATURATION: 99 % | TEMPERATURE: 97.8 F | RESPIRATION RATE: 13 BRPM | SYSTOLIC BLOOD PRESSURE: 130 MMHG | HEART RATE: 74 BPM

## 2021-09-08 PROCEDURE — G0156 HHCP-SVS OF AIDE,EA 15 MIN: HCPCS

## 2021-09-08 PROCEDURE — 0651 HSPC ROUTINE HOME CARE

## 2021-09-08 PROCEDURE — G0299 HHS/HOSPICE OF RN EA 15 MIN: HCPCS

## 2021-09-08 NOTE — HOSPICE
Arrived at [facility]; PCGLisa present. Patient was finishing breakfast, patient stated that her appetite has been unchanged, she has BMs about every other day. She stated that her pain has been manageable, and she normally takes oxycodone once in the AM and once in the PM.   Wound care completed, see assessment. Medications reconciled, none needed at this time. Supplies ordered: briefs and wipes. Reminded patient and PCG to call hospice number with any needs or concerns.

## 2021-09-09 PROCEDURE — 0651 HSPC ROUTINE HOME CARE

## 2021-09-10 ENCOUNTER — HOME CARE VISIT (OUTPATIENT)
Dept: SCHEDULING | Facility: HOME HEALTH | Age: 71
End: 2021-09-10
Payer: MEDICARE

## 2021-09-10 ENCOUNTER — HOME CARE VISIT (OUTPATIENT)
Dept: HOSPICE | Facility: HOSPICE | Age: 71
End: 2021-09-10
Payer: MEDICARE

## 2021-09-10 PROCEDURE — G0156 HHCP-SVS OF AIDE,EA 15 MIN: HCPCS

## 2021-09-10 PROCEDURE — 0651 HSPC ROUTINE HOME CARE

## 2021-09-10 NOTE — HOSPICE
Music Therapy Progress Note  Joslyn Villalta  [address], [place]    Patient Telephone Number: [phone]   Anabaptism Affiliation: [place]  Language: English     Date: 8/9/2021    Mental Status:   [ X ] Wilmer Orts  ] Leming Albarado [  ]  Confused  [  ] Minimally responsive  [  ] Sleeping    Communication Status: Nobuko.Reusing  ] Verbal [  ] Nonverbal     Physical Status:   [  ] Oxygen in use  [  ] Hard of Hearing [  ] Vision Impaired   [  ] Ambulatory  [  ] Ambulatory with assistance [ X ] Non-ambulatory     Music Preferences, Background: Varied- was a professional thapa, popular and sacred    Clinical Problem addressed: Mood and anxiety    Goal(s) met in session: Increased relaxation and self-expression  Physical/Pain management (Scale of 1-10):  No c/o pain  Pre-session rating ___________    Post-session rating __________  [ X ] Increased relaxation               [  ] Affected breathing patterns  [  ] Decreased muscle tension               [  ] Decreased agitation  [  ] Affected heart rate    [  ] Increased alertness     Emotional/Psychological:  Nobuko.Reusing ] Increased self-expression   [  ] Decreased aggressive behavior   Nobuko.Reusing ] Decreased feelings of stress              [  ] Discussed healthy coping skills     [  ] Improved mood    [  ] Decreased withdrawn behavior     Social:  [  ] Decreased feelings of isolation/loneliness [X] Positive social interaction    [  ] Provided support and/or comfort for family/friends    Spiritual:  Nobuko.Reusing ] Spiritual support    [  ] Expressed peace  Nobuko.Reusing ] Expressed misti    [  ] Discussed beliefs    Techniques Utilized (Check all that apply):   [  ] Procedural support MT [  ] Music for relaxation             [ X ] Patient preferred music  [  ] Rosy analysis  [  ] Song choice              [  ] Music for validation  [  ] Entrainment              [  ] Movement to music             [  ] Guided visualization  [  ] Bethany Free  [  ] Patient instrument playing [  ] Flora Clarke writing  [  ] [place] along   [  ] Improvisation              [  ] Sensory stimulation  [  ] Active Listening  [X] Music for spiritual support [  ] Making of CDs as gifts    Session Observations: Ms. Mary Freeman was visited at bedside for social and emotional support. She requested a visit following the news that she would need to be moving soon, and expressed some anxiety over where she might move to for the level of care she is currently receiving. Joslyn has become attached to her caregivers and the group home setting, and expressed interest in discussion if there were other residences similar to where she resides. Ms. Brigitte Mccormack finds strength in her misti, and was provided supportive listening and encourangement. She expressed gratitude for the visit and is interested in continuing with music therapy virtually and in-person for opportunities to sing with others. Thank you for the opportunity to provide music therapy to Ms. Joslyn Villalta.   Kaushal Jeong, [place], [facility]   Music [address] Certified  Spiritual Care Services  Referral- based service

## 2021-09-10 NOTE — HOSPICE
is visiting for a routine pastoral visit. Joslyn and  engaged in spiritual dialogue, she discussed current needs and coping, and  provided communion.

## 2021-09-10 NOTE — HOSPICE
Music Therapy Progress Note  Joslyn Villalta  Online Support Group  [email or web address]  Patient Telephone Number: [phone]  Voodoo Affiliation: Lyn  Language: English    Date: 8/10/2021    Mental Status:   Garth.Ripper ] Alert [  ] Johnny Show [  ]  Confused  [  ] Minimally responsive  [  ] Sleeping    Communication Status: [ X ] Verbal [  ] Nonverbal     Physical Status:   [  ] Oxygen in use  [  ] Hard of Hearing [  ] Vision Impaired   [  ] Ambulatory  [  ] Ambulatory with assistance Garth.Ripper  ] Non-ambulatory     Music Preferences, Background: Varied popular and sacred; experienced thapa;    Clinical Problem addressed: Social support;     Goal(s) met in session: socially and spiritually engaged with peer group;   Physical/Pain management (Scale of 1-10):  No c/o pain  Pre-session rating ___________    Post-session rating __________  [  ] Increased relaxation               Garth.Ripper ] Affected breathing patterns  [  ] Decreased muscle tension               [  ] Decreased agitation  [  ] Affected heart rate    [  ] Increased alertness     Emotional/Psychological:  Garth.Ripper ] Increased self-expression   [  ] Decreased aggressive behavior   [  ] Decreased feelings of stress              [  ] Discussed healthy coping skills     [  ] Improved mood    [  ] Decreased withdrawn behavior     Social:  [  ] Decreased feelings of isolation/loneliness [X] Positive social interaction    [  ] Provided support and/or comfort for family/friends    Spiritual:  Garth.Ripper ] Spiritual support    [  ] Expressed peace  Garth.Ripper ] Expressed misti    [  ] Discussed beliefs    Techniques Utilized (Check all that apply):   [  ] Procedural support MT [X] Music for relaxation             Garth.Ripper ] Patient preferred music  [  ] Rosy analysis  [  ] Song choice              Garth.Ripper ] Music for validation  [  ] Entrainment              [  ] Movement to music             [  ] Guided visualization  [  ] Laura Charles  [  ] Patient instrument playing [  ] Doron Woodson writing  Garth.Ripper ] MAINOR along   [  ] Haze Seen              [  ] Sensory stimulation  Wayne.Hunter ] Active Listening  [X] Music for spiritual support [  ] Making of CDs as gifts    Session Observations:  Ms. Jesus Alberto Hughes attended this virtual MT support group for people who cannot leave their home. She was able to talk with her peers, and share her experiences with others. She provided support through conversation and received support by participating in music making in her home, as well as receiving greetings and companionship from patients and caregivers. Thank you for the opportunity to provide music therapy to Ms. Joslyn Villalta.   Edgard Latif, [facility]   Music [address] Certified  Spiritual Care Services  Referral- based service

## 2021-09-11 PROCEDURE — HOSPICE MEDICATION HC HH HOSPICE MEDICATION

## 2021-09-11 PROCEDURE — 0651 HSPC ROUTINE HOME CARE

## 2021-09-12 PROCEDURE — 0651 HSPC ROUTINE HOME CARE

## 2021-09-13 ENCOUNTER — HOME CARE VISIT (OUTPATIENT)
Dept: SCHEDULING | Facility: HOME HEALTH | Age: 71
End: 2021-09-13
Payer: MEDICARE

## 2021-09-13 PROCEDURE — G0300 HHS/HOSPICE OF LPN EA 15 MIN: HCPCS

## 2021-09-13 PROCEDURE — 0651 HSPC ROUTINE HOME CARE

## 2021-09-13 PROCEDURE — G0156 HHCP-SVS OF AIDE,EA 15 MIN: HCPCS

## 2021-09-13 PROCEDURE — HOSPICE MEDICATION HC HH HOSPICE MEDICATION

## 2021-09-14 VITALS — SYSTOLIC BLOOD PRESSURE: 130 MMHG | RESPIRATION RATE: 18 BRPM | DIASTOLIC BLOOD PRESSURE: 64 MMHG | HEART RATE: 53 BPM

## 2021-09-14 PROCEDURE — 0651 HSPC ROUTINE HOME CARE

## 2021-09-14 NOTE — HOSPICE
Patient resting quietly in bed, alert and oriented x3. Patient denies pain or sob. assessment completed. Wound care completed, patient is very happy that wound has improved so greatly. [facility] requested that visit be decrease to twice weekly as wound is greatly improved and patient is doing well. She reports she will continue to change dressing prn due to incontinence.  CM notifed of request. No other concerns , encourage to to call with any questions or concerns

## 2021-09-15 ENCOUNTER — HOME CARE VISIT (OUTPATIENT)
Dept: SCHEDULING | Facility: HOME HEALTH | Age: 71
End: 2021-09-15
Payer: MEDICARE

## 2021-09-15 PROCEDURE — 0651 HSPC ROUTINE HOME CARE

## 2021-09-15 PROCEDURE — G0156 HHCP-SVS OF AIDE,EA 15 MIN: HCPCS

## 2021-09-16 PROCEDURE — 0651 HSPC ROUTINE HOME CARE

## 2021-09-17 ENCOUNTER — HOME CARE VISIT (OUTPATIENT)
Dept: HOSPICE | Facility: HOSPICE | Age: 71
End: 2021-09-17
Payer: MEDICARE

## 2021-09-17 ENCOUNTER — HOME CARE VISIT (OUTPATIENT)
Dept: SCHEDULING | Facility: HOME HEALTH | Age: 71
End: 2021-09-17
Payer: MEDICARE

## 2021-09-17 PROCEDURE — G0156 HHCP-SVS OF AIDE,EA 15 MIN: HCPCS

## 2021-09-17 PROCEDURE — 0651 HSPC ROUTINE HOME CARE

## 2021-09-17 PROCEDURE — G0299 HHS/HOSPICE OF RN EA 15 MIN: HCPCS

## 2021-09-17 NOTE — HOSPICE
Hospice RN visit with patient. On arrival, patient in bed, alert x 4, denies acute pain. Lungs clear, no cough, bowel sounds active. Wound care completed, wound steadily improving. RNCM will order needed supplies for delivery - Chux, diapers, wipes. No medication refillls needed at this visit.

## 2021-09-18 PROCEDURE — 0651 HSPC ROUTINE HOME CARE

## 2021-09-19 PROCEDURE — 0651 HSPC ROUTINE HOME CARE

## 2021-09-20 ENCOUNTER — HOME CARE VISIT (OUTPATIENT)
Dept: SCHEDULING | Facility: HOME HEALTH | Age: 71
End: 2021-09-20
Payer: MEDICARE

## 2021-09-20 VITALS
SYSTOLIC BLOOD PRESSURE: 128 MMHG | RESPIRATION RATE: 13 BRPM | TEMPERATURE: 98.2 F | DIASTOLIC BLOOD PRESSURE: 70 MMHG | HEART RATE: 65 BPM

## 2021-09-20 PROCEDURE — 0651 HSPC ROUTINE HOME CARE

## 2021-09-20 PROCEDURE — G0156 HHCP-SVS OF AIDE,EA 15 MIN: HCPCS

## 2021-09-20 PROCEDURE — G0299 HHS/HOSPICE OF RN EA 15 MIN: HCPCS

## 2021-09-20 NOTE — HOSPICE
Arrived at patient's home; patient laying in bed. Patient denied any pain or shortness of breath. Patient stated that her pain is controlled with oxycodone, once in the AM and once in the PM.   Patient stated that her appetite has been unchanged, that she has had daily BMs. Wound care completed, see assessment. Medications reconciled, none needed at this time. No supplies needed. Reminded patient to call hospice number with any needs or concerns.

## 2021-09-21 PROCEDURE — 0651 HSPC ROUTINE HOME CARE

## 2021-09-22 ENCOUNTER — HOME CARE VISIT (OUTPATIENT)
Dept: SCHEDULING | Facility: HOME HEALTH | Age: 71
End: 2021-09-22
Payer: MEDICARE

## 2021-09-22 PROCEDURE — G0156 HHCP-SVS OF AIDE,EA 15 MIN: HCPCS

## 2021-09-22 PROCEDURE — 0651 HSPC ROUTINE HOME CARE

## 2021-09-23 PROCEDURE — 0651 HSPC ROUTINE HOME CARE

## 2021-09-24 ENCOUNTER — HOME CARE VISIT (OUTPATIENT)
Dept: SCHEDULING | Facility: HOME HEALTH | Age: 71
End: 2021-09-24
Payer: MEDICARE

## 2021-09-24 PROCEDURE — G0156 HHCP-SVS OF AIDE,EA 15 MIN: HCPCS

## 2021-09-24 PROCEDURE — 0651 HSPC ROUTINE HOME CARE

## 2021-09-24 PROCEDURE — G0299 HHS/HOSPICE OF RN EA 15 MIN: HCPCS

## 2021-09-24 PROCEDURE — HOSPICE MEDICATION HC HH HOSPICE MEDICATION

## 2021-09-25 PROCEDURE — 0651 HSPC ROUTINE HOME CARE

## 2021-09-26 VITALS
OXYGEN SATURATION: 99 % | HEART RATE: 60 BPM | SYSTOLIC BLOOD PRESSURE: 130 MMHG | RESPIRATION RATE: 20 BRPM | TEMPERATURE: 97 F

## 2021-09-26 PROCEDURE — 0651 HSPC ROUTINE HOME CARE

## 2021-09-26 NOTE — HOSPICE
Routine hospice SN visit   Received patient supine in hospital bed awake and alert  Verbally responsive and appropriate   No S/S of pain or shortness of breath on room air  Patient denies having pain or shortness of breath    Skin warm and dry  Breath  sounds clear  No cough noted   Wound care rendered to area posterior coccyx per wound care instructions  Patient tolerated wound care well  Area is improving    No concerns expressed by patient this visit  No new issues reported by caregiver, [facility]   To continue with current hospice plan of care  Informed caregiver, [facility], to call hospice 24/7 with concerns / needs   Understanding verbalized

## 2021-09-27 ENCOUNTER — HOME CARE VISIT (OUTPATIENT)
Dept: HOSPICE | Facility: HOSPICE | Age: 71
End: 2021-09-27
Payer: MEDICARE

## 2021-09-27 ENCOUNTER — HOME CARE VISIT (OUTPATIENT)
Dept: SCHEDULING | Facility: HOME HEALTH | Age: 71
End: 2021-09-27
Payer: MEDICARE

## 2021-09-27 VITALS
TEMPERATURE: 97.3 F | HEART RATE: 63 BPM | RESPIRATION RATE: 14 BRPM | DIASTOLIC BLOOD PRESSURE: 64 MMHG | SYSTOLIC BLOOD PRESSURE: 118 MMHG | OXYGEN SATURATION: 99 %

## 2021-09-27 PROCEDURE — HOSPICE MEDICATION HC HH HOSPICE MEDICATION

## 2021-09-27 PROCEDURE — G0156 HHCP-SVS OF AIDE,EA 15 MIN: HCPCS

## 2021-09-27 PROCEDURE — 0651 HSPC ROUTINE HOME CARE

## 2021-09-27 PROCEDURE — G0299 HHS/HOSPICE OF RN EA 15 MIN: HCPCS

## 2021-09-27 NOTE — HOSPICE
Joint visit with patient from hospice RN and NP Bk Olmos. Patient caregiver Filiberto Albarado present, patient in bed, alert x 4, reports pain after wound care, declines pre-medication before dressing change. Lungs clear, no cough, patient eating well, last BM this morning. Wound care completed, wound is now pinpoint, nearly healed. New orders from Bk Olmos to change to once/week wound care by hospice, caregiver Filiberto Albarado will perform once/week as well and if soiled. Patient no longer meets hospice criteria, is interested in physical therapy again to be able to get out of bed. Bk Olmos discussed home based primary care for patient. Patient interested in pursuing discharge from hospice.     RNCM will order needed supplies for delivery - alginate, foam dressings, wipes, gloves

## 2021-09-28 ENCOUNTER — HOME CARE VISIT (OUTPATIENT)
Dept: HOSPICE | Facility: HOSPICE | Age: 71
End: 2021-09-28
Payer: MEDICARE

## 2021-09-28 PROCEDURE — 0651 HSPC ROUTINE HOME CARE

## 2021-09-29 ENCOUNTER — HOME CARE VISIT (OUTPATIENT)
Dept: SCHEDULING | Facility: HOME HEALTH | Age: 71
End: 2021-09-29
Payer: MEDICARE

## 2021-09-29 PROCEDURE — G0156 HHCP-SVS OF AIDE,EA 15 MIN: HCPCS

## 2021-09-29 PROCEDURE — 0651 HSPC ROUTINE HOME CARE

## 2021-09-30 ENCOUNTER — HOME CARE VISIT (OUTPATIENT)
Dept: HOSPICE | Facility: HOSPICE | Age: 71
End: 2021-09-30
Payer: MEDICARE

## 2021-09-30 ENCOUNTER — HOME CARE VISIT (OUTPATIENT)
Dept: SCHEDULING | Facility: HOME HEALTH | Age: 71
End: 2021-09-30
Payer: MEDICARE

## 2021-09-30 PROCEDURE — 0651 HSPC ROUTINE HOME CARE

## 2021-09-30 PROCEDURE — G0155 HHCP-SVS OF CSW,EA 15 MIN: HCPCS

## 2021-09-30 NOTE — HOSPICE
Music Therapy Online Support Group  Kayode Byers    Patient Telephone Number: [phone]  Sabianist Affiliation: Gil Portillo  Language: English    Date: [birth year]    Mental Status:   [ X ] Alert [  ] Colin Cram [  ]  Confused  [  ] Minimally responsive  [  ] Sleeping    Communication Status: [place].Reges  ] Verbal [  ] Nonverbal     Physical Status:   [  ] Oxygen in use  [  ] Hard of Hearing [  ] Vision Impaired   [  ] Ambulatory  [  ] Ambulatory with assistance [ X ] Non-ambulatory     Music Preferences, Background: __Varied, Performance Background    Clinical Problem addressed: _Social support    Goal(s) met in session:  Physical/Pain management (Scale of 1-10):  No c/o pain  Pre-session rating ___________    Post-session rating __________  [place]ak[place].Reges  ] Increased relaxation               [  ] Affected breathing patterns  [  ] Decreased muscle tension               [  ] Decreased agitation  [  ] Affected heart rate    [  ] Increased alertness     Emotional/Psychological:  [place].Reges  ] Increased self-expression   [  ] Decreased aggressive behavior   [  ] Decreased feelings of stress              [  ] Discussed healthy coping skills     [ X ] Improved mood    [  ] Decreased withdrawn behavior     Social:  [place].Reges ] Decreased feelings of isolation/loneliness Oakland.Reges  ] Positive social interaction    [  ] Provided support and/or comfort for family/friends    Spiritual:  [ X ] Spiritual support    [  ] Expressed peace  [place].Reges  ] Expressed misti    [  ] Discussed beliefs    Techniques Utilized (Check all that apply):   [  ] Procedural support MT [  ] Music for relaxation             [place].Reges ] Patient preferred music  [  ] Rosy analysis  [  ] Song choice              [  ] Music for validation  [  ] Entrainment              [  ] Movement to music             [  ] Guided visualization  [  ] Adal Hang  [  ] Patient instrument playing [  ] Kurt Spear writing  [place]ak[place].Reges ] Sing along   [  ] Kimberly Drummond              [  ] Sensory stimulation  [place].Reges ] Active Listening  [X] Music for spiritual support [  ] Making of CDs as gifts    Session Observations:  Ms. Ky Almeida attended the online support group with peers she has come to know over the past months. She smiled and shared updates of her health, reporting that her wounds had become much smaller and that she may be able to receive physical therapy to help her get out of bed. She reported that it has been a very long time since she has been out of bed. Ms. Ky Almeida had opportunities to express herself and provided a narrative account of a special time in her life when she [facility] [employer] based on a poem she wrote. She said the trip was like a second honeymoon for her and her . Thank you for the opportunity to provide music therapy to Ms. Villalta.   Anaid Arias, [facility]   [address] Certified  Spiritual Care Services  Referral- based service

## 2021-10-01 ENCOUNTER — HOME CARE VISIT (OUTPATIENT)
Dept: SCHEDULING | Facility: HOME HEALTH | Age: 71
End: 2021-10-01
Payer: MEDICARE

## 2021-10-01 PROCEDURE — G0156 HHCP-SVS OF AIDE,EA 15 MIN: HCPCS

## 2021-10-01 PROCEDURE — 0651 HSPC ROUTINE HOME CARE

## 2021-10-01 NOTE — HOSPICE
MSW had virtual visit with patient through phone call. Patient shared that she is doing well at this time and did not have any concerns for MSW. MSW inquired if patient was happy to be remaining in her home and not have to move. Patient shared that things in that area are still not certain, but that any move is on hold for now. MSW reassured patient that MSW is available for support if she needs it in regard to a move in the future. Patient thanked MSW for calling to check on her. MSW will continue to provide support as accepted and needed.

## 2021-10-02 PROCEDURE — 0651 HSPC ROUTINE HOME CARE

## 2021-10-03 PROCEDURE — 0651 HSPC ROUTINE HOME CARE

## 2021-10-04 ENCOUNTER — HOME CARE VISIT (OUTPATIENT)
Dept: SCHEDULING | Facility: HOME HEALTH | Age: 71
End: 2021-10-04
Payer: MEDICARE

## 2021-10-04 ENCOUNTER — HOME CARE VISIT (OUTPATIENT)
Dept: HOSPICE | Facility: HOSPICE | Age: 71
End: 2021-10-04
Payer: MEDICARE

## 2021-10-04 VITALS
DIASTOLIC BLOOD PRESSURE: 52 MMHG | RESPIRATION RATE: 14 BRPM | OXYGEN SATURATION: 98 % | SYSTOLIC BLOOD PRESSURE: 128 MMHG | HEART RATE: 59 BPM

## 2021-10-04 PROCEDURE — G0156 HHCP-SVS OF AIDE,EA 15 MIN: HCPCS

## 2021-10-04 PROCEDURE — 0651 HSPC ROUTINE HOME CARE

## 2021-10-04 PROCEDURE — G0299 HHS/HOSPICE OF RN EA 15 MIN: HCPCS

## 2021-10-04 NOTE — HOSPICE
Hospice RN visit with patient, in bed, alert x 4, denies current pain. Lungs clear, no cough, no trouble swallowing, bowel sounds active, last BM today. Wound care completed. RNCM will order needed supplies for delivery - diaper liner pads, Chux, wipes.   No medication refills needed at this visit

## 2021-10-05 ENCOUNTER — TELEPHONE (OUTPATIENT)
Dept: FAMILY MEDICINE CLINIC | Age: 71
End: 2021-10-05

## 2021-10-05 PROCEDURE — 0651 HSPC ROUTINE HOME CARE

## 2021-10-05 NOTE — TELEPHONE ENCOUNTER
Home Based Primary Care & Supportive Services   Phone:  [phone]      Fax:  [phone] [place] [address], [address]    Name:  Donya Kulkarni  YOB: 1950      Patient is being discharged from [facility] and is being referred to [facility]. This nurse called and patient's caregiver Matias answered. She gave the phone to patient. This nurse explained [facility] services. Patient is interested in [facility]. Patient says she has Medicare A&B and no supplement. She confirmed that she would be responsible for the 20% that Medicare B does not pay. This nurse explained that she would check with [facility] admin to find out what her approximate costs out of pocket would be for a new visit and follow up visits. Nurse will call patient back. Patient voices understanding.       OPAL McqueenN, RN-BC, [facility]  Referral Navigator, Home Based Primary Care & Supportive Services

## 2021-10-06 ENCOUNTER — HOME CARE VISIT (OUTPATIENT)
Dept: HOSPICE | Facility: HOSPICE | Age: 71
End: 2021-10-06
Payer: MEDICARE

## 2021-10-06 PROCEDURE — 0651 HSPC ROUTINE HOME CARE

## 2021-10-07 PROCEDURE — 0651 HSPC ROUTINE HOME CARE

## 2021-10-08 ENCOUNTER — HOME CARE VISIT (OUTPATIENT)
Dept: HOSPICE | Facility: HOSPICE | Age: 71
End: 2021-10-08
Payer: MEDICARE

## 2021-10-08 ENCOUNTER — HOME CARE VISIT (OUTPATIENT)
Dept: SCHEDULING | Facility: HOME HEALTH | Age: 71
End: 2021-10-08
Payer: MEDICARE

## 2021-10-08 PROCEDURE — 0651 HSPC ROUTINE HOME CARE

## 2021-10-08 PROCEDURE — G0156 HHCP-SVS OF AIDE,EA 15 MIN: HCPCS

## 2021-10-09 PROCEDURE — 0651 HSPC ROUTINE HOME CARE

## 2021-10-10 PROCEDURE — 0651 HSPC ROUTINE HOME CARE

## 2021-10-10 NOTE — HOSPICE
is visiting for a routine pastoral visit. Joslyn shared about her current experiences and coping.  provided communion and hymn singing with Joslyn by bedside.

## 2021-10-11 ENCOUNTER — TELEPHONE (OUTPATIENT)
Dept: FAMILY MEDICINE CLINIC | Age: 71
End: 2021-10-11

## 2021-10-11 ENCOUNTER — HOME CARE VISIT (OUTPATIENT)
Dept: SCHEDULING | Facility: HOME HEALTH | Age: 71
End: 2021-10-11
Payer: MEDICARE

## 2021-10-11 VITALS
RESPIRATION RATE: 13 BRPM | OXYGEN SATURATION: 100 % | SYSTOLIC BLOOD PRESSURE: 124 MMHG | HEART RATE: 58 BPM | DIASTOLIC BLOOD PRESSURE: 80 MMHG

## 2021-10-11 PROCEDURE — 0651 HSPC ROUTINE HOME CARE

## 2021-10-11 PROCEDURE — G0156 HHCP-SVS OF AIDE,EA 15 MIN: HCPCS

## 2021-10-11 PROCEDURE — G0299 HHS/HOSPICE OF RN EA 15 MIN: HCPCS

## 2021-10-11 PROCEDURE — HOSPICE MEDICATION HC HH HOSPICE MEDICATION

## 2021-10-11 NOTE — TELEPHONE ENCOUNTER
Home Based Primary Care & Supportive Services   Phone:  [phone]      Fax:  [phone] [facility], [facility], [facility]    Name:  Pop Hernadez  YOB: 1950    Outgoing call to patient to update her regarding [facility] following her discharge from hospice. Patient has Medicare A & B only, no supplement. Nurse informed her that her cost for [facility] visits would be  20% of the Medicare fee schedule which is less than the amount we bill. Patient voices understanding and says she would like to proceed with being evaluated for [facility]. Patient was admitted to [facility] on 4/30/21 after being hospitalized at [facility] 4/17/21-4/29/21 with supraventricular tachycardia (EF 20-25%), cardiomyopathy, decubitus ulcer stage IV on the sacral/coccyx area. Per hospice nurse's note today, wound is now 3 cm x 1.5 cm. Patient lives at [facility]. Primary caregiver is Antonette \"Lisa\" Trace German who is also listed as the primary health care decision maker on patient's [facility].      This nurse explained that the  [facility] team discusses new referrals at our weekly IDG meetings. The next meeting is scheduled for 10/13/21. This nurse will present the referral to the  [facility] team on 10/13/21 and will call her back to notify her of the decision. Email sent to Orlando Lambert RN Clinical Manager and Oriana Bear NP with [facility] asking the expected hospice discharge date.         THIEN Womack, RN-BC, [facility]  Referral Navigator, Home Based Primary Care & Supportive Services

## 2021-10-11 NOTE — HOSPICE
Arrived at [facility]; Matias present in home. Patient resting in bed. Patient stated that her appetite has been unchanged, BM's have been every 2-3 days, pain is controlled with oxycodone. Wound care and incontinence care completed. Wound measuring at 3x1.5 cm. RNCM updated . Medications reconciled, none needed at this time. Supplies ordered: briefs, wipes. Reminded Lisa to call hospice number with any needs or concerns.

## 2021-10-12 ENCOUNTER — HOME CARE VISIT (OUTPATIENT)
Dept: HOSPICE | Facility: HOSPICE | Age: 71
End: 2021-10-12
Payer: MEDICARE

## 2021-10-12 PROCEDURE — 0651 HSPC ROUTINE HOME CARE

## 2021-10-13 ENCOUNTER — HOME CARE VISIT (OUTPATIENT)
Dept: HOSPICE | Facility: HOSPICE | Age: 71
End: 2021-10-13
Payer: MEDICARE

## 2021-10-13 PROCEDURE — 0651 HSPC ROUTINE HOME CARE

## 2021-10-14 PROCEDURE — 0651 HSPC ROUTINE HOME CARE

## 2021-10-15 PROCEDURE — 0651 HSPC ROUTINE HOME CARE

## 2021-10-16 PROCEDURE — 0651 HSPC ROUTINE HOME CARE

## 2021-10-17 PROCEDURE — 0651 HSPC ROUTINE HOME CARE

## 2021-10-18 ENCOUNTER — HOME CARE VISIT (OUTPATIENT)
Dept: HOSPICE | Facility: HOSPICE | Age: 71
End: 2021-10-18
Payer: MEDICARE

## 2021-10-18 PROCEDURE — G0299 HHS/HOSPICE OF RN EA 15 MIN: HCPCS

## 2021-10-18 PROCEDURE — 0651 HSPC ROUTINE HOME CARE

## 2021-10-19 PROCEDURE — 0651 HSPC ROUTINE HOME CARE

## 2021-10-19 NOTE — HOSPICE
Music Therapy Progress Note  Romana Maintanis  Online Music Therapy Support Group    Patient Email: [email or web address]  Gnosticist Affiliation: Lizzeth Phelps   Language: English  Date:10/12/2021    Mental Status:   [ X ] Alert [  ] Pop Escamilla [  ]  Confused  [  ] Minimally responsive  [  ] Sleeping    Communication Status: Julieth  ] Verbal [  ] Nonverbal     Physical Status:   [  ] Oxygen in use  [  ] Hard of Hearing [  ] Vision Impaired   [  ] Ambulatory  [  ] Ambulatory with assistance Julieth  ] Non-ambulatory     Music Preferences, Background: Vocal perfomance background, music of misti, popular music;    Clinical Problem addressed: Peer support when mobility is limited    Goal(s) met in session:  Physical/Pain management (Scale of 1-10):    Pre-session rating ___________    Post-session rating __________  Julieth  ] Increased relaxation               Julieth  ] Affected breathing patterns  [  ] Decreased muscle tension               [  ] Decreased agitation  [  ] Affected heart rate    [  ] Increased alertness     Emotional/Psychological:  Julieth  ] Increased self-expression   [  ] Decreased aggressive behavior   [  ] Decreased feelings of stress              Julieth  ] Discussed healthy coping skills     [  ] Improved mood    [  ] Decreased withdrawn behavior     Social:  [  ] Decreased feelings of isolation/loneliness [ X ] Positive social interaction    [  ] Provided support and/or comfort for family/friends    Spiritual:  [ X ] Spiritual support    [  ] Expressed peace  [ X ] Expressed misti    [  ] Discussed beliefs    Techniques Utilized (Check all that apply):   [  ] Procedural support MT [  ] Music for relaxation             [ X ] Patient preferred music  [  ] Rosy analysis  [  ] Song choice              Julieth  ] Music for validation  [  ] Entrainment              [  ] Movement to music             [ X ] Guided visualization  [  ] Kenyatta Chance  [  ] Patient instrument playing [  ] Erline Bosworth writing  Julieth  ] Sing along   [  ] Improvisation              [  ] Sensory stimulation  Elayne.Beath  ] Active Listening  [ X ] Music for spiritual support [  ] Making of CDs as gifts    Session Observations:  Ms. Argelia Lopez attended the telehealth online support group for people with limited mobility. She was engaged with her peers in discussion and singing around the theme of \"Sheila. \" Ms. Bartolo Tovar commented on her feeling validated by the song, Sheila, which was the opening music, by Ginette Meredith. She shared that this song has significance for her in that it is often how she begins her day, finding it uplifting. Thank you for the opportunity to provide music therapy to Ms. Joslyn Villalta.   Husam Luis, [place], [place]-BC   Music [address] Certified  Spiritual Care Services  Referral- based service

## 2021-10-20 ENCOUNTER — HOME CARE VISIT (OUTPATIENT)
Dept: SCHEDULING | Facility: HOME HEALTH | Age: 71
End: 2021-10-20
Payer: MEDICARE

## 2021-10-20 VITALS
RESPIRATION RATE: 14 BRPM | DIASTOLIC BLOOD PRESSURE: 70 MMHG | HEART RATE: 72 BPM | SYSTOLIC BLOOD PRESSURE: 126 MMHG | OXYGEN SATURATION: 99 %

## 2021-10-20 PROCEDURE — G0156 HHCP-SVS OF AIDE,EA 15 MIN: HCPCS

## 2021-10-20 PROCEDURE — 0651 HSPC ROUTINE HOME CARE

## 2021-10-20 NOTE — HOSPICE
Hospice RN visit with patient. Patient in bed, radha Royal present, patient alert x 4, reports pain at sacrum with wound care, declines to take additional pain meds for relief. Lungs clear, no cough, bowel sounds active, last BM yesterday. RNCM will order needed supplies for delivery - diapers, wipes. No medication refills needed at this visit. With patient's permission, photos of wound sent to NP [facility] for follow up and recommendations. RNCM discussed with patient and caregiver that because patient's sacral wound has worsened somewhat hospice will complete recertification of patient for hospice services and resume previous schedule of wound care (Mon, Fri). Patient may still pursue transition to home based primary care with use of physical therapy for mobility training and revocation of hospice if she chooses,  there will now be more time availability for that transition. Patient and CG agreeable to plan. [facility]ral [facility]. Orders to continue current dressing orders and resume prior schedule (3x/week) obtained, request to place Reeder in order to acheive dry environment for better wound healing. RNCM returned to patient's home with Reeder and reviewed new instructions. Patient adamantly refuses Reeder placement, stated she has had 2 prior \"bad experiences\" with urinary catheters and will never have one again. Patient understands benefits of Reeder for wound healing but declines at this time. Patients will be considered to be in the terminal stage of heart disease (life expectancy of six months or less) if they meet the following criteria. (1 and 2 should be present. Factors from 3 will add supporting documentation.):    ____X____  1.  At the time of initial certification or recertification for hospice, the patient is or has been                           already optimally treated for heart disease or is not a candidate for a surgical procedure or has declined a procedure. (Optimally treated means that patients who are not on                           vasodilators have a medical reason for refusing these drugs, e.g., hypotension or renal                           disease.)  ________  2. The patient is classified as New York Heart Association (NYHA) Class IV and may have                           significant symptoms of heart failure or angina at rest. (Class IV patients with heart disease                           have an inability to carry on any physical activity without discomfort. Symptoms of heart                           failure or of the anginal syndrome may be present even at rest. If any physical activity is                           undertaken, discomfort is increased.) Significant congestive heart failure may be                          documented by an ejection fraction of ?20%, but is not required if not already available.  ____X____  3. Documentation of the following factors will support but is not required to establish                           eligibility for hospice care:                            ________  a. Treatment resistant symptomatic supraventricular or ventricular arrhythmias;                          ________  b. History of cardiac arrest or resuscitation;                          ________  c. History of unexplained syncope;                          ________  d. Brain embolism of cardiac origin;                          ________  e. Concomitant HIV disease.

## 2021-10-21 ENCOUNTER — HOME CARE VISIT (OUTPATIENT)
Dept: HOSPICE | Facility: HOSPICE | Age: 71
End: 2021-10-21
Payer: MEDICARE

## 2021-10-21 PROCEDURE — 0651 HSPC ROUTINE HOME CARE

## 2021-10-22 ENCOUNTER — HOME CARE VISIT (OUTPATIENT)
Dept: SCHEDULING | Facility: HOME HEALTH | Age: 71
End: 2021-10-22
Payer: MEDICARE

## 2021-10-22 ENCOUNTER — HOME CARE VISIT (OUTPATIENT)
Dept: HOSPICE | Facility: HOSPICE | Age: 71
End: 2021-10-22
Payer: MEDICARE

## 2021-10-22 PROCEDURE — 0651 HSPC ROUTINE HOME CARE

## 2021-10-22 PROCEDURE — G0299 HHS/HOSPICE OF RN EA 15 MIN: HCPCS

## 2021-10-22 PROCEDURE — HOSPICE MEDICATION HC HH HOSPICE MEDICATION

## 2021-10-22 PROCEDURE — G0156 HHCP-SVS OF AIDE,EA 15 MIN: HCPCS

## 2021-10-22 NOTE — HOSPICE
is visiting for a routine visit. Joslyn shared she is currently doing well.  provided communion with Joslyn.  and MT jointly visited and recorded music with [address].

## 2021-10-22 NOTE — HOSPICE
Hospice RN visit with patient. Patient in bed, alert x 4, reports wound pain with dressing change. caregiver Matias present. Wound care completed adn patient cleaned of incontinent BM.   Wipes provided from kevin and more ordered for delivery

## 2021-10-23 PROCEDURE — 0651 HSPC ROUTINE HOME CARE

## 2021-10-24 PROCEDURE — 0651 HSPC ROUTINE HOME CARE

## 2021-10-25 ENCOUNTER — HOME CARE VISIT (OUTPATIENT)
Dept: SCHEDULING | Facility: HOME HEALTH | Age: 71
End: 2021-10-25
Payer: MEDICARE

## 2021-10-25 VITALS
TEMPERATURE: 98 F | SYSTOLIC BLOOD PRESSURE: 135 MMHG | HEART RATE: 68 BPM | RESPIRATION RATE: 14 BRPM | DIASTOLIC BLOOD PRESSURE: 80 MMHG

## 2021-10-25 PROCEDURE — HOSPICE MEDICATION HC HH HOSPICE MEDICATION

## 2021-10-25 PROCEDURE — G0299 HHS/HOSPICE OF RN EA 15 MIN: HCPCS

## 2021-10-25 PROCEDURE — G0155 HHCP-SVS OF CSW,EA 15 MIN: HCPCS

## 2021-10-25 PROCEDURE — 0651 HSPC ROUTINE HOME CARE

## 2021-10-25 NOTE — HOSPICE
Arrived at group home; patient laying in bed. Patient stated that her appetite has been normal and BMs have been consistent. Wound care completed, see assessment. Medications reconciled, none needed. Reminded Lisa to call hospice number with any needs or concerns.

## 2021-10-26 ENCOUNTER — HOME CARE VISIT (OUTPATIENT)
Dept: SCHEDULING | Facility: HOME HEALTH | Age: 71
End: 2021-10-26
Payer: MEDICARE

## 2021-10-26 PROCEDURE — G0156 HHCP-SVS OF AIDE,EA 15 MIN: HCPCS

## 2021-10-26 PROCEDURE — 0651 HSPC ROUTINE HOME CARE

## 2021-10-27 ENCOUNTER — HOME CARE VISIT (OUTPATIENT)
Dept: SCHEDULING | Facility: HOME HEALTH | Age: 71
End: 2021-10-27
Payer: MEDICARE

## 2021-10-27 PROCEDURE — 0651 HSPC ROUTINE HOME CARE

## 2021-10-27 PROCEDURE — G0156 HHCP-SVS OF AIDE,EA 15 MIN: HCPCS

## 2021-10-28 PROCEDURE — 0651 HSPC ROUTINE HOME CARE

## 2021-10-29 ENCOUNTER — HOME CARE VISIT (OUTPATIENT)
Dept: HOSPICE | Facility: HOSPICE | Age: 71
End: 2021-10-29
Payer: MEDICARE

## 2021-10-29 PROCEDURE — 0651 HSPC ROUTINE HOME CARE

## 2021-10-29 PROCEDURE — G0299 HHS/HOSPICE OF RN EA 15 MIN: HCPCS

## 2021-10-29 NOTE — HOSPICE
Hospice RN visit with patient for wound care. Patient alert x 4, in bed, no complaints of pain. Wound care completed to sacrum, wound has not worsened or expanded. No new concerns or issues from patient or caregiver Matias. RNCM will order needed supplies for delivery - sacral foam dressings, wipes, liners.   No med refills needed at this visit

## 2021-10-30 PROCEDURE — 0651 HSPC ROUTINE HOME CARE

## 2021-10-31 PROCEDURE — 0651 HSPC ROUTINE HOME CARE

## 2021-11-01 ENCOUNTER — HOME CARE VISIT (OUTPATIENT)
Dept: SCHEDULING | Facility: HOME HEALTH | Age: 71
End: 2021-11-01
Payer: MEDICARE

## 2021-11-01 PROCEDURE — G0300 HHS/HOSPICE OF LPN EA 15 MIN: HCPCS

## 2021-11-01 PROCEDURE — 0651 HSPC ROUTINE HOME CARE

## 2021-11-01 NOTE — HOSPICE
Music Therapy Progress Note  Joslyn Villalta  [address], [place]  Patient Telephone Number: [phone]  Jainism Affiliation: Hoahaoism  Language: English    Date: 10/21/2021    Mental Status:   Severianus.Troutdale  ] Alert [  ] Hcrissy Pock [  ]  Confused  [  ] Minimally responsive  [  ] Sleeping    Communication Status: [ X ] Verbal [  ] Nonverbal     Physical Status:   [  ] Oxygen in use  [  ] Hard of Hearing [  ] Vision Impaired   [  ] Ambulatory  [  ] Ambulatory with assistance [ X ] Non-ambulatory     Music Preferences, Background: _Varied Pop and Music of Sheila    Clinical Problem addressed: _Support with Transition    Goal(s) met in session:  Physical/Pain management (Scale of 1-10):    Pre-session rating ___________    Post-session rating __________  [ X ] Increased relaxation   [  ] Affected breathing patterns  [  ] Decreased muscle tension   [  ] Decreased agitation  [  ] Affected heart rate    [  ] Increased alertness     Emotional/Psychological:  [ X ] Increased self-expression   [  ] Decreased aggressive behavior   [  ] Decreased feelings of stress  [  ] Discussed healthy coping skills     [  ] Improved mood    [  ] Decreased withdrawn behavior     Social:  [  ] Decreased feelings of isolation/loneliness Severianus.Troutdale  ] Positive social interaction    [  ] Provided support and/or comfort for family/friends    Spiritual:  [ X ] Spiritual support    [  ] Expressed peace  Severianus.Troutdale  ] Expressed sheila    [ X ] Discussed beliefs    Techniques Utilized (Check all that apply):   [  ] Procedural support MT [  ] Music for relaxation    [ X ] Patient preferred music  [  ] Rosy analysis  [  ] Song choice              [  ] Music for validation  [  ] Entrainment              [  ] Movement to music             [  ] Guided visualization  [  ] Theresa Suárez  [  ] Patient instrument playing [  ] Katie Velazquez writing  [ X ] Sing along   [  ] Jarad Coy              [  ] Sensory stimulation  [  ] Active Listening  [ X ] Music for spiritual support [ ] Making of CDs as gifts    Session Observations:  Ms. Ld Cho requested to make a recording of \"There Will Never Be [id number] You\" with Davina Villegas and myself. She was enthusiastic about singing, and invited us to play and sing with her. It created a warm environment, and she discussed how the song relates to her misti, and that God is always with her. She was emailed the recording to keep and listen to. Thank you for the opportunity to provide music therapy to Ms. Joslyn Villalta.   Anaid Boateng, [facility]   Music [address] Certified  Spiritual Care Services  Referral- based service

## 2021-11-01 NOTE — HOSPICE
wound care. On arrival patient laying in bed a&o x4. Patient denied pain. Voiding without difficulty. Last bm= 11/1/21. Sacral wound care completed. Patient tolerated procedure well. No needs at this tiem.

## 2021-11-02 ENCOUNTER — HOME CARE VISIT (OUTPATIENT)
Dept: SCHEDULING | Facility: HOME HEALTH | Age: 71
End: 2021-11-02
Payer: MEDICARE

## 2021-11-02 PROCEDURE — 0651 HSPC ROUTINE HOME CARE

## 2021-11-02 PROCEDURE — G0156 HHCP-SVS OF AIDE,EA 15 MIN: HCPCS

## 2021-11-02 NOTE — HOSPICE
MSW made virtual visit with patient through phone call. Patient shared that things are going okay for her. Patient shared that she continues to feel comfortable in her home, but did share that there is still the possibility that she will have to move in the future. MSW will provide support around that if necessary. MSW inquired if patient had made a decision in regard to final arrangements. patient shared that she had talked with  on the matter and had started looking into cremation with cre[facility] services of [place], but that plans are not finalized. Patient appreciative of phone call.

## 2021-11-03 ENCOUNTER — HOME CARE VISIT (OUTPATIENT)
Dept: SCHEDULING | Facility: HOME HEALTH | Age: 71
End: 2021-11-03
Payer: MEDICARE

## 2021-11-03 PROCEDURE — 0651 HSPC ROUTINE HOME CARE

## 2021-11-03 PROCEDURE — HOSPICE MEDICATION HC HH HOSPICE MEDICATION

## 2021-11-03 PROCEDURE — G0156 HHCP-SVS OF AIDE,EA 15 MIN: HCPCS

## 2021-11-04 PROCEDURE — 0651 HSPC ROUTINE HOME CARE

## 2021-11-05 ENCOUNTER — HOME CARE VISIT (OUTPATIENT)
Dept: SCHEDULING | Facility: HOME HEALTH | Age: 71
End: 2021-11-05
Payer: MEDICARE

## 2021-11-05 PROCEDURE — 0651 HSPC ROUTINE HOME CARE

## 2021-11-05 PROCEDURE — G0156 HHCP-SVS OF AIDE,EA 15 MIN: HCPCS

## 2021-11-05 PROCEDURE — G0299 HHS/HOSPICE OF RN EA 15 MIN: HCPCS

## 2021-11-06 PROCEDURE — 0651 HSPC ROUTINE HOME CARE

## 2021-11-07 PROCEDURE — 0651 HSPC ROUTINE HOME CARE

## 2021-11-08 ENCOUNTER — HOME CARE VISIT (OUTPATIENT)
Dept: SCHEDULING | Facility: HOME HEALTH | Age: 71
End: 2021-11-08
Payer: MEDICARE

## 2021-11-08 VITALS
TEMPERATURE: 98.1 F | DIASTOLIC BLOOD PRESSURE: 76 MMHG | SYSTOLIC BLOOD PRESSURE: 126 MMHG | RESPIRATION RATE: 14 BRPM | HEART RATE: 58 BPM

## 2021-11-08 PROCEDURE — 0651 HSPC ROUTINE HOME CARE

## 2021-11-08 PROCEDURE — G0299 HHS/HOSPICE OF RN EA 15 MIN: HCPCS

## 2021-11-09 ENCOUNTER — HOME CARE VISIT (OUTPATIENT)
Dept: SCHEDULING | Facility: HOME HEALTH | Age: 71
End: 2021-11-09
Payer: MEDICARE

## 2021-11-09 ENCOUNTER — HOME CARE VISIT (OUTPATIENT)
Dept: HOSPICE | Facility: HOSPICE | Age: 71
End: 2021-11-09
Payer: MEDICARE

## 2021-11-09 VITALS
SYSTOLIC BLOOD PRESSURE: 138 MMHG | RESPIRATION RATE: 24 BRPM | OXYGEN SATURATION: 100 % | TEMPERATURE: 96.4 F | HEART RATE: 97 BPM | DIASTOLIC BLOOD PRESSURE: 78 MMHG

## 2021-11-09 PROCEDURE — 0651 HSPC ROUTINE HOME CARE

## 2021-11-09 PROCEDURE — G0156 HHCP-SVS OF AIDE,EA 15 MIN: HCPCS

## 2021-11-09 PROCEDURE — HOSPICE MEDICATION HC HH HOSPICE MEDICATION

## 2021-11-09 NOTE — HOSPICE
SN routine visit  Patient received in hospital bed positioned on her back  Awake and alert  Responds verbally appropriately  Very engaging  No S/S of pain or shortness of breath on room air at rest   Patient denies having pain or shortness of breath during this visit     Current PRN Oxycodone 5 mg q 4 hours is effective per patient  Patient is in good spirits  No new symptoms noted   Patient reports her appetite is unchanged and enjoys eating    No swallowing issues or N/V   To continue with current hospice plan of care  Informed caregiver, Omaira Lucas, to call hospice with concerns / needs  Understanding verbalized                               No medication refill or supplies needed this visit

## 2021-11-10 ENCOUNTER — HOME CARE VISIT (OUTPATIENT)
Dept: SCHEDULING | Facility: HOME HEALTH | Age: 71
End: 2021-11-10
Payer: MEDICARE

## 2021-11-10 PROCEDURE — G0156 HHCP-SVS OF AIDE,EA 15 MIN: HCPCS

## 2021-11-10 PROCEDURE — 0651 HSPC ROUTINE HOME CARE

## 2021-11-10 NOTE — HOSPICE
Music Therapy Progress Note  Online Support Group  Joslyn [email or web address]  Presybeterian Affiliation: Synagogue/Bahai   Language: English    Date: 11/9/21    Mental Status:   [ X ] Alert [  ] Eriberto Paget [  ]  Confused  [  ] Minimally responsive  [  ] Sleeping    Communication Status: [  ] Impaired Speech [  ] Nonverbal     Physical Status:   [  ] Oxygen in use  [  ] Hard of Hearing [  ] Vision Impaired   [  ] Ambulatory  [  ] Ambulatory with assistance [  ] Non-ambulatory     Music Preferences, Background: ___Folk, Mardel Lights, Pop, Sacred, performance background;    Clinical Problem addressed: _Peer support in coping with a life limiting illness    Goal(s) met in session:  Physical/Pain management (Scale of 1-10):    Pre-session rating ___________    Post-session rating __________  [  ] Increased relaxation               [  ] Affected breathing patterns  [  ] Decreased muscle tension               [  ] Decreased agitation  [  ] Affected heart rate    [  ] Increased alertness     Emotional/Psychological:  Iskender.[email or web address] ] Increased self-expression   [  ] Decreased aggressive behavior   [  ] Decreased feelings of stress              [  ] Discussed healthy coping skills     [  ] Improved mood    [  ] Decreased withdrawn behavior     Social:  Iskender.Six ] Decreased feelings of isolation/loneliness [ X ] Positive social interaction    [  ] Provided support and/or comfort for family/friends    Spiritual:  Iskender.Six ] Spiritual support    [  ] Expressed peace  [  ] Expressed misti    Iskender.Six ] Discussed beliefs    Techniques Utilized (Check all that apply):   [  ] Procedural support MT [ X ] Music for relaxation             Iskender.Six ] Patient preferred music  [  ] Rosy analysis  [  ] Song choice              [  ] Music for validation  [  ] Entrainment              [  ] Movement to music             [  ] Guided visualization  [  ] Karthik Ling  [  ] Patient instrument playing [  ] Natalie Cheadle writing  Iskender.Six ] Sing along   [  ] Tony Mcdaniel              [ ] Sensory stimulation  Julieth ] Active Listening  Julieth ] Music for spiritual support [  ] Making of CDs as gifts    Session Observations: Ms. Donya Kulkarni was lying in bed as she attended the Online Music Therapy Support Group. Throughout the group session, Pt increased self-expression in response to the music, singing along throughout the following songs selections; Turn Turn Turn (The [place]), Changes Hopefernando Romero), I Love My Body, Everything Changes, New Real Dreamin' (The [address] [place] [address] and [place]), and In My Life (The Beatles). Pt also engaged in conversation over the selected theme Changes and other various topics. She expressed enjoyment in the Support Group and thanked the [employer] and members for the experience. MT Team concluded the session at this time.      Anaid Padilla, [facility]   Music [address] Certified  Spiritual Care Services  Referral- based service

## 2021-11-11 PROCEDURE — 0651 HSPC ROUTINE HOME CARE

## 2021-11-12 ENCOUNTER — HOME CARE VISIT (OUTPATIENT)
Dept: HOSPICE | Facility: HOSPICE | Age: 71
End: 2021-11-12
Payer: MEDICARE

## 2021-11-12 ENCOUNTER — HOME CARE VISIT (OUTPATIENT)
Dept: SCHEDULING | Facility: HOME HEALTH | Age: 71
End: 2021-11-12
Payer: MEDICARE

## 2021-11-12 PROCEDURE — G0299 HHS/HOSPICE OF RN EA 15 MIN: HCPCS

## 2021-11-12 PROCEDURE — 0651 HSPC ROUTINE HOME CARE

## 2021-11-12 PROCEDURE — G0156 HHCP-SVS OF AIDE,EA 15 MIN: HCPCS

## 2021-11-13 PROCEDURE — 0651 HSPC ROUTINE HOME CARE

## 2021-11-13 NOTE — HOSPICE
Hospice RN visit with patient. Patient in bed, alert x 4, denies current pain, caregiver Anuel Camejo present. Wound care completed to sacrum with no issues. No medication refills needed at this visit. Hospice RN will order needed supplies for delivery - 9x9 foam dressings, wipes.

## 2021-11-14 PROCEDURE — 0651 HSPC ROUTINE HOME CARE

## 2021-11-15 ENCOUNTER — HOME CARE VISIT (OUTPATIENT)
Dept: SCHEDULING | Facility: HOME HEALTH | Age: 71
End: 2021-11-15
Payer: MEDICARE

## 2021-11-15 ENCOUNTER — HOME CARE VISIT (OUTPATIENT)
Dept: HOSPICE | Facility: HOSPICE | Age: 71
End: 2021-11-15
Payer: MEDICARE

## 2021-11-15 VITALS
DIASTOLIC BLOOD PRESSURE: 80 MMHG | TEMPERATURE: 97.7 F | SYSTOLIC BLOOD PRESSURE: 130 MMHG | HEART RATE: 62 BPM | RESPIRATION RATE: 13 BRPM

## 2021-11-15 PROCEDURE — G0156 HHCP-SVS OF AIDE,EA 15 MIN: HCPCS

## 2021-11-15 PROCEDURE — 0651 HSPC ROUTINE HOME CARE

## 2021-11-15 PROCEDURE — G0299 HHS/HOSPICE OF RN EA 15 MIN: HCPCS

## 2021-11-15 NOTE — HOSPICE
Arrived at patient's home; Faina Mckay present for visit. Patient stated that her appetite has been unchanged, her BMs normal every 1-2 days. Patient stated that her pain is primarily during wound care. Wound care completed, see assessment. Medications reconciled, none needed at this time. briefs, wipes, chux, gloves ordered via medline. Reminded patient and Matias to call hospice number with any needs or concerns.

## 2021-11-16 PROCEDURE — 0651 HSPC ROUTINE HOME CARE

## 2021-11-17 ENCOUNTER — HOME CARE VISIT (OUTPATIENT)
Dept: SCHEDULING | Facility: HOME HEALTH | Age: 71
End: 2021-11-17
Payer: MEDICARE

## 2021-11-17 PROCEDURE — 0651 HSPC ROUTINE HOME CARE

## 2021-11-17 PROCEDURE — G0156 HHCP-SVS OF AIDE,EA 15 MIN: HCPCS

## 2021-11-17 PROCEDURE — HOSPICE MEDICATION HC HH HOSPICE MEDICATION

## 2021-11-18 PROCEDURE — 0651 HSPC ROUTINE HOME CARE

## 2021-11-19 ENCOUNTER — HOME CARE VISIT (OUTPATIENT)
Dept: SCHEDULING | Facility: HOME HEALTH | Age: 71
End: 2021-11-19
Payer: MEDICARE

## 2021-11-19 PROCEDURE — G0299 HHS/HOSPICE OF RN EA 15 MIN: HCPCS

## 2021-11-19 PROCEDURE — G0156 HHCP-SVS OF AIDE,EA 15 MIN: HCPCS

## 2021-11-19 PROCEDURE — 0651 HSPC ROUTINE HOME CARE

## 2021-11-20 VITALS
RESPIRATION RATE: 16 BRPM | HEART RATE: 60 BPM | TEMPERATURE: 98 F | SYSTOLIC BLOOD PRESSURE: 128 MMHG | DIASTOLIC BLOOD PRESSURE: 72 MMHG

## 2021-11-20 PROCEDURE — 0651 HSPC ROUTINE HOME CARE

## 2021-11-20 NOTE — HOSPICE
Patient sitting up in bed upon arrival for visit; caregiver Matias present. Patient alert and oriented X4. Patient reports generalized discomfort 2/10 with movement/wound dressing changes. No respiratory difficulty on room air. Good appetite; regular bowel movements. Incontinent of bowel and bladder. Wound care completed to sacrum. No medications or supplies needed at this time. Reinforced hospice 24/7 for any concerns or change in patient's condition.

## 2021-11-21 PROCEDURE — 0651 HSPC ROUTINE HOME CARE

## 2021-11-22 ENCOUNTER — HOME CARE VISIT (OUTPATIENT)
Dept: SCHEDULING | Facility: HOME HEALTH | Age: 71
End: 2021-11-22
Payer: MEDICARE

## 2021-11-22 ENCOUNTER — HOME CARE VISIT (OUTPATIENT)
Dept: HOSPICE | Facility: HOSPICE | Age: 71
End: 2021-11-22
Payer: MEDICARE

## 2021-11-22 VITALS
RESPIRATION RATE: 13 BRPM | DIASTOLIC BLOOD PRESSURE: 70 MMHG | SYSTOLIC BLOOD PRESSURE: 124 MMHG | TEMPERATURE: 97.2 F | HEART RATE: 63 BPM

## 2021-11-22 PROCEDURE — G0299 HHS/HOSPICE OF RN EA 15 MIN: HCPCS

## 2021-11-22 PROCEDURE — G0156 HHCP-SVS OF AIDE,EA 15 MIN: HCPCS

## 2021-11-22 PROCEDURE — 0651 HSPC ROUTINE HOME CARE

## 2021-11-22 NOTE — HOSPICE
Arrived at skilled nursing; patient resting in bed. Wound care completed, patient had large BM ,incontinence care completed. Patient denied any pain or shortness of breath. No medications needed at this time. Reminded Matias and patient to call hospice number with any needs or concerns.

## 2021-11-23 ENCOUNTER — HOME CARE VISIT (OUTPATIENT)
Dept: HOSPICE | Facility: HOSPICE | Age: 71
End: 2021-11-23
Payer: MEDICARE

## 2021-11-23 PROCEDURE — 0651 HSPC ROUTINE HOME CARE

## 2021-11-23 NOTE — HOSPICE
is visiting for a routine visit with [address]. Pastoral visitation, life review, spiritual dialogue and prayer provided.

## 2021-11-24 ENCOUNTER — HOME CARE VISIT (OUTPATIENT)
Dept: SCHEDULING | Facility: HOME HEALTH | Age: 71
End: 2021-11-24
Payer: MEDICARE

## 2021-11-24 ENCOUNTER — HOME CARE VISIT (OUTPATIENT)
Dept: HOSPICE | Facility: HOSPICE | Age: 71
End: 2021-11-24
Payer: MEDICARE

## 2021-11-24 PROCEDURE — G0156 HHCP-SVS OF AIDE,EA 15 MIN: HCPCS

## 2021-11-24 PROCEDURE — 0651 HSPC ROUTINE HOME CARE

## 2021-11-25 PROCEDURE — 0651 HSPC ROUTINE HOME CARE

## 2021-11-26 ENCOUNTER — HOME CARE VISIT (OUTPATIENT)
Dept: HOSPICE | Facility: HOSPICE | Age: 71
End: 2021-11-26
Payer: MEDICARE

## 2021-11-26 ENCOUNTER — HOME CARE VISIT (OUTPATIENT)
Dept: SCHEDULING | Facility: HOME HEALTH | Age: 71
End: 2021-11-26
Payer: MEDICARE

## 2021-11-26 PROCEDURE — 0651 HSPC ROUTINE HOME CARE

## 2021-11-26 PROCEDURE — G0156 HHCP-SVS OF AIDE,EA 15 MIN: HCPCS

## 2021-11-26 PROCEDURE — G0299 HHS/HOSPICE OF RN EA 15 MIN: HCPCS

## 2021-11-26 NOTE — HOSPICE
Music Therapy Progress Note  Joslyn Villalta  [address], [place]  Patient Telephone Number: [phone]  Pentecostal Affiliation: Dave Eng non-Restoration  Language: English    Date: 11/24/21    Mental Status:   [ X ] Alert [  ] Shiloh Shells [  ]  Confused  [  ] Minimally responsive  [  ] Sleeping    Communication Status: Ira  ] Verbal [  ] Nonverbal     Physical Status:   [  ] Oxygen in use  [  ] Hard of Hearing [  ] Vision Impaired   [  ] Ambulatory  [  ] Ambulatory with assistance [ X ] Non-ambulatory     Music Preferences, Background: _Varied popular and sacred; amor enjoys [employer] music;    Clinical Problem addressed: _Improving mood;    Goal(s) met in session:  Physical/Pain management (Scale of 1-10):    Pre-session rating ___________    Post-session rating __________  [  ] Increased relaxation               [  ] Affected breathing patterns  [  ] Decreased muscle tension               [  ] Decreased agitation  [  ] Affected heart rate    [  ] Increased alertness     Emotional/Psychological:  Ira  ] Increased self-expression   [  ] Decreased aggressive behavior   [  ] Decreased feelings of stress              [X  ]Discussed healthy coping skills     Ira  ] Improved mood    [  ] Decreased withdrawn behavior     Social:  Ira  ] Decreased feelings of isolation/loneliness [ X ] Positive social interaction    [  ] Provided support and/or comfort for family/friends    Spiritual:  [  ] Spiritual support    [  ] Expressed peace  [  ] Expressed misti    [  ] Discussed beliefs    Techniques Utilized (Check all that apply):   [  ] Procedural support MT [  ] Music for relaxation             Ira  ] Patient preferred music  [  ] Rosy analysis  [ X ] Song choice  [  ] Music for validation  [  ] Entrainment              [  ] Movement to music             [  ] Guided visualization  [  ] Gabriela Marvin  [  ] Patient instrument playing [  ] Jennifer Hoffmann writing  [ X ] Manasa Spence along   [  ] Leatha Muñoz              [ X ] Sensory stimulation  [  ] Active Listening  Gaius.[place]  ] Music for spiritual support [  ] Making of CDs as gifts    Session Observations:  Joslyn Villalta was sitting up in bed when I arrived for her session. She was enthusiastic to begin singing Alonzo carols, and after singing, we discussed the possibility of recording some Alonzo songs. Ms. Khushboo Jimenez signed a permission form to have her recordings shared, and she was agreeable to having her singing be heard by others. She was in a good frame of mind and was looking forward to more festive music over the holiday season. Thank you for the opportunity to provide music therapy to Ms. Joslyn Villalta.   Anaid Zaragoza, [facility]   Music [address] Certified  Spiritual Care Services  Referral- based service

## 2021-11-26 NOTE — HOSPICE
Music Therapy Progress Note  Joslyn Villalta  Online Support Group  Patient Telephone Number: [phone]  Anabaptist Affiliation: Alevism; Davee Needs non Caodaism:   Language: English    Date: 11/23/21    Mental Status:   [  Ellen Ruffing [  ] Sherial Natasha [  ]  Confused  [  ] Minimally responsive  [  ] Sleeping    Communication Status: [ X ] Verbal [  ] Nonverbal     Physical Status:   [  ] Oxygen in use  [  ] Hard of Hearing [  ] Vision Impaired   [  ] Ambulatory  [  ] Ambulatory with assistance [ X ] Non-ambulatory     Music Preferences, Background: Varied popular and sacred;     Clinical Problem addressed: _Ms. Villalta is bedbound and needs social and emotional support;    Goal(s) met in session:  Physical/Pain management (Scale of 1-10):    Pre-session rating ___________    Post-session rating __________  [  ] Increased relaxation               [  ] Affected breathing patterns  [  ] Decreased muscle tension               [  ] Decreased agitation  [  ] Affected heart rate    [  ] Increased alertness     Emotional/Psychological:  [  ] Increased self-expression   [  ] Decreased aggressive behavior   [  ] Decreased feelings of stress              [ X ] Discussed healthy coping skills     [ X ] Improved mood    [  ] Decreased withdrawn behavior     Social:  Malik ] Decreased feelings of isolation/loneliness Malik  ] Positive social interaction    [  ] Provided support and/or comfort for family/friends    Spiritual:  [  ] Spiritual support    [  ] Expressed peace  [  ] Expressed misti    [  ] Discussed beliefs    Techniques Utilized (Check all that apply):   [  ] Procedural support MT [  ] Music for relaxation             [ X ] Patient preferred music  [  ] Rosy analysis  [ X ] Song choice  [ X ] Music for validation  [  ] Entrainment              [  ] Movement to music             [  ] Guided visualization  [  ] Tremayne Ugarte  [  ] Patient instrument playing [  ] Jossie Escobedo writing  [ X ] Khushboo Fallon along   [  ] Kaden Gomes [  ] Sensory stimulation  [ X ] Active Listening  [ X ] Music for spiritual support [  ] Making of CDs as gifts    Session Observations:Ms Joslyn Villalta was lying in bed as she attended this Online Music Therapy Support Group. This music therapist and music therapist eligible (MT Team) greeted the pt and asked how she was feeling. She responded to this, sharing about her health. MT Team proceeded to explain the chosen theme of Giving Thanks and encouraged the pt to sing along during the following songs; Lean On Me, Thank You, L.O.V.E, Come [facility], [address]. Pt increased self-expression in response to the music  AEB singing along. Pt also engaged in conversation about things to be thankful for Chela Minion, [place], Love, Relationships), and other various topics. At the closing of the session, she shared her enjoyment in the Alonzo season and expressed excitement in listening to Alonzo music and watching movies on [employer]. She requested the Alonzo hymn, \"Hark the [place]\" and MT Team concluded the session by singing this and wishing Ms. Villalta a Happy Thanksgiving. Thank you for the opportunity to provide music therapy to Ms. Joslyn Villalta.   Kathy Gonzalez, [address] [place], [facility]   Music [address] Certified  Spiritual Care Services  Referral- based service

## 2021-11-27 PROCEDURE — 0651 HSPC ROUTINE HOME CARE

## 2021-11-27 NOTE — HOSPICE
Hospice RN visit with patient. Patient in bed, alert x 4, denies current pain. Lungs clear, no cough, last BM yesterday,eating well. Wound care completed. No med refills needed.   RN will order needed supplies for delivery - gloves, wipes

## 2021-11-28 PROCEDURE — 0651 HSPC ROUTINE HOME CARE

## 2021-11-29 ENCOUNTER — HOME CARE VISIT (OUTPATIENT)
Dept: SCHEDULING | Facility: HOME HEALTH | Age: 71
End: 2021-11-29
Payer: MEDICARE

## 2021-11-29 VITALS
SYSTOLIC BLOOD PRESSURE: 132 MMHG | RESPIRATION RATE: 16 BRPM | HEART RATE: 60 BPM | DIASTOLIC BLOOD PRESSURE: 70 MMHG | TEMPERATURE: 98.4 F

## 2021-11-29 PROCEDURE — G0299 HHS/HOSPICE OF RN EA 15 MIN: HCPCS

## 2021-11-29 PROCEDURE — 0651 HSPC ROUTINE HOME CARE

## 2021-11-29 PROCEDURE — G0156 HHCP-SVS OF AIDE,EA 15 MIN: HCPCS

## 2021-11-29 PROCEDURE — G0155 HHCP-SVS OF CSW,EA 15 MIN: HCPCS

## 2021-11-29 NOTE — HOSPICE
Patient sitting up in bed upon arrival for visit; caregiver Matias present. Patient alert and oriented X4. Patient denies pain or dyspnea. Good appetite; regular bowel movements. Incontinent of bowel and bladder. Wound care completed to sacrum. No medications or supplies needed at this time. Reinforced hospice 24/7 for any concerns or change in patient's condition.

## 2021-11-30 PROCEDURE — 0651 HSPC ROUTINE HOME CARE

## 2021-11-30 PROCEDURE — HOSPICE MEDICATION HC HH HOSPICE MEDICATION

## 2021-12-01 ENCOUNTER — HOME CARE VISIT (OUTPATIENT)
Dept: SCHEDULING | Facility: HOME HEALTH | Age: 71
End: 2021-12-01
Payer: MEDICARE

## 2021-12-01 PROCEDURE — 0651 HSPC ROUTINE HOME CARE

## 2021-12-01 PROCEDURE — G0156 HHCP-SVS OF AIDE,EA 15 MIN: HCPCS

## 2021-12-01 PROCEDURE — HOSPICE MEDICATION HC HH HOSPICE MEDICATION

## 2021-12-02 PROCEDURE — 0651 HSPC ROUTINE HOME CARE

## 2021-12-02 PROCEDURE — HOSPICE MEDICATION HC HH HOSPICE MEDICATION

## 2021-12-03 ENCOUNTER — HOME CARE VISIT (OUTPATIENT)
Dept: SCHEDULING | Facility: HOME HEALTH | Age: 71
End: 2021-12-03
Payer: MEDICARE

## 2021-12-03 ENCOUNTER — HOME CARE VISIT (OUTPATIENT)
Dept: HOSPICE | Facility: HOSPICE | Age: 71
End: 2021-12-03
Payer: MEDICARE

## 2021-12-03 PROCEDURE — G0299 HHS/HOSPICE OF RN EA 15 MIN: HCPCS

## 2021-12-03 PROCEDURE — 0651 HSPC ROUTINE HOME CARE

## 2021-12-03 PROCEDURE — G0156 HHCP-SVS OF AIDE,EA 15 MIN: HCPCS

## 2021-12-04 VITALS
DIASTOLIC BLOOD PRESSURE: 60 MMHG | SYSTOLIC BLOOD PRESSURE: 118 MMHG | RESPIRATION RATE: 14 BRPM | HEART RATE: 69 BPM | OXYGEN SATURATION: 98 %

## 2021-12-04 PROCEDURE — 0651 HSPC ROUTINE HOME CARE

## 2021-12-05 PROCEDURE — 0651 HSPC ROUTINE HOME CARE

## 2021-12-05 NOTE — HOSPICE
Hospice RN and NP Jimbo Castro visit with patient. Patient in bed, finishing bath with hospice HHA. Alert x 4, reports mild pain with wound care, lungs clear, no cough, no edema. Last BM today. Sacral wound now has 3 small open areas in a line, new wound care orders obtained from [facility]. Wound care supplies and Chux ordered for delivery. No med refills needed.

## 2021-12-06 ENCOUNTER — HOME CARE VISIT (OUTPATIENT)
Dept: SCHEDULING | Facility: HOME HEALTH | Age: 71
End: 2021-12-06
Payer: MEDICARE

## 2021-12-06 VITALS
OXYGEN SATURATION: 97 % | TEMPERATURE: 97.7 F | SYSTOLIC BLOOD PRESSURE: 136 MMHG | HEART RATE: 78 BPM | RESPIRATION RATE: 14 BRPM | DIASTOLIC BLOOD PRESSURE: 80 MMHG

## 2021-12-06 PROCEDURE — G0156 HHCP-SVS OF AIDE,EA 15 MIN: HCPCS

## 2021-12-06 PROCEDURE — 0651 HSPC ROUTINE HOME CARE

## 2021-12-06 PROCEDURE — G0299 HHS/HOSPICE OF RN EA 15 MIN: HCPCS

## 2021-12-06 NOTE — HOSPICE
Arrived at [facility]; patient resting in bed; Matias present in home for visit. Patient stated that her appetite has been unchanged, she denied any shortness of breath and stated that she has pain normally only with wound care. Wound care completed, see assessment. Medications reconciled, none needed at this time. New wound care supplies had not arrived, reviewed new wound care orders with Valeria Lee verbalized understanding. Reminded Matias and patient to call hospice number with any needs or concerns.

## 2021-12-07 PROCEDURE — 0651 HSPC ROUTINE HOME CARE

## 2021-12-07 NOTE — HOSPICE
MSW had virtual phone visit with patient. Patient alert and oriented on phone call, shared that she is coping appropriately at this time. Patient continues to be friendly and engaging in conversation. Patient shared that she had a nice Thanksgiving and shared that things are going well. MSW reassured patient that team is available for support and patient shared that she is appreciative of MSW phone call and okay with MSW continuing to call her.

## 2021-12-08 ENCOUNTER — HOME CARE VISIT (OUTPATIENT)
Dept: SCHEDULING | Facility: HOME HEALTH | Age: 71
End: 2021-12-08
Payer: MEDICARE

## 2021-12-08 PROCEDURE — G0156 HHCP-SVS OF AIDE,EA 15 MIN: HCPCS

## 2021-12-08 PROCEDURE — 0651 HSPC ROUTINE HOME CARE

## 2021-12-09 PROCEDURE — HOSPICE MEDICATION HC HH HOSPICE MEDICATION

## 2021-12-09 PROCEDURE — 0651 HSPC ROUTINE HOME CARE

## 2021-12-10 ENCOUNTER — HOME CARE VISIT (OUTPATIENT)
Dept: SCHEDULING | Facility: HOME HEALTH | Age: 71
End: 2021-12-10
Payer: MEDICARE

## 2021-12-10 PROCEDURE — 0651 HSPC ROUTINE HOME CARE

## 2021-12-10 PROCEDURE — G0299 HHS/HOSPICE OF RN EA 15 MIN: HCPCS

## 2021-12-10 PROCEDURE — G0156 HHCP-SVS OF AIDE,EA 15 MIN: HCPCS

## 2021-12-11 VITALS
OXYGEN SATURATION: 97 % | DIASTOLIC BLOOD PRESSURE: 72 MMHG | HEART RATE: 60 BPM | SYSTOLIC BLOOD PRESSURE: 138 MMHG | RESPIRATION RATE: 16 BRPM | TEMPERATURE: 97.9 F

## 2021-12-11 PROCEDURE — 0651 HSPC ROUTINE HOME CARE

## 2021-12-11 NOTE — HOSPICE
Patient sitting up in bed upon arrival for visit; caregiver Matias present. Patient alert and oriented X4. No pain or dyspnea observed. Good appetite; regular bowel movements. Incontinent of bowel and bladder. Wound care completed to sacrum. No medications or supplies needed at this time. Reinforced hospice 24/7 for any concerns or change in patient's condition.

## 2021-12-12 PROCEDURE — 0651 HSPC ROUTINE HOME CARE

## 2021-12-13 ENCOUNTER — HOME CARE VISIT (OUTPATIENT)
Dept: SCHEDULING | Facility: HOME HEALTH | Age: 71
End: 2021-12-13
Payer: MEDICARE

## 2021-12-13 VITALS
DIASTOLIC BLOOD PRESSURE: 78 MMHG | RESPIRATION RATE: 13 BRPM | TEMPERATURE: 98.1 F | SYSTOLIC BLOOD PRESSURE: 134 MMHG | HEART RATE: 72 BPM

## 2021-12-13 PROCEDURE — G0299 HHS/HOSPICE OF RN EA 15 MIN: HCPCS

## 2021-12-13 PROCEDURE — G0156 HHCP-SVS OF AIDE,EA 15 MIN: HCPCS

## 2021-12-13 PROCEDURE — 0651 HSPC ROUTINE HOME CARE

## 2021-12-13 NOTE — HOSPICE
arrived at skilled nursing; patient resting in bed. Lisa present for visit. Wound care completed, see assessment. Patient denied any pain with exception of after wound care changes. Patient stated that her appetite has been consistent and BMs are about every 1-2 days. Medications reconciled, none needed at this time. Supplies ordered: chux, briefs, gloves, incontinence pads via medline. Reminded Lisa to call hospice number with any needs or concerns.

## 2021-12-14 PROCEDURE — 0651 HSPC ROUTINE HOME CARE

## 2021-12-15 ENCOUNTER — HOME CARE VISIT (OUTPATIENT)
Dept: SCHEDULING | Facility: HOME HEALTH | Age: 71
End: 2021-12-15
Payer: MEDICARE

## 2021-12-15 PROCEDURE — 0651 HSPC ROUTINE HOME CARE

## 2021-12-15 PROCEDURE — G0156 HHCP-SVS OF AIDE,EA 15 MIN: HCPCS

## 2021-12-16 ENCOUNTER — HOME CARE VISIT (OUTPATIENT)
Dept: HOSPICE | Facility: HOSPICE | Age: 71
End: 2021-12-16
Payer: MEDICARE

## 2021-12-16 PROCEDURE — 0651 HSPC ROUTINE HOME CARE

## 2021-12-17 ENCOUNTER — HOME CARE VISIT (OUTPATIENT)
Dept: SCHEDULING | Facility: HOME HEALTH | Age: 71
End: 2021-12-17
Payer: MEDICARE

## 2021-12-17 ENCOUNTER — HOME CARE VISIT (OUTPATIENT)
Dept: HOSPICE | Facility: HOSPICE | Age: 71
End: 2021-12-17
Payer: MEDICARE

## 2021-12-17 VITALS
RESPIRATION RATE: 14 BRPM | OXYGEN SATURATION: 97 % | DIASTOLIC BLOOD PRESSURE: 60 MMHG | TEMPERATURE: 98.4 F | SYSTOLIC BLOOD PRESSURE: 130 MMHG | HEART RATE: 69 BPM

## 2021-12-17 PROCEDURE — G0299 HHS/HOSPICE OF RN EA 15 MIN: HCPCS

## 2021-12-17 PROCEDURE — HOSPICE MEDICATION HC HH HOSPICE MEDICATION

## 2021-12-17 PROCEDURE — 0651 HSPC ROUTINE HOME CARE

## 2021-12-17 PROCEDURE — G0156 HHCP-SVS OF AIDE,EA 15 MIN: HCPCS

## 2021-12-17 NOTE — HOSPICE
Hospice RN visit with patient. Patient in bed, alert x 4, denies current pain, lungs clear, last BM today, no edema, no cough. Wound care completed to sacrum. Wound has not improved or declined. RN will order foam dressings and therahoney for delivery, no med refills needed  this visit. Patients will be considered to be in the terminal stage of heart disease (life expectancy of six months or less) if they meet the following criteria. (1 and 2 should be present. Factors from 3 will add supporting documentation.):    ___X_____  1. At the time of initial certification or recertification for hospice, the patient is or has been                           already optimally treated for heart disease or is not a candidate for a surgical procedure or                           has declined a procedure. (Optimally treated means that patients who are not on                           vasodilators have a medical reason for refusing these drugs, e.g., hypotension or renal                           disease.)  ________  2. The patient is classified as New York Heart Association (NYHA) Class IV and may have                           significant symptoms of heart failure or angina at rest. (Class IV patients with heart disease                           have an inability to carry on any physical activity without discomfort. Symptoms of heart                           failure or of the anginal syndrome may be present even at rest. If any physical activity is                           undertaken, discomfort is increased.) Significant congestive heart failure may be                          documented by an ejection fraction of ?20%, but is not required if not already available. ___X_____  3. Documentation of the following factors will support but is not required to establish                           eligibility for hospice care:                            ________  a.  Treatment resistant symptomatic supraventricular or ventricular arrhythmias;                          ________  b. History of cardiac arrest or resuscitation;                          ____X____  c. History of unexplained syncope;                          ________  d. Brain embolism of cardiac origin;                          ________  e. Concomitant HIV disease. Recertification [id number]  Joslyn Villalta, 73 y.o. Cardiomyopathy    Recertification assessment completed 12/17/21. Changes since last recertification:  worsening/enlarging sacral wound, greater in length and width, still shallow, line of 3 open spots, painful with dressing changes. Plan of care changes since last recertification:  new wound care orders from NP [facility] on 12/3 to treat wound with therahoney and cover with foam dressing instead of packing with moist alginate. Why should patient stay on hospice:  continued nursing assessment and care for sacral wound, collaboration with [facility] staff to manage hospice patient, continue services patient greatly benefits from (music therapy,  visits). Over the past 2 weeks hospice has addressed continuing sacral wound care Mondays and Fridays, facility caregiver performs dressing changes in Wednesdays. Patient reports greater pain with wound care and will take SRK pain meds for this purpose. Plan for next 2 weeks:  continue twice/weekly nursing visits, continue HHA visits for bathing, adjust medications as needed and appropriate. Patient continues to demonstrate physical decline managed with hospice services and is recommended to be recertified into her 4th benefit period.

## 2021-12-18 PROCEDURE — 0651 HSPC ROUTINE HOME CARE

## 2021-12-19 PROCEDURE — 0651 HSPC ROUTINE HOME CARE

## 2021-12-19 NOTE — HOSPICE
engaged in a joint visit with the MT. Singing of Alonzo song engaged with the patient.  offered prayer by bedside with Joslyn.

## 2021-12-20 ENCOUNTER — HOME CARE VISIT (OUTPATIENT)
Dept: HOSPICE | Facility: HOSPICE | Age: 71
End: 2021-12-20
Payer: MEDICARE

## 2021-12-20 ENCOUNTER — HOME CARE VISIT (OUTPATIENT)
Dept: SCHEDULING | Facility: HOME HEALTH | Age: 71
End: 2021-12-20
Payer: MEDICARE

## 2021-12-20 PROCEDURE — G0156 HHCP-SVS OF AIDE,EA 15 MIN: HCPCS

## 2021-12-20 PROCEDURE — 0651 HSPC ROUTINE HOME CARE

## 2021-12-21 PROCEDURE — 0651 HSPC ROUTINE HOME CARE

## 2021-12-22 ENCOUNTER — HOME CARE VISIT (OUTPATIENT)
Dept: SCHEDULING | Facility: HOME HEALTH | Age: 71
End: 2021-12-22
Payer: MEDICARE

## 2021-12-22 VITALS
SYSTOLIC BLOOD PRESSURE: 130 MMHG | TEMPERATURE: 98.1 F | DIASTOLIC BLOOD PRESSURE: 78 MMHG | HEART RATE: 62 BPM | RESPIRATION RATE: 14 BRPM

## 2021-12-22 PROCEDURE — G0299 HHS/HOSPICE OF RN EA 15 MIN: HCPCS

## 2021-12-22 PROCEDURE — 0651 HSPC ROUTINE HOME CARE

## 2021-12-22 PROCEDURE — G0156 HHCP-SVS OF AIDE,EA 15 MIN: HCPCS

## 2021-12-22 NOTE — HOSPICE
and MT provided a joint visit collaborating in life review, recording of Alonzo songs and spiritual support to [place].

## 2021-12-22 NOTE — HOSPICE
Arrived at [facility], Omaira Lucas present for visit. Patient received in bed, patient denied any pain or shortness of breath.  wound care completed ;see assessment. medications reconciled,none needed at this time. chux, briefs, wipes and gloves ordered via [employer]. reminded patient and Omaira Lucas to call hospice number with any needs or concerns.

## 2021-12-23 PROCEDURE — 0651 HSPC ROUTINE HOME CARE

## 2021-12-24 ENCOUNTER — HOME CARE VISIT (OUTPATIENT)
Dept: SCHEDULING | Facility: HOME HEALTH | Age: 71
End: 2021-12-24
Payer: MEDICARE

## 2021-12-24 ENCOUNTER — HOME CARE VISIT (OUTPATIENT)
Dept: HOSPICE | Facility: HOSPICE | Age: 71
End: 2021-12-24
Payer: MEDICARE

## 2021-12-24 PROCEDURE — G0299 HHS/HOSPICE OF RN EA 15 MIN: HCPCS

## 2021-12-24 PROCEDURE — G0156 HHCP-SVS OF AIDE,EA 15 MIN: HCPCS

## 2021-12-24 PROCEDURE — 0651 HSPC ROUTINE HOME CARE

## 2021-12-24 NOTE — HOSPICE
Hospice RN visit with patient. Patient in bed, alert x 4, denies acute pain. Wound care completed to sacrum, wound has not declined or improved. More time needed with therahoney treatment to assess effects. No other issues/comcerns by patient. No med refills needed. RN will order supplies for delivery - wipes, sacral dressings.

## 2021-12-25 PROCEDURE — 0651 HSPC ROUTINE HOME CARE

## 2021-12-26 PROCEDURE — 0651 HSPC ROUTINE HOME CARE

## 2021-12-27 ENCOUNTER — HOME CARE VISIT (OUTPATIENT)
Dept: HOSPICE | Facility: HOSPICE | Age: 71
End: 2021-12-27
Payer: MEDICARE

## 2021-12-27 ENCOUNTER — HOME CARE VISIT (OUTPATIENT)
Dept: SCHEDULING | Facility: HOME HEALTH | Age: 71
End: 2021-12-27
Payer: MEDICARE

## 2021-12-27 VITALS
HEART RATE: 83 BPM | RESPIRATION RATE: 14 BRPM | OXYGEN SATURATION: 95 % | SYSTOLIC BLOOD PRESSURE: 132 MMHG | DIASTOLIC BLOOD PRESSURE: 64 MMHG | TEMPERATURE: 98 F

## 2021-12-27 PROCEDURE — G0299 HHS/HOSPICE OF RN EA 15 MIN: HCPCS

## 2021-12-27 PROCEDURE — G0156 HHCP-SVS OF AIDE,EA 15 MIN: HCPCS

## 2021-12-27 PROCEDURE — 0651 HSPC ROUTINE HOME CARE

## 2021-12-27 NOTE — HOSPICE
Hospice RN visit with patient. Patient in bed, alert x 4, denies current pain, lungs clear, no cough, no edema, no trouble swallowing. Patient cleaned of incontinent BM and dressing change completed. RN will order needed supplies - wipes, gloves. No med refills needed at this visit.

## 2021-12-28 PROCEDURE — 0651 HSPC ROUTINE HOME CARE

## 2021-12-29 ENCOUNTER — HOME CARE VISIT (OUTPATIENT)
Dept: SCHEDULING | Facility: HOME HEALTH | Age: 71
End: 2021-12-29
Payer: MEDICARE

## 2021-12-29 PROCEDURE — HOSPICE MEDICATION HC HH HOSPICE MEDICATION

## 2021-12-29 PROCEDURE — G0156 HHCP-SVS OF AIDE,EA 15 MIN: HCPCS

## 2021-12-29 PROCEDURE — 0651 HSPC ROUTINE HOME CARE

## 2021-12-29 NOTE — HOSPICE
Music Therapy Progress Note  Joslyn Villalta  [address] [place] [address]   Patient Telephone Number: [phone]  Mandaen Affiliation: [employer]  Language: English    Date: 2/16/21    Mental Status:   [ X ] Alert [  ] Pinkey Pascual [  ]  Confused  [  ] Minimally responsive  [  ] Sleeping    Physical Status:   [  ] Oxygen in use  [  ] Hard of Hearing [  ] Vision Impaired   [  ] Ambulatory  [  ] Ambulatory with assistance [ X ] Non-ambulatory     Music Preferences, Background: _Performance background, Evangelical and clubs    Clinical Problem addressed: __emotional support, legacy work    Goal(s) met in session: Completed recording for [employer] sing along  Physical/Pain management (Scale of 1-10):    Pre-session rating ___________    Post-session rating __________  [  ] Increased relaxation               [  ] Affected breathing patterns  [  ] Decreased muscle tension               [  ] Decreased agitation  [  ] Affected heart rate    [  ] Increased alertness     Emotional/Psychological:  [ X ] Increased self-expression   [  ] Decreased aggressive behavior   [  ] Decreased feelings of stress              [  ] Discussed healthy coping skills     [ X ] Improved mood    [  ] Decreased withdrawn behavior     Social:  [  ] Decreased feelings of isolation/loneliness Gaius.[place]  ] Positive social interaction    [  ] Provided support and/or comfort for family/friends    Spiritual:  Gaius.[place]  ] Spiritual support    [  ] Expressed peace  [  ] Expressed misti    [  ] Discussed beliefs    Techniques Utilized (Check all that apply):   [  ] Procedural support MT [  ] Music for relaxation [ X ] Patient preferred music  [  ] Rosy analysis  [  ] Paulina Duck choice  [ X ] Music for validation  [  ] Entrainment              [  ] Movement to music [  ] Guided visualization  [  ] Kin Cunning  [  ] Patient instrument playing [ X ] Song arranging/ performing  [ X ] Sing along   [  ] Kwaku [place]              [  ] Sensory stimulation  [  ] Active Listening  [X ] Music for spiritual support [  ] Making of CDs as gifts    Session Observations:  Mrs. Sheri Ascencio continued a recording of Alonzo music, providing her creative insights on the arranging and recording process. Fady Rico was co-creator on this [employer], offering his voice, spiritual presence, and assistance with recording process. Mrs. Sheri Ascencio smiled often during the recording, listening back to the music and expressing gratitude for the opportunity to share her musical gifts with friends and acquaintances. Thank you for the opportunity to provide music therapy to Ms. Joslyn Villalta.   [facility]   Music [address] Certified  Spiritual Care Services  Referral- based service

## 2021-12-30 PROCEDURE — 0651 HSPC ROUTINE HOME CARE

## 2021-12-31 ENCOUNTER — HOME CARE VISIT (OUTPATIENT)
Dept: HOSPICE | Facility: HOSPICE | Age: 71
End: 2021-12-31
Payer: MEDICARE

## 2021-12-31 ENCOUNTER — HOME CARE VISIT (OUTPATIENT)
Dept: SCHEDULING | Facility: HOME HEALTH | Age: 71
End: 2021-12-31
Payer: MEDICARE

## 2021-12-31 PROCEDURE — 0651 HSPC ROUTINE HOME CARE

## 2021-12-31 PROCEDURE — G0299 HHS/HOSPICE OF RN EA 15 MIN: HCPCS

## 2021-12-31 PROCEDURE — G0156 HHCP-SVS OF AIDE,EA 15 MIN: HCPCS

## 2022-01-01 PROCEDURE — 0651 HSPC ROUTINE HOME CARE

## 2022-01-01 NOTE — HOSPICE
Hospice RN visit with patient. Patient in bed, alert x 4, denies current pain, lungs clear, no cough, no trouble swallowing, no edema. Patient cleaned of incontinent BM during visit. Wound care completed, 3 weeks now with new regimen of therahoney, no improvement in wound, increase in size. With patient permission, pictures sent to [facility], new orders obtained to return to previous wound care - pack with saline moist calcium alginate and cover with foam dressing. RN will order needed supplies for delivery - diapers, wipes, Restore pads, per patient request will try some foam dressings with more padding (Allevyn). No med refills needed this visit.

## 2022-01-02 PROCEDURE — 0651 HSPC ROUTINE HOME CARE

## 2022-01-03 ENCOUNTER — HOME CARE VISIT (OUTPATIENT)
Dept: HOSPICE | Facility: HOSPICE | Age: 72
End: 2022-01-03
Payer: MEDICARE

## 2022-01-03 PROCEDURE — 0651 HSPC ROUTINE HOME CARE

## 2022-01-03 PROCEDURE — HOSPICE MEDICATION HC HH HOSPICE MEDICATION

## 2022-01-04 ENCOUNTER — HOME CARE VISIT (OUTPATIENT)
Dept: SCHEDULING | Facility: HOME HEALTH | Age: 72
End: 2022-01-04
Payer: MEDICARE

## 2022-01-04 VITALS
HEART RATE: 56 BPM | TEMPERATURE: 97.7 F | DIASTOLIC BLOOD PRESSURE: 76 MMHG | OXYGEN SATURATION: 99 % | RESPIRATION RATE: 15 BRPM | SYSTOLIC BLOOD PRESSURE: 130 MMHG

## 2022-01-04 PROCEDURE — G0299 HHS/HOSPICE OF RN EA 15 MIN: HCPCS

## 2022-01-04 PROCEDURE — 0651 HSPC ROUTINE HOME CARE

## 2022-01-04 NOTE — HOSPICE
Arrived at custodial; patient resting in bed; patient stated that she had some congestion and sinus headache the past couple of days, but today symptoms were improving. Patient denied any shortness of breath or pain. Wound care completed. Medications reconciled, none needed at this time. Supplies ordered: wipes, pads, briefs  Reminded Lisa to call hospice number with any needs or concerns.

## 2022-01-05 ENCOUNTER — HOME CARE VISIT (OUTPATIENT)
Dept: SCHEDULING | Facility: HOME HEALTH | Age: 72
End: 2022-01-05
Payer: MEDICARE

## 2022-01-05 PROCEDURE — 0651 HSPC ROUTINE HOME CARE

## 2022-01-05 PROCEDURE — G0156 HHCP-SVS OF AIDE,EA 15 MIN: HCPCS

## 2022-01-05 NOTE — HOSPICE
assisted  with ADL's.   Patient not in need ofnursing visit as Chelo Schilling completed routine visit 1/4/22 and did wound care

## 2022-01-05 NOTE — HOSPICE
Music Therapy Progress Note  Joslyn Villalta  [address], [place]    Patient Telephone Number: [phone]  Zoroastrianism Affiliation:    Language: English    Date: 12/20/21    Mental Status:   [ X ] Alert [  ] Berdie Haven [  ]  Confused  [  ] Minimally responsive  [  ] Sleeping    Communication Status: Halim.Butts ] Verbal [  ] Nonverbal     Physical Status:   [  ] Oxygen in use  [  ] Hard of Hearing [  ] Vision Impaired   [  ] Ambulatory  [  ] Ambulatory with assistance Halim.Butts  ] Non-ambulatory     Music Preferences, Background: _Enjoys recording and performing as she is able    Clinical Problem addressed: Legacy project, meaning-making and social-spiritual engagement;    Goal(s) met in session:  Physical/Pain management (Scale of 1-10):    Pre-session rating ___________    Post-session rating __________  [  ] Increased relaxation               [  ] Affected breathing patterns  [  ] Decreased muscle tension               [  ] Decreased agitation  [  ] Affected heart rate    [  ] Increased alertness     Emotional/Psychological:  Halim.Butts ] Increased self-expression   [  ] Decreased aggressive behavior   [  ] Decreased feelings of stress              Halim.Butts ] Discussed healthy coping skills     [  ] Improved mood    [  ] Decreased withdrawn behavior     Social:  [  ] Decreased feelings of isolation/loneliness Halim.Butts  ] Positive social interaction    [  ] Provided support and/or comfort for family/friends    Spiritual:  [ X ] Spiritual support    [  ] Expressed peace  [ X ] Expressed misti    [  ] Discussed beliefs    Techniques Utilized (Check all that apply):   [  ] Procedural support MT [  ] Music for relaxation             Halim.Butts ] Patient preferred music  [  ] Rosy analysis  [  ] Song choice              Halim.Butts  ] Music for validation  [  ] Entrainment              [  ] Movement to music             [  ] Guided visualization  [  ] Danie Fix  [  ] Patient instrument playing [  ] Dennis Dials writing  [ No Dunn along/recording  [  ] Improvisation              [  ] Sensory stimulation  [  ] Active Listening  [  ] Music for spiritual support [  ] Making of CDs as gifts    Session Observations:  Ms. Lindsay Wilder completed her legacy project recording, accompanied on guitar by this writer and with support from Toni. Ms. Reji Aguilar has increased her self-expression and honored her creativity and musical ability by recording these Alonzo songs to share with her friends and family.     Anaid Rowell, [facility]   Music [address] Certified  Spiritual Care Services  Referral- based service

## 2022-01-06 PROCEDURE — 0651 HSPC ROUTINE HOME CARE

## 2022-01-07 ENCOUNTER — HOME CARE VISIT (OUTPATIENT)
Dept: SCHEDULING | Facility: HOME HEALTH | Age: 72
End: 2022-01-07
Payer: MEDICARE

## 2022-01-07 ENCOUNTER — HOME CARE VISIT (OUTPATIENT)
Dept: HOSPICE | Facility: HOSPICE | Age: 72
End: 2022-01-07
Payer: MEDICARE

## 2022-01-07 PROCEDURE — G0299 HHS/HOSPICE OF RN EA 15 MIN: HCPCS

## 2022-01-07 PROCEDURE — 0651 HSPC ROUTINE HOME CARE

## 2022-01-07 PROCEDURE — G0156 HHCP-SVS OF AIDE,EA 15 MIN: HCPCS

## 2022-01-08 ENCOUNTER — HOME CARE VISIT (OUTPATIENT)
Dept: HOSPICE | Facility: HOSPICE | Age: 72
End: 2022-01-08
Payer: MEDICARE

## 2022-01-08 PROCEDURE — G0299 HHS/HOSPICE OF RN EA 15 MIN: HCPCS

## 2022-01-08 PROCEDURE — 0651 HSPC ROUTINE HOME CARE

## 2022-01-09 PROCEDURE — 0651 HSPC ROUTINE HOME CARE

## 2022-01-09 NOTE — HOSPICE
Hospice RN visit with patient. Patient in bed, alert x 4, mild pain at sacral wound site with dressing change, caregiver Matias present. Clarified with patient and Matias confusion over Tylenol, patient has ordered some to be delivered with her groceries, also has in stock in her home meds. Wound care completed to sacrum, now using saline-moist calcium alginate packing and foam dressing. No new complaints/issues/questions. RN will order needed supplies for delivery - diapers, wipes.   No medication refills needed this visit

## 2022-01-10 ENCOUNTER — HOME CARE VISIT (OUTPATIENT)
Dept: HOSPICE | Facility: HOSPICE | Age: 72
End: 2022-01-10
Payer: MEDICARE

## 2022-01-10 ENCOUNTER — HOME CARE VISIT (OUTPATIENT)
Dept: SCHEDULING | Facility: HOME HEALTH | Age: 72
End: 2022-01-10
Payer: MEDICARE

## 2022-01-10 PROCEDURE — G0156 HHCP-SVS OF AIDE,EA 15 MIN: HCPCS

## 2022-01-10 PROCEDURE — 0651 HSPC ROUTINE HOME CARE

## 2022-01-10 PROCEDURE — G0299 HHS/HOSPICE OF RN EA 15 MIN: HCPCS

## 2022-01-11 VITALS
SYSTOLIC BLOOD PRESSURE: 126 MMHG | RESPIRATION RATE: 14 BRPM | TEMPERATURE: 97.8 F | HEART RATE: 58 BPM | OXYGEN SATURATION: 98 % | DIASTOLIC BLOOD PRESSURE: 76 MMHG

## 2022-01-11 PROCEDURE — 0651 HSPC ROUTINE HOME CARE

## 2022-01-11 PROCEDURE — HOSPICE MEDICATION HC HH HOSPICE MEDICATION

## 2022-01-11 NOTE — HOSPICE
Hospice RN visit with patient. Patient in bed, alert x 4, denies current pain. Patient stated her cough is continuing, reports new onset loss of taste/smell. RN discussed with patient this is sign of Covid. Patient stated she had not been anywhere and if Covid positive this would mean \"someone brought it in to me\". RN offered reassurance, discussed continued monitoring of s/s. Incontinent BM cleaned by RN. Wound care completed to sacrum. VS WNL, no fever. RN notified  after visit that patient was suspected as Covid positive.

## 2022-01-12 ENCOUNTER — HOME CARE VISIT (OUTPATIENT)
Dept: SCHEDULING | Facility: HOME HEALTH | Age: 72
End: 2022-01-12
Payer: MEDICARE

## 2022-01-12 PROCEDURE — G0156 HHCP-SVS OF AIDE,EA 15 MIN: HCPCS

## 2022-01-12 PROCEDURE — 0651 HSPC ROUTINE HOME CARE

## 2022-01-13 PROCEDURE — 0651 HSPC ROUTINE HOME CARE

## 2022-01-13 NOTE — HOSPICE
HHA duties completed by skilled nursing   Pt w/o complaints of pain or respiratory distress  No complaints voiced by patient or caregiver prior to nurse leaving

## 2022-01-14 ENCOUNTER — HOME CARE VISIT (OUTPATIENT)
Dept: HOSPICE | Facility: HOSPICE | Age: 72
End: 2022-01-14
Payer: MEDICARE

## 2022-01-14 ENCOUNTER — HOME CARE VISIT (OUTPATIENT)
Dept: SCHEDULING | Facility: HOME HEALTH | Age: 72
End: 2022-01-14
Payer: MEDICARE

## 2022-01-14 PROCEDURE — 0651 HSPC ROUTINE HOME CARE

## 2022-01-14 PROCEDURE — HOSPICE MEDICATION HC HH HOSPICE MEDICATION

## 2022-01-14 PROCEDURE — G0299 HHS/HOSPICE OF RN EA 15 MIN: HCPCS

## 2022-01-14 NOTE — HOSPICE
Arrived at [facility]; Matias present for visit. Patient received in bed; patient stated that her congestion had improved and she still had a slight cough, she stated that she still had no sense of taste or smell. Wound care completed. Medications reconciled, none needed at this time. Gloves ordered via [employer]. Reminded patient and Matias to call hospice number with any needs or concerns.

## 2022-01-15 PROCEDURE — 0651 HSPC ROUTINE HOME CARE

## 2022-01-16 PROCEDURE — 0651 HSPC ROUTINE HOME CARE

## 2022-01-17 ENCOUNTER — HOME CARE VISIT (OUTPATIENT)
Dept: SCHEDULING | Facility: HOME HEALTH | Age: 72
End: 2022-01-17
Payer: MEDICARE

## 2022-01-17 VITALS
DIASTOLIC BLOOD PRESSURE: 85 MMHG | TEMPERATURE: 98 F | HEART RATE: 62 BPM | RESPIRATION RATE: 14 BRPM | SYSTOLIC BLOOD PRESSURE: 140 MMHG

## 2022-01-17 PROCEDURE — 0651 HSPC ROUTINE HOME CARE

## 2022-01-17 PROCEDURE — G0299 HHS/HOSPICE OF RN EA 15 MIN: HCPCS

## 2022-01-17 PROCEDURE — G0156 HHCP-SVS OF AIDE,EA 15 MIN: HCPCS

## 2022-01-17 NOTE — HOSPICE
Arrived at [facility]; Matias present for visit. Patient received in bed; patient stated that her cough has diminished and she has not had any more headaches, she stated she still has not regained her sense of taste or smell. Wound care completed, see assessment. Patient denied any shortness of breath or pain. Medications reconciled, none needed at this time. Chux and wipes ordered via [employer]. Reminded patient to call hospice number with any needs or concerns.

## 2022-01-18 ENCOUNTER — HOME CARE VISIT (OUTPATIENT)
Dept: HOSPICE | Facility: HOSPICE | Age: 72
End: 2022-01-18
Payer: MEDICARE

## 2022-01-18 PROCEDURE — 0651 HSPC ROUTINE HOME CARE

## 2022-01-19 ENCOUNTER — HOME CARE VISIT (OUTPATIENT)
Dept: SCHEDULING | Facility: HOME HEALTH | Age: 72
End: 2022-01-19
Payer: MEDICARE

## 2022-01-19 PROCEDURE — 0651 HSPC ROUTINE HOME CARE

## 2022-01-19 PROCEDURE — G0156 HHCP-SVS OF AIDE,EA 15 MIN: HCPCS

## 2022-01-20 PROCEDURE — 0651 HSPC ROUTINE HOME CARE

## 2022-01-21 ENCOUNTER — HOME CARE VISIT (OUTPATIENT)
Dept: SCHEDULING | Facility: HOME HEALTH | Age: 72
End: 2022-01-21
Payer: MEDICARE

## 2022-01-21 ENCOUNTER — HOME CARE VISIT (OUTPATIENT)
Dept: HOSPICE | Facility: HOSPICE | Age: 72
End: 2022-01-21
Payer: MEDICARE

## 2022-01-21 VITALS
DIASTOLIC BLOOD PRESSURE: 82 MMHG | OXYGEN SATURATION: 99 % | HEART RATE: 70 BPM | RESPIRATION RATE: 16 BRPM | TEMPERATURE: 98.8 F | SYSTOLIC BLOOD PRESSURE: 132 MMHG

## 2022-01-21 PROCEDURE — G0299 HHS/HOSPICE OF RN EA 15 MIN: HCPCS

## 2022-01-21 PROCEDURE — 0651 HSPC ROUTINE HOME CARE

## 2022-01-21 NOTE — HOSPICE
Joint visit made with hospice NP Sheila High. Patient sitting up in bed upon arrival for visit; patient's caregiver Matias present. Patient alert and oriented X4; reports adequate pain control with current medication. Patient reports mild congestion, cough and loss of taste and smell for the past 3 weeks; resolving. Good appetite; regular bowel movements. Incontinent of bowel and bladder. Wound care completed to sacrum; area healing. Continue current wound care orders. No medications or supplies needed at this time. Reinforced hospice 24/7 for any concerns or change in patient's condition.

## 2022-01-22 PROCEDURE — 0651 HSPC ROUTINE HOME CARE

## 2022-01-22 NOTE — HOSPICE
Music Therapy Progress Note  Joslyn Villalta  Virtual Visit  Patient Telephone Number: [phone]   Jehovah's witness Affiliation: Rosa Keller   Language: English    Date: 1/18/22    Mental Status:   [ X ] Alert [  ] Roselee Loretta [  ]  Confused  [  ] Minimally responsive  [  ] Sleeping    Communication Status: [  ] Impaired Speech [ X ] Verbal     Physical Status:   [  ] Oxygen in use  [  ] Hard of Hearing [  ] Vision Impaired   [  ] Ambulatory  [  ] Ambulatory with assistance [ X ] Non-ambulatory     Music Preferences, Background: _Varied popular, traditional hymns and Latter-day, classic rock; Uri Neat- favorite artist    Clinical Problem addressed: _Social support - unable to sit up or leave room;  Ox4;    Goal(s) met in session:  Physical/Pain management (Scale of 1-10):    Pre-session rating ___________    Post-session rating __________  [  ] Increased relaxation               [  ] Affected breathing patterns  [  ] Decreased muscle tension               [  ] Decreased agitation  [  ] Affected heart rate    [  ] Increased alertness     Emotional/Psychological:  [  ] Increased self-expression   [  ] Decreased aggressive behavior   [  ] Decreased feelings of stress              Ramsey.Hem  ] Discussed healthy coping skills     Liz.Hem ] Improved mood    [  ] Decreased withdrawn behavior     Social:  [ X ] Decreased feelings of isolation/loneliness [ X ] Positive social interaction    [  ] Provided support and/or comfort for family/friends    Spiritual:  [  ] Spiritual support    [  ] Expressed peace  [  ] Expressed misti    [  ] Discussed beliefs    Techniques Utilized (Check all that apply):   [  ] Procedural support MT [  ] Music for relaxation             [ X ] Patient preferred music  [  ] Rosy analysis  [  ] Song choice              [  ] Music for validation  [  ] Entrainment              [  ] Movement to music             [  ] Guided visualization  [  ] Malissa Collazo  [  ] Patient instrument playing [  ] Harmony Devlin writing  [  ] Ethel Corrales along   [  ] Juan Jose Dimas              [  ] Sensory stimulation  [ X ] Active Listening  [  ] Music for spiritual support [  ] Making of CDs as gifts    Session Observations:  Ms. Svetlana Guzman received this visit virtually, and was in a pleasant mood, discussing her recent birthday and the many calls and messages she received. She requested not to have a visit in person, as she has a sore throat and concerned she may be contagious. She welcomed the opportunity to talk about the friends she has online, and the ways she is able to remain active in her social life through [employer] and groups. She expressed gratitude for the call, and for the birthday wishes yesterday to her phone, with virtual balloons and singing the birthday song. Thank you for the opportunity to provide music therapy to Svetlana Guzman.     Lui Estrada, [place], [facility]   Music [address] Certified  Spiritual Care Services  Referral- based service

## 2022-01-23 PROCEDURE — 0651 HSPC ROUTINE HOME CARE

## 2022-01-24 ENCOUNTER — HOME CARE VISIT (OUTPATIENT)
Dept: SCHEDULING | Facility: HOME HEALTH | Age: 72
End: 2022-01-24
Payer: MEDICARE

## 2022-01-24 VITALS
RESPIRATION RATE: 14 BRPM | TEMPERATURE: 98.3 F | SYSTOLIC BLOOD PRESSURE: 135 MMHG | DIASTOLIC BLOOD PRESSURE: 65 MMHG | HEART RATE: 68 BPM

## 2022-01-24 PROCEDURE — G0299 HHS/HOSPICE OF RN EA 15 MIN: HCPCS

## 2022-01-24 PROCEDURE — 0651 HSPC ROUTINE HOME CARE

## 2022-01-24 PROCEDURE — G0156 HHCP-SVS OF AIDE,EA 15 MIN: HCPCS

## 2022-01-24 NOTE — HOSPICE
Arrived at alf; patient resting in bed; [facility] [place] present for visit. Patient denied any pain or shortness of breath. Patient denied any congestion or cough, she stated that she is slowly regaining sense of taste/smell. Wound care completed to sacrum, patient tolerated well. Medications reconciled,none needed at this time. Supplies ordered: wipes, bandages, chux, gloves via medline  Reminded patient and [facility] [place] to call hospice number with any needs or concerns.

## 2022-01-25 ENCOUNTER — HOME CARE VISIT (OUTPATIENT)
Dept: HOSPICE | Facility: HOSPICE | Age: 72
End: 2022-01-25
Payer: MEDICARE

## 2022-01-25 PROCEDURE — 0651 HSPC ROUTINE HOME CARE

## 2022-01-25 PROCEDURE — HOSPICE MEDICATION HC HH HOSPICE MEDICATION

## 2022-01-26 ENCOUNTER — HOME CARE VISIT (OUTPATIENT)
Dept: SCHEDULING | Facility: HOME HEALTH | Age: 72
End: 2022-01-26
Payer: MEDICARE

## 2022-01-26 PROCEDURE — 0651 HSPC ROUTINE HOME CARE

## 2022-01-26 PROCEDURE — G0156 HHCP-SVS OF AIDE,EA 15 MIN: HCPCS

## 2022-01-27 ENCOUNTER — HOME CARE VISIT (OUTPATIENT)
Dept: HOSPICE | Facility: HOSPICE | Age: 72
End: 2022-01-27
Payer: MEDICARE

## 2022-01-27 PROCEDURE — 0651 HSPC ROUTINE HOME CARE

## 2022-01-27 NOTE — HOSPICE
Music Therapy Progress Note  Joslyn Villalta  Music Therapy Online Support Group  Patient Telephone Number: (email) [email or web address]  Pentecostalism Affiliation: Zoroastrianism/Baptist  Language: English    Date: 1/25/22    Mental Status:   [ X ] Alert [  ] James Persons [  ]  Confused  [  ] Minimally responsive  [  ] Sleeping    Communication Status: [  ] Impaired Speech Jono.Spittle ] Verbal     Physical Status:   [  ] Oxygen in use  [  ] Hard of Hearing [  ] Vision Impaired   [  ] Ambulatory  [  ] Ambulatory with assistance [ X ] Non-ambulatory     Music Preferences, Background: _Experience as a ; Sang in nightclubs; Enjoys a wide variety of music;    Clinical Problem addressed: _Peer support;    Goal(s) met in session:  Physical/Pain management (Scale of 1-10):    Pre-session rating ___________    Post-session rating __________  Jono.Spittle  ] Increased relaxation               [  ] Affected breathing patterns  [  ] Decreased muscle tension               [  ] Decreased agitation  [  ] Affected heart rate    [  ] Increased alertness     Emotional/Psychological:  [  ] Increased self-expression   [  ] Decreased aggressive behavior   [  ] Decreased feelings of stress              Jono.Spittle ] Discussed healthy coping skills     [  ] Improved mood    [  ] Decreased withdrawn behavior     Social:  [  ] Decreased feelings of isolation/loneliness Jono.Spittle ] Positive social interaction    [  ] Provided support and/or comfort for family/friends    Spiritual:  [  ] Spiritual support    [  ] Expressed peace  [  ] Expressed misti    [  ] Discussed beliefs    Techniques Utilized (Check all that apply):   [  ] Procedural support MT [  ] Music for relaxation             [ X ] Patient preferred music  [  ] Rosy analysis  [  ] Song choice              [  ] Music for validation  [  ] Entrainment              [  ] Movement to music             [  ] Guided visualization  [  ] Raeann Jha  [  ] Patient instrument playing [  ] Calvin Champion writing  Jono.Spittle  ] Singing   [  ] Mara Grout              [  ] Sensory stimulation  Ole.Farm ] Active Listening  [  ] Music for spiritual support [  ] Making of CDs as gifts    Session Observations: Ms. Tana Maldonado was online and ready to begin, and welcomed participants as they joined this, \"Self care\" support group for online music therapy. Ms. Maria Guadalupe Tran was supportive of her peers, and was able to hear positive stories from the group members, enjoying some time to socialize and sing. She expressed gratitude for the group and is looking foward to an individual session next week. Thank you for the opportunity to provide music therapy to Ms Tana Maldonado.   [place], [place], [facility]   Music [address] Certified  Spiritual Care Services  Referral- based service

## 2022-01-28 ENCOUNTER — HOME CARE VISIT (OUTPATIENT)
Dept: SCHEDULING | Facility: HOME HEALTH | Age: 72
End: 2022-01-28
Payer: MEDICARE

## 2022-01-28 PROCEDURE — G0156 HHCP-SVS OF AIDE,EA 15 MIN: HCPCS

## 2022-01-28 PROCEDURE — 0651 HSPC ROUTINE HOME CARE

## 2022-01-28 PROCEDURE — HOSPICE MEDICATION HC HH HOSPICE MEDICATION

## 2022-01-29 PROCEDURE — 0651 HSPC ROUTINE HOME CARE

## 2022-01-30 PROCEDURE — 0651 HSPC ROUTINE HOME CARE

## 2022-01-31 ENCOUNTER — HOME CARE VISIT (OUTPATIENT)
Dept: HOSPICE | Facility: HOSPICE | Age: 72
End: 2022-01-31
Payer: MEDICARE

## 2022-01-31 ENCOUNTER — HOME CARE VISIT (OUTPATIENT)
Dept: SCHEDULING | Facility: HOME HEALTH | Age: 72
End: 2022-01-31
Payer: MEDICARE

## 2022-01-31 VITALS
TEMPERATURE: 98 F | DIASTOLIC BLOOD PRESSURE: 70 MMHG | RESPIRATION RATE: 13 BRPM | HEART RATE: 66 BPM | SYSTOLIC BLOOD PRESSURE: 124 MMHG

## 2022-01-31 PROCEDURE — G0155 HHCP-SVS OF CSW,EA 15 MIN: HCPCS

## 2022-01-31 PROCEDURE — G0299 HHS/HOSPICE OF RN EA 15 MIN: HCPCS

## 2022-01-31 PROCEDURE — 0651 HSPC ROUTINE HOME CARE

## 2022-01-31 PROCEDURE — G0156 HHCP-SVS OF AIDE,EA 15 MIN: HCPCS

## 2022-01-31 NOTE — HOSPICE
Arrived at shelter; Matias present for visit. Patient resting in bed. Patient denied any pain or shortness of breath. Patient stated that she regained her sense of taste and smell and denied any congestion or cough. Wound care completed to sacral area. Medications reconciled,  Supplies ordered via medline: wipes, briefs, chux  Reminded patient and Matias to call hospice number with any needs or concerns.

## 2022-02-01 PROCEDURE — 0651 HSPC ROUTINE HOME CARE

## 2022-02-02 ENCOUNTER — HOME CARE VISIT (OUTPATIENT)
Dept: SCHEDULING | Facility: HOME HEALTH | Age: 72
End: 2022-02-02
Payer: MEDICARE

## 2022-02-02 ENCOUNTER — HOME CARE VISIT (OUTPATIENT)
Dept: HOSPICE | Facility: HOSPICE | Age: 72
End: 2022-02-02
Payer: MEDICARE

## 2022-02-02 PROCEDURE — 0651 HSPC ROUTINE HOME CARE

## 2022-02-02 PROCEDURE — G0156 HHCP-SVS OF AIDE,EA 15 MIN: HCPCS

## 2022-02-03 PROCEDURE — 0651 HSPC ROUTINE HOME CARE

## 2022-02-04 ENCOUNTER — HOME CARE VISIT (OUTPATIENT)
Dept: SCHEDULING | Facility: HOME HEALTH | Age: 72
End: 2022-02-04
Payer: MEDICARE

## 2022-02-04 VITALS
DIASTOLIC BLOOD PRESSURE: 70 MMHG | SYSTOLIC BLOOD PRESSURE: 116 MMHG | RESPIRATION RATE: 16 BRPM | HEART RATE: 62 BPM | TEMPERATURE: 98.1 F

## 2022-02-04 PROCEDURE — 0651 HSPC ROUTINE HOME CARE

## 2022-02-04 PROCEDURE — G0299 HHS/HOSPICE OF RN EA 15 MIN: HCPCS

## 2022-02-04 PROCEDURE — G0156 HHCP-SVS OF AIDE,EA 15 MIN: HCPCS

## 2022-02-04 NOTE — HOSPICE
Patient sitting up in bed upon arrival for visit; patient's caregiver Matias present. Patient alert and oriented X4; reports adequate pain control with current medication. Good appetite; regular bowel movements. Incontinent of bowel and bladder. Incontinence care provided and brief changed. Wound care completed to sacrum. No medications or supplies needed at this time. Reinforced hospice 24/7 for any concerns or change in patient's condition.

## 2022-02-05 PROCEDURE — 0651 HSPC ROUTINE HOME CARE

## 2022-02-06 PROCEDURE — 0651 HSPC ROUTINE HOME CARE

## 2022-02-07 ENCOUNTER — HOME CARE VISIT (OUTPATIENT)
Dept: SCHEDULING | Facility: HOME HEALTH | Age: 72
End: 2022-02-07
Payer: MEDICARE

## 2022-02-07 VITALS
TEMPERATURE: 98.7 F | DIASTOLIC BLOOD PRESSURE: 68 MMHG | HEART RATE: 62 BPM | RESPIRATION RATE: 16 BRPM | SYSTOLIC BLOOD PRESSURE: 122 MMHG

## 2022-02-07 PROCEDURE — G0156 HHCP-SVS OF AIDE,EA 15 MIN: HCPCS

## 2022-02-07 PROCEDURE — 0651 HSPC ROUTINE HOME CARE

## 2022-02-07 PROCEDURE — G0299 HHS/HOSPICE OF RN EA 15 MIN: HCPCS

## 2022-02-07 NOTE — HOSPICE
Patient sitting up in bed upon arrival for visit; patient's caregiver Matias present. Patient just received bed bath from hospice CNA. Patient alert and oriented X4; reports adequate pain control with current medication. Good appetite; regular bowel movements. Incontinent of bowel and bladder. Wound care completed to sacrum. Reinforced hospice 24/7 for any concerns or change in patient's condition. Supplies ordered: Briefs, chux, wipes, gloves, sacral dressings via Med[employer].

## 2022-02-08 ENCOUNTER — HOME CARE VISIT (OUTPATIENT)
Dept: HOSPICE | Facility: HOSPICE | Age: 72
End: 2022-02-08
Payer: MEDICARE

## 2022-02-08 PROCEDURE — 0651 HSPC ROUTINE HOME CARE

## 2022-02-08 PROCEDURE — HOSPICE MEDICATION HC HH HOSPICE MEDICATION

## 2022-02-09 ENCOUNTER — HOME CARE VISIT (OUTPATIENT)
Dept: SCHEDULING | Facility: HOME HEALTH | Age: 72
End: 2022-02-09
Payer: MEDICARE

## 2022-02-09 PROCEDURE — G0156 HHCP-SVS OF AIDE,EA 15 MIN: HCPCS

## 2022-02-09 PROCEDURE — HOSPICE MEDICATION HC HH HOSPICE MEDICATION

## 2022-02-09 PROCEDURE — 0651 HSPC ROUTINE HOME CARE

## 2022-02-09 NOTE — HOSPICE
MSW provided emotional support to [facility] who is primary caregiver. MSW acitvely listened to caregiver explain her schedule and how the patient is currently doing. MSW introduced new assigned , while scheduling a face to face for the next two weeks. MSW and patient have an upcoming visit for 2/9/22. Goal for this week is to contact pt and/or caregiver.

## 2022-02-10 ENCOUNTER — HOME CARE VISIT (OUTPATIENT)
Dept: HOSPICE | Facility: HOSPICE | Age: 72
End: 2022-02-10
Payer: MEDICARE

## 2022-02-10 PROCEDURE — 0651 HSPC ROUTINE HOME CARE

## 2022-02-10 NOTE — HOSPICE
is visiting for a routine pastoral visit with Argenis Rue Leo Six Roverto Lindsey. Joslyn welcomed the  and shared about current concerns, coping, and processing of her last month.  provided communion, active listening, and dialogue about current needs.      Dewayne et al. Assessing Spiritual Concerns in Palliative Care (2.8.22)  Need for Meaning in the Face of Sufferin  Need for integrity, legacy, generativity: 2  Concerns about relationships: family and/or significant others: 2  Concern or fear of dying or death: 1  Issues related to making decisions about treatment: 0  R/S Struggle: 1  0--no evidence of unmet need; (0*--further assessment needed to clarify needs)  1--some evidence of unmet need  2--substantial evidence of unmet need  3--evidence of severe unmet need  Total Spiritual Distress Score:

## 2022-02-11 ENCOUNTER — HOME CARE VISIT (OUTPATIENT)
Dept: SCHEDULING | Facility: HOME HEALTH | Age: 72
End: 2022-02-11
Payer: MEDICARE

## 2022-02-11 VITALS — RESPIRATION RATE: 14 BRPM

## 2022-02-11 PROCEDURE — G0156 HHCP-SVS OF AIDE,EA 15 MIN: HCPCS

## 2022-02-11 PROCEDURE — G0299 HHS/HOSPICE OF RN EA 15 MIN: HCPCS

## 2022-02-11 PROCEDURE — 0651 HSPC ROUTINE HOME CARE

## 2022-02-11 NOTE — HOSPICE
Hospice RN visit with patient, caregiver Favio Faria present in home. Patient in bed, alert x 4, denies pain, states her prior headaches have stopped, she has been off precautions for suspect Covid. Lungs clear, no cough, no trouble swallowing, no edema. Wound care completed to sacrum, wound looking good. RN will order for delivery - wipes, gloves, sacral foam dressings, calcium alginate packing. No med refills needed this visit.

## 2022-02-12 PROCEDURE — 0651 HSPC ROUTINE HOME CARE

## 2022-02-13 PROCEDURE — 0651 HSPC ROUTINE HOME CARE

## 2022-02-14 ENCOUNTER — HOME CARE VISIT (OUTPATIENT)
Dept: SCHEDULING | Facility: HOME HEALTH | Age: 72
End: 2022-02-14
Payer: MEDICARE

## 2022-02-14 VITALS
TEMPERATURE: 98.1 F | SYSTOLIC BLOOD PRESSURE: 120 MMHG | DIASTOLIC BLOOD PRESSURE: 70 MMHG | HEART RATE: 60 BPM | RESPIRATION RATE: 14 BRPM

## 2022-02-14 PROCEDURE — 0651 HSPC ROUTINE HOME CARE

## 2022-02-14 PROCEDURE — G0156 HHCP-SVS OF AIDE,EA 15 MIN: HCPCS

## 2022-02-14 PROCEDURE — G0299 HHS/HOSPICE OF RN EA 15 MIN: HCPCS

## 2022-02-14 NOTE — HOSPICE
Arrived at long-term; patient resting in bed; patient denied any pain or shortness of breath. Wound care completed to sacrum. See assessment. Medications reconciled,none needed at this time. Supplies ordered: insert pads  Reminded patient and [facility] to call hospice number with any needs or concerns.

## 2022-02-14 NOTE — HOSPICE
Music Therapy Progress Note  Joslyn Villalta  [address], [place]   Patient Telephone Number: [phone]  Samaritan Affiliation: Tameka Briceño  Language: English     Date: 2/10/22    Mental Status:   [ X ] Alert [  ] Arielle Speedwell [  ]  Confused  [  ] Minimally responsive  [  ] Sleeping    Communication Status: [  ] Impaired Speech [  ] Verbal     Physical Status:   [  ] Oxygen in use  [  ] Hard of Hearing [  ] Vision Impaired   [  ] Ambulatory  [  ] Ambulatory with assistance Nicole  ] Non-ambulatory     Music Preferences, Background: _Background in singing; favorite artists: Una Guzman,     Clinical Problem addressed: _Self-expression and meaning when coping with life-limiting illness    Goal(s) met in session:  Physical/Pain management (Scale of 1-10):    Pre-session rating ___________    Post-session rating __________  [  ] Increased relaxation               Nialler  ] Affected breathing patterns  [  ] Decreased muscle tension               [  ] Decreased agitation  [  ] Affected heart rate    [  ] Increased alertness     Emotional/Psychological:  Nialler  ] Increased self-expression   [  ] Decreased aggressive behavior   [  ] Decreased feelings of stress              [ X ] Discussed healthy coping skills     [  ] Improved mood    [  ] Decreased withdrawn behavior     Social:  [  ] Decreased feelings of isolation/loneliness [ X ] Positive social interaction    [  ] Provided support and/or comfort for family/friends    Spiritual:  [  ] Spiritual support    [  ] Expressed peace  [  ] Expressed misti    [  ] Discussed beliefs    Techniques Utilized (Check all that apply):   [  ] Procedural support MT [  ] Music for relaxation             [ X ] Patient preferred music  [  ] Rosy analysis  Nialler  ] Song choice  [ X ] Music for validation  [  ] Entrainment              [  ] Movement to music             [  ] Guided visualization  [  ] Ashley Panning  [  ] Patient instrument playing [  ] Peter Do writing  Nicole ] Singing   [  ] Naman Arora              [  ] Sensory stimulation  Mejia.Rosa Maria  ] Active Listening  [  ] Music for spiritual support [  ] Making of CDs as gifts    Session Observations:  Ms Linsday Wilder is beginning a new legacy project, and was engaged in discussion and reminiscing about her teenage life in [facility] and the music she sang. Joslyn would like to pursue recording favorite songs by Rocco Cueva and Devonte Issa. Possibilities were discussed for how to begin the recording, and several songs were selected. Joslyn took leadership in finding the songs she was interested in singing, and when encouraged, selected songs she would like to record. Thank you for the opportunity to provide music therapy to Ms. Joslyn Villalta.   Anaid Rowell, [facility]   Music [address] Certified  Spiritual Care Services  Referral- based service

## 2022-02-15 PROCEDURE — 0651 HSPC ROUTINE HOME CARE

## 2022-02-16 ENCOUNTER — HOME CARE VISIT (OUTPATIENT)
Dept: SCHEDULING | Facility: HOME HEALTH | Age: 72
End: 2022-02-16
Payer: MEDICARE

## 2022-02-16 PROCEDURE — 0651 HSPC ROUTINE HOME CARE

## 2022-02-16 PROCEDURE — G0156 HHCP-SVS OF AIDE,EA 15 MIN: HCPCS

## 2022-02-17 PROCEDURE — 0651 HSPC ROUTINE HOME CARE

## 2022-02-18 ENCOUNTER — HOME CARE VISIT (OUTPATIENT)
Dept: HOSPICE | Facility: HOSPICE | Age: 72
End: 2022-02-18
Payer: MEDICARE

## 2022-02-18 ENCOUNTER — HOME CARE VISIT (OUTPATIENT)
Dept: SCHEDULING | Facility: HOME HEALTH | Age: 72
End: 2022-02-18
Payer: MEDICARE

## 2022-02-18 PROCEDURE — 0651 HSPC ROUTINE HOME CARE

## 2022-02-18 PROCEDURE — G0299 HHS/HOSPICE OF RN EA 15 MIN: HCPCS

## 2022-02-18 PROCEDURE — G0156 HHCP-SVS OF AIDE,EA 15 MIN: HCPCS

## 2022-02-18 NOTE — HOSPICE
Hospice RN visit with patient. Patient in bed, alert x 4, denies current pain. Patient cleaned of incontinent formed BM. Wound care completed to sacrum. RN will order for delivery - wipes, gloves, calcium alginate packing. No med refills needed this visit. Patients will be considered to be in the terminal stage of heart disease (life expectancy of six months or less) if they meet the following criteria. (1 and 2 should be present. Factors from 3 will add supporting documentation.):    ___X_____  1. At the time of initial certification or recertification for hospice, the patient is or has been                           already optimally treated for heart disease or is not a candidate for a surgical procedure or                           has declined a procedure. (Optimally treated means that patients who are not on                           vasodilators have a medical reason for refusing these drugs, e.g., hypotension or renal                           disease.)  ________  2. The patient is classified as New York Heart Association (NYHA) Class IV and may have                           significant symptoms of heart failure or angina at rest. (Class IV patients with heart disease                           have an inability to carry on any physical activity without discomfort. Symptoms of heart                           failure or of the anginal syndrome may be present even at rest. If any physical activity is                           undertaken, discomfort is increased.) Significant congestive heart failure may be                          documented by an ejection fraction of ?20%, but is not required if not already available.  ____X____  3. Documentation of the following factors will support but is not required to establish                           eligibility for hospice care:                            ________  a.  Treatment resistant symptomatic supraventricular or ventricular arrhythmias; ________  b. History of cardiac arrest or resuscitation;                          _____X___  c. History of unexplained syncope;                          ________  d. Brain embolism of cardiac origin;                          ________  e. Concomitant HIV disease. Recertification 9/19/2305  Joslyn Villalta, 73 y.o. Cardiomyopathy    Recertification visit will be completed 2/18/2022. Changes since last recertification include:  sacral wound decline, becoming longer and wider and patient placed on suspect Covid positive precautions on 1/10/22. Plan of care changes since last recertification include:  return to prior wound care orders for sacral wound decline to include use of saline soaked calcium alginate and foam dressing. Patient was kept on Covid precautions for several weeks. Over the past 2 weeks hospice has worked to address twice/weekly wound care and monitoring wound status as well as pain level at wound site. Plan for next 2 weeks:  continue twice/weekly nursing visits and HHA visits for bathing assistance, adjust medications as needed and appropriate. Patient should stay on hospice for nursing assessments and wound care, socialization from  and music therapy which patient enjoys. Patient continues to demonstrate physical decline managed by hospice services and is recommended to be recertified into her 5th benefit period.

## 2022-02-19 PROCEDURE — 0651 HSPC ROUTINE HOME CARE

## 2022-02-20 PROCEDURE — 0651 HSPC ROUTINE HOME CARE

## 2022-02-21 ENCOUNTER — HOME CARE VISIT (OUTPATIENT)
Dept: SCHEDULING | Facility: HOME HEALTH | Age: 72
End: 2022-02-21
Payer: MEDICARE

## 2022-02-21 VITALS
TEMPERATURE: 97.8 F | SYSTOLIC BLOOD PRESSURE: 122 MMHG | RESPIRATION RATE: 14 BRPM | HEART RATE: 72 BPM | DIASTOLIC BLOOD PRESSURE: 82 MMHG

## 2022-02-21 PROCEDURE — 0651 HSPC ROUTINE HOME CARE

## 2022-02-21 PROCEDURE — G0299 HHS/HOSPICE OF RN EA 15 MIN: HCPCS

## 2022-02-21 PROCEDURE — G0156 HHCP-SVS OF AIDE,EA 15 MIN: HCPCS

## 2022-02-21 NOTE — HOSPICE
Arrived at skilled nursing; patient resting in bed; patient denied any pain or shortness of breath. Wound care completed. Medications reconciled, none needed at this time. NO supplies needed at this time. Reminded patient to call hospice number with any needs or concerns.

## 2022-02-22 ENCOUNTER — HOME CARE VISIT (OUTPATIENT)
Dept: HOSPICE | Facility: HOSPICE | Age: 72
End: 2022-02-22
Payer: MEDICARE

## 2022-02-22 PROCEDURE — 0651 HSPC ROUTINE HOME CARE

## 2022-02-22 PROCEDURE — HOSPICE MEDICATION HC HH HOSPICE MEDICATION

## 2022-02-23 ENCOUNTER — HOME CARE VISIT (OUTPATIENT)
Dept: SCHEDULING | Facility: HOME HEALTH | Age: 72
End: 2022-02-23
Payer: MEDICARE

## 2022-02-23 PROCEDURE — G0156 HHCP-SVS OF AIDE,EA 15 MIN: HCPCS

## 2022-02-23 PROCEDURE — 0651 HSPC ROUTINE HOME CARE

## 2022-02-24 ENCOUNTER — HOME CARE VISIT (OUTPATIENT)
Dept: HOSPICE | Facility: HOSPICE | Age: 72
End: 2022-02-24
Payer: MEDICARE

## 2022-02-24 PROCEDURE — 0651 HSPC ROUTINE HOME CARE

## 2022-02-25 ENCOUNTER — HOME CARE VISIT (OUTPATIENT)
Dept: HOSPICE | Facility: HOSPICE | Age: 72
End: 2022-02-25
Payer: MEDICARE

## 2022-02-25 ENCOUNTER — HOME CARE VISIT (OUTPATIENT)
Dept: SCHEDULING | Facility: HOME HEALTH | Age: 72
End: 2022-02-25
Payer: MEDICARE

## 2022-02-25 PROCEDURE — G0299 HHS/HOSPICE OF RN EA 15 MIN: HCPCS

## 2022-02-25 PROCEDURE — G0156 HHCP-SVS OF AIDE,EA 15 MIN: HCPCS

## 2022-02-25 PROCEDURE — 0651 HSPC ROUTINE HOME CARE

## 2022-02-25 NOTE — HOSPICE
A user error has taken place: encounter opened in error, closed for administrative reasons.         Music Therapy Jayda Rascon  Joslyn Villalta  Music Therapy Online Support Group  Patient Telephone Number: [phone]   Yarsanism Affiliation: Lutheran  Language: English    Date: 2/22/22    Mental Status:   [ X ] Alert [  ] Nancey Heady [  ]  Confused  [  ] Minimally responsive  [  ] Sleeping    Communication Status: [  ] Impaired Speech [ X ] Verbal     Physical Status:   [  ] Oxygen in use  [  ] Hard of Hearing [  ] Vision Impaired   [  ] Ambulatory  [  ] Ambulatory with assistance [ X ] Non-ambulatory     Music Preferences, Background: Varied Popular and Music of Sheila;  ;    Clinical Problem addressed: Meaning-making with a life-limiting illness; decreasing isolation;    Goal(s) met in session:   Physical/Pain management (Scale of 1-10):  No complaint or evidence of pain;  Pre-session rating ___________    Post-session rating __________  [ X ] Increased relaxation               [  ] Affected breathing patterns  [  ] Decreased muscle tension               [  ] Decreased agitation  [  ] Affected heart rate    [  ] Increased alertness     Emotional/Psychological:  [ X ] Increased self-expression   [  ] Decreased aggressive behavior   [  ] Decreased feelings of stress              Rebeccat ] Discussed healthy coping skills     [  ] Improved mood    [  ] Decreased withdrawn behavior     Social:  Rebeccat ] Decreased feelings of isolation/loneliness [ X ] Positive social interaction    [  ] Provided support and/or comfort for family/friends    Spiritual:  Rebeccat ] Spiritual support    [  ] Expressed peace  [  ] Expressed sheila    [  ] Discussed beliefs    Techniques Utilized (Check all that apply):   [  ] Procedural support MT [  ] Music for relaxation             [  ] Patient preferred music  [  ] Rosy analysis  [  ] Song choice              [  ] Music for validation  [  ] Entrainment              [  ] Movement to music             [  ] Guided visualization  [  ] Costella Olp  [  ] Patient instrument playing [  ] Dennis Dials writing  [ X ] Sing along   [  ] Ana Morin              [  ] Sensory stimulation  [ X ] Active Listening  [  ] Music for spiritual support [  ] Making of CDs as gifts    Session Observations: This music therapy team (MT Team) led the Music therapy Virtual Support Group, focusing on the theme of Black History Month. MT Team also provided songs written and performed by influential  artists/musicians, as well as shared about their lives and various achievements. Pt was alert throughout the session, and engaged in conversation with MT Team over various topics (her interests, music preferences, music experiences, etc.). Pt also participated in discussing the chosen theme, and her feelings and experiences surrounding the song selections. Pt increased self-expression throughout the session . aeb smiling as she sang along during each song. MT Team concluded the session by singing and playing This [place] of Mine with guitar and offering words of support to each member. Pt expressed gratitude for the session and music, and exited.   Taylor Devries and Addison Morrow  Referral- based service

## 2022-02-26 VITALS — RESPIRATION RATE: 14 BRPM

## 2022-02-26 PROCEDURE — 0651 HSPC ROUTINE HOME CARE

## 2022-02-27 PROCEDURE — 0651 HSPC ROUTINE HOME CARE

## 2022-02-27 NOTE — HOSPICE
Hospice RN visit with patient, caregiver Linda Magana present. Patient in bed, alert x 4, denies current pain, last BM today. No cough, no trouble swallowing, appetite good. Wound care completed to sacrum. RN will order needed supplies for delivery - wipes, foam dressings. no med refills needed this visit.

## 2022-02-28 ENCOUNTER — HOME CARE VISIT (OUTPATIENT)
Dept: SCHEDULING | Facility: HOME HEALTH | Age: 72
End: 2022-02-28
Payer: MEDICARE

## 2022-02-28 ENCOUNTER — HOME CARE VISIT (OUTPATIENT)
Dept: HOSPICE | Facility: HOSPICE | Age: 72
End: 2022-02-28
Payer: MEDICARE

## 2022-02-28 PROCEDURE — G0299 HHS/HOSPICE OF RN EA 15 MIN: HCPCS

## 2022-02-28 PROCEDURE — G0156 HHCP-SVS OF AIDE,EA 15 MIN: HCPCS

## 2022-02-28 PROCEDURE — 0651 HSPC ROUTINE HOME CARE

## 2022-03-01 VITALS
OXYGEN SATURATION: 98 % | DIASTOLIC BLOOD PRESSURE: 76 MMHG | RESPIRATION RATE: 14 BRPM | SYSTOLIC BLOOD PRESSURE: 128 MMHG | HEART RATE: 66 BPM

## 2022-03-01 PROCEDURE — 0651 HSPC ROUTINE HOME CARE

## 2022-03-01 NOTE — HOSPICE
Hospice RN visit with patient, caregiver Gloria Joseph present. Patient in bed, alert x 4, no new issues or concerns. Wound care completed to sacrum, discussed sacral foam dressings, RN will attempt to order Allevyn padded dressings to reduce discomfort. AGNIESZKA mattress adjusted. No med refills needed this visit. RN will order needed supplies for delivery - Allevyn dressings, wipes.

## 2022-03-02 ENCOUNTER — HOME CARE VISIT (OUTPATIENT)
Dept: SCHEDULING | Facility: HOME HEALTH | Age: 72
End: 2022-03-02
Payer: MEDICARE

## 2022-03-02 PROCEDURE — 0651 HSPC ROUTINE HOME CARE

## 2022-03-02 PROCEDURE — G0156 HHCP-SVS OF AIDE,EA 15 MIN: HCPCS

## 2022-03-03 PROCEDURE — 0651 HSPC ROUTINE HOME CARE

## 2022-03-03 NOTE — HOSPICE
Music Therapy Progress Note  Joslyn Villalta  [address], [place]  Patient Telephone Number: [phone]  Congregational Affiliation: Sherlyn Sylvester; Manju Flores;   Language: English    Date:2/24/22    Mental Status:   [ X ] Alert [  ] Konstantin Moll [  ]  Confused  [  ] Minimally responsive  [  ] Sleeping    Communication Status: [  ] Impaired Speech Yorklyn.Jose  ] Verbal     Physical Status:   [  ] Oxygen in use  [  ] Hard of Hearing [  ] Vision Impaired   [  ] Ambulatory  [  ] Ambulatory with assistance [ X ] Non-ambulatory     Music Preferences, Background: Joellen Sport, Popular 28'D, 66's, [de-identified]; Jose Johny;    Clinical Problem addressed: Building and sharing legacy of music;     Goal(s) met in session:  Physical/Pain management (Scale of 1-10):    Pre-session rating ___________    Post-session rating __________  [ Lila Button ] Increased relaxation               [  ] Affected breathing patterns  [  ] Decreased muscle tension               [  ] Decreased agitation  [  ] Affected heart rate    Yorklyn.Jose  ] Increased alertness     Emotional/Psychological:  Yorklyn.Jose ] Increased self-expression   [  ] Decreased aggressive behavior   [  ] Decreased feelings of stress              [  ] Discussed healthy coping skills     Yorklyn.Jose  ] Improved mood    [  ] Decreased withdrawn behavior     Social:  Nithya.Jose  ] Decreased feelings of isolation/loneliness [ X ] Positive social interaction    [  ] Provided support and/or comfort for family/friends    Spiritual:  Nithya.Jose  ] Spiritual support    [  ] Expressed peace  Yorklyn.Jose  ] Expressed misti    [  ] Discussed beliefs    Techniques Utilized (Check all that apply):   [  ] Procedural support MT [  ] Music for relaxation             [ X ] Patient preferred music  [  ] Rosy analysis  [ X ] Song choice  [  ] Music for validation  [  ] Entrainment              [  ] Movement to music             [  ] Guided visualization  [  ] Stevie Chew  [  ] Patient instrument playing [  ] Mahesh Wilson writing  [  ] Loni Roy along   [  ] Brooks Greek [  ] Sensory stimulation  [  ] Active Listening  Fadime.[email or web address]  ] Music for spiritual support [  ] Making of CDs as gifts    Session Observations: Joslyn Villalta was lying in bed with the TV on when I entered her room. 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 She sang, \"I feel the Earth Move\" by Valeria Cueva, with keyboard accompaniment, and feels ready to record it at our next visit. She also was provided a spiritual song at the beginning of the session, to which she sang along quietly. Thank you for the opportunity to provide music therapy to Ms. Joslyn Villalta.   Anaid Manley, [facility]   [address] Certified  Spiritual Care Services  Referral- based service

## 2022-03-04 ENCOUNTER — HOME CARE VISIT (OUTPATIENT)
Dept: SCHEDULING | Facility: HOME HEALTH | Age: 72
End: 2022-03-04
Payer: MEDICARE

## 2022-03-04 ENCOUNTER — HOME CARE VISIT (OUTPATIENT)
Dept: HOSPICE | Facility: HOSPICE | Age: 72
End: 2022-03-04
Payer: MEDICARE

## 2022-03-04 VITALS
RESPIRATION RATE: 14 BRPM | SYSTOLIC BLOOD PRESSURE: 124 MMHG | TEMPERATURE: 97.8 F | HEART RATE: 62 BPM | DIASTOLIC BLOOD PRESSURE: 78 MMHG

## 2022-03-04 PROCEDURE — G0156 HHCP-SVS OF AIDE,EA 15 MIN: HCPCS

## 2022-03-04 PROCEDURE — G0299 HHS/HOSPICE OF RN EA 15 MIN: HCPCS

## 2022-03-04 PROCEDURE — 0651 HSPC ROUTINE HOME CARE

## 2022-03-04 NOTE — HOSPICE
Arrived at nursing home; patient resting in bed. Patient denied any pain or shortness of breath. Wound care completed, see assessment. Medications reconciled; none needed at this time. Supplies ordered:briefs, liners via medline  Reminded patient and Lisa PARKER to call hospice number with any needs or concerns.

## 2022-03-05 PROCEDURE — 0651 HSPC ROUTINE HOME CARE

## 2022-03-06 PROCEDURE — 0651 HSPC ROUTINE HOME CARE

## 2022-03-07 ENCOUNTER — HOME CARE VISIT (OUTPATIENT)
Dept: SCHEDULING | Facility: HOME HEALTH | Age: 72
End: 2022-03-07
Payer: MEDICARE

## 2022-03-07 VITALS
HEART RATE: 65 BPM | RESPIRATION RATE: 12 BRPM | TEMPERATURE: 97.7 F | SYSTOLIC BLOOD PRESSURE: 122 MMHG | DIASTOLIC BLOOD PRESSURE: 80 MMHG

## 2022-03-07 PROCEDURE — G0299 HHS/HOSPICE OF RN EA 15 MIN: HCPCS

## 2022-03-07 PROCEDURE — 0651 HSPC ROUTINE HOME CARE

## 2022-03-07 PROCEDURE — G0156 HHCP-SVS OF AIDE,EA 15 MIN: HCPCS

## 2022-03-07 PROCEDURE — HOSPICE MEDICATION HC HH HOSPICE MEDICATION

## 2022-03-07 NOTE — HOSPICE
Arrived at assisted; patient resting in bed; denied any pain or shortness of breath. Wound care completed, see assessment. no supplies needed. Medications reconciled, none needed at this time. Reminded patient/PCG to call hospice number with any needs or concerns.

## 2022-03-08 PROCEDURE — 0651 HSPC ROUTINE HOME CARE

## 2022-03-09 ENCOUNTER — HOME CARE VISIT (OUTPATIENT)
Dept: SCHEDULING | Facility: HOME HEALTH | Age: 72
End: 2022-03-09
Payer: MEDICARE

## 2022-03-09 PROCEDURE — 0651 HSPC ROUTINE HOME CARE

## 2022-03-09 PROCEDURE — G0156 HHCP-SVS OF AIDE,EA 15 MIN: HCPCS

## 2022-03-10 ENCOUNTER — HOME CARE VISIT (OUTPATIENT)
Dept: HOSPICE | Facility: HOSPICE | Age: 72
End: 2022-03-10
Payer: MEDICARE

## 2022-03-10 PROCEDURE — 0651 HSPC ROUTINE HOME CARE

## 2022-03-11 ENCOUNTER — HOME CARE VISIT (OUTPATIENT)
Dept: SCHEDULING | Facility: HOME HEALTH | Age: 72
End: 2022-03-11
Payer: MEDICARE

## 2022-03-11 ENCOUNTER — HOME CARE VISIT (OUTPATIENT)
Dept: HOSPICE | Facility: HOSPICE | Age: 72
End: 2022-03-11
Payer: MEDICARE

## 2022-03-11 PROCEDURE — G0299 HHS/HOSPICE OF RN EA 15 MIN: HCPCS

## 2022-03-11 PROCEDURE — G0156 HHCP-SVS OF AIDE,EA 15 MIN: HCPCS

## 2022-03-11 PROCEDURE — 0651 HSPC ROUTINE HOME CARE

## 2022-03-12 VITALS — RESPIRATION RATE: 14 BRPM

## 2022-03-12 PROCEDURE — 0651 HSPC ROUTINE HOME CARE

## 2022-03-12 NOTE — HOSPICE
is visiting for a routine pastoral visit with Nely Lawson. Active listening and pastoral dialogue, discussion of concerns and prayer provided.

## 2022-03-12 NOTE — HOSPICE
Hospice RN visit with patient. Patient in new room at facility, caregiver Ru Tai present. Alert x 4, no new issues or concerns. Wound care completed to sacrum. AGNIESZKA mattress assessed and adjusted per patient request.  RN will order needed supplies - diapers, wipes, gloves.   No med refills needed this visit

## 2022-03-13 PROCEDURE — 0651 HSPC ROUTINE HOME CARE

## 2022-03-14 ENCOUNTER — HOME CARE VISIT (OUTPATIENT)
Dept: SCHEDULING | Facility: HOME HEALTH | Age: 72
End: 2022-03-14
Payer: MEDICARE

## 2022-03-14 ENCOUNTER — HOME CARE VISIT (OUTPATIENT)
Dept: HOSPICE | Facility: HOSPICE | Age: 72
End: 2022-03-14
Payer: MEDICARE

## 2022-03-14 VITALS
HEART RATE: 58 BPM | SYSTOLIC BLOOD PRESSURE: 126 MMHG | DIASTOLIC BLOOD PRESSURE: 80 MMHG | TEMPERATURE: 97.9 F | RESPIRATION RATE: 13 BRPM

## 2022-03-14 PROCEDURE — 0651 HSPC ROUTINE HOME CARE

## 2022-03-14 PROCEDURE — G0156 HHCP-SVS OF AIDE,EA 15 MIN: HCPCS

## 2022-03-14 PROCEDURE — G0299 HHS/HOSPICE OF RN EA 15 MIN: HCPCS

## 2022-03-14 NOTE — HOSPICE
Arrived at [facility]; patient resting in bed; patient denied any pain or shortness of breath. Wound care completed, see assessment, patient tolerated well. Medications reconciled, none needed at this time. Supplies ordered: wipes, briefs, chux, inserts  Reminded patient and PCG to call hospice number with any needs or concerns.

## 2022-03-15 PROCEDURE — 0651 HSPC ROUTINE HOME CARE

## 2022-03-16 ENCOUNTER — HOME CARE VISIT (OUTPATIENT)
Dept: SCHEDULING | Facility: HOME HEALTH | Age: 72
End: 2022-03-16
Payer: MEDICARE

## 2022-03-16 PROCEDURE — G0156 HHCP-SVS OF AIDE,EA 15 MIN: HCPCS

## 2022-03-16 PROCEDURE — 0651 HSPC ROUTINE HOME CARE

## 2022-03-17 PROCEDURE — 0651 HSPC ROUTINE HOME CARE

## 2022-03-18 ENCOUNTER — HOME CARE VISIT (OUTPATIENT)
Dept: SCHEDULING | Facility: HOME HEALTH | Age: 72
End: 2022-03-18
Payer: MEDICARE

## 2022-03-18 ENCOUNTER — HOME CARE VISIT (OUTPATIENT)
Dept: HOSPICE | Facility: HOSPICE | Age: 72
End: 2022-03-18
Payer: MEDICARE

## 2022-03-18 PROBLEM — E78.5 HLD (HYPERLIPIDEMIA): Status: ACTIVE | Noted: 2021-04-21

## 2022-03-18 PROBLEM — L89.159 SACRAL DECUBITUS ULCER: Status: ACTIVE | Noted: 2020-12-16

## 2022-03-18 PROBLEM — I42.9 CARDIOMYOPATHY (HCC): Status: ACTIVE | Noted: 2021-04-23

## 2022-03-18 PROBLEM — R77.8 ELEVATED TROPONIN: Status: ACTIVE | Noted: 2021-04-23

## 2022-03-18 PROBLEM — R79.89 ELEVATED TROPONIN: Status: ACTIVE | Noted: 2021-04-23

## 2022-03-18 PROBLEM — D64.9 ANEMIA: Status: ACTIVE | Noted: 2021-04-23

## 2022-03-18 PROCEDURE — G0299 HHS/HOSPICE OF RN EA 15 MIN: HCPCS

## 2022-03-18 PROCEDURE — G0156 HHCP-SVS OF AIDE,EA 15 MIN: HCPCS

## 2022-03-18 PROCEDURE — 0651 HSPC ROUTINE HOME CARE

## 2022-03-19 PROBLEM — L08.9 INFECTED DECUBITUS ULCER: Status: ACTIVE | Noted: 2021-04-17

## 2022-03-19 PROBLEM — A41.9 SEPSIS (HCC): Status: ACTIVE | Noted: 2020-12-16

## 2022-03-19 PROBLEM — L89.90 INFECTED DECUBITUS ULCER: Status: ACTIVE | Noted: 2021-04-17

## 2022-03-19 PROCEDURE — 0651 HSPC ROUTINE HOME CARE

## 2022-03-20 PROBLEM — I10 HTN (HYPERTENSION): Status: ACTIVE | Noted: 2021-04-21

## 2022-03-20 PROCEDURE — 0651 HSPC ROUTINE HOME CARE

## 2022-03-21 ENCOUNTER — HOME CARE VISIT (OUTPATIENT)
Dept: SCHEDULING | Facility: HOME HEALTH | Age: 72
End: 2022-03-21
Payer: MEDICARE

## 2022-03-21 VITALS
HEART RATE: 63 BPM | TEMPERATURE: 97.7 F | RESPIRATION RATE: 15 BRPM | SYSTOLIC BLOOD PRESSURE: 118 MMHG | DIASTOLIC BLOOD PRESSURE: 70 MMHG

## 2022-03-21 PROCEDURE — HOSPICE MEDICATION HC HH HOSPICE MEDICATION

## 2022-03-21 PROCEDURE — 0651 HSPC ROUTINE HOME CARE

## 2022-03-21 PROCEDURE — G0299 HHS/HOSPICE OF RN EA 15 MIN: HCPCS

## 2022-03-21 PROCEDURE — G0156 HHCP-SVS OF AIDE,EA 15 MIN: HCPCS

## 2022-03-21 NOTE — HOSPICE
[facility] at [facility]; patient resting in bed; patient denied any pain or shortness of breath. Wound care completed, see assessment. Medications reconciled, none needed at this time. Reminded patient and PCG to call hospice number with any needs or concerns.

## 2022-03-21 NOTE — HOSPICE
Hospice RN visit with patient. No new issues or concerns. Wound care completed to sacrum. No medication refills needed.   RN will order needed supplies for delivery - Allevyn foam dressings

## 2022-03-22 PROCEDURE — 0651 HSPC ROUTINE HOME CARE

## 2022-03-22 NOTE — HOSPICE
Music Therapy Progress Note  Joslyn Villalta  Virtual Visit  Patient Telephone Number: [phone]  Buddhist Affiliation: Oriental orthodox/Pentecostalism  Language: English    Date: 3/14/22    Mental Status:   [ Barry Jael [  ] Jennifer Baptism [  ]  Confused  [  ] Minimally responsive  [  ] Sleeping    Communication Status: [  ] Impaired Speech [ X ] Verbal     Physical Status:   [  ] Oxygen in use  [  ] Hard of Hearing [  ] Vision Impaired   [  ] Ambulatory  [  ] Ambulatory with assistance [ X ] Non-ambulatory     Music Preferences, Background: __Varied popular, amor.  Maurice Sea (women's history theme)    Clinical Problem addressed: _Peer engagement and support    Goal(s) met in session:  Physical/Pain management (Scale of 1-10):    Pre-session rating ___________    Post-session rating __________  [  ] Increased relaxation               [  ] Affected breathing patterns  [  ] Decreased muscle tension               [  ] Decreased agitation  [  ] Affected heart rate    [  ] Increased alertness     Emotional/Psychological:  [ X ] Increased self-expression   [  ] Decreased aggressive behavior   [  ] Decreased feelings of stress              [ X ] Discussed healthy coping skills     [  ] Improved mood    [  ] Decreased withdrawn behavior     Social:  [  ] Decreased feelings of isolation/loneliness [ X ] Positive social interaction    [  ] Provided support and/or comfort for family/friends    Spiritual:  [  ] Spiritual support    [  ] Expressed peace  [  ] Expressed misti    [  ] Discussed beliefs    Techniques Utilized (Check all that apply):   [  ] Procedural support MT [  ] Music for relaxation             [employer].[email or web address]  ] Patient preferred music  [  ] Rosy analysis  [  ] Song choice              Gallicus.[email or web address]  ] Music for validation  [  ] Entrainment              [  ] Movement to music             [  ] Guided visualization  [  ] Brooks Ryder  [  ] Patient instrument playing [  ] Chet Clark writing  [ X ] Chelsea Quinones along   [  ] Gail Hirsch              [  ] Sensory stimulation  [ X ] Active Listening  [  ] Music for spiritual support [  ] Making of CDs as gifts    Session Observations: Patient (pt) was alert, lying in bed as she attended the music therapy Virtual support group. The support group's selected theme focused on Women's History Month, and was led by the music therapy team (MT Team). Pt engaged in conversation over various topics, and shared about personal experiences and female individuals that have impacted her life. Pt also increased self-expression in response to the music .aeb singing along and discussing what the lyrics mean to her. Pt expressed enjoyment in the session as this MT Team provided songs by various female artists, and concluded the session with a prayer and Alberto Coffey' Landslide. Thank you for the opportunity to provide music therapy to [facility].   Anaid Eli, MT-BC   Music [address] Certified  Spiritual Care Services  Referral- based service

## 2022-03-23 ENCOUNTER — HOME CARE VISIT (OUTPATIENT)
Dept: SCHEDULING | Facility: HOME HEALTH | Age: 72
End: 2022-03-23
Payer: MEDICARE

## 2022-03-23 PROCEDURE — G0156 HHCP-SVS OF AIDE,EA 15 MIN: HCPCS

## 2022-03-23 PROCEDURE — 0651 HSPC ROUTINE HOME CARE

## 2022-03-24 PROCEDURE — 0651 HSPC ROUTINE HOME CARE

## 2022-03-25 ENCOUNTER — HOME CARE VISIT (OUTPATIENT)
Dept: SCHEDULING | Facility: HOME HEALTH | Age: 72
End: 2022-03-25
Payer: MEDICARE

## 2022-03-25 ENCOUNTER — HOME CARE VISIT (OUTPATIENT)
Dept: HOSPICE | Facility: HOSPICE | Age: 72
End: 2022-03-25
Payer: MEDICARE

## 2022-03-25 PROCEDURE — G0156 HHCP-SVS OF AIDE,EA 15 MIN: HCPCS

## 2022-03-25 PROCEDURE — 0651 HSPC ROUTINE HOME CARE

## 2022-03-25 PROCEDURE — G0299 HHS/HOSPICE OF RN EA 15 MIN: HCPCS

## 2022-03-25 NOTE — HOSPICE
Hospice RN visit with patient. DAVIE Sanchez present, fininshing patient's bath. Patient alert x 4, denies current pain, no new issues or concerns. Wound care completed to sacrum, wound appears to be improving slowly, less drainage today. RN will order needed supplies - diapers, Chux, wipes, diaper liners. No medication refills needed this visit.

## 2022-03-26 PROCEDURE — 0651 HSPC ROUTINE HOME CARE

## 2022-03-27 PROCEDURE — 0651 HSPC ROUTINE HOME CARE

## 2022-03-28 ENCOUNTER — HOME CARE VISIT (OUTPATIENT)
Dept: SCHEDULING | Facility: HOME HEALTH | Age: 72
End: 2022-03-28
Payer: MEDICARE

## 2022-03-28 ENCOUNTER — HOME CARE VISIT (OUTPATIENT)
Dept: HOSPICE | Facility: HOSPICE | Age: 72
End: 2022-03-28
Payer: MEDICARE

## 2022-03-28 VITALS
RESPIRATION RATE: 14 BRPM | TEMPERATURE: 97.6 F | HEART RATE: 64 BPM | DIASTOLIC BLOOD PRESSURE: 80 MMHG | SYSTOLIC BLOOD PRESSURE: 122 MMHG

## 2022-03-28 PROCEDURE — G0156 HHCP-SVS OF AIDE,EA 15 MIN: HCPCS

## 2022-03-28 PROCEDURE — G0299 HHS/HOSPICE OF RN EA 15 MIN: HCPCS

## 2022-03-28 PROCEDURE — 0651 HSPC ROUTINE HOME CARE

## 2022-03-28 NOTE — HOSPICE
[facility]; Matias present for visit. Patient denied any pain or shortness of breath. Patient stated her appetite has been consistent and BMs have been normal, mostly once daily. Wound care completed, see assessment. Medications reconciled, none needed at this time. Reminded Matias and patient to call hospice number with any needs or concerns.

## 2022-03-29 ENCOUNTER — HOME CARE VISIT (OUTPATIENT)
Dept: HOSPICE | Facility: HOSPICE | Age: 72
End: 2022-03-29
Payer: MEDICARE

## 2022-03-29 PROCEDURE — 0651 HSPC ROUTINE HOME CARE

## 2022-03-29 NOTE — HOSPICE
Music Therapy Progress Note  Joslyn Villalta  Online Support Group    Patient Telephone Number: [phone]   Christianity Affiliation: Worship/Yarsanism   Language: English    Date: 3/28/22    Mental Status:   [ X ] Alert [  ] Maralyn [place] [  ]  Confused  [  ] Minimally responsive  [  ] Sleeping    Communication Status: [  ] Impaired Speech [ X ] Verbal     Physical Status:   [  ] Oxygen in use  [  ] Hard of Hearing [  ] Vision Impaired   [  ] Ambulatory  [  ] Ambulatory with assistance [ X ] Non-ambulatory     Music Preferences, Background: __Orestes Keller Rom, 60's, 70's, 80's rock, Spiritual;     Clinical Problem addressed: _Social and emotional support;    Goal(s) met in session:  Physical/Pain management (Scale of 1-10):    Pre-session rating ___________    Post-session rating __________  [  ] Increased relaxation               [  ] Affected breathing patterns  [  ] Decreased muscle tension               [  ] Decreased agitation  [  ] Affected heart rate    [  ] Increased alertness     Emotional/Psychological:  Norma.Ales  ] Increased self-expression   [  ] Decreased aggressive behavior   [  ] Decreased feelings of stress              Norma.Ales  ] Discussed healthy coping skills     [  ] Improved mood    [  ] Decreased withdrawn behavior     Social:  [  ] Decreased feelings of isolation/loneliness [ X ] Positive social interaction    [  ] Provided support and/or comfort for family/friends    Spiritual:  [ X ] Spiritual support    [  ] Expressed peace  [  ] Expressed misti    [  ] Discussed beliefs    Techniques Utilized (Check all that apply):   [  ] Procedural support MT [  ] Music for relaxation             Norma.Ales  ] Patient preferred music  [  ] Rosy analysis  [ X ] Song choice             [  ] Music for validation  [  ] Entrainment              [  ] Movement to music             [  ] Guided visualization  [  ] Oneil Chandler  [  ] Patient instrument playing [  ] Rere Ayala writing  Norma.Ales  ] Singing                         [  ] Improvisation              [  ] Sensory stimulation  [  ] Active Listening  Norma.Ales  ] Music for spiritual support [  ] Making of CDs as gifts    Session Observations:  Joslyn Villalta attended this online support group and engaged in discussion around women's roles and their involvement in music. She offered peer support, and shared some of her personal history, including being at [employer] [address] [employer] songs on the steps of a Pentecostalism in [facility], and working in the [address] [facility]. She offered that she would encourage girls to follow their dreams, and to not give up even if others tell them that they cannot do something. Joslyn is looking forward to recording several songs which she will be able to share with this group. Thank you for the opportunity to provide music therapy to [facility].   Anaid Olivas, MT-BC   [address] Certified  Spiritual Care Services  Referral- based service

## 2022-03-30 ENCOUNTER — HOME CARE VISIT (OUTPATIENT)
Dept: SCHEDULING | Facility: HOME HEALTH | Age: 72
End: 2022-03-30
Payer: MEDICARE

## 2022-03-30 PROCEDURE — 0651 HSPC ROUTINE HOME CARE

## 2022-03-30 PROCEDURE — G0156 HHCP-SVS OF AIDE,EA 15 MIN: HCPCS

## 2022-03-31 PROCEDURE — 0651 HSPC ROUTINE HOME CARE

## 2022-04-01 ENCOUNTER — HOME CARE VISIT (OUTPATIENT)
Dept: SCHEDULING | Facility: HOME HEALTH | Age: 72
End: 2022-04-01
Payer: MEDICARE

## 2022-04-01 PROCEDURE — G0156 HHCP-SVS OF AIDE,EA 15 MIN: HCPCS

## 2022-04-01 PROCEDURE — 0651 HSPC ROUTINE HOME CARE

## 2022-04-01 PROCEDURE — G0299 HHS/HOSPICE OF RN EA 15 MIN: HCPCS

## 2022-04-01 NOTE — HOSPICE
Hospice RN visit with patient. Caregiver Lisa present. Patient alert x 4, no new complaints or issues, wound care completed to sacrum, wound looks improved, getting smaller. Will order needed supplies for delivery - wipes, diapers, FitRight liners. No med refills needed.

## 2022-04-02 PROCEDURE — 0651 HSPC ROUTINE HOME CARE

## 2022-04-03 PROCEDURE — 0651 HSPC ROUTINE HOME CARE

## 2022-04-04 ENCOUNTER — HOME CARE VISIT (OUTPATIENT)
Dept: SCHEDULING | Facility: HOME HEALTH | Age: 72
End: 2022-04-04
Payer: MEDICARE

## 2022-04-04 PROCEDURE — 0651 HSPC ROUTINE HOME CARE

## 2022-04-04 PROCEDURE — G0156 HHCP-SVS OF AIDE,EA 15 MIN: HCPCS

## 2022-04-04 PROCEDURE — HOSPICE MEDICATION HC HH HOSPICE MEDICATION

## 2022-04-04 PROCEDURE — G0299 HHS/HOSPICE OF RN EA 15 MIN: HCPCS

## 2022-04-05 VITALS
TEMPERATURE: 97.9 F | DIASTOLIC BLOOD PRESSURE: 70 MMHG | HEART RATE: 62 BPM | SYSTOLIC BLOOD PRESSURE: 122 MMHG | OXYGEN SATURATION: 97 % | RESPIRATION RATE: 14 BRPM

## 2022-04-05 PROCEDURE — 0651 HSPC ROUTINE HOME CARE

## 2022-04-05 NOTE — HOSPICE
Hospice RN and NP Jas Minden visit with patient. DAVIE Sanchez present finishing patien'ts bath. Patient alert x 4, no c/o pain, lungs clear, no cough, no edema, last BM today. Eating and swallowing well. Discussed alternating pressure mattress which will not stay on patient's desired setting. Service request placed with HME to come out and assess. Wound care completed to sacrum. RN will order needed supplies - saline, alginate packing strips, 4x4 gauze, brief liners, Chux, wipes.   No med refills needed this visit

## 2022-04-06 ENCOUNTER — HOME CARE VISIT (OUTPATIENT)
Dept: SCHEDULING | Facility: HOME HEALTH | Age: 72
End: 2022-04-06
Payer: MEDICARE

## 2022-04-06 PROCEDURE — G0156 HHCP-SVS OF AIDE,EA 15 MIN: HCPCS

## 2022-04-06 PROCEDURE — 0651 HSPC ROUTINE HOME CARE

## 2022-04-07 ENCOUNTER — HOME CARE VISIT (OUTPATIENT)
Dept: HOSPICE | Facility: HOSPICE | Age: 72
End: 2022-04-07
Payer: MEDICARE

## 2022-04-07 PROCEDURE — 0651 HSPC ROUTINE HOME CARE

## 2022-04-08 ENCOUNTER — HOME CARE VISIT (OUTPATIENT)
Dept: SCHEDULING | Facility: HOME HEALTH | Age: 72
End: 2022-04-08
Payer: MEDICARE

## 2022-04-08 PROCEDURE — G0156 HHCP-SVS OF AIDE,EA 15 MIN: HCPCS

## 2022-04-08 PROCEDURE — 0651 HSPC ROUTINE HOME CARE

## 2022-04-08 PROCEDURE — G0300 HHS/HOSPICE OF LPN EA 15 MIN: HCPCS

## 2022-04-08 NOTE — HOSPICE
wound care visit. On arrival patient laying in bed a&o x4. Patient denied pain. Prior to wound care patient cleaned from bowel movement. Wound care provided to sacral wound. Patient tolerated well.   No other needs at this time

## 2022-04-09 PROCEDURE — 0651 HSPC ROUTINE HOME CARE

## 2022-04-10 PROCEDURE — 0651 HSPC ROUTINE HOME CARE

## 2022-04-11 ENCOUNTER — HOME CARE VISIT (OUTPATIENT)
Dept: HOSPICE | Facility: HOSPICE | Age: 72
End: 2022-04-11
Payer: MEDICARE

## 2022-04-11 ENCOUNTER — HOME CARE VISIT (OUTPATIENT)
Dept: SCHEDULING | Facility: HOME HEALTH | Age: 72
End: 2022-04-11
Payer: MEDICARE

## 2022-04-11 VITALS
OXYGEN SATURATION: 97 % | RESPIRATION RATE: 16 BRPM | HEART RATE: 58 BPM | DIASTOLIC BLOOD PRESSURE: 67 MMHG | TEMPERATURE: 97.7 F | SYSTOLIC BLOOD PRESSURE: 131 MMHG

## 2022-04-11 PROCEDURE — G0156 HHCP-SVS OF AIDE,EA 15 MIN: HCPCS

## 2022-04-11 PROCEDURE — 0651 HSPC ROUTINE HOME CARE

## 2022-04-11 PROCEDURE — G0300 HHS/HOSPICE OF LPN EA 15 MIN: HCPCS

## 2022-04-11 NOTE — HOSPICE
routine/wound care visit. On arrival patient laying in bed a&o x4. Patient denied pain. Patient incontinent of bowel and bladder. Last bm= today. No complaints. Vitals taken. Assessment completed. Sacral wound care completed. Patient tlerated well. Ordered gloves and supplies as per requested by caregiver Matias.

## 2022-04-12 PROCEDURE — 0651 HSPC ROUTINE HOME CARE

## 2022-04-12 NOTE — HOSPICE
Music Therapy Progress Note  Joslyn Villalta  [address], [facility]   Patient Telephone Number: [phone]  Scientologist Affiliation: Mu-ism/Denominational  Language: English    Date: 4/7/22    Mental Status:   [ X ] Alert [  ] James Honor [  ]  Confused  [  ] Minimally responsive  [  ] Sleeping    Communication Status: [  ] Impaired Speech RobiJuneau ] Verbal     Physical Status:   [  ] Oxygen in use  [  ] Hard of Hearing [  ] Vision Impaired   [  ] Ambulatory  [  ] Ambulatory with assistance [ X ] Non-ambulatory     Music Preferences, Background: , sacred and secular music of all kinds;    Clinical Problem addressed: __Increasing sense of self; identity;    Goal(s) met in session:  Physical/Pain management (Scale of 1-10):    Pre-session rating ___________    Post-session rating __________  [  ] Increased relaxation               [  ] Affected breathing patterns  [  ] Decreased muscle tension               [  ] Decreased agitation  [  ] Affected heart rate    [  ] Increased alertness     Emotional/Psychological:  [X] Increased self-expression   [  ] Decreased aggressive behavior   [  ] Decreased feelings of stress              [X] Discussed healthy coping skills     [X] Improved mood    [  ] Decreased withdrawn behavior     Social:  [  ] Decreased feelings of isolation/loneliness [X] Positive social interaction    [  ] Provided support and/or comfort for family/friends    Spiritual:  [X] Spiritual support    [  ] Expressed peace  [X] Expressed misti    [  ] Discussed beliefs    Techniques Utilized (Check all that apply):   [  ] Procedural support MT [  ] Music for relaxation             [X] Patient preferred music  [  ] Rosy analysis  RobiJuneau ] Song choice              [  ] Music for validation  [  ] Entrainment              [  ] Movement to music             [  ] Guided visualization  [  ] Fernando Lyonss  [  ] Patient instrument playing [  ] Jeevan Bazzi writing  Coco.Juneau ] Singing/Recording  [  ] Alma Bailey [  ] Sensory stimulation  [  ] Active Listening  [ X ] Music for spiritual support [  ] Making of CDs as gifts    Session Observations:  Joslyn Villalta was lying in bed, awake, when I arrived and she welcomed me into her room, next door to the previous room where she had been residing. The new room is larger, and has a second twin bed in it, and she said she had been in this room previously. She was eager to begin recording, and we recorded three songs with Joslyn singing and me playing accompaniment on the keyboard. We were happy with two of the songs, and she was given the recordings via [employer] sound file to her email, allowing her the ability to share her recordings with others. She was agreeable with sharing the songs to the support group and we will re-record the song, \"I Feel the Earth Move\" at the next in person visit. When we finished, Joslyn exclaimed, \"That was fun! \"    Thank you for the opportunity to provide music therapy to [address] [place] [address].   Anaid Puente, [facility]   Music [address] Certified  Spiritual Care Services  Referral- based service

## 2022-04-13 ENCOUNTER — HOME CARE VISIT (OUTPATIENT)
Dept: HOSPICE | Facility: HOSPICE | Age: 72
End: 2022-04-13
Payer: MEDICARE

## 2022-04-13 ENCOUNTER — HOME CARE VISIT (OUTPATIENT)
Dept: SCHEDULING | Facility: HOME HEALTH | Age: 72
End: 2022-04-13
Payer: MEDICARE

## 2022-04-13 PROCEDURE — HOSPICE MEDICATION HC HH HOSPICE MEDICATION

## 2022-04-13 PROCEDURE — G0156 HHCP-SVS OF AIDE,EA 15 MIN: HCPCS

## 2022-04-13 PROCEDURE — 0651 HSPC ROUTINE HOME CARE

## 2022-04-14 ENCOUNTER — HOME CARE VISIT (OUTPATIENT)
Dept: HOSPICE | Facility: HOSPICE | Age: 72
End: 2022-04-14
Payer: MEDICARE

## 2022-04-14 PROCEDURE — 0651 HSPC ROUTINE HOME CARE

## 2022-04-15 ENCOUNTER — HOME CARE VISIT (OUTPATIENT)
Dept: HOSPICE | Facility: HOSPICE | Age: 72
End: 2022-04-15
Payer: MEDICARE

## 2022-04-15 ENCOUNTER — HOME CARE VISIT (OUTPATIENT)
Dept: SCHEDULING | Facility: HOME HEALTH | Age: 72
End: 2022-04-15
Payer: MEDICARE

## 2022-04-15 PROCEDURE — G0299 HHS/HOSPICE OF RN EA 15 MIN: HCPCS

## 2022-04-15 PROCEDURE — 0651 HSPC ROUTINE HOME CARE

## 2022-04-15 PROCEDURE — G0156 HHCP-SVS OF AIDE,EA 15 MIN: HCPCS

## 2022-04-15 NOTE — HOSPICE
Music Therapy Virtual Session    Date: 4/11/22    Mental Status:   [x] Alert [  ] Clau Davon [  ]  Confused  [  ] Minimally responsive  [  ] Sleeping    Communication Status: [  ] Impaired Speech [  ] Nonverbal -N/A    Physical Status:   [  ] Oxygen in use  [  ] Hard of Hearing [  ] Vision Impaired   [  ] Ambulatory  [  ] Ambulatory with assistance [  ] Non-ambulatory -N/A    Music Preferences, Background: Hymns and varied popular music from the 60's/70's    Clinical Problem addressed: Peer support and spiritual support    Goal(s) met in session:  Physical/Pain management (Scale of 1-10):    Pre-session rating: Pt didn't report on this  Post-session rating: Pt didn't report on this  [  ] Increased relaxation   [  ] Affected breathing patterns  [  ] Decreased muscle tension   [  ] Decreased agitation  [  ] Affected heart rate   [  ] Increased alertness     Emotional/Psychological:  [x] Increased self-expression   [  ] Decreased aggressive behavior   [  ] Decreased feelings of stress  [  ] Discussed healthy coping skills     [  ] Improved mood    [  ] Decreased withdrawn behavior     Social:  [  ] Decreased feelings of isolation/loneliness [x] Positive social interaction    [  ] Provided support and/or comfort for family/friends    Spiritual:  [x] Spiritual support    [  ] Expressed peace  [  ] Expressed misti    [  ] Discussed beliefs    Techniques Utilized (Check all that apply):   [  ] Procedural support MT [  ] Music for relaxation [  ] Patient preferred music  [  ] Rosy analysis  [  ] Song choice  [  ] Music for validation  [  ] Entrainment  [x] Movement to music [  ] Guided visualization  [  ] Mikayla Humphrey  [  ] Patient instrument playing [  ] Ruchi Dill writing  [x] Sing along   [  ] Katherine Sullivan  [  ] Sensory stimulation  [x] Active Listening  [x] Music for spiritual support [  ] Making of CDs as gifts    Session Observations:  Patient (pt) participated in this virtual music therapy support group, led by Standard Oxford and this writer (MT Team). The theme focused on the month of April and the idea of \"sun, growth, and hope\". MT Team led the group in a breathing exercise, and then asked for updates on each member. Pt responded to this, as well as listened and offered peer support to other members. MT Team further opened the music experience by singing a hymn and playing a pre-recorded song that was sung by the pt (I Love Cat Torres). Team then provided additional songs for further support, and pt increased emotional expression in response to this as evidenced by (AEB) becoming tearful. MT team engaged the group in conversation over various topics, and pt responded by sharing her own personal experience with a particular song. MT Team concluded the session with the hymn God Be With You Till We Meet Again, and ended the Zoom session.      Ruby Delgado MT-BC (Music Therapist, Board Certified)  [id number] [facility]

## 2022-04-16 PROCEDURE — 0651 HSPC ROUTINE HOME CARE

## 2022-04-17 PROCEDURE — 0651 HSPC ROUTINE HOME CARE

## 2022-04-18 ENCOUNTER — HOME CARE VISIT (OUTPATIENT)
Dept: SCHEDULING | Facility: HOME HEALTH | Age: 72
End: 2022-04-18
Payer: MEDICARE

## 2022-04-18 VITALS — RESPIRATION RATE: 14 BRPM | SYSTOLIC BLOOD PRESSURE: 124 MMHG | DIASTOLIC BLOOD PRESSURE: 60 MMHG

## 2022-04-18 VITALS — RESPIRATION RATE: 14 BRPM

## 2022-04-18 PROCEDURE — G0156 HHCP-SVS OF AIDE,EA 15 MIN: HCPCS

## 2022-04-18 PROCEDURE — 0651 HSPC ROUTINE HOME CARE

## 2022-04-18 PROCEDURE — G0299 HHS/HOSPICE OF RN EA 15 MIN: HCPCS

## 2022-04-18 PROCEDURE — HOSPICE MEDICATION HC HH HOSPICE MEDICATION

## 2022-04-18 NOTE — HOSPICE
is visiting for a routine pastoral visit with [address].  engaged pt in pastoral rapport, discussed her current status and coping. She notes she is doing well, keeps in touch with friends through social media and has continued to enjoy various music related projects she does with the MT. Pastoral rapport, dialogue, and prayer provided.

## 2022-04-18 NOTE — HOSPICE
Hospice RN visit with patient. Patient in bed, alert x 4, no new s/s or concerns. Wound care completed to sacrum, wound appears nearly healed. Will order needed supplies for delivery, no med refills needed this visit.

## 2022-04-18 NOTE — HOSPICE
Hospice RN visit with patient. Patient in bed, alert x 4, denies current pain or SOB. Wound care completed to sacrum, wound looks to be greatly improved. RN will order needed supplies for delivery - gloves, foam dressings, wipes. No med refills needed this visit. Patients will be considered to be in the terminal stage of heart disease (life expectancy of six months or less) if they meet the following criteria. (1 and 2 should be present. Factors from 3 will add supporting documentation.):    ____X____  1. At the time of initial certification or recertification for hospice, the patient is or has been                           already optimally treated for heart disease or is not a candidate for a surgical procedure or                           has declined a procedure. (Optimally treated means that patients who are not on                           vasodilators have a medical reason for refusing these drugs, e.g., hypotension or renal                           disease.)  ________  2. The patient is classified as New York Heart Association (NYHA) Class IV and may have                           significant symptoms of heart failure or angina at rest. (Class IV patients with heart disease                           have an inability to carry on any physical activity without discomfort. Symptoms of heart                           failure or of the anginal syndrome may be present even at rest. If any physical activity is                           undertaken, discomfort is increased.) Significant congestive heart failure may be                          documented by an ejection fraction of ?20%, but is not required if not already available.  ________  3. Documentation of the following factors will support but is not required to establish                           eligibility for hospice care:                            ________  a.  Treatment resistant symptomatic supraventricular or ventricular arrhythmias; ________  b. History of cardiac arrest or resuscitation;                          ________  c. History of unexplained syncope;                          ________  d. Brain embolism of cardiac origin;                          ________  e. Concomitant HIV disease.

## 2022-04-19 PROCEDURE — 0651 HSPC ROUTINE HOME CARE

## 2022-04-19 PROCEDURE — HOSPICE MEDICATION HC HH HOSPICE MEDICATION

## 2022-04-20 ENCOUNTER — HOME CARE VISIT (OUTPATIENT)
Dept: SCHEDULING | Facility: HOME HEALTH | Age: 72
End: 2022-04-20
Payer: MEDICARE

## 2022-04-20 PROCEDURE — 0651 HSPC ROUTINE HOME CARE

## 2022-04-20 PROCEDURE — G0156 HHCP-SVS OF AIDE,EA 15 MIN: HCPCS

## 2022-04-21 ENCOUNTER — HOME CARE VISIT (OUTPATIENT)
Dept: HOSPICE | Facility: HOSPICE | Age: 72
End: 2022-04-21
Payer: MEDICARE

## 2022-04-21 PROCEDURE — 0651 HSPC ROUTINE HOME CARE

## 2022-04-22 ENCOUNTER — HOME CARE VISIT (OUTPATIENT)
Dept: SCHEDULING | Facility: HOME HEALTH | Age: 72
End: 2022-04-22
Payer: MEDICARE

## 2022-04-22 ENCOUNTER — HOME CARE VISIT (OUTPATIENT)
Dept: HOSPICE | Facility: HOSPICE | Age: 72
End: 2022-04-22
Payer: MEDICARE

## 2022-04-22 PROCEDURE — G0300 HHS/HOSPICE OF LPN EA 15 MIN: HCPCS

## 2022-04-22 PROCEDURE — 0651 HSPC ROUTINE HOME CARE

## 2022-04-22 PROCEDURE — G0156 HHCP-SVS OF AIDE,EA 15 MIN: HCPCS

## 2022-04-22 NOTE — HOSPICE
wound care. On arrival patient laying in bed a&o x4. No complaints of pain. Wound care:  old dressing removed. cleansed with normal saline  alginate moistened with saline packing  foam dressing applied  patient tolerated procedure well. Patient's incontinence brief changed.     [facility]  wipes  saline for wound care  gloves (M) for wound care

## 2022-04-23 ENCOUNTER — HOME CARE VISIT (OUTPATIENT)
Dept: HOSPICE | Facility: HOSPICE | Age: 72
End: 2022-04-23
Payer: MEDICARE

## 2022-04-23 PROCEDURE — 0651 HSPC ROUTINE HOME CARE

## 2022-04-24 ENCOUNTER — HOME CARE VISIT (OUTPATIENT)
Dept: HOSPICE | Facility: HOSPICE | Age: 72
End: 2022-04-24
Payer: MEDICARE

## 2022-04-24 PROCEDURE — 0651 HSPC ROUTINE HOME CARE

## 2022-04-25 ENCOUNTER — HOME CARE VISIT (OUTPATIENT)
Dept: SCHEDULING | Facility: HOME HEALTH | Age: 72
End: 2022-04-25
Payer: MEDICARE

## 2022-04-25 ENCOUNTER — HOME CARE VISIT (OUTPATIENT)
Dept: HOSPICE | Facility: HOSPICE | Age: 72
End: 2022-04-25
Payer: MEDICARE

## 2022-04-25 VITALS
TEMPERATURE: 98.1 F | RESPIRATION RATE: 16 BRPM | SYSTOLIC BLOOD PRESSURE: 127 MMHG | DIASTOLIC BLOOD PRESSURE: 59 MMHG | OXYGEN SATURATION: 98 % | HEART RATE: 59 BPM

## 2022-04-25 PROCEDURE — G0156 HHCP-SVS OF AIDE,EA 15 MIN: HCPCS

## 2022-04-25 PROCEDURE — G0300 HHS/HOSPICE OF LPN EA 15 MIN: HCPCS

## 2022-04-25 PROCEDURE — 0651 HSPC ROUTINE HOME CARE

## 2022-04-25 NOTE — HOSPICE
routine/wound care visit. On arrival patient laying in bed. Patient denied pain. Patient stated appetite is good. Incontinent of bowel and bladder. Voiding without difficulty. Last bowel movement = 4/24. Vitals taken. Assessment completed. sacral woud care completed. Patient tolerated well.   No further needs at this time

## 2022-04-25 NOTE — HOSPICE
Music Therapy Virtual     Date: 4/25/22    Mental Status:   [x] Alert [  ] Clyda Sable [  ]  Confused  [  ] Minimally responsive  [  ] Sleeping    Communication Status: [  ] Impaired Speech [  ] Nonverbal -N/A    Physical Status:   [  ] Oxygen in use  [  ] Hard of Hearing [  ] Vision Impaired   [  ] Ambulatory  [  ] Ambulatory with assistance [x] Non-ambulatory     Music Preferences, Background: Pt enjoys artists like [employer] and Michelle Age    Clinical Problem addressed: Increase socialization, expression, and spiritual support    Goal(s) met in session:  Physical/Pain management (Scale of 1-10):    Pre-session rating: Pt didn't report on this  Post-session rating: Pt didn't report on this  [  ] Increased relaxation   [  ] Affected breathing patterns  [  ] Decreased muscle tension   [  ] Decreased agitation  [  ] Affected heart rate   [  ] Increased alertness     Emotional/Psychological:  [x] Increased self-expression  [  ] Decreased aggressive behavior   [  ] Decreased feelings of stress  [  ] Discussed healthy coping skills     [  ] Improved mood   [  ] Decreased withdrawn behavior   Social:  [  ] Decreased feelings of isolation/loneliness [x] Positive social interaction    [  ] Provided support and/or comfort for family/friends    Spiritual:  [x] Spiritual support    [  ] Expressed peace  [  ] Expressed misti    [  ] Discussed beliefs    Techniques Utilized (Check all that apply):   [  ] Procedural support MT [x] Music for relaxation [x] Patient preferred music  [  ] Rosy analysis  [x] Song choice  [x] Music for validation  [  ] Entrainment  [x] Movement to music [  ] Guided visualization  [  ] Reema Argueta  [  ] Patient instrument playing [  ] Donn De La O writing  [x] Sing along   [  ] Damon File  [  ] Sensory stimulation  [x] Active Listening  [x] Music for spiritual support [  ] Making of CDs as gifts    Session Observations:  Patient (pt) was alert, lying in her bed as she participated in the music therapy virtual support group. The music therapy team (MT Team) chose the theme of \"Sun, Growth, and Hope\", and provided various songs and topics of conversation. Pt engaged in conversation throughout the session, sharing about her music experiences, favorite musicians, and favorite places in nature. Pt also sang along during each song, and expressed an openness to playing a pre-recorded song she worked on in a previous music therapy session. As MT Team provided the recording for the support group, pt appeared to smile and expressed enjoyment in sharing her voice. MT Team provided additional opportunities for members to request songs, and offered space for members to share about themselves during this time. Before concluding the session, MT Team offered the a hymn and a final closing song, and pt expressed an openness to this. MT Team sang and played \"In the Garden\" and Sarah" with guitar at a slow tempo. Pt appeared to sing along, and expressed gratitdue for the session. MT Team concluded the session at this time and ended the Zoom chat.      HANK Pickett (Music Therapist, Board Certified)  [id number] [facility]

## 2022-04-26 PROCEDURE — 0651 HSPC ROUTINE HOME CARE

## 2022-04-26 NOTE — HOSPICE
Called and left message on 4/21 offering visit. No return call. Will reach out to team for assistance with contacting.

## 2022-04-26 NOTE — HOSPICE
Called patient and left message. Unable to schedule visit before benefit period ended. Will schedule visit with caregiver in the next 2 weeks.

## 2022-04-27 ENCOUNTER — HOME CARE VISIT (OUTPATIENT)
Dept: SCHEDULING | Facility: HOME HEALTH | Age: 72
End: 2022-04-27
Payer: MEDICARE

## 2022-04-27 ENCOUNTER — HOME CARE VISIT (OUTPATIENT)
Dept: HOSPICE | Facility: HOSPICE | Age: 72
End: 2022-04-27
Payer: MEDICARE

## 2022-04-27 PROCEDURE — G0156 HHCP-SVS OF AIDE,EA 15 MIN: HCPCS

## 2022-04-27 PROCEDURE — 0651 HSPC ROUTINE HOME CARE

## 2022-04-28 PROCEDURE — 0651 HSPC ROUTINE HOME CARE

## 2022-04-29 ENCOUNTER — HOME CARE VISIT (OUTPATIENT)
Dept: SCHEDULING | Facility: HOME HEALTH | Age: 72
End: 2022-04-29
Payer: MEDICARE

## 2022-04-29 ENCOUNTER — HOME CARE VISIT (OUTPATIENT)
Dept: HOSPICE | Facility: HOSPICE | Age: 72
End: 2022-04-29
Payer: MEDICARE

## 2022-04-29 PROCEDURE — G0299 HHS/HOSPICE OF RN EA 15 MIN: HCPCS

## 2022-04-29 PROCEDURE — G0156 HHCP-SVS OF AIDE,EA 15 MIN: HCPCS

## 2022-04-29 PROCEDURE — 0651 HSPC ROUTINE HOME CARE

## 2022-04-29 NOTE — HOSPICE
Hospice RN visit with patient. Patient in bed, alert x 4, no pain or SOB. Wound care completed to sacrum, wound has completely closed. Will follow up with NP for new recommendations. RN will order needed supplies - diapers, wipes, gloves, Chux, diaper liners.   No medication refills needed this visit

## 2022-04-30 PROCEDURE — 0651 HSPC ROUTINE HOME CARE

## 2022-05-01 PROCEDURE — 0651 HSPC ROUTINE HOME CARE

## 2022-05-02 ENCOUNTER — HOME CARE VISIT (OUTPATIENT)
Dept: SCHEDULING | Facility: HOME HEALTH | Age: 72
End: 2022-05-02
Payer: MEDICARE

## 2022-05-02 ENCOUNTER — HOME CARE VISIT (OUTPATIENT)
Dept: HOSPICE | Facility: HOSPICE | Age: 72
End: 2022-05-02
Payer: MEDICARE

## 2022-05-02 PROCEDURE — HOSPICE MEDICATION HC HH HOSPICE MEDICATION

## 2022-05-02 PROCEDURE — G0156 HHCP-SVS OF AIDE,EA 15 MIN: HCPCS

## 2022-05-02 PROCEDURE — G0299 HHS/HOSPICE OF RN EA 15 MIN: HCPCS

## 2022-05-02 PROCEDURE — 0651 HSPC ROUTINE HOME CARE

## 2022-05-03 PROCEDURE — 0651 HSPC ROUTINE HOME CARE

## 2022-05-04 ENCOUNTER — HOME CARE VISIT (OUTPATIENT)
Dept: SCHEDULING | Facility: HOME HEALTH | Age: 72
End: 2022-05-04
Payer: MEDICARE

## 2022-05-04 VITALS — SYSTOLIC BLOOD PRESSURE: 132 MMHG | RESPIRATION RATE: 14 BRPM | DIASTOLIC BLOOD PRESSURE: 70 MMHG

## 2022-05-04 PROCEDURE — G0156 HHCP-SVS OF AIDE,EA 15 MIN: HCPCS

## 2022-05-04 PROCEDURE — 0651 HSPC ROUTINE HOME CARE

## 2022-05-04 NOTE — HOSPICE
Hospice RN visit with patient, caregiver Tami Jones present in home. Patient alert x 4, no pain or SOB. No new needs or concerns. Wound care completed to sacrum, wound is still closed, RN reviewed wound care orders with PREET Dexter. Directions to continue current plan of care this week, will reevaluate dressing needs next week. No med refills needed this visit.

## 2022-05-05 PROCEDURE — 0651 HSPC ROUTINE HOME CARE

## 2022-05-06 ENCOUNTER — HOME CARE VISIT (OUTPATIENT)
Dept: SCHEDULING | Facility: HOME HEALTH | Age: 72
End: 2022-05-06
Payer: MEDICARE

## 2022-05-06 PROCEDURE — G0300 HHS/HOSPICE OF LPN EA 15 MIN: HCPCS

## 2022-05-06 PROCEDURE — G0156 HHCP-SVS OF AIDE,EA 15 MIN: HCPCS

## 2022-05-06 PROCEDURE — 0651 HSPC ROUTINE HOME CARE

## 2022-05-06 NOTE — HOSPICE
wound care. On arrival patient laying in bed a&o x4. Patient denied pain. Wound care completed to sacrum. Patient tolerated procedure well. Supplies ordered.

## 2022-05-07 PROCEDURE — 0651 HSPC ROUTINE HOME CARE

## 2022-05-08 PROCEDURE — 0651 HSPC ROUTINE HOME CARE

## 2022-05-09 ENCOUNTER — HOME CARE VISIT (OUTPATIENT)
Dept: SCHEDULING | Facility: HOME HEALTH | Age: 72
End: 2022-05-09
Payer: MEDICARE

## 2022-05-09 VITALS
HEART RATE: 57 BPM | TEMPERATURE: 97.8 F | SYSTOLIC BLOOD PRESSURE: 142 MMHG | OXYGEN SATURATION: 98 % | DIASTOLIC BLOOD PRESSURE: 67 MMHG | RESPIRATION RATE: 16 BRPM

## 2022-05-09 PROCEDURE — G0300 HHS/HOSPICE OF LPN EA 15 MIN: HCPCS

## 2022-05-09 PROCEDURE — G0156 HHCP-SVS OF AIDE,EA 15 MIN: HCPCS

## 2022-05-09 PROCEDURE — 0651 HSPC ROUTINE HOME CARE

## 2022-05-10 ENCOUNTER — HOME CARE VISIT (OUTPATIENT)
Dept: HOSPICE | Facility: HOSPICE | Age: 72
End: 2022-05-10
Payer: MEDICARE

## 2022-05-10 PROCEDURE — 0651 HSPC ROUTINE HOME CARE

## 2022-05-10 NOTE — HOSPICE
Routine spiritual care visit with the patient. The patient was lying in bed watching TV. The patient said she was doing well and was very engaging with the . The  encouraged life review and discussed patient's misti and end-of-life requests.

## 2022-05-11 ENCOUNTER — HOME CARE VISIT (OUTPATIENT)
Dept: SCHEDULING | Facility: HOME HEALTH | Age: 72
End: 2022-05-11
Payer: MEDICARE

## 2022-05-11 PROCEDURE — 0651 HSPC ROUTINE HOME CARE

## 2022-05-11 PROCEDURE — G0156 HHCP-SVS OF AIDE,EA 15 MIN: HCPCS

## 2022-05-12 ENCOUNTER — HOME CARE VISIT (OUTPATIENT)
Dept: HOSPICE | Facility: HOSPICE | Age: 72
End: 2022-05-12
Payer: MEDICARE

## 2022-05-12 PROCEDURE — 0651 HSPC ROUTINE HOME CARE

## 2022-05-13 ENCOUNTER — HOME CARE VISIT (OUTPATIENT)
Dept: SCHEDULING | Facility: HOME HEALTH | Age: 72
End: 2022-05-13
Payer: MEDICARE

## 2022-05-13 PROCEDURE — 0651 HSPC ROUTINE HOME CARE

## 2022-05-13 PROCEDURE — G0156 HHCP-SVS OF AIDE,EA 15 MIN: HCPCS

## 2022-05-13 PROCEDURE — G0299 HHS/HOSPICE OF RN EA 15 MIN: HCPCS

## 2022-05-14 PROCEDURE — 0651 HSPC ROUTINE HOME CARE

## 2022-05-14 NOTE — HOSPICE
Music Therapy Progress Note  Joslyn Villalta  [address] [facility] [place], [address] [facility]  Patient Telephone Number: [phone]  Islam Affiliation: Amish/Non-denomination Orthodox  Language: English    Date: 5/12/22    Mental Status:   [ X ] Alert [  ] Marsa Lennox [  ]  Confused  [  ] Minimally responsive  [  ] Sleeping    Communication Status: [  ] Impaired Speech Damari ] Verbal     Physical Status:   [  ] Oxygen in use  [  ] Hard of Hearing [  ] Vision Impaired   [  ] Ambulatory  [  ] Ambulatory with assistance [ X ] Non-ambulatory     Music Preferences, Background: Miranda Menezes, Enjoys recording music and sharing;    Clinical Problem addressed: _Socialization and emotional support, isolated secondary to physical limitations;    Goal(s) met in session:  Physical/Pain management (Scale of 1-10):    Pre-session rating ___________    Post-session rating __________  [  ] Increased relaxation               [  ] Affected breathing patterns  [  ] Decreased muscle tension               [  ] Decreased agitation  [  ] Affected heart rate    [  ] Increased alertness     Emotional/Psychological:  Damari  ] Increased self-expression   [  ] Decreased aggressive behavior   [  ] Decreased feelings of stress              [ X ] Discussed healthy coping skills     [  ] Improved mood    [  ] Decreased withdrawn behavior     Social:  [  ] Decreased feelings of isolation/loneliness [ X ] Positive social interaction    [  ] Provided support and/or comfort for family/friends    Spiritual:  [ X ] Spiritual support    [  ] Expressed peace  [ X ] Expressed misti    [  ] Discussed beliefs    Techniques Utilized (Check all that apply):   [  ] Procedural support MT [  ] Music for relaxation             [ X ] Patient preferred music  [  ] Rosy analysis  [  ] Song choice              [ X ] Music for validation  [  ] Entrainment              [  ] Movement to music             [  ] Guided visualization  [  ] Dino Smith  [  ] Patient instrument playing [  ] Ruchi Dill writing  [  ] Willyace Gail ontiveros   [  ] Katherine Sullivan              [  ] Sensory stimulation  [  ] Active Listening  [ X ] Music for spiritual support [  ] Making of CDs as gifts    Session Observations:  Joslyn was awake and watching TV when I arrived for her visit. She was in good spirits, smiling and laughing often, and engaged in discussion about her younger life and her musical social network that she has cultivated online. She was receptive to visiting virtually with a peer who is also from [place] and is a patient who is receptive to a virtual support group. We will try to arrange for a group visit with this peer, who is also engaged in online supportive prayer. We closed the visit with a praise song that Joslyn recorded, where she is singing a cappella. Thank you for the opportunity to provide music therapy to [facility].   [place], [place], [facility]   Music [address] Certified  Spiritual Care Services  Referral- based service

## 2022-05-15 PROCEDURE — 0651 HSPC ROUTINE HOME CARE

## 2022-05-16 ENCOUNTER — HOME CARE VISIT (OUTPATIENT)
Dept: SCHEDULING | Facility: HOME HEALTH | Age: 72
End: 2022-05-16
Payer: MEDICARE

## 2022-05-16 VITALS
SYSTOLIC BLOOD PRESSURE: 144 MMHG | HEART RATE: 64 BPM | OXYGEN SATURATION: 99 % | DIASTOLIC BLOOD PRESSURE: 66 MMHG | RESPIRATION RATE: 16 BRPM | TEMPERATURE: 97.8 F

## 2022-05-16 PROCEDURE — HOSPICE MEDICATION HC HH HOSPICE MEDICATION

## 2022-05-16 PROCEDURE — G0156 HHCP-SVS OF AIDE,EA 15 MIN: HCPCS

## 2022-05-16 PROCEDURE — G0300 HHS/HOSPICE OF LPN EA 15 MIN: HCPCS

## 2022-05-16 PROCEDURE — 0651 HSPC ROUTINE HOME CARE

## 2022-05-16 NOTE — HOSPICE
The patient is here for a routine OB visit  She reports no contractions, vaginal bleeding or leakage of fluid  She reports no fetal movement yet  Pt had a sequential screen done, 2nd part is pending  She is scheduled for level 2 US  She refused the flu shot today  Patient with history of gestational hypertension  She was recommended low-dose aspirin  Baseline preeclamptic labs was ordered  Follow up in 4 weeks  routine visit with wound care. On arrival patient laying in bed a&o x$. Patient denied pain. Patient cleaned of large bowel movement. Wound care completed; patient tolerated well. New incontinence brief placed on patient. Reviewed suppliy order from 5/13. order to arrive 5/17.   No other needs

## 2022-05-17 ENCOUNTER — HOME CARE VISIT (OUTPATIENT)
Dept: SCHEDULING | Facility: HOME HEALTH | Age: 72
End: 2022-05-17
Payer: MEDICARE

## 2022-05-17 PROCEDURE — 0651 HSPC ROUTINE HOME CARE

## 2022-05-17 PROCEDURE — G0155 HHCP-SVS OF CSW,EA 15 MIN: HCPCS

## 2022-05-18 ENCOUNTER — HOME CARE VISIT (OUTPATIENT)
Dept: SCHEDULING | Facility: HOME HEALTH | Age: 72
End: 2022-05-18
Payer: MEDICARE

## 2022-05-18 PROCEDURE — 0651 HSPC ROUTINE HOME CARE

## 2022-05-18 PROCEDURE — G0156 HHCP-SVS OF AIDE,EA 15 MIN: HCPCS

## 2022-05-19 VITALS
DIASTOLIC BLOOD PRESSURE: 68 MMHG | SYSTOLIC BLOOD PRESSURE: 134 MMHG | TEMPERATURE: 98.5 F | HEART RATE: 82 BPM | RESPIRATION RATE: 16 BRPM

## 2022-05-19 PROCEDURE — 0651 HSPC ROUTINE HOME CARE

## 2022-05-19 NOTE — HOSPICE
Patient lying in bed upon arrival for visit; patient's caregiver present. Patient alert and oriented X4; reports no pain currently. Patient denies any dyspnea or cough. Good appetite. Incontinent of bowel and bladder. Last bowel movement this morning. Wound care completed to coccyx. No needs verbalized by caregiver at this time. Supplies ordered: Wipes, chux, gloves via [employer]. Reinforced hospice 24/7 for any concerns or change in patient's condition.

## 2022-05-20 ENCOUNTER — HOME CARE VISIT (OUTPATIENT)
Dept: SCHEDULING | Facility: HOME HEALTH | Age: 72
End: 2022-05-20
Payer: MEDICARE

## 2022-05-20 PROCEDURE — 0651 HSPC ROUTINE HOME CARE

## 2022-05-20 PROCEDURE — G0299 HHS/HOSPICE OF RN EA 15 MIN: HCPCS

## 2022-05-20 PROCEDURE — G0156 HHCP-SVS OF AIDE,EA 15 MIN: HCPCS

## 2022-05-21 VITALS — RESPIRATION RATE: 14 BRPM

## 2022-05-21 PROCEDURE — 0651 HSPC ROUTINE HOME CARE

## 2022-05-21 NOTE — HOSPICE
Hospice RN visit with patient. Caregiver Miladis present. Patient in bed, alert x 4, no pain or SOB. Wound care completed, wound remains closed. RN will order needed supplies for delivery - saline, diapers, wipes, gloves, Allevyn foam dressings. RN will order needed med refills - Senna.   Patient and CGs understand to call hospice with any needs

## 2022-05-22 PROCEDURE — 0651 HSPC ROUTINE HOME CARE

## 2022-05-23 ENCOUNTER — HOME CARE VISIT (OUTPATIENT)
Dept: SCHEDULING | Facility: HOME HEALTH | Age: 72
End: 2022-05-23
Payer: MEDICARE

## 2022-05-23 VITALS
HEART RATE: 55 BPM | RESPIRATION RATE: 16 BRPM | SYSTOLIC BLOOD PRESSURE: 128 MMHG | DIASTOLIC BLOOD PRESSURE: 78 MMHG | OXYGEN SATURATION: 99 %

## 2022-05-23 PROCEDURE — G0156 HHCP-SVS OF AIDE,EA 15 MIN: HCPCS

## 2022-05-23 PROCEDURE — G0300 HHS/HOSPICE OF LPN EA 15 MIN: HCPCS

## 2022-05-23 PROCEDURE — 0651 HSPC ROUTINE HOME CARE

## 2022-05-23 NOTE — HOSPICE
routine visit. On arrival patient laying in bed a&o x4. Patient with no c/o pain. Wound care completed. Patient tolerated procedure well. Patient stated supplies ordered by [facility] on Friday. Called to confirm that supplies in progress. Allevyn foams are back ordered, will order alternative until Allevyn are available.   Tylenol and Senna refill ordered today for delivery

## 2022-05-24 PROCEDURE — 0651 HSPC ROUTINE HOME CARE

## 2022-05-25 ENCOUNTER — HOME CARE VISIT (OUTPATIENT)
Dept: SCHEDULING | Facility: HOME HEALTH | Age: 72
End: 2022-05-25
Payer: MEDICARE

## 2022-05-25 ENCOUNTER — HOME CARE VISIT (OUTPATIENT)
Dept: HOSPICE | Facility: HOSPICE | Age: 72
End: 2022-05-25
Payer: MEDICARE

## 2022-05-25 PROCEDURE — 0651 HSPC ROUTINE HOME CARE

## 2022-05-25 PROCEDURE — G0156 HHCP-SVS OF AIDE,EA 15 MIN: HCPCS

## 2022-05-25 NOTE — HOSPICE
MSW met with pt at group home where she was alert and oriented x4. Pt stated appreciative of hospice support and would like social work to assist her with getting medical records. MSW learned that patient would like to receive information/medical records from [address] and other facilities she was admitted to over the years. Patient would like to understand her condition better and how she's been unable to walk for over 3 years. The patient stated not understanding the connection and would like to know for herself why certain procedures were done and other reasons to health changes. The patient stated reaching out and completing forms that she sent to the hospitals requesting her records and no response. The patient stated wanting records from [id number] from [employer], [employer] and etc. MSW will assist with consent exchange form; reaching out to the patient advocate and etc to retrieve medical records for patient. MSW continued with processing patient thoughts and discussing any of her concerns. Patient engaged during visit was very talkative and open about her life. The patient enjoys socialization and doesn't get many visits since the pandemic. The patient has an estranged son. Brother passed and sister 49yrs old lives out of state. MSW will continue to provide emotional support and assist to patient.

## 2022-05-26 ENCOUNTER — HOME CARE VISIT (OUTPATIENT)
Dept: HOSPICE | Facility: HOSPICE | Age: 72
End: 2022-05-26
Payer: MEDICARE

## 2022-05-26 PROCEDURE — 0651 HSPC ROUTINE HOME CARE

## 2022-05-27 ENCOUNTER — HOME CARE VISIT (OUTPATIENT)
Dept: SCHEDULING | Facility: HOME HEALTH | Age: 72
End: 2022-05-27
Payer: MEDICARE

## 2022-05-27 ENCOUNTER — HOME CARE VISIT (OUTPATIENT)
Dept: HOSPICE | Facility: HOSPICE | Age: 72
End: 2022-05-27
Payer: MEDICARE

## 2022-05-27 PROCEDURE — HOSPICE MEDICATION HC HH HOSPICE MEDICATION

## 2022-05-27 PROCEDURE — 0651 HSPC ROUTINE HOME CARE

## 2022-05-27 PROCEDURE — G0299 HHS/HOSPICE OF RN EA 15 MIN: HCPCS

## 2022-05-27 PROCEDURE — G0156 HHCP-SVS OF AIDE,EA 15 MIN: HCPCS

## 2022-05-28 PROCEDURE — 0651 HSPC ROUTINE HOME CARE

## 2022-05-29 PROCEDURE — 0651 HSPC ROUTINE HOME CARE

## 2022-05-29 NOTE — HOSPICE
Hospice RN visit with patient, caregiver Yaniv Ponce present. Patient alert x 4, in bed, discussedw ti patient transfer to [id number]. Patient met with NP Jose A Ahn via FaceTime call to answer questions. Wound care completed to sacrum, wound still closed, foam dressing applied. RN will order needed supplies for delivery - Chux, diapers, wipes.

## 2022-05-30 ENCOUNTER — HOME CARE VISIT (OUTPATIENT)
Dept: SCHEDULING | Facility: HOME HEALTH | Age: 72
End: 2022-05-30
Payer: MEDICARE

## 2022-05-30 PROCEDURE — G0299 HHS/HOSPICE OF RN EA 15 MIN: HCPCS

## 2022-05-30 PROCEDURE — 0651 HSPC ROUTINE HOME CARE

## 2022-05-30 PROCEDURE — HOSPICE MEDICATION HC HH HOSPICE MEDICATION

## 2022-05-31 PROCEDURE — 0651 HSPC ROUTINE HOME CARE

## 2022-06-01 ENCOUNTER — HOME CARE VISIT (OUTPATIENT)
Dept: SCHEDULING | Facility: HOME HEALTH | Age: 72
End: 2022-06-01
Payer: MEDICARE

## 2022-06-01 PROCEDURE — G0156 HHCP-SVS OF AIDE,EA 15 MIN: HCPCS

## 2022-06-01 PROCEDURE — 0651 HSPC ROUTINE HOME CARE

## 2022-06-01 NOTE — HOSPICE
Hospice RN visit with patient. Patient in bed, alert x 4, wound care completed to sacrum. Wound remains closed, new foam dressing applied. No new issues or concerns. Briefly discussed transition to Home Based Primary Care. No med refills needed. RN will order for delivery - liners, Chux, wipes, gloves, No-Rinse bodywash.

## 2022-06-02 PROCEDURE — 0651 HSPC ROUTINE HOME CARE

## 2022-06-03 ENCOUNTER — HOME CARE VISIT (OUTPATIENT)
Dept: SCHEDULING | Facility: HOME HEALTH | Age: 72
End: 2022-06-03
Payer: MEDICARE

## 2022-06-03 ENCOUNTER — HOME CARE VISIT (OUTPATIENT)
Dept: HOSPICE | Facility: HOSPICE | Age: 72
End: 2022-06-03
Payer: MEDICARE

## 2022-06-03 PROCEDURE — G0299 HHS/HOSPICE OF RN EA 15 MIN: HCPCS

## 2022-06-03 PROCEDURE — G0156 HHCP-SVS OF AIDE,EA 15 MIN: HCPCS

## 2022-06-03 PROCEDURE — 0651 HSPC ROUTINE HOME CARE

## 2022-06-03 NOTE — HOSPICE
Hospice RN visit with patient, caregiver Nemo Good present in home. Patient in bed, alert x 4, no pain or shortness of breath, no new concerns or issues. Wound care completed to sacrum, wound remains stage 1 and closed. Reviewed with patient upcoming transition to Home Based Primary Care, specifically equipment issues. [facility] nurse practitioner scheduled to visit patient 6/16. Several supply items in transit, delayed by holiday earlier in week. Incorrect diapers were sent, RN will follow up. No medication refills needed.

## 2022-06-04 PROCEDURE — 0651 HSPC ROUTINE HOME CARE

## 2022-06-05 PROCEDURE — 0651 HSPC ROUTINE HOME CARE

## 2022-06-06 ENCOUNTER — HOME CARE VISIT (OUTPATIENT)
Dept: SCHEDULING | Facility: HOME HEALTH | Age: 72
End: 2022-06-06
Payer: MEDICARE

## 2022-06-06 ENCOUNTER — TELEPHONE (OUTPATIENT)
Dept: FAMILY MEDICINE CLINIC | Age: 72
End: 2022-06-06

## 2022-06-06 ENCOUNTER — HOME CARE VISIT (OUTPATIENT)
Dept: HOSPICE | Facility: HOSPICE | Age: 72
End: 2022-06-06
Payer: MEDICARE

## 2022-06-06 VITALS
SYSTOLIC BLOOD PRESSURE: 126 MMHG | HEART RATE: 74 BPM | DIASTOLIC BLOOD PRESSURE: 74 MMHG | TEMPERATURE: 98.3 F | OXYGEN SATURATION: 98 % | RESPIRATION RATE: 16 BRPM

## 2022-06-06 PROCEDURE — HOSPICE MEDICATION HC HH HOSPICE MEDICATION

## 2022-06-06 PROCEDURE — G0156 HHCP-SVS OF AIDE,EA 15 MIN: HCPCS

## 2022-06-06 PROCEDURE — 0651 HSPC ROUTINE HOME CARE

## 2022-06-06 PROCEDURE — G0299 HHS/HOSPICE OF RN EA 15 MIN: HCPCS

## 2022-06-06 PROCEDURE — G0155 HHCP-SVS OF CSW,EA 15 MIN: HCPCS

## 2022-06-06 NOTE — HOSPICE
Patient lying in bed upon arrival for visit; patient's caregiver present. Patient alert and oriented X4. Patient denies any pain or dyspnea. Good appetite. Incontinent of bowel and bladder. Last bowel movement yesterday. Wound care completed to stage 1 sacral area; closed. Patient requesting medication and supply refills, however patient has received recent deliveries. RNCM aware and will follow up as necessary. Reinforced hospice 24/7 for any concerns or change in patient's condition.

## 2022-06-06 NOTE — HOSPICE
Music Therapy Progress Note  Joslyn Villalta  [address] [facility]   Patient Telephone Number: [phone]  Yarsani Affiliation: Congregation/ Denominational  Language: English    Date: 5/26/22    Mental Status:   [ X ] Alert [  ] Miguelina Boros [  ]  Confused  [  ] Minimally responsive  [  ] Sleeping    Communication Status: [  ] Impaired Speech [ X ] Verbal     Physical Status:   [  ] Oxygen in use  [  ] Hard of Hearing [  ] Vision Impaired   [  ] Ambulatory  [  ] Ambulatory with assistance [ X ] Non-ambulatory     Music Preferences, Background: Varied popular and sacred; sings for enjoyment and making recordings for others;    Clinical Problem addressed: Emotional support; self-expression;    Goal(s) met in session:  Physical/Pain management (Scale of 1-10):    Pre-session rating ___________    Post-session rating __________  Billy.Lipa  ] Increased relaxation               [  ] Affected breathing patterns  [  ] Decreased muscle tension               [  ] Decreased agitation  [  ] Affected heart rate    [  ] Increased alertness     Emotional/Psychological:  Billy.Lipa  ] Increased self-expression   [  ] Decreased aggressive behavior   [  ] Decreased feelings of stress              [  ] Discussed healthy coping skills     [  ] Improved mood    [  ] Decreased withdrawn behavior     Social:  Billy.Lipa ] Decreased feelings of isolation/loneliness [  ] Positive social interaction    [  ] Provided support and/or comfort for family/friends    Spiritual:  [  ] Spiritual support    [  ] Expressed peace  [  ] Expressed misti    [  ] Discussed beliefs    Techniques Utilized (Check all that apply):   [  ] Procedural support MT [  ] Music for relaxation             Billy.Lipa  ] Patient preferred music  [  ] Rosy analysis  [  ] Song choice              [  ] Music for validation  [  ] Entrainment              [  ] Movement to music             [  ] Guided visualization  [  ] Luis Walton  [  ] Patient instrument playing [  ] Rachel Latif writing  [ X ] Singing   [ ] Improvisation              [  ] Sensory stimulation  [  ] Active Listening  [  ] Music for spiritual support [  ] Making of CDs as gifts    Session Observations:  Joslyn Gordon was visited virtually, and was able to participate by singing and sharing her thoughts and feelings. She offered her thoughts and preferences on the peer support group, and asked about several of the people she has met through the group. She sang with familiar songs, and was offered a variety of Beatles and folk music that she finds bring up memories and allow for self-expression. She suggested a theme of \"travel\" for an upcoming music support group. Thank you for the opportunity to provide music therapy to [facility].

## 2022-06-06 NOTE — TELEPHONE ENCOUNTER
LCSW called pt to schedule initial in-home visit for [facility] intake paperwork completion and psychosocial assessment. No answer, went straight to voicemail. LCSW called the number listed for pt's /mPOA \"Lisa\", the number is no longer in service. LCSW called pt's cell phone back, left a detailed message, provided contact information and encouraged a call back at her convenience.      LORNA Cabrera, [facility]    [address]  ) [phone]

## 2022-06-06 NOTE — TELEPHONE ENCOUNTER
LCSW rec'd return call voicemail from pt. LCSW called her back 2 minutes after missed call, and her phone again did not ring, went straight to voicemail. LCSW left another message, encouraged a call back at her convenience. No return call by [facility].      LORNA Jackson, [facility]    [address]  (k) [phone]

## 2022-06-07 ENCOUNTER — TELEPHONE (OUTPATIENT)
Dept: FAMILY MEDICINE CLINIC | Age: 72
End: 2022-06-07

## 2022-06-07 PROCEDURE — 0651 HSPC ROUTINE HOME CARE

## 2022-06-07 NOTE — TELEPHONE ENCOUNTER
LCSW attempted again to reach pt, to schedule initial in-home visit for [facility] paperwork completion and psychosocial assessment. Phone does not ring, goes straight to voicemail. LCSW left message, provided contact information, and encouraged a call back at her convenience.      LORNA Wynn, [facility]    [place] [facility] [address] [place] [address]  (y) [phone]

## 2022-06-08 ENCOUNTER — TELEPHONE (OUTPATIENT)
Dept: FAMILY MEDICINE CLINIC | Age: 72
End: 2022-06-08

## 2022-06-08 ENCOUNTER — HOME CARE VISIT (OUTPATIENT)
Dept: SCHEDULING | Facility: HOME HEALTH | Age: 72
End: 2022-06-08
Payer: MEDICARE

## 2022-06-08 PROCEDURE — G0156 HHCP-SVS OF AIDE,EA 15 MIN: HCPCS

## 2022-06-08 PROCEDURE — 0651 HSPC ROUTINE HOME CARE

## 2022-06-08 NOTE — TELEPHONE ENCOUNTER
Home Based Primary Care [facility] Supportive Services   Phone:  [phone]      Fax:  [phone] [place] [address], [address]    Name:  Urszula Figueroa  YOB: 1950    When Aldo Joel LCSW spoke with Ms. Celi Silveriooter today, she requested [facility] visits on Tuesdays or Thursdays, her only \"free days\". This nurse called patient and explained that [address] providers may make scheduled home visits between the hours of 8:30am and 5:00pm.   Individual appointment day or time requests (such as no morning appts) are not usually able to be honored due to the fact that visits are scheduled to optimize the travel route of the provider. Patient says she was not aware that when [address] starts coming, the hospice aides and nurses will stop coming. She was basing her request for [facility] visits on Tuesdays or Thursdays on her understanding that the aides would continue to come MWF and nurse would come on MF and TTh would be her free days. I explained to her that once she is discharged from hospice she will no longer have the aides and nurses coming. Once she understood that, she is fine with [facility] coming any day. Patient says that the staff in the group home changes her diaper 3x daily and changes her linens weekly;  they do not bathe her. This nurse asked if that is because Hospice has been sending an aide to bathe her. She said that the year that she lived there prior to hospice, she was never bathed. This nurse told patient that once [facility] starts care, we will ask the caregiver about bathing.     Dariela Palacio, OPALN, RN-BC, [facility]  Referral Navigator, Home Based Primary Care & Supportive Services

## 2022-06-08 NOTE — HOSPICE
MSW assisted with consent exchange form; reaching out to the patient advocate and etc to retrieve medical records for patient. MSW spoke to patient advocate about next steps and instructed to fax [facility] with release of information and letter attached. MSW requested information/medical records from [address] and other facilities. MSW made call CNA to follow up with receiving faxed CNA form. CNA stated patient is approved. MSW shared that with patient and she was appreciative. MSW will continue to provide emotional support and assist to patient. Problem: Emotional support to patient who has limited support and is bedbound in an adult home. Plan: MSW will continue to offer emotional support to patient through socialization and active listening. Advance Directives: Advanced Directive is in place, caregiver, Michael Negron, is ELIZABETH  DDNR: Pt has a DNR in place.  Home: Patient has not finalized plans, leaning towards [facility] or [place].

## 2022-06-08 NOTE — TELEPHONE ENCOUNTER
LCSW rec'd return call from pt at 6:53pm on Tuesday 6/7/22. She left voicemail stating that she could not hear LCSW's voicemail message on her phone. At 1:42pm on Wednesday 6/8/22, pt called the office stating that LCSW had not called her back, she did not attempt to call LCSW on work cell phone. LCSW was at the office and able to take call from pt. LCSW had not yet had a chance to call her back due to schedule for the day, but was able to take her call in the office. Pt stated that she only has time for intake visit from [facility] on Tuesdays or Thursdays. [facility] NP is scheduled to see pt next Thursday, 6/16/22. LCSW offered the morning of 6/16/22 for initial visit for intake paperwork completion and psychosocial assessment. Pt stated that she is currently working with hospice social worker, John Savage, to obtain her medical records from previous hospitalizations and long term care facilities. She stated she feels it is her right to have access to her medical records, and she is looking for answers as to why she is currently bedbound. [facility] sent email to Ms. Wilson for additional information, as pt will be d/c from hospice and [facility] will become primary care team.     LCSW shared with team that pt stated she only has time for visits on Tuesdays and Thursdays. It was not made clear as to why she is unavailable on Mondays, Wednesdays, or Fridays. If pt is not agreeable to visits on Mondays, Wednesdays, or Fridays, [facility] may not be able to admit pt due to scheduling limitations. HB referral nurse to contact pt and clarify her reasons for not being able to be seen on Mondays, Wednesdays, and Fridays.      LORNA Monge, [place]    [address]  (d) [phone]

## 2022-06-08 NOTE — TELEPHONE ENCOUNTER
Dr. Lizzy Woodard :    CMS considers documentation to be conflicting in nature when one condition is diagnosed as two different conditions by the same or different physicians. In this instance, the patient presented with Chest pain. On the progress note, Dr. Patrice Cabrera documents that the patient has Chest pain in setting of Chronic hypoxic resp failure but the same condition is documented as chest tightness in the setting of Acute hypoxic respiratory failure by Dr. Severiano Lederer on his/her progress note. Please clarify the patient's condition if possible:    =>Acute on chronic hypoxic respiratory failure POA   =>Chronic hypoxic respiratory failure POA  =>Other Explanation (please specify)  =>Unable to Determine    The medical record reflects the following risk factors, clinical indicators, and treatment    Risk Factors:  47 BF w/hx: arthritis, asthma, CAD, CHF, COPD, DM, HTN, morbid obesity, NSTEMI, SVT  Clinical Indicators:  Admitted with chest pain with Admit SpO2 88-96% on 3.5 lpm to 6lpm O2 via NC,  Per cardiology \"chest tightness in the setting of acute hypoxic respiratory failure\"  Treatment: Pulmonary consult, cardiology consult, continuation of home CPAP and O2 at 5lpm    Please clarify and document your clinical opinion in the progress notes and discharge summary including the definitive and/or presumptive diagnosis, (suspected or probable), related to the above clinical findings. Please include clinical findings supporting your diagnosis. Thank [email or web address]  [phone] LCSW spoke with pt to schedule initial in-home social work visit for [facility] intake paperwork completion and initial psychosocial assessment.  Visit scheduled for Thursday 6/16 @ Adam [facility], MSW, [address]  R) [phone]

## 2022-06-09 ENCOUNTER — HOME CARE VISIT (OUTPATIENT)
Dept: HOSPICE | Facility: HOSPICE | Age: 72
End: 2022-06-09
Payer: MEDICARE

## 2022-06-09 PROCEDURE — 0651 HSPC ROUTINE HOME CARE

## 2022-06-10 ENCOUNTER — HOME CARE VISIT (OUTPATIENT)
Dept: SCHEDULING | Facility: HOME HEALTH | Age: 72
End: 2022-06-10
Payer: MEDICARE

## 2022-06-10 PROCEDURE — G0300 HHS/HOSPICE OF LPN EA 15 MIN: HCPCS

## 2022-06-10 PROCEDURE — G0156 HHCP-SVS OF AIDE,EA 15 MIN: HCPCS

## 2022-06-10 PROCEDURE — 0651 HSPC ROUTINE HOME CARE

## 2022-06-11 PROCEDURE — 0651 HSPC ROUTINE HOME CARE

## 2022-06-12 PROCEDURE — 0651 HSPC ROUTINE HOME CARE

## 2022-06-13 ENCOUNTER — HOME CARE VISIT (OUTPATIENT)
Dept: SCHEDULING | Facility: HOME HEALTH | Age: 72
End: 2022-06-13
Payer: MEDICARE

## 2022-06-13 VITALS
SYSTOLIC BLOOD PRESSURE: 124 MMHG | TEMPERATURE: 98 F | OXYGEN SATURATION: 98 % | HEART RATE: 54 BPM | RESPIRATION RATE: 16 BRPM | DIASTOLIC BLOOD PRESSURE: 68 MMHG

## 2022-06-13 PROCEDURE — 0651 HSPC ROUTINE HOME CARE

## 2022-06-13 PROCEDURE — G0156 HHCP-SVS OF AIDE,EA 15 MIN: HCPCS

## 2022-06-13 PROCEDURE — G0300 HHS/HOSPICE OF LPN EA 15 MIN: HCPCS

## 2022-06-13 NOTE — HOSPICE
routine visit. On arrival patient laying in bed a&o x4. Patient denied pain. Per patient bowel movements are regular. No difficulty voiding. Wound care to sacrum completed; patient tolerated well.   No needs at this time

## 2022-06-14 PROCEDURE — 0651 HSPC ROUTINE HOME CARE

## 2022-06-15 ENCOUNTER — HOME CARE VISIT (OUTPATIENT)
Dept: SCHEDULING | Facility: HOME HEALTH | Age: 72
End: 2022-06-15
Payer: MEDICARE

## 2022-06-15 PROCEDURE — G0156 HHCP-SVS OF AIDE,EA 15 MIN: HCPCS

## 2022-06-15 PROCEDURE — 0651 HSPC ROUTINE HOME CARE

## 2022-06-16 ENCOUNTER — HOME CARE VISIT (OUTPATIENT)
Dept: HOSPICE | Facility: HOSPICE | Age: 72
End: 2022-06-16
Payer: MEDICARE

## 2022-06-16 ENCOUNTER — OFFICE VISIT (OUTPATIENT)
Dept: FAMILY MEDICINE CLINIC | Age: 72
End: 2022-06-16

## 2022-06-16 ENCOUNTER — HOME VISIT (OUTPATIENT)
Dept: FAMILY MEDICINE CLINIC | Age: 72
End: 2022-06-16
Payer: MEDICARE

## 2022-06-16 VITALS
DIASTOLIC BLOOD PRESSURE: 90 MMHG | OXYGEN SATURATION: 97 % | HEART RATE: 59 BPM | RESPIRATION RATE: 16 BRPM | SYSTOLIC BLOOD PRESSURE: 182 MMHG

## 2022-06-16 DIAGNOSIS — M25.662 JOINT STIFFNESS OF BOTH KNEES: ICD-10-CM

## 2022-06-16 DIAGNOSIS — D72.829 LEUKOCYTOSIS, UNSPECIFIED TYPE: ICD-10-CM

## 2022-06-16 DIAGNOSIS — I42.8 OTHER CARDIOMYOPATHY (HCC): Primary | ICD-10-CM

## 2022-06-16 DIAGNOSIS — R52 MILD PAIN: ICD-10-CM

## 2022-06-16 DIAGNOSIS — E03.9 HYPOTHYROIDISM, UNSPECIFIED TYPE: ICD-10-CM

## 2022-06-16 DIAGNOSIS — Z11.59 NEED FOR HEPATITIS C SCREENING TEST: ICD-10-CM

## 2022-06-16 DIAGNOSIS — I10 PRIMARY HYPERTENSION: ICD-10-CM

## 2022-06-16 DIAGNOSIS — M25.661 JOINT STIFFNESS OF BOTH KNEES: ICD-10-CM

## 2022-06-16 DIAGNOSIS — Z76.89 ENCOUNTER TO ESTABLISH CARE: ICD-10-CM

## 2022-06-16 DIAGNOSIS — Z91.89 AT HIGH RISK FOR SKIN BREAKDOWN: ICD-10-CM

## 2022-06-16 DIAGNOSIS — E78.5 HYPERLIPIDEMIA, UNSPECIFIED HYPERLIPIDEMIA TYPE: ICD-10-CM

## 2022-06-16 DIAGNOSIS — K59.00 CONSTIPATION, UNSPECIFIED CONSTIPATION TYPE: ICD-10-CM

## 2022-06-16 DIAGNOSIS — Z74.01 BEDBOUND: ICD-10-CM

## 2022-06-16 DIAGNOSIS — Z76.89 ENCOUNTER FOR SOCIAL WORK INTERVENTION: Primary | ICD-10-CM

## 2022-06-16 PROCEDURE — 0651 HSPC ROUTINE HOME CARE

## 2022-06-16 PROCEDURE — 1123F ACP DISCUSS/DSCN MKR DOCD: CPT | Performed by: NURSE PRACTITIONER

## 2022-06-16 PROCEDURE — 99345 HOME/RES VST NEW HIGH MDM 75: CPT | Performed by: NURSE PRACTITIONER

## 2022-06-16 RX ORDER — LEVOTHYROXINE SODIUM 25 UG/1
25 TABLET ORAL DAILY
Qty: 30 TABLET | Refills: 5 | Status: SHIPPED | OUTPATIENT
Start: 2022-06-16

## 2022-06-16 RX ORDER — DEXTROMETHORPHAN HYDROBROMIDE, GUAIFENESIN 5; 100 MG/5ML; MG/5ML
650 LIQUID ORAL EVERY 8 HOURS
Qty: 100 TABLET | Refills: 2 | Status: SHIPPED | OUTPATIENT
Start: 2022-06-16 | End: 2022-07-20 | Stop reason: ALTCHOICE

## 2022-06-16 RX ORDER — CARVEDILOL 3.12 MG/1
3.12 TABLET ORAL 2 TIMES DAILY
Qty: 60 TABLET | Refills: 5 | Status: SHIPPED | OUTPATIENT
Start: 2022-06-16

## 2022-06-16 RX ORDER — AMOXICILLIN 250 MG
2 CAPSULE ORAL
Qty: 100 TABLET | Refills: 1 | Status: SHIPPED | OUTPATIENT
Start: 2022-06-16
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
## 2022-06-16 NOTE — PROGRESS NOTES
Joint home visit with Aguilar Fiugeroa NP to establish primary care relationship    S \" I am not having any pain right now, I usually take 1 oxycodone pill at bedtime for sacral pain. \"    O - Patient lying supine in hospital bed, alert and oriented x 4. Unhappy with low air loss mattress that has been provided by hospice and wants a hospital bed with a regular mattress. She is non-ambulatory x 4 years, states her knees do not bend. Goal is to have PT so she can stand up and walk. Appt made for this nurse to return on 6/20 early AM for venipuncture, she is a known difficult stick and we advised her to hydrate well day before and have extra cover on bed day of draw to cause vessel dilation. She verbalized understanding of these instructions. Assisted pt to turn, sacral decub is fully healed with white scar tissue evident - she has protective mepilex sacrum in place. Discussed adding protein to her diet with [employer], one daily. She is intolerant of Boost and Ensure, history of intolerance of milk, uses lactaid milk. A - BP on arrival right arm 180/72 P 59 R 16 O2 sat 97%. After one hour repeated right arm 182/90, left arm 154/70    P - Will assist provider with home health order for PT, order for semi-electric hospital bed with gel jayson support surface.

## 2022-06-17 ENCOUNTER — DOCUMENTATION ONLY (OUTPATIENT)
Dept: FAMILY MEDICINE CLINIC | Age: 72
End: 2022-06-17

## 2022-06-17 ENCOUNTER — HOME HEALTH ADMISSION (OUTPATIENT)
Dept: HOME HEALTH SERVICES | Facility: HOME HEALTH | Age: 72
End: 2022-06-17
Payer: MEDICARE

## 2022-06-17 DIAGNOSIS — Z11.59 NEED FOR HEPATITIS C SCREENING TEST: ICD-10-CM

## 2022-06-17 DIAGNOSIS — E78.5 HYPERLIPIDEMIA, UNSPECIFIED HYPERLIPIDEMIA TYPE: ICD-10-CM

## 2022-06-17 DIAGNOSIS — I10 PRIMARY HYPERTENSION: ICD-10-CM

## 2022-06-17 DIAGNOSIS — I42.8 OTHER CARDIOMYOPATHY (HCC): ICD-10-CM

## 2022-06-17 DIAGNOSIS — E03.9 HYPOTHYROIDISM, UNSPECIFIED TYPE: ICD-10-CM

## 2022-06-17 PROBLEM — L89.159 SACRAL DECUBITUS ULCER: Status: RESOLVED | Noted: 2020-12-16 | Resolved: 2022-06-17

## 2022-06-17 PROBLEM — D72.829 LEUKOCYTOSIS: Status: ACTIVE | Noted: 2022-06-17

## 2022-06-17 PROBLEM — A41.9 SEPSIS (HCC): Status: RESOLVED | Noted: 2020-12-16 | Resolved: 2022-06-17

## 2022-06-17 PROBLEM — R77.8 ELEVATED TROPONIN: Status: RESOLVED | Noted: 2021-04-23 | Resolved: 2022-06-17

## 2022-06-17 PROBLEM — R79.89 ELEVATED TROPONIN: Status: RESOLVED | Noted: 2021-04-23 | Resolved: 2022-06-17

## 2022-06-17 NOTE — PATIENT INSTRUCTIONS
Learning About Getting More Protein  How does your body use protein? Protein is an essential part of our diets. It is made up of chemicals called amino acids. Your body needs protein to help build and repair muscle, skin, and other body tissues. Protein also helps fight infection, balance body fluids, and carry oxygen through the body. How much protein do you need? How much protein you need each day depends on your age, sex, and how active you are. It's recommended that most adults eat 5 to 7 ounces of protein foods a day. Sometimes you may need to eat more protein. Your doctor may advise you on the right amount of protein you need. What foods contain protein? Protein is found in a variety of foods. High-protein foods include lean meat, poultry, and fish. A serving of these foods is 2 to 3 ounces. (3 ounces is about the size and thickness of a deck of cards.)  Protein isn't just found in meat. If you are looking for other types of protein, the following foods are equal to about 1 ounce of meat:  · ¼ cup of cooked beans, peas, or lentils  · ¼ cup of tofu (about 2 ounces)  · 2 Tbsp of hummus  · ½ ounce of nuts or seeds (for example, 12 almonds or 7 walnut halves)  · 1 egg  · 1 Tbsp of peanut butter or other nut or seed butter  Other sources of protein include cheese, milk, and other milk products. You can also buy protein bars, drinks, and powders. Check the nutrition label for the amount of protein in each serving. What are some tips for getting more protein? You can get more protein in your food by adding high-protein ingredients. For example, you can:  · Add powdered milk to other foods, such as pudding or soups. · Add powdered protein to fruit smoothies and cooked cereal.  · Add beans to soup and chili. · Add nuts, seeds, or wheat germ to yogurt. You can also:  · Spread peanut butter onto a banana. · Mix cottage cheese into noodle dishes or casseroles.   · Sprinkle hard-boiled eggs on a salad.  · Grate cheese over vegetables and soups. Where can you learn more? Go to http://www.gray.com/  Enter X375 in the search box to learn more about \"Learning About Getting More Protein. \"  Current as of: September 8, 2021               Content Version: 13.2  © 5999-5452 [employer], [employer]. Care instructions adapted under license by [employer] (which disclaims liability or warranty for this information). If you have questions about a medical condition or this instruction, always ask your healthcare professional. [address] any warranty or liability for your use of this information.
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
## 2022-06-17 NOTE — PROGRESS NOTES
Home Based Primary Care  Plan of Care    [facility] Team Members: Valerio Sanchez MD; Vicki Long NP; Antonietta Murphy NP; Aidan Quinonez, RN; Ginna St, LASHANDA; ELY Caceres  1950 / [id number]  female    The physician has reviewed and discussed the plan of care with the interdisciplinary group on 06/21/22 and agrees. Date of Initial Visit (Start of Care):    Diagnosis:   Patient Active Problem List   Diagnosis Code    Infected decubitus ulcer L89.90, L08.9    HTN (hypertension) I10    HLD (hyperlipidemia) E78.5    Cardiomyopathy ([address] I42.9    Anemia D64.9    Hypothyroidism E03.9    Leukocytosis D72.829       Patient status summary: 81 y.o. female who was referred to the [facility] program due to bedbound status, bilateral knee stiffness. Patient discussed today for initial POC review. Advance Care Planning:  Code status: DNR      Primary Decision Maker: Kayla Jain \"Lisa\" - Caregiver - [phone]   Advance Care Planning 4/28/2021   Patient's Healthcare Decision Maker is: Named in scanned ACP document   Confirm Advance Directive Yes, on file   Patient Would Like to Complete Advance Directive -   Does the patient have other document types Do Not Resuscitate       DME/Supplies:  Hospital Bed     Allergies   Allergen Reactions    Antihistamines - Alkylamine Unknown (comments)     reported on referral packet       Nutritional Requirements:   Oral with supported meal preparation    Functional/Activity Level:  Bedbound only    Safety Measures:   Self-care deficity    Acuity Level Rating: Low    Current Outpatient Medications   Medication Sig    acetaminophen (TYLENOL) 650 mg TbER Take 1 Tablet by mouth every eight (8) hours.  carvediloL (COREG) 3.125 mg tablet Take 1 Tablet by mouth two (2) times a day.  levothyroxine (SYNTHROID) 25 mcg tablet Take 1 Tablet by mouth daily.     senna-docusate (Senna with Docusate Sodium) 8.6-50 mg per tablet Take 2 Tablets by mouth two (2) times daily as needed for Constipation. bowel regime    oxyCODONE IR (ROXICODONE) 5 mg immediate release tablet Take 5 mg by mouth every four (4) hours as needed for Pain (Unrelieved by Tylenol). No current facility-administered medications for this visit. The Plan of Care has been initiated for within 15 days of New Visit  and reviewed and updated by the Interdisciplinary Group (IDG) as frequently as the patient condition warrants. Plan of Care by Discipline:    1. Provider  Identify patient's health care goal(s), Assessment of medication adverse effects, Reduction of polypharmacy within benefit/burden framework appropriate to age, function and disease state, Determine need for laboratory assessment based on patient disease status , Assess results of laboratory testing and adjust treatment plan as appropriate, Communicate with prior/current health care team members to enhance understanding of patient status and Anticipate and plan for medical intervention(s) in emergency / at time of crisis    2. Nursing  Identify patient's health care goal(s), Communicate with prior/current health care team members to enhance understanding of patient status, Education regarding disease state, Education regarding current medications and adverse effects, Education for skin care in patients with limited mobility; with focus on preventing skin breakdown and Skin assessment in patient's with limited mobility    3. Social Work  Introduce Home Based Primary Care and Supportive Services, New patient assessment of psychosocial needs and social determinants of health, Establish therapeutic relationship through in home visits and telephonic touch points, Assist in optimizing levels of psychosocial function and Assess safety concerns and address as appropriate      Plan of Care Orders / Action Items:  -Cardiomyopathy: Echo 4/22/21 with EF 20 - 25%, severely reduced systolic function. Moderate tricuspid valve regurgitation. Cardiac cath 4/23/22 notes no CAD, LV gram consistent with Takostubo cardiomyopathy. Per chart review, Shakir Revels discussed during hospitalization but she instead transitioned to hospice. Denies SOB, chest pain, swelling, or palpitations. Managed with carvedilol. No longer follows with cardiology as she was previously on hospice.    -Hypertension: BP elevated at visit today with initial reading 182/90, repeat 152/70, asymptomatic. She was previously managed with Norvasc, but this was d/c due to hypotension. Nurse visit planned for 6/20/22 and will re-initiate amlodipine if BP continues to be elevated. CMP planned for nurse visit. -Hypothyroidism: Managed with Levothyroxine 25mcg daily. TSH planned for nurse visit 6/20/22.   -Pain: Reports ongoing pain in sacrum in area of previous decubitus, no pain during visit. She has weaned herself from oxycodone as pain has improved, and she reports taking it only once daily at bedtime. Reports pain worsened by her current bed. New hospital bed ordered as hospice will require return of her current mattress.    -Leukocytosis: Per lab results 4/2021 with high of 18.3. ID and Heme/Onc consulted during hospitalization who felt this was not attributable to active infection. Discussed this with patient as CBC recheck is planned for upcoming Monday. She would not want additional consult/follow-up if this is ongoing. Will continue to monitor for other s/s of infection and treat as indicated. -Constipation: GI consult during 4/20/21 hospitalization due to diarrhea and worsening anemia. CT scan 4/27/21 significant for rectal impaction. She reports BM regular at this time with regimen of Senna twice daily.    -Bedbound/ Joint stiffness of knees: She has been bedbound for about 4 years after a fall for unclear reasons, and she failed to respond to spinal surgery or electrical stimulation of BLE.   She would benefit from PT at this time and she is highly motivated to improve LE mobility. She does bed exercises and can lift both legs from bed. She is unable to bend bilateral knees and reports this has been chronic issue. Will consult PT at this time.    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 She was receiving wound care with hospice and wounds have healed significantly, scar tissue present today. She remains high risk for reoccurrence due to her immobility. She is able to turn herself in bed but remains on her back overnight and most of the day. Per patient, current low-airloss mattress is complicating her turning because it creates a \"hole\" that is difficult to roll out of. We encouraged more frequent repositioning and protein sources (Premier Protein). CMP recheck planned for Monday.    -Hyperlipidemia: Diagnosis per chart review. She was previously taking Zetia but is no longer requiring.   Lipid panel recheck planned 6/20/22      Health Maintenance   Topic Date Due    Hepatitis C Screening  Never done    COVID-19 Vaccine (1) Never done    Pneumococcal 65+ years (1 - PCV) Never done    DTaP/Tdap/Td series (1 - Tdap) Never done    Lipid Screen  Never done    Shingrix Vaccine Age 50> (1 of 2) Never done    Breast Cancer Screen Mammogram  Never done    Bone Densitometry (Dexa) Screening  Never done    Colorectal Cancer Screening Combo  04/23/2022    Medicare Yearly Exam  06/07/2022    Flu Vaccine (Season Ended) 09/01/2022    Depression Screen  06/16/2023         Estimated Visit Frequency:  Monthly Provider Visit  SW visits PRN

## 2022-06-20 ENCOUNTER — HOME VISIT (OUTPATIENT)
Dept: FAMILY MEDICINE CLINIC | Age: 72
End: 2022-06-20

## 2022-06-20 VITALS
HEART RATE: 69 BPM | RESPIRATION RATE: 16 BRPM | DIASTOLIC BLOOD PRESSURE: 82 MMHG | SYSTOLIC BLOOD PRESSURE: 140 MMHG | OXYGEN SATURATION: 94 %

## 2022-06-20 LAB
ALBUMIN SERPL-MCNC: 2.8 G/DL (ref 3.5–5)
ALBUMIN/GLOB SERPL: 0.6 {RATIO} (ref 1.1–2.2)
ALP SERPL-CCNC: 93 U/L (ref 45–117)
ALT SERPL-CCNC: 9 U/L (ref 12–78)
ANION GAP SERPL CALC-SCNC: 6 MMOL/L (ref 5–15)
AST SERPL-CCNC: 10 U/L (ref 15–37)
BASOPHILS # BLD: 0.1 K/UL (ref 0–0.1)
BASOPHILS NFR BLD: 1 % (ref 0–1)
BILIRUB SERPL-MCNC: 0.3 MG/DL (ref 0.2–1)
BUN SERPL-MCNC: 35 MG/DL (ref 6–20)
BUN/CREAT SERPL: 40 (ref 12–20)
CALCIUM SERPL-MCNC: 9.9 MG/DL (ref 8.5–10.1)
CHLORIDE SERPL-SCNC: 113 MMOL/L (ref 97–108)
CHOLEST SERPL-MCNC: 200 MG/DL
CO2 SERPL-SCNC: 26 MMOL/L (ref 21–32)
CREAT SERPL-MCNC: 0.88 MG/DL (ref 0.55–1.02)
DIFFERENTIAL METHOD BLD: ABNORMAL
EOSINOPHIL # BLD: 0.1 K/UL (ref 0–0.4)
EOSINOPHIL NFR BLD: 2 % (ref 0–7)
ERYTHROCYTE [DISTWIDTH] IN BLOOD BY AUTOMATED COUNT: 13.4 % (ref 11.5–14.5)
GLOBULIN SER CALC-MCNC: 4.4 G/DL (ref 2–4)
GLUCOSE SERPL-MCNC: 91 MG/DL (ref 65–100)
HCT VFR BLD AUTO: 33.3 % (ref 35–47)
HCV AB SERPL QL IA: NONREACTIVE
HDLC SERPL-MCNC: 41 MG/DL
HDLC SERPL: 4.9 {RATIO} (ref 0–5)
HGB BLD-MCNC: 10.6 G/DL (ref 11.5–16)
IMM GRANULOCYTES # BLD AUTO: 0 K/UL (ref 0–0.04)
IMM GRANULOCYTES NFR BLD AUTO: 0 % (ref 0–0.5)
LDLC SERPL CALC-MCNC: 120.4 MG/DL (ref 0–100)
LYMPHOCYTES # BLD: 2.3 K/UL (ref 0.8–3.5)
LYMPHOCYTES NFR BLD: 28 % (ref 12–49)
MCH RBC QN AUTO: 27.2 PG (ref 26–34)
MCHC RBC AUTO-ENTMCNC: 31.8 G/DL (ref 30–36.5)
MCV RBC AUTO: 85.6 FL (ref 80–99)
MONOCYTES # BLD: 0.5 K/UL (ref 0–1)
MONOCYTES NFR BLD: 6 % (ref 5–13)
NEUTS SEG # BLD: 5.2 K/UL (ref 1.8–8)
NEUTS SEG NFR BLD: 63 % (ref 32–75)
NRBC # BLD: 0 K/UL (ref 0–0.01)
NRBC BLD-RTO: 0 PER 100 WBC
PLATELET # BLD AUTO: 450 K/UL (ref 150–400)
PMV BLD AUTO: 9.1 FL (ref 8.9–12.9)
POTASSIUM SERPL-SCNC: 4.6 MMOL/L (ref 3.5–5.1)
PROT SERPL-MCNC: 7.2 G/DL (ref 6.4–8.2)
RBC # BLD AUTO: 3.89 M/UL (ref 3.8–5.2)
SODIUM SERPL-SCNC: 145 MMOL/L (ref 136–145)
TRIGL SERPL-MCNC: 193 MG/DL (ref ?–150)
TSH SERPL DL<=0.05 MIU/L-ACNC: 4.76 UIU/ML (ref 0.36–3.74)
VLDLC SERPL CALC-MCNC: 38.6 MG/DL
WBC # BLD AUTO: 8.2 K/UL (ref 3.6–11)

## 2022-06-20 NOTE — PROGRESS NOTES
Discussed results with patient. Discussed slightly elevated TSH- will recheck TSH and add Free T4 in about 6 weeks and adjust synthroid dose at that time if indicated. Discussed low albumin (improved but remains low) and encouraged dietary sources of protein (does not tolerate protein supplements well). Discussed CBC with improved WBC. Discussed slightly elevated cholesterol/LDL and risks vs benefit of statin- would not provide significant benefit at this time and goal is to reduce pill burden.   She states understanding and has no further questions

## 2022-06-20 NOTE — PROGRESS NOTES
Home visit for BP assessment and lab draw    S - I had a good weekend and I am looking forward to getting rid of this bed where I lay down in a hole. O - Patient alert and oriented, with no complaints of pain. Venipuncture x 1 in left hand, blood samples obtained for CBC, CMP, Lipid panel, TSH, and Hep C. Pt tolerated procedure well. Specimens taken to [facility] for analysis. We discussed that hospital bed has been ordered and will be delivered this week. Pt questioning how she would be moved from one bed to the other. Advised to call [facility] non-emergency number and request lift assistance. A - /82 in left arm supine, P 69 R 16 O2 sat at 94%.     P - Update provider

## 2022-06-20 NOTE — HOSPICE
Music Therapy Progress Note  Virtual Music Therapy Support Group    Patient Telephone Number: [phone]  Confucianism Affiliation: Confucianism/Holiness   Language: English    Date: 6/16/2022    Mental Status:   Lisa.Ryan ] Alert [  ] [place] [  ]  Confused  [  ] Minimally responsive  [  ] Sleeping    Communication Status: [  ] Impaired Speech [ X ] Verbal     Physical Status:   [  ] Oxygen in use  [  ] Hard of Hearing [  ] Vision Impaired   [  ] Ambulatory  [  ] Ambulatory with assistance [ X ] Non-ambulatory     Music Preferences, Background: 60's-70's rock, sacred, varied     Clinical Problem addressed: _Peer support (unable to leave home)    Goal(s) met in session:  Physical/Pain management (Scale of 1-10):    Pre-session rating ___________    Post-session rating __________  Lisa.Ryan ] Increased relaxation               Lisa.Ryan ] Affected breathing patterns  [  ] Decreased muscle tension               [  ] Decreased agitation  [  ] Affected heart rate    [  ] Increased alertness     Emotional/Psychological:  [X] Increased self-expression   [  ] Decreased aggressive behavior   [  ] Decreased feelings of stress              Lisa.Ryan ] Discussed healthy coping skills     [  ] Improved mood    [  ] Decreased withdrawn behavior     Social:  [X] Decreased feelings of isolation/loneliness [X] Positive social interaction    [  ] Provided support and/or comfort for family/friends    Spiritual:  Lisa.Ryan ] Spiritual support    [  ] Expressed peace  [X] Expressed misti    [  ] Discussed beliefs    Techniques Utilized (Check all that apply):   [  ] Procedural support MT [  ] Music for relaxation             [X] Patient preferred music  [  ] Rosy analysis  [  ] Song choice              Lisa.Ryan ] Music for validation  [  ] Entrainment              [  ] Movement to music             [  ] Guided visualization  [  ] Jackson Harmon  [  ] Patient instrument playing [  ] Eddie Harrison writing  [ Vanna Velez   [  ] Mingo Nolasco              [  ] Sensory stimulation  [  ] Active Listening  [X] Music for spiritual support Elizabeth.Collar ] Performance opportunity    Session Observations:  Ms. Leonardo King attended her final music therapy support group meeting as a hospice patient, as she is transitioning to Home Based Primary Care. She will still be welcomed to the support group, and she will continue to be invited to share recordings she makes in her music therapy sessions. Today, the theme was travel and culture, and Joslyn spoke of her trip to [facility] and the cruise she took to get there. She was grateful to have the opportunity to share a meaningful memory with those who are her peers and also receiving support for a life limiting illness. 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 Joslyn would like to continue her music therapy program as she transistions to [id number], and will be offered the opportunity to do so within the structure of the spiritual care department programming that she receives as an outpatient at [facility]. Thank you for the opportunity to provide music therapy to Leonardo King.   Dg Elam [place], [place]-BC   Music [address] Certified  Spiritual Care Services  Referral- based service

## 2022-06-21 ENCOUNTER — HOME CARE VISIT (OUTPATIENT)
Dept: SCHEDULING | Facility: HOME HEALTH | Age: 72
End: 2022-06-21
Payer: MEDICARE

## 2022-06-21 VITALS
OXYGEN SATURATION: 98 % | DIASTOLIC BLOOD PRESSURE: 82 MMHG | SYSTOLIC BLOOD PRESSURE: 140 MMHG | HEART RATE: 59 BPM | TEMPERATURE: 96.8 F | RESPIRATION RATE: 20 BRPM

## 2022-06-21 PROCEDURE — G0151 HHCP-SERV OF PT,EA 15 MIN: HCPCS

## 2022-06-21 PROCEDURE — 3331090001 HH PPS REVENUE CREDIT

## 2022-06-21 PROCEDURE — 400013 HH SOC

## 2022-06-21 PROCEDURE — 400018 HH-NO PAY CLAIM PROCEDURE

## 2022-06-21 PROCEDURE — 3331090002 HH PPS REVENUE DEBIT

## 2022-06-22 PROCEDURE — 3331090001 HH PPS REVENUE CREDIT

## 2022-06-22 PROCEDURE — 3331090002 HH PPS REVENUE DEBIT

## 2022-06-23 PROCEDURE — 3331090001 HH PPS REVENUE CREDIT

## 2022-06-23 PROCEDURE — 3331090002 HH PPS REVENUE DEBIT

## 2022-06-24 ENCOUNTER — HOME CARE VISIT (OUTPATIENT)
Dept: SCHEDULING | Facility: HOME HEALTH | Age: 72
End: 2022-06-24
Payer: MEDICARE

## 2022-06-24 VITALS
HEART RATE: 71 BPM | OXYGEN SATURATION: 99 % | DIASTOLIC BLOOD PRESSURE: 82 MMHG | RESPIRATION RATE: 18 BRPM | SYSTOLIC BLOOD PRESSURE: 138 MMHG | TEMPERATURE: 96.8 F

## 2022-06-24 PROCEDURE — 3331090001 HH PPS REVENUE CREDIT

## 2022-06-24 PROCEDURE — 3331090002 HH PPS REVENUE DEBIT

## 2022-06-24 PROCEDURE — G0151 HHCP-SERV OF PT,EA 15 MIN: HCPCS

## 2022-06-25 PROCEDURE — 3331090001 HH PPS REVENUE CREDIT

## 2022-06-25 PROCEDURE — 3331090002 HH PPS REVENUE DEBIT

## 2022-06-26 PROCEDURE — 3331090002 HH PPS REVENUE DEBIT

## 2022-06-26 PROCEDURE — 3331090001 HH PPS REVENUE CREDIT

## 2022-06-27 ENCOUNTER — TELEPHONE (OUTPATIENT)
Dept: FAMILY MEDICINE CLINIC | Age: 72
End: 2022-06-27

## 2022-06-27 PROCEDURE — 3331090002 HH PPS REVENUE DEBIT

## 2022-06-27 PROCEDURE — 3331090001 HH PPS REVENUE CREDIT

## 2022-06-27 NOTE — TELEPHONE ENCOUNTER
The patient called and said that hospice called her wanting to  the bed today. The patient said she needs to know when her new bed ordered by Joie Rouse will arrive.   She asks for a callback at [phone]

## 2022-06-27 NOTE — TELEPHONE ENCOUNTER
Telephone call to patient to advise that [facility] has not been able to reach her by phone to arrange delivery of her new hospital bed. Further advised pt that they are unable to even leave her a message on her phone and that this is a phone issue same as it was when [address] [place] [address] could not reach her for the same reason. Provided patient with the phone number for [facility] [phone] and encouraged her to call them to arrange delivery. Advised it is up to her to coordinate hospice bed removal and new bed delivery.

## 2022-06-28 ENCOUNTER — HOME CARE VISIT (OUTPATIENT)
Dept: SCHEDULING | Facility: HOME HEALTH | Age: 72
End: 2022-06-28
Payer: MEDICARE

## 2022-06-28 VITALS
TEMPERATURE: 98.5 F | DIASTOLIC BLOOD PRESSURE: 80 MMHG | SYSTOLIC BLOOD PRESSURE: 120 MMHG | RESPIRATION RATE: 16 BRPM | HEART RATE: 56 BPM | OXYGEN SATURATION: 99 %

## 2022-06-28 PROCEDURE — 3331090002 HH PPS REVENUE DEBIT

## 2022-06-28 PROCEDURE — G0151 HHCP-SERV OF PT,EA 15 MIN: HCPCS

## 2022-06-28 PROCEDURE — 3331090001 HH PPS REVENUE CREDIT

## 2022-06-28 PROCEDURE — G0156 HHCP-SVS OF AIDE,EA 15 MIN: HCPCS

## 2022-06-29 PROCEDURE — 3331090001 HH PPS REVENUE CREDIT

## 2022-06-29 PROCEDURE — 3331090002 HH PPS REVENUE DEBIT

## 2022-06-30 ENCOUNTER — HOME CARE VISIT (OUTPATIENT)
Dept: SCHEDULING | Facility: HOME HEALTH | Age: 72
End: 2022-06-30
Payer: MEDICARE

## 2022-06-30 VITALS
OXYGEN SATURATION: 98 % | SYSTOLIC BLOOD PRESSURE: 145 MMHG | TEMPERATURE: 97.9 F | HEART RATE: 53 BPM | RESPIRATION RATE: 16 BRPM | DIASTOLIC BLOOD PRESSURE: 80 MMHG

## 2022-06-30 PROCEDURE — G0151 HHCP-SERV OF PT,EA 15 MIN: HCPCS

## 2022-06-30 PROCEDURE — 3331090002 HH PPS REVENUE DEBIT

## 2022-06-30 PROCEDURE — G0156 HHCP-SVS OF AIDE,EA 15 MIN: HCPCS

## 2022-06-30 PROCEDURE — 3331090001 HH PPS REVENUE CREDIT

## 2022-07-01 ENCOUNTER — TELEPHONE (OUTPATIENT)
Dept: PALLATIVE CARE | Age: 72
End: 2022-07-01

## 2022-07-01 DIAGNOSIS — I42.8 OTHER CARDIOMYOPATHY (HCC): ICD-10-CM

## 2022-07-01 PROCEDURE — 3331090002 HH PPS REVENUE DEBIT

## 2022-07-01 PROCEDURE — 3331090001 HH PPS REVENUE CREDIT

## 2022-07-01 NOTE — TELEPHONE ENCOUNTER
Telephone call to patient to inquire which medication needs to be refilled, she states Coreg. Review of record shows that this medication refill was sent in on the start of care 6/16 to [address]. [facility] to [facility] to see if rx received. Pharmacy advises that they did receive and dispensed this med to the group home where patient resides. Telephone call to patient to advise of above. She states that caregiver Douglas Winter checked the med closet and states she only has two doses. Advised pt that Walker needs to speak with the pharmacy and work out where the medication is. The pharmacy has 5 more refills on this script. Patient has no local pharmacy.

## 2022-07-02 PROCEDURE — 3331090001 HH PPS REVENUE CREDIT

## 2022-07-02 PROCEDURE — 3331090002 HH PPS REVENUE DEBIT

## 2022-07-03 PROCEDURE — 3331090002 HH PPS REVENUE DEBIT

## 2022-07-03 PROCEDURE — 3331090001 HH PPS REVENUE CREDIT

## 2022-07-04 PROCEDURE — 3331090001 HH PPS REVENUE CREDIT

## 2022-07-04 PROCEDURE — 3331090002 HH PPS REVENUE DEBIT

## 2022-07-05 ENCOUNTER — HOME CARE VISIT (OUTPATIENT)
Dept: SCHEDULING | Facility: HOME HEALTH | Age: 72
End: 2022-07-05
Payer: MEDICARE

## 2022-07-05 VITALS
RESPIRATION RATE: 20 BRPM | TEMPERATURE: 97.4 F | SYSTOLIC BLOOD PRESSURE: 122 MMHG | OXYGEN SATURATION: 98 % | HEART RATE: 61 BPM | DIASTOLIC BLOOD PRESSURE: 78 MMHG

## 2022-07-05 PROCEDURE — G0156 HHCP-SVS OF AIDE,EA 15 MIN: HCPCS

## 2022-07-05 PROCEDURE — G0151 HHCP-SERV OF PT,EA 15 MIN: HCPCS

## 2022-07-05 PROCEDURE — 3331090001 HH PPS REVENUE CREDIT

## 2022-07-05 PROCEDURE — 3331090002 HH PPS REVENUE DEBIT

## 2022-07-06 ENCOUNTER — TELEPHONE (OUTPATIENT)
Dept: FAMILY MEDICINE CLINIC | Age: 72
End: 2022-07-06

## 2022-07-06 PROCEDURE — 3331090002 HH PPS REVENUE DEBIT

## 2022-07-06 PROCEDURE — 3331090001 HH PPS REVENUE CREDIT

## 2022-07-06 NOTE — TELEPHONE ENCOUNTER
Call placed to [facility], spoke with David Ruby about patient's bed delivery - MISSING Gel Overlay. David Ruby responded that he will schedule a visit with technician tomorrow, 7/7/22 for delivery of overlay. I also requested that bed FULL rails should be replaced with HALF rails, per PT request. This will be also accomplished tomorrow.

## 2022-07-07 ENCOUNTER — HOME CARE VISIT (OUTPATIENT)
Dept: SCHEDULING | Facility: HOME HEALTH | Age: 72
End: 2022-07-07
Payer: MEDICARE

## 2022-07-07 VITALS
RESPIRATION RATE: 20 BRPM | TEMPERATURE: 96.8 F | OXYGEN SATURATION: 96 % | HEART RATE: 74 BPM | SYSTOLIC BLOOD PRESSURE: 110 MMHG | DIASTOLIC BLOOD PRESSURE: 64 MMHG

## 2022-07-07 PROCEDURE — 3331090002 HH PPS REVENUE DEBIT

## 2022-07-07 PROCEDURE — 3331090001 HH PPS REVENUE CREDIT

## 2022-07-07 PROCEDURE — G0156 HHCP-SVS OF AIDE,EA 15 MIN: HCPCS

## 2022-07-07 PROCEDURE — G0151 HHCP-SERV OF PT,EA 15 MIN: HCPCS

## 2022-07-08 PROCEDURE — 3331090001 HH PPS REVENUE CREDIT

## 2022-07-08 PROCEDURE — 3331090002 HH PPS REVENUE DEBIT

## 2022-07-09 PROCEDURE — 3331090001 HH PPS REVENUE CREDIT

## 2022-07-09 PROCEDURE — 3331090002 HH PPS REVENUE DEBIT

## 2022-07-10 PROCEDURE — 3331090002 HH PPS REVENUE DEBIT

## 2022-07-10 PROCEDURE — 3331090001 HH PPS REVENUE CREDIT

## 2022-07-11 PROCEDURE — 3331090001 HH PPS REVENUE CREDIT

## 2022-07-11 PROCEDURE — 3331090002 HH PPS REVENUE DEBIT

## 2022-07-12 ENCOUNTER — HOME CARE VISIT (OUTPATIENT)
Dept: SCHEDULING | Facility: HOME HEALTH | Age: 72
End: 2022-07-12
Payer: MEDICARE

## 2022-07-12 ENCOUNTER — HOME CARE VISIT (OUTPATIENT)
Dept: HOME HEALTH SERVICES | Facility: HOME HEALTH | Age: 72
End: 2022-07-12
Payer: MEDICARE

## 2022-07-12 VITALS
SYSTOLIC BLOOD PRESSURE: 138 MMHG | DIASTOLIC BLOOD PRESSURE: 78 MMHG | HEART RATE: 62 BPM | OXYGEN SATURATION: 99 % | RESPIRATION RATE: 20 BRPM | TEMPERATURE: 97.7 F

## 2022-07-12 PROCEDURE — 3331090002 HH PPS REVENUE DEBIT

## 2022-07-12 PROCEDURE — G0151 HHCP-SERV OF PT,EA 15 MIN: HCPCS

## 2022-07-12 PROCEDURE — G0156 HHCP-SVS OF AIDE,EA 15 MIN: HCPCS

## 2022-07-12 PROCEDURE — 3331090001 HH PPS REVENUE CREDIT

## 2022-07-13 PROCEDURE — 3331090001 HH PPS REVENUE CREDIT

## 2022-07-13 PROCEDURE — 3331090002 HH PPS REVENUE DEBIT

## 2022-07-14 ENCOUNTER — HOME CARE VISIT (OUTPATIENT)
Dept: SCHEDULING | Facility: HOME HEALTH | Age: 72
End: 2022-07-14
Payer: MEDICARE

## 2022-07-14 PROCEDURE — 3331090001 HH PPS REVENUE CREDIT

## 2022-07-14 PROCEDURE — G0156 HHCP-SVS OF AIDE,EA 15 MIN: HCPCS

## 2022-07-14 PROCEDURE — 3331090002 HH PPS REVENUE DEBIT

## 2022-07-15 ENCOUNTER — HOME CARE VISIT (OUTPATIENT)
Dept: SCHEDULING | Facility: HOME HEALTH | Age: 72
End: 2022-07-15
Payer: MEDICARE

## 2022-07-15 PROCEDURE — G0151 HHCP-SERV OF PT,EA 15 MIN: HCPCS

## 2022-07-15 PROCEDURE — 3331090001 HH PPS REVENUE CREDIT

## 2022-07-15 PROCEDURE — 3331090002 HH PPS REVENUE DEBIT

## 2022-07-16 VITALS
RESPIRATION RATE: 16 BRPM | OXYGEN SATURATION: 98 % | TEMPERATURE: 98 F | HEART RATE: 70 BPM | DIASTOLIC BLOOD PRESSURE: 60 MMHG | SYSTOLIC BLOOD PRESSURE: 124 MMHG

## 2022-07-16 PROCEDURE — 3331090001 HH PPS REVENUE CREDIT

## 2022-07-16 PROCEDURE — 3331090002 HH PPS REVENUE DEBIT

## 2022-07-17 PROCEDURE — 3331090001 HH PPS REVENUE CREDIT

## 2022-07-17 PROCEDURE — 3331090002 HH PPS REVENUE DEBIT

## 2022-07-18 ENCOUNTER — HOME CARE VISIT (OUTPATIENT)
Dept: SCHEDULING | Facility: HOME HEALTH | Age: 72
End: 2022-07-18
Payer: MEDICARE

## 2022-07-18 PROCEDURE — 3331090001 HH PPS REVENUE CREDIT

## 2022-07-18 PROCEDURE — 3331090002 HH PPS REVENUE DEBIT

## 2022-07-18 PROCEDURE — G0156 HHCP-SVS OF AIDE,EA 15 MIN: HCPCS

## 2022-07-19 ENCOUNTER — HOME CARE VISIT (OUTPATIENT)
Dept: SCHEDULING | Facility: HOME HEALTH | Age: 72
End: 2022-07-19
Payer: MEDICARE

## 2022-07-19 VITALS
SYSTOLIC BLOOD PRESSURE: 140 MMHG | HEART RATE: 60 BPM | OXYGEN SATURATION: 99 % | DIASTOLIC BLOOD PRESSURE: 70 MMHG | RESPIRATION RATE: 16 BRPM | TEMPERATURE: 98.3 F

## 2022-07-19 PROCEDURE — 3331090001 HH PPS REVENUE CREDIT

## 2022-07-19 PROCEDURE — G0151 HHCP-SERV OF PT,EA 15 MIN: HCPCS

## 2022-07-19 PROCEDURE — 3331090002 HH PPS REVENUE DEBIT

## 2022-07-20 ENCOUNTER — HOME VISIT (OUTPATIENT)
Dept: FAMILY MEDICINE CLINIC | Age: 72
End: 2022-07-20
Payer: MEDICARE

## 2022-07-20 ENCOUNTER — HOME CARE VISIT (OUTPATIENT)
Dept: SCHEDULING | Facility: HOME HEALTH | Age: 72
End: 2022-07-20
Payer: MEDICARE

## 2022-07-20 VITALS
DIASTOLIC BLOOD PRESSURE: 78 MMHG | OXYGEN SATURATION: 96 % | TEMPERATURE: 98.4 F | HEART RATE: 62 BPM | RESPIRATION RATE: 18 BRPM | SYSTOLIC BLOOD PRESSURE: 140 MMHG

## 2022-07-20 DIAGNOSIS — E78.5 HYPERLIPIDEMIA, UNSPECIFIED HYPERLIPIDEMIA TYPE: ICD-10-CM

## 2022-07-20 DIAGNOSIS — Z74.01 BEDBOUND: ICD-10-CM

## 2022-07-20 DIAGNOSIS — Z91.89 AT HIGH RISK FOR SKIN BREAKDOWN: ICD-10-CM

## 2022-07-20 DIAGNOSIS — Z00.00 MEDICARE ANNUAL WELLNESS VISIT, SUBSEQUENT: ICD-10-CM

## 2022-07-20 DIAGNOSIS — M25.662 JOINT STIFFNESS OF BOTH KNEES: ICD-10-CM

## 2022-07-20 DIAGNOSIS — I42.8 OTHER CARDIOMYOPATHY (HCC): Primary | ICD-10-CM

## 2022-07-20 DIAGNOSIS — Z71.89 ADVANCED CARE PLANNING/COUNSELING DISCUSSION: ICD-10-CM

## 2022-07-20 DIAGNOSIS — E03.9 HYPOTHYROIDISM, UNSPECIFIED TYPE: ICD-10-CM

## 2022-07-20 DIAGNOSIS — I10 PRIMARY HYPERTENSION: ICD-10-CM

## 2022-07-20 DIAGNOSIS — M25.661 JOINT STIFFNESS OF BOTH KNEES: ICD-10-CM

## 2022-07-20 PROCEDURE — 3331090002 HH PPS REVENUE DEBIT

## 2022-07-20 PROCEDURE — 3331090003 HH PPS REVENUE ADJ

## 2022-07-20 PROCEDURE — G0156 HHCP-SVS OF AIDE,EA 15 MIN: HCPCS

## 2022-07-20 PROCEDURE — G0439 PPPS, SUBSEQ VISIT: HCPCS | Performed by: NURSE PRACTITIONER

## 2022-07-20 PROCEDURE — 99349 HOME/RES VST EST MOD MDM 40: CPT | Performed by: NURSE PRACTITIONER

## 2022-07-20 PROCEDURE — 3331090001 HH PPS REVENUE CREDIT

## 2022-07-20 PROCEDURE — 1123F ACP DISCUSS/DSCN MKR DOCD: CPT | Performed by: NURSE PRACTITIONER

## 2022-07-20 RX ORDER — DEXTROMETHORPHAN HYDROBROMIDE, GUAIFENESIN 5; 100 MG/5ML; MG/5ML
650 LIQUID ORAL
Qty: 90 TABLET | Refills: 1 | Status: SHIPPED | OUTPATIENT
Start: 2022-07-20 | End: 2022-09-29 | Stop reason: ALTCHOICE

## 2022-07-20 RX ORDER — DEXTROMETHORPHAN HYDROBROMIDE, GUAIFENESIN 5; 100 MG/5ML; MG/5ML
650 LIQUID ORAL
COMMUNITY
End: 2022-07-20 | Stop reason: SDUPTHER

## 2022-07-20 NOTE — PROGRESS NOTES
Date of visit: 7/20/2022     This is an Subsequent Medicare Annual Wellness Visit (AWV), (Performed more than 12 months after effective date of Medicare Part B enrollment and 12 months after last preventive visit, Once in a lifetime)    I have reviewed the patient's medical history in detail and updated the computerized patient record. History obtained from: the patient. Concerns today   (Patient understands that medical problems addressed today may incur additional cost as this is a preventive visit)  -See separate note for management of chronic conditions. History     Patient Active Problem List   Diagnosis Code    Infected decubitus ulcer L89.90, L08.9    HTN (hypertension) I10    HLD (hyperlipidemia) E78.5    Cardiomyopathy ([address] I42.9    Anemia D64.9    Hypothyroidism E03.9    Leukocytosis D72.829     Past Medical History:   Diagnosis Date    Cardiomyopathy ([address]     HLD (hyperlipidemia)     Hypertension     Leukocytosis     Thyroid disease       Past Surgical History:   Procedure Laterality Date    HX ORTHOPAEDIC      spinal surgery, heel surgery     Allergies   Allergen Reactions    Antihistamines - Alkylamine Unknown (comments)     reported on referral packet     Current Outpatient Medications   Medication Sig Dispense Refill    acetaminophen (Tylenol Arthritis Pain) 650 mg TbER Take 1 Tablet by mouth three (3) times daily as needed for Pain or Fever. 90 Tablet 1    ammonium lactate (AMLACTIN) 12 % topical cream Apply 1 Each to affected area daily. carvediloL (COREG) 3.125 mg tablet Take 1 Tablet by mouth two (2) times a day. 60 Tablet 5    levothyroxine (SYNTHROID) 25 mcg tablet Take 1 Tablet by mouth daily. 30 Tablet 5    senna-docusate (Senna with Docusate Sodium) 8.6-50 mg per tablet Take 2 Tablets by mouth two (2) times daily as needed for Constipation.  bowel regime 100 Tablet 1    oxyCODONE IR (ROXICODONE) 5 mg immediate release tablet Take 5 mg by mouth every four (4) hours as needed for Pain (Unrelieved by Tylenol). Family History   Problem Relation Age of Onset    Heart Disease Mother     Stroke Father     Cancer Brother     Cancer Maternal Uncle      Social History     Tobacco Use    Smoking status: Never    Smokeless tobacco: Never   Substance Use Topics    Alcohol use: Never       Specialists/Care Team   Joslyn Villalta has established care with the following healthcare providers:  PCP: Marcelo Manzanares NP  No other specialty providers     Health Risk Assessment     Demographics   female  81 y.o. [address] Questions   -During the past 4 weeks:   -How would you rate your health in general? Good   -How often have you been bothered by feeling dizzy when standing up? never   -How much have you been bothered by bodily pain? moderately   -Have you noticed any hearing difficulties? no   -Has your physical and emotional health limited your social activities with family or friends? no    Emotional Health Questions   -Do you have a history of depression, anxiety, or emotional problems? yes  -Over the past 2 weeks, have you felt down, depressed or hopeless? no  -Over the past 2 weeks, have you felt little interest or pleasure in doing things? no    Health Habits   Please describe your diet habits: Eating regularly, meals provided by group home  Do you get 5 servings of fruits or vegetables daily? no  Do you exercise regularly? No- limited mobility limits exercise  Tobacco use: Never  Alcohol use: None     Activities of Daily Living and Functional Status   -Do you need help with eating, walking, dressing, bathing, toileting, the phone, transportation, shopping, preparing meals, housework, laundry, medications or managing money? yes  -In the past four weeks, was someone available to help you if you needed and wanted help with anything? yes  -Are you confident are you that you can control and manage most of your health problems?  yes  -Have you been given information to help you keep track of your medications? yes  -How often do you have trouble taking your medications as prescribed? never (medications provided by group home)    Fall Risk and Home Safety   Have you fallen 2 or more times in the past year? no  Do you have smoke detectors and check them regularly? yes  Do you have difficulties driving a car? NA does not drive  Do you always fasten your seat belt when you are in a car? NA does not go in car    Review of Systems (if indicated for problems addressed today)   -See separate note for management of ROS. Physical Examination     Vitals:    07/20/22 0945   BP: (!) 140/78   Pulse: 62   Resp: 18   Temp: 98.4 °F (36.9 °C)   TempSrc: Temporal   SpO2: 96%     There is no height or weight on file to calculate BMI. No results found. Was the patient's timed Up & Go test unsteady or longer than 30 seconds? Unable to perform due to mobility limitations    Evaluation of Cognitive Function   Mood/affect:  neutral  Orientation: Person, Place, Time, and Situation  Appearance: age appropriate and casually dressed  Family member/caregiver input: See separate note for management of chronic conditions. Additional exam if indicated for problems addressed today:  -See separate note for physical exam.    Advice/Referrals/Counseling (as indicated)   Education and counseling provided for any problems identified above:   -See separate note for management of chronic conditions. Preventive Services   Declines Covid vaccinations but would like flu vaccination when due. Reports has already completed 2 pneumonia vaccinations.   Declines Mmg, DEXA, colonoscopy or cologuard as it is taxing to perform and would not change plan  Health Maintenance Due   Topic Date Due    COVID-19 Vaccine (1) Never done    Pneumococcal 65+ years (1 - PCV) Never done    DTaP/Tdap/Td series (1 - Tdap) Never done    Shingrix Vaccine Age 50> (1 of 2) Never done    Breast Cancer Screen Mammogram  Never done    Bone Densitometry (Dexa) Screening  Never done    Colorectal Cancer Screening Combo  04/23/2022       (Preventive care checklist to be included in patient instructions)  Discussed today Done Previously Not Needed     X  Pneumococcal vaccines     X Flu vaccine     X Hepatitis B vaccine (if at risk)   X   Shingles vaccine   X   TDAP vaccine   X   Mammogram     X Pap smear   X   Colorectal cancer screening     X Low-dose CT for lung cancer screening   X   Bone density test     X Glaucoma screening    X  Cholesterol test    X  Diabetes screening test      X Diabetes self-management class     X Nutritionist referral for diabetes or renal disease     Discussion of Advance Directive   Discussed with Joslyn Villalta her ability to prepare and advance directive in the case that an injury or illness causes her to be unable to make health care decisions. She does not have AMD at this time but plans to complete with a . DDNR in home. Assessment/Plan       ICD-10-CM ICD-9-CM    1. Other cardiomyopathy ([facility] [address]  I42.8 425.4       2. Primary hypertension  I10 401.9       3. Hypothyroidism, unspecified type  E03.9 244.9       4. Joint stiffness of both knees  M25.661 719.56 acetaminophen (Tylenol Arthritis Pain) 650 mg TbER    M25.662        5. Bedbound  Z74.01 V49.84       6. At high risk for skin breakdown  Z91.89 V49.89       7. Hyperlipidemia, unspecified hyperlipidemia type  E78.5 272.4       8. Medicare annual wellness visit, subsequent  Z00.00 V70.0       9. Advanced care planning/counseling discussion  Z71.89 V65.49           -MWV completed today  -Continue routine follow up for management of chronic conditions.     April Brasher NP

## 2022-07-20 NOTE — PROGRESS NOTES
Advance Care Planning     General Advance Care Planning (ACP) Conversation      Date of Conversation: 7/20/2022  Conducted with: Patient with Decision Making Capacity and Healthcare Decision Maker: Next of Kin by law (only applies in absence of a Healthcare Power of  or Legal Guardian)    Healthcare Decision Maker:     Primary Decision Maker: Jeny Vasquez \"Lisa\" - Caregiver - [phone]  Click here to [address] including selection of the Healthcare Decision Maker Relationship (ie \"Primary\")    Today we discussed Healthcare Decision Makers. The patient is considering options. Content/Action Overview:   Has NO ACP documents/care preferences - information provided, considering goals and options  Reviewed DNR/DNI and patient confirms current DNR status - completed forms on file (place new order if needed)  Topics discussed: treatment goals, hospitalization preferences, and resuscitation preferences  Additional Comments: Discussed patient would want her sister, Barbara Brock, to make decisions on her behalf if she was unable to. She plans to make her AMD including health and financial wishes with  at later time.       Length of Voluntary ACP Conversation in minutes:  <16 minutes (Non-Billable)    Jayne Dang, NP

## 2022-07-20 NOTE — PROGRESS NOTES
Home Based [facility]   [id number]          NOTE: Home Based Primary Care is a PROVIDER (MD/NP) based interdisciplinary practice (RN, LCSW, Pharmacy, Dietician) for patients who cannot access (or have a taxing effort) primary / specialty medical care in an office setting. [facility] is NOT [facility] but works with [facility] when there is a skilled need. Our MD/NPs are integral in Care Transitions; PLEASE CALL [phone] if this patient arrives in the Emergency Department or Hospital.         Date of Current Visit: 7/20/2022     SUMMARY     Joslyn Garcia is a 81 y.o. female seen in the home today due to taxing effort to seek primary care services in the community s/t mobility limitations, knee stiffness, bedbound status. Patient/Family present for Current Visit:  Patient, primary caregiver/grouphome owner, Cris Zaman of Care as stated by the patient/family is: To get out of bed    ASSESSMENT AND PLAN       ICD-10-CM ICD-9-CM    1. Other cardiomyopathy ([facility] [address]  I42.8 425.4       2. Primary hypertension  I10 401.9       3. Hypothyroidism, unspecified type  E03.9 244.9       4. Joint stiffness of both knees  M25.661 719.56     M25.662        5. Bedbound  Z74.01 V49.84       6. At high risk for skin breakdown  Z91.89 V49.89       7. Hyperlipidemia, unspecified hyperlipidemia type  E78.5 272.4       8. Medicare annual wellness visit, subsequent  Z00.00 V70.0       9. Advanced care planning/counseling discussion  Z71.89 V65.49          -Cardiomyopathy: Echo 4/22/21 with EF 20 - 25%, severely reduced systolic function, Moderate tricuspid valve regurgitation. Cardiac cath 4/23/22 notes no CAD, LV gram consistent with Takostubo cardiomyopathy. Per chart review, Anne Harness discussed during hospitalization but she instead transitioned to hospice. Denies recent SOB, chest pain, swelling, or palpitations. Managed with carvedilol 3.125mg BID.  No longer follows with cardiology and does not wish to at this time.    -Hypertension: BP improved from previous visit, at 140/78 today. Goal <150/90. She was previously managed with Norvasc, but this was d/c due to hypotension, patient reports it worsened her lymphedema. If worsening, would consider losartan/olmesartan. CMP obtained 6/20/22 and stable. -Hypothyroidism: Managed with Levothyroxine 25mcg daily. TSH obtained 6/20/22 and slightly elevated, will repeat TSH and T4 with nurse visit in August.   -Pain: Reports improved but ongoing pain in sacrum in area of previous decubitus and pain in bilateral knees. Rates pain 1-3/10, she is not typically bothered by it, but \"it lets me know it is there. \"   She takes Tylenol as needed, Is weaning from oxycodone and takes for severe pain typically once daily at night.     -Bedbound/ Joint stiffness of knees: She has been bedbound for about 4 years after a fall for unclear reasons, and she failed to respond to spinal surgery or electrical stimulation of BLE. She is currently working with PT at this time and she is highly motivated to improve LE mobility. She does bed exercises and can lift both legs from bed. Trapeze recommended by PT and ordered. She is unable to bend bilateral knees and reports this has been chronic issue.    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 She was receiving wound care with hospice and sacral wound has healed, scar tissue remains. She continues to have pain in this area and remains high risk for reoccurrence due to her immobility. Trapeze recommended by PT and has been ordered with hopes to improve bed mobility and offloading. Currently on hospital bed with gel overlay. Encourage protein sources in diet (has not tolerated supplements due to GI side effects).   CMP obtained 6/20/22 with significant improvement in albumin but remains low at 2.8  -Hyperlipidemia: Per Lipid panel 6/20/22 significant for Total Cholest 200, Tg 193, , She was previously taking Zetia but this was discontinued. 10-yr ASCVD risk calculated at 11.8%, would be 8.0% with initiation of aspirin and statin. Discussed with patient and would not add additional medications at this time due to other comorbidities and goal of reducing pill burden. -Medicare Wellness Exam: completed today  -Advanced Care Planning: Discussed wishes today and she states her caregiver Esau Galeana makes decisions if there is an emergency (example when she was found confused and septic, sent to hospital). Discussed if decisions had to be made in hospital, who would they call and she would want sister, Jed Whitehead, to make decisions on her behalf. She has a son who is estranged. She has elected DNR status and has DDNR in home. She would not like additional screening such as Colonoscopy, DEXA scan, Mmg, but would want hospitalization if needed. She plans to outline her wishes with  at a later time. *PT ongoing for strengthening, bed mobility, and evaluation of equipment needs due to current bedbound status, high risk for complications of immobility, and stiffness of bilateral knees.      -Labs: Labs obtained 6/20/22 including CBC, CMP, Lipids, Hep C screening, TSH. Repeat q 6 months or as needed. -AMD: No AMD at this time, discussed today and she would want sister Jed Whitehead to make healthcare decisions if she was unable. She plans to make healthcare and financial directives with  at later date.   -Code status: DNR (DDNR in home on wall of room)  -Follow up: in 1 month (8/19/22). Will need follow-up on TSH/ consider nurse visit for recheck. CSC and Tox Screen. Declines Covid vaccinations and reports has completed Pneumonia series. Unable to leave the home to complete Shingles, Tdap. Declines additional Mmg, DEXA, Colonoscopy/Cologuard at this time. [address] completed today.    Health Maintenance Due Topic Date Due    COVID-19 Vaccine (1) Never done    Pneumococcal 65+ years (1 - PCV) Never done    DTaP/Tdap/Td series (1 - Tdap) Never done    Shingrix Vaccine Age 50> (1 of 2) Never done    Breast Cancer Screen Mammogram  Never done    Bone Densitometry (Dexa) Screening  Never done    Colorectal Cancer Screening Combo  04/23/2022    Medicare Yearly Exam  Never done     I have left a SUMMARY / INSTRUCTIONS from today's visit with the patient/family in the home        HISTORY:      CHIEF COMPLAINT:    Chief Complaint   Patient presents with    Follow-up    Hypertension    CHF    Annual Wellness Visit       HPI/SUBJECTIVE:    Ms. Audrey Sanchez is a 80 y/o F seen today for follow-up of chronic medical conditions. She resides in a group home with her primary caretaker, Tatum Patel.   She has been bedbound for about 4 years after a fall from chair that occurred for unclear reasons. She underwent a surgical procedure of some sort at [facility] and then rehabiliation at the [facility] with electrical stimulation of her legs. However, her mobility in lower legs continued to decline. She had a hospitalization in April 2021 for septic shock s/t infection of decubitus ulcer and was discharged with hospice. Since that time, her sacral ulcer has healed and she has graduated hospice. She has resumed PT and is working to improve bed mobility and transfer to a chair. She has received several DME since starting therapy including hospital bed with rails, gel mattress, and has a trapeze pending delivery. Her chronic medical conditions include cardiomyopathy with EF 20-25%. She is currently managed on carvedilol with no symptoms of CP, SOB, edema. Per chart review, lifevest was recommended during her April 2021 hospitalization, but patient elected to transition to hospice. She has a history of SVT episodes and HR has been recently well controlled on carvedilol.   BP is also well-controlled at this time. She was previously taking Norvasc but it was discontinued due to hypotension. She also reports it worsened her lower extremity edema. Her sacral decubitus ulcer has been chronic for around 4 years in various stages of healing and is currently healed. She still has occasional pain in this area that is worst at night. She states the pain stays around 1-3/10 in the daytime but \"lets me know it's there\" at night. Pain is well-controlled at this time with PRN Tylenol and oxycodone (uses once daily at night). She also has hyperlipidemia but is no longer taking Zetia or a statin- discussed Lipid panel lab results today and will not re-initiate a statin due to risk/benefit and care goals. She takes Synthroid for hypothyroidism, TSH slightly elevated and discussed plans to recheck and change plan if indicated. Her hospitalization record also notes chronic leukocytosis and was seen by ID and hematology/oncology during April 2021 hospitalization. This condition was not felt to be related to infection, rather chronic sacral ulcer. CBC rechecked at last visit with WBC WNL. We completed WMV at visit today and discussed her wishes for healthcare directives. [facility] caregivers report no additional concerns. REVIEW OF SYSTEMS:     Review of Systems   Constitutional:  Negative for fever, malaise/fatigue and weight loss. HENT:  Negative for congestion, hearing loss and sore throat. Eyes:  Negative for blurred vision. Wears glasses   Respiratory:  Negative for cough, shortness of breath and wheezing. Cardiovascular:  Negative for chest pain, palpitations and leg swelling. Gastrointestinal:  Negative for abdominal pain, blood in stool, constipation and diarrhea. Genitourinary:  Negative for dysuria. Musculoskeletal:  Negative for back pain, falls and joint pain. Pain in sacrum and knees   Skin:  Negative for rash.    Neurological:  Positive for weakness. Negative for dizziness and headaches. Psychiatric/Behavioral:  Negative for depression and memory loss. The patient is not nervous/anxious. PHYSICAL EXAM:     Visit Vitals  BP (!) 140/78   Pulse 62   Temp 98.4 °F (36.9 °C) (Temporal)   Resp 18   SpO2 96%   **BP recheck 154/70    Physical Exam  Constitutional:       General: She is not in acute distress. Appearance: Normal appearance. She is not ill-appearing. HENT:      Head: Normocephalic and atraumatic. Right Ear: External ear normal.      Left Ear: External ear normal.      Nose: Nose normal. No rhinorrhea. Mouth/Throat:      Mouth: Mucous membranes are moist.      Pharynx: Oropharynx is clear. Eyes:      General: No scleral icterus. Right eye: No discharge. Left eye: No discharge. Pupils: Pupils are equal, round, and reactive to light. Cardiovascular:      Rate and Rhythm: Normal rate and regular rhythm. Pulses: Normal pulses. Heart sounds: Normal heart sounds. No murmur heard. No gallop. Pulmonary:      Effort: Pulmonary effort is normal. No respiratory distress. Breath sounds: Normal breath sounds. No wheezing or rhonchi. Abdominal:      General: Abdomen is flat. Bowel sounds are normal. There is no distension. Palpations: Abdomen is soft. There is no mass. Comments: Slight tenderness to deep palpation in LLQ   Musculoskeletal:      Cervical back: Normal range of motion. Right lower leg: No edema. Left lower leg: No edema. Comments: Unable to bend knees bilaterally. Skin:     General: Skin is warm and dry. Findings: No rash. Comments: Hyperpigmentation to BLE extending from above ankle to knee. Skin intact and well-moisturized. Neurological:      General: No focal deficit present. Mental Status: She is alert and oriented to person, place, and time. Cranial Nerves: No cranial nerve deficit.       Comments: Generalized weakness present, more severe in lower extremities   Psychiatric:         Mood and Affect: Mood normal.         Behavior: Behavior normal.         Thought Content: Thought content normal.         Judgment: Judgment normal.        HISTORY:     Past Medical and Surgical History reviewed in [facility] on date of initial visit    Patient Active Problem List   Diagnosis Code    Infected decubitus ulcer L89.90, L08.9    HTN (hypertension) I10    HLD (hyperlipidemia) E78.5    Cardiomyopathy ([address] I42.9    Anemia D64.9    Hypothyroidism E03.9    Leukocytosis D72.829     Family History   Problem Relation Age of Onset    Heart Disease Mother     Stroke Father     Cancer Brother     Cancer Maternal Uncle       Social History     Tobacco Use    Smoking status: Never    Smokeless tobacco: Never   Substance Use Topics    Alcohol use: Never     Allergies   Allergen Reactions    Antihistamines - Alkylamine Unknown (comments)     reported on referral packet      Current Outpatient Medications   Medication Sig    acetaminophen (Tylenol Arthritis Pain) 650 mg TbER Take 650 mg by mouth every six to eight (6-8) hours as needed for Pain or Fever.  ammonium lactate (AMLACTIN) 12 % topical cream Apply 1 Each to affected area daily.  carvediloL (COREG) 3.125 mg tablet Take 1 Tablet by mouth two (2) times a day.  levothyroxine (SYNTHROID) 25 mcg tablet Take 1 Tablet by mouth daily.  senna-docusate (Senna with Docusate Sodium) 8.6-50 mg per tablet Take 2 Tablets by mouth two (2) times daily as needed for Constipation. bowel regime    oxyCODONE IR (ROXICODONE) 5 mg immediate release tablet Take 5 mg by mouth every four (4) hours as needed for Pain (Unrelieved by Tylenol). No current facility-administered medications for this visit.         LAB DATA REVIEWED:     No results found for: HBA1C, SEE0RJMX, JXE8CPPD, CCB7KIRU  No results found for: MCACR, MCA1, MCA2, MCA3, MCAU, MCAU2, MCALPOCT  Lab Results   Component Value Date/Time    TSH 4.76 (H) 06/20/2022 08:15 AM     No results found for: Dami Evangelista VD3RIA      Lab Results   Component Value Date/Time    WBC 8.2 06/20/2022 08:15 AM    HGB 10.6 (L) 06/20/2022 08:15 AM    PLATELET 286 (H) 45/60/1516 08:15 AM     Lab Results   Component Value Date/Time    Sodium 145 06/20/2022 08:15 AM    Potassium 4.6 06/20/2022 08:15 AM    Chloride 113 (H) 06/20/2022 08:15 AM    CO2 26 06/20/2022 08:15 AM    BUN 35 (H) 06/20/2022 08:15 AM    Creatinine 0.88 06/20/2022 08:15 AM    Calcium 9.9 06/20/2022 08:15 AM    Magnesium 2.4 04/22/2021 12:26 AM    Phosphorus 2.7 04/28/2021 04:59 AM      Lab Results   Component Value Date/Time    Alk.  phosphatase 93 06/20/2022 08:15 AM    Protein, total 7.2 06/20/2022 08:15 AM    Albumin 2.8 (L) 06/20/2022 08:15 AM    Globulin 4.4 (H) 06/20/2022 08:15 AM     Lab Results   Component Value Date/Time    Ferritin 1,974 (H) 04/23/2021 09:36 AM                Lorenzo Paula NP

## 2022-07-21 ENCOUNTER — HOME CARE VISIT (OUTPATIENT)
Dept: SCHEDULING | Facility: HOME HEALTH | Age: 72
End: 2022-07-21
Payer: MEDICARE

## 2022-07-21 VITALS
OXYGEN SATURATION: 99 % | RESPIRATION RATE: 16 BRPM | SYSTOLIC BLOOD PRESSURE: 136 MMHG | TEMPERATURE: 98.2 F | DIASTOLIC BLOOD PRESSURE: 72 MMHG | HEART RATE: 62 BPM

## 2022-07-21 PROCEDURE — 3331090002 HH PPS REVENUE DEBIT

## 2022-07-21 PROCEDURE — G0151 HHCP-SERV OF PT,EA 15 MIN: HCPCS

## 2022-07-21 PROCEDURE — 400013 HH SOC

## 2022-07-21 PROCEDURE — 3331090001 HH PPS REVENUE CREDIT

## 2022-07-22 ENCOUNTER — HOME CARE VISIT (OUTPATIENT)
Dept: SCHEDULING | Facility: HOME HEALTH | Age: 72
End: 2022-07-22
Payer: MEDICARE

## 2022-07-22 PROCEDURE — G0156 HHCP-SVS OF AIDE,EA 15 MIN: HCPCS

## 2022-07-22 PROCEDURE — 3331090001 HH PPS REVENUE CREDIT

## 2022-07-22 PROCEDURE — 3331090002 HH PPS REVENUE DEBIT

## 2022-07-23 PROCEDURE — 3331090002 HH PPS REVENUE DEBIT

## 2022-07-23 PROCEDURE — 3331090001 HH PPS REVENUE CREDIT

## 2022-07-24 PROCEDURE — 3331090001 HH PPS REVENUE CREDIT

## 2022-07-24 PROCEDURE — 3331090002 HH PPS REVENUE DEBIT

## 2022-07-25 ENCOUNTER — HOME CARE VISIT (OUTPATIENT)
Dept: SCHEDULING | Facility: HOME HEALTH | Age: 72
End: 2022-07-25
Payer: MEDICARE

## 2022-07-25 VITALS
OXYGEN SATURATION: 99 % | HEART RATE: 63 BPM | RESPIRATION RATE: 16 BRPM | TEMPERATURE: 98.3 F | DIASTOLIC BLOOD PRESSURE: 70 MMHG | SYSTOLIC BLOOD PRESSURE: 135 MMHG

## 2022-07-25 PROCEDURE — 3331090001 HH PPS REVENUE CREDIT

## 2022-07-25 PROCEDURE — G0151 HHCP-SERV OF PT,EA 15 MIN: HCPCS

## 2022-07-25 PROCEDURE — 3331090002 HH PPS REVENUE DEBIT

## 2022-07-26 ENCOUNTER — HOME CARE VISIT (OUTPATIENT)
Dept: SCHEDULING | Facility: HOME HEALTH | Age: 72
End: 2022-07-26
Payer: MEDICARE

## 2022-07-26 PROCEDURE — G0156 HHCP-SVS OF AIDE,EA 15 MIN: HCPCS

## 2022-07-26 PROCEDURE — 3331090002 HH PPS REVENUE DEBIT

## 2022-07-26 PROCEDURE — 3331090001 HH PPS REVENUE CREDIT

## 2022-07-27 ENCOUNTER — TELEPHONE (OUTPATIENT)
Dept: FAMILY MEDICINE CLINIC | Age: 72
End: 2022-07-27

## 2022-07-27 ENCOUNTER — HOME CARE VISIT (OUTPATIENT)
Dept: SCHEDULING | Facility: HOME HEALTH | Age: 72
End: 2022-07-27
Payer: MEDICARE

## 2022-07-27 VITALS
OXYGEN SATURATION: 99 % | DIASTOLIC BLOOD PRESSURE: 80 MMHG | TEMPERATURE: 98.6 F | SYSTOLIC BLOOD PRESSURE: 125 MMHG | HEART RATE: 58 BPM | RESPIRATION RATE: 16 BRPM

## 2022-07-27 PROCEDURE — 3331090002 HH PPS REVENUE DEBIT

## 2022-07-27 PROCEDURE — G0151 HHCP-SERV OF PT,EA 15 MIN: HCPCS

## 2022-07-27 PROCEDURE — 3331090001 HH PPS REVENUE CREDIT

## 2022-07-27 NOTE — TELEPHONE ENCOUNTER
[facility] called from [facility] to request a verbal order to continue PT for the patient 2x/wk for 2 weeks then 1x/wk for 3rd week.   # [phone]

## 2022-07-27 NOTE — TELEPHONE ENCOUNTER
Telephone call to [place] [facility] with [facility], verbal order provided for PT to continue for 2 wk 2 and 1 wk 1.

## 2022-07-28 ENCOUNTER — HOME CARE VISIT (OUTPATIENT)
Dept: SCHEDULING | Facility: HOME HEALTH | Age: 72
End: 2022-07-28
Payer: MEDICARE

## 2022-07-28 PROCEDURE — G0156 HHCP-SVS OF AIDE,EA 15 MIN: HCPCS

## 2022-07-28 PROCEDURE — 3331090002 HH PPS REVENUE DEBIT

## 2022-07-28 PROCEDURE — 3331090001 HH PPS REVENUE CREDIT

## 2022-07-29 PROCEDURE — 3331090002 HH PPS REVENUE DEBIT

## 2022-07-29 PROCEDURE — 3331090001 HH PPS REVENUE CREDIT

## 2022-07-30 PROCEDURE — 3331090002 HH PPS REVENUE DEBIT

## 2022-07-30 PROCEDURE — 3331090001 HH PPS REVENUE CREDIT

## 2022-07-31 PROCEDURE — 3331090002 HH PPS REVENUE DEBIT

## 2022-07-31 PROCEDURE — 3331090001 HH PPS REVENUE CREDIT

## 2022-08-01 ENCOUNTER — HOME CARE VISIT (OUTPATIENT)
Dept: SCHEDULING | Facility: HOME HEALTH | Age: 72
End: 2022-08-01
Payer: MEDICARE

## 2022-08-01 ENCOUNTER — TELEPHONE (OUTPATIENT)
Dept: FAMILY MEDICINE CLINIC | Age: 72
End: 2022-08-01

## 2022-08-01 VITALS
OXYGEN SATURATION: 97 % | SYSTOLIC BLOOD PRESSURE: 140 MMHG | DIASTOLIC BLOOD PRESSURE: 90 MMHG | RESPIRATION RATE: 16 BRPM | TEMPERATURE: 98.3 F | HEART RATE: 65 BPM

## 2022-08-01 PROCEDURE — 3331090001 HH PPS REVENUE CREDIT

## 2022-08-01 PROCEDURE — G0151 HHCP-SERV OF PT,EA 15 MIN: HCPCS

## 2022-08-01 PROCEDURE — 3331090002 HH PPS REVENUE DEBIT

## 2022-08-02 PROCEDURE — 3331090002 HH PPS REVENUE DEBIT

## 2022-08-02 PROCEDURE — 3331090001 HH PPS REVENUE CREDIT

## 2022-08-03 ENCOUNTER — HOME CARE VISIT (OUTPATIENT)
Dept: SCHEDULING | Facility: HOME HEALTH | Age: 72
End: 2022-08-03
Payer: MEDICARE

## 2022-08-03 ENCOUNTER — TELEPHONE (OUTPATIENT)
Dept: FAMILY MEDICINE CLINIC | Age: 72
End: 2022-08-03

## 2022-08-03 VITALS
RESPIRATION RATE: 16 BRPM | OXYGEN SATURATION: 99 % | TEMPERATURE: 98.2 F | HEART RATE: 55 BPM | SYSTOLIC BLOOD PRESSURE: 138 MMHG | DIASTOLIC BLOOD PRESSURE: 70 MMHG

## 2022-08-03 PROCEDURE — 3331090001 HH PPS REVENUE CREDIT

## 2022-08-03 PROCEDURE — G0156 HHCP-SVS OF AIDE,EA 15 MIN: HCPCS

## 2022-08-03 PROCEDURE — G0151 HHCP-SERV OF PT,EA 15 MIN: HCPCS

## 2022-08-03 PROCEDURE — 3331090002 HH PPS REVENUE DEBIT

## 2022-08-03 NOTE — TELEPHONE ENCOUNTER
Mary Kirkland is a physical therapist w/[facility]. She called to let Daniel Danielle know that the patient's resting heart rate was 55.   Mary Kirkland said that everything else was normal, /80, Oxygen 99.   # [phone]

## 2022-08-04 PROCEDURE — 3331090001 HH PPS REVENUE CREDIT

## 2022-08-04 PROCEDURE — 3331090002 HH PPS REVENUE DEBIT

## 2022-08-05 ENCOUNTER — DOCUMENTATION ONLY (OUTPATIENT)
Dept: OTHER | Age: 72
End: 2022-08-05

## 2022-08-05 ENCOUNTER — HOME CARE VISIT (OUTPATIENT)
Dept: SCHEDULING | Facility: HOME HEALTH | Age: 72
End: 2022-08-05
Payer: MEDICARE

## 2022-08-05 PROCEDURE — 3331090002 HH PPS REVENUE DEBIT

## 2022-08-05 PROCEDURE — 3331090001 HH PPS REVENUE CREDIT

## 2022-08-05 PROCEDURE — G0156 HHCP-SVS OF AIDE,EA 15 MIN: HCPCS

## 2022-08-06 PROCEDURE — 3331090002 HH PPS REVENUE DEBIT

## 2022-08-06 PROCEDURE — 3331090001 HH PPS REVENUE CREDIT

## 2022-08-07 PROCEDURE — 3331090002 HH PPS REVENUE DEBIT

## 2022-08-07 PROCEDURE — 3331090001 HH PPS REVENUE CREDIT

## 2022-08-08 ENCOUNTER — HOME CARE VISIT (OUTPATIENT)
Dept: HOME HEALTH SERVICES | Facility: HOME HEALTH | Age: 72
End: 2022-08-08
Payer: MEDICARE

## 2022-08-08 ENCOUNTER — HOME CARE VISIT (OUTPATIENT)
Dept: SCHEDULING | Facility: HOME HEALTH | Age: 72
End: 2022-08-08
Payer: MEDICARE

## 2022-08-08 VITALS
SYSTOLIC BLOOD PRESSURE: 120 MMHG | RESPIRATION RATE: 16 BRPM | OXYGEN SATURATION: 99 % | TEMPERATURE: 98.1 F | HEART RATE: 62 BPM | DIASTOLIC BLOOD PRESSURE: 80 MMHG

## 2022-08-08 PROCEDURE — 3331090001 HH PPS REVENUE CREDIT

## 2022-08-08 PROCEDURE — 3331090002 HH PPS REVENUE DEBIT

## 2022-08-08 PROCEDURE — G0151 HHCP-SERV OF PT,EA 15 MIN: HCPCS

## 2022-08-09 ENCOUNTER — HOME CARE VISIT (OUTPATIENT)
Dept: HOME HEALTH SERVICES | Facility: HOME HEALTH | Age: 72
End: 2022-08-09
Payer: MEDICARE

## 2022-08-09 ENCOUNTER — TELEPHONE (OUTPATIENT)
Dept: FAMILY MEDICINE CLINIC | Age: 72
End: 2022-08-09

## 2022-08-09 PROCEDURE — 3331090002 HH PPS REVENUE DEBIT

## 2022-08-09 PROCEDURE — 3331090001 HH PPS REVENUE CREDIT

## 2022-08-09 NOTE — PROGRESS NOTES
Music Therapy Progress Note      Leia Marinelli [id number]     1950  81 y.o.  female    Patient Telephone Number: [phone] (home)   Confucianist Affiliation: Latter day   Language: English   Patient Active Problem List    Diagnosis Date Noted    Hypothyroidism 06/17/2022    Leukocytosis 06/17/2022    Cardiomyopathy ([address] 04/23/2021    Anemia 04/23/2021    HTN (hypertension) 04/21/2021    HLD (hyperlipidemia) 04/21/2021    Infected decubitus ulcer 04/17/2021        Date: 8/9/2022            Total Time (in minutes): 40          [facility]    Mental Status:   [ X ] Alert [  ] Vanessa Shank [  ]  Confused  [  ] Minimally responsive  [  ] Sleeping    Communication Status: [  ] Impaired Speech [ X ] Verbal    Physical Status:   [  ] Oxygen in use  [  ] Hard of Hearing [  ] Vision Impaired  [  ] Ambulatory  [  ] Ambulatory with assistance [ X ] Non-ambulatory     Music Preferences, Background: _Prefers singing a variety of popular and sacred music;     Clinical Problem addressed: Coping with grief and loss over decreased mobility;     Goal(s) met in session:  Physical/Pain management (Scale of 1-10):    Pre-session rating ___________    Post-session rating __________  [  ] Increased relaxation   [  ] Affected breathing patterns  [  ] Decreased muscle tension   [  ] Decreased agitation  [  ] Affected heart rate    [  ] Increased alertness     Emotional/Psychological:  [X] Increased self-expression   [  ] Decreased aggressive behavior   [  ] Decreased feelings of stress  [ X ] Discussed healthy coping skills     [  ] Improved mood    [  ] Decreased withdrawn behavior     Social:  [X] Decreased feelings of isolation/loneliness [ X] Positive social interaction   [  ] Provided support and/or comfort for family/friends    Spiritual:  [X] Spiritual support    [  ] Expressed peace  [X] Expressed misti    [  ] Discussed beliefs    Techniques Utilized (Check all that apply):   [  ] Procedural support MT [  ] Music for relaxation [X] Patient preferred music  [  ] Rosy analysis  [X] Song choice  [  ] Music for validation  [  ] Entrainment  [  ] Movement to music [  ] Guided visualization  [  ] Raymond Pear  [  ] Patient instrument playing [  ] Aditi Jernigan writing  [X] Singing   [  ] Chela Neither  [  ] Sensory stimulation  [X] Active Listening  [X] Music for spiritual support [  ] Making of CDs as gifts    Session Observations:  Ms. Robin Ocasio engaged in discussion, singing, song-choice and prayer during this in-person visit. She expressed sadness over the difficulty she is having regaining her mobility, and describes her knees as \"locked\" reporting that physical therapy is unable to continue after two more weeks. She was offered validation for her feelings of frustration and sadness, and songs of misti that acknowledge struggle were offered, along with spoken prayer. Ms. Remington Adler also engaged in her own choice of Adventism music which she sang as a solo prayer, and offered updates on her new involvement with the [facility] [employer]. Thank you for the opportunity to provide music therapy and spiritual care to MsNaa Joslyn Weisscelso.   [place], [place], [facility]   Music [address] Certified  Spiritual Care Services  Referral- based service

## 2022-08-09 NOTE — TELEPHONE ENCOUNTER
The patient called at [address] [place] [address],[address] on 8/8/22. She asks that Claudia Olivo please call her. No other information given.   # [phone]

## 2022-08-09 NOTE — TELEPHONE ENCOUNTER
Telephone call to patient who has billing questions. Will have [facility] and return call to patient.

## 2022-08-10 ENCOUNTER — HOME CARE VISIT (OUTPATIENT)
Dept: SCHEDULING | Facility: HOME HEALTH | Age: 72
End: 2022-08-10
Payer: MEDICARE

## 2022-08-10 PROCEDURE — 3331090001 HH PPS REVENUE CREDIT

## 2022-08-10 PROCEDURE — 3331090002 HH PPS REVENUE DEBIT

## 2022-08-10 PROCEDURE — G0156 HHCP-SVS OF AIDE,EA 15 MIN: HCPCS

## 2022-08-11 PROCEDURE — 3331090002 HH PPS REVENUE DEBIT

## 2022-08-11 PROCEDURE — 3331090001 HH PPS REVENUE CREDIT

## 2022-08-12 ENCOUNTER — HOME CARE VISIT (OUTPATIENT)
Dept: SCHEDULING | Facility: HOME HEALTH | Age: 72
End: 2022-08-12
Payer: MEDICARE

## 2022-08-12 VITALS
OXYGEN SATURATION: 98 % | DIASTOLIC BLOOD PRESSURE: 80 MMHG | RESPIRATION RATE: 16 BRPM | HEART RATE: 56 BPM | TEMPERATURE: 98.1 F | SYSTOLIC BLOOD PRESSURE: 128 MMHG

## 2022-08-12 PROCEDURE — 3331090002 HH PPS REVENUE DEBIT

## 2022-08-12 PROCEDURE — G0151 HHCP-SERV OF PT,EA 15 MIN: HCPCS

## 2022-08-12 PROCEDURE — 3331090001 HH PPS REVENUE CREDIT

## 2022-08-13 PROCEDURE — 3331090001 HH PPS REVENUE CREDIT

## 2022-08-13 PROCEDURE — 3331090002 HH PPS REVENUE DEBIT

## 2022-08-14 PROCEDURE — 3331090001 HH PPS REVENUE CREDIT

## 2022-08-14 PROCEDURE — 3331090002 HH PPS REVENUE DEBIT

## 2022-08-15 ENCOUNTER — TELEPHONE (OUTPATIENT)
Dept: FAMILY MEDICINE CLINIC | Age: 72
End: 2022-08-15

## 2022-08-15 PROCEDURE — 3331090001 HH PPS REVENUE CREDIT

## 2022-08-15 PROCEDURE — 3331090002 HH PPS REVENUE DEBIT

## 2022-08-15 NOTE — TELEPHONE ENCOUNTER
[facility] called and spoke with pt for routine psychosocial check-in. She stated that she would like to go back on hospice. She said she came off of hospice because her wound healed and she wanted to try home health for PT/OT with the goal to sit up and get out of bed. She said after 8 weeks of PT, she made no progress due to her knees in a permanent 10-17% bend, which makes it impossible to sit and walk. She said she cannot sit up more than 72%, so she cannot sit on the edge of her bed to get into wheelchair. Because this is the 3rd attempt at PT with no results since 2019, she is now hoping to go back to hospice care. She says she has Stage 5 renal disease, which should qualify her for hospice as her disease is progressive. ELY offered to inform her provider, Danae Jin, of this information at the team meeting tomorrow morning. She said that would be helpful, and Spann has an in-home appointment coming up this Friday.      LORNA Geronimo, [facility]    [address]  [address]) [phone]

## 2022-08-16 ENCOUNTER — HOME CARE VISIT (OUTPATIENT)
Dept: SCHEDULING | Facility: HOME HEALTH | Age: 72
End: 2022-08-16
Payer: MEDICARE

## 2022-08-16 PROCEDURE — 3331090001 HH PPS REVENUE CREDIT

## 2022-08-16 PROCEDURE — G0156 HHCP-SVS OF AIDE,EA 15 MIN: HCPCS

## 2022-08-16 PROCEDURE — 3331090002 HH PPS REVENUE DEBIT

## 2022-08-17 PROCEDURE — 3331090001 HH PPS REVENUE CREDIT

## 2022-08-17 PROCEDURE — 3331090002 HH PPS REVENUE DEBIT

## 2022-08-18 ENCOUNTER — HOME CARE VISIT (OUTPATIENT)
Dept: SCHEDULING | Facility: HOME HEALTH | Age: 72
End: 2022-08-18
Payer: MEDICARE

## 2022-08-18 ENCOUNTER — DOCUMENTATION ONLY (OUTPATIENT)
Dept: OTHER | Age: 72
End: 2022-08-18

## 2022-08-18 VITALS
RESPIRATION RATE: 16 BRPM | HEART RATE: 53 BPM | DIASTOLIC BLOOD PRESSURE: 82 MMHG | TEMPERATURE: 98.1 F | SYSTOLIC BLOOD PRESSURE: 150 MMHG | OXYGEN SATURATION: 98 %

## 2022-08-18 PROCEDURE — G0156 HHCP-SVS OF AIDE,EA 15 MIN: HCPCS

## 2022-08-18 PROCEDURE — 3331090001 HH PPS REVENUE CREDIT

## 2022-08-18 PROCEDURE — G0151 HHCP-SERV OF PT,EA 15 MIN: HCPCS

## 2022-08-18 PROCEDURE — 3331090002 HH PPS REVENUE DEBIT

## 2022-08-18 NOTE — PROGRESS NOTES
Music Therapy Progress Note      Donya Kulkarni [id number]     1950  81 y.o.  female    Patient Telephone Number: [phone] (home)   Congregational Affiliation: Baptist   Language: English   Patient Active Problem List    Diagnosis Date Noted    Hypothyroidism 06/17/2022    Leukocytosis 06/17/2022    Cardiomyopathy ([address] 04/23/2021    Anemia 04/23/2021    HTN (hypertension) 04/21/2021    HLD (hyperlipidemia) 04/21/2021    Infected decubitus ulcer 04/17/2021        Date: 8/18/2022            Total Time (in minutes): 39          [facility]    Mental Status:   Rangi.[place]  ] Alert [  ] Pop Escamilla [  ]  Confused  [  ] Minimally responsive  [  ] Sleeping    Communication Status: [  ] Impaired Speech [ X] Verbal     Physical Status:   [  ] Oxygen in use  [  ] Hard of Hearing [  ] Vision Impaired  [  ] Ambulatory  [  ] Ambulatory with assistance [ X ] Non-ambulatory     Music Preferences, Background: __Varied Popular and Sacred    Clinical Problem addressed: _Bedbound and unable to sit up to use a wheelchair;    Goal(s) met in session:  Physical/Pain management (Scale of 1-10):    Pre-session rating ___________    Post-session rating __________  [ Ari Veloz ] Increased relaxation   [  ] Affected breathing patterns  [  ] Decreased muscle tension   [  ] Decreased agitation  [  ] Affected heart rate    [  ] Increased alertness     Emotional/Psychological:  [X] Increased self-expression   [  ] Decreased aggressive behavior   [  ] Decreased feelings of stress  [  ] Discussed healthy coping skills     [  ] Improved mood    [  ] Decreased withdrawn behavior     Social:  [  ] Decreased feelings of isolation/loneliness [ X ] Positive social interaction   [  ] Provided support and/or comfort for family/friends    Spiritual:  [ X ] Spiritual support    [  ] Expressed peace  [ X ] Expressed misti    [  ] Discussed beliefs    Techniques Utilized (Check all that apply):   [  ] Procedural support MT [  ] Music for relaxation [X] Patient preferred music  [  ] Rosy analysis  [  ] Song choice  [  ] Music for validation  [  ] Entrainment  [  ] Movement to music [  ] Guided visualization  [  ] Ame Urban  [  ] Patient instrument playing [  ] Lisette Asif writing  [X] Singing   [  ] Gavin Schmid  [  ] Sensory stimulation  [  ] Active Listening  [X] Music for spiritual support [  ] Making of CDs as gifts    Session Observations:  Joslyn Villalta attended this virtual support group where she knows and appreciates engagement from group members, and has the opportunity to hear music she prefers. Today's session theme was \"inspiration\" and she discussed the topic with peers. She also had one of her recordings featured in the session, and received positive feedback from her peers and the music therapists facilitating the group. Joslyn is struggling with her PT coming to an end soon, and facing disappointment in the impact this has on her level of mobility, with has not improved enough for her to be able to sit up, get out of bed, or use a wheelchair. She received support and encouragement, and expressed gratitude for the inspirational music. Thank you for the opportunity to provide music therapy to [facility].   Anaid Mace, [facility]   Music [address] Certified  Spiritual Care Services  Referral- based service

## 2022-08-19 ENCOUNTER — HOME VISIT (OUTPATIENT)
Dept: FAMILY MEDICINE CLINIC | Age: 72
End: 2022-08-19
Payer: MEDICARE

## 2022-08-19 VITALS
OXYGEN SATURATION: 96 % | TEMPERATURE: 98.2 F | SYSTOLIC BLOOD PRESSURE: 136 MMHG | DIASTOLIC BLOOD PRESSURE: 74 MMHG | HEART RATE: 58 BPM | RESPIRATION RATE: 18 BRPM

## 2022-08-19 DIAGNOSIS — M25.662 JOINT STIFFNESS OF BOTH KNEES: ICD-10-CM

## 2022-08-19 DIAGNOSIS — Z91.89 AT HIGH RISK FOR SKIN BREAKDOWN: ICD-10-CM

## 2022-08-19 DIAGNOSIS — M25.661 JOINT STIFFNESS OF BOTH KNEES: ICD-10-CM

## 2022-08-19 DIAGNOSIS — Z74.01 BEDBOUND: ICD-10-CM

## 2022-08-19 DIAGNOSIS — I42.8 OTHER CARDIOMYOPATHY (HCC): Primary | ICD-10-CM

## 2022-08-19 DIAGNOSIS — B35.1 ONYCHOMYCOSIS: ICD-10-CM

## 2022-08-19 DIAGNOSIS — E03.9 HYPOTHYROIDISM, UNSPECIFIED TYPE: ICD-10-CM

## 2022-08-19 DIAGNOSIS — G89.29 OTHER CHRONIC PAIN: ICD-10-CM

## 2022-08-19 DIAGNOSIS — I10 PRIMARY HYPERTENSION: ICD-10-CM

## 2022-08-19 DIAGNOSIS — Z71.89 ADVANCED CARE PLANNING/COUNSELING DISCUSSION: ICD-10-CM

## 2022-08-19 PROBLEM — I42.9 MYOCARDIOPATHY (HCC): Status: ACTIVE | Noted: 2021-04-23

## 2022-08-19 PROCEDURE — 99349 HOME/RES VST EST MOD MDM 40: CPT | Performed by: NURSE PRACTITIONER

## 2022-08-19 PROCEDURE — 1123F ACP DISCUSS/DSCN MKR DOCD: CPT | Performed by: NURSE PRACTITIONER

## 2022-08-19 NOTE — PROGRESS NOTES
Advance Care Planning     General Advance Care Planning (ACP) Conversation      Date of Conversation: 8/19/2022  Conducted with: Patient with Decision Making Capacity    Healthcare Decision Maker:     Primary Decision Maker: Zhao Allen \"Lisa\" - Caregiver - [phone]  Click here to complete [address] including selection of the Healthcare Decision Maker Relationship (ie \"Primary\")    Patient has primary decision makers in the group home \"[facility]\" and has verbalized this is still her wishes     Content/Action Overview:   Has NO ACP documents/care preferences - refer to ACP Clinical Specialist  Reviewed DNR/DNI and patient confirms current DNR status - completed forms on file (place new order if needed)  Topics discussed: hospice care  Additional Comments: Patient was previously under hospice services but did not decline and was transferred to [facility] so she would be able to participate in PT. She has worked with PT and does not have potential for further improvement. She would like to transition back to hospice at this time. She has CHF without current symptoms. Notified patient that I would discuss with hospice team but this clinician did not feel she meets hospice criteria due to no recent decline or symptoms.        Length of Voluntary ACP Conversation in minutes:  <16 minutes (Non-Billable)    Azra Gray NP

## 2022-08-19 NOTE — PROGRESS NOTES
Home Based [address] [facility]   [id number]          NOTE: Home Based Primary Care is a PROVIDER (MD/NP) based interdisciplinary practice (RN, LCSW, Pharmacy, Dietician) for patients who cannot access (or have a taxing effort) primary / specialty medical care in an office setting. [facility] is NOT [employer] but works with [facility] when there is a skilled need. Our MD/NPs are integral in Care Transitions; PLEASE CALL [phone] if this patient arrives in the Emergency Department or Hospital.         Date of Current Visit: 8/19/2022     Jeffrey Craig is a 81 y.o. female seen in the home today due to taxing effort to seek primary care services in the community s/t mobility limitations, knee stiffness, bedbound status. Patient/Family present for Current Visit:  Patient, primary caregiver/[facility], Lesa Mandujanoss of Care as stated by the patient/family is: To get out of bed    ASSESSMENT AND PLAN       ICD-10-CM ICD-9-CM    1. Other cardiomyopathy ([facility] [address]  I42.8 425.4       2. Primary hypertension  I10 401.9       3. Hypothyroidism, unspecified type  E03.9 244.9       4. Joint stiffness of both knees  M25.661 719.56     M25.662        5. Bedbound  Z74.01 V49.84       6. At high risk for skin breakdown  Z91.89 V49.89       7. Advanced care planning/counseling discussion  Z71.89 V65.49       8. Other chronic pain  G89.29 338.29       9. Onychomycosis  B35.1 110.1            -Cardiomyopathy: Echo 4/22/21 with EF 20 - 25%, severely reduced systolic function, Moderate tricuspid valve regurgitation. Cardiac cath 4/23/22 notes no CAD, LV gram consistent with Takostubo cardiomyopathy. Per chart review, Alondra Guallpa discussed during hospitalization but she instead transitioned to hospice. Denies SOB, chest pain, swelling, or palpitations. Managed with carvedilol 3.125mg BID.  No longer follows with cardiology and does not wish to at this time.     -Hypertension: BP at goal today, per patient it has been intermittently elevated with PT. Goal <150/90. She was previously managed with Norvasc, but this was d/c due to hypotension, patient reports it worsened her lymphedema. If worsening, would consider losartan/olmesartan. CMP obtained 6/20/22 and stable, repeat q 6 months or PRN. -Hypothyroidism: Managed with Levothyroxine 25mcg daily. TSH obtained 6/20/22 and slightly elevated. Discussed with patient and we elected not to make changes to levothyroxine, but will repeat TSH and T4 with next lab draw. -Bedbound/ Joint stiffness of knees: She has been bedbound for about 4 years after a fall for unclear reasons, and she failed to respond to spinal surgery or electrical stimulation of BLE. She does bed exercises and can lift both legs from bed. She recently worked with PT but was unable to bend either knee. Per patient this is her 3rd round of PT and she has not made progress. This limits her ability to sit on side of bed or in wheelchair, which has been a hard adjustment for her. Denies pain in knees at this time.  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 She was receiving wound care with hospice and sacral wound has healed, scar tissue remains. She continues to have pain in this area and remains high risk for reoccurrence due to her immobility. She has hospital bed with gel overlay and trapeze. Endorses repositioning herself with trapeze. Encourage protein sources in diet (has not tolerated supplements due to GI side effects). CMP obtained 6/20/22 with significant improvement in albumin but remains low at 2.8, will repeat with next lab draw. -Advanced Care Planning: Discussed at previous visit and she states her caregiver Ortencia Homans makes decisions if there is an emergency (example when she was found confused and septic, sent to hospital).   Discussed if decisions had to be made in hospital, who would they call and she would want sister, Lizet Rajput, to make decisions on her behalf. She has a son who is estranged. She has elected DNR status and has DDNR in home. She would not like additional screening such as Colonoscopy, DEXA scan, Mmg, but would want hospitalization if needed. She plans to outline her wishes with  at a later time. We discussed hospice at today's visit as patient expressed desire to return to hospice to team BENJA. She reports awareness that she has chronic, life-limiting illness and feels she benefited from hospice services previously. Discussed that she does not currently have symptoms and no evidence of decline, but will discuss with her former hospice provider.    -Chronic pain: ongoing intermittent pain in sacrum in area of previous decubitus and pain in bilateral knees. Denies pain during visit, but continues to have sacral pain despite healing of wound. She takes Tylenol as needed, has weaned from oxycodone and takes for severe pain typically once daily at night.     -Onychomycosis: Noted on feet bilaterally. She has a helper trim nails twice yearly but has not recently seen a podiatrist.  Phone number of mobile podiatrist given to patient today to schedule appointment. No pain, broken nails currently.      -Labs: Labs obtained 6/20/22 including CBC, CMP, Lipids, Hep C screening, TSH. Repeat q 6 months or as needed. - add free T4 and Tox screen. -AMD: No AMD at this time, discussed today and she would want sister Lizet Rajput to make healthcare decisions if she was unable. She plans to make healthcare and financial directives with  at later date. If worsening symptoms, would want to transition to hospice  -Code status: DNR (DDNR in home on wall of room)  -Follow up: in 6 weeks (9/29/22). Will need CSC     Declines Covid vaccinations and reports has completed Pneumonia series and had previous Shingles vaccination (would not like newer version).   She would like flu shot during flu season. Health Maintenance Due   Topic Date Due    DTaP/Tdap/Td series (1 - Tdap) Never done    Shingrix Vaccine Age 50> (1 of 2) Never done     I have left a SUMMARY / INSTRUCTIONS from today's visit with the patient/family in the home          HISTORY:      CHIEF COMPLAINT:    Chief Complaint   Patient presents with    Follow-up    CHF       HPI/SUBJECTIVE:    Ms. Nida Collier is a 82 y/o F seen today for follow-up of chronic medical conditions. She resides in a group home with her primary caretaker, Cammie Driscoll.   She has been bedbound for about 4 years after a fall from chair that occurred for unclear reasons. She underwent a surgical procedure of some sort at [facility] and then rehabiliation at the [facility] with electrical stimulation of her legs. However, her mobility has not improved and she continues to be limited to a bed. She recently worked with PT with goal to sit on side of bed or transfer to wheelchair, but was unable to improve the bend in her knees. Per patient, this is her 3rd PT session that was not successful and she is coming to the realization that her condition is permanent. She has been having a hard time with this situation. Discussed that team SW is an option for support and she endorses having her contact and will reach out if interested. She had a hospitalization in April 2021 for septic shock s/t infection of decubitus ulcer and was discharged with hospice. Since that time, her sacral ulcer has healed and she has graduated hospice. She expressed interested today in transitioning back to hospice services as she benefited from their visits and she still has life-limiting diagnosis. She also expressed seeing in her hospital records that she had Stage V renal disease. She has had multiple previous hospitalizations with [facility] and she is able to identify a hospital note with CKD St 3.   I could not find a note stating Stage V and we discussed that her lab values were WNL at this time. She is a good historian and it is certainly possible that her kidney function was worse previously. We discussed that I do not believe she would qualify for hospice at this time as she has not had a decline or symptoms (previously qualified based on cardiomyopathy). Her chronic medical conditions include cardiomyopathy with EF 20-25%. She is currently managed on carvedilol with no symptoms of CP, SOB, edema. Per chart review, lifevest was recommended during her April 2021 hospitalization, but patient elected to transition to hospice. She has a history of SVT episodes and HR has been recently well controlled on carvedilol. BP is also well-controlled at this time. She was previously taking Norvasc but it was discontinued due to hypotension. Her sacral decubitus ulcer has been chronic for around 4 years in various stages of healing and is currently healed. She still has intermittent pain in this area that is worst at night. Pain is well-controlled at this time with PRN Tylenol and oxycodone (uses once daily at night). She also has hyperlipidemia but is no longer taking Zetia or a statin- discussed Lipid panel lab results today and will not re-initiate a statin due to risk/benefit and care goals. She takes Synthroid for hypothyroidism, TSH slightly elevated and discussed plans to recheck and change plan if indicated. She also has onychomycosis bilaterally. She has an aide trim nails twice daily but has not seen a podiatrist.  Phone number provided for mobile podiatrist and encouraged to make an appointment at this time. REVIEW OF SYSTEMS:     Review of Systems   Constitutional:  Negative for fever, malaise/fatigue and weight loss. HENT:  Negative for congestion, hearing loss and sore throat. Eyes:  Negative for blurred vision.         Wears glasses   Respiratory:  Negative for cough, shortness of breath and wheezing. Cardiovascular:  Negative for chest pain, palpitations and leg swelling. Gastrointestinal:  Negative for abdominal pain, blood in stool, constipation, diarrhea and nausea. Genitourinary:  Negative for dysuria. Musculoskeletal:  Negative for back pain and falls. Pain in sacrum, stiffness in knees and inability to bend   Skin:  Negative for rash. Neurological:  Positive for weakness. Negative for dizziness and headaches. Psychiatric/Behavioral:  Negative for depression and memory loss. The patient is not nervous/anxious. PHYSICAL EXAM:     Visit Vitals  /74 (BP 1 Location: Right upper arm, BP Patient Position: Lying)   Pulse (!) 58   Temp 98.2 °F (36.8 °C) (Temporal)   Resp 18   SpO2 96%   **BP recheck 154/70    Physical Exam  Constitutional:       General: She is not in acute distress. Appearance: Normal appearance. She is not ill-appearing. HENT:      Head: Normocephalic and atraumatic. Right Ear: External ear normal.      Left Ear: External ear normal.      Nose: Nose normal. No rhinorrhea. Mouth/Throat:      Mouth: Mucous membranes are moist.      Pharynx: Oropharynx is clear. Eyes:      General: No scleral icterus. Right eye: No discharge. Left eye: No discharge. Pupils: Pupils are equal, round, and reactive to light. Cardiovascular:      Rate and Rhythm: Normal rate and regular rhythm. Pulses: Normal pulses. Dorsalis pedis pulses are 2+ on the right side and 2+ on the left side. Heart sounds: Normal heart sounds. No murmur heard. No gallop. Pulmonary:      Effort: Pulmonary effort is normal. No respiratory distress. Breath sounds: Normal breath sounds. No wheezing or rhonchi. Abdominal:      General: Abdomen is flat. Bowel sounds are normal. There is no distension. Palpations: Abdomen is soft. There is no mass. Musculoskeletal:      Cervical back: Normal range of motion.       Right lower leg: No edema. Left lower leg: No edema. Right foot: Bunion present. No foot drop. Left foot: Bunion present. No foot drop. Comments: Unable to bend knees   Feet:      Right foot:      Skin integrity: Callus present. No ulcer or skin breakdown. Toenail Condition: Right toenails are abnormally thick. Fungal disease present. Left foot:      Skin integrity: Callus present. No ulcer or skin breakdown. Toenail Condition: Left toenails are abnormally thick. Fungal disease present. Skin:     General: Skin is warm and dry. Findings: No rash. Comments: Hyperpigmentation to BLE extending from above ankle to knee. Skin intact and well-moisturized. Neurological:      General: No focal deficit present. Mental Status: She is alert and oriented to person, place, and time. Cranial Nerves: No cranial nerve deficit. Comments: Generalized weakness present, more severe in lower extremities   Psychiatric:         Mood and Affect: Mood normal.         Behavior: Behavior normal.         Thought Content:  Thought content normal.         Judgment: Judgment normal.        HISTORY:     Past Medical and Surgical History reviewed in [facility] on date of initial visit    Patient Active Problem List   Diagnosis Code    Infected decubitus ulcer L89.90, L08.9    HTN (hypertension) I10    HLD (hyperlipidemia) E78.5    Myocardiopathy ([facility] [address] I42.9    Anemia D64.9    Hypothyroidism E03.9    Leukocytosis D72.829    Joint stiffness of both knees M25.661, M25.662    Other chronic pain G89.29     Family History   Problem Relation Age of Onset    Heart Disease Mother     Stroke Father     Cancer Brother     Cancer Maternal Uncle       Social History     Tobacco Use    Smoking status: Never    Smokeless tobacco: Never   Substance Use Topics    Alcohol use: Never     Allergies   Allergen Reactions    Antihistamines - Alkylamine Unknown (comments)     reported on referral packet Current Outpatient Medications   Medication Sig    sodium chloride (CHILDREN'S SALINE NASAL SPRAY NA) 2 Sprays by Nasal route as needed for PRN Reason (Other) (nasal dryness). apply 2 sprays in each nostril as needed for nasal dryness    acetaminophen (Tylenol Arthritis Pain) 650 mg TbER Take 1 Tablet by mouth three (3) times daily as needed for Pain or Fever.  ammonium lactate (AMLACTIN) 12 % topical cream Apply 1 Each to affected area daily. Apply topically to both legs at bedtime for dry skin. Taking Differently: apply topically to both legs in the morning for dry skin    carvediloL (COREG) 3.125 mg tablet Take 1 Tablet by mouth two (2) times a day.  levothyroxine (SYNTHROID) 25 mcg tablet Take 1 Tablet by mouth daily.  senna-docusate (Senna with Docusate Sodium) 8.6-50 mg per tablet Take 2 Tablets by mouth two (2) times daily as needed for Constipation. bowel regime    oxyCODONE IR (ROXICODONE) 5 mg immediate release tablet Take 5 mg by mouth every four (4) hours as needed for Pain (Unrelieved by Tylenol). take 2 tablets by mouth once daily as needed for costipation. No current facility-administered medications for this visit.         LAB DATA REVIEWED:     No results found for: HBA1C, DIO0YPIE, FDO7THPD, ILU6OFSV  No results found for: MCACR, MCA1, MCA2, MCA3, MCAU, MCAU2, MCALPOCT  Lab Results   Component Value Date/Time    TSH 4.76 (H) 06/20/2022 08:15 AM     No results found for: KEON Verma      Lab Results   Component Value Date/Time    WBC 8.2 06/20/2022 08:15 AM    HGB 10.6 (L) 06/20/2022 08:15 AM    PLATELET 382 (H) 31/96/3071 08:15 AM     Lab Results   Component Value Date/Time    Sodium 145 06/20/2022 08:15 AM    Potassium 4.6 06/20/2022 08:15 AM    Chloride 113 (H) 06/20/2022 08:15 AM    CO2 26 06/20/2022 08:15 AM    BUN 35 (H) 06/20/2022 08:15 AM    Creatinine 0.88 06/20/2022 08:15 AM    Calcium 9.9 06/20/2022 08:15 AM    Magnesium 2.4 04/22/2021 12:26 AM Phosphorus 2.7 04/28/2021 04:59 AM      Lab Results   Component Value Date/Time    Alk.  phosphatase 93 06/20/2022 08:15 AM    Protein, total 7.2 06/20/2022 08:15 AM    Albumin 2.8 (L) 06/20/2022 08:15 AM    Globulin 4.4 (H) 06/20/2022 08:15 AM     Lab Results   Component Value Date/Time    Ferritin 1,974 (H) 04/23/2021 09:36 AM                Xavier Natarajan NP

## 2022-08-24 ENCOUNTER — TELEPHONE (OUTPATIENT)
Dept: FAMILY MEDICINE CLINIC | Age: 72
End: 2022-08-24

## 2022-08-24 NOTE — TELEPHONE ENCOUNTER
CHINOW called and spoke with pt. She said that because she does not qualify for hospice, she is looking in to getting an aide to come for 1 hour, 3 times a week for bed baths. She had been rec'ing bed baths when she was under hospice care, but the group home staff does not provide hygiene care other than changing her diapers and giving her a wash cloth in the morning for her to clean her face and areas that she can reach and has the mobility to clean herself. ELY talked with pt about hiring an aide through an agency, such as [facility], [facility], etc. She said she had already contacted agencies but she is not willing to commit to the requirement of 20 hours/week minimum for staffing. She said she only needs someone for 1 hour, 3 times a week. Pt does not qualify for Medicaid due to income. LCSW will contact [facility] tomorrow and provide intake information for their in-home caregiving win program. Pt stated that she has also been asking friends from [employer] if anyone knows an individual who may be able to provide personal care.      LORNA Keene, [place]    [address]  (z) [phone] [id number]

## 2022-08-25 ENCOUNTER — TELEPHONE (OUTPATIENT)
Dept: FAMILY MEDICINE CLINIC | Age: 72
End: 2022-08-25

## 2022-08-25 NOTE — TELEPHONE ENCOUNTER
[facility] to complete intake for their win-funded in-home personal care program. This program offers 8 hours/week for 10 weeks of in-home personal care aide services. The [facility] representative informed that there is currently a 3-4 month wait list for the in-home aide program.     LCSW provided intake information, was unable to provide specific income information as LCSW did not have that information. Representative stated that a Care Coordinator through [facility] will call pt within 10 business days, and will collect the financial information. LCSW called pt to inform her that [facility] had been contacted, and that there is currently a wait list. LCSW informed her that a Care Coordinator from [facility] will be calling her within the next 2 weeks. She expressed appreciation, stated that she will put this on the list of things she has tried as far as getting herself help in the home for bed bathing.      LORNA Martínez, [facility]    [address]  (t) [phone] [id number]

## 2022-09-07 ENCOUNTER — TELEPHONE (OUTPATIENT)
Dept: FAMILY MEDICINE CLINIC | Age: 72
End: 2022-09-07

## 2022-09-07 NOTE — TELEPHONE ENCOUNTER
LCSW rec'd voicemail message yesterday evening from [place] [address] with [facility]. She said she had tried to call pt, but left voicemail. She was calling to get additional information re: intake for services. LCSW called her back this morning ([phone]), no answer and voicemail was full so LCSW unable to leave a message. LCSW will continue attempts to reach her and provide information as appropriate.      LORNA Villanueva, [place]    [address]  (y) [phone]

## 2022-09-08 ENCOUNTER — DOCUMENTATION ONLY (OUTPATIENT)
Dept: FAMILY MEDICINE CLINIC | Age: 72
End: 2022-09-08

## 2022-09-13 ENCOUNTER — TELEPHONE (OUTPATIENT)
Dept: FAMILY MEDICINE CLINIC | Age: 72
End: 2022-09-13

## 2022-09-13 NOTE — TELEPHONE ENCOUNTER
CHINOW rec'd call and voicemail this morning from pt. She stated in her message that she rec'd a notification in her [employer] account that she has a bill due for $43.40, which is concerning to her because she has not rec'd a bill in several months and was not expecting this bill. She said the statement does not indicate a date of service, or what it was for. She also stated that she saw in [address] 2 visits from this LCSW, from 9/7/22 and 9/8/22, but that LCSW had not been to her home. LCSW called pt back. Phone did not ring, went straight to voicemail. In the voicemail message, CHINOW provided pt with the phone number for the [facility] Department ([phone]). ELY also explained that the notes she sees from this LCSW are not in-home visit notes, rather 9/7/22 was a telephone note from when ELY rec'd message from [facility], and 9/8/22 was documentation only from when ELY updated her Social Determinants of Health wheel. LCSW encouraged her to call back should she have any other questions or concerns, and provided contact information.      LORNA Maxwell, [place]    [place] [facility] [place]  (y) [phone] [id number]

## 2022-09-15 ENCOUNTER — DOCUMENTATION ONLY (OUTPATIENT)
Dept: FAMILY MEDICINE CLINIC | Age: 72
End: 2022-09-15

## 2022-09-15 NOTE — PROGRESS NOTES
Home Based Primary Care  Plan of Care    [facility] Team Members: Des Washington MD; Gee Longoria NP; Abraham Marrero NP; Hussein Childress, RN; Miguelina Bazan, LASHANDA; ELY Wallace  1950 / [id number]  female    The physician has reviewed and discussed the plan of care with the interdisciplinary group on 09/20/22 and agrees. Date of Initial Visit (Start of Care):    Diagnosis:   Patient Active Problem List   Diagnosis Code    Infected decubitus ulcer L89.90, L08.9    HTN (hypertension) I10    HLD (hyperlipidemia) E78.5    Myocardiopathy ([address] I42.9    Anemia D64.9    Hypothyroidism E03.9    Leukocytosis D72.829    Joint stiffness of both knees M25.661, M25.662    Other chronic pain G89.29       Patient status summary: 81 y.o. female who was referred to the [facility] program due to bedbound status, bilateral knee stiffness. Patient discussed today for POC review. Advance Care Planning:  Code status: DNR      Primary Decision Maker: Eulalio Kelsi \"Lisa\" - Caregiver - [phone]   Advance Care Planning 4/28/2021   Patient's Healthcare Decision Maker is: Named in scanned ACP document   Confirm Advance Directive Yes, on file   Patient Would Like to Complete Advance Directive -   Does the patient have other document types Do Not Resuscitate       DME/Supplies:  Hospital Bed     Allergies   Allergen Reactions    Antihistamines - Alkylamine Unknown (comments)     reported on referral packet       Nutritional Requirements:   Oral with supported meal preparation    Functional/Activity Level:  Bedbound only    Safety Measures:   Self-care deficity    Acuity Level Rating: Low    Current Outpatient Medications   Medication Sig    sodium chloride (CHILDREN'S SALINE NASAL SPRAY NA) 2 Sprays by Nasal route as needed for PRN Reason (Other) (nasal dryness).  apply 2 sprays in each nostril as needed for nasal dryness    acetaminophen (Tylenol Arthritis Pain) 650 mg TbER Take 1 Tablet by mouth three (3) times daily as needed for Pain or Fever. ammonium lactate (AMLACTIN) 12 % topical cream Apply 1 Each to affected area daily. Apply topically to both legs at bedtime for dry skin. Taking Differently: apply topically to both legs in the morning for dry skin    carvediloL (COREG) 3.125 mg tablet Take 1 Tablet by mouth two (2) times a day. levothyroxine (SYNTHROID) 25 mcg tablet Take 1 Tablet by mouth daily. senna-docusate (Senna with Docusate Sodium) 8.6-50 mg per tablet Take 2 Tablets by mouth two (2) times daily as needed for Constipation. bowel regime    oxyCODONE IR (ROXICODONE) 5 mg immediate release tablet Take 5 mg by mouth every four (4) hours as needed for Pain (Unrelieved by Tylenol). take 2 tablets by mouth once daily as needed for costipation. No current facility-administered medications for this visit. The Plan of Care has been initiated and reviewed and updated by the Interdisciplinary Group (IDG) as frequently as the patient condition warrants. Plan of Care by Discipline:    1. Provider  Ongoing evaluation of treatment interventions for specific disease state, Determine need for laboratory assessment based on patient disease status , Assess results of laboratory testing and adjust treatment plan as appropriate, Communicate with prior/current health care team members to enhance understanding of patient status, and Anticipate and plan for medical intervention(s) in emergency / at time of crisis    2. Nursing  Communicate with prior/current health care team members to enhance understanding of patient status, Education regarding disease state, Education regarding current medications and adverse effects, Education for skin care in patients with limited mobility; with focus on preventing skin breakdown, and Skin assessment in patient's with limited mobility    3.  Social Work  Establish therapeutic relationship through in home visits and telephonic touch points, Assist in optimizing levels of psychosocial function, and Assess safety concerns and address as appropriate      Plan of Care Orders / Action Items:  -Cardiomyopathy: Echo 4/22/21 with EF 20 - 25%, severely reduced systolic function, Moderate tricuspid valve regurgitation. Cardiac cath 4/23/22 notes no CAD, LV gram consistent with Takostubo cardiomyopathy. Per chart review, Rhesa Showers discussed during hospitalization but she instead transitioned to hospice. Denies SOB, chest pain, swelling, or palpitations. Managed with carvedilol 3.125mg BID. No longer follows with cardiology and does not wish to at this time.     -Hypertension: BP at goal, per patient it has been intermittently elevated with PT. Goal <150/90. She was previously managed with Norvasc, but this was d/c due to hypotension, patient reports it worsened her lymphedema. If worsening, would consider losartan/olmesartan. CMP obtained 6/20/22 and stable, repeat q 6 months or PRN. -Hypothyroidism: Managed with Levothyroxine 25mcg daily. TSH obtained 6/20/22 and slightly elevated. Discussed with patient and we elected not to make changes to levothyroxine, but will repeat TSH and T4 with next lab draw. -Bedbound/ Joint stiffness of knees: She has been bedbound for about 4 years after a fall for unclear reasons, and she failed to respond to spinal surgery or electrical stimulation of BLE. She does bed exercises and can lift both legs from bed. She recently worked with PT but was unable to bend either knee. Per patient this is her 3rd round of PT and she has not made progress. This limits her ability to sit on side of bed or in wheelchair, which has been a hard adjustment for her. Denies pain in knees at this time.  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 She was receiving wound care with hospice and sacral wound has healed, scar tissue remains.  She continues to have pain in this area and remains high risk for reoccurrence due to her immobility. She has hospital bed with gel overlay and trapeze. Endorses repositioning herself with trapeze. Encourage protein sources in diet (has not tolerated supplements due to GI side effects). CMP obtained 6/20/22 with significant improvement in albumin but remains low at 2.8, will repeat with next lab draw. -Advanced Care Planning: Discussed at previous visit and she states her caregiver Debby Acevedo makes decisions if there is an emergency (example when she was found confused and septic, sent to hospital). Discussed if decisions had to be made in hospital, who would they call and she would want sister, Cady Tobin, to make decisions on her behalf. She has a son who is estranged. She has elected DNR status and has DDNR in home. She would not like additional screening such as Colonoscopy, DEXA scan, Mmg, but would want hospitalization if needed. She plans to outline her wishes with  at a later time. We discussed hospice at visit as patient expressed desire to return to hospice to team BENJA. She reports awareness that she has chronic, life-limiting illness and feels she benefited from hospice services previously. Discussed that she does not currently have symptoms and no evidence of decline, but will discuss with her former hospice provider.    -Chronic pain: ongoing intermittent pain in sacrum in area of previous decubitus and pain in bilateral knees. Denies pain during visit, but continues to have sacral pain despite healing of wound. She takes Tylenol as needed, has weaned from oxycodone and takes for severe pain typically once daily at night.     -Onychomycosis: Noted on feet bilaterally. She has a helper trim nails twice yearly but has not recently seen a podiatrist.  Phone number of mobile podiatrist given to patient today to schedule appointment.   No pain, broken nails currently.       -Labs: Labs obtained 6/20/22 including CBC, CMP, Lipids, Hep C screening, TSH. Repeat q 6 months or as needed. - add free T4 and Tox screen. -AMD: No AMD at this time, discussed and she would want sister Choco Andrea to make healthcare decisions if she was unable. She plans to make healthcare and financial directives with  at later date. If worsening symptoms, would want to transition to hospice  -Code status: DNR (DDNR in home on wall of room)  -Follow up: in 6 weeks (9/29/22). Will need CSC      Declines Covid vaccinations and reports has completed Pneumonia series and had previous Shingles vaccination (would not like newer version). She would like flu shot during flu season.        Health Maintenance   Topic Date Due    DTaP/Tdap/Td series (1 - Tdap) Never done    Shingrix Vaccine Age 50> (1 of 2) Never done    Flu Vaccine (1) Never done    Bone Densitometry (Dexa) Screening  07/20/2023 (Originally 1/17/2017)    Pneumococcal 65+ years (1 - PCV) 08/25/2023 (Originally 1/17/1956)    COVID-19 Vaccine (1) 10/20/2023 (Originally 1950)    Breast Cancer Screen Mammogram  01/17/2023    Colorectal Cancer Screening Combo  04/23/2023    Medicare Yearly Exam  07/21/2023    Depression Screen  08/19/2023    Lipid Screen  06/20/2027    Hepatitis C Screening  Completed       Last NP visit 8/19/22  Estimated Visit Frequency:  Other: 6 weeks  SW visits PRN

## 2022-09-26 ENCOUNTER — DOCUMENTATION ONLY (OUTPATIENT)
Dept: OTHER | Age: 72
End: 2022-09-26

## 2022-09-26 NOTE — PROGRESS NOTES
Music Therapy Progress Note      Anthony Hickey [id number]     1950  81 y.o.  female    Patient Telephone Number: [phone] (home)   Christianity Affiliation: Worship   Language: English   Patient Active Problem List    Diagnosis Date Noted    Joint stiffness of both knees 08/19/2022    Other chronic pain 08/19/2022    Hypothyroidism 06/17/2022    Leukocytosis 06/17/2022    Myocardiopathy ([address] 04/23/2021    Anemia 04/23/2021    HTN (hypertension) 04/21/2021    HLD (hyperlipidemia) 04/21/2021    Infected decubitus ulcer 04/17/2021      Visit: 9/23/2022  Date: 9/26/2022            Total Time (in minutes): 61          [address] [facility]    Mental Status:   [ X ] Alert [  ] Rafael Stabs [  ]  Confused  [  ] Minimally responsive  [  ] Sleeping    Communication Status: [  ] Impaired Speech [ X ] Verbal     Physical Status:   [  ] Oxygen in use  [  ] Hard of Hearing [  ] Vision Impaired  [  ] Ambulatory  [  ] Ambulatory with assistance [  Nyla Byrne Preferences, Background: ___Varied popular and sacred music of the 1960's-90's    Clinical Problem addressed:  Social support (bedbound)    Goal(s) met in session:  Physical/Pain management (Scale of 1-10):    Pre-session rating ___________    Post-session rating __________  [  ] Increased relaxation   [  ] Affected breathing patterns  [  ] Decreased muscle tension   [  ] Decreased agitation  [  ] Affected heart rate    [  ] Increased alertness     Emotional/Psychological:  [ X ] Increased self-expression  [  ] Decreased aggressive behavior   [  ] Decreased feelings of stress  [ X ] Discussed healthy coping skills     [  ] Improved mood    [  ] Decreased withdrawn behavior     Social:  [  ] Decreased feelings of isolation/loneliness [ X] Positive social interaction   [  ] Provided support and/or comfort for family/friends    Spiritual:  [ X ] Spiritual support    [  ] Expressed peace  [ X ] Expressed misti    [ X ] Discussed beliefs    Techniques Utilized (Check all that apply):   [  ] Procedural support MT [  ] Music for relaxation [ X] Patient preferred music  [  ] Rosy analysis  [  ] Song choice  [ Italia Pore for validation  [  ] Entrainment  [  ] Movement to music [  ] Guided visualization  [  ] Geovany Mail  [  ] Patient instrument playing [  ] Sarah Ortegaon writing  [ X ] Tomasz Bradshaw along  [  ] Suhail Gaye  [  ] Sensory stimulation  [  Conda Vashti Listening  [  ] Music for spiritual support [  ] Making of CDs as gifts    Session Observations:  Joslyn Villalta was receiving a bed bath when I arrived, and she was feeling happy when I entered her room after this personal care. She reported on several facets of her life where she has initiated and maintained relationships, especially with the Judaism in her neighborhood where she now receives communion regularly and attends Zoroastrianism via zoom. She is also grateful for the personal care she receives from her neighbor, reporting that in general things are going well. 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 After this discussion, Joslyn played several of the recordings we had made together, and found pleasure in the way her voice sounded with the accompaniment. Thank you for the opportunity to provide music therapy to Ms [facility].   Anaid Tabares [facility]   Music [address] Certified  Spiritual Care Services  Referral- based service

## 2022-09-29 ENCOUNTER — HOME VISIT (OUTPATIENT)
Dept: FAMILY MEDICINE CLINIC | Age: 72
End: 2022-09-29
Payer: MEDICARE

## 2022-09-29 VITALS
RESPIRATION RATE: 18 BRPM | TEMPERATURE: 98.2 F | SYSTOLIC BLOOD PRESSURE: 124 MMHG | DIASTOLIC BLOOD PRESSURE: 72 MMHG | HEART RATE: 60 BPM | OXYGEN SATURATION: 95 %

## 2022-09-29 DIAGNOSIS — M25.662 JOINT STIFFNESS OF BOTH KNEES: ICD-10-CM

## 2022-09-29 DIAGNOSIS — Z91.89 AT HIGH RISK FOR SKIN BREAKDOWN: ICD-10-CM

## 2022-09-29 DIAGNOSIS — G89.29 OTHER CHRONIC PAIN: ICD-10-CM

## 2022-09-29 DIAGNOSIS — E03.9 HYPOTHYROIDISM, UNSPECIFIED TYPE: ICD-10-CM

## 2022-09-29 DIAGNOSIS — M25.661 JOINT STIFFNESS OF BOTH KNEES: ICD-10-CM

## 2022-09-29 DIAGNOSIS — B35.1 ONYCHOMYCOSIS: ICD-10-CM

## 2022-09-29 DIAGNOSIS — I10 PRIMARY HYPERTENSION: ICD-10-CM

## 2022-09-29 DIAGNOSIS — Z74.01 BEDBOUND: ICD-10-CM

## 2022-09-29 DIAGNOSIS — I42.8 OTHER CARDIOMYOPATHY (HCC): Primary | ICD-10-CM

## 2022-09-29 PROCEDURE — 1123F ACP DISCUSS/DSCN MKR DOCD: CPT | Performed by: NURSE PRACTITIONER

## 2022-09-29 PROCEDURE — 99349 HOME/RES VST EST MOD MDM 40: CPT | Performed by: NURSE PRACTITIONER
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
## 2022-10-10 ENCOUNTER — DOCUMENTATION ONLY (OUTPATIENT)
Dept: OTHER | Age: 72
End: 2022-10-10

## 2022-10-10 NOTE — PROGRESS NOTES
Music Therapy Progress Note      Navarro Salgado [id number]     1950  81 y.o.  female    Patient Telephone Number: [phone] (home)   Temple Affiliation: Nondenominational   Language: English   Patient Active Problem List    Diagnosis Date Noted    Joint stiffness of both knees 08/19/2022    Other chronic pain 08/19/2022    Hypothyroidism 06/17/2022    Leukocytosis 06/17/2022    Myocardiopathy ([address] 04/23/2021    Anemia 04/23/2021    HTN (hypertension) 04/21/2021    HLD (hyperlipidemia) 04/21/2021    Infected decubitus ulcer 04/17/2021        Date: 10/10/2022            Total Time (in minutes): 54          [facility]    Mental Status:   [ X ] Alert [  ] Duke Butt [  ]  Confused  [  ] Minimally responsive  [  ] Sleeping    Communication Status: [  ] Impaired Speech [ X] Verbal     Physical Status:   [  ] Oxygen in use  [  ] Hard of Hearing [  ] Vision Impaired  [  ] Ambulatory  [  ] Ambulatory with assistance [  X] Non-ambulatory     Music Preferences, Background: Varied musical interests;  Kirill Romero and song-leader;    Clinical Problem addressed: Decreased mobility (bed-bound), spiritual and emotional support    Goal(s) met in session:  Physical/Pain management (Scale of 1-10):    Pre-session rating ___________    Post-session rating __________  [  ] Increased relaxation   [ X ] Affected breathing patterns  [  ] Decreased muscle tension   [  ] Decreased agitation  [  ] Affected heart rate    [  ] Increased alertness     Emotional/Psychological:  [X] Increased self-expression   [  ] Decreased aggressive behavior   [  ] Decreased feelings of stress  [ X] Discussed healthy coping skills     [  ] Improved mood    [  ] Decreased withdrawn behavior     Social:  [ X ] Decreased feelings of isolation/loneliness [ X] Positive social interaction   [  ] Provided support and/or comfort for family/friends    Spiritual:  [ X ] Spiritual support    [  ] Expressed peace  [ X ] Expressed misti    [X] Discussed beliefs    Techniques Utilized (Check all that apply):   [  ] Procedural support MT [  ] Music for relaxation [X] Patient preferred music  [X] Rosy analysis  [  ] Song choice  [X] Music for validation  [  ] Entrainment  [  ] Movement to music [  ] Guided visualization  [  ] Genetta Bering  [  ] Patient instrument playing [  ] Alissa Aguilar writing  [  ] Catalina Sterlingdana along   [  ] Verle Most  [  ] Sensory stimulation  [X] Active Listening  [X] Music for spiritual support [  ] Making of CDs as gifts    Session Observations:  Ms. Clau Carrion received this music therapy visit via zoom, as an introduction to her new music therapist MEENU VillarealBC, and to provide closure with her current music therapist, Bo Dover MTBrennaBC. Jefrey Najjar had a picture of herself from 2 years ago for her zoom profile, before she was bed-bound, which was a contrast from her current state of health where she no longer sits up; she has only received music therapy while lying down in bed. She reported that the photo was from work, and her camera remained off during this visit. A review of Joslyn's involvement in music therapy with [facility], along with her misti and performance history prior to becoming sick was shared with her new MT. Joslyn requested the song, \"Walk Right in, Sit Right Down\" and it was first listened to with the recording of the original artists, then was sung live with laine. She reported on the importance of music of misti, and that she uses singing of songs with inspiring lyrics to help with her mood and her support and self-care. She sang a song of misti a jaycee, and Joslyn shared news about her family, and participated in life review, as well as talking through planning for upcoming sessions with [address], including the request for a Music Therapy Support Group meeting. The session closed with [address] offering a follow up visit, and a tentative plan to offer a support group on zoom in November. , incorporating some of Joslyn's recorded songs. Thank you for the opportunity to provide music therapy to Ms. Joslyn Villalta.   Minnie Vergara [place], MT-BC   Music [address] Certified  Spiritual Care Services  Referral- based service

## 2022-10-25 ENCOUNTER — TELEPHONE (OUTPATIENT)
Dept: FAMILY MEDICINE CLINIC | Age: 72
End: 2022-10-25

## 2022-10-25 NOTE — TELEPHONE ENCOUNTER
Call returned to patient to inform that I was not able to figure out who had called her, as both Niya Muñoz NP and Ayala Cr LCSW have told me they did not call the patient. Patient reported that Ramon Miller MT was the one calling, she had actually left a voice message. Patient will return the call to [facility].

## 2022-10-25 NOTE — TELEPHONE ENCOUNTER
The patient called returning a call, caller unknown. Joslyn said that there was no VM.   She asks for a callback at [phone]

## 2022-11-04 DIAGNOSIS — G89.29 OTHER CHRONIC PAIN: Primary | ICD-10-CM

## 2022-11-04 NOTE — TELEPHONE ENCOUNTER
I returned the patient's VM call. She repeated that her caregiver had called several times but was unable to speak with anyone. I let the patient know to call when she needs the refill a few days ahead and to let us know of the number of pills left and her message will be submitted to the clinical team.  She acknowledged.

## 2022-11-04 NOTE — TELEPHONE ENCOUNTER
The patient called and left a  at 4:54 pm.  She stated that \"her Blanquita Barkley has been trying to get in touch with [facility] for a week to request a refill on the patient's oxycodone but has been unable to contact Dorothy\".

## 2022-11-07 ENCOUNTER — TELEPHONE (OUTPATIENT)
Dept: FAMILY MEDICINE CLINIC | Age: 72
End: 2022-11-07

## 2022-11-07 RX ORDER — OXYCODONE HYDROCHLORIDE 5 MG/1
5 TABLET ORAL
Qty: 90 TABLET | Refills: 0 | Status: SHIPPED | OUTPATIENT
Start: 2022-11-07 | End: 2023-02-05

## 2022-11-07 NOTE — TELEPHONE ENCOUNTER
Joslyn Villalta    Triage for Controlled Substance Refill Request    Pain Diagnosis: bilateral knees and sacrum    Last Outpatient Visit: 9/29/22    Next Outpatient Visit: 11/11/22    Pharmacy: [facility]     Medication:  Oxycodone 5 mg   Dose and directions: 1 daily at bedtime  Number dispensed:  90    Date filled (): 6/6/22     reviewed: yes - last filled 6/6/22 for 30 tabs by [facility] NP    Date of Urine Drug Screen:  none    Opioid Safety Handout given:  yes    Appropriate for refill:  yes    Action:  pended to Que Rivera NP      Shannan Pinto RN

## 2022-11-07 NOTE — TELEPHONE ENCOUNTER
The patient states [facility] has been trying to reach [facility] to have the refill for Oxycodone approved. She is now down to 2 pills. She called on Friday and was assured there would be a call back today.  # [phone].

## 2022-11-07 NOTE — TELEPHONE ENCOUNTER
Telephone call to patient to advise that Shagufta Moya NP sent the refill for Oxycodone earlier today. Discussed with patient that this office has not received a call or request from anyone other than her about this refill. We did not receive any contact from either caregiver or [address] as pt stated in her earlier calls. Pt verbalized understanding.

## 2022-11-09 ENCOUNTER — DOCUMENTATION ONLY (OUTPATIENT)
Dept: FAMILY MEDICINE CLINIC | Age: 72
End: 2022-11-09
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
## 2022-11-11 ENCOUNTER — HOME VISIT (OUTPATIENT)
Dept: FAMILY MEDICINE CLINIC | Age: 72
End: 2022-11-11
Payer: MEDICARE

## 2022-11-11 VITALS
OXYGEN SATURATION: 96 % | RESPIRATION RATE: 18 BRPM | DIASTOLIC BLOOD PRESSURE: 74 MMHG | HEART RATE: 66 BPM | SYSTOLIC BLOOD PRESSURE: 144 MMHG | TEMPERATURE: 98.6 F

## 2022-11-11 DIAGNOSIS — Z74.01 BEDBOUND: ICD-10-CM

## 2022-11-11 DIAGNOSIS — I42.8 OTHER CARDIOMYOPATHY (HCC): Primary | ICD-10-CM

## 2022-11-11 DIAGNOSIS — M25.662 JOINT STIFFNESS OF BOTH KNEES: ICD-10-CM

## 2022-11-11 DIAGNOSIS — G89.29 OTHER CHRONIC PAIN: ICD-10-CM

## 2022-11-11 DIAGNOSIS — M25.661 JOINT STIFFNESS OF BOTH KNEES: ICD-10-CM

## 2022-11-11 DIAGNOSIS — E03.9 HYPOTHYROIDISM, UNSPECIFIED TYPE: ICD-10-CM

## 2022-11-11 DIAGNOSIS — Z91.89 AT HIGH RISK FOR SKIN BREAKDOWN: ICD-10-CM

## 2022-11-11 DIAGNOSIS — I10 ESSENTIAL HYPERTENSION: ICD-10-CM

## 2022-11-11 DIAGNOSIS — B35.1 ONYCHOMYCOSIS: ICD-10-CM

## 2022-11-11 DIAGNOSIS — E78.5 HYPERLIPIDEMIA, UNSPECIFIED HYPERLIPIDEMIA TYPE: ICD-10-CM

## 2022-11-11 PROCEDURE — 1123F ACP DISCUSS/DSCN MKR DOCD: CPT | Performed by: NURSE PRACTITIONER

## 2022-11-11 PROCEDURE — 3078F DIAST BP <80 MM HG: CPT | Performed by: NURSE PRACTITIONER

## 2022-11-11 PROCEDURE — 3074F SYST BP LT 130 MM HG: CPT | Performed by: NURSE PRACTITIONER

## 2022-11-11 PROCEDURE — 99349 HOME/RES VST EST MOD MDM 40: CPT | Performed by: NURSE PRACTITIONER
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
## 2022-11-14 DIAGNOSIS — I42.8 OTHER CARDIOMYOPATHY (HCC): ICD-10-CM

## 2022-11-14 DIAGNOSIS — K59.00 CONSTIPATION, UNSPECIFIED CONSTIPATION TYPE: ICD-10-CM

## 2022-11-14 DIAGNOSIS — E03.9 HYPOTHYROIDISM, UNSPECIFIED TYPE: ICD-10-CM

## 2022-11-14 RX ORDER — AMMONIUM LACTATE 12 G/100G
1 CREAM TOPICAL DAILY
Qty: 280 G | Refills: 3 | Status: SHIPPED | OUTPATIENT
Start: 2022-11-14

## 2022-11-14 RX ORDER — CARVEDILOL 3.12 MG/1
3.12 TABLET ORAL 2 TIMES DAILY
Qty: 60 TABLET | Refills: 5 | Status: SHIPPED | OUTPATIENT
Start: 2022-11-14

## 2022-11-14 RX ORDER — DEXTROMETHORPHAN HYDROBROMIDE, GUAIFENESIN 5; 100 MG/5ML; MG/5ML
650 LIQUID ORAL
Qty: 100 TABLET | Refills: 3 | Status: SHIPPED | OUTPATIENT
Start: 2022-11-14

## 2022-11-14 RX ORDER — AMOXICILLIN 250 MG
2 CAPSULE ORAL
Qty: 100 TABLET | Refills: 1 | Status: SHIPPED | OUTPATIENT
Start: 2022-11-14

## 2022-11-14 RX ORDER — LEVOTHYROXINE SODIUM 25 UG/1
25 TABLET ORAL DAILY
Qty: 30 TABLET | Refills: 5 | Status: SHIPPED | OUTPATIENT
Start: 2022-11-14

## 2022-11-15 ENCOUNTER — TELEPHONE (OUTPATIENT)
Dept: FAMILY MEDICINE CLINIC | Age: 72
End: 2022-11-15

## 2022-11-16 ENCOUNTER — TELEPHONE (OUTPATIENT)
Dept: FAMILY MEDICINE CLINIC | Age: 72
End: 2022-11-16

## 2023-01-06 ENCOUNTER — DOCUMENTATION ONLY (OUTPATIENT)
Dept: FAMILY MEDICINE CLINIC | Age: 73
End: 2023-01-06
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
## 2023-01-18 ENCOUNTER — HOME VISIT (OUTPATIENT)
Dept: FAMILY MEDICINE CLINIC | Age: 73
End: 2023-01-18
Payer: MEDICARE

## 2023-01-18 VITALS
SYSTOLIC BLOOD PRESSURE: 140 MMHG | HEART RATE: 85 BPM | DIASTOLIC BLOOD PRESSURE: 78 MMHG | TEMPERATURE: 98.8 F | RESPIRATION RATE: 18 BRPM | OXYGEN SATURATION: 98 %

## 2023-01-18 DIAGNOSIS — E03.9 HYPOTHYROIDISM, UNSPECIFIED TYPE: ICD-10-CM

## 2023-01-18 DIAGNOSIS — M25.661 JOINT STIFFNESS OF BOTH KNEES: ICD-10-CM

## 2023-01-18 DIAGNOSIS — Z74.01 BEDBOUND: ICD-10-CM

## 2023-01-18 DIAGNOSIS — E78.5 HYPERLIPIDEMIA, UNSPECIFIED HYPERLIPIDEMIA TYPE: ICD-10-CM

## 2023-01-18 DIAGNOSIS — G89.29 OTHER CHRONIC PAIN: ICD-10-CM

## 2023-01-18 DIAGNOSIS — M25.662 JOINT STIFFNESS OF BOTH KNEES: ICD-10-CM

## 2023-01-18 DIAGNOSIS — I10 ESSENTIAL HYPERTENSION: ICD-10-CM

## 2023-01-18 DIAGNOSIS — I42.8 OTHER CARDIOMYOPATHY (HCC): Primary | ICD-10-CM

## 2023-01-18 DIAGNOSIS — Z91.89 AT HIGH RISK FOR SKIN BREAKDOWN: ICD-10-CM

## 2023-01-18 PROCEDURE — 3077F SYST BP >= 140 MM HG: CPT | Performed by: NURSE PRACTITIONER

## 2023-01-18 PROCEDURE — 3078F DIAST BP <80 MM HG: CPT | Performed by: NURSE PRACTITIONER

## 2023-01-18 PROCEDURE — 1123F ACP DISCUSS/DSCN MKR DOCD: CPT | Performed by: NURSE PRACTITIONER

## 2023-01-18 PROCEDURE — 99348 HOME/RES VST EST LOW MDM 30: CPT | Performed by: NURSE PRACTITIONER
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
## 2023-01-20 ENCOUNTER — HOME VISIT (OUTPATIENT)
Dept: FAMILY MEDICINE CLINIC | Age: 73
End: 2023-01-20

## 2023-01-20 VITALS
RESPIRATION RATE: 18 BRPM | SYSTOLIC BLOOD PRESSURE: 146 MMHG | DIASTOLIC BLOOD PRESSURE: 80 MMHG | OXYGEN SATURATION: 99 % | HEART RATE: 65 BPM

## 2023-01-20 LAB
ALBUMIN SERPL-MCNC: 2.8 G/DL (ref 3.5–5)
ALBUMIN/GLOB SERPL: 0.7 (ref 1.1–2.2)
ALP SERPL-CCNC: 96 U/L (ref 45–117)
ALT SERPL-CCNC: 10 U/L (ref 12–78)
ANION GAP SERPL CALC-SCNC: 8 MMOL/L (ref 5–15)
AST SERPL-CCNC: 10 U/L (ref 15–37)
BILIRUB SERPL-MCNC: 0.3 MG/DL (ref 0.2–1)
BUN SERPL-MCNC: 30 MG/DL (ref 6–20)
BUN/CREAT SERPL: 31 (ref 12–20)
CALCIUM SERPL-MCNC: 9.5 MG/DL (ref 8.5–10.1)
CHLORIDE SERPL-SCNC: 106 MMOL/L (ref 97–108)
CHOLEST SERPL-MCNC: 196 MG/DL
CO2 SERPL-SCNC: 25 MMOL/L (ref 21–32)
CREAT SERPL-MCNC: 0.96 MG/DL (ref 0.55–1.02)
ERYTHROCYTE [DISTWIDTH] IN BLOOD BY AUTOMATED COUNT: 13.7 % (ref 11.5–14.5)
GLOBULIN SER CALC-MCNC: 4.1 G/DL (ref 2–4)
GLUCOSE SERPL-MCNC: 119 MG/DL (ref 65–100)
HCT VFR BLD AUTO: 36.1 % (ref 35–47)
HDLC SERPL-MCNC: 44 MG/DL
HDLC SERPL: 4.5 (ref 0–5)
HGB BLD-MCNC: 11.5 G/DL (ref 11.5–16)
LDLC SERPL CALC-MCNC: 123.2 MG/DL (ref 0–100)
MCH RBC QN AUTO: 28 PG (ref 26–34)
MCHC RBC AUTO-ENTMCNC: 31.9 G/DL (ref 30–36.5)
MCV RBC AUTO: 88 FL (ref 80–99)
NRBC # BLD: 0 K/UL (ref 0–0.01)
NRBC BLD-RTO: 0 PER 100 WBC
PLATELET # BLD AUTO: 421 K/UL (ref 150–400)
PMV BLD AUTO: 9.4 FL (ref 8.9–12.9)
POTASSIUM SERPL-SCNC: 4.4 MMOL/L (ref 3.5–5.1)
PROT SERPL-MCNC: 6.9 G/DL (ref 6.4–8.2)
RBC # BLD AUTO: 4.1 M/UL (ref 3.8–5.2)
SODIUM SERPL-SCNC: 139 MMOL/L (ref 136–145)
T4 SERPL-MCNC: 8.9 UG/DL (ref 4.8–13.9)
TRIGL SERPL-MCNC: 144 MG/DL (ref ?–150)
TSH SERPL DL<=0.05 MIU/L-ACNC: 4.89 UIU/ML (ref 0.36–3.74)
VLDLC SERPL CALC-MCNC: 28.8 MG/DL
WBC # BLD AUTO: 7.5 K/UL (ref 3.6–11)

## 2023-01-20 NOTE — PROGRESS NOTES
Home visit for assessment and lab draw    S - Yes, I was able to stop taking Oxycodone entirely, you don't have to draw all those red tubes now. O - Pt lying in bed without complaints, states she has hydrated well and is very warm in preparation for venipuncture visit. A & O x 4. Friendly and smiling but states she is very anxious regarding her sister who is being evicted from her apartment today. Appetite is good. Venipuncture x 1 in right wrist. Pt tolerated well. Blood specimen obtained for CBC, CMP, TSH, T4, Lipid panel. Drug screen was canceled by provider. Labs taken to [facility] lab. A - /94 (before venipuncture) at 9:40 AM right upper arm. P 65 R 18  O2 sat 99%.   Repeat BP 10 /80 (after venipuncture) right upper arm    P - Update provider

## 2023-03-01 ENCOUNTER — DOCUMENTATION ONLY (OUTPATIENT)
Dept: FAMILY MEDICINE CLINIC | Age: 73
End: 2023-03-01
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
## 2023-05-08 ENCOUNTER — TELEPHONE (OUTPATIENT)
Facility: CLINIC | Age: 73
End: 2023-05-08

## 2023-05-08 NOTE — TELEPHONE ENCOUNTER
Pt left  requesting callback RE why she received 8 COVID tests from [facility] in [facility].

## 2023-05-08 NOTE — TELEPHONE ENCOUNTER
Telephone call returned to patient, received her VM, left message advising that our office did not order any covid tests for patient.

## 2023-05-09 RX ORDER — LEVOTHYROXINE SODIUM 0.03 MG/1
TABLET ORAL
Qty: 31 TABLET | Refills: 0 | Status: SHIPPED | OUTPATIENT
Start: 2023-05-09

## 2023-05-24 ENCOUNTER — CLINICAL DOCUMENTATION (OUTPATIENT)
Facility: CLINIC | Age: 73
End: 2023-05-24

## 2023-05-24 NOTE — PROGRESS NOTES
daily.    senna-docusate (Senna with Docusate Sodium) 8.6-50 mg per tablet Take 2 Tablets by mouth two (2) times daily as needed for Constipation. bowel regime    acetaminophen (Tylenol Arthritis Pain) 650 mg TbER Take 1 Tablet by mouth every eight (8) hours as needed for Pain.    sodium chloride (CHILDREN'S SALINE NASAL SPRAY NA) 2 Sprays by Nasal route as needed for PRN Reason (Other) (nasal dryness). apply 2 sprays in each nostril as needed for nasal dryness     No current facility-administered medications for this visit. The Plan of Care has been initiated and reviewed and updated by the Interdisciplinary Group (IDG) as frequently as the patient condition warrants. Plan of Care by Discipline:    1. Provider  Ongoing evaluation of treatment interventions for specific disease state, Determine need for laboratory assessment based on patient disease status , Assess results of laboratory testing and adjust treatment plan as appropriate, Communicate with prior/current health care team members to enhance understanding of patient status, and Anticipate and plan for medical intervention(s) in emergency / at time of crisis    2. Nursing  Communicate with prior/current health care team members to enhance understanding of patient status, Education regarding disease state, Education regarding current medications and adverse effects, Education for skin care in patients with limited mobility; with focus on preventing skin breakdown, and Skin assessment in patient's with limited mobility    3. Social Work  Establish therapeutic relationship through in home visits and telephonic touch points, Assist in optimizing levels of psychosocial function, and Assess safety concerns and address as appropriate      Plan of Care Orders / Action Items:  -Cardiomyopathy: Echo 4/22/21 with EF 20 - 25%, severely reduced systolic function,  moderate tricuspid valve regurgitation.   Cardiac cath 4/23/22 with no CAD, LV gram consistent

## 2023-06-29 RX ORDER — CARVEDILOL 3.12 MG/1
TABLET ORAL
Qty: 60 TABLET | Refills: 0 | Status: SHIPPED | OUTPATIENT
Start: 2023-06-29

## 2023-07-03 RX ORDER — OXYCODONE HYDROCHLORIDE 5 MG/1
TABLET ORAL
Qty: 30 TABLET | Refills: 0 | OUTPATIENT
Start: 2023-07-03

## 2023-07-11 RX ORDER — LEVOTHYROXINE SODIUM 0.03 MG/1
TABLET ORAL
Qty: 30 TABLET | Refills: 0 | Status: SHIPPED | OUTPATIENT
Start: 2023-07-11

## 2023-07-13 ENCOUNTER — TELEPHONE (OUTPATIENT)
Facility: CLINIC | Age: 73
End: 2023-07-13

## 2023-07-13 NOTE — TELEPHONE ENCOUNTER
The patient called to verify her appointment with Zita Boas, Friday, 7/14/23, arrival between 9am-1pm.  The patient said that there are not changes to insurance or location and no covid in the household. During the call the patient asked about the status of her cologuard test kit. She said she has not received it yet. She also stated that she has received a box of phony covid tests \"from a [place] that is infiltrating the medicare system\". She said that she would make them available for Caprice Burrell to see on Friday.

## 2023-07-14 ENCOUNTER — OFFICE VISIT (OUTPATIENT)
Facility: CLINIC | Age: 73
End: 2023-07-14

## 2023-07-14 VITALS
DIASTOLIC BLOOD PRESSURE: 64 MMHG | TEMPERATURE: 97.6 F | HEART RATE: 71 BPM | OXYGEN SATURATION: 96 % | SYSTOLIC BLOOD PRESSURE: 118 MMHG | RESPIRATION RATE: 96 BRPM

## 2023-07-14 DIAGNOSIS — I42.9 CARDIOMYOPATHY, UNSPECIFIED TYPE (HCC): Primary | ICD-10-CM

## 2023-07-14 DIAGNOSIS — E78.5 HYPERLIPIDEMIA, UNSPECIFIED HYPERLIPIDEMIA TYPE: ICD-10-CM

## 2023-07-14 DIAGNOSIS — M25.661 JOINT STIFFNESS OF BOTH KNEES: ICD-10-CM

## 2023-07-14 DIAGNOSIS — M25.662 JOINT STIFFNESS OF BOTH KNEES: ICD-10-CM

## 2023-07-14 DIAGNOSIS — R05.8 OTHER COUGH: ICD-10-CM

## 2023-07-14 DIAGNOSIS — I10 ESSENTIAL HYPERTENSION: ICD-10-CM

## 2023-07-14 DIAGNOSIS — Z00.00 ENCOUNTER FOR ANNUAL WELLNESS VISIT (AWV) IN MEDICARE PATIENT: ICD-10-CM

## 2023-07-14 DIAGNOSIS — Z12.11 SCREENING FOR COLON CANCER: ICD-10-CM

## 2023-07-14 DIAGNOSIS — E03.9 HYPOTHYROIDISM, UNSPECIFIED TYPE: ICD-10-CM

## 2023-07-14 PROBLEM — L89.90 INFECTED DECUBITUS ULCER: Status: RESOLVED | Noted: 2021-04-17 | Resolved: 2023-07-14

## 2023-07-14 PROBLEM — L08.9 INFECTED DECUBITUS ULCER: Status: RESOLVED | Noted: 2021-04-17 | Resolved: 2023-07-14

## 2023-07-14 ASSESSMENT — PATIENT HEALTH QUESTIONNAIRE - PHQ9
SUM OF ALL RESPONSES TO PHQ9 QUESTIONS 1 & 2: 0
2. FEELING DOWN, DEPRESSED OR HOPELESS: 0
SUM OF ALL RESPONSES TO PHQ QUESTIONS 1-9: 0
1. LITTLE INTEREST OR PLEASURE IN DOING THINGS: 0
SUM OF ALL RESPONSES TO PHQ QUESTIONS 1-9: 0

## 2023-07-14 NOTE — ASSESSMENT & PLAN NOTE
Echo 4/22/21 with EF 20 - 25%, severely reduced systolic function,  moderate tricuspid valve regurgitation. Cardiac cath 4/23/22 with no CAD, LV gram consistent with Takostubo cardiomyopathy. Per chart review, Monica Kapadia discussed during hospitalization but she instead transitioned to hospice. Managed with carvedilol  3.125mg BID, BP and HR have been well-controlled. No longer follows with cardiology and does not wish to at this time as goal of care is primarily comfort. Denies symptoms including SOB, chest pain, palpitations.

## 2023-07-14 NOTE — ASSESSMENT & PLAN NOTE
BP at goal of <150/90 today. She was previously managed with Norvasc, but this was d/c due to hypotension and worsening  of lymphedema. If worsening, would consider Ace-I/ARB, but is not needed at this time.   CMP obtained Jan 2023 and stable

## 2023-07-14 NOTE — ASSESSMENT & PLAN NOTE
Last LDL slightly above goal at 123 in Jan 2023 (largely unchanged from previous June 2022). ASCVD 10-yr risk calculated at 13.8%  (would be reduced to 10.3% with statin). Was previously taking Zetia. Not currently managed with statin, discussed risks vs benefits today and would want to limit pill burden unless strongly indicated, which does not seem to be at this time. The patient has been examined and the H&P has been reviewed:    I concur with the findings and no changes have occurred since H&P was written.    Anesthesia/Surgery risks, benefits and alternative options discussed and understood by patient/family.          There are no hospital problems to display for this patient.
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
## 2023-07-14 NOTE — ASSESSMENT & PLAN NOTE
Managed with Levothyroxine 25mcg daily. TSH Jan 2023 and slightly elevated at 4.89 but T4 WNL. No changes to medication  warranted at this time.
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
## 2023-07-21 ENCOUNTER — TELEPHONE (OUTPATIENT)
Facility: CLINIC | Age: 73
End: 2023-07-21

## 2023-07-21 NOTE — TELEPHONE ENCOUNTER
The patient called and stated that she is not able to use th cologuard test kit that was sent to her. She said that she called cologuard and stated her situation to them regarding being bed-bound, not able to use the toilet. She said that she can't swab from the diaper and not able to give a hard sample. She said that she would destroy the test kid and dispose of it. Her callback if needed is [phone].

## 2023-07-24 ENCOUNTER — CLINICAL DOCUMENTATION (OUTPATIENT)
Facility: CLINIC | Age: 73
End: 2023-07-24

## 2023-07-24 NOTE — PROGRESS NOTES
services during this 30-day period. Patient's condition required low medical decision making during this 30-day period.    Total non-billed staff time during this CCM period: 0 - 19 minutes  Billable code(s): 59818 (non-complex medical conditions, up to 20 minutes of clinical staff time, non face-to-face)

## 2023-08-08 RX ORDER — CARVEDILOL 3.12 MG/1
3.12 TABLET ORAL 2 TIMES DAILY
Qty: 180 TABLET | Refills: 1 | Status: SHIPPED | OUTPATIENT
Start: 2023-08-08

## 2023-09-07 RX ORDER — LEVOTHYROXINE SODIUM 0.03 MG/1
TABLET ORAL
Qty: 30 TABLET | Refills: 0 | Status: SHIPPED | OUTPATIENT
Start: 2023-09-07

## 2023-10-16 ENCOUNTER — TELEPHONE (OUTPATIENT)
Facility: CLINIC | Age: 73
End: 2023-10-16

## 2023-10-16 NOTE — TELEPHONE ENCOUNTER
I called patient to remind of their in home visit w/ Jessica Sosa on 10/20/23, arrival between 9-1. I reached home VM and left a message to please call the [address] [place] [address] office to confirm the visit.

## 2023-10-20 ENCOUNTER — OFFICE VISIT (OUTPATIENT)
Facility: CLINIC | Age: 73
End: 2023-10-20
Payer: MEDICARE

## 2023-10-20 VITALS
DIASTOLIC BLOOD PRESSURE: 64 MMHG | HEART RATE: 72 BPM | RESPIRATION RATE: 16 BRPM | TEMPERATURE: 98.1 F | OXYGEN SATURATION: 96 % | SYSTOLIC BLOOD PRESSURE: 122 MMHG

## 2023-10-20 DIAGNOSIS — M25.661 JOINT STIFFNESS OF BOTH KNEES: ICD-10-CM

## 2023-10-20 DIAGNOSIS — R19.5 LOOSE STOOLS: ICD-10-CM

## 2023-10-20 DIAGNOSIS — I10 ESSENTIAL HYPERTENSION: ICD-10-CM

## 2023-10-20 DIAGNOSIS — E03.9 HYPOTHYROIDISM, UNSPECIFIED TYPE: ICD-10-CM

## 2023-10-20 DIAGNOSIS — I42.9 CARDIOMYOPATHY, UNSPECIFIED TYPE (HCC): Primary | ICD-10-CM

## 2023-10-20 DIAGNOSIS — M25.662 JOINT STIFFNESS OF BOTH KNEES: ICD-10-CM

## 2023-10-20 DIAGNOSIS — R05.3 CHRONIC COUGH: ICD-10-CM

## 2023-10-20 DIAGNOSIS — E78.5 HYPERLIPIDEMIA, UNSPECIFIED HYPERLIPIDEMIA TYPE: ICD-10-CM

## 2023-10-20 DIAGNOSIS — S91.205A: ICD-10-CM

## 2023-10-20 PROCEDURE — 3078F DIAST BP <80 MM HG: CPT | Performed by: NURSE PRACTITIONER

## 2023-10-20 PROCEDURE — 3017F COLORECTAL CA SCREEN DOC REV: CPT | Performed by: NURSE PRACTITIONER

## 2023-10-20 PROCEDURE — 99349 HOME/RES VST EST MOD MDM 40: CPT | Performed by: NURSE PRACTITIONER

## 2023-10-20 PROCEDURE — G8421 BMI NOT CALCULATED: HCPCS | Performed by: NURSE PRACTITIONER

## 2023-10-20 PROCEDURE — G8484 FLU IMMUNIZE NO ADMIN: HCPCS | Performed by: NURSE PRACTITIONER

## 2023-10-20 PROCEDURE — 3074F SYST BP LT 130 MM HG: CPT | Performed by: NURSE PRACTITIONER

## 2023-10-20 PROCEDURE — 1036F TOBACCO NON-USER: CPT | Performed by: NURSE PRACTITIONER

## 2023-10-20 PROCEDURE — 1090F PRES/ABSN URINE INCON ASSESS: CPT | Performed by: NURSE PRACTITIONER

## 2023-10-20 PROCEDURE — 1123F ACP DISCUSS/DSCN MKR DOCD: CPT | Performed by: NURSE PRACTITIONER

## 2023-10-20 RX ORDER — BENZONATATE 100 MG/1
100 CAPSULE ORAL 2 TIMES DAILY PRN
Qty: 60 CAPSULE | Refills: 1 | Status: SHIPPED | OUTPATIENT
Start: 2023-10-20

## 2023-10-20 NOTE — ASSESSMENT & PLAN NOTE
Echo 4/22/21 with EF 20 - 25%, severely reduced systolic function,  moderate tricuspid valve regurgitation. Cardiac cath 4/23/22 with no CAD, LV gram consistent with Takostubo cardiomyopathy. Per chart review, Chapis oSlis discussed during hospitalization but she instead transitioned to hospice. Managed with carvedilol  3.125mg BID, BP and HR have been well-controlled. No longer follows with cardiology and does not wish to at this time as goal of care is primarily comfort. Denies symptoms including SOB, chest pain, palpitations.

## 2023-10-20 NOTE — ASSESSMENT & PLAN NOTE
BP at goal on carvedilol. She was previously managed with Norvasc, but this was d/c due to hypotension and worsening of lymphedema. If worsening, would consider Ace-I/ARB, but is not needed at this time.   CMP obtained Jan 2023 and stable

## 2023-10-20 NOTE — ASSESSMENT & PLAN NOTE
Last LDL slightly above goal at 123 in Jan 2023 (largely unchanged from previous June 2022). ASCVD 10-yr risk calculated at 13.8%  (would be reduced to 10.3% with statin). Was previously taking Zetia. Not currently managed with statin, discussed risks vs benefits today and would want to limit pill burden unless strongly indicated, which does not seem to be at this time.

## 2023-10-20 NOTE — ASSESSMENT & PLAN NOTE
This has been chronic for several months. Patient reports taking fiber supplementation in hospital but this made stools very loose. Improved with BRAT diet but symptoms return when she resumes regular diet. Not improved with Pepto bismol, no signs or risk factors of infectious origin.  Discussed low dose of OTC imodium, hold if no BM in 24 hours and she states she would try this option

## 2023-10-20 NOTE — ASSESSMENT & PLAN NOTE
Onset about 3 months ago with intermittent, non-productive cough. No associated symptoms of allergies, GERD, CHF, no smoking history or COPD. Lungs clear and SpO2 WNL, denies SOB or wheezing.   Improved somewhat with lozenges, but sent trial of tessalon perles for symptom relief

## 2023-10-20 NOTE — PROGRESS NOTES
[address]   [id number]     Date of Current Visit: 10/20/2023         SUMMARY       Patient seen in the home today due to taxing effort to seek primary care services in the community s/t mobility limitations, knee stiffness, bedbound status. Patient/Family present for Current Visit:  Patient   Goals of Care as stated by the patient/family is: comfort         ASSESSMENT AND PLAN       1. Cardiomyopathy, unspecified type ([address])    2. Essential hypertension    3. Hypothyroidism, unspecified type    4. Hyperlipidemia, unspecified hyperlipidemia type    5. Joint stiffness of both knees    6. Chronic cough    7. Loose stools    8. Traumatic loss of toenail of lesser toe of left foot, initial encounter       1. Cardiomyopathy, unspecified type ([address])  Echo 4/22/21 with EF 20 - 25%, severely reduced systolic function,  moderate tricuspid valve regurgitation. Cardiac cath 4/23/22 with no CAD, LV gram consistent with Takostubo cardiomyopathy. Per chart review, Colin Geoff discussed during hospitalization but she instead transitioned to hospice. Managed with carvedilol  3.125mg BID, BP and HR have been well-controlled. No longer follows with cardiology and does not wish to at this time as goal of care is primarily comfort. Denies symptoms including SOB, chest pain, palpitations. 2. Essential hypertension  BP at goal on carvedilol. She was previously managed with Norvasc, but this was d/c due to hypotension and worsening of lymphedema. If worsening, would consider Ace-I/ARB, but is not needed at this time. CMP obtained Jan 2023 and stable  3. Hypothyroidism, unspecified type  Managed with Levothyroxine 25mcg daily. TSH Jan 2023 and slightly elevated at 4.89 but T4 WNL. No changes to medication warranted at this time. 4. Hyperlipidemia, unspecified hyperlipidemia type  Last LDL slightly above goal at 123 in Jan 2023 (largely unchanged from previous June 2022).   ASCVD 10-yr risk

## 2023-10-24 ENCOUNTER — TELEPHONE (OUTPATIENT)
Facility: CLINIC | Age: 73
End: 2023-10-24

## 2023-10-24 DIAGNOSIS — R05.3 CHRONIC COUGH: ICD-10-CM

## 2023-10-24 RX ORDER — BENZONATATE 100 MG/1
100 CAPSULE ORAL 2 TIMES DAILY PRN
Qty: 60 CAPSULE | Refills: 1 | Status: SHIPPED | OUTPATIENT
Start: 2023-10-24

## 2023-10-24 NOTE — TELEPHONE ENCOUNTER
Tessalon Perles rx resent to [address] [place] [address] per patient's request.  Telephone call to patient to advise this has been sent and we have removed [facility] from her medical record so that we won't ever use this pharmacy by accident. She was appreciative of call.

## 2023-11-07 RX ORDER — LEVOTHYROXINE SODIUM 0.03 MG/1
TABLET ORAL
Qty: 30 TABLET | Refills: 0 | Status: SHIPPED | OUTPATIENT
Start: 2023-11-07

## 2023-11-08 ENCOUNTER — TELEPHONE (OUTPATIENT)
Facility: CLINIC | Age: 73
End: 2023-11-08

## 2023-11-08 NOTE — TELEPHONE ENCOUNTER
The patient called to update Preston Arriaga NP that a duplicate order of her prescriptions Per [facility], [address] placed by the patient's caregiver on 10/25/23 for:    Carvedilol  Levothyroxine    She said that these two medication were originally ordered on 10/4/23 also, so she has received a bill for both orders. The patient also asked about her tessalon refill. It was sent to the wrong pharmacy initially on 10/24/23. She wanted to know if other medications were ordered at the same time.     Her CB# [phone]

## 2023-11-08 NOTE — TELEPHONE ENCOUNTER
Telephone call to patient to advise that this nurse sent Luke Faes to [address] [place] [address] on 10/24 per pt request and removed [facility] from her chart. No further prescriptions or faxes were sent to [facility] [place] since that date. Pt will follow up with [facility].

## 2023-12-10 ENCOUNTER — ANESTHESIA EVENT (OUTPATIENT)
Facility: HOSPITAL | Age: 73
End: 2023-12-10
Payer: MEDICARE

## 2023-12-10 ENCOUNTER — HOSPITAL ENCOUNTER (INPATIENT)
Facility: HOSPITAL | Age: 73
LOS: 23 days | Discharge: NURSING, REHAB OR LONG-TERM CARE | DRG: 853 | End: 2024-01-02
Attending: STUDENT IN AN ORGANIZED HEALTH CARE EDUCATION/TRAINING PROGRAM | Admitting: HOSPITALIST
Payer: MEDICARE

## 2023-12-10 ENCOUNTER — ANESTHESIA (OUTPATIENT)
Facility: HOSPITAL | Age: 73
End: 2023-12-10
Payer: MEDICARE

## 2023-12-10 ENCOUNTER — APPOINTMENT (OUTPATIENT)
Facility: HOSPITAL | Age: 73
DRG: 853 | End: 2023-12-10
Payer: MEDICARE

## 2023-12-10 DIAGNOSIS — R41.82 ALTERED MENTAL STATUS, UNSPECIFIED ALTERED MENTAL STATUS TYPE: ICD-10-CM

## 2023-12-10 DIAGNOSIS — A41.9 SEPTIC SHOCK (HCC): ICD-10-CM

## 2023-12-10 DIAGNOSIS — R65.21 SEPTIC SHOCK (HCC): ICD-10-CM

## 2023-12-10 DIAGNOSIS — R78.81 BACTEREMIA: Primary | ICD-10-CM

## 2023-12-10 DIAGNOSIS — K65.1 INTRA-ABDOMINAL ABSCESS (HCC): ICD-10-CM

## 2023-12-10 LAB
ALBUMIN SERPL-MCNC: 2.1 G/DL (ref 3.5–5)
ALBUMIN/GLOB SERPL: 0.4 (ref 1.1–2.2)
ALP SERPL-CCNC: 90 U/L (ref 45–117)
ALT SERPL-CCNC: <6 U/L (ref 12–78)
ANION GAP SERPL CALC-SCNC: 12 MMOL/L (ref 5–15)
APPEARANCE UR: ABNORMAL
AST SERPL-CCNC: 18 U/L (ref 15–37)
BACTERIA URNS QL MICRO: ABNORMAL /HPF
BASOPHILS # BLD: 0 K/UL (ref 0–0.1)
BASOPHILS NFR BLD: 0 % (ref 0–1)
BILIRUB SERPL-MCNC: 0.3 MG/DL (ref 0.2–1)
BILIRUB UR QL: NEGATIVE
BUN SERPL-MCNC: 38 MG/DL (ref 6–20)
BUN/CREAT SERPL: 20 (ref 12–20)
CALCIUM SERPL-MCNC: 9.3 MG/DL (ref 8.5–10.1)
CHLORIDE SERPL-SCNC: 104 MMOL/L (ref 97–108)
CO2 SERPL-SCNC: 21 MMOL/L (ref 21–32)
COLOR UR: ABNORMAL
CREAT SERPL-MCNC: 1.94 MG/DL (ref 0.55–1.02)
DIFFERENTIAL METHOD BLD: ABNORMAL
EOSINOPHIL # BLD: 0 K/UL (ref 0–0.4)
EOSINOPHIL NFR BLD: 0 % (ref 0–7)
EPITH CASTS URNS QL MICRO: ABNORMAL /LPF
ERYTHROCYTE [DISTWIDTH] IN BLOOD BY AUTOMATED COUNT: 17.2 % (ref 11.5–14.5)
FLUAV AG NPH QL IA: NEGATIVE
FLUBV AG NOSE QL IA: NEGATIVE
GLOBULIN SER CALC-MCNC: 5.5 G/DL (ref 2–4)
GLUCOSE BLD STRIP.AUTO-MCNC: 139 MG/DL (ref 65–117)
GLUCOSE SERPL-MCNC: 133 MG/DL (ref 65–100)
GLUCOSE UR STRIP.AUTO-MCNC: NEGATIVE MG/DL
HCT VFR BLD AUTO: 26.5 % (ref 35–47)
HGB BLD-MCNC: 8 G/DL (ref 11.5–16)
HGB UR QL STRIP: ABNORMAL
IMM GRANULOCYTES # BLD AUTO: 0.2 K/UL (ref 0–0.04)
IMM GRANULOCYTES NFR BLD AUTO: 1 % (ref 0–0.5)
KETONES UR QL STRIP.AUTO: 15 MG/DL
LACTATE BLD-SCNC: 0.94 MMOL/L (ref 0.4–2)
LEUKOCYTE ESTERASE UR QL STRIP.AUTO: ABNORMAL
LYMPHOCYTES # BLD: 0.7 K/UL (ref 0.8–3.5)
LYMPHOCYTES NFR BLD: 4 % (ref 12–49)
MCH RBC QN AUTO: 23.2 PG (ref 26–34)
MCHC RBC AUTO-ENTMCNC: 30.2 G/DL (ref 30–36.5)
MCV RBC AUTO: 76.8 FL (ref 80–99)
MONOCYTES # BLD: 0.7 K/UL (ref 0–1)
MONOCYTES NFR BLD: 4 % (ref 5–13)
NEUTS SEG # BLD: 16.6 K/UL (ref 1.8–8)
NEUTS SEG NFR BLD: 91 % (ref 32–75)
NITRITE UR QL STRIP.AUTO: NEGATIVE
NRBC # BLD: 0 K/UL (ref 0–0.01)
NRBC BLD-RTO: 0 PER 100 WBC
PH UR STRIP: 8.5 (ref 5–8)
PLATELET # BLD AUTO: 755 K/UL (ref 150–400)
PLATELET COMMENT: ABNORMAL
PMV BLD AUTO: 8.3 FL (ref 8.9–12.9)
POTASSIUM SERPL-SCNC: 5 MMOL/L (ref 3.5–5.1)
PROCALCITONIN SERPL-MCNC: 32.38 NG/ML
PROT SERPL-MCNC: 7.6 G/DL (ref 6.4–8.2)
PROT UR STRIP-MCNC: 100 MG/DL
RBC # BLD AUTO: 3.45 M/UL (ref 3.8–5.2)
RBC #/AREA URNS HPF: ABNORMAL /HPF (ref 0–5)
RBC MORPH BLD: ABNORMAL
RBC MORPH BLD: ABNORMAL
SARS-COV-2 RDRP RESP QL NAA+PROBE: NOT DETECTED
SERVICE CMNT-IMP: ABNORMAL
SODIUM SERPL-SCNC: 137 MMOL/L (ref 136–145)
SOURCE: NORMAL
SP GR UR REFRACTOMETRY: 1.02
TRI-PHOS CRY URNS QL MICRO: ABNORMAL
TROPONIN I SERPL HS-MCNC: 5389 NG/L (ref 0–51)
TROPONIN I SERPL HS-MCNC: 5863 NG/L (ref 0–51)
URINE CULTURE IF INDICATED: ABNORMAL
UROBILINOGEN UR QL STRIP.AUTO: 1 EU/DL (ref 0.2–1)
WBC # BLD AUTO: 18.2 K/UL (ref 3.6–11)
WBC URNS QL MICRO: >100 /HPF (ref 0–4)

## 2023-12-10 PROCEDURE — 2580000003 HC RX 258: Performed by: UROLOGY

## 2023-12-10 PROCEDURE — 74176 CT ABD & PELVIS W/O CONTRAST: CPT

## 2023-12-10 PROCEDURE — 2709999900 HC NON-CHARGEABLE SUPPLY: Performed by: UROLOGY

## 2023-12-10 PROCEDURE — 36415 COLL VENOUS BLD VENIPUNCTURE: CPT

## 2023-12-10 PROCEDURE — 51701 INSERT BLADDER CATHETER: CPT

## 2023-12-10 PROCEDURE — 81001 URINALYSIS AUTO W/SCOPE: CPT

## 2023-12-10 PROCEDURE — 2580000003 HC RX 258: Performed by: HOSPITALIST

## 2023-12-10 PROCEDURE — 84145 PROCALCITONIN (PCT): CPT

## 2023-12-10 PROCEDURE — 87086 URINE CULTURE/COLONY COUNT: CPT

## 2023-12-10 PROCEDURE — 2700000000 HC OXYGEN THERAPY PER DAY

## 2023-12-10 PROCEDURE — 2500000003 HC RX 250 WO HCPCS: Performed by: NURSE ANESTHETIST, CERTIFIED REGISTERED

## 2023-12-10 PROCEDURE — 2580000003 HC RX 258: Performed by: STUDENT IN AN ORGANIZED HEALTH CARE EDUCATION/TRAINING PROGRAM

## 2023-12-10 PROCEDURE — C2617 STENT, NON-COR, TEM W/O DEL: HCPCS | Performed by: UROLOGY

## 2023-12-10 PROCEDURE — 71250 CT THORAX DX C-: CPT

## 2023-12-10 PROCEDURE — 80053 COMPREHEN METABOLIC PANEL: CPT

## 2023-12-10 PROCEDURE — 3600000003 HC SURGERY LEVEL 3 BASE: Performed by: UROLOGY

## 2023-12-10 PROCEDURE — 99285 EMERGENCY DEPT VISIT HI MDM: CPT

## 2023-12-10 PROCEDURE — 84484 ASSAY OF TROPONIN QUANT: CPT

## 2023-12-10 PROCEDURE — 6360000004 HC RX CONTRAST MEDICATION: Performed by: UROLOGY

## 2023-12-10 PROCEDURE — 6370000000 HC RX 637 (ALT 250 FOR IP): Performed by: STUDENT IN AN ORGANIZED HEALTH CARE EDUCATION/TRAINING PROGRAM

## 2023-12-10 PROCEDURE — 3700000001 HC ADD 15 MINUTES (ANESTHESIA): Performed by: UROLOGY

## 2023-12-10 PROCEDURE — 70450 CT HEAD/BRAIN W/O DYE: CPT

## 2023-12-10 PROCEDURE — 85025 COMPLETE CBC W/AUTO DIFF WBC: CPT

## 2023-12-10 PROCEDURE — 87154 CUL TYP ID BLD PTHGN 6+ TRGT: CPT

## 2023-12-10 PROCEDURE — 96375 TX/PRO/DX INJ NEW DRUG ADDON: CPT

## 2023-12-10 PROCEDURE — 93005 ELECTROCARDIOGRAM TRACING: CPT | Performed by: STUDENT IN AN ORGANIZED HEALTH CARE EDUCATION/TRAINING PROGRAM

## 2023-12-10 PROCEDURE — 93005 ELECTROCARDIOGRAM TRACING: CPT | Performed by: HOSPITALIST

## 2023-12-10 PROCEDURE — 87635 SARS-COV-2 COVID-19 AMP PRB: CPT

## 2023-12-10 PROCEDURE — 96366 THER/PROPH/DIAG IV INF ADDON: CPT

## 2023-12-10 PROCEDURE — 2000000000 HC ICU R&B

## 2023-12-10 PROCEDURE — 82962 GLUCOSE BLOOD TEST: CPT

## 2023-12-10 PROCEDURE — 96365 THER/PROPH/DIAG IV INF INIT: CPT

## 2023-12-10 PROCEDURE — C1758 CATHETER, URETERAL: HCPCS | Performed by: UROLOGY

## 2023-12-10 PROCEDURE — 0T788DZ DILATION OF BILATERAL URETERS WITH INTRALUMINAL DEVICE, VIA NATURAL OR ARTIFICIAL OPENING ENDOSCOPIC: ICD-10-PCS | Performed by: UROLOGY

## 2023-12-10 PROCEDURE — 87804 INFLUENZA ASSAY W/OPTIC: CPT

## 2023-12-10 PROCEDURE — 3700000000 HC ANESTHESIA ATTENDED CARE: Performed by: UROLOGY

## 2023-12-10 PROCEDURE — 6360000002 HC RX W HCPCS: Performed by: NURSE ANESTHETIST, CERTIFIED REGISTERED

## 2023-12-10 PROCEDURE — 3600000013 HC SURGERY LEVEL 3 ADDTL 15MIN: Performed by: UROLOGY

## 2023-12-10 PROCEDURE — 0T918ZX DRAINAGE OF LEFT KIDNEY, VIA NATURAL OR ARTIFICIAL OPENING ENDOSCOPIC, DIAGNOSTIC: ICD-10-PCS | Performed by: UROLOGY

## 2023-12-10 PROCEDURE — C1769 GUIDE WIRE: HCPCS | Performed by: UROLOGY

## 2023-12-10 PROCEDURE — 71045 X-RAY EXAM CHEST 1 VIEW: CPT

## 2023-12-10 PROCEDURE — 83605 ASSAY OF LACTIC ACID: CPT

## 2023-12-10 PROCEDURE — 2500000003 HC RX 250 WO HCPCS: Performed by: STUDENT IN AN ORGANIZED HEALTH CARE EDUCATION/TRAINING PROGRAM

## 2023-12-10 PROCEDURE — 87186 SC STD MICRODIL/AGAR DIL: CPT

## 2023-12-10 PROCEDURE — 87040 BLOOD CULTURE FOR BACTERIA: CPT

## 2023-12-10 PROCEDURE — 87077 CULTURE AEROBIC IDENTIFY: CPT

## 2023-12-10 PROCEDURE — 6360000002 HC RX W HCPCS: Performed by: STUDENT IN AN ORGANIZED HEALTH CARE EDUCATION/TRAINING PROGRAM

## 2023-12-10 DEVICE — URETERAL STENT
Type: IMPLANTABLE DEVICE | Site: URETER | Status: FUNCTIONAL
Brand: POLARIS™ ULTRA

## 2023-12-10 RX ORDER — MIDAZOLAM HYDROCHLORIDE 1 MG/ML
INJECTION INTRAMUSCULAR; INTRAVENOUS PRN
Status: DISCONTINUED | OUTPATIENT
Start: 2023-12-10 | End: 2023-12-10 | Stop reason: SDUPTHER

## 2023-12-10 RX ORDER — SODIUM CHLORIDE, SODIUM LACTATE, POTASSIUM CHLORIDE, AND CALCIUM CHLORIDE .6; .31; .03; .02 G/100ML; G/100ML; G/100ML; G/100ML
30 INJECTION, SOLUTION INTRAVENOUS ONCE
Status: COMPLETED | OUTPATIENT
Start: 2023-12-10 | End: 2023-12-10

## 2023-12-10 RX ORDER — SODIUM CHLORIDE, SODIUM LACTATE, POTASSIUM CHLORIDE, CALCIUM CHLORIDE 600; 310; 30; 20 MG/100ML; MG/100ML; MG/100ML; MG/100ML
INJECTION, SOLUTION INTRAVENOUS CONTINUOUS
Status: DISCONTINUED | OUTPATIENT
Start: 2023-12-10 | End: 2023-12-11

## 2023-12-10 RX ORDER — ACETAMINOPHEN 325 MG/1
650 TABLET ORAL EVERY 6 HOURS PRN
Status: DISCONTINUED | OUTPATIENT
Start: 2023-12-10 | End: 2024-01-02 | Stop reason: HOSPADM

## 2023-12-10 RX ORDER — CASTOR OIL AND BALSAM, PERU 788; 87 MG/G; MG/G
OINTMENT TOPICAL 2 TIMES DAILY
Status: DISCONTINUED | OUTPATIENT
Start: 2023-12-11 | End: 2024-01-02 | Stop reason: HOSPADM

## 2023-12-10 RX ORDER — LIDOCAINE HYDROCHLORIDE 20 MG/ML
INJECTION, SOLUTION EPIDURAL; INFILTRATION; INTRACAUDAL; PERINEURAL PRN
Status: DISCONTINUED | OUTPATIENT
Start: 2023-12-10 | End: 2023-12-10 | Stop reason: SDUPTHER

## 2023-12-10 RX ORDER — ONDANSETRON 2 MG/ML
4 INJECTION INTRAMUSCULAR; INTRAVENOUS EVERY 6 HOURS PRN
Status: DISCONTINUED | OUTPATIENT
Start: 2023-12-10 | End: 2024-01-02 | Stop reason: HOSPADM

## 2023-12-10 RX ORDER — EPHEDRINE SULFATE/0.9% NACL/PF 50 MG/5 ML
SYRINGE (ML) INTRAVENOUS PRN
Status: DISCONTINUED | OUTPATIENT
Start: 2023-12-10 | End: 2023-12-10 | Stop reason: SDUPTHER

## 2023-12-10 RX ORDER — POLYETHYLENE GLYCOL 3350 17 G/17G
17 POWDER, FOR SOLUTION ORAL DAILY PRN
Status: DISCONTINUED | OUTPATIENT
Start: 2023-12-10 | End: 2024-01-02 | Stop reason: HOSPADM

## 2023-12-10 RX ORDER — ACETAMINOPHEN 500 MG
1000 TABLET ORAL
Status: COMPLETED | OUTPATIENT
Start: 2023-12-10 | End: 2023-12-10

## 2023-12-10 RX ORDER — ONDANSETRON 4 MG/1
4 TABLET, ORALLY DISINTEGRATING ORAL EVERY 8 HOURS PRN
Status: DISCONTINUED | OUTPATIENT
Start: 2023-12-10 | End: 2024-01-02 | Stop reason: HOSPADM

## 2023-12-10 RX ORDER — SODIUM CHLORIDE 0.9 % (FLUSH) 0.9 %
5-40 SYRINGE (ML) INJECTION EVERY 12 HOURS SCHEDULED
Status: DISCONTINUED | OUTPATIENT
Start: 2023-12-10 | End: 2024-01-02 | Stop reason: HOSPADM

## 2023-12-10 RX ORDER — SODIUM CHLORIDE 9 MG/ML
INJECTION, SOLUTION INTRAVENOUS PRN
Status: DISCONTINUED | OUTPATIENT
Start: 2023-12-10 | End: 2024-01-02 | Stop reason: HOSPADM

## 2023-12-10 RX ORDER — ENOXAPARIN SODIUM 100 MG/ML
30 INJECTION SUBCUTANEOUS DAILY
Status: DISCONTINUED | OUTPATIENT
Start: 2023-12-10 | End: 2023-12-10

## 2023-12-10 RX ORDER — ACETAMINOPHEN 650 MG/1
650 SUPPOSITORY RECTAL EVERY 6 HOURS PRN
Status: DISCONTINUED | OUTPATIENT
Start: 2023-12-10 | End: 2024-01-02 | Stop reason: HOSPADM

## 2023-12-10 RX ORDER — NOREPINEPHRINE BITARTRATE 0.06 MG/ML
1-100 INJECTION, SOLUTION INTRAVENOUS CONTINUOUS
Status: DISCONTINUED | OUTPATIENT
Start: 2023-12-10 | End: 2023-12-11

## 2023-12-10 RX ORDER — FENTANYL CITRATE 50 UG/ML
INJECTION, SOLUTION INTRAMUSCULAR; INTRAVENOUS PRN
Status: DISCONTINUED | OUTPATIENT
Start: 2023-12-10 | End: 2023-12-10 | Stop reason: SDUPTHER

## 2023-12-10 RX ORDER — SODIUM CHLORIDE 0.9 % (FLUSH) 0.9 %
5-40 SYRINGE (ML) INJECTION PRN
Status: DISCONTINUED | OUTPATIENT
Start: 2023-12-10 | End: 2024-01-02 | Stop reason: HOSPADM

## 2023-12-10 RX ADMIN — ACETAMINOPHEN 1000 MG: 500 TABLET ORAL at 09:54

## 2023-12-10 RX ADMIN — Medication 5 MG: at 20:50

## 2023-12-10 RX ADMIN — LIDOCAINE HYDROCHLORIDE 60 MG: 20 INJECTION, SOLUTION EPIDURAL; INFILTRATION; INTRACAUDAL; PERINEURAL at 20:35

## 2023-12-10 RX ADMIN — MIDAZOLAM HYDROCHLORIDE 2 MG: 1 INJECTION, SOLUTION INTRAMUSCULAR; INTRAVENOUS at 20:28

## 2023-12-10 RX ADMIN — VANCOMYCIN HYDROCHLORIDE 1500 MG: 1.5 INJECTION, POWDER, LYOPHILIZED, FOR SOLUTION INTRAVENOUS at 11:07

## 2023-12-10 RX ADMIN — Medication 5 MG: at 21:01

## 2023-12-10 RX ADMIN — FENTANYL CITRATE 25 MCG: 50 INJECTION, SOLUTION INTRAMUSCULAR; INTRAVENOUS at 21:04

## 2023-12-10 RX ADMIN — PIPERACILLIN AND TAZOBACTAM 4500 MG: 4; .5 INJECTION, POWDER, LYOPHILIZED, FOR SOLUTION INTRAVENOUS at 10:30

## 2023-12-10 RX ADMIN — SODIUM CHLORIDE, POTASSIUM CHLORIDE, SODIUM LACTATE AND CALCIUM CHLORIDE: 600; 310; 30; 20 INJECTION, SOLUTION INTRAVENOUS at 14:32

## 2023-12-10 RX ADMIN — SODIUM CHLORIDE, POTASSIUM CHLORIDE, SODIUM LACTATE AND CALCIUM CHLORIDE 2391 ML: 600; 310; 30; 20 INJECTION, SOLUTION INTRAVENOUS at 09:54

## 2023-12-10 RX ADMIN — SODIUM CHLORIDE, POTASSIUM CHLORIDE, SODIUM LACTATE AND CALCIUM CHLORIDE: 600; 310; 30; 20 INJECTION, SOLUTION INTRAVENOUS at 22:25

## 2023-12-10 RX ADMIN — NOREPINEPHRINE BITARTRATE 5 MCG/MIN: 1 INJECTION, SOLUTION, CONCENTRATE INTRAVENOUS at 13:09

## 2023-12-10 RX ADMIN — FENTANYL CITRATE 25 MCG: 50 INJECTION, SOLUTION INTRAMUSCULAR; INTRAVENOUS at 20:32

## 2023-12-10 RX ADMIN — SODIUM CHLORIDE, PRESERVATIVE FREE 10 ML: 5 INJECTION INTRAVENOUS at 22:22

## 2023-12-10 RX ADMIN — PROPOFOL 100 MCG/KG/MIN: 10 INJECTION, EMULSION INTRAVENOUS at 20:38

## 2023-12-10 RX ADMIN — FENTANYL CITRATE 25 MCG: 50 INJECTION, SOLUTION INTRAMUSCULAR; INTRAVENOUS at 20:36

## 2023-12-10 RX ADMIN — FENTANYL CITRATE 25 MCG: 50 INJECTION, SOLUTION INTRAMUSCULAR; INTRAVENOUS at 20:45

## 2023-12-10 ASSESSMENT — PAIN SCALES - GENERAL
PAINLEVEL_OUTOF10: 0

## 2023-12-10 ASSESSMENT — PAIN - FUNCTIONAL ASSESSMENT: PAIN_FUNCTIONAL_ASSESSMENT: 0-10

## 2023-12-10 ASSESSMENT — LIFESTYLE VARIABLES
HOW OFTEN DO YOU HAVE A DRINK CONTAINING ALCOHOL: NEVER
HOW MANY STANDARD DRINKS CONTAINING ALCOHOL DO YOU HAVE ON A TYPICAL DAY: PATIENT DOES NOT DRINK

## 2023-12-10 NOTE — H&P
73   Temp 98.4 °F (36.9 °C) (Oral)   Resp 20   Ht 1.702 m (5' 7\")   Wt 79.7 kg (175 lb 11.3 oz)   SpO2 96%   BMI 27.52 kg/m²      Temp (24hrs), Av.6 °F (37.6 °C), Min:98.4 °F (36.9 °C), Max:100.7 °F (38.2 °C)           Intake/Output:     Intake/Output Summary (Last 24 hours) at 12/10/2023 1338  Last data filed at 12/10/2023 1125  Gross per 24 hour   Intake 100 ml   Output 200 ml   Net -100 ml       Physical Exam  Constitutional:       General: She is not in acute distress.     Appearance: She is not ill-appearing or toxic-appearing.   HENT:      Head: Normocephalic and atraumatic.   Eyes:      Pupils: Pupils are equal, round, and reactive to light.   Cardiovascular:      Rate and Rhythm: Tachycardia present.   Pulmonary:      Effort: Pulmonary effort is normal.      Breath sounds: Normal breath sounds.   Abdominal:      General: Abdomen is flat. There is no distension.      Palpations: Abdomen is soft.      Tenderness: There is no abdominal tenderness.   Musculoskeletal:      Right lower leg: No edema.      Left lower leg: No edema.   Neurological:      Mental Status: She is alert and oriented to person, place, and time.   Psychiatric:         Behavior: Behavior normal.         Past Medical History:        has a past medical history of Cardiomyopathy (HCC), HLD (hyperlipidemia), Hypertension, Leukocytosis, and Thyroid disease.    Past Surgical History:      has a past surgical history that includes orthopedic surgery.      Home Medications:     Prior to Admission medications    Medication Sig Start Date End Date Taking? Authorizing Provider   levothyroxine (SYNTHROID) 25 MCG tablet TAKE ONE TABLET BY MOUTH EVERY DAY FOR HYPOTHYROIDISM. 23   Mari Smart APRN - NP   benzonatate (TESSALON) 100 MG capsule Take 1 capsule by mouth 2 times daily as needed for Cough 10/24/23   Mari Smart APRN - NP   carvedilol (COREG) 3.125 MG tablet Take 1 tablet by mouth 2 times daily 23   Berry

## 2023-12-10 NOTE — ED NOTES
Dr Russell wants patient to receive lactated ringers bolus per orders and will continue to monitor patients blood pressure and condition.       Li Chavis RN  12/10/23 8659

## 2023-12-10 NOTE — ED PROVIDER NOTES
\[facility]\"" EMERGENCY DEPT  EMERGENCY DEPARTMENT ENCOUNTER       Pt Name: Nicolasa Macdonald  MRN: [id number]  Birthdate 1950  Date of evaluation: 12/10/2023  Provider: Manny Wilson MD   PCP: Mari Smart APRN - NP  Note Started: 10:00 AM EST 12/10/23     CHIEF COMPLAINT       Chief Complaint   Patient presents with    Altered Mental Status     EMS reports patient from Nursing facility, staff reports patient altered.  Patient has fever.      Fever        HISTORY OF PRESENT ILLNESS: 1 or more elements      History From: patient, ems, History limited by: ems     Nicolasa Macdonald is a 73 y.o. female present with altered mental status.  History is somewhat limited as patient cannot recall entire story.  Per report from EMS she was not her normal self this morning at her care facility and sent here.  Patient has no complaints at this time.       Please See MDM for Additional Details of the HPI/PMH  Nursing Notes were all reviewed and agreed with or any disagreements were addressed in the HPI.     REVIEW OF SYSTEMS        Positives and Pertinent negatives as per HPI.    PAST HISTORY     Past Medical History:  Past Medical History:   Diagnosis Date    Cardiomyopathy (HCC)     HLD (hyperlipidemia)     Hypertension     Leukocytosis     Thyroid disease        Past Surgical History:  Past Surgical History:   Procedure Laterality Date    ORTHOPEDIC SURGERY      spinal surgery, heel surgery       Family History:  Family History   Problem Relation Age of Onset    Cancer Maternal Uncle     Cancer Brother     Stroke Father     Heart Disease Mother        Social History:  Social History     Tobacco Use    Smoking status: Never    Smokeless tobacco: Never   Substance Use Topics    Alcohol use: Never    Drug use: Never       Allergies:  Allergies   Allergen Reactions    Antihistamines, Diphenhydramine-Type Other (See Comments)     reported on referral packet       CURRENT MEDICATIONS      Previous Medications    ACETAMINOPHEN

## 2023-12-10 NOTE — ED NOTES
Called and gave report to Anny PARMAR.  Patient possibly going to OR at 1800 from ED then to ICU.  She is going to clarify this and let this nurse know.      Li Chavis, RN  12/10/23 2563

## 2023-12-10 NOTE — ED NOTES
Patient straight cath for urine.  Tolerated procedure well.  Patient had yellow, purulent drainage in catheter tubing.  200 cc of urine returned.  Patient is incontinent of bowel and bladder at nursing facility.  Patient unable to bend knees or role over with out pain.       Li Chavis, RN  12/10/23 9654

## 2023-12-11 ENCOUNTER — APPOINTMENT (OUTPATIENT)
Facility: HOSPITAL | Age: 73
DRG: 853 | End: 2023-12-11
Attending: INTERNAL MEDICINE
Payer: MEDICARE

## 2023-12-11 LAB
ACCESSION NUMBER, LLC1M: ABNORMAL
ACINETOBACTER CALCOAC BAUMANNII COMPLEX BY PCR: NOT DETECTED
ANION GAP SERPL CALC-SCNC: 9 MMOL/L (ref 5–15)
B FRAGILIS DNA BLD POS QL NAA+NON-PROBE: NOT DETECTED
BASOPHILS # BLD: 0 K/UL (ref 0–0.1)
BASOPHILS NFR BLD: 0 % (ref 0–1)
BIOFIRE TEST COMMENT: ABNORMAL
BLACTX-M ISLT/SPM QL: NOT DETECTED
BLAIMP ISLT/SPM QL: NOT DETECTED
BLAKPC BLD POS QL NAA+NON-PROBE: NOT DETECTED
BLAOXA-48-LIKE ISLT/SPM QL: NOT DETECTED
BLAVIM ISLT/SPM QL: NOT DETECTED
BUN SERPL-MCNC: 36 MG/DL (ref 6–20)
BUN/CREAT SERPL: 21 (ref 12–20)
C ALBICANS DNA BLD POS QL NAA+NON-PROBE: NOT DETECTED
C AURIS DNA BLD POS QL NAA+NON-PROBE: NOT DETECTED
C GATTII+NEOFOR DNA BLD POS QL NAA+N-PRB: NOT DETECTED
C GLABRATA DNA BLD POS QL NAA+NON-PROBE: NOT DETECTED
C KRUSEI DNA BLD POS QL NAA+NON-PROBE: NOT DETECTED
C PARAP DNA BLD POS QL NAA+NON-PROBE: NOT DETECTED
C TROPICLS DNA BLD POS QL NAA+NON-PROBE: NOT DETECTED
CALCIUM SERPL-MCNC: 9 MG/DL (ref 8.5–10.1)
CHLORIDE SERPL-SCNC: 107 MMOL/L (ref 97–108)
CO2 SERPL-SCNC: 21 MMOL/L (ref 21–32)
CREAT SERPL-MCNC: 1.69 MG/DL (ref 0.55–1.02)
DIFFERENTIAL METHOD BLD: ABNORMAL
E CLOAC COMP DNA BLD POS NAA+NON-PROBE: NOT DETECTED
E COLI DNA BLD POS QL NAA+NON-PROBE: NOT DETECTED
E FAECALIS DNA BLD POS QL NAA+NON-PROBE: NOT DETECTED
E FAECIUM DNA BLD POS QL NAA+NON-PROBE: NOT DETECTED
ECHO AO ROOT DIAM: 3.2 CM
ECHO AO ROOT INDEX: 1.66 CM/M2
ECHO AV AREA PEAK VELOCITY: 2.9 CM2
ECHO AV AREA/BSA PEAK VELOCITY: 1.5 CM2/M2
ECHO AV PEAK GRADIENT: 3 MMHG
ECHO AV PEAK VELOCITY: 0.9 M/S
ECHO AV VELOCITY RATIO: 0.78
ECHO BSA: 1.96 M2
ECHO LA DIAMETER INDEX: 1.81 CM/M2
ECHO LA DIAMETER: 3.5 CM
ECHO LA TO AORTIC ROOT RATIO: 1.09
ECHO LV FRACTIONAL SHORTENING: 22 % (ref 28–44)
ECHO LV INTERNAL DIMENSION DIASTOLE INDEX: 2.38 CM/M2
ECHO LV INTERNAL DIMENSION DIASTOLIC: 4.6 CM (ref 3.9–5.3)
ECHO LV INTERNAL DIMENSION SYSTOLIC INDEX: 1.87 CM/M2
ECHO LV INTERNAL DIMENSION SYSTOLIC: 3.6 CM
ECHO LV IVSD: 1.2 CM (ref 0.6–0.9)
ECHO LV MASS 2D: 192.9 G (ref 67–162)
ECHO LV MASS INDEX 2D: 100 G/M2 (ref 43–95)
ECHO LV POSTERIOR WALL DIASTOLIC: 1.1 CM (ref 0.6–0.9)
ECHO LV RELATIVE WALL THICKNESS RATIO: 0.48
ECHO LVOT AREA: 3.5 CM2
ECHO LVOT DIAM: 2.1 CM
ECHO LVOT PEAK GRADIENT: 2 MMHG
ECHO LVOT PEAK VELOCITY: 0.7 M/S
ECHO TV REGURGITANT MAX VELOCITY: 1.95 M/S
ECHO TV REGURGITANT PEAK GRADIENT: 15 MMHG
ENTEROBACTERALES DNA BLD POS NAA+N-PRB: DETECTED
EOSINOPHIL # BLD: 0 K/UL (ref 0–0.4)
EOSINOPHIL NFR BLD: 0 % (ref 0–7)
ERYTHROCYTE [DISTWIDTH] IN BLOOD BY AUTOMATED COUNT: 17.4 % (ref 11.5–14.5)
GLUCOSE SERPL-MCNC: 175 MG/DL (ref 65–100)
GP B STREP DNA BLD POS QL NAA+NON-PROBE: NOT DETECTED
HAEM INFLU DNA BLD POS QL NAA+NON-PROBE: NOT DETECTED
HCT VFR BLD AUTO: 25.7 % (ref 35–47)
HGB BLD-MCNC: 7.5 G/DL (ref 11.5–16)
IMM GRANULOCYTES # BLD AUTO: 0.2 K/UL (ref 0–0.04)
IMM GRANULOCYTES NFR BLD AUTO: 1 % (ref 0–0.5)
K OXYTOCA DNA BLD POS QL NAA+NON-PROBE: NOT DETECTED
KLEBSIELLA SP DNA BLD POS QL NAA+NON-PRB: NOT DETECTED
KLEBSIELLA SP DNA BLD POS QL NAA+NON-PRB: NOT DETECTED
L MONOCYTOG DNA BLD POS QL NAA+NON-PROBE: NOT DETECTED
LACTATE SERPL-SCNC: 1.3 MMOL/L (ref 0.4–2)
LYMPHOCYTES # BLD: 0.7 K/UL (ref 0.8–3.5)
LYMPHOCYTES NFR BLD: 4 % (ref 12–49)
MAGNESIUM SERPL-MCNC: 1.9 MG/DL (ref 1.6–2.4)
MCH RBC QN AUTO: 23.1 PG (ref 26–34)
MCHC RBC AUTO-ENTMCNC: 29.2 G/DL (ref 30–36.5)
MCV RBC AUTO: 79.1 FL (ref 80–99)
MONOCYTES # BLD: 0.2 K/UL (ref 0–1)
MONOCYTES NFR BLD: 1 % (ref 5–13)
N MEN DNA BLD POS QL NAA+NON-PROBE: NOT DETECTED
NEUTS SEG # BLD: 17.3 K/UL (ref 1.8–8)
NEUTS SEG NFR BLD: 94 % (ref 32–75)
NRBC # BLD: 0 K/UL (ref 0–0.01)
NRBC BLD-RTO: 0 PER 100 WBC
P AERUGINOSA DNA BLD POS NAA+NON-PROBE: NOT DETECTED
PHOSPHATE SERPL-MCNC: 3.5 MG/DL (ref 2.6–4.7)
PLATELET # BLD AUTO: 720 K/UL (ref 150–400)
PLATELET COMMENT: ABNORMAL
PMV BLD AUTO: 8.3 FL (ref 8.9–12.9)
POTASSIUM SERPL-SCNC: 5 MMOL/L (ref 3.5–5.1)
PROTEUS SP DNA BLD POS QL NAA+NON-PROBE: DETECTED
RBC # BLD AUTO: 3.25 M/UL (ref 3.8–5.2)
RBC MORPH BLD: ABNORMAL
RESISTANT GENE NDM BY PCR: NOT DETECTED
RESISTANT GENE TARGETS: ABNORMAL
S AUREUS DNA BLD POS QL NAA+NON-PROBE: NOT DETECTED
S AUREUS+CONS DNA BLD POS NAA+NON-PROBE: NOT DETECTED
S EPIDERMIDIS DNA BLD POS QL NAA+NON-PRB: NOT DETECTED
S LUGDUNENSIS DNA BLD POS QL NAA+NON-PRB: NOT DETECTED
S MALTOPHILIA DNA BLD POS QL NAA+NON-PRB: NOT DETECTED
S MARCESCENS DNA BLD POS NAA+NON-PROBE: NOT DETECTED
S PNEUM DNA BLD POS QL NAA+NON-PROBE: NOT DETECTED
S PYO DNA BLD POS QL NAA+NON-PROBE: NOT DETECTED
SALMONELLA DNA BLD POS QL NAA+NON-PROBE: NOT DETECTED
SODIUM SERPL-SCNC: 137 MMOL/L (ref 136–145)
STREPTOCOCCUS DNA BLD POS NAA+NON-PROBE: NOT DETECTED
TROPONIN I SERPL HS-MCNC: 1892 NG/L (ref 0–51)
WBC # BLD AUTO: 18.4 K/UL (ref 3.6–11)

## 2023-12-11 PROCEDURE — 93308 TTE F-UP OR LMTD: CPT

## 2023-12-11 PROCEDURE — 2580000003 HC RX 258: Performed by: HOSPITALIST

## 2023-12-11 PROCEDURE — 2580000003 HC RX 258: Performed by: UROLOGY

## 2023-12-11 PROCEDURE — 6360000002 HC RX W HCPCS: Performed by: HOSPITALIST

## 2023-12-11 PROCEDURE — 83735 ASSAY OF MAGNESIUM: CPT

## 2023-12-11 PROCEDURE — 85025 COMPLETE CBC W/AUTO DIFF WBC: CPT

## 2023-12-11 PROCEDURE — 6370000000 HC RX 637 (ALT 250 FOR IP): Performed by: HOSPITALIST

## 2023-12-11 PROCEDURE — 80048 BASIC METABOLIC PNL TOTAL CA: CPT

## 2023-12-11 PROCEDURE — 87040 BLOOD CULTURE FOR BACTERIA: CPT

## 2023-12-11 PROCEDURE — 83605 ASSAY OF LACTIC ACID: CPT

## 2023-12-11 PROCEDURE — 84100 ASSAY OF PHOSPHORUS: CPT

## 2023-12-11 PROCEDURE — 36415 COLL VENOUS BLD VENIPUNCTURE: CPT

## 2023-12-11 PROCEDURE — 84484 ASSAY OF TROPONIN QUANT: CPT

## 2023-12-11 PROCEDURE — 2060000000 HC ICU INTERMEDIATE R&B

## 2023-12-11 RX ORDER — LEVOTHYROXINE SODIUM 0.03 MG/1
25 TABLET ORAL DAILY
Status: DISCONTINUED | OUTPATIENT
Start: 2023-12-11 | End: 2024-01-02 | Stop reason: HOSPADM

## 2023-12-11 RX ORDER — ENOXAPARIN SODIUM 100 MG/ML
40 INJECTION SUBCUTANEOUS DAILY
Status: DISCONTINUED | OUTPATIENT
Start: 2023-12-11 | End: 2024-01-02 | Stop reason: HOSPADM

## 2023-12-11 RX ADMIN — SODIUM CHLORIDE, PRESERVATIVE FREE 10 ML: 5 INJECTION INTRAVENOUS at 20:54

## 2023-12-11 RX ADMIN — Medication 1 AMPULE: at 20:54

## 2023-12-11 RX ADMIN — ENOXAPARIN SODIUM 40 MG: 100 INJECTION SUBCUTANEOUS at 11:03

## 2023-12-11 RX ADMIN — Medication: at 09:30

## 2023-12-11 RX ADMIN — PIPERACILLIN AND TAZOBACTAM 3375 MG: 3; .375 INJECTION, POWDER, LYOPHILIZED, FOR SOLUTION INTRAVENOUS at 23:35

## 2023-12-11 RX ADMIN — LEVOTHYROXINE SODIUM 25 MCG: 0.03 TABLET ORAL at 11:04

## 2023-12-11 RX ADMIN — PIPERACILLIN AND TAZOBACTAM 4500 MG: 4; .5 INJECTION, POWDER, FOR SOLUTION INTRAVENOUS at 11:13

## 2023-12-11 RX ADMIN — Medication 1 AMPULE: at 08:08

## 2023-12-11 RX ADMIN — SODIUM CHLORIDE, POTASSIUM CHLORIDE, SODIUM LACTATE AND CALCIUM CHLORIDE: 600; 310; 30; 20 INJECTION, SOLUTION INTRAVENOUS at 03:32

## 2023-12-11 RX ADMIN — SODIUM CHLORIDE, PRESERVATIVE FREE 10 ML: 5 INJECTION INTRAVENOUS at 08:09

## 2023-12-11 RX ADMIN — SODIUM CHLORIDE, POTASSIUM CHLORIDE, SODIUM LACTATE AND CALCIUM CHLORIDE: 600; 310; 30; 20 INJECTION, SOLUTION INTRAVENOUS at 09:57

## 2023-12-11 RX ADMIN — PIPERACILLIN AND TAZOBACTAM 3375 MG: 3; .375 INJECTION, POWDER, LYOPHILIZED, FOR SOLUTION INTRAVENOUS at 18:08

## 2023-12-11 RX ADMIN — Medication: at 20:55

## 2023-12-11 ASSESSMENT — PAIN SCALES - GENERAL
PAINLEVEL_OUTOF10: 0

## 2023-12-11 NOTE — OP NOTE
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    PROCEDURE PERFORMED:  Cystoscopy, bilateral retrogrades, bilateral 6 x 24 double-J stent placement.    SURGEON:  Nathan Garrett MD    ASSISTANT:  None.    ANESTHESIA:  General.    COMPLICATIONS:  0.    SPECIMENS REMOVED:  Fluid, left kidney for culture.    IMPLANTS:  6 x 24 stents bilaterally.    ESTIMATED BLOOD LOSS:  None.    INDICATIONS:  The patient was admitted to the hospital today  from the living facility where she has not been feeling well.  She has had trouble with sepsis.  She was found to have hydronephrosis on the left secondary to a 12 mm UPJ stone.  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  We discussed the options for dealing with this.  The patient is a DNR; however, she wished to proceed with attempt at draining infection from her kidneys, further evaluation if possible needing right nephrectomy.    FINDINGS:  At the time of the procedure;  1.  Bladder with purulent looking urine and inflammation, but no lesions seen.  2.  Pus from left kidney was aspirated and sent for culture and adequate double-J stent placement on the left.  3.  Unable to aspirate pus on the right, kidney had a mottled appearance on retrograde, stent placement in the upper most calyx.    DESCRIPTION OF PROCEDURE:  After consent was obtained, the patient was taken to the operating room.  After adequate anesthesia was obtained, she was prepped and draped in lithotomy position.  A rigid cystoscope was inserted into the

## 2023-12-11 NOTE — ANESTHESIA POSTPROCEDURE EVALUATION
Department of Anesthesiology  Postprocedure Note    Patient: Lin Harper  MRN: [id number]  YOB: 1950  Date of evaluation: 12/11/2023      Procedure Summary       Date: 12/10/23 Room / Location: [facility] MAIN OR  / [facility] MAIN OR    Anesthesia Start: 2032 Anesthesia Stop: 2201    Procedure: CYSTOSCOPY BILATERAL URETERAL STENT INSERTION (Bilateral: Ureter) Diagnosis:       Ureteral calculi      (Ureteral calculi [N20.1])    Providers: Risa Jordan MD Responsible Provider: Conchita Ramirez MD    Anesthesia Type: MAC ASA Status: 3 - Emergent            Anesthesia Type: MAC    Lane Phase I:      Lane Phase II:        Anesthesia Post Evaluation    Patient location during evaluation: PACU  Patient participation: complete - patient participated  Level of consciousness: sleepy but conscious and responsive to verbal stimuli  Pain score: 2  Airway patency: patent  Nausea & Vomiting: no vomiting and no nausea  Complications: no  Cardiovascular status: blood pressure returned to baseline and hemodynamically stable  Respiratory status: acceptable  Hydration status: stable  Multimodal analgesia pain management approach  Pain management: adequate

## 2023-12-11 NOTE — INTERDISCIPLINARY ROUNDS
Critical care interdisciplinary rounds today.  Following members present: Case Management, Nursing, Nutrition, Pharmacy, and Physician. [place] placed by Urology.  Plan of care discussed.  See clinical pathway for plan of care and interventions and desired outcomes.

## 2023-12-11 NOTE — BRIEF OP NOTE
Brief Postoperative Note      Patient: Nicolasa Macdonald  YOB: 1950  MRN: [id number]    Date of Procedure: 12/10/2023    Pre-Op Diagnosis Codes:     * Ureteral calculi [N20.1], R XGP with 2 ureteral stones,  L 12 mmupj stone    Post-Op Diagnosis: Same, pus in both kidneys       Procedure(s):  CYSTOSCOPY BILATERAL URETERAL STENT INSERTION, B rpg    Surgeon(s):  Nathan Garrett MD    Assistant:  * No surgical staff found *    Anesthesia: Other    Estimated Blood Loss (mL): Minimal    Complications: None    Specimens:   ID Type Source Tests Collected by Time Destination   A : urine culture left kidney Urine Urine, Cystoscopic CULTURE, URINE Nathan Garrett MD 12/10/2023 2111        Implants:  Implant Name Type Inv. Item Serial No.  Lot No. LRB No. Used Action   STENT URET 6FR L24CM PERCFLX HYDR+ DBL PGTL THRD 2 - [id number]  STENT URET 6FR L24CM PERCFLX HYDR+ DBL PGTL THRD 2 NA [employer] [id number] Left 1 Implanted   STENT URET 6FR L24CM PERCFLX HYDR+ DBL PGTL THRD 2 - [id number]  STENT URET 6FR L24CM PERCFLX HYDR+ DBL PGTL THRD 2  [employer] [id number] Right 1 Implanted         Drains:   Urinary Catheter 12/10/23 2 Way (Active)       Findings: pus in L kidney aspirated, min drainage r kidney      Electronically signed by Nathan Garrett MD on 12/10/2023 at 9:41 PM

## 2023-12-12 LAB
ALBUMIN SERPL-MCNC: 1.7 G/DL (ref 3.5–5)
ALBUMIN/GLOB SERPL: 0.3 (ref 1.1–2.2)
ALP SERPL-CCNC: 74 U/L (ref 45–117)
ALT SERPL-CCNC: 7 U/L (ref 12–78)
ANION GAP SERPL CALC-SCNC: 7 MMOL/L (ref 5–15)
AST SERPL-CCNC: 17 U/L (ref 15–37)
BACTERIA SPEC CULT: ABNORMAL
BILIRUB SERPL-MCNC: 0.2 MG/DL (ref 0.2–1)
BUN SERPL-MCNC: 42 MG/DL (ref 6–20)
BUN/CREAT SERPL: 24 (ref 12–20)
CALCIUM SERPL-MCNC: 8.8 MG/DL (ref 8.5–10.1)
CC UR VC: ABNORMAL
CHLORIDE SERPL-SCNC: 101 MMOL/L (ref 97–108)
CO2 SERPL-SCNC: 22 MMOL/L (ref 21–32)
CREAT SERPL-MCNC: 1.78 MG/DL (ref 0.55–1.02)
EKG ATRIAL RATE: 81 BPM
EKG ATRIAL RATE: 85 BPM
EKG ATRIAL RATE: 87 BPM
EKG DIAGNOSIS: NORMAL
EKG P AXIS: 10 DEGREES
EKG P AXIS: 67 DEGREES
EKG P AXIS: 85 DEGREES
EKG P-R INTERVAL: 188 MS
EKG P-R INTERVAL: 196 MS
EKG P-R INTERVAL: 212 MS
EKG Q-T INTERVAL: 394 MS
EKG Q-T INTERVAL: 394 MS
EKG Q-T INTERVAL: 402 MS
EKG QRS DURATION: 110 MS
EKG QRS DURATION: 114 MS
EKG QRS DURATION: 126 MS
EKG QTC CALCULATION (BAZETT): 457 MS
EKG QTC CALCULATION (BAZETT): 474 MS
EKG QTC CALCULATION (BAZETT): 478 MS
EKG R AXIS: -10 DEGREES
EKG R AXIS: -20 DEGREES
EKG R AXIS: -25 DEGREES
EKG T AXIS: 26 DEGREES
EKG T AXIS: 34 DEGREES
EKG T AXIS: 46 DEGREES
EKG VENTRICULAR RATE: 81 BPM
EKG VENTRICULAR RATE: 85 BPM
EKG VENTRICULAR RATE: 87 BPM
ERYTHROCYTE [DISTWIDTH] IN BLOOD BY AUTOMATED COUNT: 17.4 % (ref 11.5–14.5)
FERRITIN SERPL-MCNC: 1146 NG/ML (ref 8–252)
GLOBULIN SER CALC-MCNC: 5.5 G/DL (ref 2–4)
GLUCOSE SERPL-MCNC: 124 MG/DL (ref 65–100)
HCT VFR BLD AUTO: 26 % (ref 35–47)
HGB BLD-MCNC: 7.5 G/DL (ref 11.5–16)
IRON SATN MFR SERPL: 66 % (ref 20–50)
IRON SERPL-MCNC: 84 UG/DL (ref 35–150)
LACTATE SERPL-SCNC: 1.4 MMOL/L (ref 0.4–2)
MAGNESIUM SERPL-MCNC: 1.6 MG/DL (ref 1.6–2.4)
MCH RBC QN AUTO: 22.3 PG (ref 26–34)
MCHC RBC AUTO-ENTMCNC: 28.8 G/DL (ref 30–36.5)
MCV RBC AUTO: 77.4 FL (ref 80–99)
NRBC # BLD: 0 K/UL (ref 0–0.01)
NRBC BLD-RTO: 0 PER 100 WBC
PLATELET # BLD AUTO: 706 K/UL (ref 150–400)
PMV BLD AUTO: 9 FL (ref 8.9–12.9)
POTASSIUM SERPL-SCNC: 4.2 MMOL/L (ref 3.5–5.1)
PROT SERPL-MCNC: 7.2 G/DL (ref 6.4–8.2)
RBC # BLD AUTO: 3.36 M/UL (ref 3.8–5.2)
SERVICE CMNT-IMP: ABNORMAL
SODIUM SERPL-SCNC: 130 MMOL/L (ref 136–145)
TIBC SERPL-MCNC: 128 UG/DL (ref 250–450)
TROPONIN I SERPL HS-MCNC: 1372 NG/L (ref 0–51)
WBC # BLD AUTO: 16.9 K/UL (ref 3.6–11)

## 2023-12-12 PROCEDURE — 6360000002 HC RX W HCPCS: Performed by: HOSPITALIST

## 2023-12-12 PROCEDURE — 97166 OT EVAL MOD COMPLEX 45 MIN: CPT

## 2023-12-12 PROCEDURE — 84484 ASSAY OF TROPONIN QUANT: CPT

## 2023-12-12 PROCEDURE — 6370000000 HC RX 637 (ALT 250 FOR IP): Performed by: HOSPITALIST

## 2023-12-12 PROCEDURE — 97530 THERAPEUTIC ACTIVITIES: CPT

## 2023-12-12 PROCEDURE — 83540 ASSAY OF IRON: CPT

## 2023-12-12 PROCEDURE — 83735 ASSAY OF MAGNESIUM: CPT

## 2023-12-12 PROCEDURE — 82728 ASSAY OF FERRITIN: CPT

## 2023-12-12 PROCEDURE — 2580000003 HC RX 258: Performed by: HOSPITALIST

## 2023-12-12 PROCEDURE — 85027 COMPLETE CBC AUTOMATED: CPT

## 2023-12-12 PROCEDURE — 80053 COMPREHEN METABOLIC PANEL: CPT

## 2023-12-12 PROCEDURE — 97162 PT EVAL MOD COMPLEX 30 MIN: CPT

## 2023-12-12 PROCEDURE — 2580000003 HC RX 258: Performed by: UROLOGY

## 2023-12-12 PROCEDURE — 83550 IRON BINDING TEST: CPT

## 2023-12-12 PROCEDURE — 36415 COLL VENOUS BLD VENIPUNCTURE: CPT

## 2023-12-12 PROCEDURE — 83605 ASSAY OF LACTIC ACID: CPT

## 2023-12-12 PROCEDURE — 2060000000 HC ICU INTERMEDIATE R&B

## 2023-12-12 RX ADMIN — LEVOTHYROXINE SODIUM 25 MCG: 0.03 TABLET ORAL at 06:23

## 2023-12-12 RX ADMIN — Medication: at 08:51

## 2023-12-12 RX ADMIN — SODIUM CHLORIDE: 9 INJECTION, SOLUTION INTRAVENOUS at 08:50

## 2023-12-12 RX ADMIN — SODIUM CHLORIDE, PRESERVATIVE FREE 10 ML: 5 INJECTION INTRAVENOUS at 08:53

## 2023-12-12 RX ADMIN — Medication 1 AMPULE: at 08:59

## 2023-12-12 RX ADMIN — ENOXAPARIN SODIUM 40 MG: 100 INJECTION SUBCUTANEOUS at 08:52

## 2023-12-12 RX ADMIN — Medication: at 20:47

## 2023-12-12 RX ADMIN — SODIUM CHLORIDE, PRESERVATIVE FREE 5 ML: 5 INJECTION INTRAVENOUS at 20:47

## 2023-12-12 RX ADMIN — PIPERACILLIN AND TAZOBACTAM 3375 MG: 3; .375 INJECTION, POWDER, LYOPHILIZED, FOR SOLUTION INTRAVENOUS at 08:52

## 2023-12-12 RX ADMIN — Medication 1 AMPULE: at 20:46

## 2023-12-12 RX ADMIN — PIPERACILLIN AND TAZOBACTAM 3375 MG: 3; .375 INJECTION, POWDER, LYOPHILIZED, FOR SOLUTION INTRAVENOUS at 17:10

## 2023-12-13 LAB
BACTERIA SPEC CULT: ABNORMAL
CC UR VC: ABNORMAL
SERVICE CMNT-IMP: ABNORMAL

## 2023-12-13 PROCEDURE — 2060000000 HC ICU INTERMEDIATE R&B

## 2023-12-13 PROCEDURE — 2580000003 HC RX 258: Performed by: UROLOGY

## 2023-12-13 PROCEDURE — 2580000003 HC RX 258: Performed by: HOSPITALIST

## 2023-12-13 PROCEDURE — 6360000002 HC RX W HCPCS: Performed by: HOSPITALIST

## 2023-12-13 PROCEDURE — 6370000000 HC RX 637 (ALT 250 FOR IP): Performed by: HOSPITALIST

## 2023-12-13 RX ADMIN — PIPERACILLIN AND TAZOBACTAM 3375 MG: 3; .375 INJECTION, POWDER, LYOPHILIZED, FOR SOLUTION INTRAVENOUS at 08:51

## 2023-12-13 RX ADMIN — Medication 1 AMPULE: at 08:54

## 2023-12-13 RX ADMIN — Medication 1 AMPULE: at 21:23

## 2023-12-13 RX ADMIN — LEVOTHYROXINE SODIUM 25 MCG: 0.03 TABLET ORAL at 07:06

## 2023-12-13 RX ADMIN — Medication: at 08:48

## 2023-12-13 RX ADMIN — ENOXAPARIN SODIUM 40 MG: 100 INJECTION SUBCUTANEOUS at 08:48

## 2023-12-13 RX ADMIN — PIPERACILLIN AND TAZOBACTAM 3375 MG: 3; .375 INJECTION, POWDER, LYOPHILIZED, FOR SOLUTION INTRAVENOUS at 15:56

## 2023-12-13 RX ADMIN — Medication: at 21:22

## 2023-12-13 RX ADMIN — PIPERACILLIN AND TAZOBACTAM 3375 MG: 3; .375 INJECTION, POWDER, LYOPHILIZED, FOR SOLUTION INTRAVENOUS at 01:03

## 2023-12-13 RX ADMIN — SODIUM CHLORIDE, PRESERVATIVE FREE 10 ML: 5 INJECTION INTRAVENOUS at 08:55

## 2023-12-13 ASSESSMENT — PAIN SCALES - GENERAL
PAINLEVEL_OUTOF10: 0
PAINLEVEL_OUTOF10: 0

## 2023-12-14 LAB
ANION GAP SERPL CALC-SCNC: 7 MMOL/L (ref 5–15)
BUN SERPL-MCNC: 36 MG/DL (ref 6–20)
BUN/CREAT SERPL: 23 (ref 12–20)
CALCIUM SERPL-MCNC: 8.8 MG/DL (ref 8.5–10.1)
CHLORIDE SERPL-SCNC: 110 MMOL/L (ref 97–108)
CO2 SERPL-SCNC: 23 MMOL/L (ref 21–32)
CREAT SERPL-MCNC: 1.58 MG/DL (ref 0.55–1.02)
ERYTHROCYTE [DISTWIDTH] IN BLOOD BY AUTOMATED COUNT: 17.6 % (ref 11.5–14.5)
GLUCOSE SERPL-MCNC: 101 MG/DL (ref 65–100)
HCT VFR BLD AUTO: 24.2 % (ref 35–47)
HGB BLD-MCNC: 7.1 G/DL (ref 11.5–16)
MAGNESIUM SERPL-MCNC: 1.8 MG/DL (ref 1.6–2.4)
MCH RBC QN AUTO: 22.8 PG (ref 26–34)
MCHC RBC AUTO-ENTMCNC: 29.3 G/DL (ref 30–36.5)
MCV RBC AUTO: 77.8 FL (ref 80–99)
NRBC # BLD: 0 K/UL (ref 0–0.01)
NRBC BLD-RTO: 0 PER 100 WBC
PLATELET # BLD AUTO: 687 K/UL (ref 150–400)
PMV BLD AUTO: 8.4 FL (ref 8.9–12.9)
POTASSIUM SERPL-SCNC: 4 MMOL/L (ref 3.5–5.1)
RBC # BLD AUTO: 3.11 M/UL (ref 3.8–5.2)
SODIUM SERPL-SCNC: 140 MMOL/L (ref 136–145)
WBC # BLD AUTO: 10.4 K/UL (ref 3.6–11)

## 2023-12-14 PROCEDURE — 1100000003 HC PRIVATE W/ TELEMETRY

## 2023-12-14 PROCEDURE — 2580000003 HC RX 258: Performed by: HOSPITALIST

## 2023-12-14 PROCEDURE — 36415 COLL VENOUS BLD VENIPUNCTURE: CPT

## 2023-12-14 PROCEDURE — 85027 COMPLETE CBC AUTOMATED: CPT

## 2023-12-14 PROCEDURE — 6370000000 HC RX 637 (ALT 250 FOR IP): Performed by: UROLOGY

## 2023-12-14 PROCEDURE — 6360000002 HC RX W HCPCS: Performed by: HOSPITALIST

## 2023-12-14 PROCEDURE — 83735 ASSAY OF MAGNESIUM: CPT

## 2023-12-14 PROCEDURE — 6360000002 HC RX W HCPCS: Performed by: INTERNAL MEDICINE

## 2023-12-14 PROCEDURE — 6370000000 HC RX 637 (ALT 250 FOR IP): Performed by: HOSPITALIST

## 2023-12-14 PROCEDURE — 2580000003 HC RX 258: Performed by: UROLOGY

## 2023-12-14 PROCEDURE — 80048 BASIC METABOLIC PNL TOTAL CA: CPT

## 2023-12-14 PROCEDURE — 2580000003 HC RX 258: Performed by: INTERNAL MEDICINE

## 2023-12-14 RX ORDER — SODIUM CHLORIDE, SODIUM LACTATE, POTASSIUM CHLORIDE, CALCIUM CHLORIDE 600; 310; 30; 20 MG/100ML; MG/100ML; MG/100ML; MG/100ML
INJECTION, SOLUTION INTRAVENOUS CONTINUOUS
Status: DISCONTINUED | OUTPATIENT
Start: 2023-12-14 | End: 2023-12-15

## 2023-12-14 RX ADMIN — ENOXAPARIN SODIUM 40 MG: 100 INJECTION SUBCUTANEOUS at 09:50

## 2023-12-14 RX ADMIN — CEFTRIAXONE SODIUM 2000 MG: 2 INJECTION, POWDER, FOR SOLUTION INTRAMUSCULAR; INTRAVENOUS at 18:01

## 2023-12-14 RX ADMIN — SODIUM CHLORIDE, PRESERVATIVE FREE 10 ML: 5 INJECTION INTRAVENOUS at 21:26

## 2023-12-14 RX ADMIN — Medication: at 21:26

## 2023-12-14 RX ADMIN — PIPERACILLIN AND TAZOBACTAM 3375 MG: 3; .375 INJECTION, POWDER, LYOPHILIZED, FOR SOLUTION INTRAVENOUS at 16:35

## 2023-12-14 RX ADMIN — SODIUM CHLORIDE, POTASSIUM CHLORIDE, SODIUM LACTATE AND CALCIUM CHLORIDE: 600; 310; 30; 20 INJECTION, SOLUTION INTRAVENOUS at 17:57

## 2023-12-14 RX ADMIN — Medication 1 AMPULE: at 09:54

## 2023-12-14 RX ADMIN — Medication: at 09:51

## 2023-12-14 RX ADMIN — Medication 1 AMPULE: at 21:29

## 2023-12-14 RX ADMIN — PIPERACILLIN AND TAZOBACTAM 3375 MG: 3; .375 INJECTION, POWDER, LYOPHILIZED, FOR SOLUTION INTRAVENOUS at 01:32

## 2023-12-14 RX ADMIN — PIPERACILLIN AND TAZOBACTAM 3375 MG: 3; .375 INJECTION, POWDER, LYOPHILIZED, FOR SOLUTION INTRAVENOUS at 09:49

## 2023-12-14 RX ADMIN — LEVOTHYROXINE SODIUM 25 MCG: 0.03 TABLET ORAL at 06:14

## 2023-12-14 RX ADMIN — SODIUM CHLORIDE, PRESERVATIVE FREE 10 ML: 5 INJECTION INTRAVENOUS at 09:51

## 2023-12-15 LAB
ALBUMIN SERPL-MCNC: 1.7 G/DL (ref 3.5–5)
ANION GAP SERPL CALC-SCNC: 5 MMOL/L (ref 5–15)
BASOPHILS # BLD: 0.1 K/UL (ref 0–0.1)
BASOPHILS NFR BLD: 1 % (ref 0–1)
BUN SERPL-MCNC: 30 MG/DL (ref 6–20)
BUN/CREAT SERPL: 19 (ref 12–20)
CALCIUM SERPL-MCNC: 8.6 MG/DL (ref 8.5–10.1)
CHLORIDE SERPL-SCNC: 111 MMOL/L (ref 97–108)
CO2 SERPL-SCNC: 23 MMOL/L (ref 21–32)
CREAT SERPL-MCNC: 1.56 MG/DL (ref 0.55–1.02)
DIFFERENTIAL METHOD BLD: ABNORMAL
EOSINOPHIL # BLD: 0.3 K/UL (ref 0–0.4)
EOSINOPHIL NFR BLD: 2 % (ref 0–7)
ERYTHROCYTE [DISTWIDTH] IN BLOOD BY AUTOMATED COUNT: 17.7 % (ref 11.5–14.5)
GLUCOSE SERPL-MCNC: 95 MG/DL (ref 65–100)
HCT VFR BLD AUTO: 23.8 % (ref 35–47)
HCT VFR BLD AUTO: 27.1 % (ref 35–47)
HGB BLD-MCNC: 6.8 G/DL (ref 11.5–16)
HGB BLD-MCNC: 8.4 G/DL (ref 11.5–16)
HISTORY CHECK: NORMAL
IMM GRANULOCYTES # BLD AUTO: 0.1 K/UL (ref 0–0.04)
IMM GRANULOCYTES NFR BLD AUTO: 1 % (ref 0–0.5)
LYMPHOCYTES # BLD: 3.1 K/UL (ref 0.8–3.5)
LYMPHOCYTES NFR BLD: 25 % (ref 12–49)
MCH RBC QN AUTO: 22.2 PG (ref 26–34)
MCHC RBC AUTO-ENTMCNC: 28.6 G/DL (ref 30–36.5)
MCV RBC AUTO: 77.8 FL (ref 80–99)
MONOCYTES # BLD: 0.9 K/UL (ref 0–1)
MONOCYTES NFR BLD: 7 % (ref 5–13)
NEUTS SEG # BLD: 8 K/UL (ref 1.8–8)
NEUTS SEG NFR BLD: 64 % (ref 32–75)
NRBC # BLD: 0 K/UL (ref 0–0.01)
NRBC BLD-RTO: 0 PER 100 WBC
PHOSPHATE SERPL-MCNC: 2.9 MG/DL (ref 2.6–4.7)
PLATELET # BLD AUTO: 672 K/UL (ref 150–400)
PLATELET COMMENT: ABNORMAL
PMV BLD AUTO: 8.6 FL (ref 8.9–12.9)
POTASSIUM SERPL-SCNC: 4.4 MMOL/L (ref 3.5–5.1)
RBC # BLD AUTO: 3.06 M/UL (ref 3.8–5.2)
RBC MORPH BLD: ABNORMAL
RBC MORPH BLD: ABNORMAL
SODIUM SERPL-SCNC: 139 MMOL/L (ref 136–145)
WBC # BLD AUTO: 12.5 K/UL (ref 3.6–11)
WBC MORPH BLD: ABNORMAL

## 2023-12-15 PROCEDURE — 86900 BLOOD TYPING SEROLOGIC ABO: CPT

## 2023-12-15 PROCEDURE — 86923 COMPATIBILITY TEST ELECTRIC: CPT

## 2023-12-15 PROCEDURE — 36415 COLL VENOUS BLD VENIPUNCTURE: CPT

## 2023-12-15 PROCEDURE — 6370000000 HC RX 637 (ALT 250 FOR IP): Performed by: HOSPITALIST

## 2023-12-15 PROCEDURE — 80048 BASIC METABOLIC PNL TOTAL CA: CPT

## 2023-12-15 PROCEDURE — 84100 ASSAY OF PHOSPHORUS: CPT

## 2023-12-15 PROCEDURE — 30233N1 TRANSFUSION OF NONAUTOLOGOUS RED BLOOD CELLS INTO PERIPHERAL VEIN, PERCUTANEOUS APPROACH: ICD-10-PCS | Performed by: INTERNAL MEDICINE

## 2023-12-15 PROCEDURE — 85025 COMPLETE CBC W/AUTO DIFF WBC: CPT

## 2023-12-15 PROCEDURE — 86850 RBC ANTIBODY SCREEN: CPT

## 2023-12-15 PROCEDURE — 2580000003 HC RX 258: Performed by: INTERNAL MEDICINE

## 2023-12-15 PROCEDURE — 2580000003 HC RX 258: Performed by: UROLOGY

## 2023-12-15 PROCEDURE — 82040 ASSAY OF SERUM ALBUMIN: CPT

## 2023-12-15 PROCEDURE — P9016 RBC LEUKOCYTES REDUCED: HCPCS

## 2023-12-15 PROCEDURE — 1100000003 HC PRIVATE W/ TELEMETRY

## 2023-12-15 PROCEDURE — 86901 BLOOD TYPING SEROLOGIC RH(D): CPT

## 2023-12-15 PROCEDURE — 6360000002 HC RX W HCPCS: Performed by: HOSPITALIST

## 2023-12-15 PROCEDURE — 6360000002 HC RX W HCPCS: Performed by: INTERNAL MEDICINE

## 2023-12-15 PROCEDURE — 85014 HEMATOCRIT: CPT

## 2023-12-15 PROCEDURE — 85018 HEMOGLOBIN: CPT

## 2023-12-15 PROCEDURE — 36430 TRANSFUSION BLD/BLD COMPNT: CPT

## 2023-12-15 RX ORDER — SODIUM CHLORIDE, SODIUM LACTATE, POTASSIUM CHLORIDE, CALCIUM CHLORIDE 600; 310; 30; 20 MG/100ML; MG/100ML; MG/100ML; MG/100ML
INJECTION, SOLUTION INTRAVENOUS CONTINUOUS
Status: DISCONTINUED | OUTPATIENT
Start: 2023-12-15 | End: 2023-12-16

## 2023-12-15 RX ORDER — SODIUM CHLORIDE 9 MG/ML
INJECTION, SOLUTION INTRAVENOUS PRN
Status: DISCONTINUED | OUTPATIENT
Start: 2023-12-15 | End: 2023-12-28

## 2023-12-15 RX ADMIN — SODIUM CHLORIDE 50 ML: 9 INJECTION, SOLUTION INTRAVENOUS at 18:03

## 2023-12-15 RX ADMIN — Medication: at 09:04

## 2023-12-15 RX ADMIN — SODIUM CHLORIDE, POTASSIUM CHLORIDE, SODIUM LACTATE AND CALCIUM CHLORIDE: 600; 310; 30; 20 INJECTION, SOLUTION INTRAVENOUS at 17:56

## 2023-12-15 RX ADMIN — Medication 1 AMPULE: at 21:00

## 2023-12-15 RX ADMIN — Medication 1 AMPULE: at 09:04

## 2023-12-15 RX ADMIN — LEVOTHYROXINE SODIUM 25 MCG: 0.03 TABLET ORAL at 09:02

## 2023-12-15 RX ADMIN — SODIUM CHLORIDE, PRESERVATIVE FREE 10 ML: 5 INJECTION INTRAVENOUS at 09:03

## 2023-12-15 RX ADMIN — CEFTRIAXONE SODIUM 2000 MG: 2 INJECTION, POWDER, FOR SOLUTION INTRAMUSCULAR; INTRAVENOUS at 18:05

## 2023-12-15 RX ADMIN — Medication: at 21:00

## 2023-12-15 RX ADMIN — ENOXAPARIN SODIUM 40 MG: 100 INJECTION SUBCUTANEOUS at 09:02

## 2023-12-15 NOTE — CONSENT
Informed Consent for Blood Component Transfusion Note    I have discussed with the patient the rationale for blood component transfusion; its benefits in treating or preventing fatigue, organ damage, or death; and its risk which includes mild transfusion reactions, rare risk of blood borne infection, or more serious but rare reactions. I have discussed the alternatives to transfusion, including the risk and consequences of not receiving transfusion. The patient had an opportunity to ask questions and had agreed to proceed with transfusion of blood components.    Electronically signed by Tena Venegas MD on 12/15/23 at 12:13 PM EST

## 2023-12-16 LAB
ABO + RH BLD: NORMAL
ALBUMIN SERPL-MCNC: 1.4 G/DL (ref 3.5–5)
ANION GAP SERPL CALC-SCNC: 7 MMOL/L (ref 5–15)
BASOPHILS # BLD: 0.1 K/UL (ref 0–0.1)
BASOPHILS NFR BLD: 1 % (ref 0–1)
BLD PROD TYP BPU: NORMAL
BLOOD BANK DISPENSE STATUS: NORMAL
BLOOD GROUP ANTIBODIES SERPL: NORMAL
BPU ID: NORMAL
BUN SERPL-MCNC: 22 MG/DL (ref 6–20)
BUN/CREAT SERPL: 20 (ref 12–20)
CALCIUM SERPL-MCNC: 8.1 MG/DL (ref 8.5–10.1)
CHLORIDE SERPL-SCNC: 112 MMOL/L (ref 97–108)
CO2 SERPL-SCNC: 19 MMOL/L (ref 21–32)
CREAT SERPL-MCNC: 1.09 MG/DL (ref 0.55–1.02)
CROSSMATCH RESULT: NORMAL
DIFFERENTIAL METHOD BLD: ABNORMAL
EOSINOPHIL # BLD: 0.3 K/UL (ref 0–0.4)
EOSINOPHIL NFR BLD: 2 % (ref 0–7)
ERYTHROCYTE [DISTWIDTH] IN BLOOD BY AUTOMATED COUNT: 17.9 % (ref 11.5–14.5)
GLUCOSE SERPL-MCNC: 80 MG/DL (ref 65–100)
HCT VFR BLD AUTO: 27.8 % (ref 35–47)
HGB BLD-MCNC: 8.1 G/DL (ref 11.5–16)
IMM GRANULOCYTES # BLD AUTO: 0.2 K/UL (ref 0–0.04)
IMM GRANULOCYTES NFR BLD AUTO: 1 % (ref 0–0.5)
LYMPHOCYTES # BLD: 2.8 K/UL (ref 0.8–3.5)
LYMPHOCYTES NFR BLD: 19 % (ref 12–49)
MCH RBC QN AUTO: 23.8 PG (ref 26–34)
MCHC RBC AUTO-ENTMCNC: 29.1 G/DL (ref 30–36.5)
MCV RBC AUTO: 81.5 FL (ref 80–99)
MONOCYTES # BLD: 0.7 K/UL (ref 0–1)
MONOCYTES NFR BLD: 5 % (ref 5–13)
NEUTS SEG # BLD: 10.5 K/UL (ref 1.8–8)
NEUTS SEG NFR BLD: 72 % (ref 32–75)
NRBC # BLD: 0.02 K/UL (ref 0–0.01)
NRBC BLD-RTO: 0.1 PER 100 WBC
PHOSPHATE SERPL-MCNC: 2.1 MG/DL (ref 2.6–4.7)
PLATELET # BLD AUTO: 402 K/UL (ref 150–400)
PMV BLD AUTO: 10.1 FL (ref 8.9–12.9)
POTASSIUM SERPL-SCNC: 4.5 MMOL/L (ref 3.5–5.1)
RBC # BLD AUTO: 3.41 M/UL (ref 3.8–5.2)
SODIUM SERPL-SCNC: 138 MMOL/L (ref 136–145)
SPECIMEN EXP DATE BLD: NORMAL
UNIT DIVISION: 0
WBC # BLD AUTO: 14.6 K/UL (ref 3.6–11)

## 2023-12-16 PROCEDURE — 2580000003 HC RX 258: Performed by: INTERNAL MEDICINE

## 2023-12-16 PROCEDURE — 80069 RENAL FUNCTION PANEL: CPT

## 2023-12-16 PROCEDURE — 1100000003 HC PRIVATE W/ TELEMETRY

## 2023-12-16 PROCEDURE — 85025 COMPLETE CBC W/AUTO DIFF WBC: CPT

## 2023-12-16 PROCEDURE — 6370000000 HC RX 637 (ALT 250 FOR IP): Performed by: INTERNAL MEDICINE

## 2023-12-16 PROCEDURE — 36415 COLL VENOUS BLD VENIPUNCTURE: CPT

## 2023-12-16 PROCEDURE — 6360000002 HC RX W HCPCS: Performed by: INTERNAL MEDICINE

## 2023-12-16 PROCEDURE — 51702 INSERT TEMP BLADDER CATH: CPT

## 2023-12-16 PROCEDURE — 2580000003 HC RX 258: Performed by: UROLOGY

## 2023-12-16 PROCEDURE — 6370000000 HC RX 637 (ALT 250 FOR IP): Performed by: HOSPITALIST

## 2023-12-16 RX ADMIN — DIBASIC SODIUM PHOSPHATE, MONOBASIC POTASSIUM PHOSPHATE AND MONOBASIC SODIUM PHOSPHATE 1 TABLET: 852; 155; 130 TABLET ORAL at 21:32

## 2023-12-16 RX ADMIN — LEVOTHYROXINE SODIUM 25 MCG: 0.03 TABLET ORAL at 08:15

## 2023-12-16 RX ADMIN — DIBASIC SODIUM PHOSPHATE, MONOBASIC POTASSIUM PHOSPHATE AND MONOBASIC SODIUM PHOSPHATE 1 TABLET: 852; 155; 130 TABLET ORAL at 15:51

## 2023-12-16 RX ADMIN — Medication 1 AMPULE: at 08:15

## 2023-12-16 RX ADMIN — CEFTRIAXONE SODIUM 2000 MG: 2 INJECTION, POWDER, FOR SOLUTION INTRAMUSCULAR; INTRAVENOUS at 15:52

## 2023-12-16 RX ADMIN — SODIUM CHLORIDE, PRESERVATIVE FREE 10 ML: 5 INJECTION INTRAVENOUS at 22:32

## 2023-12-16 RX ADMIN — SODIUM CHLORIDE, POTASSIUM CHLORIDE, SODIUM LACTATE AND CALCIUM CHLORIDE 75 ML/HR: 600; 310; 30; 20 INJECTION, SOLUTION INTRAVENOUS at 05:06

## 2023-12-16 RX ADMIN — Medication: at 21:32

## 2023-12-16 RX ADMIN — Medication: at 08:15

## 2023-12-16 RX ADMIN — EPOETIN ALFA-EPBX 10000 UNITS: 10000 INJECTION, SOLUTION INTRAVENOUS; SUBCUTANEOUS at 17:21

## 2023-12-16 RX ADMIN — SODIUM CHLORIDE, PRESERVATIVE FREE 10 ML: 5 INJECTION INTRAVENOUS at 08:16

## 2023-12-16 RX ADMIN — Medication 1 AMPULE: at 22:32

## 2023-12-17 LAB
ANION GAP SERPL CALC-SCNC: 4 MMOL/L (ref 5–15)
BACTERIA SPEC CULT: NORMAL
BASOPHILS # BLD: 0.1 K/UL (ref 0–0.1)
BASOPHILS NFR BLD: 0 % (ref 0–1)
BUN SERPL-MCNC: 21 MG/DL (ref 6–20)
BUN/CREAT SERPL: 20 (ref 12–20)
CALCIUM SERPL-MCNC: 8.5 MG/DL (ref 8.5–10.1)
CHLORIDE SERPL-SCNC: 107 MMOL/L (ref 97–108)
CO2 SERPL-SCNC: 28 MMOL/L (ref 21–32)
COMMENT:: NORMAL
CREAT SERPL-MCNC: 1.05 MG/DL (ref 0.55–1.02)
DIFFERENTIAL METHOD BLD: ABNORMAL
EOSINOPHIL # BLD: 0.3 K/UL (ref 0–0.4)
EOSINOPHIL NFR BLD: 2 % (ref 0–7)
ERYTHROCYTE [DISTWIDTH] IN BLOOD BY AUTOMATED COUNT: 18.1 % (ref 11.5–14.5)
GLUCOSE SERPL-MCNC: 99 MG/DL (ref 65–100)
HCT VFR BLD AUTO: 27 % (ref 35–47)
HGB BLD-MCNC: 8.3 G/DL (ref 11.5–16)
IMM GRANULOCYTES # BLD AUTO: 0.2 K/UL (ref 0–0.04)
IMM GRANULOCYTES NFR BLD AUTO: 1 % (ref 0–0.5)
LYMPHOCYTES # BLD: 2.6 K/UL (ref 0.8–3.5)
LYMPHOCYTES NFR BLD: 15 % (ref 12–49)
MCH RBC QN AUTO: 24.1 PG (ref 26–34)
MCHC RBC AUTO-ENTMCNC: 30.7 G/DL (ref 30–36.5)
MCV RBC AUTO: 78.5 FL (ref 80–99)
MONOCYTES # BLD: 0.8 K/UL (ref 0–1)
MONOCYTES NFR BLD: 5 % (ref 5–13)
NEUTS SEG # BLD: 13.5 K/UL (ref 1.8–8)
NEUTS SEG NFR BLD: 77 % (ref 32–75)
NRBC # BLD: 0 K/UL (ref 0–0.01)
NRBC BLD-RTO: 0 PER 100 WBC
PLATELET # BLD AUTO: 637 K/UL (ref 150–400)
PMV BLD AUTO: 8.2 FL (ref 8.9–12.9)
POTASSIUM SERPL-SCNC: 4.6 MMOL/L (ref 3.5–5.1)
RBC # BLD AUTO: 3.44 M/UL (ref 3.8–5.2)
SERVICE CMNT-IMP: NORMAL
SODIUM SERPL-SCNC: 139 MMOL/L (ref 136–145)
SPECIMEN HOLD: NORMAL
WBC # BLD AUTO: 17.5 K/UL (ref 3.6–11)

## 2023-12-17 PROCEDURE — 6370000000 HC RX 637 (ALT 250 FOR IP): Performed by: INTERNAL MEDICINE

## 2023-12-17 PROCEDURE — 2580000003 HC RX 258: Performed by: INTERNAL MEDICINE

## 2023-12-17 PROCEDURE — 36415 COLL VENOUS BLD VENIPUNCTURE: CPT

## 2023-12-17 PROCEDURE — 80048 BASIC METABOLIC PNL TOTAL CA: CPT

## 2023-12-17 PROCEDURE — 6360000002 HC RX W HCPCS: Performed by: HOSPITALIST

## 2023-12-17 PROCEDURE — 6360000002 HC RX W HCPCS: Performed by: INTERNAL MEDICINE

## 2023-12-17 PROCEDURE — 1100000003 HC PRIVATE W/ TELEMETRY

## 2023-12-17 PROCEDURE — 51702 INSERT TEMP BLADDER CATH: CPT

## 2023-12-17 PROCEDURE — 2580000003 HC RX 258: Performed by: UROLOGY

## 2023-12-17 PROCEDURE — 6370000000 HC RX 637 (ALT 250 FOR IP): Performed by: HOSPITALIST

## 2023-12-17 PROCEDURE — 85025 COMPLETE CBC W/AUTO DIFF WBC: CPT

## 2023-12-17 RX ADMIN — Medication 1 AMPULE: at 23:03

## 2023-12-17 RX ADMIN — DIBASIC SODIUM PHOSPHATE, MONOBASIC POTASSIUM PHOSPHATE AND MONOBASIC SODIUM PHOSPHATE 1 TABLET: 852; 155; 130 TABLET ORAL at 22:59

## 2023-12-17 RX ADMIN — PIPERACILLIN AND TAZOBACTAM 3375 MG: 3; .375 INJECTION, POWDER, FOR SOLUTION INTRAVENOUS at 22:59

## 2023-12-17 RX ADMIN — Medication: at 09:47

## 2023-12-17 RX ADMIN — LEVOTHYROXINE SODIUM 25 MCG: 0.03 TABLET ORAL at 06:26

## 2023-12-17 RX ADMIN — Medication 1 AMPULE: at 09:47

## 2023-12-17 RX ADMIN — SODIUM CHLORIDE, PRESERVATIVE FREE 5 ML: 5 INJECTION INTRAVENOUS at 23:04

## 2023-12-17 RX ADMIN — Medication: at 23:04

## 2023-12-17 RX ADMIN — ENOXAPARIN SODIUM 40 MG: 100 INJECTION SUBCUTANEOUS at 09:46

## 2023-12-17 RX ADMIN — SODIUM CHLORIDE, PRESERVATIVE FREE 10 ML: 5 INJECTION INTRAVENOUS at 09:47

## 2023-12-17 RX ADMIN — PIPERACILLIN AND TAZOBACTAM 3375 MG: 3; .375 INJECTION, POWDER, FOR SOLUTION INTRAVENOUS at 16:08

## 2023-12-17 RX ADMIN — DIBASIC SODIUM PHOSPHATE, MONOBASIC POTASSIUM PHOSPHATE AND MONOBASIC SODIUM PHOSPHATE 1 TABLET: 852; 155; 130 TABLET ORAL at 09:46

## 2023-12-17 ASSESSMENT — PAIN SCALES - GENERAL
PAINLEVEL_OUTOF10: 0
PAINLEVEL_OUTOF10: 0

## 2023-12-18 PROCEDURE — 36415 COLL VENOUS BLD VENIPUNCTURE: CPT

## 2023-12-18 PROCEDURE — 80048 BASIC METABOLIC PNL TOTAL CA: CPT

## 2023-12-18 PROCEDURE — 85025 COMPLETE CBC W/AUTO DIFF WBC: CPT

## 2023-12-18 PROCEDURE — 82040 ASSAY OF SERUM ALBUMIN: CPT

## 2023-12-18 PROCEDURE — 6360000002 HC RX W HCPCS: Performed by: HOSPITALIST

## 2023-12-18 PROCEDURE — 2580000003 HC RX 258: Performed by: INTERNAL MEDICINE

## 2023-12-18 PROCEDURE — 2580000003 HC RX 258: Performed by: UROLOGY

## 2023-12-18 PROCEDURE — 6360000002 HC RX W HCPCS: Performed by: INTERNAL MEDICINE

## 2023-12-18 PROCEDURE — 51702 INSERT TEMP BLADDER CATH: CPT

## 2023-12-18 PROCEDURE — 84100 ASSAY OF PHOSPHORUS: CPT

## 2023-12-18 PROCEDURE — 6370000000 HC RX 637 (ALT 250 FOR IP): Performed by: HOSPITALIST

## 2023-12-18 PROCEDURE — 1100000003 HC PRIVATE W/ TELEMETRY

## 2023-12-18 RX ORDER — LACTOBACILLUS RHAMNOSUS GG 10B CELL
1 CAPSULE ORAL
Status: DISCONTINUED | OUTPATIENT
Start: 2023-12-19 | End: 2024-01-02 | Stop reason: HOSPADM

## 2023-12-18 RX ADMIN — PIPERACILLIN AND TAZOBACTAM 3375 MG: 3; .375 INJECTION, POWDER, FOR SOLUTION INTRAVENOUS at 15:04

## 2023-12-18 RX ADMIN — PIPERACILLIN AND TAZOBACTAM 3375 MG: 3; .375 INJECTION, POWDER, FOR SOLUTION INTRAVENOUS at 22:14

## 2023-12-18 RX ADMIN — SODIUM CHLORIDE, PRESERVATIVE FREE 10 ML: 5 INJECTION INTRAVENOUS at 08:50

## 2023-12-18 RX ADMIN — PIPERACILLIN AND TAZOBACTAM 3375 MG: 3; .375 INJECTION, POWDER, FOR SOLUTION INTRAVENOUS at 06:34

## 2023-12-18 RX ADMIN — SODIUM CHLORIDE: 9 INJECTION, SOLUTION INTRAVENOUS at 06:33

## 2023-12-18 RX ADMIN — ENOXAPARIN SODIUM 40 MG: 100 INJECTION SUBCUTANEOUS at 08:50

## 2023-12-18 RX ADMIN — Medication 1 AMPULE: at 21:25

## 2023-12-18 RX ADMIN — SODIUM CHLORIDE, PRESERVATIVE FREE 5 ML: 5 INJECTION INTRAVENOUS at 21:25

## 2023-12-18 RX ADMIN — LEVOTHYROXINE SODIUM 25 MCG: 0.03 TABLET ORAL at 06:28

## 2023-12-18 RX ADMIN — Medication: at 21:25

## 2023-12-18 RX ADMIN — Medication 1 AMPULE: at 08:49

## 2023-12-19 ENCOUNTER — APPOINTMENT (OUTPATIENT)
Facility: HOSPITAL | Age: 73
DRG: 853 | End: 2023-12-19
Payer: MEDICARE

## 2023-12-19 LAB
ALBUMIN SERPL-MCNC: 1.7 G/DL (ref 3.5–5)
ANION GAP SERPL CALC-SCNC: 6 MMOL/L (ref 5–15)
BASOPHILS # BLD: 0 K/UL (ref 0–0.1)
BASOPHILS NFR BLD: 0 % (ref 0–1)
BUN SERPL-MCNC: 13 MG/DL (ref 6–20)
BUN/CREAT SERPL: 10 (ref 12–20)
CALCIUM SERPL-MCNC: 8.9 MG/DL (ref 8.5–10.1)
CHLORIDE SERPL-SCNC: 104 MMOL/L (ref 97–108)
CO2 SERPL-SCNC: 26 MMOL/L (ref 21–32)
CREAT SERPL-MCNC: 1.29 MG/DL (ref 0.55–1.02)
DIFFERENTIAL METHOD BLD: ABNORMAL
EOSINOPHIL # BLD: 0.2 K/UL (ref 0–0.4)
EOSINOPHIL NFR BLD: 2 % (ref 0–7)
ERYTHROCYTE [DISTWIDTH] IN BLOOD BY AUTOMATED COUNT: 18.1 % (ref 11.5–14.5)
GLUCOSE SERPL-MCNC: 109 MG/DL (ref 65–100)
HCT VFR BLD AUTO: 26.5 % (ref 35–47)
HGB BLD-MCNC: 8.3 G/DL (ref 11.5–16)
IMM GRANULOCYTES # BLD AUTO: 0.2 K/UL (ref 0–0.04)
IMM GRANULOCYTES NFR BLD AUTO: 1 % (ref 0–0.5)
LYMPHOCYTES # BLD: 2.1 K/UL (ref 0.8–3.5)
LYMPHOCYTES NFR BLD: 17 % (ref 12–49)
MCH RBC QN AUTO: 24.6 PG (ref 26–34)
MCHC RBC AUTO-ENTMCNC: 31.3 G/DL (ref 30–36.5)
MCV RBC AUTO: 78.6 FL (ref 80–99)
MONOCYTES # BLD: 0.7 K/UL (ref 0–1)
MONOCYTES NFR BLD: 6 % (ref 5–13)
NEUTS SEG # BLD: 9.4 K/UL (ref 1.8–8)
NEUTS SEG NFR BLD: 74 % (ref 32–75)
NRBC # BLD: 0 K/UL (ref 0–0.01)
NRBC BLD-RTO: 0 PER 100 WBC
PHOSPHATE SERPL-MCNC: 3.1 MG/DL (ref 2.6–4.7)
PLATELET # BLD AUTO: 608 K/UL (ref 150–400)
PMV BLD AUTO: 8.3 FL (ref 8.9–12.9)
POTASSIUM SERPL-SCNC: 4.5 MMOL/L (ref 3.5–5.1)
RBC # BLD AUTO: 3.37 M/UL (ref 3.8–5.2)
SODIUM SERPL-SCNC: 136 MMOL/L (ref 136–145)
WBC # BLD AUTO: 12.6 K/UL (ref 3.6–11)

## 2023-12-19 PROCEDURE — 6360000002 HC RX W HCPCS: Performed by: INTERNAL MEDICINE

## 2023-12-19 PROCEDURE — 6360000004 HC RX CONTRAST MEDICATION: Performed by: UROLOGY

## 2023-12-19 PROCEDURE — 76937 US GUIDE VASCULAR ACCESS: CPT

## 2023-12-19 PROCEDURE — 51702 INSERT TEMP BLADDER CATH: CPT

## 2023-12-19 PROCEDURE — 6370000000 HC RX 637 (ALT 250 FOR IP): Performed by: INTERNAL MEDICINE

## 2023-12-19 PROCEDURE — 2580000003 HC RX 258: Performed by: UROLOGY

## 2023-12-19 PROCEDURE — 6360000002 HC RX W HCPCS: Performed by: HOSPITALIST

## 2023-12-19 PROCEDURE — 2580000003 HC RX 258: Performed by: INTERNAL MEDICINE

## 2023-12-19 PROCEDURE — 74177 CT ABD & PELVIS W/CONTRAST: CPT

## 2023-12-19 PROCEDURE — 1100000003 HC PRIVATE W/ TELEMETRY

## 2023-12-19 PROCEDURE — 94761 N-INVAS EAR/PLS OXIMETRY MLT: CPT

## 2023-12-19 PROCEDURE — 6370000000 HC RX 637 (ALT 250 FOR IP): Performed by: HOSPITALIST

## 2023-12-19 RX ADMIN — SODIUM CHLORIDE, PRESERVATIVE FREE 10 ML: 5 INJECTION INTRAVENOUS at 21:32

## 2023-12-19 RX ADMIN — Medication 1 CAPSULE: at 09:55

## 2023-12-19 RX ADMIN — SODIUM CHLORIDE, PRESERVATIVE FREE 10 ML: 5 INJECTION INTRAVENOUS at 09:56

## 2023-12-19 RX ADMIN — PIPERACILLIN AND TAZOBACTAM 3375 MG: 3; .375 INJECTION, POWDER, FOR SOLUTION INTRAVENOUS at 22:00

## 2023-12-19 RX ADMIN — Medication: at 09:55

## 2023-12-19 RX ADMIN — IOPAMIDOL 100 ML: 755 INJECTION, SOLUTION INTRAVENOUS at 07:55

## 2023-12-19 RX ADMIN — ENOXAPARIN SODIUM 40 MG: 100 INJECTION SUBCUTANEOUS at 09:55

## 2023-12-19 RX ADMIN — Medication 1 AMPULE: at 09:56

## 2023-12-19 RX ADMIN — Medication: at 21:35

## 2023-12-19 RX ADMIN — PIPERACILLIN AND TAZOBACTAM 3375 MG: 3; .375 INJECTION, POWDER, FOR SOLUTION INTRAVENOUS at 14:05

## 2023-12-19 RX ADMIN — PIPERACILLIN AND TAZOBACTAM 3375 MG: 3; .375 INJECTION, POWDER, FOR SOLUTION INTRAVENOUS at 05:49

## 2023-12-19 RX ADMIN — Medication 1 AMPULE: at 21:32

## 2023-12-19 RX ADMIN — LEVOTHYROXINE SODIUM 25 MCG: 0.03 TABLET ORAL at 05:46

## 2023-12-20 ENCOUNTER — APPOINTMENT (OUTPATIENT)
Facility: HOSPITAL | Age: 73
DRG: 853 | End: 2023-12-20
Payer: MEDICARE

## 2023-12-20 PROCEDURE — 2580000003 HC RX 258: Performed by: INTERNAL MEDICINE

## 2023-12-20 PROCEDURE — 2709999900 CT ABSCESS DRAINAGE SOFT TISSUE

## 2023-12-20 PROCEDURE — 87075 CULTR BACTERIA EXCEPT BLOOD: CPT

## 2023-12-20 PROCEDURE — 6370000000 HC RX 637 (ALT 250 FOR IP): Performed by: HOSPITALIST

## 2023-12-20 PROCEDURE — 1100000003 HC PRIVATE W/ TELEMETRY

## 2023-12-20 PROCEDURE — 87186 SC STD MICRODIL/AGAR DIL: CPT

## 2023-12-20 PROCEDURE — 87077 CULTURE AEROBIC IDENTIFY: CPT

## 2023-12-20 PROCEDURE — 6360000002 HC RX W HCPCS: Performed by: HOSPITALIST

## 2023-12-20 PROCEDURE — 87070 CULTURE OTHR SPECIMN AEROBIC: CPT

## 2023-12-20 PROCEDURE — 6370000000 HC RX 637 (ALT 250 FOR IP): Performed by: INTERNAL MEDICINE

## 2023-12-20 PROCEDURE — 87205 SMEAR GRAM STAIN: CPT

## 2023-12-20 PROCEDURE — 2580000003 HC RX 258: Performed by: UROLOGY

## 2023-12-20 PROCEDURE — 6360000002 HC RX W HCPCS

## 2023-12-20 PROCEDURE — 6360000002 HC RX W HCPCS: Performed by: INTERNAL MEDICINE

## 2023-12-20 PROCEDURE — 0T933ZX DRAINAGE OF RIGHT KIDNEY PELVIS, PERCUTANEOUS APPROACH, DIAGNOSTIC: ICD-10-PCS | Performed by: RADIOLOGY

## 2023-12-20 RX ORDER — FENTANYL CITRATE 50 UG/ML
100 INJECTION, SOLUTION INTRAMUSCULAR; INTRAVENOUS AS NEEDED
Status: DISCONTINUED | OUTPATIENT
Start: 2023-12-20 | End: 2024-01-02 | Stop reason: HOSPADM

## 2023-12-20 RX ORDER — FENTANYL CITRATE 50 UG/ML
INJECTION, SOLUTION INTRAMUSCULAR; INTRAVENOUS PRN
Status: COMPLETED | OUTPATIENT
Start: 2023-12-20 | End: 2023-12-20

## 2023-12-20 RX ORDER — MIDAZOLAM HYDROCHLORIDE 1 MG/ML
INJECTION, SOLUTION INTRAMUSCULAR; INTRAVENOUS PRN
Status: COMPLETED | OUTPATIENT
Start: 2023-12-20 | End: 2023-12-20

## 2023-12-20 RX ORDER — MIDAZOLAM HYDROCHLORIDE 1 MG/ML
5 INJECTION, SOLUTION INTRAMUSCULAR; INTRAVENOUS AS NEEDED
Status: DISCONTINUED | OUTPATIENT
Start: 2023-12-20 | End: 2024-01-02 | Stop reason: HOSPADM

## 2023-12-20 RX ADMIN — Medication 1 CAPSULE: at 08:43

## 2023-12-20 RX ADMIN — Medication: at 08:47

## 2023-12-20 RX ADMIN — PIPERACILLIN AND TAZOBACTAM 3375 MG: 3; .375 INJECTION, POWDER, FOR SOLUTION INTRAVENOUS at 16:36

## 2023-12-20 RX ADMIN — MIDAZOLAM HYDROCHLORIDE 1 MG: 1 INJECTION, SOLUTION INTRAMUSCULAR; INTRAVENOUS at 15:07

## 2023-12-20 RX ADMIN — MIDAZOLAM HYDROCHLORIDE 2 MG: 1 INJECTION, SOLUTION INTRAMUSCULAR; INTRAVENOUS at 15:00

## 2023-12-20 RX ADMIN — SODIUM CHLORIDE, PRESERVATIVE FREE 10 ML: 5 INJECTION INTRAVENOUS at 20:20

## 2023-12-20 RX ADMIN — PIPERACILLIN AND TAZOBACTAM 3375 MG: 3; .375 INJECTION, POWDER, FOR SOLUTION INTRAVENOUS at 06:40

## 2023-12-20 RX ADMIN — FENTANYL CITRATE 50 MCG: 50 INJECTION, SOLUTION INTRAMUSCULAR; INTRAVENOUS at 15:00

## 2023-12-20 RX ADMIN — LEVOTHYROXINE SODIUM 25 MCG: 0.03 TABLET ORAL at 06:39

## 2023-12-20 RX ADMIN — FENTANYL CITRATE 25 MCG: 50 INJECTION, SOLUTION INTRAMUSCULAR; INTRAVENOUS at 15:07

## 2023-12-20 RX ADMIN — SODIUM CHLORIDE, PRESERVATIVE FREE 10 ML: 5 INJECTION INTRAVENOUS at 08:43

## 2023-12-20 RX ADMIN — Medication: at 20:20

## 2023-12-20 RX ADMIN — Medication 1 AMPULE: at 08:44

## 2023-12-20 RX ADMIN — ENOXAPARIN SODIUM 40 MG: 100 INJECTION SUBCUTANEOUS at 08:42

## 2023-12-20 RX ADMIN — Medication 1 AMPULE: at 20:19

## 2023-12-20 ASSESSMENT — PAIN SCALES - GENERAL
PAINLEVEL_OUTOF10: 0

## 2023-12-21 LAB
ANION GAP SERPL CALC-SCNC: 5 MMOL/L (ref 5–15)
BASOPHILS # BLD: 0.1 K/UL (ref 0–0.1)
BASOPHILS NFR BLD: 0 % (ref 0–1)
BUN SERPL-MCNC: 11 MG/DL (ref 6–20)
BUN/CREAT SERPL: 10 (ref 12–20)
CALCIUM SERPL-MCNC: 9.1 MG/DL (ref 8.5–10.1)
CHLORIDE SERPL-SCNC: 103 MMOL/L (ref 97–108)
CO2 SERPL-SCNC: 27 MMOL/L (ref 21–32)
CREAT SERPL-MCNC: 1.1 MG/DL (ref 0.55–1.02)
DIFFERENTIAL METHOD BLD: ABNORMAL
EOSINOPHIL # BLD: 0.1 K/UL (ref 0–0.4)
EOSINOPHIL NFR BLD: 1 % (ref 0–7)
ERYTHROCYTE [DISTWIDTH] IN BLOOD BY AUTOMATED COUNT: 18.2 % (ref 11.5–14.5)
GLUCOSE SERPL-MCNC: 112 MG/DL (ref 65–100)
HCT VFR BLD AUTO: 28.6 % (ref 35–47)
HGB BLD-MCNC: 8.6 G/DL (ref 11.5–16)
IMM GRANULOCYTES # BLD AUTO: 0.1 K/UL (ref 0–0.04)
IMM GRANULOCYTES NFR BLD AUTO: 1 % (ref 0–0.5)
LYMPHOCYTES # BLD: 1.9 K/UL (ref 0.8–3.5)
LYMPHOCYTES NFR BLD: 13 % (ref 12–49)
MCH RBC QN AUTO: 24 PG (ref 26–34)
MCHC RBC AUTO-ENTMCNC: 30.1 G/DL (ref 30–36.5)
MCV RBC AUTO: 79.9 FL (ref 80–99)
MONOCYTES # BLD: 0.7 K/UL (ref 0–1)
MONOCYTES NFR BLD: 5 % (ref 5–13)
NEUTS SEG # BLD: 12.2 K/UL (ref 1.8–8)
NEUTS SEG NFR BLD: 81 % (ref 32–75)
NRBC # BLD: 0 K/UL (ref 0–0.01)
NRBC BLD-RTO: 0 PER 100 WBC
PLATELET # BLD AUTO: 608 K/UL (ref 150–400)
PMV BLD AUTO: 8.2 FL (ref 8.9–12.9)
POTASSIUM SERPL-SCNC: 3.6 MMOL/L (ref 3.5–5.1)
RBC # BLD AUTO: 3.58 M/UL (ref 3.8–5.2)
SODIUM SERPL-SCNC: 135 MMOL/L (ref 136–145)
WBC # BLD AUTO: 15 K/UL (ref 3.6–11)

## 2023-12-21 PROCEDURE — 36415 COLL VENOUS BLD VENIPUNCTURE: CPT

## 2023-12-21 PROCEDURE — 6370000000 HC RX 637 (ALT 250 FOR IP): Performed by: HOSPITALIST

## 2023-12-21 PROCEDURE — 2580000003 HC RX 258: Performed by: UROLOGY

## 2023-12-21 PROCEDURE — 2700000000 HC OXYGEN THERAPY PER DAY

## 2023-12-21 PROCEDURE — 85025 COMPLETE CBC W/AUTO DIFF WBC: CPT

## 2023-12-21 PROCEDURE — 80048 BASIC METABOLIC PNL TOTAL CA: CPT

## 2023-12-21 PROCEDURE — 76937 US GUIDE VASCULAR ACCESS: CPT

## 2023-12-21 PROCEDURE — 1100000003 HC PRIVATE W/ TELEMETRY

## 2023-12-21 PROCEDURE — 6370000000 HC RX 637 (ALT 250 FOR IP): Performed by: INTERNAL MEDICINE

## 2023-12-21 PROCEDURE — 6360000002 HC RX W HCPCS: Performed by: INTERNAL MEDICINE

## 2023-12-21 PROCEDURE — 2580000003 HC RX 258: Performed by: INTERNAL MEDICINE

## 2023-12-21 PROCEDURE — 6370000000 HC RX 637 (ALT 250 FOR IP): Performed by: STUDENT IN AN ORGANIZED HEALTH CARE EDUCATION/TRAINING PROGRAM

## 2023-12-21 RX ORDER — LOPERAMIDE HYDROCHLORIDE 2 MG/1
2 CAPSULE ORAL 4 TIMES DAILY PRN
Status: DISCONTINUED | OUTPATIENT
Start: 2023-12-21 | End: 2024-01-02 | Stop reason: HOSPADM

## 2023-12-21 RX ADMIN — PIPERACILLIN AND TAZOBACTAM 3375 MG: 3; .375 INJECTION, POWDER, FOR SOLUTION INTRAVENOUS at 18:02

## 2023-12-21 RX ADMIN — PIPERACILLIN AND TAZOBACTAM 3375 MG: 3; .375 INJECTION, POWDER, FOR SOLUTION INTRAVENOUS at 12:11

## 2023-12-21 RX ADMIN — LOPERAMIDE HYDROCHLORIDE 2 MG: 2 CAPSULE ORAL at 20:23

## 2023-12-21 RX ADMIN — Medication: at 09:10

## 2023-12-21 RX ADMIN — Medication 1 AMPULE: at 09:20

## 2023-12-21 RX ADMIN — LEVOTHYROXINE SODIUM 25 MCG: 0.03 TABLET ORAL at 06:31

## 2023-12-21 RX ADMIN — Medication 1 CAPSULE: at 09:20

## 2023-12-21 RX ADMIN — LOPERAMIDE HYDROCHLORIDE 2 MG: 2 CAPSULE ORAL at 12:07

## 2023-12-21 RX ADMIN — Medication: at 20:26

## 2023-12-21 RX ADMIN — LOPERAMIDE HYDROCHLORIDE 2 MG: 2 CAPSULE ORAL at 17:44

## 2023-12-21 RX ADMIN — PIPERACILLIN AND TAZOBACTAM 3375 MG: 3; .375 INJECTION, POWDER, FOR SOLUTION INTRAVENOUS at 00:08

## 2023-12-21 RX ADMIN — SODIUM CHLORIDE, PRESERVATIVE FREE 10 ML: 5 INJECTION INTRAVENOUS at 20:24

## 2023-12-21 RX ADMIN — SODIUM CHLORIDE, PRESERVATIVE FREE 10 ML: 5 INJECTION INTRAVENOUS at 09:23

## 2023-12-21 RX ADMIN — Medication 1 AMPULE: at 20:24

## 2023-12-21 ASSESSMENT — PAIN SCALES - GENERAL
PAINLEVEL_OUTOF10: 0
PAINLEVEL_OUTOF10: 0

## 2023-12-22 LAB
ANION GAP SERPL CALC-SCNC: 7 MMOL/L (ref 5–15)
BACTERIA SPEC CULT: ABNORMAL
BACTERIA SPEC CULT: NORMAL
BASOPHILS # BLD: 0.1 K/UL (ref 0–0.1)
BASOPHILS NFR BLD: 1 % (ref 0–1)
BUN SERPL-MCNC: 10 MG/DL (ref 6–20)
BUN/CREAT SERPL: 9 (ref 12–20)
CALCIUM SERPL-MCNC: 8.8 MG/DL (ref 8.5–10.1)
CHLORIDE SERPL-SCNC: 104 MMOL/L (ref 97–108)
CO2 SERPL-SCNC: 27 MMOL/L (ref 21–32)
CREAT SERPL-MCNC: 1.09 MG/DL (ref 0.55–1.02)
DIFFERENTIAL METHOD BLD: ABNORMAL
EOSINOPHIL # BLD: 0.1 K/UL (ref 0–0.4)
EOSINOPHIL NFR BLD: 1 % (ref 0–7)
ERYTHROCYTE [DISTWIDTH] IN BLOOD BY AUTOMATED COUNT: 18.4 % (ref 11.5–14.5)
GLUCOSE SERPL-MCNC: 91 MG/DL (ref 65–100)
GRAM STN SPEC: ABNORMAL
GRAM STN SPEC: ABNORMAL
HCT VFR BLD AUTO: 25.1 % (ref 35–47)
HGB BLD-MCNC: 7.4 G/DL (ref 11.5–16)
IMM GRANULOCYTES # BLD AUTO: 0.1 K/UL (ref 0–0.04)
IMM GRANULOCYTES NFR BLD AUTO: 1 % (ref 0–0.5)
LYMPHOCYTES # BLD: 2.2 K/UL (ref 0.8–3.5)
LYMPHOCYTES NFR BLD: 17 % (ref 12–49)
MCH RBC QN AUTO: 23.7 PG (ref 26–34)
MCHC RBC AUTO-ENTMCNC: 29.5 G/DL (ref 30–36.5)
MCV RBC AUTO: 80.4 FL (ref 80–99)
MONOCYTES # BLD: 0.8 K/UL (ref 0–1)
MONOCYTES NFR BLD: 6 % (ref 5–13)
NEUTS SEG # BLD: 9.6 K/UL (ref 1.8–8)
NEUTS SEG NFR BLD: 74 % (ref 32–75)
NRBC # BLD: 0 K/UL (ref 0–0.01)
NRBC BLD-RTO: 0 PER 100 WBC
PLATELET # BLD AUTO: 533 K/UL (ref 150–400)
PMV BLD AUTO: 8.3 FL (ref 8.9–12.9)
POTASSIUM SERPL-SCNC: 3.2 MMOL/L (ref 3.5–5.1)
RBC # BLD AUTO: 3.12 M/UL (ref 3.8–5.2)
SERVICE CMNT-IMP: ABNORMAL
SERVICE CMNT-IMP: NORMAL
SODIUM SERPL-SCNC: 138 MMOL/L (ref 136–145)
WBC # BLD AUTO: 12.9 K/UL (ref 3.6–11)

## 2023-12-22 PROCEDURE — 6370000000 HC RX 637 (ALT 250 FOR IP): Performed by: INTERNAL MEDICINE

## 2023-12-22 PROCEDURE — 6360000002 HC RX W HCPCS: Performed by: INTERNAL MEDICINE

## 2023-12-22 PROCEDURE — 6370000000 HC RX 637 (ALT 250 FOR IP): Performed by: STUDENT IN AN ORGANIZED HEALTH CARE EDUCATION/TRAINING PROGRAM

## 2023-12-22 PROCEDURE — 1100000003 HC PRIVATE W/ TELEMETRY

## 2023-12-22 PROCEDURE — 6370000000 HC RX 637 (ALT 250 FOR IP): Performed by: HOSPITALIST

## 2023-12-22 PROCEDURE — 6360000002 HC RX W HCPCS: Performed by: HOSPITALIST

## 2023-12-22 PROCEDURE — 80048 BASIC METABOLIC PNL TOTAL CA: CPT

## 2023-12-22 PROCEDURE — 2580000003 HC RX 258: Performed by: INTERNAL MEDICINE

## 2023-12-22 PROCEDURE — 6360000002 HC RX W HCPCS: Performed by: NURSE PRACTITIONER

## 2023-12-22 PROCEDURE — 85025 COMPLETE CBC W/AUTO DIFF WBC: CPT

## 2023-12-22 PROCEDURE — 2580000003 HC RX 258: Performed by: UROLOGY

## 2023-12-22 PROCEDURE — 36415 COLL VENOUS BLD VENIPUNCTURE: CPT

## 2023-12-22 RX ORDER — POTASSIUM CHLORIDE 7.45 MG/ML
10 INJECTION INTRAVENOUS
Status: COMPLETED | OUTPATIENT
Start: 2023-12-22 | End: 2023-12-22

## 2023-12-22 RX ADMIN — Medication: at 09:46

## 2023-12-22 RX ADMIN — Medication 1 CAPSULE: at 09:31

## 2023-12-22 RX ADMIN — LEVOTHYROXINE SODIUM 25 MCG: 0.03 TABLET ORAL at 06:27

## 2023-12-22 RX ADMIN — SODIUM CHLORIDE, PRESERVATIVE FREE 10 ML: 5 INJECTION INTRAVENOUS at 20:51

## 2023-12-22 RX ADMIN — SODIUM CHLORIDE, PRESERVATIVE FREE 10 ML: 5 INJECTION INTRAVENOUS at 09:46

## 2023-12-22 RX ADMIN — LOPERAMIDE HYDROCHLORIDE 2 MG: 2 CAPSULE ORAL at 18:03

## 2023-12-22 RX ADMIN — LOPERAMIDE HYDROCHLORIDE 2 MG: 2 CAPSULE ORAL at 12:03

## 2023-12-22 RX ADMIN — PIPERACILLIN AND TAZOBACTAM 3375 MG: 3; .375 INJECTION, POWDER, FOR SOLUTION INTRAVENOUS at 09:34

## 2023-12-22 RX ADMIN — POTASSIUM CHLORIDE 10 MEQ: 7.46 INJECTION, SOLUTION INTRAVENOUS at 06:38

## 2023-12-22 RX ADMIN — Medication 1 AMPULE: at 20:51

## 2023-12-22 RX ADMIN — Medication: at 20:50

## 2023-12-22 RX ADMIN — Medication 1 AMPULE: at 09:45

## 2023-12-22 RX ADMIN — PIPERACILLIN AND TAZOBACTAM 3375 MG: 3; .375 INJECTION, POWDER, FOR SOLUTION INTRAVENOUS at 18:02

## 2023-12-22 RX ADMIN — LOPERAMIDE HYDROCHLORIDE 2 MG: 2 CAPSULE ORAL at 05:27

## 2023-12-22 RX ADMIN — POTASSIUM CHLORIDE 10 MEQ: 7.46 INJECTION, SOLUTION INTRAVENOUS at 05:36

## 2023-12-22 RX ADMIN — PIPERACILLIN AND TAZOBACTAM 3375 MG: 3; .375 INJECTION, POWDER, FOR SOLUTION INTRAVENOUS at 02:10

## 2023-12-22 RX ADMIN — ENOXAPARIN SODIUM 40 MG: 100 INJECTION SUBCUTANEOUS at 09:45

## 2023-12-22 ASSESSMENT — PAIN SCALES - GENERAL
PAINLEVEL_OUTOF10: 0

## 2023-12-23 LAB
ANION GAP SERPL CALC-SCNC: 6 MMOL/L (ref 5–15)
BASOPHILS # BLD: 0.1 K/UL (ref 0–0.1)
BASOPHILS NFR BLD: 0 % (ref 0–1)
BUN SERPL-MCNC: 9 MG/DL (ref 6–20)
BUN/CREAT SERPL: 9 (ref 12–20)
CALCIUM SERPL-MCNC: 8.8 MG/DL (ref 8.5–10.1)
CHLORIDE SERPL-SCNC: 105 MMOL/L (ref 97–108)
CO2 SERPL-SCNC: 26 MMOL/L (ref 21–32)
CREAT SERPL-MCNC: 0.96 MG/DL (ref 0.55–1.02)
DIFFERENTIAL METHOD BLD: ABNORMAL
EOSINOPHIL # BLD: 0.2 K/UL (ref 0–0.4)
EOSINOPHIL NFR BLD: 2 % (ref 0–7)
ERYTHROCYTE [DISTWIDTH] IN BLOOD BY AUTOMATED COUNT: 18.4 % (ref 11.5–14.5)
GLUCOSE SERPL-MCNC: 86 MG/DL (ref 65–100)
HCT VFR BLD AUTO: 24 % (ref 35–47)
HGB BLD-MCNC: 7.4 G/DL (ref 11.5–16)
IMM GRANULOCYTES # BLD AUTO: 0.1 K/UL (ref 0–0.04)
IMM GRANULOCYTES NFR BLD AUTO: 1 % (ref 0–0.5)
LYMPHOCYTES # BLD: 2 K/UL (ref 0.8–3.5)
LYMPHOCYTES NFR BLD: 16 % (ref 12–49)
MCH RBC QN AUTO: 24.9 PG (ref 26–34)
MCHC RBC AUTO-ENTMCNC: 30.8 G/DL (ref 30–36.5)
MCV RBC AUTO: 80.8 FL (ref 80–99)
MONOCYTES # BLD: 0.6 K/UL (ref 0–1)
MONOCYTES NFR BLD: 5 % (ref 5–13)
NEUTS SEG # BLD: 9.2 K/UL (ref 1.8–8)
NEUTS SEG NFR BLD: 76 % (ref 32–75)
NRBC # BLD: 0 K/UL (ref 0–0.01)
NRBC BLD-RTO: 0 PER 100 WBC
PLATELET # BLD AUTO: 468 K/UL (ref 150–400)
PMV BLD AUTO: 8.8 FL (ref 8.9–12.9)
POTASSIUM SERPL-SCNC: 4 MMOL/L (ref 3.5–5.1)
RBC # BLD AUTO: 2.97 M/UL (ref 3.8–5.2)
SODIUM SERPL-SCNC: 137 MMOL/L (ref 136–145)
WBC # BLD AUTO: 12 K/UL (ref 3.6–11)

## 2023-12-23 PROCEDURE — 6370000000 HC RX 637 (ALT 250 FOR IP): Performed by: HOSPITALIST

## 2023-12-23 PROCEDURE — 2580000003 HC RX 258: Performed by: UROLOGY

## 2023-12-23 PROCEDURE — 6370000000 HC RX 637 (ALT 250 FOR IP): Performed by: INTERNAL MEDICINE

## 2023-12-23 PROCEDURE — 80048 BASIC METABOLIC PNL TOTAL CA: CPT

## 2023-12-23 PROCEDURE — 6360000002 HC RX W HCPCS: Performed by: STUDENT IN AN ORGANIZED HEALTH CARE EDUCATION/TRAINING PROGRAM

## 2023-12-23 PROCEDURE — 1100000003 HC PRIVATE W/ TELEMETRY

## 2023-12-23 PROCEDURE — 36415 COLL VENOUS BLD VENIPUNCTURE: CPT

## 2023-12-23 PROCEDURE — 6360000002 HC RX W HCPCS: Performed by: INTERNAL MEDICINE

## 2023-12-23 PROCEDURE — 6370000000 HC RX 637 (ALT 250 FOR IP): Performed by: STUDENT IN AN ORGANIZED HEALTH CARE EDUCATION/TRAINING PROGRAM

## 2023-12-23 PROCEDURE — 85025 COMPLETE CBC W/AUTO DIFF WBC: CPT

## 2023-12-23 PROCEDURE — 2580000003 HC RX 258: Performed by: INTERNAL MEDICINE

## 2023-12-23 PROCEDURE — 2580000003 HC RX 258: Performed by: STUDENT IN AN ORGANIZED HEALTH CARE EDUCATION/TRAINING PROGRAM

## 2023-12-23 RX ADMIN — PIPERACILLIN AND TAZOBACTAM 3375 MG: 3; .375 INJECTION, POWDER, FOR SOLUTION INTRAVENOUS at 17:31

## 2023-12-23 RX ADMIN — SODIUM CHLORIDE, PRESERVATIVE FREE 10 ML: 5 INJECTION INTRAVENOUS at 09:37

## 2023-12-23 RX ADMIN — PIPERACILLIN AND TAZOBACTAM 3375 MG: 3; .375 INJECTION, POWDER, FOR SOLUTION INTRAVENOUS at 02:58

## 2023-12-23 RX ADMIN — PIPERACILLIN AND TAZOBACTAM 3375 MG: 3; .375 INJECTION, POWDER, FOR SOLUTION INTRAVENOUS at 09:36

## 2023-12-23 RX ADMIN — Medication 1 CAPSULE: at 09:34

## 2023-12-23 RX ADMIN — LEVOTHYROXINE SODIUM 25 MCG: 0.03 TABLET ORAL at 06:54

## 2023-12-23 RX ADMIN — LOPERAMIDE HYDROCHLORIDE 2 MG: 2 CAPSULE ORAL at 00:31

## 2023-12-23 RX ADMIN — Medication 1 AMPULE: at 09:33

## 2023-12-23 RX ADMIN — SODIUM CHLORIDE, PRESERVATIVE FREE 10 ML: 5 INJECTION INTRAVENOUS at 20:31

## 2023-12-23 RX ADMIN — Medication 1 AMPULE: at 20:34

## 2023-12-23 RX ADMIN — Medication: at 20:32

## 2023-12-23 RX ADMIN — Medication: at 09:37

## 2023-12-23 ASSESSMENT — PAIN SCALES - GENERAL: PAINLEVEL_OUTOF10: 0

## 2023-12-24 ENCOUNTER — ANESTHESIA EVENT (OUTPATIENT)
Facility: HOSPITAL | Age: 73
End: 2023-12-24
Payer: MEDICARE

## 2023-12-24 LAB
ANION GAP SERPL CALC-SCNC: 5 MMOL/L (ref 5–15)
BASOPHILS # BLD: 0.1 K/UL (ref 0–0.1)
BASOPHILS NFR BLD: 1 % (ref 0–1)
BUN SERPL-MCNC: 9 MG/DL (ref 6–20)
BUN/CREAT SERPL: 9 (ref 12–20)
CALCIUM SERPL-MCNC: 9.1 MG/DL (ref 8.5–10.1)
CHLORIDE SERPL-SCNC: 105 MMOL/L (ref 97–108)
CO2 SERPL-SCNC: 28 MMOL/L (ref 21–32)
CREAT SERPL-MCNC: 1 MG/DL (ref 0.55–1.02)
DIFFERENTIAL METHOD BLD: ABNORMAL
EOSINOPHIL # BLD: 0.2 K/UL (ref 0–0.4)
EOSINOPHIL NFR BLD: 1 % (ref 0–7)
ERYTHROCYTE [DISTWIDTH] IN BLOOD BY AUTOMATED COUNT: 18.3 % (ref 11.5–14.5)
GLUCOSE SERPL-MCNC: 91 MG/DL (ref 65–100)
HCT VFR BLD AUTO: 23.9 % (ref 35–47)
HGB BLD-MCNC: 7.1 G/DL (ref 11.5–16)
IMM GRANULOCYTES # BLD AUTO: 0 K/UL (ref 0–0.04)
IMM GRANULOCYTES NFR BLD AUTO: 0 % (ref 0–0.5)
LYMPHOCYTES # BLD: 1.7 K/UL (ref 0.8–3.5)
LYMPHOCYTES NFR BLD: 14 % (ref 12–49)
MCH RBC QN AUTO: 23.8 PG (ref 26–34)
MCHC RBC AUTO-ENTMCNC: 29.7 G/DL (ref 30–36.5)
MCV RBC AUTO: 80.2 FL (ref 80–99)
MONOCYTES # BLD: 0.6 K/UL (ref 0–1)
MONOCYTES NFR BLD: 5 % (ref 5–13)
NEUTS SEG # BLD: 9.2 K/UL (ref 1.8–8)
NEUTS SEG NFR BLD: 79 % (ref 32–75)
NRBC # BLD: 0 K/UL (ref 0–0.01)
NRBC BLD-RTO: 0 PER 100 WBC
PLATELET # BLD AUTO: 523 K/UL (ref 150–400)
PMV BLD AUTO: 8.5 FL (ref 8.9–12.9)
POTASSIUM SERPL-SCNC: 3.5 MMOL/L (ref 3.5–5.1)
RBC # BLD AUTO: 2.98 M/UL (ref 3.8–5.2)
SODIUM SERPL-SCNC: 138 MMOL/L (ref 136–145)
WBC # BLD AUTO: 11.8 K/UL (ref 3.6–11)

## 2023-12-24 PROCEDURE — 2580000003 HC RX 258: Performed by: STUDENT IN AN ORGANIZED HEALTH CARE EDUCATION/TRAINING PROGRAM

## 2023-12-24 PROCEDURE — 6360000002 HC RX W HCPCS: Performed by: HOSPITALIST

## 2023-12-24 PROCEDURE — 6370000000 HC RX 637 (ALT 250 FOR IP): Performed by: HOSPITALIST

## 2023-12-24 PROCEDURE — 2580000003 HC RX 258: Performed by: UROLOGY

## 2023-12-24 PROCEDURE — 85025 COMPLETE CBC W/AUTO DIFF WBC: CPT

## 2023-12-24 PROCEDURE — 80048 BASIC METABOLIC PNL TOTAL CA: CPT

## 2023-12-24 PROCEDURE — 1100000003 HC PRIVATE W/ TELEMETRY

## 2023-12-24 PROCEDURE — 6370000000 HC RX 637 (ALT 250 FOR IP): Performed by: UROLOGY

## 2023-12-24 PROCEDURE — 6360000002 HC RX W HCPCS: Performed by: STUDENT IN AN ORGANIZED HEALTH CARE EDUCATION/TRAINING PROGRAM

## 2023-12-24 PROCEDURE — 36415 COLL VENOUS BLD VENIPUNCTURE: CPT

## 2023-12-24 PROCEDURE — 6370000000 HC RX 637 (ALT 250 FOR IP): Performed by: INTERNAL MEDICINE

## 2023-12-24 RX ADMIN — ACETAMINOPHEN 650 MG: 325 TABLET ORAL at 15:48

## 2023-12-24 RX ADMIN — SODIUM CHLORIDE, PRESERVATIVE FREE 10 ML: 5 INJECTION INTRAVENOUS at 20:27

## 2023-12-24 RX ADMIN — Medication 1 AMPULE: at 20:29

## 2023-12-24 RX ADMIN — SODIUM CHLORIDE 125 MG: 9 INJECTION, SOLUTION INTRAVENOUS at 15:42

## 2023-12-24 RX ADMIN — Medication: at 20:26

## 2023-12-24 RX ADMIN — Medication 1 CAPSULE: at 09:39

## 2023-12-24 RX ADMIN — LEVOTHYROXINE SODIUM 25 MCG: 0.03 TABLET ORAL at 06:06

## 2023-12-24 RX ADMIN — PIPERACILLIN AND TAZOBACTAM 3375 MG: 3; .375 INJECTION, POWDER, FOR SOLUTION INTRAVENOUS at 09:38

## 2023-12-24 RX ADMIN — PIPERACILLIN AND TAZOBACTAM 3375 MG: 3; .375 INJECTION, POWDER, FOR SOLUTION INTRAVENOUS at 17:47

## 2023-12-24 RX ADMIN — ENOXAPARIN SODIUM 40 MG: 100 INJECTION SUBCUTANEOUS at 09:39

## 2023-12-24 RX ADMIN — PIPERACILLIN AND TAZOBACTAM 3375 MG: 3; .375 INJECTION, POWDER, FOR SOLUTION INTRAVENOUS at 01:38

## 2023-12-24 RX ADMIN — Medication: at 09:40

## 2023-12-24 RX ADMIN — Medication 1 AMPULE: at 09:38

## 2023-12-24 ASSESSMENT — PAIN SCALES - GENERAL
PAINLEVEL_OUTOF10: 0

## 2023-12-25 LAB
ANION GAP SERPL CALC-SCNC: 9 MMOL/L (ref 5–15)
BASOPHILS # BLD: 0.1 K/UL (ref 0–0.1)
BASOPHILS NFR BLD: 1 % (ref 0–1)
BUN SERPL-MCNC: 9 MG/DL (ref 6–20)
BUN/CREAT SERPL: 9 (ref 12–20)
CALCIUM SERPL-MCNC: 9.6 MG/DL (ref 8.5–10.1)
CHLORIDE SERPL-SCNC: 103 MMOL/L (ref 97–108)
CO2 SERPL-SCNC: 24 MMOL/L (ref 21–32)
CREAT SERPL-MCNC: 1.03 MG/DL (ref 0.55–1.02)
DIFFERENTIAL METHOD BLD: ABNORMAL
EOSINOPHIL # BLD: 0.1 K/UL (ref 0–0.4)
EOSINOPHIL NFR BLD: 1 % (ref 0–7)
ERYTHROCYTE [DISTWIDTH] IN BLOOD BY AUTOMATED COUNT: 18.3 % (ref 11.5–14.5)
GLUCOSE SERPL-MCNC: 81 MG/DL (ref 65–100)
HCT VFR BLD AUTO: 29.3 % (ref 35–47)
HGB BLD-MCNC: 8.7 G/DL (ref 11.5–16)
IMM GRANULOCYTES # BLD AUTO: 0.1 K/UL (ref 0–0.04)
IMM GRANULOCYTES NFR BLD AUTO: 1 % (ref 0–0.5)
LYMPHOCYTES # BLD: 1.5 K/UL (ref 0.8–3.5)
LYMPHOCYTES NFR BLD: 14 % (ref 12–49)
MCH RBC QN AUTO: 24.3 PG (ref 26–34)
MCHC RBC AUTO-ENTMCNC: 29.7 G/DL (ref 30–36.5)
MCV RBC AUTO: 81.8 FL (ref 80–99)
MONOCYTES # BLD: 0.5 K/UL (ref 0–1)
MONOCYTES NFR BLD: 5 % (ref 5–13)
NEUTS SEG # BLD: 8.8 K/UL (ref 1.8–8)
NEUTS SEG NFR BLD: 78 % (ref 32–75)
NRBC # BLD: 0 K/UL (ref 0–0.01)
NRBC BLD-RTO: 0 PER 100 WBC
PLATELET # BLD AUTO: 556 K/UL (ref 150–400)
PMV BLD AUTO: 8.2 FL (ref 8.9–12.9)
POTASSIUM SERPL-SCNC: 3.7 MMOL/L (ref 3.5–5.1)
RBC # BLD AUTO: 3.58 M/UL (ref 3.8–5.2)
SODIUM SERPL-SCNC: 136 MMOL/L (ref 136–145)
WBC # BLD AUTO: 11 K/UL (ref 3.6–11)

## 2023-12-25 PROCEDURE — 80048 BASIC METABOLIC PNL TOTAL CA: CPT

## 2023-12-25 PROCEDURE — 1100000003 HC PRIVATE W/ TELEMETRY

## 2023-12-25 PROCEDURE — 6370000000 HC RX 637 (ALT 250 FOR IP): Performed by: UROLOGY

## 2023-12-25 PROCEDURE — 6360000002 HC RX W HCPCS: Performed by: STUDENT IN AN ORGANIZED HEALTH CARE EDUCATION/TRAINING PROGRAM

## 2023-12-25 PROCEDURE — 36415 COLL VENOUS BLD VENIPUNCTURE: CPT

## 2023-12-25 PROCEDURE — 85025 COMPLETE CBC W/AUTO DIFF WBC: CPT

## 2023-12-25 PROCEDURE — 6360000002 HC RX W HCPCS: Performed by: HOSPITALIST

## 2023-12-25 PROCEDURE — 2580000003 HC RX 258: Performed by: STUDENT IN AN ORGANIZED HEALTH CARE EDUCATION/TRAINING PROGRAM

## 2023-12-25 PROCEDURE — 6370000000 HC RX 637 (ALT 250 FOR IP): Performed by: HOSPITALIST

## 2023-12-25 PROCEDURE — 2580000003 HC RX 258: Performed by: UROLOGY

## 2023-12-25 PROCEDURE — 6370000000 HC RX 637 (ALT 250 FOR IP): Performed by: STUDENT IN AN ORGANIZED HEALTH CARE EDUCATION/TRAINING PROGRAM

## 2023-12-25 PROCEDURE — 6360000002 HC RX W HCPCS: Performed by: UROLOGY

## 2023-12-25 PROCEDURE — 6370000000 HC RX 637 (ALT 250 FOR IP): Performed by: INTERNAL MEDICINE

## 2023-12-25 RX ADMIN — Medication 1 AMPULE: at 09:34

## 2023-12-25 RX ADMIN — ONDANSETRON 4 MG: 2 INJECTION INTRAMUSCULAR; INTRAVENOUS at 09:41

## 2023-12-25 RX ADMIN — SODIUM CHLORIDE: 9 INJECTION, SOLUTION INTRAVENOUS at 09:32

## 2023-12-25 RX ADMIN — SODIUM CHLORIDE, PRESERVATIVE FREE 10 ML: 5 INJECTION INTRAVENOUS at 23:18

## 2023-12-25 RX ADMIN — PIPERACILLIN AND TAZOBACTAM 3375 MG: 3; .375 INJECTION, POWDER, FOR SOLUTION INTRAVENOUS at 02:13

## 2023-12-25 RX ADMIN — Medication: at 23:19

## 2023-12-25 RX ADMIN — LOPERAMIDE HYDROCHLORIDE 2 MG: 2 CAPSULE ORAL at 15:43

## 2023-12-25 RX ADMIN — ACETAMINOPHEN 650 MG: 325 TABLET ORAL at 09:34

## 2023-12-25 RX ADMIN — Medication 1 AMPULE: at 23:18

## 2023-12-25 RX ADMIN — PIPERACILLIN AND TAZOBACTAM 3375 MG: 3; .375 INJECTION, POWDER, FOR SOLUTION INTRAVENOUS at 18:21

## 2023-12-25 RX ADMIN — LEVOTHYROXINE SODIUM 25 MCG: 0.03 TABLET ORAL at 06:03

## 2023-12-25 RX ADMIN — Medication 1 CAPSULE: at 09:33

## 2023-12-25 RX ADMIN — ACETAMINOPHEN 650 MG: 325 TABLET ORAL at 18:21

## 2023-12-25 RX ADMIN — PIPERACILLIN AND TAZOBACTAM 3375 MG: 3; .375 INJECTION, POWDER, FOR SOLUTION INTRAVENOUS at 09:33

## 2023-12-25 RX ADMIN — ACETAMINOPHEN 650 MG: 325 TABLET ORAL at 00:43

## 2023-12-25 RX ADMIN — Medication: at 09:35

## 2023-12-25 ASSESSMENT — PAIN SCALES - GENERAL
PAINLEVEL_OUTOF10: 0

## 2023-12-26 ENCOUNTER — TELEPHONE (OUTPATIENT)
Facility: CLINIC | Age: 73
End: 2023-12-26

## 2023-12-26 ENCOUNTER — APPOINTMENT (OUTPATIENT)
Facility: HOSPITAL | Age: 73
DRG: 853 | End: 2023-12-26
Payer: MEDICARE

## 2023-12-26 LAB
ANION GAP SERPL CALC-SCNC: 5 MMOL/L (ref 5–15)
BASOPHILS # BLD: 0 K/UL (ref 0–0.1)
BASOPHILS NFR BLD: 0 % (ref 0–1)
BUN SERPL-MCNC: 11 MG/DL (ref 6–20)
BUN/CREAT SERPL: 10 (ref 12–20)
CALCIUM SERPL-MCNC: 9.5 MG/DL (ref 8.5–10.1)
CHLORIDE SERPL-SCNC: 105 MMOL/L (ref 97–108)
CO2 SERPL-SCNC: 26 MMOL/L (ref 21–32)
CREAT SERPL-MCNC: 1.11 MG/DL (ref 0.55–1.02)
DIFFERENTIAL METHOD BLD: ABNORMAL
EOSINOPHIL # BLD: 0 K/UL (ref 0–0.4)
EOSINOPHIL NFR BLD: 0 % (ref 0–7)
ERYTHROCYTE [DISTWIDTH] IN BLOOD BY AUTOMATED COUNT: 17.8 % (ref 11.5–14.5)
GLUCOSE SERPL-MCNC: 102 MG/DL (ref 65–100)
HCT VFR BLD AUTO: 27.6 % (ref 35–47)
HGB BLD-MCNC: 8.4 G/DL (ref 11.5–16)
IMM GRANULOCYTES # BLD AUTO: 0.1 K/UL (ref 0–0.04)
IMM GRANULOCYTES NFR BLD AUTO: 1 % (ref 0–0.5)
LYMPHOCYTES # BLD: 0.7 K/UL (ref 0.8–3.5)
LYMPHOCYTES NFR BLD: 7 % (ref 12–49)
MCH RBC QN AUTO: 24.9 PG (ref 26–34)
MCHC RBC AUTO-ENTMCNC: 30.4 G/DL (ref 30–36.5)
MCV RBC AUTO: 81.7 FL (ref 80–99)
MONOCYTES # BLD: 0.4 K/UL (ref 0–1)
MONOCYTES NFR BLD: 4 % (ref 5–13)
NEUTS SEG # BLD: 8.6 K/UL (ref 1.8–8)
NEUTS SEG NFR BLD: 88 % (ref 32–75)
NRBC # BLD: 0 K/UL (ref 0–0.01)
NRBC BLD-RTO: 0 PER 100 WBC
PLATELET # BLD AUTO: 501 K/UL (ref 150–400)
PMV BLD AUTO: 8.3 FL (ref 8.9–12.9)
POTASSIUM SERPL-SCNC: 3.3 MMOL/L (ref 3.5–5.1)
RBC # BLD AUTO: 3.38 M/UL (ref 3.8–5.2)
RBC MORPH BLD: ABNORMAL
RBC MORPH BLD: ABNORMAL
SODIUM SERPL-SCNC: 136 MMOL/L (ref 136–145)
WBC # BLD AUTO: 9.8 K/UL (ref 3.6–11)

## 2023-12-26 PROCEDURE — 6370000000 HC RX 637 (ALT 250 FOR IP): Performed by: STUDENT IN AN ORGANIZED HEALTH CARE EDUCATION/TRAINING PROGRAM

## 2023-12-26 PROCEDURE — 85025 COMPLETE CBC W/AUTO DIFF WBC: CPT

## 2023-12-26 PROCEDURE — 6370000000 HC RX 637 (ALT 250 FOR IP): Performed by: INTERNAL MEDICINE

## 2023-12-26 PROCEDURE — 6360000002 HC RX W HCPCS: Performed by: STUDENT IN AN ORGANIZED HEALTH CARE EDUCATION/TRAINING PROGRAM

## 2023-12-26 PROCEDURE — 2580000003 HC RX 258: Performed by: UROLOGY

## 2023-12-26 PROCEDURE — 6370000000 HC RX 637 (ALT 250 FOR IP): Performed by: UROLOGY

## 2023-12-26 PROCEDURE — 1100000003 HC PRIVATE W/ TELEMETRY

## 2023-12-26 PROCEDURE — 71046 X-RAY EXAM CHEST 2 VIEWS: CPT

## 2023-12-26 PROCEDURE — 80048 BASIC METABOLIC PNL TOTAL CA: CPT

## 2023-12-26 PROCEDURE — 6370000000 HC RX 637 (ALT 250 FOR IP): Performed by: HOSPITALIST

## 2023-12-26 PROCEDURE — 36415 COLL VENOUS BLD VENIPUNCTURE: CPT

## 2023-12-26 PROCEDURE — 2580000003 HC RX 258: Performed by: STUDENT IN AN ORGANIZED HEALTH CARE EDUCATION/TRAINING PROGRAM

## 2023-12-26 RX ADMIN — LEVOTHYROXINE SODIUM 25 MCG: 0.03 TABLET ORAL at 06:12

## 2023-12-26 RX ADMIN — Medication: at 09:14

## 2023-12-26 RX ADMIN — ACETAMINOPHEN 650 MG: 325 TABLET ORAL at 02:56

## 2023-12-26 RX ADMIN — PIPERACILLIN AND TAZOBACTAM 3375 MG: 3; .375 INJECTION, POWDER, FOR SOLUTION INTRAVENOUS at 02:41

## 2023-12-26 RX ADMIN — Medication: at 21:05

## 2023-12-26 RX ADMIN — PIPERACILLIN AND TAZOBACTAM 3375 MG: 3; .375 INJECTION, POWDER, FOR SOLUTION INTRAVENOUS at 19:36

## 2023-12-26 RX ADMIN — Medication 1 CAPSULE: at 09:13

## 2023-12-26 RX ADMIN — SODIUM CHLORIDE, PRESERVATIVE FREE 10 ML: 5 INJECTION INTRAVENOUS at 21:08

## 2023-12-26 RX ADMIN — POTASSIUM BICARBONATE 40 MEQ: 782 TABLET, EFFERVESCENT ORAL at 12:52

## 2023-12-26 RX ADMIN — SODIUM CHLORIDE, PRESERVATIVE FREE 10 ML: 5 INJECTION INTRAVENOUS at 09:14

## 2023-12-26 RX ADMIN — Medication 1 AMPULE: at 21:07

## 2023-12-26 RX ADMIN — PIPERACILLIN AND TAZOBACTAM 3375 MG: 3; .375 INJECTION, POWDER, FOR SOLUTION INTRAVENOUS at 11:16

## 2023-12-26 RX ADMIN — Medication 1 AMPULE: at 09:12

## 2023-12-26 RX ADMIN — ACETAMINOPHEN 650 MG: 325 TABLET ORAL at 12:57

## 2023-12-26 NOTE — TELEPHONE ENCOUNTER
Kidney Surgery - The patient left a , 12/23/23, 9:30am, to update Mari Smart NP that she is scheduled to have kidney surgery, 12/29/23.  She states that she has been at [facility] for 14 days and wanted to let the NP know of the situation.    The patient's callback is [phone] if needed

## 2023-12-26 NOTE — FLOWSHEET NOTE
12/26/23 0250   Vital Signs   Temp (!) 102.9 °F (39.4 °C)   Temp Source Oral   Pulse 92   Respirations 16   BP (!) 146/74   MAP (Calculated) 98   MAP (mmHg) 97   BP Location Left upper arm   Patient Position Supine   Oxygen Therapy   SpO2 92 %     Acetaminophen prn administered for fever.

## 2023-12-27 LAB
ANION GAP SERPL CALC-SCNC: 6 MMOL/L (ref 5–15)
BASOPHILS # BLD: 0 K/UL (ref 0–0.1)
BASOPHILS NFR BLD: 1 % (ref 0–1)
BUN SERPL-MCNC: 13 MG/DL (ref 6–20)
BUN/CREAT SERPL: 13 (ref 12–20)
CALCIUM SERPL-MCNC: 9.3 MG/DL (ref 8.5–10.1)
CHLORIDE SERPL-SCNC: 104 MMOL/L (ref 97–108)
CO2 SERPL-SCNC: 26 MMOL/L (ref 21–32)
CREAT SERPL-MCNC: 1.03 MG/DL (ref 0.55–1.02)
DIFFERENTIAL METHOD BLD: ABNORMAL
EOSINOPHIL # BLD: 0 K/UL (ref 0–0.4)
EOSINOPHIL NFR BLD: 0 % (ref 0–7)
ERYTHROCYTE [DISTWIDTH] IN BLOOD BY AUTOMATED COUNT: 18.2 % (ref 11.5–14.5)
GLUCOSE SERPL-MCNC: 92 MG/DL (ref 65–100)
HCT VFR BLD AUTO: 27.4 % (ref 35–47)
HGB BLD-MCNC: 8.1 G/DL (ref 11.5–16)
HISTORY CHECK: NORMAL
IMM GRANULOCYTES # BLD AUTO: 0 K/UL (ref 0–0.04)
IMM GRANULOCYTES NFR BLD AUTO: 0 % (ref 0–0.5)
LYMPHOCYTES # BLD: 1.3 K/UL (ref 0.8–3.5)
LYMPHOCYTES NFR BLD: 17 % (ref 12–49)
MCH RBC QN AUTO: 23.8 PG (ref 26–34)
MCHC RBC AUTO-ENTMCNC: 29.6 G/DL (ref 30–36.5)
MCV RBC AUTO: 80.4 FL (ref 80–99)
MONOCYTES # BLD: 0.5 K/UL (ref 0–1)
MONOCYTES NFR BLD: 7 % (ref 5–13)
NEUTS SEG # BLD: 5.5 K/UL (ref 1.8–8)
NEUTS SEG NFR BLD: 75 % (ref 32–75)
NRBC # BLD: 0 K/UL (ref 0–0.01)
NRBC BLD-RTO: 0 PER 100 WBC
PLATELET # BLD AUTO: 464 K/UL (ref 150–400)
PMV BLD AUTO: 8.3 FL (ref 8.9–12.9)
POTASSIUM SERPL-SCNC: 3.6 MMOL/L (ref 3.5–5.1)
RBC # BLD AUTO: 3.41 M/UL (ref 3.8–5.2)
SARS-COV-2 RDRP RESP QL NAA+PROBE: DETECTED
SODIUM SERPL-SCNC: 136 MMOL/L (ref 136–145)
SOURCE: ABNORMAL
WBC # BLD AUTO: 7.5 K/UL (ref 3.6–11)

## 2023-12-27 PROCEDURE — P9016 RBC LEUKOCYTES REDUCED: HCPCS

## 2023-12-27 PROCEDURE — 1100000003 HC PRIVATE W/ TELEMETRY

## 2023-12-27 PROCEDURE — 86923 COMPATIBILITY TEST ELECTRIC: CPT

## 2023-12-27 PROCEDURE — 6370000000 HC RX 637 (ALT 250 FOR IP): Performed by: HOSPITALIST

## 2023-12-27 PROCEDURE — 6370000000 HC RX 637 (ALT 250 FOR IP): Performed by: STUDENT IN AN ORGANIZED HEALTH CARE EDUCATION/TRAINING PROGRAM

## 2023-12-27 PROCEDURE — 86900 BLOOD TYPING SEROLOGIC ABO: CPT

## 2023-12-27 PROCEDURE — 2580000003 HC RX 258: Performed by: UROLOGY

## 2023-12-27 PROCEDURE — 2580000003 HC RX 258: Performed by: STUDENT IN AN ORGANIZED HEALTH CARE EDUCATION/TRAINING PROGRAM

## 2023-12-27 PROCEDURE — 36430 TRANSFUSION BLD/BLD COMPNT: CPT

## 2023-12-27 PROCEDURE — 6360000002 HC RX W HCPCS: Performed by: STUDENT IN AN ORGANIZED HEALTH CARE EDUCATION/TRAINING PROGRAM

## 2023-12-27 PROCEDURE — 86850 RBC ANTIBODY SCREEN: CPT

## 2023-12-27 PROCEDURE — 6370000000 HC RX 637 (ALT 250 FOR IP): Performed by: INTERNAL MEDICINE

## 2023-12-27 PROCEDURE — 6360000002 HC RX W HCPCS: Performed by: RADIOLOGY

## 2023-12-27 PROCEDURE — 36415 COLL VENOUS BLD VENIPUNCTURE: CPT

## 2023-12-27 PROCEDURE — 86901 BLOOD TYPING SEROLOGIC RH(D): CPT

## 2023-12-27 PROCEDURE — 80048 BASIC METABOLIC PNL TOTAL CA: CPT

## 2023-12-27 PROCEDURE — 87635 SARS-COV-2 COVID-19 AMP PRB: CPT

## 2023-12-27 PROCEDURE — 6360000002 HC RX W HCPCS: Performed by: HOSPITALIST

## 2023-12-27 PROCEDURE — 85025 COMPLETE CBC W/AUTO DIFF WBC: CPT

## 2023-12-27 RX ORDER — OXYCODONE HYDROCHLORIDE 5 MG/1
5 TABLET ORAL EVERY 4 HOURS PRN
Status: DISCONTINUED | OUTPATIENT
Start: 2023-12-27 | End: 2024-01-02

## 2023-12-27 RX ORDER — SODIUM CHLORIDE 9 MG/ML
INJECTION, SOLUTION INTRAVENOUS PRN
Status: DISCONTINUED | OUTPATIENT
Start: 2023-12-27 | End: 2024-01-02 | Stop reason: HOSPADM

## 2023-12-27 RX ORDER — ATENOLOL 25 MG/1
12.5 TABLET ORAL DAILY
Status: DISCONTINUED | OUTPATIENT
Start: 2023-12-27 | End: 2024-01-02 | Stop reason: HOSPADM

## 2023-12-27 RX ORDER — OXYCODONE HYDROCHLORIDE 5 MG/1
10 TABLET ORAL EVERY 4 HOURS PRN
Status: DISCONTINUED | OUTPATIENT
Start: 2023-12-27 | End: 2024-01-02

## 2023-12-27 RX ADMIN — PIPERACILLIN AND TAZOBACTAM 3375 MG: 3; .375 INJECTION, POWDER, FOR SOLUTION INTRAVENOUS at 20:14

## 2023-12-27 RX ADMIN — Medication 1 CAPSULE: at 08:33

## 2023-12-27 RX ADMIN — OXYCODONE HYDROCHLORIDE 10 MG: 5 TABLET ORAL at 20:11

## 2023-12-27 RX ADMIN — PIPERACILLIN AND TAZOBACTAM 3375 MG: 3; .375 INJECTION, POWDER, FOR SOLUTION INTRAVENOUS at 02:36

## 2023-12-27 RX ADMIN — PIPERACILLIN AND TAZOBACTAM 3375 MG: 3; .375 INJECTION, POWDER, FOR SOLUTION INTRAVENOUS at 11:30

## 2023-12-27 RX ADMIN — FENTANYL CITRATE 100 MCG: 50 INJECTION INTRAMUSCULAR; INTRAVENOUS at 08:32

## 2023-12-27 RX ADMIN — ATENOLOL 12.5 MG: 25 TABLET ORAL at 19:25

## 2023-12-27 RX ADMIN — LEVOTHYROXINE SODIUM 25 MCG: 0.03 TABLET ORAL at 05:59

## 2023-12-27 RX ADMIN — SODIUM CHLORIDE, PRESERVATIVE FREE 30 ML: 5 INJECTION INTRAVENOUS at 20:24

## 2023-12-27 RX ADMIN — Medication: at 08:48

## 2023-12-27 RX ADMIN — Medication: at 20:21

## 2023-12-27 RX ADMIN — ENOXAPARIN SODIUM 40 MG: 100 INJECTION SUBCUTANEOUS at 08:33

## 2023-12-27 RX ADMIN — Medication 1 AMPULE: at 20:24

## 2023-12-27 RX ADMIN — Medication 1 AMPULE: at 08:47

## 2023-12-27 ASSESSMENT — PAIN DESCRIPTION - DESCRIPTORS
DESCRIPTORS: ACHING

## 2023-12-27 ASSESSMENT — PAIN DESCRIPTION - PAIN TYPE
TYPE: ACUTE PAIN
TYPE: ACUTE PAIN

## 2023-12-27 ASSESSMENT — PAIN SCALES - GENERAL
PAINLEVEL_OUTOF10: 3
PAINLEVEL_OUTOF10: 9
PAINLEVEL_OUTOF10: 0
PAINLEVEL_OUTOF10: 4
PAINLEVEL_OUTOF10: 6

## 2023-12-27 ASSESSMENT — PAIN DESCRIPTION - DIRECTION
RADIATING_TOWARDS: RIGHT
RADIATING_TOWARDS: RIGHT

## 2023-12-27 ASSESSMENT — PAIN - FUNCTIONAL ASSESSMENT
PAIN_FUNCTIONAL_ASSESSMENT: ACTIVITIES ARE NOT PREVENTED

## 2023-12-27 ASSESSMENT — PAIN DESCRIPTION - FREQUENCY
FREQUENCY: INTERMITTENT
FREQUENCY: INTERMITTENT

## 2023-12-27 ASSESSMENT — PAIN DESCRIPTION - ONSET
ONSET: SUDDEN
ONSET: SUDDEN

## 2023-12-27 ASSESSMENT — PAIN DESCRIPTION - LOCATION
LOCATION: SHOULDER
LOCATION: SHOULDER;ARM;LEG
LOCATION: ARM;SHOULDER;LEG

## 2023-12-27 ASSESSMENT — PAIN DESCRIPTION - ORIENTATION
ORIENTATION: RIGHT

## 2023-12-28 LAB
ABO + RH BLD: NORMAL
BLD PROD TYP BPU: NORMAL
BLOOD BANK DISPENSE STATUS: NORMAL
BLOOD GROUP ANTIBODIES SERPL: NORMAL
BPU ID: NORMAL
CROSSMATCH RESULT: NORMAL
NT PRO BNP: 7686 PG/ML
SPECIMEN EXP DATE BLD: NORMAL
UNIT DIVISION: 0

## 2023-12-28 PROCEDURE — 1100000003 HC PRIVATE W/ TELEMETRY

## 2023-12-28 PROCEDURE — 83880 ASSAY OF NATRIURETIC PEPTIDE: CPT

## 2023-12-28 PROCEDURE — 6360000002 HC RX W HCPCS: Performed by: HOSPITALIST

## 2023-12-28 PROCEDURE — 2580000003 HC RX 258: Performed by: UROLOGY

## 2023-12-28 PROCEDURE — 6360000002 HC RX W HCPCS: Performed by: STUDENT IN AN ORGANIZED HEALTH CARE EDUCATION/TRAINING PROGRAM

## 2023-12-28 PROCEDURE — 6370000000 HC RX 637 (ALT 250 FOR IP): Performed by: HOSPITALIST

## 2023-12-28 PROCEDURE — 2580000003 HC RX 258: Performed by: STUDENT IN AN ORGANIZED HEALTH CARE EDUCATION/TRAINING PROGRAM

## 2023-12-28 PROCEDURE — 36415 COLL VENOUS BLD VENIPUNCTURE: CPT

## 2023-12-28 PROCEDURE — 6370000000 HC RX 637 (ALT 250 FOR IP): Performed by: INTERNAL MEDICINE

## 2023-12-28 PROCEDURE — 6370000000 HC RX 637 (ALT 250 FOR IP): Performed by: STUDENT IN AN ORGANIZED HEALTH CARE EDUCATION/TRAINING PROGRAM

## 2023-12-28 RX ADMIN — SODIUM CHLORIDE, PRESERVATIVE FREE 10 ML: 5 INJECTION INTRAVENOUS at 21:37

## 2023-12-28 RX ADMIN — Medication: at 09:18

## 2023-12-28 RX ADMIN — PIPERACILLIN AND TAZOBACTAM 3375 MG: 3; .375 INJECTION, POWDER, FOR SOLUTION INTRAVENOUS at 03:01

## 2023-12-28 RX ADMIN — Medication 1 AMPULE: at 21:37

## 2023-12-28 RX ADMIN — PIPERACILLIN AND TAZOBACTAM 3375 MG: 3; .375 INJECTION, POWDER, FOR SOLUTION INTRAVENOUS at 18:43

## 2023-12-28 RX ADMIN — SODIUM CHLORIDE, PRESERVATIVE FREE 10 ML: 5 INJECTION INTRAVENOUS at 09:19

## 2023-12-28 RX ADMIN — SODIUM CHLORIDE: 9 INJECTION, SOLUTION INTRAVENOUS at 11:07

## 2023-12-28 RX ADMIN — PIPERACILLIN AND TAZOBACTAM 3375 MG: 3; .375 INJECTION, POWDER, FOR SOLUTION INTRAVENOUS at 11:09

## 2023-12-28 RX ADMIN — ATENOLOL 12.5 MG: 25 TABLET ORAL at 09:13

## 2023-12-28 RX ADMIN — LEVOTHYROXINE SODIUM 25 MCG: 0.03 TABLET ORAL at 06:29

## 2023-12-28 RX ADMIN — Medication 1 CAPSULE: at 09:13

## 2023-12-28 RX ADMIN — ENOXAPARIN SODIUM 40 MG: 100 INJECTION SUBCUTANEOUS at 09:15

## 2023-12-28 RX ADMIN — Medication 1 AMPULE: at 09:18

## 2023-12-29 ENCOUNTER — ANESTHESIA (OUTPATIENT)
Facility: HOSPITAL | Age: 73
End: 2023-12-29
Payer: MEDICARE

## 2023-12-29 LAB
ANION GAP SERPL CALC-SCNC: 6 MMOL/L (ref 5–15)
BASOPHILS # BLD: 0 K/UL (ref 0–0.1)
BASOPHILS NFR BLD: 1 % (ref 0–1)
BUN SERPL-MCNC: 11 MG/DL (ref 6–20)
BUN/CREAT SERPL: 11 (ref 12–20)
CALCIUM SERPL-MCNC: 9 MG/DL (ref 8.5–10.1)
CHLORIDE SERPL-SCNC: 103 MMOL/L (ref 97–108)
CO2 SERPL-SCNC: 27 MMOL/L (ref 21–32)
CREAT SERPL-MCNC: 0.98 MG/DL (ref 0.55–1.02)
DIFFERENTIAL METHOD BLD: ABNORMAL
EOSINOPHIL # BLD: 0.1 K/UL (ref 0–0.4)
EOSINOPHIL NFR BLD: 1 % (ref 0–7)
ERYTHROCYTE [DISTWIDTH] IN BLOOD BY AUTOMATED COUNT: 17.7 % (ref 11.5–14.5)
GLUCOSE SERPL-MCNC: 105 MG/DL (ref 65–100)
HCT VFR BLD AUTO: 31.5 % (ref 35–47)
HGB BLD-MCNC: 9.8 G/DL (ref 11.5–16)
IMM GRANULOCYTES # BLD AUTO: 0 K/UL (ref 0–0.04)
IMM GRANULOCYTES NFR BLD AUTO: 0 % (ref 0–0.5)
LYMPHOCYTES # BLD: 1.3 K/UL (ref 0.8–3.5)
LYMPHOCYTES NFR BLD: 22 % (ref 12–49)
MAGNESIUM SERPL-MCNC: 1.6 MG/DL (ref 1.6–2.4)
MCH RBC QN AUTO: 24.5 PG (ref 26–34)
MCHC RBC AUTO-ENTMCNC: 31.1 G/DL (ref 30–36.5)
MCV RBC AUTO: 78.8 FL (ref 80–99)
MONOCYTES # BLD: 0.3 K/UL (ref 0–1)
MONOCYTES NFR BLD: 6 % (ref 5–13)
NEUTS SEG # BLD: 4.2 K/UL (ref 1.8–8)
NEUTS SEG NFR BLD: 70 % (ref 32–75)
NRBC # BLD: 0 K/UL (ref 0–0.01)
NRBC BLD-RTO: 0 PER 100 WBC
PHOSPHATE SERPL-MCNC: 2.2 MG/DL (ref 2.6–4.7)
PLATELET # BLD AUTO: 537 K/UL (ref 150–400)
PMV BLD AUTO: 8.2 FL (ref 8.9–12.9)
POTASSIUM SERPL-SCNC: 3.6 MMOL/L (ref 3.5–5.1)
RBC # BLD AUTO: 4 M/UL (ref 3.8–5.2)
SODIUM SERPL-SCNC: 136 MMOL/L (ref 136–145)
WBC # BLD AUTO: 6 K/UL (ref 3.6–11)

## 2023-12-29 PROCEDURE — 6370000000 HC RX 637 (ALT 250 FOR IP): Performed by: STUDENT IN AN ORGANIZED HEALTH CARE EDUCATION/TRAINING PROGRAM

## 2023-12-29 PROCEDURE — 2580000003 HC RX 258: Performed by: STUDENT IN AN ORGANIZED HEALTH CARE EDUCATION/TRAINING PROGRAM

## 2023-12-29 PROCEDURE — 2580000003 HC RX 258: Performed by: UROLOGY

## 2023-12-29 PROCEDURE — 83735 ASSAY OF MAGNESIUM: CPT

## 2023-12-29 PROCEDURE — 6360000002 HC RX W HCPCS: Performed by: STUDENT IN AN ORGANIZED HEALTH CARE EDUCATION/TRAINING PROGRAM

## 2023-12-29 PROCEDURE — 6370000000 HC RX 637 (ALT 250 FOR IP): Performed by: UROLOGY

## 2023-12-29 PROCEDURE — 80048 BASIC METABOLIC PNL TOTAL CA: CPT

## 2023-12-29 PROCEDURE — 6370000000 HC RX 637 (ALT 250 FOR IP): Performed by: INTERNAL MEDICINE

## 2023-12-29 PROCEDURE — 1100000003 HC PRIVATE W/ TELEMETRY

## 2023-12-29 PROCEDURE — 85025 COMPLETE CBC W/AUTO DIFF WBC: CPT

## 2023-12-29 PROCEDURE — 6360000002 HC RX W HCPCS: Performed by: HOSPITALIST

## 2023-12-29 PROCEDURE — 6370000000 HC RX 637 (ALT 250 FOR IP): Performed by: HOSPITALIST

## 2023-12-29 PROCEDURE — 84100 ASSAY OF PHOSPHORUS: CPT

## 2023-12-29 PROCEDURE — 36415 COLL VENOUS BLD VENIPUNCTURE: CPT

## 2023-12-29 RX ADMIN — Medication: at 03:19

## 2023-12-29 RX ADMIN — Medication 1 AMPULE: at 21:40

## 2023-12-29 RX ADMIN — PIPERACILLIN AND TAZOBACTAM 3375 MG: 3; .375 INJECTION, POWDER, FOR SOLUTION INTRAVENOUS at 11:01

## 2023-12-29 RX ADMIN — ATENOLOL 12.5 MG: 25 TABLET ORAL at 08:56

## 2023-12-29 RX ADMIN — Medication 1 CAPSULE: at 08:55

## 2023-12-29 RX ADMIN — ACETAMINOPHEN 650 MG: 325 TABLET ORAL at 11:29

## 2023-12-29 RX ADMIN — Medication: at 08:56

## 2023-12-29 RX ADMIN — PIPERACILLIN AND TAZOBACTAM 3375 MG: 3; .375 INJECTION, POWDER, FOR SOLUTION INTRAVENOUS at 18:12

## 2023-12-29 RX ADMIN — PIPERACILLIN AND TAZOBACTAM 3375 MG: 3; .375 INJECTION, POWDER, FOR SOLUTION INTRAVENOUS at 03:16

## 2023-12-29 RX ADMIN — SODIUM CHLORIDE, PRESERVATIVE FREE 10 ML: 5 INJECTION INTRAVENOUS at 21:40

## 2023-12-29 RX ADMIN — Medication 1 AMPULE: at 08:59

## 2023-12-29 RX ADMIN — Medication: at 21:40

## 2023-12-29 RX ADMIN — LEVOTHYROXINE SODIUM 25 MCG: 0.03 TABLET ORAL at 05:51

## 2023-12-29 RX ADMIN — SODIUM CHLORIDE, PRESERVATIVE FREE 10 ML: 5 INJECTION INTRAVENOUS at 08:57

## 2023-12-29 RX ADMIN — POTASSIUM & SODIUM PHOSPHATES POWDER PACK 280-160-250 MG 500 MG: 280-160-250 PACK at 16:08

## 2023-12-29 RX ADMIN — ACETAMINOPHEN 650 MG: 325 TABLET ORAL at 18:31

## 2023-12-29 RX ADMIN — ENOXAPARIN SODIUM 40 MG: 100 INJECTION SUBCUTANEOUS at 08:56

## 2023-12-29 ASSESSMENT — PAIN SCALES - GENERAL: PAINLEVEL_OUTOF10: 0

## 2023-12-30 PROCEDURE — 6370000000 HC RX 637 (ALT 250 FOR IP): Performed by: STUDENT IN AN ORGANIZED HEALTH CARE EDUCATION/TRAINING PROGRAM

## 2023-12-30 PROCEDURE — 6370000000 HC RX 637 (ALT 250 FOR IP): Performed by: HOSPITALIST

## 2023-12-30 PROCEDURE — 6370000000 HC RX 637 (ALT 250 FOR IP): Performed by: INTERNAL MEDICINE

## 2023-12-30 PROCEDURE — 6370000000 HC RX 637 (ALT 250 FOR IP): Performed by: UROLOGY

## 2023-12-30 PROCEDURE — 2580000003 HC RX 258: Performed by: UROLOGY

## 2023-12-30 PROCEDURE — 99222 1ST HOSP IP/OBS MODERATE 55: CPT | Performed by: INTERNAL MEDICINE

## 2023-12-30 PROCEDURE — 6360000002 HC RX W HCPCS: Performed by: STUDENT IN AN ORGANIZED HEALTH CARE EDUCATION/TRAINING PROGRAM

## 2023-12-30 PROCEDURE — 1100000003 HC PRIVATE W/ TELEMETRY

## 2023-12-30 PROCEDURE — 2580000003 HC RX 258: Performed by: STUDENT IN AN ORGANIZED HEALTH CARE EDUCATION/TRAINING PROGRAM

## 2023-12-30 PROCEDURE — 6360000002 HC RX W HCPCS: Performed by: HOSPITALIST

## 2023-12-30 RX ADMIN — LOPERAMIDE HYDROCHLORIDE 2 MG: 2 CAPSULE ORAL at 08:26

## 2023-12-30 RX ADMIN — SODIUM CHLORIDE, PRESERVATIVE FREE 10 ML: 5 INJECTION INTRAVENOUS at 21:42

## 2023-12-30 RX ADMIN — Medication 1 CAPSULE: at 08:17

## 2023-12-30 RX ADMIN — ATENOLOL 12.5 MG: 25 TABLET ORAL at 08:16

## 2023-12-30 RX ADMIN — LEVOTHYROXINE SODIUM 25 MCG: 0.03 TABLET ORAL at 06:39

## 2023-12-30 RX ADMIN — PIPERACILLIN AND TAZOBACTAM 3375 MG: 3; .375 INJECTION, POWDER, FOR SOLUTION INTRAVENOUS at 03:38

## 2023-12-30 RX ADMIN — ACETAMINOPHEN 325 MG: 325 TABLET ORAL at 18:57

## 2023-12-30 RX ADMIN — LOPERAMIDE HYDROCHLORIDE 2 MG: 2 CAPSULE ORAL at 15:30

## 2023-12-30 RX ADMIN — PIPERACILLIN AND TAZOBACTAM 3375 MG: 3; .375 INJECTION, POWDER, FOR SOLUTION INTRAVENOUS at 11:33

## 2023-12-30 RX ADMIN — SODIUM CHLORIDE, PRESERVATIVE FREE 10 ML: 5 INJECTION INTRAVENOUS at 08:20

## 2023-12-30 RX ADMIN — Medication 1 AMPULE: at 21:41

## 2023-12-30 RX ADMIN — PIPERACILLIN AND TAZOBACTAM 3375 MG: 3; .375 INJECTION, POWDER, FOR SOLUTION INTRAVENOUS at 18:51

## 2023-12-30 RX ADMIN — Medication: at 08:20

## 2023-12-30 RX ADMIN — Medication: at 21:42

## 2023-12-30 RX ADMIN — Medication 1 AMPULE: at 08:19

## 2023-12-30 ASSESSMENT — PAIN - FUNCTIONAL ASSESSMENT: PAIN_FUNCTIONAL_ASSESSMENT: ACTIVITIES ARE NOT PREVENTED

## 2023-12-30 ASSESSMENT — PAIN DESCRIPTION - DESCRIPTORS: DESCRIPTORS: ACHING

## 2023-12-30 ASSESSMENT — PAIN DESCRIPTION - ORIENTATION: ORIENTATION: POSTERIOR

## 2023-12-30 ASSESSMENT — PAIN SCALES - GENERAL: PAINLEVEL_OUTOF10: 3

## 2023-12-30 ASSESSMENT — PAIN DESCRIPTION - LOCATION: LOCATION: HEAD
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
## 2023-12-31 LAB
ANION GAP SERPL CALC-SCNC: 2 MMOL/L (ref 5–15)
BASOPHILS # BLD: 0 K/UL (ref 0–0.1)
BASOPHILS NFR BLD: 0 % (ref 0–1)
BUN SERPL-MCNC: 11 MG/DL (ref 6–20)
BUN/CREAT SERPL: 11 (ref 12–20)
CALCIUM SERPL-MCNC: 9.1 MG/DL (ref 8.5–10.1)
CHLORIDE SERPL-SCNC: 105 MMOL/L (ref 97–108)
CO2 SERPL-SCNC: 27 MMOL/L (ref 21–32)
CREAT SERPL-MCNC: 0.97 MG/DL (ref 0.55–1.02)
DIFFERENTIAL METHOD BLD: ABNORMAL
EOSINOPHIL # BLD: 0 K/UL (ref 0–0.4)
EOSINOPHIL NFR BLD: 0 % (ref 0–7)
ERYTHROCYTE [DISTWIDTH] IN BLOOD BY AUTOMATED COUNT: 18 % (ref 11.5–14.5)
GLUCOSE SERPL-MCNC: 95 MG/DL (ref 65–100)
HCT VFR BLD AUTO: 31.1 % (ref 35–47)
HGB BLD-MCNC: 9.6 G/DL (ref 11.5–16)
IMM GRANULOCYTES # BLD AUTO: 0 K/UL (ref 0–0.04)
IMM GRANULOCYTES NFR BLD AUTO: 0 % (ref 0–0.5)
LYMPHOCYTES # BLD: 2.2 K/UL (ref 0.8–3.5)
LYMPHOCYTES NFR BLD: 45 % (ref 12–49)
MAGNESIUM SERPL-MCNC: 1.5 MG/DL (ref 1.6–2.4)
MCH RBC QN AUTO: 24.6 PG (ref 26–34)
MCHC RBC AUTO-ENTMCNC: 30.9 G/DL (ref 30–36.5)
MCV RBC AUTO: 79.7 FL (ref 80–99)
MONOCYTES # BLD: 0.3 K/UL (ref 0–1)
MONOCYTES NFR BLD: 6 % (ref 5–13)
NEUTS SEG # BLD: 2.3 K/UL (ref 1.8–8)
NEUTS SEG NFR BLD: 49 % (ref 32–75)
NRBC # BLD: 0 K/UL (ref 0–0.01)
NRBC BLD-RTO: 0 PER 100 WBC
PHOSPHATE SERPL-MCNC: 2.7 MG/DL (ref 2.6–4.7)
PLATELET # BLD AUTO: 534 K/UL (ref 150–400)
PMV BLD AUTO: 8.1 FL (ref 8.9–12.9)
POTASSIUM SERPL-SCNC: 3.3 MMOL/L (ref 3.5–5.1)
RBC # BLD AUTO: 3.9 M/UL (ref 3.8–5.2)
RBC MORPH BLD: ABNORMAL
RBC MORPH BLD: ABNORMAL
SODIUM SERPL-SCNC: 134 MMOL/L (ref 136–145)
WBC # BLD AUTO: 4.8 K/UL (ref 3.6–11)
WBC MORPH BLD: ABNORMAL

## 2023-12-31 PROCEDURE — 6370000000 HC RX 637 (ALT 250 FOR IP): Performed by: STUDENT IN AN ORGANIZED HEALTH CARE EDUCATION/TRAINING PROGRAM

## 2023-12-31 PROCEDURE — 36415 COLL VENOUS BLD VENIPUNCTURE: CPT

## 2023-12-31 PROCEDURE — 80048 BASIC METABOLIC PNL TOTAL CA: CPT

## 2023-12-31 PROCEDURE — 6370000000 HC RX 637 (ALT 250 FOR IP): Performed by: HOSPITALIST

## 2023-12-31 PROCEDURE — 84100 ASSAY OF PHOSPHORUS: CPT

## 2023-12-31 PROCEDURE — 2580000003 HC RX 258: Performed by: UROLOGY

## 2023-12-31 PROCEDURE — 85025 COMPLETE CBC W/AUTO DIFF WBC: CPT

## 2023-12-31 PROCEDURE — 2580000003 HC RX 258: Performed by: STUDENT IN AN ORGANIZED HEALTH CARE EDUCATION/TRAINING PROGRAM

## 2023-12-31 PROCEDURE — 6360000002 HC RX W HCPCS: Performed by: STUDENT IN AN ORGANIZED HEALTH CARE EDUCATION/TRAINING PROGRAM

## 2023-12-31 PROCEDURE — 83735 ASSAY OF MAGNESIUM: CPT

## 2023-12-31 PROCEDURE — 6360000002 HC RX W HCPCS: Performed by: HOSPITALIST

## 2023-12-31 PROCEDURE — 1100000003 HC PRIVATE W/ TELEMETRY

## 2023-12-31 PROCEDURE — 6370000000 HC RX 637 (ALT 250 FOR IP): Performed by: INTERNAL MEDICINE

## 2023-12-31 PROCEDURE — 6370000000 HC RX 637 (ALT 250 FOR IP): Performed by: UROLOGY

## 2023-12-31 PROCEDURE — 2500000003 HC RX 250 WO HCPCS: Performed by: STUDENT IN AN ORGANIZED HEALTH CARE EDUCATION/TRAINING PROGRAM

## 2023-12-31 RX ORDER — MAGNESIUM SULFATE HEPTAHYDRATE 40 MG/ML
2000 INJECTION, SOLUTION INTRAVENOUS ONCE
Status: COMPLETED | OUTPATIENT
Start: 2023-12-31 | End: 2023-12-31

## 2023-12-31 RX ORDER — POTASSIUM CHLORIDE 750 MG/1
40 TABLET, FILM COATED, EXTENDED RELEASE ORAL ONCE
Status: COMPLETED | OUTPATIENT
Start: 2023-12-31 | End: 2023-12-31

## 2023-12-31 RX ADMIN — POTASSIUM CHLORIDE 40 MEQ: 750 TABLET, FILM COATED, EXTENDED RELEASE ORAL at 09:39

## 2023-12-31 RX ADMIN — LEVOTHYROXINE SODIUM 25 MCG: 0.03 TABLET ORAL at 06:37

## 2023-12-31 RX ADMIN — SODIUM CHLORIDE, PRESERVATIVE FREE 10 ML: 5 INJECTION INTRAVENOUS at 08:46

## 2023-12-31 RX ADMIN — PIPERACILLIN AND TAZOBACTAM 3375 MG: 3; .375 INJECTION, POWDER, FOR SOLUTION INTRAVENOUS at 18:11

## 2023-12-31 RX ADMIN — Medication 1 AMPULE: at 09:13

## 2023-12-31 RX ADMIN — PIPERACILLIN AND TAZOBACTAM 3375 MG: 3; .375 INJECTION, POWDER, FOR SOLUTION INTRAVENOUS at 11:54

## 2023-12-31 RX ADMIN — ATENOLOL 12.5 MG: 25 TABLET ORAL at 08:45

## 2023-12-31 RX ADMIN — LOPERAMIDE HYDROCHLORIDE 2 MG: 2 CAPSULE ORAL at 14:44

## 2023-12-31 RX ADMIN — Medication: at 21:58

## 2023-12-31 RX ADMIN — MAGNESIUM SULFATE HEPTAHYDRATE 2000 MG: 40 INJECTION, SOLUTION INTRAVENOUS at 09:40

## 2023-12-31 RX ADMIN — ACETAMINOPHEN 650 MG: 325 TABLET ORAL at 14:44

## 2023-12-31 RX ADMIN — SODIUM CHLORIDE, PRESERVATIVE FREE 10 ML: 5 INJECTION INTRAVENOUS at 21:58

## 2023-12-31 RX ADMIN — PIPERACILLIN AND TAZOBACTAM 3375 MG: 3; .375 INJECTION, POWDER, FOR SOLUTION INTRAVENOUS at 03:48

## 2023-12-31 RX ADMIN — Medication 1 CAPSULE: at 08:45

## 2023-12-31 RX ADMIN — Medication 1 AMPULE: at 21:58

## 2023-12-31 RX ADMIN — Medication: at 08:46

## 2023-12-31 RX ADMIN — ENOXAPARIN SODIUM 40 MG: 100 INJECTION SUBCUTANEOUS at 08:46

## 2023-12-31 RX ADMIN — LOPERAMIDE HYDROCHLORIDE 2 MG: 2 CAPSULE ORAL at 08:52

## 2023-12-31 ASSESSMENT — PAIN SCALES - GENERAL: PAINLEVEL_OUTOF10: 0

## 2024-01-01 ENCOUNTER — HOSPICE ADMISSION (OUTPATIENT)
Age: 74
End: 2024-01-01
Payer: MEDICARE

## 2024-01-01 PROCEDURE — 0656 HSPC GENERAL INPATIENT

## 2024-01-01 PROCEDURE — 6360000002 HC RX W HCPCS: Performed by: STUDENT IN AN ORGANIZED HEALTH CARE EDUCATION/TRAINING PROGRAM

## 2024-01-01 PROCEDURE — 1100000003 HC PRIVATE W/ TELEMETRY

## 2024-01-01 PROCEDURE — 6370000000 HC RX 637 (ALT 250 FOR IP): Performed by: INTERNAL MEDICINE

## 2024-01-01 PROCEDURE — 6370000000 HC RX 637 (ALT 250 FOR IP): Performed by: STUDENT IN AN ORGANIZED HEALTH CARE EDUCATION/TRAINING PROGRAM

## 2024-01-01 PROCEDURE — 6370000000 HC RX 637 (ALT 250 FOR IP): Performed by: UROLOGY

## 2024-01-01 PROCEDURE — 2580000003 HC RX 258: Performed by: UROLOGY

## 2024-01-01 PROCEDURE — 6370000000 HC RX 637 (ALT 250 FOR IP): Performed by: HOSPITALIST

## 2024-01-01 PROCEDURE — 2580000003 HC RX 258: Performed by: STUDENT IN AN ORGANIZED HEALTH CARE EDUCATION/TRAINING PROGRAM

## 2024-01-01 RX ADMIN — PIPERACILLIN AND TAZOBACTAM 3375 MG: 3; .375 INJECTION, POWDER, FOR SOLUTION INTRAVENOUS at 03:54

## 2024-01-01 RX ADMIN — PIPERACILLIN AND TAZOBACTAM 3375 MG: 3; .375 INJECTION, POWDER, FOR SOLUTION INTRAVENOUS at 10:36

## 2024-01-01 RX ADMIN — ATENOLOL 12.5 MG: 25 TABLET ORAL at 08:45

## 2024-01-01 RX ADMIN — SODIUM CHLORIDE, PRESERVATIVE FREE 10 ML: 5 INJECTION INTRAVENOUS at 08:47

## 2024-01-01 RX ADMIN — LOPERAMIDE HYDROCHLORIDE 2 MG: 2 CAPSULE ORAL at 08:45

## 2024-01-01 RX ADMIN — LEVOTHYROXINE SODIUM 25 MCG: 0.03 TABLET ORAL at 06:50

## 2024-01-01 RX ADMIN — ACETAMINOPHEN 650 MG: 325 TABLET ORAL at 20:41

## 2024-01-01 RX ADMIN — PIPERACILLIN AND TAZOBACTAM 3375 MG: 3; .375 INJECTION, POWDER, FOR SOLUTION INTRAVENOUS at 18:02

## 2024-01-01 RX ADMIN — Medication 1 AMPULE: at 08:46

## 2024-01-01 RX ADMIN — Medication 1 CAPSULE: at 08:45

## 2024-01-01 RX ADMIN — LOPERAMIDE HYDROCHLORIDE 2 MG: 2 CAPSULE ORAL at 14:39

## 2024-01-01 RX ADMIN — SODIUM CHLORIDE, PRESERVATIVE FREE 10 ML: 5 INJECTION INTRAVENOUS at 20:42

## 2024-01-01 RX ADMIN — ACETAMINOPHEN 650 MG: 325 TABLET ORAL at 08:45

## 2024-01-01 RX ADMIN — Medication: at 08:46

## 2024-01-01 RX ADMIN — Medication 1 AMPULE: at 20:42

## 2024-01-01 RX ADMIN — Medication: at 20:43

## 2024-01-01 ASSESSMENT — PAIN SCALES - GENERAL: PAINLEVEL_OUTOF10: 0

## 2024-01-01 NOTE — PLAN OF CARE
Problem: Discharge Planning  Goal: Discharge to home or other facility with appropriate resources  Outcome: Progressing     
  Problem: Pain  Goal: Verbalizes/displays adequate comfort level or baseline comfort level  12/13/2023 0959 by Odessa Bae RN  Outcome: Progressing  Flowsheets (Taken 12/13/2023 0745)  Verbalizes/displays adequate comfort level or baseline comfort level: Encourage patient to monitor pain and request assistance  12/13/2023 0040 by Purvi Saxena RN  Outcome: Progressing     Problem: Discharge Planning  Goal: Discharge to home or other facility with appropriate resources  12/13/2023 0959 by Odessa Bae RN  Outcome: Progressing  Flowsheets (Taken 12/13/2023 0750)  Discharge to home or other facility with appropriate resources: Identify barriers to discharge with patient and caregiver  12/13/2023 0040 by Purvi Saxena RN  Outcome: Progressing     Problem: Skin/Tissue Integrity  Goal: Absence of new skin breakdown  Description: 1.  Monitor for areas of redness and/or skin breakdown  2.  Assess vascular access sites hourly  3.  Every 4-6 hours minimum:  Change oxygen saturation probe site  4.  Every 4-6 hours:  If on nasal continuous positive airway pressure, respiratory therapy assess nares and determine need for appliance change or resting period.  12/13/2023 0959 by Odessa Bae RN  Outcome: Progressing  12/13/2023 0040 by Purvi Saxena RN  Outcome: Progressing     Problem: Safety - Adult  Goal: Free from fall injury  12/13/2023 0959 by Odessa Bae RN  Outcome: Progressing  12/13/2023 0040 by Purvi Saxena RN  Outcome: Progressing     Problem: ABCDS Injury Assessment  Goal: Absence of physical injury  12/13/2023 0959 by Odessa Bae RN  Outcome: Progressing  12/13/2023 0040 by Purvi Saxena RN  Outcome: Progressing     
  Problem: Pain  Goal: Verbalizes/displays adequate comfort level or baseline comfort level  12/20/2023 2136 by Arnulfo Crespo, RN  Outcome: Progressing  12/20/2023 1219 by Opal Burrows RN  Outcome: Progressing  Flowsheets (Taken 12/20/2023 1219)  Verbalizes/displays adequate comfort level or baseline comfort level:   Encourage patient to monitor pain and request assistance   Assess pain using appropriate pain scale   Administer analgesics based on type and severity of pain and evaluate response     Problem: Discharge Planning  Goal: Discharge to home or other facility with appropriate resources  Outcome: Progressing     Problem: Skin/Tissue Integrity  Goal: Absence of new skin breakdown  Description: 1.  Monitor for areas of redness and/or skin breakdown  2.  Assess vascular access sites hourly  3.  Every 4-6 hours minimum:  Change oxygen saturation probe site  4.  Every 4-6 hours:  If on nasal continuous positive airway pressure, respiratory therapy assess nares and determine need for appliance change or resting period.  Outcome: Progressing     Problem: Safety - Adult  Goal: Free from fall injury  Outcome: Progressing     Problem: ABCDS Injury Assessment  Goal: Absence of physical injury  Outcome: Progressing     
  Problem: Pain  Goal: Verbalizes/displays adequate comfort level or baseline comfort level  12/21/2023 2157 by Arnulfo Crespo RN  Outcome: Progressing  12/21/2023 1143 by Ileana Moy RN  Outcome: Progressing     Problem: Discharge Planning  Goal: Discharge to home or other facility with appropriate resources  12/21/2023 2157 by Arnulfo Crespo RN  Outcome: Progressing  12/21/2023 1143 by Ileana Moy RN  Outcome: Progressing     Problem: Skin/Tissue Integrity  Goal: Absence of new skin breakdown  Description: 1.  Monitor for areas of redness and/or skin breakdown  2.  Assess vascular access sites hourly  3.  Every 4-6 hours minimum:  Change oxygen saturation probe site  4.  Every 4-6 hours:  If on nasal continuous positive airway pressure, respiratory therapy assess nares and determine need for appliance change or resting period.  12/21/2023 2157 by Arnulfo Crespo RN  Outcome: Progressing  12/21/2023 1143 by Ileana Moy RN  Outcome: Progressing     Problem: Safety - Adult  Goal: Free from fall injury  12/21/2023 2157 by Arnulfo Crespo RN  Outcome: Progressing  12/21/2023 1143 by Ileana Moy RN  Outcome: Progressing     Problem: ABCDS Injury Assessment  Goal: Absence of physical injury  12/21/2023 2157 by Arnulfo Crespo RN  Outcome: Progressing  12/21/2023 1143 by Ileana Moy RN  Outcome: Progressing     
  Problem: Pain  Goal: Verbalizes/displays adequate comfort level or baseline comfort level  12/24/2023 0211 by Dawna Mccarthy RN  Outcome: Progressing     Problem: Skin/Tissue Integrity  Goal: Absence of new skin breakdown  Description: 1.  Monitor for areas of redness and/or skin breakdown  2.  Assess vascular access sites hourly  3.  Every 4-6 hours minimum:  Change oxygen saturation probe site  4.  Every 4-6 hours:  If on nasal continuous positive airway pressure, respiratory therapy assess nares and determine need for appliance change or resting period.  12/24/2023 0211 by Dawna Mccarthy, RN  Outcome: Progressing     Problem: Safety - Adult  Goal: Free from fall injury  12/24/2023 0211 by Dawna Mccarthy RN  Outcome: Progressing     Problem: ABCDS Injury Assessment  Goal: Absence of physical injury  12/24/2023 0211 by Dawna Mccarthy, RN  Outcome: Progressing     
  Problem: Pain  Goal: Verbalizes/displays adequate comfort level or baseline comfort level  12/24/2023 0729 by Kayy Alberts RN  Outcome: Progressing  12/24/2023 0211 by Dawna Mccarthy RN  Outcome: Progressing     Problem: Discharge Planning  Goal: Discharge to home or other facility with appropriate resources  Outcome: Progressing     Problem: Skin/Tissue Integrity  Goal: Absence of new skin breakdown  Description: 1.  Monitor for areas of redness and/or skin breakdown  2.  Assess vascular access sites hourly  3.  Every 4-6 hours minimum:  Change oxygen saturation probe site  4.  Every 4-6 hours:  If on nasal continuous positive airway pressure, respiratory therapy assess nares and determine need for appliance change or resting period.  12/24/2023 0729 by Kayy Alberts RN  Outcome: Progressing  12/24/2023 0211 by Dawna Mccarthy RN  Outcome: Progressing     Problem: Safety - Adult  Goal: Free from fall injury  12/24/2023 0729 by Kayy Alberts RN  Outcome: Progressing  12/24/2023 0211 by Dawna Mccarthy RN  Outcome: Progressing     Problem: ABCDS Injury Assessment  Goal: Absence of physical injury  12/24/2023 0729 by Kayy Alberts RN  Outcome: Progressing  12/24/2023 0211 by Dawna Mccarthy RN  Outcome: Progressing     
  Problem: Pain  Goal: Verbalizes/displays adequate comfort level or baseline comfort level  12/25/2023 0727 by Kayy Alberts RN  Outcome: Progressing  12/24/2023 2339 by Dawna Mccarthy RN  Outcome: Progressing  Flowsheets (Taken 12/24/2023 1200 by Kayy Alberts RN)  Verbalizes/displays adequate comfort level or baseline comfort level:   Encourage patient to monitor pain and request assistance   Assess pain using appropriate pain scale     Problem: Discharge Planning  Goal: Discharge to home or other facility with appropriate resources  Outcome: Progressing     Problem: Skin/Tissue Integrity  Goal: Absence of new skin breakdown  Description: 1.  Monitor for areas of redness and/or skin breakdown  2.  Assess vascular access sites hourly  3.  Every 4-6 hours minimum:  Change oxygen saturation probe site  4.  Every 4-6 hours:  If on nasal continuous positive airway pressure, respiratory therapy assess nares and determine need for appliance change or resting period.  12/25/2023 0727 by Kayy Alberts RN  Outcome: Progressing  12/24/2023 2339 by Dawna Mccarthy RN  Outcome: Progressing     Problem: Safety - Adult  Goal: Free from fall injury  12/25/2023 0727 by Kayy Alberts RN  Outcome: Progressing  12/24/2023 2339 by Dawna Mccarthy RN  Outcome: Progressing     Problem: ABCDS Injury Assessment  Goal: Absence of physical injury  12/25/2023 0727 by Kayy Alberts RN  Outcome: Progressing  12/24/2023 2339 by Dawna Mccarthy RN  Outcome: Progressing     
  Problem: Pain  Goal: Verbalizes/displays adequate comfort level or baseline comfort level  12/26/2023 2337 by Anaya Holder RN  Outcome: Progressing  12/26/2023 1346 by Eve Stafford LPN  Outcome: Progressing     Problem: Discharge Planning  Goal: Discharge to home or other facility with appropriate resources  Outcome: Progressing     Problem: Skin/Tissue Integrity  Goal: Absence of new skin breakdown  Description: 1.  Monitor for areas of redness and/or skin breakdown  2.  Assess vascular access sites hourly  3.  Every 4-6 hours minimum:  Change oxygen saturation probe site  4.  Every 4-6 hours:  If on nasal continuous positive airway pressure, respiratory therapy assess nares and determine need for appliance change or resting period.  Outcome: Progressing     Problem: Safety - Adult  Goal: Free from fall injury  Outcome: Progressing     Problem: ABCDS Injury Assessment  Goal: Absence of physical injury  Outcome: Progressing     
  Problem: Pain  Goal: Verbalizes/displays adequate comfort level or baseline comfort level  12/27/2023 1010 by Kayy Alberts RN  Outcome: Progressing  12/26/2023 2337 by Anaya Holder RN  Outcome: Progressing     Problem: Discharge Planning  Goal: Discharge to home or other facility with appropriate resources  12/27/2023 1010 by Kayy Alberts RN  Outcome: Progressing  12/26/2023 2337 by Anaya Holder RN  Outcome: Progressing     Problem: Skin/Tissue Integrity  Goal: Absence of new skin breakdown  Description: 1.  Monitor for areas of redness and/or skin breakdown  2.  Assess vascular access sites hourly  3.  Every 4-6 hours minimum:  Change oxygen saturation probe site  4.  Every 4-6 hours:  If on nasal continuous positive airway pressure, respiratory therapy assess nares and determine need for appliance change or resting period.  12/27/2023 1010 by Kayy Alberts RN  Outcome: Progressing  12/26/2023 2337 by Anaya Holder RN  Outcome: Progressing     Problem: Safety - Adult  Goal: Free from fall injury  12/27/2023 1010 by Kayy Alberts RN  Outcome: Progressing  12/26/2023 2337 by Anaya Holder RN  Outcome: Progressing     Problem: ABCDS Injury Assessment  Goal: Absence of physical injury  12/27/2023 1010 by Kayy Alberts RN  Outcome: Progressing  12/26/2023 2337 by Anaya Holder RN  Outcome: Progressing     
  Problem: Pain  Goal: Verbalizes/displays adequate comfort level or baseline comfort level  12/27/2023 1011 by Kayy Alberts RN  Outcome: Progressing  12/27/2023 1010 by Kayy Alberts RN  Outcome: Progressing  12/26/2023 2337 by Anaya Holder RN  Outcome: Progressing     Problem: Discharge Planning  Goal: Discharge to home or other facility with appropriate resources  12/27/2023 1011 by Kayy Alberts RN  Outcome: Progressing  12/27/2023 1010 by Kayy Alberts RN  Outcome: Progressing  12/26/2023 2337 by Anaya Holder RN  Outcome: Progressing     Problem: Skin/Tissue Integrity  Goal: Absence of new skin breakdown  Description: 1.  Monitor for areas of redness and/or skin breakdown  2.  Assess vascular access sites hourly  3.  Every 4-6 hours minimum:  Change oxygen saturation probe site  4.  Every 4-6 hours:  If on nasal continuous positive airway pressure, respiratory therapy assess nares and determine need for appliance change or resting period.  12/27/2023 1011 by Kayy Alberts RN  Outcome: Progressing  12/27/2023 1010 by Kayy Alberts RN  Outcome: Progressing  12/26/2023 2337 by Anaya Holder RN  Outcome: Progressing     Problem: Safety - Adult  Goal: Free from fall injury  12/27/2023 1011 by Kayy Alberts RN  Outcome: Progressing  12/27/2023 1010 by Kayy Alberts RN  Outcome: Progressing  12/26/2023 2337 by Anaya Holder RN  Outcome: Progressing     Problem: ABCDS Injury Assessment  Goal: Absence of physical injury  12/27/2023 1011 by Kayy Alberts RN  Outcome: Progressing  12/27/2023 1010 by Kayy Alberts RN  Outcome: Progressing  12/26/2023 2337 by Anaya Holder RN  Outcome: Progressing     
  Problem: Pain  Goal: Verbalizes/displays adequate comfort level or baseline comfort level  12/30/2023 2151 by Della Madsen RN  Outcome: Progressing  Flowsheets (Taken 12/24/2023 1200 by Kayy Alberts RN)  Verbalizes/displays adequate comfort level or baseline comfort level:   Encourage patient to monitor pain and request assistance   Assess pain using appropriate pain scale  12/30/2023 1836 by Tomer Rivera RN  Outcome: Progressing     Problem: Discharge Planning  Goal: Discharge to home or other facility with appropriate resources  12/30/2023 2151 by Della Madsen RN  Outcome: Progressing  12/30/2023 1836 by Tomer Rivera RN  Outcome: Progressing     Problem: Skin/Tissue Integrity  Goal: Absence of new skin breakdown  Description: 1.  Monitor for areas of redness and/or skin breakdown  2.  Assess vascular access sites hourly  3.  Every 4-6 hours minimum:  Change oxygen saturation probe site  4.  Every 4-6 hours:  If on nasal continuous positive airway pressure, respiratory therapy assess nares and determine need for appliance change or resting period.  12/30/2023 2151 by Della Madsen RN  Outcome: Progressing  12/30/2023 1836 by Tomer Rivera RN  Outcome: Progressing     Problem: Safety - Adult  Goal: Free from fall injury  12/30/2023 2151 by Della Madsen RN  Outcome: Progressing  12/30/2023 1836 by Tomer Rivera RN  Outcome: Progressing     Problem: ABCDS Injury Assessment  Goal: Absence of physical injury  12/30/2023 2151 by Della Madsen RN  Outcome: Progressing  Flowsheets (Taken 12/13/2023 1004 by Odessa Bae, RN)  Absence of Physical Injury: Implement safety measures based on patient assessment  12/30/2023 1836 by Tomer Rivera RN  Outcome: Progressing     
  Problem: Pain  Goal: Verbalizes/displays adequate comfort level or baseline comfort level  Outcome: Progressing     
  Problem: Pain  Goal: Verbalizes/displays adequate comfort level or baseline comfort level  Outcome: Progressing     
  Problem: Pain  Goal: Verbalizes/displays adequate comfort level or baseline comfort level  Outcome: Progressing     Problem: Discharge Planning  Goal: Discharge to home or other facility with appropriate resources  12/31/2023 2224 by Della Madsen, RN  Outcome: Progressing  12/31/2023 1240 by Chelly Miller, RN  Outcome: Progressing     Problem: Skin/Tissue Integrity  Goal: Absence of new skin breakdown  Description: 1.  Monitor for areas of redness and/or skin breakdown  2.  Assess vascular access sites hourly  3.  Every 4-6 hours minimum:  Change oxygen saturation probe site  4.  Every 4-6 hours:  If on nasal continuous positive airway pressure, respiratory therapy assess nares and determine need for appliance change or resting period.  Outcome: Progressing     Problem: Safety - Adult  Goal: Free from fall injury  Outcome: Progressing     Problem: ABCDS Injury Assessment  Goal: Absence of physical injury  Outcome: Progressing     
  Problem: Pain  Goal: Verbalizes/displays adequate comfort level or baseline comfort level  Outcome: Progressing     Problem: Discharge Planning  Goal: Discharge to home or other facility with appropriate resources  Outcome: Progressing     Problem: Skin/Tissue Integrity  Goal: Absence of new skin breakdown  Description: 1.  Monitor for areas of redness and/or skin breakdown  2.  Assess vascular access sites hourly  3.  Every 4-6 hours minimum:  Change oxygen saturation probe site  4.  Every 4-6 hours:  If on nasal continuous positive airway pressure, respiratory therapy assess nares and determine need for appliance change or resting period.  Outcome: Progressing     Problem: Safety - Adult  Goal: Free from fall injury  Outcome: Progressing     Problem: ABCDS Injury Assessment  Goal: Absence of physical injury  Outcome: Progressing     
  Problem: Pain  Goal: Verbalizes/displays adequate comfort level or baseline comfort level  Outcome: Progressing     Problem: Skin/Tissue Integrity  Goal: Absence of new skin breakdown  Description: 1.  Monitor for areas of redness and/or skin breakdown  2.  Assess vascular access sites hourly  3.  Every 4-6 hours minimum:  Change oxygen saturation probe site  4.  Every 4-6 hours:  If on nasal continuous positive airway pressure, respiratory therapy assess nares and determine need for appliance change or resting period.  Outcome: Progressing     Problem: Safety - Adult  Goal: Free from fall injury  Outcome: Progressing     Problem: ABCDS Injury Assessment  Goal: Absence of physical injury  Outcome: Progressing     
  Problem: Pain  Goal: Verbalizes/displays adequate comfort level or baseline comfort level  Outcome: Progressing  Flowsheets  Taken 12/17/2023 0755 by Josefa Kingston RN  Verbalizes/displays adequate comfort level or baseline comfort level: Encourage patient to monitor pain and request assistance  Taken 12/16/2023 1915 by Christelle Lizarraga RN  Verbalizes/displays adequate comfort level or baseline comfort level:   Encourage patient to monitor pain and request assistance   Assess pain using appropriate pain scale   Administer analgesics based on type and severity of pain and evaluate response   Implement non-pharmacological measures as appropriate and evaluate response   Notify Licensed Independent Practitioner if interventions unsuccessful or patient reports new pain     Problem: Discharge Planning  Goal: Discharge to home or other facility with appropriate resources  Outcome: Progressing     Problem: Safety - Adult  Goal: Free from fall injury  Outcome: Progressing     Problem: ABCDS Injury Assessment  Goal: Absence of physical injury  Outcome: Progressing     
  Problem: Pain  Goal: Verbalizes/displays adequate comfort level or baseline comfort level  Outcome: Progressing  Flowsheets (Taken 12/20/2023 1219)  Verbalizes/displays adequate comfort level or baseline comfort level:   Encourage patient to monitor pain and request assistance   Assess pain using appropriate pain scale   Administer analgesics based on type and severity of pain and evaluate response     
  Problem: Pain  Goal: Verbalizes/displays adequate comfort level or baseline comfort level  Outcome: Progressing  Flowsheets (Taken 12/24/2023 1200 by Kayy Alberts RN)  Verbalizes/displays adequate comfort level or baseline comfort level:   Encourage patient to monitor pain and request assistance   Assess pain using appropriate pain scale     Problem: Skin/Tissue Integrity  Goal: Absence of new skin breakdown  Description: 1.  Monitor for areas of redness and/or skin breakdown  2.  Assess vascular access sites hourly  3.  Every 4-6 hours minimum:  Change oxygen saturation probe site  4.  Every 4-6 hours:  If on nasal continuous positive airway pressure, respiratory therapy assess nares and determine need for appliance change or resting period.  Outcome: Progressing     Problem: Safety - Adult  Goal: Free from fall injury  Outcome: Progressing     Problem: ABCDS Injury Assessment  Goal: Absence of physical injury  Outcome: Progressing     
  Problem: Pain  Goal: Verbalizes/displays adequate comfort level or baseline comfort level  Outcome: Progressing  Flowsheets (Taken 12/24/2023 1200 by Kayy Alberts, RN)  Verbalizes/displays adequate comfort level or baseline comfort level:   Encourage patient to monitor pain and request assistance   Assess pain using appropriate pain scale     Problem: Discharge Planning  Goal: Discharge to home or other facility with appropriate resources  Outcome: Progressing     Problem: Skin/Tissue Integrity  Goal: Absence of new skin breakdown  Description: 1.  Monitor for areas of redness and/or skin breakdown  2.  Assess vascular access sites hourly  3.  Every 4-6 hours minimum:  Change oxygen saturation probe site  4.  Every 4-6 hours:  If on nasal continuous positive airway pressure, respiratory therapy assess nares and determine need for appliance change or resting period.  Outcome: Progressing     Problem: Safety - Adult  Goal: Free from fall injury  12/29/2023 2211 by Della Madsen, RN  Outcome: Progressing  Flowsheets (Taken 12/13/2023 1004 by Odessa Bae, RN)  Free From Fall Injury: Instruct family/caregiver on patient safety  12/29/2023 1350 by Chelly Miller, RN  Outcome: Progressing     Problem: ABCDS Injury Assessment  Goal: Absence of physical injury  Outcome: Progressing     
  Problem: Safety - Adult  Goal: Free from fall injury  Outcome: Progressing     
  Problem: Safety - Adult  Goal: Free from fall injury  Outcome: Progressing     
For possible nephrectomy 12/29, currently clinically stable  
3  []  4   2.  Moving from lying on your back to sitting on the side of a flat bed without using bedrails? [x]  1 []  2 []  3  []  4   3.  Moving to and from a bed to a chair (including a wheelchair)? [x]  1 []  2 []  3  []  4   4. Standing up from a chair using your arms (e.g. wheelchair or bedside chair)? [x]  1 []  2 []  3  []  4   5.  Walking in hospital room? [x]  1 []  2 []  3  []  4   6.  Climbing 3-5 steps with a railing? [x]  1 []  2 []  3  []  4     Raw Score: 7/24                            Cutoff score ?171,2,3 had higher odds of discharging home with home health or need of SNF/IPR.    1. Phyllis Weiss, Danielle Arboleda, Dmitri Rolle, Catie Frazier, Mickey Woo, Husam Weiss.  Validity of the AM-PAC “6-Clicks” Inpatient Daily Activity and Basic Mobility Short Forms. Physical Therapy Mar 2014, [phone] [phone]; DOI: 10.2522/ptj.[id number]  2. Agustín MA, Josesito J, Christopher J, Chinyere J. Association of AM-PAC \"6-Clicks\" Basic Mobility and Daily Activity Scores With Discharge Destination. Phys Ther. 2021 Apr 4;101(4):bvdu768. doi: 10.1093/ptj/khry382. PMID: 28645708.  3. Lesley BROWER, Marybel D, Opal S, Tiffany K, Fiordaliza S. Activity Measure for Post-Acute Care \"6-Clicks\" Basic Mobility Scores Predict Discharge Destination After Acute Care Hospitalization in Select Patient Groups: A Retrospective, Observational Study. Arch Rehabil Res Clin Transl. 2022 Jul 16;4(3):517640. doi: 10.1016/j.[id number].[id number]. PMID: 18935801; PMCID: [id number].  4. Isela CULVER, Carmita S, Abdulaziz W, Almita P. AM-PAC Short Forms Manual 4.0. Revised 2/2020.                                                                                                                                                                                                                            Pain Rating:  3/10 buttocks  Pain Intervention(s):   repositioning and pain is at a level acceptable to the patient    Activity Tolerance:   Good and SpO2

## 2024-01-02 VITALS
TEMPERATURE: 97.7 F | RESPIRATION RATE: 18 BRPM | DIASTOLIC BLOOD PRESSURE: 73 MMHG | WEIGHT: 178.13 LBS | BODY MASS INDEX: 27.96 KG/M2 | HEART RATE: 52 BPM | HEIGHT: 67 IN | OXYGEN SATURATION: 94 % | SYSTOLIC BLOOD PRESSURE: 169 MMHG

## 2024-01-02 PROBLEM — N15.1: Status: ACTIVE | Noted: 2024-01-02

## 2024-01-02 PROBLEM — R78.81 BACTEREMIA: Status: ACTIVE | Noted: 2024-01-02

## 2024-01-02 LAB
ANION GAP SERPL CALC-SCNC: 1 MMOL/L (ref 5–15)
BASOPHILS # BLD: 0.1 K/UL (ref 0–0.1)
BASOPHILS NFR BLD: 1 % (ref 0–1)
BUN SERPL-MCNC: 13 MG/DL (ref 6–20)
BUN/CREAT SERPL: 12 (ref 12–20)
CALCIUM SERPL-MCNC: 9.5 MG/DL (ref 8.5–10.1)
CHLORIDE SERPL-SCNC: 107 MMOL/L (ref 97–108)
CO2 SERPL-SCNC: 27 MMOL/L (ref 21–32)
CREAT SERPL-MCNC: 1.08 MG/DL (ref 0.55–1.02)
DIFFERENTIAL METHOD BLD: ABNORMAL
EOSINOPHIL # BLD: 0.4 K/UL (ref 0–0.4)
EOSINOPHIL NFR BLD: 6 % (ref 0–7)
ERYTHROCYTE [DISTWIDTH] IN BLOOD BY AUTOMATED COUNT: 18.6 % (ref 11.5–14.5)
GLUCOSE SERPL-MCNC: 75 MG/DL (ref 65–100)
HCT VFR BLD AUTO: 31.9 % (ref 35–47)
HGB BLD-MCNC: 9.6 G/DL (ref 11.5–16)
IMM GRANULOCYTES # BLD AUTO: 0.1 K/UL (ref 0–0.04)
IMM GRANULOCYTES NFR BLD AUTO: 1 % (ref 0–0.5)
LYMPHOCYTES # BLD: 2.1 K/UL (ref 0.8–3.5)
LYMPHOCYTES NFR BLD: 34 % (ref 12–49)
MAGNESIUM SERPL-MCNC: 1.8 MG/DL (ref 1.6–2.4)
MCH RBC QN AUTO: 24.6 PG (ref 26–34)
MCHC RBC AUTO-ENTMCNC: 30.1 G/DL (ref 30–36.5)
MCV RBC AUTO: 81.6 FL (ref 80–99)
MONOCYTES # BLD: 0.3 K/UL (ref 0–1)
MONOCYTES NFR BLD: 4 % (ref 5–13)
NEUTS SEG # BLD: 3.3 K/UL (ref 1.8–8)
NEUTS SEG NFR BLD: 54 % (ref 32–75)
NRBC # BLD: 0 K/UL (ref 0–0.01)
NRBC BLD-RTO: 0 PER 100 WBC
PHOSPHATE SERPL-MCNC: 2.7 MG/DL (ref 2.6–4.7)
PLATELET # BLD AUTO: 625 K/UL (ref 150–400)
PMV BLD AUTO: 8.3 FL (ref 8.9–12.9)
POTASSIUM SERPL-SCNC: 4.3 MMOL/L (ref 3.5–5.1)
RBC # BLD AUTO: 3.91 M/UL (ref 3.8–5.2)
RBC MORPH BLD: ABNORMAL
SODIUM SERPL-SCNC: 135 MMOL/L (ref 136–145)
WBC # BLD AUTO: 6.3 K/UL (ref 3.6–11)

## 2024-01-02 PROCEDURE — 83735 ASSAY OF MAGNESIUM: CPT

## 2024-01-02 PROCEDURE — 36415 COLL VENOUS BLD VENIPUNCTURE: CPT

## 2024-01-02 PROCEDURE — 6360000002 HC RX W HCPCS: Performed by: STUDENT IN AN ORGANIZED HEALTH CARE EDUCATION/TRAINING PROGRAM

## 2024-01-02 PROCEDURE — 80048 BASIC METABOLIC PNL TOTAL CA: CPT

## 2024-01-02 PROCEDURE — 84100 ASSAY OF PHOSPHORUS: CPT

## 2024-01-02 PROCEDURE — 76937 US GUIDE VASCULAR ACCESS: CPT

## 2024-01-02 PROCEDURE — 36569 INSJ PICC 5 YR+ W/O IMAGING: CPT

## 2024-01-02 PROCEDURE — 02HV33Z INSERTION OF INFUSION DEVICE INTO SUPERIOR VENA CAVA, PERCUTANEOUS APPROACH: ICD-10-PCS | Performed by: STUDENT IN AN ORGANIZED HEALTH CARE EDUCATION/TRAINING PROGRAM

## 2024-01-02 PROCEDURE — 6370000000 HC RX 637 (ALT 250 FOR IP): Performed by: INTERNAL MEDICINE

## 2024-01-02 PROCEDURE — B5181ZA FLUOROSCOPY OF SUPERIOR VENA CAVA USING LOW OSMOLAR CONTRAST, GUIDANCE: ICD-10-PCS | Performed by: STUDENT IN AN ORGANIZED HEALTH CARE EDUCATION/TRAINING PROGRAM

## 2024-01-02 PROCEDURE — 6370000000 HC RX 637 (ALT 250 FOR IP): Performed by: UROLOGY

## 2024-01-02 PROCEDURE — 6370000000 HC RX 637 (ALT 250 FOR IP): Performed by: HOSPITALIST

## 2024-01-02 PROCEDURE — 85025 COMPLETE CBC W/AUTO DIFF WBC: CPT

## 2024-01-02 PROCEDURE — 99232 SBSQ HOSP IP/OBS MODERATE 35: CPT | Performed by: INTERNAL MEDICINE

## 2024-01-02 PROCEDURE — 6360000002 HC RX W HCPCS: Performed by: HOSPITALIST

## 2024-01-02 PROCEDURE — 2580000003 HC RX 258: Performed by: UROLOGY

## 2024-01-02 PROCEDURE — 6370000000 HC RX 637 (ALT 250 FOR IP): Performed by: STUDENT IN AN ORGANIZED HEALTH CARE EDUCATION/TRAINING PROGRAM

## 2024-01-02 PROCEDURE — 2580000003 HC RX 258: Performed by: STUDENT IN AN ORGANIZED HEALTH CARE EDUCATION/TRAINING PROGRAM

## 2024-01-02 PROCEDURE — C1751 CATH, INF, PER/CENT/MIDLINE: HCPCS

## 2024-01-02 RX ORDER — HYDRALAZINE HYDROCHLORIDE 20 MG/ML
5 INJECTION INTRAMUSCULAR; INTRAVENOUS EVERY 6 HOURS PRN
Status: DISCONTINUED | OUTPATIENT
Start: 2024-01-02 | End: 2024-01-02 | Stop reason: HOSPADM

## 2024-01-02 RX ORDER — CARVEDILOL 3.12 MG/1
3.12 TABLET ORAL 2 TIMES DAILY
Qty: 60 TABLET | Refills: 3 | Status: SHIPPED | OUTPATIENT
Start: 2024-01-02

## 2024-01-02 RX ORDER — ATENOLOL 25 MG/1
12.5 TABLET ORAL DAILY
Qty: 30 TABLET | Refills: 3 | Status: SHIPPED | OUTPATIENT
Start: 2024-01-03 | End: 2024-01-02 | Stop reason: HOSPADM

## 2024-01-02 RX ADMIN — ATENOLOL 12.5 MG: 25 TABLET ORAL at 10:22

## 2024-01-02 RX ADMIN — Medication 1 CAPSULE: at 14:52

## 2024-01-02 RX ADMIN — Medication: at 08:44

## 2024-01-02 RX ADMIN — PIPERACILLIN AND TAZOBACTAM 3375 MG: 3; .375 INJECTION, POWDER, FOR SOLUTION INTRAVENOUS at 03:00

## 2024-01-02 RX ADMIN — SODIUM CHLORIDE, PRESERVATIVE FREE 10 ML: 5 INJECTION INTRAVENOUS at 10:28

## 2024-01-02 RX ADMIN — Medication 1 AMPULE: at 08:45

## 2024-01-02 RX ADMIN — PIPERACILLIN AND TAZOBACTAM 3375 MG: 3; .375 INJECTION, POWDER, FOR SOLUTION INTRAVENOUS at 10:29

## 2024-01-02 RX ADMIN — ENOXAPARIN SODIUM 40 MG: 100 INJECTION SUBCUTANEOUS at 10:23

## 2024-01-02 RX ADMIN — LEVOTHYROXINE SODIUM 25 MCG: 0.03 TABLET ORAL at 07:15

## 2024-01-02 RX ADMIN — ACETAMINOPHEN 650 MG: 325 TABLET ORAL at 15:29

## 2024-01-02 ASSESSMENT — PAIN - FUNCTIONAL ASSESSMENT: PAIN_FUNCTIONAL_ASSESSMENT: ACTIVITIES ARE NOT PREVENTED

## 2024-01-02 ASSESSMENT — PAIN DESCRIPTION - LOCATION
LOCATION: HEAD

## 2024-01-02 ASSESSMENT — PAIN DESCRIPTION - ORIENTATION
ORIENTATION: ANTERIOR

## 2024-01-02 ASSESSMENT — PAIN SCALES - GENERAL
PAINLEVEL_OUTOF10: 0
PAINLEVEL_OUTOF10: 3
PAINLEVEL_OUTOF10: 5
PAINLEVEL_OUTOF10: 4

## 2024-01-02 ASSESSMENT — PAIN DESCRIPTION - PAIN TYPE: TYPE: CHRONIC PAIN

## 2024-01-02 ASSESSMENT — PAIN DESCRIPTION - DESCRIPTORS
DESCRIPTORS: ACHING;DULL
DESCRIPTORS: ACHING

## 2024-01-02 ASSESSMENT — PAIN DESCRIPTION - DIRECTION: RADIATING_TOWARDS: ANTERIOR HEAD

## 2024-01-02 ASSESSMENT — PAIN DESCRIPTION - FREQUENCY: FREQUENCY: CONTINUOUS

## 2024-01-02 NOTE — DISCHARGE SUMMARY
BUN 13   CREATININE 1.08*   GLUCOSE 75   CALCIUM 9.5   PHOS 2.7   MG 1.8     Recent Labs     01/02/24  0443   HGB 9.6*   HCT 31.9*   WBC 6.3   *       Discharge Medications:        Post Discharge PICC and Antibiotic Orders     1.  Diagnosis: Renal carbuncle  2.  Antibiotic: Zosyn 3.375 g IV every 6 hours through 1/11/2024  3.  Routine PICC/ Paulette/ Portacath Care including PRN catheter flow management  4.  Weekly labs:              [x] CBC/diff/platelets              [x] BUN/Creatinine              [] CPK              [x] AST/TotalBilirubin/AlkalinePhosphatase              [] CRP              []Trough Vancomycin level goal 15-20  5.  Fax lab to [phone]  Call Critical Lab Values to [phone]  6.  May send to IR for line evaluation or replacement [phone]  7.  Home Health to pull PIC line at end of therapy or send to IR for Paulette removal  8.  Allergies:          Allergies   Allergen Reactions    Antihistamines, Diphenhydramine-Type Other (See Comments)       reported on referral packet                  Medication List        CONTINUE taking these medications      carvedilol 3.125 MG tablet  Commonly known as: Coreg  Take 1 tablet by mouth 2 times daily     levothyroxine 25 MCG tablet  Commonly known as: SYNTHROID  TAKE ONE TABLET BY MOUTH EVERY DAY FOR HYPOTHYROIDISM.            ASK your doctor about these medications      acetaminophen 650 MG extended release tablet  Commonly known as: TYLENOL     benzonatate 100 MG capsule  Commonly known as: TESSALON  Take 1 capsule by mouth 2 times daily as needed for Cough     senna-docusate 8.6-50 MG per tablet  Commonly known as: PERICOLACE               Where to Get Your Medications        These medications were sent to [facility] - [place], [place] - [address] - P [phone] - F [phone]  [address] [facility] [address]      Phone: [phone]   carvedilol 3.125 MG tablet             DISPOSITION:    Home with

## 2024-01-02 NOTE — DISCHARGE INSTRUCTIONS
You were treated for COVID and a kidney infection. Please followup with the Urology office regarding coming back for the kidney surgery. Followup with your primary care doctor.

## 2024-01-02 NOTE — PROCEDURES
Peripherally Inserted Central Catheter (PICC) Procedure Note    INDICATIONS: Zosyn IV  until 1/10/24 with possible extension    CONSENT:  The procedure, risks, benefits and alternatives were discussed with the patient. All questions were answered, and informed written consent obtained from the patient. Informed consent and Procedure Time Out were completed.    PROCEDURE DETAILS:   Ultrasound was used to confirm patency of the right brachial prior to obtaining venous access. The area to be punctured was cleansed with chlorhexidine 2% for more than 30 seconds, allowed to dry and the site was draped using maximum sterile barrier precautions. Selected vein was once again confirmed with ultrasound and Lidocaine 1% injected followed by puncture of the right brachial with a 21 gauge single wall needle under direct sonographic guidance. Guidewire was advanced and the needle was removed and peel-away sheath was placed. The catheter was then trimmed and advanced through the peel-away sheath using  electronic navigation. The sheath was, brisk blood return confirmed, the catheter was flushed with normal saline and capped.    Maximum sterile barrier was used: cap, mask, sterile gloves, sterile gown, and body drape. All guide wires removed with tip intact by visual inspection. The catheter was stabilized on the skin using a securement device. Antimicrobial impregnated transparent dressing applied using aseptic technique.    Patient tolerated the procedure well with no complications    PICC:  Catheter Type: BARD 4 FR   single Lumen PowerPICC  Insertion Site (vein) :right brachial  Total Length: 34 cm  External Length: 0 cm  UAC:  31 cm  PICC occupies 17 % of Vein    PICC KIT:  Reference #: [id number]  Lot #: [id number]  Expiration date: 01-    FINDINGS/CONCLUSIONS:  No signs of bleeding or symptoms of nerve irritation noted.  Final PICC tip location was

## 2024-01-02 NOTE — PROGRESS NOTES
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 nephrostomy.  She has bilateral stones obstructing her ureters.  12 mm at the left UPJ and 1 to 2 mm stones in separate places in the mid ureter on the right.  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  There is a large stone in the upper pole that may be blocking and infundibulum.         Pt seen in bed discussed procedure for R perc tube . Reviewed reason for procedure and process involved pt agreed with procedure     Vss afebrile   Wbc 12.6  Cr 1.29    Vitals: Temp (24hrs), Av.9 °F (36.6 °C), Min:97 °F (36.1 °C), Max:98.2 °F (36.8 °C)    Blood pressure (!) 156/69, pulse 69, temperature 98.2 °F (36.8 °C), temperature source Oral, resp. rate 18, height 1.702 m (5' 7.01\"), weight 80.8 kg (178 lb 2.1 oz), SpO2 98 %.    Intake and Output:   1901 -  0700  In: -   Out: 400 [Urine:400]  No intake/output data recorded.  LORETTA Output lats 24 hrs: No data found.   LORETTA Output last 8 hrs: No data found.  BM over last 24 hrs: No data found.    Exam:   Gen: NAD  CV: extremities well 
..End of Shift Note    Bedside shift change report given to GHULAM Mcclure (oncoming nurse) by Srinivasan John RN (offgoing nurse).  Report included the following information SBAR    Shift worked:  0700 - 1900     Shift summary and any significant changes:    Pt. Tolerated all medication w/o issue.  Last BM today.  No c/o pain or discomfort.  CT scan ordered for abdomen.     Concerns for physician to address: No     Zone phone for oncoming shift:  No       Activity:     Number times ambulated in hallways past shift: 0  Number of times OOB to chair past shift: 0    Cardiac:   Cardiac Monitoring: Yes           Access:  Current line(s): PIV     Genitourinary:   Urinary status: voiding    Respiratory:      Chronic home O2 use?: NO  Incentive spirometer at bedside: YES       GI:     Current diet:  DIET ONE TIME MESSAGE;  ADULT DIET; Regular; No bread please  Passing flatus: YES  Tolerating current diet: YES       Pain Management:   Patient states pain is manageable on current regimen: YES    Skin:     Interventions: increase time out of bed    Patient Safety:  Fall Score:    Interventions: bed/chair alarm       Length of Stay:  Expected LOS: 9  Actual LOS: 8      Srinivasan John RN                            
0600 Pt states that she will allow one attempt for morning labs each morning. Unable to obtain labs.  
0700: Bedside shift report received from GHULAM Aguillon.   0800: Shift assessment complete. Pt is A&Ox4 with no c/o pain or discomfort at this time. Pt with escobar draining hazy/yellow urine; 40cc out at this time. Pt with LR @ 150 at this time. Levophed stopped at this time; will continue to monitor BP to maintain parameters. VSS. See flow sheets for more assessment details and eMAR for medication details.   1030: Interdisciplinary rounding complete - new goals/orders noted.   1200: Reassessment complete - no acute change.   1330: New orders for paired blood cultures, troponin, and lactic noted. Attempted to stick patient multiple times; able to obtain 1 set of blood cultures, troponin and lactic. Unable to obtain 2nd set of blood cultures. PICC team called and notified.    1430: Echo at bedside.   1445: Pt transferred to [facility]. VSS. Two RNs at bedside upon arrival to unit.   
1900 Bedside and Verbal shift change report given to Arnulfo PARMAR (oncoming nurse) by Ileana PARMAR (offgoing nurse). Report included the following information Nurse Handoff Report, MAR, Recent Results, and Cardiac Rhythm NSR/Corby    0357 Pt latest potassium is 3.2 and latest hemoglobin is 7.4. [facility]a Hospitalist informed    0500 New orders from [facility] [place] [facility] of potassium chloride 10 mEq/100 mL IV 2x started.    End of Shift Note    Bedside shift change report given to Sam PARMAR (oncoming nurse) by Arnulfo Crespo RN (offgoing nurse).  Report included the following information SBAR, MAR, Recent Results, and Cardiac Rhythm NSR    Shift worked:  7pm-7am     Shift summary and any significant changes:      Morning bloods collected and sent to lab    See above   Concerns for physician to address:        Zone phone for oncoming shift:           Activity:     Number times ambulated in hallways past shift: 0  Number of times OOB to chair past shift: 0    Cardiac:   Cardiac Monitoring: Yes           Access:  Current line(s): PIV     Genitourinary:   Urinary status: escobar         GI:     Current diet:  ADULT DIET; Regular  Passing flatus: YES  Tolerating current diet: YES       Pain Management:   Patient states pain is manageable on current regimen: YES    Skin:     Interventions: specialty bed, float heels, increase time out of bed, foam dressing, PT/OT consult, limit briefs, internal/external urinary devices, and nutritional support    Patient Safety:  Fall Score:    Interventions: bed/chair alarm, assistive device (walker, cane. etc), gripper socks, pt to call before getting OOB, stay with me (per policy), and gait belt       Length of Stay:  Expected LOS: 15  Actual LOS: 11    Crow Score 13. All of the following interventions have been implemented to prevent pressure injury:    SKIN ASSESSMENT (S)  Dual skin assessment completed at shift change: Yes  Name of second RN who completed Dual Skin Assessment: Ileana 
1900 Bedside and Verbal shift change report given to Arnulfo PARMAR (oncoming nurse) by Kayla PARMAR (offgoing nurse). Report included the following information Nurse Handoff Report, MAR, Recent Results, and Cardiac Rhythm NSR .     0445 Patient refused blood collection and knows that she is a hard stick, wants to be poked once only for blood collection with the ultrasound guided machine. A[facility]arya Hospitalist informed.    Crow Score 13. All of the following interventions have been implemented to prevent pressure injury:    SKIN ASSESSMENT (S)  Dual skin assessment completed at shift change: Yes  Name of second RN who completed Dual Skin Assessment: Kayla PARMAR  Picture of wound uploaded to EMR: Yes  Venelex ordered and given per protocol: Yes  Wound care consulted if wounds present: Yes    KEEP MOVING (K)  Mobility status (Bedrest, Chairbound, UWA x 1 assist , 2 assist, Max assist): Max  Q2 hour turns documented: Yes  Refusals to turn and education provided documented: Yes  Device used to float heels: Pillow  PT/OT consulted: Yes    INCONTINENCE (I)  Incontinence status assessed Q2 hours: Yes  External catheter in use: Monahan  Barrier cream in use: Venelex    NUTRITION (N)  I/O's documented every 8 hours: Yes  Oral supplements ordered if appropriate: Yes  Nutrition services consulted: Yes    All concerns about new DTI's must be escalated directly to attending MD, charge nurse, CCL and nurse director.         End of Shift Note    Bedside shift change report given to Ileana PARMAR (oncoming nurse) by Arnulfo Crespo RN (offgoing nurse).  Report included the following information SBAR, MAR, Recent Results, and Cardiac Rhythm NSR/Corby    Shift worked:  7pm-7am     Shift summary and any significant changes:     Needs attended and kept comfortable.    See above     Concerns for physician to address:        Zone phone for oncoming shift:           Activity:     Number times ambulated in hallways past shift: 0  Number of times OOB to 
1900 Bedside and Verbal shift change report given to Arnulfo PARMAR (oncoming nurse) by Markell (offgoing nurse). Report included the following information Nurse Handoff Report, MAR, Recent Results, and Cardiac Rhythm Jessica .     Crow Score 15. All of the following interventions have been implemented to prevent pressure injury:    0327 Pt poop 3x in the past 4 hours. 2 formed and 1 loose and was requesting medicine to help. Also, pt latest troponin is 1,372, Hemoglobin is 7.5, Sodium is 130, BUN is 42 & Crea is 1.78. LAKSHMI Gill informed.    SKIN ASSESSMENT (S)  Dual skin assessment completed at shift change: Yes  Name of second RN who completed Dual Skin Assessment: Markell RN  Picture of wound uploaded to EMR: Yes  Venelex ordered and given per protocol: Yes  Wound care consulted if wounds present: Yes      KEEP MOVING (K)  Mobility status (Bedrest, Chairbound, UWA x 1 assist , 2 assist, Max assist): Max  Q2 hour turns documented: Yes  Refusals to turn and education provided documented: Yes  Device used to float heels: Foam  PT/OT consulted: Yes    INCONTINENCE (I)  Incontinence status assessed Q2 hours: Yes  External catheter in use: Monahan  Barrier cream in use: Venelex    NUTRITION (N)  I/O's documented every 8 hours: Yes  Oral supplements ordered if appropriate: Yes  Nutrition services consulted: Yes    All concerns about new DTI's must be escalated directly to attending MD, charge nurse, CCL and nurse director.         End of Shift Note    Bedside shift change report given to Rae PARMAR (oncoming nurse) by Arnulfo Crespo RN (offgoing nurse).  Report included the following information SBAR, MAR, Recent Results, and Cardiac Rhythm SCheryl    Shift worked:  7pm-7am     Shift summary and any significant changes:      Morning bloods collected and sent to lab.    See above   Concerns for physician to address:        Zone phone for oncoming shift:           Activity:     Number times ambulated in hallways past shift: 
1915  Report received    2129  Pt given med without difficulty Pt refused to be turned pt stated \" the bed should be enough. I want to get some rest tonight.\"     2311  Retacrit received from the pharmacy tech     2316  Rounds made no distress noted call bell within reach   Pt refused Retacrit    0206  Rounds made no distress noted call bell within reach     0500  Labs drawn     0724  Report given to on coming nurse Sarah including SBAR. Opportunity to ask questions given       
1920: Bedside shift change report given to Henna SRINIVASAN RN (oncoming nurse) by Anny TRAVIS RN (offgoing nurse). Report included the following information Nurse Handoff Report and ED Encounter Summary. Patient still waiting to go to OR.    2212: Assessment completed- see flowsheet for details. Patient is awake, alert, and oriented x 4. No complaints of pain. Patient moves all extremities spontaneously and purposefully and follows all commands, but is weak and has limited mobility in the lower extremities. O2 sats 98% on 2L NC. Lung sounds clear to diminished bilaterally. S1 and S2 audible. No edema present. All pulses palpable. Bowel sounds active. Monahan placed during surgery, has not drained any urine yet. Patient turned. Patient tolerating turning well.    0000: Reassessment completed, no changes from previous assessment.    0400: Reassessment completed, no changes from previous assessment.    0720: Bedside shift change report given to Aurelia BOSS RN (oncoming nurse) by Henna SRINIVASAN RN (offgoing nurse). Report included the following information Nurse Handoff Report, Intake/Output, MAR, Recent Results, Med Rec Status, and Cardiac Rhythm NSR .   
Again attempted to call PT in room, will try again later today    New COVID diagnosis requiring 10 day delay in any nonurgent OR procedure while COVID infection resolving     Okay to discharge home today with picc line and ABX therapy and plan for scheduled 1/10/23  IP [facility] nephrectomy. She will stay IP after this procedure at least another night or two.   
Attempted to speak with pt over phone     >24 hour afebrile    No new labs this morning however labs had stablized    New SOB/cough and loss of taste prompting covid test->now COVID + from 12/27 swab and on droplet precautions        PLAN:  unfortunately COVID diagnosis will delay open right nephrectomy, orignally scheduled for 12/29, now re scheduled for 1/10 afternoon    Cards consult reviewed- okay to proceed with procedure although sign elevated risk  Will also plan to do another repeat CT sometime next week to re-evaluate renal abscess prior to nephrectomy   Will formally round tomorrow   
Bedside and Verbal shift change report given to Jennifer (oncoming nurse) by Odessa (offgoing nurse). Report included the following information Nurse Handoff Report, Index, Intake/Output, MAR, and Cardiac Rhythm NSR .         End of Shift Note    Bedside shift change report given to  (oncoming nurse) by JENNIFER MENDOZA RN (offgoing nurse).  Report included the following information SBAR, Kardex, Intake/Output, MAR, Recent Results, and Cardiac Rhythm NSR    Shift worked:  7p-7a     Shift summary and any significant changes:     Uneventful shift     Concerns for physician to address:       Zone phone for oncoming shift:            JENNIFER MENDOZA RN          Crow Score 15. All of the following interventions have been implemented to prevent pressure injury:    SKIN ASSESSMENT (S)  Dual skin assessment completed at shift change: Yes  Name of second RN who completed Dual Skin Assessment: GHULAM Saxena  Picture of wound uploaded to EMR: NA  Venelex ordered and given per protocol: Yes  Wound care consulted if wounds present: NA    SURFACE (S)  Eryn air pump or specialty bed ordered: Yes  Type of bed: Iso-air  Only white chux used with specialty surfaces (no green chux used): Yes  Waffle cushion used for chair positioning: NA    KEEP MOVING (K)  Mobility status (Bedrest, Chairbound, UWA x 1 assist , 2 assist, Max assist): Bedrest - Ax1-2  Q2 hour turns documented: Yes  Refusals to turn and education provided documented: NA  Device used to float heels: Pillows  PT/OT consulted: NA    INCONTINENCE (I)  Incontinence status assessed Q2 hours: Yes  External catheter in use: No- Monahan  Barrier cream in use: Venelex    NUTRITION (N)  I/O's documented every 8 hours: Yes  Oral supplements ordered if appropriate: NA  Nutrition services consulted: NA    All concerns about new DTI's must be escalated directly to attending MD, charge nurse, CCL and nurse director.                       
Bedside and Verbal shift change report given to Josefa Kingston RN  (oncoming nurse) by RN (offgoing nurse). Report included the following information Nurse Handoff Report, Index, ED Encounter Summary, Intake/Output, MAR, Recent Results, and Cardiac Rhythm NSR .      End of Shift Note    Bedside shift change report given to GHULAM Doty (oncoming nurse) by Josefa Kingston RN (offgoing nurse).  Report included the following information SBAR, Kardex, ED Summary, Intake/Output, MAR, Recent Results, and Cardiac Rhythm NSR    Shift worked:  0257-7968     Shift summary and any significant changes:     No significant changes     Concerns for physician to address:       Zone phone for oncoming shift:          Activity:     Number times ambulated in hallways past shift: 0  Number of times OOB to chair past shift: 0    Cardiac:   Cardiac Monitoring: Yes           Access:  Current line(s): PIV     Genitourinary:   Urinary status: escobar    Respiratory:      Chronic home O2 use?: NO  Incentive spirometer at bedside: NO       GI:     Current diet:  ADULT DIET; Regular  Passing flatus: YES  Tolerating current diet: YES       Pain Management:   Patient states pain is manageable on current regimen: YES    Skin:     Interventions: specialty bed, float heels, increase time out of bed, PT/OT consult, limit briefs, and internal/external urinary devices    Patient Safety:  Fall Score:    Interventions: bed/chair alarm, assistive device (walker, cane. etc), gripper socks, pt to call before getting OOB, and stay with me (per policy)       Length of Stay:  Expected LOS: 9  Actual LOS: 7      Josefa Kingston RN                           
Bedside and Verbal shift change report given to Karla RN (oncoming nurse) by Markell RN (offgoing nurse). Report included the following information Nurse Handoff Report, MAR, and Cardiac Rhythm NSR/ sinus tachy .     0031: Administered prn imodium for diarrhea at patient request    0420: Morning blood work drawn and sent to lab    End of Shift Note    Bedside shift change report given to Chemo PARMAR (oncoming nurse) by Karla Castillo RN (offgoing nurse).  Report included the following information SBAR, MAR, Recent Results, and Cardiac Rhythm NSR/ sinus tachy    Shift worked:  5525-3930     Shift summary and any significant changes:    See above     Concerns for physician to address:    Zone phone for oncoming shift:       Activity:     Number times ambulated in hallways past shift: 0  Number of times OOB to chair past shift: 0    Cardiac:   Cardiac Monitoring: Yes           Access:  Current line(s): PIV     Genitourinary:   Urinary status: escobar    Respiratory:      Chronic home O2 use?: NO  Incentive spirometer at bedside: N/A       GI:     Current diet:  ADULT DIET; Regular  Passing flatus: YES  Tolerating current diet: YES       Pain Management:   Patient states pain is manageable on current regimen: YES    Skin:     Interventions: float heels, increase time out of bed, PT/OT consult, limit briefs, and nutritional support    Patient Safety:  Fall Score:    Interventions: bed/chair alarm, gripper socks, pt to call before getting OOB, stay with me (per policy), and gait belt       Length of Stay:  Expected LOS: 15  Actual LOS: 13      Karla Castillo RN                            
Called report to Reta PARMAR @[facility]. SBAR reviewed. Patient will discharge with Instructions, PICC line and Monahan catheter.   
Chart reviewed for IR consult  
Chart rounding only        Tmax 100.8 yesterday morning, now afeb.     Other labs stable    New SOB/cough and loss of taste prompting covid test, COVID swab was + this morning        PLAN:  unfortunately COVID diagnosis will delay open right nephrectomy, orignally scheduled for 12/29, now re scheduled for 1/10 afternoon     Needs final Cardiology clearance, re-consult pending   Will also plan to do another repeat CT sometime next week to re-evaluate renal abscess prior to nephrectomy   
Comprehensive Nutrition Assessment    Type and Reason for Visit:  Initial, RD Nutrition Re-Screen/LOS    Nutrition Recommendations/Plan:   Continue regular diet  Please document % meals consumed in flowsheet I/O's under intake      Malnutrition Assessment:  Malnutrition Status:  No malnutrition (12/18/23 1346)        Nutrition Assessment:     Chart reviewed for LOS and case discussed during IDR. Pt medically noted for septic shock, R renal abscess, NISREEN, BL hydronephrosis, bacteremia, NSTEMI, anemia, diarrhea. Pt reports her appetite is doing well. When she is hungry, she eats. No issues with appetite PTA either. Pt had some  feedback which RD will communicate to the [facility] leadership. Food preferences noted in her diet order. We discussed her diarrhea likely being r/t her ABX. Pt likes yogurt and has a probiotic ordered to start tomorrow. Will continue monitoring.     Patient Vitals for the past 120 hrs:   PO Meals Eaten (%)   12/17/23 0755 51 - 75%   12/14/23 1801 76 - 100%   12/14/23 1200 51 - 75%   12/14/23 0751 76 - 100%     Wt Readings from Last 5 Encounters:   12/14/23 80.8 kg (178 lb 2.1 oz)   ]    Nutrition Related Findings:    No labs today.   Meds: synthroid, zosyn.   Edema: 1+ generalized, 2+ BLE, trace BUE.   BM 12/17.   Wound Type: None       Current Nutrition Intake & Therapies:    Average Meal Intake: 51-75%  Average Supplements Intake: None Ordered  DIET ONE TIME MESSAGE;  ADULT DIET; Regular; No bread please    Anthropometric Measures:  Height: 170.2 cm (5' 7.01\")  Ideal Body Weight (IBW): 135 lbs (61 kg)       Current Body Weight: 80.8 kg (178 lb 2.1 oz), 131.9 % IBW. Weight Source: Bed Scale  Current BMI (kg/m2): 27.9        Weight Adjustment For: No Adjustment                 BMI Categories: Overweight (BMI 25.0-29.9)    Estimated Daily Nutrient Needs:  Energy Requirements Based On: Formula  Weight Used for Energy Requirements: Current  Energy (kcal/day): 1760 kcals (BMR x 1.3AF)  Weight 
Comprehensive Nutrition Assessment    Type and Reason for Visit:  Reassess    Nutrition Recommendations/Plan:   Continue diet as tolerated  Replete lytes prn  Please document % meals and supplements consumed in flowsheet I/O's under intake      Malnutrition Assessment:  Malnutrition Status:  No malnutrition (12/18/23 1346)        Nutrition Assessment:  Chart reviewed.  Pt now COVID +.  Awaiting disposition as nephrectomy has been postponed until 2024.  BM noted today.  Phos 2.2, in need of repletion.  Fairly good appetite noted previously.  Encourage PO intake.        Nutrition Related Findings:    Meds: zosyn, culturelle, synthroid, efferK.    Edema: trace-generalized, all extremities.    BM: 12/29   Wound Type: None       Current Nutrition Intake & Therapies:    Average Meal Intake: 51-75%  Average Supplements Intake: None Ordered  ADULT DIET; Regular    Anthropometric Measures:  Height: 170.2 cm (5' 7.01\")  Ideal Body Weight (IBW): 135 lbs (61 kg)       Current Body Weight: 80.8 kg (178 lb 2.1 oz), 131.9 % IBW. Weight Source: Bed Scale  Current BMI (kg/m2): 27.9        Weight Adjustment For: No Adjustment                 BMI Categories: Overweight (BMI 25.0-29.9)    Estimated Daily Nutrient Needs:  Energy Requirements Based On: Formula  Weight Used for Energy Requirements: Current  Energy (kcal/day): 1760 kcals (BMR x 1.3AF)  Weight Used for Protein Requirements: Current  Protein (g/day): 65-81g (0.8-1.0g/kg)  Method Used for Fluid Requirements: 1 ml/kcal  Fluid (ml/day): 1760mL    Nutrition Diagnosis:   No nutrition diagnosis at this time     Nutrition Interventions:   Food and/or Nutrient Delivery: Continue Current Diet  Nutrition Education/Counseling: No recommendation at this time  Coordination of Nutrition Care: Continue to monitor while inpatient       Goals:  Previous Goal Met: Goal(s) Achieved  Goals: PO intake 75% or greater, by next RD assessment       Nutrition Monitoring and Evaluation: 
Consent for blood transfusion    I have discussed with the patient the rationale for blood component transfusion; its benefits in treating or preventing fatigue, organ damage, or death; and its risk which includes mild transfusion reactions, rare risk of blood borne infection, or more serious but rare reactions. I have discussed the alternatives to transfusion, including the risk and consequences of not receiving transfusion. The patient had an opportunity to ask questions and had agreed to proceed with transfusion of blood components.    
Crow Score 13. All of the following interventions have been implemented to prevent pressure injury:    SKIN ASSESSMENT (S)  Dual skin assessment completed at shift change: {YES/NO:19732}  Name of second RN who completed Dual Skin Assessment: ***  Picture of wound uploaded to EMR: {YES/NO:19732}  Venelex ordered and given per protocol: {YES/NO:19732}  Wound care consulted if wounds present: {YES/NO:19732}    SURFACE (S)  [place] air pump or specialty bed ordered: {YES/NO:19732}  Type of bed: ***  Only white chux used with specialty surfaces (no green chux used): {YES/NO:19732}  Waffle cushion used for chair positioning: {YES/NO:19732}    KEEP MOVING (K)  Mobility status (Bedrest, Chairbound, UWA x 1 assist , 2 assist, Max assist): ***  Q2 hour turns documented: {YES/NO:19732}  Refusals to turn and education provided documented: {YES/NO:19732}  Device used to float heels: ***  PT/OT consulted: {YES/NO:19732}    INCONTINENCE (I)  Incontinence status assessed Q2 hours: {YES/NO:19732}  External catheter in use: ***  Barrier cream in use: ***    NUTRITION (N)  I/O's documented every 8 hours: {YES/NO:19732}  Oral supplements ordered if appropriate: {YES/NO:19732}  Nutrition services consulted: {YES/NO:19732}    All concerns about new DTI's must be escalated directly to attending MD, charge nurse, CCL and nurse director.       
Crow Score 14. All of the following interventions have been implemented to prevent pressure injury:    SKIN ASSESSMENT (S)  Dual skin assessment completed at shift change: Yes  Name of second RN who completed Dual Skin Assessment: GHULAM Valerio  Picture of wound uploaded to EMR: NA  Venelex ordered and given per protocol: Yes  Wound care consulted if wounds present: NA    SURFACE (S)  [place] air pump or specialty bed ordered: Yes  Type of bed: Air bed  Only white chux used with specialty surfaces (no green chux used): Yes  Waffle cushion used for chair positioning: Bedrest    KEEP MOVING (K)  Mobility status (Bedrest, Chairbound, UWA x 1 assist , 2 assist, Max assist): Bedrest  Q2 hour turns documented: Yes  Refusals to turn and education provided documented: NA  Device used to float heels: pillows  PT/OT consulted: Yes    INCONTINENCE (I)  Incontinence status assessed Q2 hours: Yes  External catheter in use: internal, escobar  Barrier cream in use: yes    NUTRITION (N)  I/O's documented every 8 hours: Yes  Oral supplements ordered if appropriate: Yes  Nutrition services consulted: Yes    All concerns about new DTI's must be escalated directly to attending MD, charge nurse, CCL and nurse director.      
Dictated.    B obs stones  Uti    Possible R xgp kidney    Complex ill patient. Understands she may need nephrectomy on R eventually    Plan B stents tonight  
End of Shift Note    Bedside shift change report given to (oncoming nurse) by Chelly Miller RN (offgoing nurse).  Report included the following information SBAR and Kardex    Shift worked:  7242-0283     Shift summary and any significant changes:    Turned q2. Heels up. No issues to report.   Concerns for physician to address:    Zone phone for oncoming shift:                         
End of Shift Note    Bedside shift change report given to (oncoming nurse) by Chelly Miller RN (offgoing nurse).  Report included the following information SBAR and Kardex    Shift worked:  9106-0912     Shift summary and any significant changes:     Turned q2 when pt would allow. Refuses heels up. Labs drawn.      Concerns for physician to address:    Zone phone for oncoming shift:       
End of Shift Note    Bedside shift change report given to GHULAM Barajas (oncoming nurse) by Eve Stafford LPN (offgoing nurse).  Report included the following information SBAR, Kardex, Intake/Output, MAR, and Recent Results    Shift worked:  7a-7p     Shift summary and any significant changes:     Pt refused turns this shift. Educated on purpose and importance of turns. Pt verbalized understanding but still refused. Given scheduled antibiotics. No complaints of pain or nausea. Tolerating diet.     Concerns for physician to address:  none     Zone phone for oncoming shift:   [phone]         Eve Stafford LPN                            
End of Shift Note    Bedside shift change report given to GHULAM Doty (oncoming nurse) by Sarmad James RN (offgoing nurse).  Report included the following information SBAR, ED Summary, Intake/Output, MAR, Recent Results, Med Rec Status, Cardiac Rhythm  , and Alarm Parameters     Shift worked:  7a-7p     Shift summary and any significant changes:     Continued plan of care, several BM     Concerns for physician to address:  none     Zone phone for oncoming shift:          Activity:     Number times ambulated in hallways past shift: 0 bedboud at baseline  Number of times OOB to chair past shift: 0    Cardiac:   Cardiac Monitoring: Yes           Access:  Current line(s): PIV     Genitourinary:   Urinary status: escobar    Respiratory:      Chronic home O2 use?: NO  Incentive spirometer at bedside: N/A       GI:     Current diet:  ADULT DIET; Regular  Passing flatus: YES  Tolerating current diet: YES       Pain Management:   Patient states pain is manageable on current regimen: N/A    Skin:     Interventions: specialty bed, float heels, PT/OT consult, limit briefs, internal/external urinary devices, and nutritional support    Patient Safety:  Fall Score:    Interventions: bed/chair alarm       Length of Stay:  Expected LOS: 8  Actual LOS: 2      Sarmad James RN                            
End of Shift Note    Bedside shift change report given to GHULAM Garcia (oncoming nurse) by Tomer Rivera RN (offgoing nurse).  Report included the following information SBAR    Shift worked:  9564-1400     Shift summary and any significant changes:    Consulted to infectious disease for antibiotic recommendation  Gave imodium 2x as pt said she is having diarrhea  Gave half dose of tylenol as pt's request  Uneventful shift     Concerns for physician to address:  None     Zone phone for oncoming shift:   [phone]       Tomer Rivera, RN                            
End of Shift Note    Bedside shift change report given to GHULAM Julien (oncoming nurse) by Eve Stafford LPN (offgoing nurse).  Report included the following information SBAR, Kardex, Intake/Output, MAR, and Recent Results    Shift worked:  7a-7p     Shift summary and any significant changes:     Pt refused Q2 turns this shift. Pt educated on importance of turning. Pt refused turns stating she is comfortable in her current position. Pt taken for X-Ray this shift. Given scheduled antibiotics. No complaints of pain or nausea. Tolerating diet.     Concerns for physician to address:  none     Zone phone for oncoming shift:   [phone]         Eve Stafford LPN                           
End of Shift Note    Bedside shift change report given to Jaskaran PARMAR (oncoming nurse) by Odessa Bae RN (offgoing nurse).  Report included the following information SBAR, Kardex, Intake/Output, MAR, Recent Results, and Cardiac Rhythm SR    Shift worked:  days     Shift summary and any significant changes:          Concerns for physician to address:       Zone phone for oncoming shift:          Activity:     Number times ambulated in hallways past shift:   Number of times OOB to chair past shift:     Cardiac:   Cardiac Monitoring:            Access:  Current line(s):      Genitourinary:   Urinary status:     Respiratory:      Chronic home O2 use?:   Incentive spirometer at bedside:        GI:     Current diet:  ADULT DIET; Regular  Passing flatus: YES  Tolerating current diet: YES       Pain Management:   Patient states pain is manageable on current regimen: YES    Skin:     Interventions: specialty bed and float heels    Patient Safety:  Fall Score:    Interventions: bed/chair alarm       Length of Stay:  Expected LOS: 9  Actual LOS: 4      Odessa Bae RN                            
End of Shift Note    Bedside shift change report given to Purvi (oncoming nurse) by Odessa Bae RN (offgoing nurse).  Report included the following information SBAR, Kardex, Intake/Output, MAR, Recent Results, and Cardiac Rhythm sr    Shift worked:  days     Shift summary and any significant changes:          Concerns for physician to address:       Zone phone for oncoming shift:          Activity:     Number times ambulated in hallways past shift: 0  Number of times OOB to chair past shift: 5    Cardiac:   Cardiac Monitoring: Yes           Access:  Current line(s): PIV     Genitourinary:   Urinary status: escobar cath in place    Respiratory:      Chronic home O2 use?: no    Incentive spirometer at bedside: NO       GI:     Current diet:  ADULT DIET; Regular  Passing flatus: YES  Tolerating current diet: YES       Pain Management:   Patient states pain is manageable on current regimen: yes    Skin:     Interventions: turn, elevate heels    Patient Safety:  Fall Score:    Interventions: gripper socks       Length of Stay:  Expected LOS: 8  Actual LOS: 3      Odessa Bae RN                            
I prayed with Nicolasa and celebrated with her the Anointing of the Sick.  Father Rupert Ocampo  
I prayed with Nicolasa and gave her a blessing.  Father Rupert Ocampo  
ID consult acknowledged, Perfect serve sent to Amita Liriano with ID at [id number] stating, \"new consult for abx recommendations, complicated culture and abx course thus far, source control not occuring until 1/10, would like to send home on abx until then. Pt has a covid bed available at [facility] as of yesterday 12/29 just pending final abx recs\"   Response at 0915 - ID to evaluate  
INTERVENTIONAL RADIOLOGY  Preoperative History and Physical      Patient:  Nicolasa Macdonald  :  1950  Age:  73 y.o.  MRN:  [id number]  Today's Date:  2023      CC / HPI   Nicolasa Macdonald is a 73 y.o. female with a history of known renal stones with UTIs/sepsis who presents for right renal abscess aspiration.    PAST MEDICAL HISTORY  Past Medical History:   Diagnosis Date    Cardiomyopathy (HCC)     HLD (hyperlipidemia)     Hypertension     Kidney stone     Leukocytosis     Thyroid disease        PAST SURGICAL HISTORY  Past Surgical History:   Procedure Laterality Date    CYSTOSCOPY Bilateral 12/10/2023    CYSTOSCOPY BILATERAL URETERAL STENT INSERTION performed by Nathan Garrett MD at [facility] MAIN OR    ORTHOPEDIC SURGERY      spinal surgery, heel surgery       SOCIAL HISTORY  Social History     Socioeconomic History    Marital status:      Spouse name: Not on file    Number of children: Not on file    Years of education: Not on file    Highest education level: Not on file   Occupational History    Not on file   Tobacco Use    Smoking status: Never    Smokeless tobacco: Never   Substance and Sexual Activity    Alcohol use: Never    Drug use: Never    Sexual activity: Not on file   Other Topics Concern    Not on file   Social History Narrative    Not on file     Social Determinants of Health     Financial Resource Strain: Low Risk  (2022)    Overall Financial Resource Strain (CARDIA)     Difficulty of Paying Living Expenses: Not hard at all   Food Insecurity: No Food Insecurity (12/10/2023)    Hunger Vital Sign     Worried About Running Out of Food in the Last Year: Never true     Ran Out of Food in the Last Year: Never true   Transportation Needs: No Transportation Needs (12/10/2023)    PRAPARE - Transportation     Lack of Transportation (Medical): No     Lack of Transportation (Non-Medical): No   Physical Activity: Inactive (2022)    Exercise Vital Sign     Days of Exercise per Week: 0 
Nephrology Progress Note  [facility] / [place] [facility]  [address], [address]  [place] [address]  Phone - [phone]  Fax - [phone]                 Patient: Nicolasa Macdonald                     YOB: 1950        Date- 12/15/2023                                     Admit Date: 12/10/2023   CC: Follow up for nisreen   IMPRESSION & PLAN:   NISREEN  due to ATN- sepsis +/- hydronephrosis  Renal stone  Ckd 2- bl cr 0.96 in jan 2023  Hyponatremia - improved  Right renal abscess  Proteus bacteremia   Anemia due to chronic dis  BL hydronephrosis with stones--S/p 12/11 B/L ureteral stent placement      PLAN-  Continue ivf  Continue abx - rocephine  Follow bmp  Avoid hypotension  Avoid NSAIDS     Subjective:  Interval History:   -  cr high stable  She is not getting any ivf at present?  She refused epogen yesterday    Objective:   Vitals:    12/14/23 1754 12/14/23 2239 12/15/23 0307 12/15/23 0745   BP:  (!) 121/56 (!) 116/49 (!) 123/51   Pulse: 78 69 58 56   Resp:   18 16   Temp:  97.8 °F (36.6 °C) 97.9 °F (36.6 °C)    TempSrc:  Axillary Oral    SpO2:  99% 97% 98%   Weight:       Height:          I/O last 3 completed shifts:  In: 410.2 [P.O.:200; I.V.:32.3; IV Piggyback:177.9]  Out: 1150 [Urine:1150]  No intake/output data recorded.      Physical exam:    GEN: NAD  NECK- no mass  RESP: no wheezing,  decreased BS b/l  CVS: S1,S2  RRR  NEURO: Normal speech,  non focal  EXT: No Edema   PSYCH: normal mood    Chart reviewed.         Pertinent Notes reviewed.     Data Review :  Lab Results   Component Value Date/Time     12/15/2023 05:00 AM    K 4.4 12/15/2023 05:00 AM     12/15/2023 05:00 AM    CO2 23 12/15/2023 05:00 AM    BUN 30 12/15/2023 05:00 AM    CREATININE 1.56 12/15/2023 05:00 AM    GLUCOSE 95 12/15/2023 05:00 AM    CALCIUM 8.6 12/15/2023 05:00 AM       Lab Results   Component Value Date    WBC 12.5 (H) 12/15/2023    HGB 6.8 (L) 
New lab orders placed this AM. This nurse attempted to draw labs from patient x 1. Pt refused to let this nurse try a second time. Charge nurse notified. Charge nurse made 2 attempts to obtain labs from patient and was unsuccessful. Call made to vascular access team. Vascular access will come as soon as they can.  
Nurse stuck her twice to get her type and screen   Pt said if I didn't get it she wasn't allowing anymore sticks.  Fortunately I got it.  Pt refusing to be turned nurse informed her the problems that could arise.  Pt allowed it and complained that the pill was making her \"uneven in the bed\"  I explained the purpose and importance.  Pt giving a lot of pushback and is rather difficult and not easy to work with.  
Occupational Therapy     OCCUPATIONAL THERAPY EVALUATION/DISCHARGE  Patient: Nicolasa Macdonald (73 y.o. female)  Date: 12/12/2023  Primary Diagnosis: Intra-abdominal abscess (HCC) [K65.1]  Septic shock (HCC) [A41.9, R65.21]  Altered mental status, unspecified altered mental status type [R41.82]  Procedure(s) (LRB):  CYSTOSCOPY BILATERAL URETERAL STENT INSERTION (Bilateral) 2 Days Post-Op     Precautions: Fall Risk, Bed Alarm, Other (comment) (high risk for skin breakdown)                  ASSESSMENT :  Based on the objective data below, the patient presents to be at their functional baseline for ADLs. Pt has been bed bound for ~4 years since her knees have started to \"lock up\" and she can't fully bend her knees; pt did state that bending knees or even palpitating kneecap is extremely painful. Pt's RUE and LUE AROM is generally decreased (RUE > LUE), yet functional for completing UE ADLs like UE dressing, bathing, and to self-feed. Pt stated that lives in a group home setup, and has a paid caregiver which comes 1-2 week to assist with LE bathing. Pt's group home setup assists her with toileting and changing her incontinence briefs, 1-2 times a day. Pt did perform rolling on this evaluation due to pt stating that she normally needs increased assistance with rolling at home. However, found that pt only requires SBA for rolling to L side w/ use of bed rail, and Min A for R side rolling w/ use of bed rail. Educated pt on the importance of performing BUE AROM for shoulder flexion, shoulder abduction, and elbow flexion to maintain current AROM and not decondition while in the hospital; pt demonstrates good carryover of education. Recommend that pt return to her group home upon being medically stable for discharge. Pt is discharged from acute care OT services due to being at her functional baseline for ADL completion and no further acute care OT needs at this time.     Functional Outcome Measure:  The patient scored 20/100 
PT stated that she doesn't want to be turned during the night. She wants to be in supine/semi montesinos position.Notified GHULAM Spaulding  
Patient arrived to IR for ordered renal abscess aspiration. Patient is AxO4 and in NAD.    Shanelle Collins obtained consent.     1525- Name of Procedure: Renal Abscess Aspiration     Sedation medications given:      Versed: 3 mg     Fentanyl:  75 mcg     Sedation Tolerated: Well     Procedure and sedation times are the same.      Sedation Start: 1500  Sedation End: 1525     Vital Signs:  Stable     Fluids Removed: 5 ml     Samples sent to lab: Yes, cultures walked to the lab.     Any complications related to procedure: none identified at this time    This patient is at an increased risk of falling because they have received sedating medications. Please evaluate and implement fall precautions/fall prevention practices as appropriate.    
Post Discharge PICC and Antibiotic Orders    1.  Diagnosis: Renal carbuncle  2.  Antibiotic: Zosyn 3.375 g IV every 6 hours through 1/11/2024  3.  Routine PICC/ Paulette/ Portacath Care including PRN catheter flow management  4.  Weekly labs:   [x] CBC/diff/platelets   [x] BUN/Creatinine   [] CPK   [x] AST/TotalBilirubin/AlkalinePhosphatase   [] CRP   []Trough Vancomycin level goal 15-20  5.  Fax lab to [phone]  Call Critical Lab Values to [phone]  6.  May send to IR for line evaluation or replacement [phone]  7.  Home Health to pull PIC line at end of therapy or send to IR for Paulette removal  8.  Allergies:    Allergies   Allergen Reactions    Antihistamines, Diphenhydramine-Type Other (See Comments)     reported on referral packet         Nathan Sinha DO     
Pt c/o coughing, loss of taste and smell stating all started 12/25 and is requesting to be covid tested. NP notified new orders for rapid covid. Drplet precautions in place. Charge nurse aware  
Pt refusing lab sticks.  
Spiritual Care Assessment/Progress Note  [facility]    Name: Nicolasa Macdonald MRN: [id number]    Age: 73 y.o.     Sex: female   Language: English     Date: 12/19/2023            Total Time Calculated: 9 min              Spiritual Assessment begun in [facility] 2 CARDIAC MEDICAL STEP DOWN  Service Provided For:: Patient  Referral/Consult From:: Rounding  Encounter Overview/Reason : Initial Encounter    Spiritual beliefs:      [] Involved in a uriah tradition/spiritual practice:      [] Supported by a uriah community:      [] Claims no spiritual orientation:      [] Seeking spiritual identity:           [] Adheres to an individual form of spirituality:      [x] Not able to assess:                Identified resources for coping and support system:   Support System: Family members       [] Prayer                  [] Devotional reading               [] Music                  [] Guided Imagery     [] Pet visits                                        [] Other: (COMMENT)     Specific area/focus of visit   Encounter:    Crisis:    Spiritual/Emotional needs: Type: Spiritual Support  Ritual, Rites and Sacraments: Type: Blessings  Grief, Loss, and Adjustments:    Ethics/Mediation:    Behavioral Health:    Palliative Care:    Advance Care Planning:      Plan/Referrals: Continue to visit, (comment), Continue Support (comment)    Narrative:  reviewed the patient's chart prior to the visit.  greeted Ms. Macdonald as she watched television. She engaged in conversation and shared her thoughts.  affirmed her feelings and advised her of the availability of Spiritual Health 24 hours a day as requested.    services are available 24 hours a day as requested.     Rev. ROMEO Rajan  [facility]   Paging Service [phone])  
This patient refused to have her labs drawn.  She stated that she allowed the night shift nurse to try and then one other nurse to to try.  They were unsuccessful so she said she wouldn't allow anyone else to try.  She would allow then to try again tomorrow morning.  
Tired.   Tmax 102.6 last night, now afeb.  WBC 9.8, Hgb 8.4.  Mild productive cough.     PLAN:  Still on track for open right nephrectomy 12/29.  Will order CXR.  On Zosyn.  Needs final Cardiology clearance for big op and transfusion above Hgb 9 with 2 U PRBC's to spare  
To patient's room to draw second set of blood cultures as requested by RN.  Patient kindly refused any more lab work today.  Primary nurse informed.     Tamica Mack RN, BSN, [place]-BC  Vascular Access Team    
[facility] Progress Note  Nathan Sinha DO  [phone] Office  [phone] Fax    2024      Assessment & Plan:     Proteus mirabilis renal carbuncle with loculated large collection measuring 5.4 x 3.2 x 7.1 cm.  Nephrectomy planned 1/10/2024.  Patient to be discharged to SNF then return to the hospital for procedure.  IV antibiotic orders are in the chart.  Please reconsult ID service once patient is readmitted to determine if she will need prolonged antibiotics  Proteus mirabilis bacteremia.  Repeat blood cultures have sterilized.  No further treatment planned  Proteus mirabilis UTI with nephrolithiasis and hydronephrosis.  Status post stent placement by urology  COVID-19 pneumonia.  Diagnosed 2023.  Continue isolation          Subjective:         Objective:     Vitals: BP (!) 167/71   Pulse 50   Temp 97.7 °F (36.5 °C) (Oral)   Resp 18   Ht 1.702 m (5' 7.01\")   Wt 80.8 kg (178 lb 2.1 oz)   SpO2 98%   BMI 27.89 kg/m²      Tmax:  Temp (24hrs), Av.3 °F (36.3 °C), Min:97 °F (36.1 °C), Max:97.7 °F (36.5 °C)      Exam:   Patient is intubated:  no    Physical Examination:   General:  Alert, cooperative, no distress   Head:  Normocephalic, atraumatic.   Eyes:  Conjunctivae clear   Neck: Supple       Lungs:   No distress.   Chest wall:     Heart:     Abdomen:   non-distended   Extremities: Moves all.     Skin: No acute rash on exposed skin   Neurologic: CNII-XII intact.      Labs:        Invalid input(s): \"ITNL\"   No results for input(s): \"CPK\", \"CKMB\" in the last 72 hours.    Invalid input(s): \"TROIQ\"  Recent Labs     23  0356 24  0443   * 135*   K 3.3* 4.3    107   CO2 27 27   BUN 11 13   PHOS 2.7 2.7   MG 1.5* 1.8   WBC 4.8 6.3   HGB 9.6* 9.6*   HCT 31.1* 31.9*   * 625*     No results for input(s): \"INR\", \"APTT\" in the last 72 hours.    Invalid input(s): \"PTP\"  Needs: urine analysis, urine sodium, protein and creatinine  Lab Results 
~0030: Report given to GHULAM Loja by GHULAM Doty    End of Shift Note    Bedside shift change report given to brandon (oncoming nurse) by JENNIFER MENDOZA RN (offgoing nurse).  Report included the following information SBAR and Kardex    Shift worked:  7p-7a     Shift summary and any significant changes:     Uneventful shift.     Concerns for physician to address:       Zone phone for oncoming shift:            JENNIFER MENDOZA RN                           
Identified  Food/Nutrient Intake Outcomes: Food and Nutrient Intake  Physical Signs/Symptoms Outcomes: Biochemical Data, GI Status, Fluid Status or Edema, Weight    Discharge Planning:    Continue current diet     Cecile Colmenares RD, [facility]  Contact: ext [phone]    
cardiomyopathy  -appreciate cardiology consult     anemia  Thrombocytosis  S/p 1 unit of PRBC on 12/15/23  S/p 1 dose of IV iron    Acute hypophosphatemia:  resolved    Diarrhea, suspected from antibiotics.    Continue probiotics  Will monitor      Hypothyroidism, continue levothyroxine    HTN, holding Coreg, resume as able      Medical Decision Making:   I personally reviewed labs: CBC BMP  I personally reviewed imaging:  I personally reviewed EKG:  Toxic drug monitoring: monitor platelets for thrombocytopenia from zosyn toxicity  Discussed case with: Urology, RN, CM      Code Status: DNR but OK with all supportive treatments including intubation if needed.   DVT Prophylaxis: Lovenox  GI Prophylaxis:  Baseline: For the last 4 years, patient is bedridden but able to wiggle her toes.  She has a caretaker which is also her DPOA.    Subjective:     Chief Complaint / Reason for Physician Visit  Following regarding renal abscess. Feels ok today.    Objective:     VITALS:   Last 24hrs VS reviewed since prior progress note. Most recent are:  Patient Vitals for the past 24 hrs:   BP Temp Temp src Pulse Resp SpO2   12/29/23 0800 (!) 142/80 98 °F (36.7 °C) Oral 72 16 --   12/29/23 0400 (!) 146/82 98 °F (36.7 °C) Oral 68 16 --   12/28/23 2002 (!) 152/85 97.9 °F (36.6 °C) Oral 64 18 95 %   12/28/23 1550 (!) 167/70 97.3 °F (36.3 °C) -- 63 18 95 %           Intake/Output Summary (Last 24 hours) at 12/29/2023 1545  Last data filed at 12/29/2023 1241  Gross per 24 hour   Intake 306.35 ml   Output 950 ml   Net -643.65 ml          I had a face to face encounter and independently examined this patient on 12/29/2023, as outlined below:    PHYSICAL EXAM:  General: Alert, cooperative, patchy alopecia  EENT:  EOMI. Anicteric sclerae.  Resp:  CTA bilaterally, no wheezing or rales.  No accessory muscle use  CV:  Regular  rate,  No edema  :  Monahan catheter in place draining yellow urine.  GI:  Soft, Non distended, Non tender.  +Bowel 
including intubation if needed.   DVT Prophylaxis: Lovenox  GI Prophylaxis:  Baseline: For the last 4 years, patient is bedridden but able to wiggle her toes.  She has a caretaker which is also her DPOA.    Subjective:     Chief Complaint / Reason for Physician Visit  Seen and examined at the bedside  Following regarding renal abscess  IV antibiotic  No new issues today    Objective:     VITALS:   Last 24hrs VS reviewed since prior progress note. Most recent are:  Patient Vitals for the past 24 hrs:   BP Temp Temp src Pulse Resp SpO2   12/23/23 1139 (!) 141/63 98.2 °F (36.8 °C) Oral 75 16 96 %   12/23/23 0930 (!) 145/73 -- -- 83 16 97 %   12/23/23 0456 138/62 97.7 °F (36.5 °C) Oral 64 17 96 %   12/23/23 0040 129/63 98.7 °F (37.1 °C) Oral 73 16 96 %   12/22/23 1915 (!) 155/62 98.4 °F (36.9 °C) Oral 80 16 96 %   12/22/23 1500 (!) 153/85 98.5 °F (36.9 °C) Oral 81 14 95 %           Intake/Output Summary (Last 24 hours) at 12/23/2023 1227  Last data filed at 12/23/2023 0040  Gross per 24 hour   Intake --   Output 1350 ml   Net -1350 ml          I had a face to face encounter and independently examined this patient on 12/23/2023, as outlined below:  PHYSICAL EXAM:  General: Alert, cooperative, patchy alopecia  EENT:  EOMI. Anicteric sclerae.  Resp:  CTA bilaterally, no wheezing or rales.  No accessory muscle use  CV:  Regular  rhythm,  No edema  :  Monahan catheter in place draining yellow urine.  GI:  Soft, Non distended, Non tender.  +Bowel sounds  Neurologic:  Alert and oriented X 3, normal speech,   Psych:   Good insight. Not anxious nor agitated  Skin:  No rashes.  No jaundice    Reviewed most current lab test results and cultures  YES  Reviewed most current radiology test results   YES  Review and summation of old records today    NO  Reviewed patient's current orders and MAR    YES  PMH/SH reviewed - no change compared to H&P    Procedures: see electronic medical records for all procedures/Xrays and details which 
pain    NSTEMI type 2 from septic shock   - Initial troponin improved from 5.8K to 5.3K, Monitor   12/11: Repeat troponin is 1892 which is trending down.  Patient does not have any chest pain or dyspnea.    -Echocardiogram with normal EF  -Patient has history of Takotsubo cardiomyopathy  -appreciate cardiology consult     anemia  Thrombocytosis  S/p 1 unit of PRBC on 12/15/23  S/p 1 dose of IV iron    Acute hypophosphatemia:  resolved    Diarrhea, suspected from antibiotics.    Continue probiotics  Will monitor      Hypothyroidism, continue levothyroxine    HTN, holding Coreg, resume as able      Medical Decision Making:   I personally reviewed labs: CBC BMP  I personally reviewed imaging:  I personally reviewed EKG:  Toxic drug monitoring: monitor platelets for thrombocytopenia from zosyn toxicity  Discussed case with: ID, RN, CM      Code Status: DNR but OK with all supportive treatments including intubation if needed.   DVT Prophylaxis: Lovenox  GI Prophylaxis:  Baseline: For the last 4 years, patient is bedridden but able to wiggle her toes.  She has a caretaker which is also her DPOA.    Subjective:     Chief Complaint / Reason for Physician Visit  Following regarding renal abscess. Feels ok today, no complaints.    Objective:     VITALS:   Last 24hrs VS reviewed since prior progress note. Most recent are:  Patient Vitals for the past 24 hrs:   BP Temp Temp src Pulse Resp SpO2   01/01/24 0345 (!) 160/50 97.2 °F (36.2 °C) Oral 51 15 --   12/31/23 2145 (!) 175/78 97.3 °F (36.3 °C) Oral 53 15 96 %           Intake/Output Summary (Last 24 hours) at 1/1/2024 1520  Last data filed at 1/1/2024 0653  Gross per 24 hour   Intake --   Output 1050 ml   Net -1050 ml          I had a face to face encounter and independently examined this patient on 1/1/2024, as outlined below:    PHYSICAL EXAM:  General: Alert, cooperative, patchy alopecia  EENT:  EOMI. Anicteric sclerae.  Resp:  CTA bilaterally, no wheezing or rales.  No 
     Head: Normocephalic and atraumatic.   Eyes:      Pupils: Pupils are equal, round, and reactive to light.   Cardiovascular:      Rate and Rhythm: Normal rate.   Pulmonary:      Effort: Pulmonary effort is normal.      Breath sounds: Normal breath sounds.   Neurological:      Mental Status: She is alert and oriented to person, place, and time.   Psychiatric:         Behavior: Behavior normal.         Past Medical History:      has a past medical history of Cardiomyopathy (HCC), HLD (hyperlipidemia), Hypertension, Leukocytosis, and Thyroid disease.    Past Surgical History:      has a past surgical history that includes orthopedic surgery and Cystoscopy (Bilateral, 12/10/2023).    Home Medications:     Prior to Admission medications    Medication Sig Start Date End Date Taking? Authorizing Provider   levothyroxine (SYNTHROID) 25 MCG tablet TAKE ONE TABLET BY MOUTH EVERY DAY FOR HYPOTHYROIDISM. 11/7/23   Mari Smart APRN - NP   benzonatate (TESSALON) 100 MG capsule Take 1 capsule by mouth 2 times daily as needed for Cough  Patient not taking: Reported on 12/10/2023 10/24/23   Mari Smart APRN - NP   carvedilol (COREG) 3.125 MG tablet Take 1 tablet by mouth 2 times daily 8/8/23   Mari Smart APRN - NP   acetaminophen (TYLENOL) 650 MG extended release tablet Take 1 tablet by mouth every 8 hours as needed  Patient not taking: Reported on 12/10/2023 11/14/22   Automatic Reconciliation, Ar   senna-docusate (PERICOLACE) 8.6-50 MG per tablet Take 2 tablets by mouth 2 times daily as needed  Patient not taking: Reported on 10/20/2023 11/14/22   Automatic Reconciliation, Ar       Allergies/Social/Family History:     Allergies   Allergen Reactions    Antihistamines, Diphenhydramine-Type Other (See Comments)     reported on referral packet      Social History     Tobacco Use    Smoking status: Never    Smokeless tobacco: Never   Substance Use Topics    Alcohol use: Never      Family History   Problem 
   NO  Reviewed patient's current orders and MAR    YES  PMH/SH reviewed - no change compared to H&P    Procedures: see electronic medical records for all procedures/Xrays and details which were not copied into this note but were reviewed prior to creation of Plan.      LABS:  I reviewed today's most current labs and imaging studies.  Pertinent labs include:  Recent Labs     12/10/23  0941 12/11/23 0347 12/12/23 0221   WBC 18.2* 18.4* 16.9*   HGB 8.0* 7.5* 7.5*   HCT 26.5* 25.7* 26.0*   * 720* 706*       Recent Labs     12/10/23  0941 12/11/23 0347 12/12/23 0221    137 130*   K 5.0 5.0 4.2    107 101   CO2 21 21 22   GLUCOSE 133* 175* 124*   BUN 38* 36* 42*   CREATININE 1.94* 1.69* 1.78*   CALCIUM 9.3 9.0 8.8   MG  --  1.9 1.6   PHOS  --  3.5  --    LABALBU 2.1*  --  1.7*   BILITOT 0.3  --  0.2   AST 18  --  17   ALT <6*  --  7*         Signed: Torrey Bullard MD          
  12/11/23 0800 (!) 109/52 97.6 °F (36.4 °C) Oral 62 14 -- --   12/11/23 0700 (!) 108/91 -- -- 75 19 -- --   12/11/23 0645 (!) 96/59 -- -- 70 17 -- --   12/11/23 0630 (!) 121/53 -- -- 66 14 97 % --   12/11/23 0615 (!) 127/58 -- -- 51 17 94 % --   12/11/23 0600 118/74 -- -- 54 18 95 % --   12/11/23 0545 (!) 121/55 -- -- 54 18 94 % --   12/11/23 0530 101/61 -- -- 51 18 94 % --   12/11/23 0515 (!) 119/59 -- -- 50 18 94 % --   12/11/23 0500 (!) 120/59 -- -- 53 18 94 % --   12/11/23 0445 120/60 -- -- 55 19 97 % --   12/11/23 0430 (!) 129/57 -- -- 60 18 97 % --   12/11/23 0415 130/62 -- -- 55 16 96 % --   12/11/23 0400 130/60 98 °F (36.7 °C) Axillary 65 17 97 % --   12/11/23 0345 123/70 -- -- 72 14 98 % --   12/11/23 0330 (!) 115/55 -- -- 63 18 95 % --   12/11/23 0315 (!) 117/57 -- -- 58 19 95 % --   12/11/23 0300 (!) 112/53 -- -- 72 17 95 % --   12/11/23 0245 122/64 -- -- 78 18 95 % --   12/11/23 0230 (!) 122/51 -- -- 57 16 95 % --   12/11/23 0215 (!) 113/55 -- -- 60 20 96 % --   12/11/23 0200 (!) 98/53 -- -- 52 18 94 % --   12/11/23 0145 (!) 110/46 -- -- 58 15 95 % --   12/11/23 0130 (!) 104/52 -- -- 55 18 96 % --   12/11/23 0115 (!) 88/50 -- -- 57 18 94 % --   12/11/23 0100 (!) 104/42 -- -- 59 19 93 % --   12/11/23 0045 (!) 113/54 -- -- 71 13 95 % --   12/11/23 0030 (!) 113/56 -- -- 69 17 100 % --   12/11/23 0015 (!) 111/59 -- -- 56 18 100 % --   12/11/23 0000 100/61 98 °F (36.7 °C) Axillary 57 19 100 % --   12/10/23 2345 (!) 108/55 -- -- 56 19 100 % --   12/10/23 2330 (!) 105/51 -- -- 60 20 100 % --   12/10/23 2315 (!) 104/56 -- -- 65 20 100 % --   12/10/23 2300 (!) 119/55 -- -- 73 18 100 % --   12/10/23 2245 (!) 119/54 -- -- 75 19 99 % --   12/10/23 2230 122/60 -- -- 76 18 99 % --   12/10/23 2215 118/62 -- -- 76 16 99 % --   12/10/23 2212 (!) 99/54 97.6 °F (36.4 °C) Oral 80 17 98 % 81.6 kg (179 lb 14.3 oz)   12/10/23 1851 119/71 -- -- 78 20 -- --   12/10/23 1719 (!) 132/56 -- -- 89 29 -- --   12/10/23 1611 112/60 -- 
  Psych:   Good insight. Not anxious nor agitated  Skin:  No rashes.  No jaundice    Reviewed most current lab test results and cultures  YES  Reviewed most current radiology test results   YES  Review and summation of old records today    NO  Reviewed patient's current orders and MAR    YES  PMH/SH reviewed - no change compared to H&P    Procedures: see electronic medical records for all procedures/Xrays and details which were not copied into this note but were reviewed prior to creation of Plan.      LABS:  I reviewed today's most current labs and imaging studies.  Pertinent labs include:  Recent Labs     12/24/23  0512 12/25/23  0642 12/26/23  0620   WBC 11.8* 11.0 9.8   HGB 7.1* 8.7* 8.4*   HCT 23.9* 29.3* 27.6*   * 556* 501*       Recent Labs     12/24/23  0512 12/25/23  0642 12/26/23  0620    136 136   K 3.5 3.7 3.3*    103 105   CO2 28 24 26   GLUCOSE 91 81 102*   BUN 9 9 11   CREATININE 1.00 1.03* 1.11*   CALCIUM 9.1 9.6 9.5         Signed: Massiel Ordonez MD          
Daily    sodium chloride flush 0.9 % injection 5-40 mL  5-40 mL IntraVENous 2 times per day    sodium chloride flush 0.9 % injection 5-40 mL  5-40 mL IntraVENous PRN    0.9 % sodium chloride infusion   IntraVENous PRN    ondansetron (ZOFRAN-ODT) disintegrating tablet 4 mg  4 mg Oral Q8H PRN    Or    ondansetron (ZOFRAN) injection 4 mg  4 mg IntraVENous Q6H PRN    polyethylene glycol (GLYCOLAX) packet 17 g  17 g Oral Daily PRN    acetaminophen (TYLENOL) tablet 650 mg  650 mg Oral Q6H PRN    Or    acetaminophen (TYLENOL) suppository 650 mg  650 mg Rectal Q6H PRN    alcohol 62% (NOZIN) nasal  1 ampule  1 ampule Topical Q12H    balsum peru-castor oil (VENELEX) ointment   Topical BID        Valdemar Rico MD  12/18/2023                                                                                                               
and details which were not copied into this note but were reviewed prior to creation of Plan.      LABS:  I reviewed today's most current labs and imaging studies.  Pertinent labs include:  Recent Labs     12/17/23  1151 12/18/23  1048   WBC 17.5* 12.6*   HGB 8.3* 8.3*   HCT 27.0* 26.5*   * 608*       Recent Labs     12/17/23  0440 12/18/23  1048    136   K 4.6 4.5    104   CO2 28 26   GLUCOSE 99 109*   BUN 21* 13   CREATININE 1.05* 1.29*   CALCIUM 8.5 8.9   PHOS  --  3.1   LABALBU  --  1.7*         Signed: Tena Venegas MD

## 2024-01-03 ENCOUNTER — TELEPHONE (OUTPATIENT)
Facility: CLINIC | Age: 74
End: 2024-01-03

## 2024-01-03 NOTE — TELEPHONE ENCOUNTER
Telephone call from caregiver Kay who reports that she just received a delivery from pharmacy for Atenolol and wants to know why it was sent as pt is currently at [facility] and not at home.  Inquired who is the prescriber on pill bottle and she advised Dr. Ramos.  Informed Kay that we are aware pt is not at home, we do not have a Dr. Ramos in our practice and did not send this prescription.  Suggested that this is likely hospital discharging doctor.  She will follow up with pharmacy for more details.

## 2024-01-08 ENCOUNTER — TELEPHONE (OUTPATIENT)
Facility: CLINIC | Age: 74
End: 2024-01-08

## 2024-01-08 NOTE — TELEPHONE ENCOUNTER
Called patient to confirm their in home visit with Charlotte Smart on 1/12/24, arrival between 9-1.  Spoke with patient, they cancelled the appointment. Patient is currently in rehab and will be going to [facility] for surgery on 1/10/24.  # [phone]

## 2024-01-10 ENCOUNTER — APPOINTMENT (OUTPATIENT)
Facility: HOSPITAL | Age: 74
DRG: 853 | End: 2024-01-10
Attending: UROLOGY
Payer: MEDICARE

## 2024-01-10 ENCOUNTER — HOSPITAL ENCOUNTER (INPATIENT)
Facility: HOSPITAL | Age: 74
LOS: 16 days | Discharge: NURSING, REHAB OR LONG-TERM CARE | DRG: 853 | End: 2024-01-26
Attending: UROLOGY | Admitting: UROLOGY
Payer: MEDICARE

## 2024-01-10 DIAGNOSIS — R60.0 BILATERAL LEG EDEMA: ICD-10-CM

## 2024-01-10 DIAGNOSIS — I63.9 ISCHEMIC STROKE OF FRONTAL LOBE (HCC): ICD-10-CM

## 2024-01-10 DIAGNOSIS — I63.9 CEREBROVASCULAR ACCIDENT (CVA), UNSPECIFIED MECHANISM (HCC): Primary | ICD-10-CM

## 2024-01-10 PROBLEM — N15.1 RENAL ABSCESS, RIGHT: Status: ACTIVE | Noted: 2024-01-10

## 2024-01-10 LAB
ALBUMIN SERPL-MCNC: 2.3 G/DL (ref 3.5–5)
ALBUMIN/GLOB SERPL: 0.3 (ref 1.1–2.2)
ALP SERPL-CCNC: 111 U/L (ref 45–117)
ALT SERPL-CCNC: 8 U/L (ref 12–78)
ANION GAP SERPL CALC-SCNC: 2 MMOL/L (ref 5–15)
APPEARANCE UR: ABNORMAL
APTT PPP: 30.8 SEC (ref 22.1–31)
AST SERPL-CCNC: 27 U/L (ref 15–37)
BACTERIA URNS QL MICRO: ABNORMAL /HPF
BILIRUB SERPL-MCNC: 0.6 MG/DL (ref 0.2–1)
BILIRUB UR QL: NEGATIVE
BUN SERPL-MCNC: 9 MG/DL (ref 6–20)
BUN/CREAT SERPL: 10 (ref 12–20)
CALCIUM SERPL-MCNC: 10.1 MG/DL (ref 8.5–10.1)
CHLORIDE SERPL-SCNC: 105 MMOL/L (ref 97–108)
CO2 SERPL-SCNC: 29 MMOL/L (ref 21–32)
COLOR UR: ABNORMAL
CREAT SERPL-MCNC: 0.92 MG/DL (ref 0.55–1.02)
EPITH CASTS URNS QL MICRO: ABNORMAL /LPF
ERYTHROCYTE [DISTWIDTH] IN BLOOD BY AUTOMATED COUNT: 20.3 % (ref 11.5–14.5)
GLOBULIN SER CALC-MCNC: 6.6 G/DL (ref 2–4)
GLUCOSE SERPL-MCNC: 91 MG/DL (ref 65–100)
GLUCOSE UR STRIP.AUTO-MCNC: NEGATIVE MG/DL
HCT VFR BLD AUTO: 28.8 % (ref 35–47)
HCT VFR BLD AUTO: 41.4 % (ref 35–47)
HGB BLD-MCNC: 12.5 G/DL (ref 11.5–16)
HGB BLD-MCNC: 8.7 G/DL (ref 11.5–16)
HGB UR QL STRIP: ABNORMAL
HISTORY CHECK: NORMAL
HISTORY CHECK: NORMAL
INR PPP: 1.1 (ref 0.9–1.1)
KETONES UR QL STRIP.AUTO: NEGATIVE MG/DL
LEUKOCYTE ESTERASE UR QL STRIP.AUTO: ABNORMAL
MCH RBC QN AUTO: 24.5 PG (ref 26–34)
MCHC RBC AUTO-ENTMCNC: 30.2 G/DL (ref 30–36.5)
MCV RBC AUTO: 81 FL (ref 80–99)
NITRITE UR QL STRIP.AUTO: NEGATIVE
NRBC # BLD: 0 K/UL (ref 0–0.01)
NRBC BLD-RTO: 0 PER 100 WBC
PH UR STRIP: 6 (ref 5–8)
PLATELET # BLD AUTO: 626 K/UL (ref 150–400)
PMV BLD AUTO: 8.3 FL (ref 8.9–12.9)
POTASSIUM SERPL-SCNC: 4.9 MMOL/L (ref 3.5–5.1)
PROT SERPL-MCNC: 8.9 G/DL (ref 6.4–8.2)
PROT UR STRIP-MCNC: 30 MG/DL
PROTHROMBIN TIME: 11.1 SEC (ref 9–11.1)
RBC # BLD AUTO: 5.11 M/UL (ref 3.8–5.2)
RBC #/AREA URNS HPF: ABNORMAL /HPF (ref 0–5)
SODIUM SERPL-SCNC: 136 MMOL/L (ref 136–145)
SP GR UR REFRACTOMETRY: 1.01
THERAPEUTIC RANGE: NORMAL SECS (ref 58–77)
URINE CULTURE IF INDICATED: ABNORMAL
UROBILINOGEN UR QL STRIP.AUTO: 0.2 EU/DL (ref 0.2–1)
WBC # BLD AUTO: 10.4 K/UL (ref 3.6–11)
WBC URNS QL MICRO: ABNORMAL /HPF (ref 0–4)

## 2024-01-10 PROCEDURE — 6360000002 HC RX W HCPCS: Performed by: UROLOGY

## 2024-01-10 PROCEDURE — 2500000003 HC RX 250 WO HCPCS: Performed by: NURSE ANESTHETIST, CERTIFIED REGISTERED

## 2024-01-10 PROCEDURE — 2580000003 HC RX 258: Performed by: NURSE ANESTHETIST, CERTIFIED REGISTERED

## 2024-01-10 PROCEDURE — 85730 THROMBOPLASTIN TIME PARTIAL: CPT

## 2024-01-10 PROCEDURE — 3700000000 HC ANESTHESIA ATTENDED CARE: Performed by: UROLOGY

## 2024-01-10 PROCEDURE — 6360000002 HC RX W HCPCS: Performed by: NURSE ANESTHETIST, CERTIFIED REGISTERED

## 2024-01-10 PROCEDURE — 86901 BLOOD TYPING SEROLOGIC RH(D): CPT

## 2024-01-10 PROCEDURE — 3600000014 HC SURGERY LEVEL 4 ADDTL 15MIN: Performed by: UROLOGY

## 2024-01-10 PROCEDURE — 85027 COMPLETE CBC AUTOMATED: CPT

## 2024-01-10 PROCEDURE — 7100000001 HC PACU RECOVERY - ADDTL 15 MIN: Performed by: UROLOGY

## 2024-01-10 PROCEDURE — 86900 BLOOD TYPING SEROLOGIC ABO: CPT

## 2024-01-10 PROCEDURE — 2580000003 HC RX 258: Performed by: STUDENT IN AN ORGANIZED HEALTH CARE EDUCATION/TRAINING PROGRAM

## 2024-01-10 PROCEDURE — 81001 URINALYSIS AUTO W/SCOPE: CPT

## 2024-01-10 PROCEDURE — 80053 COMPREHEN METABOLIC PANEL: CPT

## 2024-01-10 PROCEDURE — 2500000003 HC RX 250 WO HCPCS: Performed by: ANESTHESIOLOGY

## 2024-01-10 PROCEDURE — 87205 SMEAR GRAM STAIN: CPT

## 2024-01-10 PROCEDURE — 88342 IMHCHEM/IMCYTCHM 1ST ANTB: CPT

## 2024-01-10 PROCEDURE — 36415 COLL VENOUS BLD VENIPUNCTURE: CPT

## 2024-01-10 PROCEDURE — 0DQ80ZZ REPAIR SMALL INTESTINE, OPEN APPROACH: ICD-10-PCS | Performed by: UROLOGY

## 2024-01-10 PROCEDURE — 87070 CULTURE OTHR SPECIMN AEROBIC: CPT

## 2024-01-10 PROCEDURE — 87077 CULTURE AEROBIC IDENTIFY: CPT

## 2024-01-10 PROCEDURE — 6370000000 HC RX 637 (ALT 250 FOR IP): Performed by: UROLOGY

## 2024-01-10 PROCEDURE — 93005 ELECTROCARDIOGRAM TRACING: CPT | Performed by: UROLOGY

## 2024-01-10 PROCEDURE — 87186 SC STD MICRODIL/AGAR DIL: CPT

## 2024-01-10 PROCEDURE — 0DNE0ZZ RELEASE LARGE INTESTINE, OPEN APPROACH: ICD-10-PCS | Performed by: SURGERY

## 2024-01-10 PROCEDURE — 2580000003 HC RX 258: Performed by: UROLOGY

## 2024-01-10 PROCEDURE — P9045 ALBUMIN (HUMAN), 5%, 250 ML: HCPCS | Performed by: NURSE ANESTHETIST, CERTIFIED REGISTERED

## 2024-01-10 PROCEDURE — 87086 URINE CULTURE/COLONY COUNT: CPT

## 2024-01-10 PROCEDURE — 3600000004 HC SURGERY LEVEL 4 BASE: Performed by: UROLOGY

## 2024-01-10 PROCEDURE — 88300 SURGICAL PATH GROSS: CPT

## 2024-01-10 PROCEDURE — 0DN80ZZ RELEASE SMALL INTESTINE, OPEN APPROACH: ICD-10-PCS | Performed by: SURGERY

## 2024-01-10 PROCEDURE — 2580000003 HC RX 258: Performed by: ANESTHESIOLOGY

## 2024-01-10 PROCEDURE — 88312 SPECIAL STAINS GROUP 1: CPT

## 2024-01-10 PROCEDURE — 7100000000 HC PACU RECOVERY - FIRST 15 MIN: Performed by: UROLOGY

## 2024-01-10 PROCEDURE — C2626 INFUSION PUMP, NON-PROG,TEMP: HCPCS | Performed by: UROLOGY

## 2024-01-10 PROCEDURE — 6360000002 HC RX W HCPCS: Performed by: ANESTHESIOLOGY

## 2024-01-10 PROCEDURE — 74150 CT ABDOMEN W/O CONTRAST: CPT

## 2024-01-10 PROCEDURE — 2060000000 HC ICU INTERMEDIATE R&B

## 2024-01-10 PROCEDURE — 85610 PROTHROMBIN TIME: CPT

## 2024-01-10 PROCEDURE — 86923 COMPATIBILITY TEST ELECTRIC: CPT

## 2024-01-10 PROCEDURE — 85018 HEMOGLOBIN: CPT

## 2024-01-10 PROCEDURE — 3700000001 HC ADD 15 MINUTES (ANESTHESIA): Performed by: UROLOGY

## 2024-01-10 PROCEDURE — 2720000010 HC SURG SUPPLY STERILE: Performed by: UROLOGY

## 2024-01-10 PROCEDURE — 88307 TISSUE EXAM BY PATHOLOGIST: CPT

## 2024-01-10 PROCEDURE — 86850 RBC ANTIBODY SCREEN: CPT

## 2024-01-10 PROCEDURE — 87075 CULTR BACTERIA EXCEPT BLOOD: CPT

## 2024-01-10 PROCEDURE — 2709999900 HC NON-CHARGEABLE SUPPLY: Performed by: UROLOGY

## 2024-01-10 PROCEDURE — 0TT00ZZ RESECTION OF RIGHT KIDNEY, OPEN APPROACH: ICD-10-PCS | Performed by: UROLOGY

## 2024-01-10 PROCEDURE — 3E033XZ INTRODUCTION OF VASOPRESSOR INTO PERIPHERAL VEIN, PERCUTANEOUS APPROACH: ICD-10-PCS | Performed by: ANESTHESIOLOGY

## 2024-01-10 PROCEDURE — 85014 HEMATOCRIT: CPT

## 2024-01-10 RX ORDER — HYDROMORPHONE HYDROCHLORIDE 1 MG/ML
0.5 INJECTION, SOLUTION INTRAMUSCULAR; INTRAVENOUS; SUBCUTANEOUS
Status: DISCONTINUED | OUTPATIENT
Start: 2024-01-10 | End: 2024-01-13

## 2024-01-10 RX ORDER — SODIUM CHLORIDE, SODIUM LACTATE, POTASSIUM CHLORIDE, CALCIUM CHLORIDE 600; 310; 30; 20 MG/100ML; MG/100ML; MG/100ML; MG/100ML
INJECTION, SOLUTION INTRAVENOUS CONTINUOUS PRN
Status: DISCONTINUED | OUTPATIENT
Start: 2024-01-10 | End: 2024-01-10 | Stop reason: SDUPTHER

## 2024-01-10 RX ORDER — ONDANSETRON 2 MG/ML
4 INJECTION INTRAMUSCULAR; INTRAVENOUS EVERY 6 HOURS PRN
Status: DISCONTINUED | OUTPATIENT
Start: 2024-01-10 | End: 2024-01-26 | Stop reason: HOSPADM

## 2024-01-10 RX ORDER — FENTANYL CITRATE 50 UG/ML
INJECTION, SOLUTION INTRAMUSCULAR; INTRAVENOUS PRN
Status: DISCONTINUED | OUTPATIENT
Start: 2024-01-10 | End: 2024-01-10 | Stop reason: SDUPTHER

## 2024-01-10 RX ORDER — LIDOCAINE HYDROCHLORIDE ANHYDROUS AND DEXTROSE MONOHYDRATE 5; 400 G/100ML; MG/100ML
INJECTION, SOLUTION INTRAVENOUS CONTINUOUS PRN
Status: DISCONTINUED | OUTPATIENT
Start: 2024-01-10 | End: 2024-01-10 | Stop reason: SDUPTHER

## 2024-01-10 RX ORDER — GLYCOPYRROLATE 0.2 MG/ML
INJECTION INTRAMUSCULAR; INTRAVENOUS PRN
Status: DISCONTINUED | OUTPATIENT
Start: 2024-01-10 | End: 2024-01-10 | Stop reason: SDUPTHER

## 2024-01-10 RX ORDER — PHENYLEPHRINE HCL IN 0.9% NACL 0.4MG/10ML
SYRINGE (ML) INTRAVENOUS PRN
Status: DISCONTINUED | OUTPATIENT
Start: 2024-01-10 | End: 2024-01-10 | Stop reason: SDUPTHER

## 2024-01-10 RX ORDER — SODIUM CHLORIDE 0.9 % (FLUSH) 0.9 %
5-40 SYRINGE (ML) INJECTION EVERY 12 HOURS SCHEDULED
Status: DISCONTINUED | OUTPATIENT
Start: 2024-01-10 | End: 2024-01-22

## 2024-01-10 RX ORDER — OXYCODONE HYDROCHLORIDE 5 MG/1
10 TABLET ORAL EVERY 4 HOURS PRN
Status: DISCONTINUED | OUTPATIENT
Start: 2024-01-10 | End: 2024-01-13

## 2024-01-10 RX ORDER — SODIUM CHLORIDE 0.9 % (FLUSH) 0.9 %
5-40 SYRINGE (ML) INJECTION PRN
Status: DISCONTINUED | OUTPATIENT
Start: 2024-01-10 | End: 2024-01-22

## 2024-01-10 RX ORDER — SODIUM CHLORIDE 9 MG/ML
INJECTION, SOLUTION INTRAVENOUS PRN
Status: DISCONTINUED | OUTPATIENT
Start: 2024-01-10 | End: 2024-01-10 | Stop reason: HOSPADM

## 2024-01-10 RX ORDER — ACETAMINOPHEN 325 MG/1
650 TABLET ORAL EVERY 4 HOURS PRN
Status: DISCONTINUED | OUTPATIENT
Start: 2024-01-10 | End: 2024-01-26 | Stop reason: HOSPADM

## 2024-01-10 RX ORDER — SODIUM CHLORIDE 450 MG/100ML
INJECTION, SOLUTION INTRAVENOUS CONTINUOUS
Status: DISCONTINUED | OUTPATIENT
Start: 2024-01-10 | End: 2024-01-11

## 2024-01-10 RX ORDER — SODIUM CHLORIDE 9 MG/ML
INJECTION, SOLUTION INTRAVENOUS PRN
Status: DISCONTINUED | OUTPATIENT
Start: 2024-01-10 | End: 2024-01-12

## 2024-01-10 RX ORDER — PROCHLORPERAZINE EDISYLATE 5 MG/ML
5 INJECTION INTRAMUSCULAR; INTRAVENOUS
Status: DISCONTINUED | OUTPATIENT
Start: 2024-01-10 | End: 2024-01-10 | Stop reason: HOSPADM

## 2024-01-10 RX ORDER — OXYCODONE HYDROCHLORIDE 5 MG/1
5 TABLET ORAL EVERY 4 HOURS PRN
Status: DISCONTINUED | OUTPATIENT
Start: 2024-01-10 | End: 2024-01-13

## 2024-01-10 RX ORDER — ONDANSETRON 2 MG/ML
4 INJECTION INTRAMUSCULAR; INTRAVENOUS
Status: DISCONTINUED | OUTPATIENT
Start: 2024-01-10 | End: 2024-01-10 | Stop reason: HOSPADM

## 2024-01-10 RX ORDER — MAGNESIUM SULFATE HEPTAHYDRATE 40 MG/ML
INJECTION, SOLUTION INTRAVENOUS PRN
Status: DISCONTINUED | OUTPATIENT
Start: 2024-01-10 | End: 2024-01-10 | Stop reason: SDUPTHER

## 2024-01-10 RX ORDER — LIDOCAINE HYDROCHLORIDE 20 MG/ML
INJECTION, SOLUTION EPIDURAL; INFILTRATION; INTRACAUDAL; PERINEURAL PRN
Status: DISCONTINUED | OUTPATIENT
Start: 2024-01-10 | End: 2024-01-10 | Stop reason: SDUPTHER

## 2024-01-10 RX ORDER — PHENYLEPHRINE HYDROCHLORIDE 10 MG/ML
INJECTION INTRAVENOUS
Status: DISPENSED
Start: 2024-01-10 | End: 2024-01-11

## 2024-01-10 RX ORDER — ACETAMINOPHEN 500 MG
1000 TABLET ORAL ONCE
Status: COMPLETED | OUTPATIENT
Start: 2024-01-10 | End: 2024-01-10

## 2024-01-10 RX ORDER — SODIUM CHLORIDE 0.9 % (FLUSH) 0.9 %
5-40 SYRINGE (ML) INJECTION EVERY 12 HOURS SCHEDULED
Status: DISCONTINUED | OUTPATIENT
Start: 2024-01-10 | End: 2024-01-10 | Stop reason: HOSPADM

## 2024-01-10 RX ORDER — ROCURONIUM BROMIDE 10 MG/ML
INJECTION, SOLUTION INTRAVENOUS PRN
Status: DISCONTINUED | OUTPATIENT
Start: 2024-01-10 | End: 2024-01-10 | Stop reason: SDUPTHER

## 2024-01-10 RX ORDER — SODIUM CHLORIDE 9 MG/ML
INJECTION, SOLUTION INTRAVENOUS CONTINUOUS PRN
Status: DISCONTINUED | OUTPATIENT
Start: 2024-01-10 | End: 2024-01-10 | Stop reason: SDUPTHER

## 2024-01-10 RX ORDER — ONDANSETRON 4 MG/1
4 TABLET, ORALLY DISINTEGRATING ORAL EVERY 8 HOURS PRN
Status: DISCONTINUED | OUTPATIENT
Start: 2024-01-10 | End: 2024-01-26 | Stop reason: HOSPADM

## 2024-01-10 RX ORDER — PROPOFOL 10 MG/ML
INJECTION, EMULSION INTRAVENOUS PRN
Status: DISCONTINUED | OUTPATIENT
Start: 2024-01-10 | End: 2024-01-10 | Stop reason: SDUPTHER

## 2024-01-10 RX ORDER — SODIUM CHLORIDE 0.9 % (FLUSH) 0.9 %
5-40 SYRINGE (ML) INJECTION PRN
Status: DISCONTINUED | OUTPATIENT
Start: 2024-01-10 | End: 2024-01-10 | Stop reason: HOSPADM

## 2024-01-10 RX ORDER — OXYCODONE HYDROCHLORIDE 5 MG/1
5 TABLET ORAL
Status: DISCONTINUED | OUTPATIENT
Start: 2024-01-10 | End: 2024-01-10 | Stop reason: HOSPADM

## 2024-01-10 RX ORDER — HYDROMORPHONE HYDROCHLORIDE 1 MG/ML
0.25 INJECTION, SOLUTION INTRAMUSCULAR; INTRAVENOUS; SUBCUTANEOUS
Status: DISCONTINUED | OUTPATIENT
Start: 2024-01-10 | End: 2024-01-13

## 2024-01-10 RX ORDER — FENTANYL CITRATE 50 UG/ML
25 INJECTION, SOLUTION INTRAMUSCULAR; INTRAVENOUS EVERY 5 MIN PRN
Status: COMPLETED | OUTPATIENT
Start: 2024-01-10 | End: 2024-01-10

## 2024-01-10 RX ORDER — ALBUMIN, HUMAN INJ 5% 5 %
SOLUTION INTRAVENOUS PRN
Status: DISCONTINUED | OUTPATIENT
Start: 2024-01-10 | End: 2024-01-10 | Stop reason: SDUPTHER

## 2024-01-10 RX ORDER — KETAMINE HCL IN NACL, ISO-OSM 100MG/10ML
SYRINGE (ML) INJECTION PRN
Status: DISCONTINUED | OUTPATIENT
Start: 2024-01-10 | End: 2024-01-10 | Stop reason: SDUPTHER

## 2024-01-10 RX ORDER — EPHEDRINE SULFATE/0.9% NACL/PF 50 MG/5 ML
SYRINGE (ML) INTRAVENOUS PRN
Status: DISCONTINUED | OUTPATIENT
Start: 2024-01-10 | End: 2024-01-10 | Stop reason: SDUPTHER

## 2024-01-10 RX ORDER — DEXAMETHASONE SODIUM PHOSPHATE 4 MG/ML
INJECTION, SOLUTION INTRA-ARTICULAR; INTRALESIONAL; INTRAMUSCULAR; INTRAVENOUS; SOFT TISSUE PRN
Status: DISCONTINUED | OUTPATIENT
Start: 2024-01-10 | End: 2024-01-10 | Stop reason: SDUPTHER

## 2024-01-10 RX ORDER — SODIUM CHLORIDE, SODIUM LACTATE, POTASSIUM CHLORIDE, CALCIUM CHLORIDE 600; 310; 30; 20 MG/100ML; MG/100ML; MG/100ML; MG/100ML
INJECTION, SOLUTION INTRAVENOUS ONCE
Status: COMPLETED | OUTPATIENT
Start: 2024-01-10 | End: 2024-01-10

## 2024-01-10 RX ORDER — HYDROMORPHONE HYDROCHLORIDE 1 MG/ML
0.5 INJECTION, SOLUTION INTRAMUSCULAR; INTRAVENOUS; SUBCUTANEOUS EVERY 5 MIN PRN
Status: COMPLETED | OUTPATIENT
Start: 2024-01-10 | End: 2024-01-10

## 2024-01-10 RX ORDER — HYDROMORPHONE HYDROCHLORIDE 2 MG/ML
INJECTION, SOLUTION INTRAMUSCULAR; INTRAVENOUS; SUBCUTANEOUS PRN
Status: DISCONTINUED | OUTPATIENT
Start: 2024-01-10 | End: 2024-01-10 | Stop reason: SDUPTHER

## 2024-01-10 RX ORDER — SENNA AND DOCUSATE SODIUM 50; 8.6 MG/1; MG/1
1 TABLET, FILM COATED ORAL 2 TIMES DAILY
Status: DISCONTINUED | OUTPATIENT
Start: 2024-01-10 | End: 2024-01-26 | Stop reason: HOSPADM

## 2024-01-10 RX ORDER — SODIUM CHLORIDE 9 MG/ML
INJECTION, SOLUTION INTRAVENOUS PRN
Status: DISCONTINUED | OUTPATIENT
Start: 2024-01-10 | End: 2024-01-26 | Stop reason: HOSPADM

## 2024-01-10 RX ORDER — LOPERAMIDE HYDROCHLORIDE 2 MG/1
2 CAPSULE ORAL PRN
COMMUNITY

## 2024-01-10 RX ADMIN — ROCURONIUM BROMIDE 60 MG: 10 INJECTION INTRAVENOUS at 13:28

## 2024-01-10 RX ADMIN — Medication 120 MCG: at 15:48

## 2024-01-10 RX ADMIN — ALBUMIN (HUMAN) 250 ML: 12.5 INJECTION, SOLUTION INTRAVENOUS at 14:24

## 2024-01-10 RX ADMIN — SODIUM CHLORIDE 3375 MG: 900 INJECTION INTRAVENOUS at 15:29

## 2024-01-10 RX ADMIN — FENTANYL CITRATE 25 MCG: 50 INJECTION INTRAMUSCULAR; INTRAVENOUS at 19:35

## 2024-01-10 RX ADMIN — HYDROMORPHONE HYDROCHLORIDE 0.5 MG: 1 INJECTION, SOLUTION INTRAMUSCULAR; INTRAVENOUS; SUBCUTANEOUS at 19:44

## 2024-01-10 RX ADMIN — ROCURONIUM BROMIDE 20 MG: 10 INJECTION INTRAVENOUS at 16:47

## 2024-01-10 RX ADMIN — Medication 30 MG: at 13:54

## 2024-01-10 RX ADMIN — HYDROMORPHONE HYDROCHLORIDE 0.5 MG: 1 INJECTION, SOLUTION INTRAMUSCULAR; INTRAVENOUS; SUBCUTANEOUS at 19:39

## 2024-01-10 RX ADMIN — Medication 10 MG: at 18:26

## 2024-01-10 RX ADMIN — Medication 20 MG: at 17:05

## 2024-01-10 RX ADMIN — SODIUM CHLORIDE, POTASSIUM CHLORIDE, SODIUM LACTATE AND CALCIUM CHLORIDE: 600; 310; 30; 20 INJECTION, SOLUTION INTRAVENOUS at 13:17

## 2024-01-10 RX ADMIN — Medication 120 MCG: at 16:37

## 2024-01-10 RX ADMIN — FENTANYL CITRATE 50 MCG: 50 INJECTION, SOLUTION INTRAMUSCULAR; INTRAVENOUS at 13:17

## 2024-01-10 RX ADMIN — SODIUM CHLORIDE: 4.5 INJECTION, SOLUTION INTRAVENOUS at 18:35

## 2024-01-10 RX ADMIN — VANCOMYCIN HYDROCHLORIDE 1500 MG: 1.5 INJECTION, POWDER, LYOPHILIZED, FOR SOLUTION INTRAVENOUS at 13:48

## 2024-01-10 RX ADMIN — HYDROMORPHONE HYDROCHLORIDE 0.4 MG: 2 INJECTION INTRAMUSCULAR; INTRAVENOUS; SUBCUTANEOUS at 17:53

## 2024-01-10 RX ADMIN — SODIUM CHLORIDE, SODIUM LACTATE, POTASSIUM CHLORIDE, CALCIUM CHLORIDE: 600; 310; 30; 20 INJECTION, SOLUTION INTRAVENOUS at 13:24

## 2024-01-10 RX ADMIN — Medication 3 AMPULE: at 12:05

## 2024-01-10 RX ADMIN — PHENYLEPHRINE HYDROCHLORIDE 50 MCG/MIN: 10 INJECTION INTRAVENOUS at 13:41

## 2024-01-10 RX ADMIN — PROPOFOL 150 MG: 10 INJECTION, EMULSION INTRAVENOUS at 13:28

## 2024-01-10 RX ADMIN — LIDOCAINE HYDROCHLORIDE 100 MG: 20 INJECTION, SOLUTION EPIDURAL; INFILTRATION; INTRACAUDAL; PERINEURAL at 13:28

## 2024-01-10 RX ADMIN — PROPOFOL 150 MCG/KG/MIN: 10 INJECTION, EMULSION INTRAVENOUS at 13:29

## 2024-01-10 RX ADMIN — SODIUM CHLORIDE: 9 INJECTION, SOLUTION INTRAVENOUS at 15:01

## 2024-01-10 RX ADMIN — SODIUM CHLORIDE, POTASSIUM CHLORIDE, SODIUM LACTATE AND CALCIUM CHLORIDE: 600; 310; 30; 20 INJECTION, SOLUTION INTRAVENOUS at 16:04

## 2024-01-10 RX ADMIN — FENTANYL CITRATE 25 MCG: 50 INJECTION INTRAMUSCULAR; INTRAVENOUS at 19:15

## 2024-01-10 RX ADMIN — SODIUM CHLORIDE, POTASSIUM CHLORIDE, SODIUM LACTATE AND CALCIUM CHLORIDE: 600; 310; 30; 20 INJECTION, SOLUTION INTRAVENOUS at 12:09

## 2024-01-10 RX ADMIN — PIPERACILLIN AND TAZOBACTAM 3375 MG: 3; .375 INJECTION, POWDER, FOR SOLUTION INTRAVENOUS at 12:32

## 2024-01-10 RX ADMIN — Medication 80 MCG: at 13:41

## 2024-01-10 RX ADMIN — GLYCOPYRROLATE 0.1 MG: 0.2 INJECTION INTRAMUSCULAR; INTRAVENOUS at 13:38

## 2024-01-10 RX ADMIN — ALBUMIN (HUMAN) 250 ML: 12.5 INJECTION, SOLUTION INTRAVENOUS at 14:34

## 2024-01-10 RX ADMIN — PHENYLEPHRINE HYDROCHLORIDE 10 MCG/MIN: 10 INJECTION INTRAVENOUS at 19:24

## 2024-01-10 RX ADMIN — ROCURONIUM BROMIDE 20 MG: 10 INJECTION INTRAVENOUS at 15:29

## 2024-01-10 RX ADMIN — ROCURONIUM BROMIDE 20 MG: 10 INJECTION INTRAVENOUS at 15:04

## 2024-01-10 RX ADMIN — MAGNESIUM SULFATE IN WATER 2000 MG: 40 INJECTION, SOLUTION INTRAVENOUS at 13:46

## 2024-01-10 RX ADMIN — FENTANYL CITRATE 50 MCG: 50 INJECTION, SOLUTION INTRAMUSCULAR; INTRAVENOUS at 13:23

## 2024-01-10 RX ADMIN — OXYCODONE HYDROCHLORIDE 5 MG: 5 TABLET ORAL at 22:30

## 2024-01-10 RX ADMIN — LIDOCAINE HYDROCHLORIDE 2 MG/KG/HR: 4 INJECTION, SOLUTION INTRAVENOUS at 13:29

## 2024-01-10 RX ADMIN — FENTANYL CITRATE 25 MCG: 50 INJECTION INTRAMUSCULAR; INTRAVENOUS at 19:49

## 2024-01-10 RX ADMIN — Medication 160 MCG: at 15:26

## 2024-01-10 RX ADMIN — Medication 80 MCG: at 15:51

## 2024-01-10 RX ADMIN — FENTANYL CITRATE 25 MCG: 50 INJECTION INTRAMUSCULAR; INTRAVENOUS at 19:30

## 2024-01-10 RX ADMIN — BUPIVACAINE HYDROCHLORIDE 270 ML: 2.5 INJECTION, SOLUTION EPIDURAL; INFILTRATION; INTRACAUDAL; PERINEURAL at 17:52

## 2024-01-10 RX ADMIN — Medication 160 MCG: at 14:21

## 2024-01-10 RX ADMIN — HYDROMORPHONE HYDROCHLORIDE 0.6 MG: 2 INJECTION INTRAMUSCULAR; INTRAVENOUS; SUBCUTANEOUS at 17:20

## 2024-01-10 RX ADMIN — Medication 160 MCG: at 14:29

## 2024-01-10 RX ADMIN — SODIUM CHLORIDE, PRESERVATIVE FREE 10 ML: 5 INJECTION INTRAVENOUS at 21:51

## 2024-01-10 RX ADMIN — ACETAMINOPHEN 1000 MG: 500 TABLET ORAL at 12:05

## 2024-01-10 RX ADMIN — DEXAMETHASONE SODIUM PHOSPHATE 8 MG: 4 INJECTION INTRA-ARTICULAR; INTRALESIONAL; INTRAMUSCULAR; INTRAVENOUS; SOFT TISSUE at 13:45

## 2024-01-10 ASSESSMENT — PAIN SCALES - GENERAL
PAINLEVEL_OUTOF10: 0
PAINLEVEL_OUTOF10: 9
PAINLEVEL_OUTOF10: 0
PAINLEVEL_OUTOF10: 9
PAINLEVEL_OUTOF10: 0
PAINLEVEL_OUTOF10: 0
PAINLEVEL_OUTOF10: 7
PAINLEVEL_OUTOF10: 0
PAINLEVEL_OUTOF10: 5
PAINLEVEL_OUTOF10: 3
PAINLEVEL_OUTOF10: 0
PAINLEVEL_OUTOF10: 4

## 2024-01-10 ASSESSMENT — PAIN DESCRIPTION - LOCATION
LOCATION: ABDOMEN

## 2024-01-10 ASSESSMENT — PAIN DESCRIPTION - DESCRIPTORS
DESCRIPTORS: ACHING
DESCRIPTORS: SORE
DESCRIPTORS: ACHING
DESCRIPTORS: SORE;ACHING
DESCRIPTORS: SORE;ACHING

## 2024-01-10 ASSESSMENT — PAIN DESCRIPTION - ORIENTATION
ORIENTATION: UPPER
ORIENTATION: ANTERIOR;MID
ORIENTATION: MID
ORIENTATION: UPPER

## 2024-01-10 ASSESSMENT — PAIN - FUNCTIONAL ASSESSMENT: PAIN_FUNCTIONAL_ASSESSMENT: 0-10

## 2024-01-10 ASSESSMENT — PAIN DESCRIPTION - PAIN TYPE
TYPE: SURGICAL PAIN
TYPE: SURGICAL PAIN

## 2024-01-10 NOTE — BRIEF OP NOTE
Brief Postoperative Note      Patient: Nicolasa Macdonald  YOB: 1950  MRN: [id number]    Date of Procedure: 1/10/2024    Pre-Op Diagnosis Codes:     * Kidney abscess [N15.1]RIGHT    Post-Op Diagnosis: Same       Procedure(s):  RIGHT RADICAL OPEN NEPHRECTOMY (E R A S)  LIGATION OF RIGHT RENAL ARTERY  REPAIR SB LACERATION  Surgeon(s):  Saad Holder MD Rhamy, Scott, MD Cote, Eric P, MD Dougherty, David J, MD    Assistant:  * No surgical staff found *    Anesthesia: General    Estimated Blood Loss (mL): 750 ml    Complications: Other: Small bowel laceration repaired    Specimens:   ID Type Source Tests Collected by Time Destination   1 : Right renal stone Stone (Calculus) Kidney SURGICAL PATHOLOGY Zac Lind MD 1/10/2024 1505    2 : Right kidney Tissue Kidney SURGICAL PATHOLOGY Zac Lind MD 1/10/2024 1726    A : right kidney abscess Swab Kidney CULTURE, ANAEROBIC, CULTURE, TISSUE Zac Lind MD 1/10/2024 1424        Implants:  * No implants in log *      Drains:   Closed/Suction Drain Left Abdomen Bulb (Active)       [REMOVED] Urinary Catheter 12/10/23 2 Way (Removed)   $ Urethral catheter insertion $ Not inserted for procedure 12/19/23 1926   Catheter Indications Urinary retention (acute or chronic), continuous bladder irrigation or bladder outlet obstruction 01/02/24 0830   Site Assessment No urethral drainage 01/02/24 0830   Urine Color Yellow 01/02/24 0830   Urine Appearance Cloudy 01/02/24 0830   Urine Odor Other (Comment) 01/01/24 2037   Collection Container Standard 01/02/24 0830   Securement Method Securing device (Describe) 01/02/24 0830   Catheter Care  Perineal wipes 01/02/24 1230   Catheter Best Practices  Drainage tube clipped to bed;Catheter secured to thigh;Tamper seal intact;Bag below bladder;Bag not on floor;Lack of dependent loop in tubing;Drainage bag less than half full 01/02/24 1230   Status Draining 01/02/24 1230   Output (mL) 500 mL 01/02/24 0456       Findings: see op

## 2024-01-10 NOTE — H&P
(!) 157/68 97.2 °F (36.2 °C) Oral 50 17 95 %   01/01/24 2037 (!) 180/74 97 °F (36.1 °C) Oral (!) 48 16 95 %         Gen:    Not in distress  Chest:  Clear lungs  CVS:    Regular rate.  No edema  Abd:     Soft, not distended, not tender  Neuro: awake, moving all exts     Discharge/Recent Laboratory Results:      Recent Labs     01/02/24  0443   *   K 4.3      CO2 27   BUN 13   CREATININE 1.08*   GLUCOSE 75   CALCIUM 9.5   PHOS 2.7   MG 1.8          Recent Labs     01/02/24  0443   HGB 9.6*   HCT 31.9*   WBC 6.3   *         Discharge Medications:          Post Discharge PICC and Antibiotic Orders     1.  Diagnosis: Renal carbuncle  2.  Antibiotic: Zosyn 3.375 g IV every 6 hours through 1/11/2024  3.  Routine PICC/ Paulette/ Portacath Care including PRN catheter flow management  4.  Weekly labs:              [x] CBC/diff/platelets              [x] BUN/Creatinine              [] CPK              [x] AST/TotalBilirubin/AlkalinePhosphatase              [] CRP              []Trough Vancomycin level goal 15-20  5.  Fax lab to [phone]  Call Critical Lab Values to [phone]  6.  May send to IR for line evaluation or replacement [phone]  7.  Home Health to pull PIC line at end of therapy or send to IR for Paulette removal  8.  Allergies:              Allergies   Allergen Reactions    Antihistamines, Diphenhydramine-Type Other (See Comments)       reported on referral packet                      Medication List          CONTINUE taking these medications       carvedilol 3.125 MG tablet  Commonly known as: Coreg  Take 1 tablet by mouth 2 times daily      levothyroxine 25 MCG tablet  Commonly known as: SYNTHROID  TAKE ONE TABLET BY MOUTH EVERY DAY FOR HYPOTHYROIDISM.                ASK your doctor about these medications       acetaminophen 650 MG extended release tablet  Commonly known as: TYLENOL      benzonatate 100 MG capsule  Commonly known as: TESSALON  Take 1 capsule by mouth 2

## 2024-01-10 NOTE — ANESTHESIA PROCEDURE NOTES
Arterial Line:    An arterial line was placed using ultrasound guidance and surface landmarks, in the pre-op for the following indication(s): continuous blood pressure monitoring and blood sampling needed.    A 20 gauge (size) (length), Arrow (type) catheter was placed, Seldinger technique used, into the left radial artery, secured by Tegaderm.  Anesthesia type: Local  Local infiltration: Injection    Events:  patient tolerated procedure well with no complications.1/10/2024 4:30 PM1/10/2024 4:35 PM  Anesthesiologist: Zafar Grande MD  Performed: Other anesthesia staff   Preanesthetic Checklist  Completed: patient identified, IV checked, site marked, risks and benefits discussed, surgical/procedural consents, equipment checked, pre-op evaluation, timeout performed, anesthesia consent given and oxygen available

## 2024-01-10 NOTE — ANESTHESIA PRE PROCEDURE
Department of Anesthesiology  Preprocedure Note       Name:  Nicolasa Macdonald   Age:  73 y.o.  :  1950                                          MRN:  [id number]         Date:  1/10/2024      Surgeon: Surgeon(s):  Zac Lind MD Rhamy, Scott, MD    Procedure: Procedure(s):  RIGHT RADICAL OPEN NEPHRECTOMY (E R A S)    Medications prior to admission:   Prior to Admission medications    Medication Sig Start Date End Date Taking? Authorizing Provider   carvedilol (COREG) 3.125 MG tablet Take 1 tablet by mouth 2 times daily 24   Mendel, David L, MD   levothyroxine (SYNTHROID) 25 MCG tablet TAKE ONE TABLET BY MOUTH EVERY DAY FOR HYPOTHYROIDISM. 23   Mari Smart APRN - NP   benzonatate (TESSALON) 100 MG capsule Take 1 capsule by mouth 2 times daily as needed for Cough  Patient not taking: Reported on 12/10/2023 10/24/23   Mari Smart APRN - NP   acetaminophen (TYLENOL) 650 MG extended release tablet Take 1 tablet by mouth every 8 hours as needed  Patient not taking: Reported on 12/10/2023 11/14/22   Automatic Reconciliation, Ar   senna-docusate (PERICOLACE) 8.6-50 MG per tablet Take 2 tablets by mouth 2 times daily as needed  Patient not taking: Reported on 10/20/2023 11/14/22   Automatic Reconciliation, Ar       Current medications:    No current facility-administered medications for this encounter.       Allergies:    Allergies   Allergen Reactions   • Antihistamines, Diphenhydramine-Type Other (See Comments)     reported on referral packet       Problem List:    Patient Active Problem List   Diagnosis Code   • Anemia D64.9   • HLD (hyperlipidemia) E78.5   • Hypothyroidism E03.9   • Leukocytosis D72.829   • Myocardiopathy (HCC) I42.9   • Joint stiffness of both knees M25.661, M25.662   • Other chronic pain G89.29   • Essential hypertension I10   • Chronic cough R05.3   • Loose stools R19.5   • Septic shock (HCC) A41.9, R65.21   • Bacteremia R78.81   • Renal carbuncle N15.1       Past

## 2024-01-10 NOTE — PERIOP NOTE
1009 - PT ARRIVED FROM [facility] VIA TRANSPORT ON STRETCHER.  COMPLETE SLIDE TO STRETCHER.  PT A&OX4, ABLE TO MOSCOSO SPON AND TO COMMAND.  LIMITED MOVEMENT IN UPPER EXT, LOWER EXT LOCKED AT KNEES - PT HAS 10% MOBILITY IN LE.  PRE-OP TCHING DONE.  CHG BATH GIVEN.  12 LEAD EKG DONE.    1049 - URINE SPEC AND MRSA SWAB SENT TO LAB.    1100 - PT TO CT SCAN VIA STRETCHER.    1130 - PT RETURNED FROM CT SCAN.    1155 - LAB WORK SENT TO LAB.  PRE-OP TCHING DONE - PT VERBALIZES UNDERSTANDING.  STRETCHER IN LOWEST POSITION, CB IN PLACE AND SR UP X2.    1218 - DR. RICKS IN TO SPEAK WITH PT - ALL QUESTIONS ANSWERED.    1257 - LAB CALLED STATING T&S AND PTT, PT/INR HEMOLYZED.  REDRAWN AND SPECIMEN SENT TO LAB.

## 2024-01-11 ENCOUNTER — APPOINTMENT (OUTPATIENT)
Facility: HOSPITAL | Age: 74
DRG: 853 | End: 2024-01-11
Attending: UROLOGY
Payer: MEDICARE

## 2024-01-11 LAB
ANION GAP SERPL CALC-SCNC: 8 MMOL/L (ref 5–15)
BACTERIA SPEC CULT: NORMAL
BASOPHILS # BLD: 0 K/UL (ref 0–0.1)
BASOPHILS NFR BLD: 0 % (ref 0–1)
BUN SERPL-MCNC: 10 MG/DL (ref 6–20)
BUN/CREAT SERPL: 11 (ref 12–20)
CALCIUM SERPL-MCNC: 8.8 MG/DL (ref 8.5–10.1)
CHLORIDE SERPL-SCNC: 107 MMOL/L (ref 97–108)
CO2 SERPL-SCNC: 18 MMOL/L (ref 21–32)
CREAT SERPL-MCNC: 0.91 MG/DL (ref 0.55–1.02)
DIFFERENTIAL METHOD BLD: ABNORMAL
EKG ATRIAL RATE: 69 BPM
EKG DIAGNOSIS: NORMAL
EKG P AXIS: 90 DEGREES
EKG P-R INTERVAL: 222 MS
EKG Q-T INTERVAL: 410 MS
EKG QRS DURATION: 130 MS
EKG QTC CALCULATION (BAZETT): 439 MS
EKG R AXIS: -11 DEGREES
EKG T AXIS: 36 DEGREES
EKG VENTRICULAR RATE: 69 BPM
EOSINOPHIL # BLD: 0 K/UL (ref 0–0.4)
EOSINOPHIL NFR BLD: 0 % (ref 0–7)
ERYTHROCYTE [DISTWIDTH] IN BLOOD BY AUTOMATED COUNT: 19.9 % (ref 11.5–14.5)
GLUCOSE SERPL-MCNC: 149 MG/DL (ref 65–100)
HCT VFR BLD AUTO: 28.7 % (ref 35–47)
HGB BLD-MCNC: 8.5 G/DL (ref 11.5–16)
IMM GRANULOCYTES # BLD AUTO: 0.2 K/UL (ref 0–0.04)
IMM GRANULOCYTES NFR BLD AUTO: 1 % (ref 0–0.5)
LYMPHOCYTES # BLD: 1 K/UL (ref 0.8–3.5)
LYMPHOCYTES NFR BLD: 6 % (ref 12–49)
MCH RBC QN AUTO: 25.1 PG (ref 26–34)
MCHC RBC AUTO-ENTMCNC: 29.6 G/DL (ref 30–36.5)
MCV RBC AUTO: 84.9 FL (ref 80–99)
MONOCYTES # BLD: 0.5 K/UL (ref 0–1)
MONOCYTES NFR BLD: 3 % (ref 5–13)
NEUTS SEG # BLD: 15.7 K/UL (ref 1.8–8)
NEUTS SEG NFR BLD: 90 % (ref 32–75)
NRBC # BLD: 0 K/UL (ref 0–0.01)
NRBC BLD-RTO: 0 PER 100 WBC
PLATELET # BLD AUTO: 329 K/UL (ref 150–400)
PMV BLD AUTO: 8.9 FL (ref 8.9–12.9)
POTASSIUM SERPL-SCNC: 3.7 MMOL/L (ref 3.5–5.1)
RBC # BLD AUTO: 3.38 M/UL (ref 3.8–5.2)
RBC MORPH BLD: ABNORMAL
SERVICE CMNT-IMP: NORMAL
SERVICE CMNT-IMP: NORMAL
SODIUM SERPL-SCNC: 133 MMOL/L (ref 136–145)
WBC # BLD AUTO: 17.4 K/UL (ref 3.6–11)

## 2024-01-11 PROCEDURE — 2580000003 HC RX 258: Performed by: UROLOGY

## 2024-01-11 PROCEDURE — 6370000000 HC RX 637 (ALT 250 FOR IP): Performed by: UROLOGY

## 2024-01-11 PROCEDURE — 80048 BASIC METABOLIC PNL TOTAL CA: CPT

## 2024-01-11 PROCEDURE — 87040 BLOOD CULTURE FOR BACTERIA: CPT

## 2024-01-11 PROCEDURE — 2500000003 HC RX 250 WO HCPCS: Performed by: UROLOGY

## 2024-01-11 PROCEDURE — 2580000003 HC RX 258: Performed by: INTERNAL MEDICINE

## 2024-01-11 PROCEDURE — 85025 COMPLETE CBC W/AUTO DIFF WBC: CPT

## 2024-01-11 PROCEDURE — 2580000003 HC RX 258: Performed by: NURSE PRACTITIONER

## 2024-01-11 PROCEDURE — 6360000002 HC RX W HCPCS: Performed by: HOSPITALIST

## 2024-01-11 PROCEDURE — 36415 COLL VENOUS BLD VENIPUNCTURE: CPT

## 2024-01-11 PROCEDURE — 99223 1ST HOSP IP/OBS HIGH 75: CPT | Performed by: INTERNAL MEDICINE

## 2024-01-11 PROCEDURE — 94761 N-INVAS EAR/PLS OXIMETRY MLT: CPT

## 2024-01-11 PROCEDURE — 2700000000 HC OXYGEN THERAPY PER DAY

## 2024-01-11 PROCEDURE — 6360000002 HC RX W HCPCS: Performed by: NURSE PRACTITIONER

## 2024-01-11 PROCEDURE — 2060000000 HC ICU INTERMEDIATE R&B

## 2024-01-11 PROCEDURE — 6360000002 HC RX W HCPCS: Performed by: UROLOGY

## 2024-01-11 PROCEDURE — 71045 X-RAY EXAM CHEST 1 VIEW: CPT

## 2024-01-11 RX ORDER — CASTOR OIL AND BALSAM, PERU 788; 87 MG/G; MG/G
OINTMENT TOPICAL 2 TIMES DAILY
Status: DISCONTINUED | OUTPATIENT
Start: 2024-01-11 | End: 2024-01-26 | Stop reason: HOSPADM

## 2024-01-11 RX ORDER — LEVOTHYROXINE SODIUM 0.03 MG/1
25 TABLET ORAL DAILY
Status: DISCONTINUED | OUTPATIENT
Start: 2024-01-12 | End: 2024-01-26 | Stop reason: HOSPADM

## 2024-01-11 RX ORDER — SODIUM CHLORIDE, SODIUM LACTATE, POTASSIUM CHLORIDE, CALCIUM CHLORIDE 600; 310; 30; 20 MG/100ML; MG/100ML; MG/100ML; MG/100ML
INJECTION, SOLUTION INTRAVENOUS CONTINUOUS
Status: DISCONTINUED | OUTPATIENT
Start: 2024-01-11 | End: 2024-01-13

## 2024-01-11 RX ORDER — PROCHLORPERAZINE EDISYLATE 5 MG/ML
5 INJECTION INTRAMUSCULAR; INTRAVENOUS EVERY 6 HOURS PRN
Status: DISCONTINUED | OUTPATIENT
Start: 2024-01-11 | End: 2024-01-26 | Stop reason: HOSPADM

## 2024-01-11 RX ADMIN — VANCOMYCIN HYDROCHLORIDE 750 MG: 750 INJECTION, POWDER, LYOPHILIZED, FOR SOLUTION INTRAVENOUS at 23:46

## 2024-01-11 RX ADMIN — PIPERACILLIN AND TAZOBACTAM 3375 MG: 3; .375 INJECTION, POWDER, FOR SOLUTION INTRAVENOUS at 19:31

## 2024-01-11 RX ADMIN — HYDROMORPHONE HYDROCHLORIDE 0.5 MG: 1 INJECTION, SOLUTION INTRAMUSCULAR; INTRAVENOUS; SUBCUTANEOUS at 05:18

## 2024-01-11 RX ADMIN — HYDROMORPHONE HYDROCHLORIDE 0.5 MG: 1 INJECTION, SOLUTION INTRAMUSCULAR; INTRAVENOUS; SUBCUTANEOUS at 09:48

## 2024-01-11 RX ADMIN — SODIUM CHLORIDE: 4.5 INJECTION, SOLUTION INTRAVENOUS at 01:08

## 2024-01-11 RX ADMIN — VANCOMYCIN HYDROCHLORIDE 1500 MG: 1.5 INJECTION, POWDER, LYOPHILIZED, FOR SOLUTION INTRAVENOUS at 15:07

## 2024-01-11 RX ADMIN — OXYCODONE HYDROCHLORIDE 10 MG: 5 TABLET ORAL at 07:37

## 2024-01-11 RX ADMIN — PROCHLORPERAZINE EDISYLATE 5 MG: 5 INJECTION INTRAMUSCULAR; INTRAVENOUS at 23:37

## 2024-01-11 RX ADMIN — SODIUM CHLORIDE, PRESERVATIVE FREE 10 ML: 5 INJECTION INTRAVENOUS at 09:49

## 2024-01-11 RX ADMIN — SODIUM CHLORIDE, PRESERVATIVE FREE 10 ML: 5 INJECTION INTRAVENOUS at 22:58

## 2024-01-11 RX ADMIN — OXYCODONE HYDROCHLORIDE 10 MG: 5 TABLET ORAL at 02:35

## 2024-01-11 RX ADMIN — Medication: at 22:57

## 2024-01-11 RX ADMIN — SODIUM CHLORIDE, POTASSIUM CHLORIDE, SODIUM LACTATE AND CALCIUM CHLORIDE: 600; 310; 30; 20 INJECTION, SOLUTION INTRAVENOUS at 13:09

## 2024-01-11 RX ADMIN — PIPERACILLIN AND TAZOBACTAM 3375 MG: 3; .375 INJECTION, POWDER, FOR SOLUTION INTRAVENOUS at 13:10

## 2024-01-11 RX ADMIN — ONDANSETRON 4 MG: 2 INJECTION INTRAMUSCULAR; INTRAVENOUS at 16:50

## 2024-01-11 RX ADMIN — ONDANSETRON 4 MG: 2 INJECTION INTRAMUSCULAR; INTRAVENOUS at 09:48

## 2024-01-11 ASSESSMENT — PAIN SCALES - GENERAL
PAINLEVEL_OUTOF10: 8
PAINLEVEL_OUTOF10: 7
PAINLEVEL_OUTOF10: 8
PAINLEVEL_OUTOF10: 3

## 2024-01-11 ASSESSMENT — PAIN DESCRIPTION - ORIENTATION: ORIENTATION: MID

## 2024-01-11 ASSESSMENT — PAIN DESCRIPTION - LOCATION
LOCATION: ABDOMEN
LOCATION: ABDOMEN

## 2024-01-11 NOTE — ANESTHESIA POSTPROCEDURE EVALUATION
Department of Anesthesiology  Postprocedure Note    Patient: Nicolasa Macdonald  MRN: [id number]  YOB: 1950  Date of evaluation: 1/10/2024    Procedure Summary       Date: 01/10/24 Room / Location: [facility] MAIN OR M1 / [facility] MAIN OR    Anesthesia Start: 1317 Anesthesia Stop: 1830    Procedures:       RIGHT RADICAL OPEN NEPHRECTOMY (E R A S) (Right: Abdomen)      LIGATION OF RIGHT RENAL ARTERY (Right: Kidney)      REPAIR SMALL BOWEL LACERATION (Abdomen) Diagnosis:       Kidney abscess      (Kidney abscess [N15.1])    Providers: Zac Lind MD; Saad Holder MD; Danny Cortez MD Responsible Provider: Zafar Grande MD    Anesthesia Type: TIVA, General ASA Status: 3            Anesthesia Type: TIVA, General    Lane Phase I: Lane Score: 8    Lane Phase II:      Anesthesia Post Evaluation    Patient location during evaluation: PACU  Patient participation: complete - patient participated  Level of consciousness: awake  Pain score: 0  Airway patency: patent  Nausea & Vomiting: no nausea and no vomiting  Cardiovascular status: hemodynamically stable  Respiratory status: acceptable  Hydration status: stable  Multimodal analgesia pain management approach  Pain management: adequate    No notable events documented.

## 2024-01-11 NOTE — WOUND CARE
Assessment Slough;Epithelialization 01/11/24 1557   Drainage Amount Scant (moist but unmeasurable) 01/11/24 1557   Odor None 01/11/24 1557   Cate-wound Assessment Fragile 01/11/24 1557   Number of days: 0       Wound 01/11/24 Heel Right;Plantar (Active)   Wound Image   01/11/24 1601   Wound Etiology Pressure Unstageable 01/11/24 1601   Dressing/Treatment Open to air 01/11/24 1601   Wound Length (cm) 3.5 cm 01/11/24 1601   Wound Width (cm) 3.5 cm 01/11/24 1601   Wound Surface Area (cm^2) 12.25 cm^2 01/11/24 1601   Wound Assessment Other (Comment) 01/11/24 1601   Drainage Amount None (dry) 01/11/24 1601   Odor None 01/11/24 1601   Cate-wound Assessment Fragile 01/11/24 1601   Number of days: 0       Wound 01/11/24 Heel Left;Plantar (Active)   Wound Image   01/11/24 1601   Wound Etiology Pressure Unstageable 01/11/24 1601   Dressing/Treatment Open to air 01/11/24 1601   Wound Assessment Epithelialization 01/11/24 1601   Drainage Amount None (dry) 01/11/24 1601   Odor None 01/11/24 1601   Cate-wound Assessment Intact 01/11/24 1601   Number of days: 0       Recommend:    Sacral wound: daily, cleanse with NS moist 4x4, apply a smear of Venelex ointment to wound and cover with a sacral foam dressing.    Bilateral heels: apply Venelex ointment BID, leave ARIEL and floating off bed.    NORMA LOMBARDO RN, CWON

## 2024-01-12 ENCOUNTER — APPOINTMENT (OUTPATIENT)
Facility: HOSPITAL | Age: 74
DRG: 853 | End: 2024-01-12
Attending: UROLOGY
Payer: MEDICARE

## 2024-01-12 PROBLEM — B96.4 BACTEREMIA DUE TO PROTEUS SPECIES: Status: ACTIVE | Noted: 2024-01-02

## 2024-01-12 PROBLEM — Z96.0 S/P URETERAL STENT PLACEMENT: Status: ACTIVE | Noted: 2024-01-12

## 2024-01-12 PROBLEM — U07.1 COVID-19 VIRUS INFECTION: Status: ACTIVE | Noted: 2024-01-12

## 2024-01-12 PROBLEM — N13.30 BILATERAL HYDRONEPHROSIS: Status: ACTIVE | Noted: 2024-01-12

## 2024-01-12 PROBLEM — A49.8 PROTEUS MIRABILIS INFECTION: Status: ACTIVE | Noted: 2024-01-12

## 2024-01-12 PROBLEM — A41.50 GRAM NEGATIVE SEPSIS (HCC): Status: ACTIVE | Noted: 2024-01-12

## 2024-01-12 PROBLEM — N20.0 RENAL CALCULUS, RIGHT: Status: ACTIVE | Noted: 2024-01-12

## 2024-01-12 LAB
ALBUMIN SERPL-MCNC: 1.8 G/DL (ref 3.5–5)
ALBUMIN/GLOB SERPL: 0.4 (ref 1.1–2.2)
ALP SERPL-CCNC: 68 U/L (ref 45–117)
ALT SERPL-CCNC: 7 U/L (ref 12–78)
ANION GAP SERPL CALC-SCNC: 6 MMOL/L (ref 5–15)
ANION GAP SERPL CALC-SCNC: 6 MMOL/L (ref 5–15)
ANION GAP SERPL CALC-SCNC: 7 MMOL/L (ref 5–15)
ARTERIAL PATENCY WRIST A: ABNORMAL
AST SERPL-CCNC: 12 U/L (ref 15–37)
BASE DEFICIT BLDA-SCNC: 1 MMOL/L
BASOPHILS # BLD: 0.1 K/UL (ref 0–0.1)
BASOPHILS NFR BLD: 0 % (ref 0–1)
BDY SITE: ABNORMAL
BILIRUB SERPL-MCNC: 0.3 MG/DL (ref 0.2–1)
BUN SERPL-MCNC: 26 MG/DL (ref 6–20)
BUN SERPL-MCNC: 31 MG/DL (ref 6–20)
BUN SERPL-MCNC: 32 MG/DL (ref 6–20)
BUN/CREAT SERPL: 20 (ref 12–20)
BUN/CREAT SERPL: 21 (ref 12–20)
BUN/CREAT SERPL: 21 (ref 12–20)
CALCIUM SERPL-MCNC: 8.5 MG/DL (ref 8.5–10.1)
CALCIUM SERPL-MCNC: 8.6 MG/DL (ref 8.5–10.1)
CALCIUM SERPL-MCNC: 9.1 MG/DL (ref 8.5–10.1)
CHLORIDE SERPL-SCNC: 107 MMOL/L (ref 97–108)
CHLORIDE SERPL-SCNC: 107 MMOL/L (ref 97–108)
CHLORIDE SERPL-SCNC: 109 MMOL/L (ref 97–108)
CO2 SERPL-SCNC: 22 MMOL/L (ref 21–32)
CO2 SERPL-SCNC: 23 MMOL/L (ref 21–32)
CO2 SERPL-SCNC: 24 MMOL/L (ref 21–32)
CREAT SERPL-MCNC: 1.26 MG/DL (ref 0.55–1.02)
CREAT SERPL-MCNC: 1.47 MG/DL (ref 0.55–1.02)
CREAT SERPL-MCNC: 1.61 MG/DL (ref 0.55–1.02)
DIFFERENTIAL METHOD BLD: ABNORMAL
EOSINOPHIL # BLD: 0 K/UL (ref 0–0.4)
EOSINOPHIL NFR BLD: 0 % (ref 0–7)
ERYTHROCYTE [DISTWIDTH] IN BLOOD BY AUTOMATED COUNT: 20.4 % (ref 11.5–14.5)
ERYTHROCYTE [DISTWIDTH] IN BLOOD BY AUTOMATED COUNT: 20.7 % (ref 11.5–14.5)
GLOBULIN SER CALC-MCNC: 4.2 G/DL (ref 2–4)
GLUCOSE SERPL-MCNC: 124 MG/DL (ref 65–100)
GLUCOSE SERPL-MCNC: 136 MG/DL (ref 65–100)
GLUCOSE SERPL-MCNC: 138 MG/DL (ref 65–100)
HCO3 BLDA-SCNC: 22 MMOL/L (ref 22–26)
HCT VFR BLD AUTO: 23.5 % (ref 35–47)
HCT VFR BLD AUTO: 24.6 % (ref 35–47)
HCT VFR BLD AUTO: 26 % (ref 35–47)
HGB BLD-MCNC: 7.2 G/DL (ref 11.5–16)
HGB BLD-MCNC: 7.6 G/DL (ref 11.5–16)
HGB BLD-MCNC: 7.9 G/DL (ref 11.5–16)
IMM GRANULOCYTES # BLD AUTO: 0.3 K/UL (ref 0–0.04)
IMM GRANULOCYTES NFR BLD AUTO: 1 % (ref 0–0.5)
LACTATE SERPL-SCNC: 1.4 MMOL/L (ref 0.4–2)
LACTATE SERPL-SCNC: 1.7 MMOL/L (ref 0.4–2)
LYMPHOCYTES # BLD: 1 K/UL (ref 0.8–3.5)
LYMPHOCYTES NFR BLD: 3 % (ref 12–49)
MCH RBC QN AUTO: 25.2 PG (ref 26–34)
MCH RBC QN AUTO: 25.5 PG (ref 26–34)
MCHC RBC AUTO-ENTMCNC: 30.4 G/DL (ref 30–36.5)
MCHC RBC AUTO-ENTMCNC: 30.9 G/DL (ref 30–36.5)
MCV RBC AUTO: 81.5 FL (ref 80–99)
MCV RBC AUTO: 83.9 FL (ref 80–99)
MONOCYTES # BLD: 0.9 K/UL (ref 0–1)
MONOCYTES NFR BLD: 3 % (ref 5–13)
NEUTS SEG # BLD: 28.1 K/UL (ref 1.8–8)
NEUTS SEG NFR BLD: 93 % (ref 32–75)
NRBC # BLD: 0 K/UL (ref 0–0.01)
NRBC # BLD: 0 K/UL (ref 0–0.01)
NRBC BLD-RTO: 0 PER 100 WBC
NRBC BLD-RTO: 0 PER 100 WBC
PCO2 BLDA: 33 MMHG (ref 35–45)
PH BLDA: 7.45 (ref 7.35–7.45)
PLATELET # BLD AUTO: 446 K/UL (ref 150–400)
PLATELET # BLD AUTO: 467 K/UL (ref 150–400)
PMV BLD AUTO: 8.8 FL (ref 8.9–12.9)
PO2 BLDA: 212 MMHG (ref 80–100)
POTASSIUM SERPL-SCNC: 3.2 MMOL/L (ref 3.5–5.1)
POTASSIUM SERPL-SCNC: 3.6 MMOL/L (ref 3.5–5.1)
POTASSIUM SERPL-SCNC: 3.6 MMOL/L (ref 3.5–5.1)
PROCALCITONIN SERPL-MCNC: 1.46 NG/ML
PROT SERPL-MCNC: 6 G/DL (ref 6.4–8.2)
RBC # BLD AUTO: 3.02 M/UL (ref 3.8–5.2)
RBC # BLD AUTO: 3.1 M/UL (ref 3.8–5.2)
SAO2 % BLD: 100 % (ref 92–97)
SAO2% DEVICE SAO2% SENSOR NAME: ABNORMAL
SODIUM SERPL-SCNC: 135 MMOL/L (ref 136–145)
SODIUM SERPL-SCNC: 138 MMOL/L (ref 136–145)
SODIUM SERPL-SCNC: 138 MMOL/L (ref 136–145)
SPECIMEN SITE: ABNORMAL
TSH SERPL DL<=0.05 MIU/L-ACNC: 4.77 UIU/ML (ref 0.36–3.74)
VANCOMYCIN SERPL-MCNC: 33.2 UG/ML
WBC # BLD AUTO: 27.6 K/UL (ref 3.6–11)
WBC # BLD AUTO: 30.3 K/UL (ref 3.6–11)

## 2024-01-12 PROCEDURE — 74019 RADEX ABDOMEN 2 VIEWS: CPT

## 2024-01-12 PROCEDURE — 71045 X-RAY EXAM CHEST 1 VIEW: CPT

## 2024-01-12 PROCEDURE — 99233 SBSQ HOSP IP/OBS HIGH 50: CPT | Performed by: INTERNAL MEDICINE

## 2024-01-12 PROCEDURE — 6370000000 HC RX 637 (ALT 250 FOR IP): Performed by: UROLOGY

## 2024-01-12 PROCEDURE — 2580000003 HC RX 258: Performed by: INTERNAL MEDICINE

## 2024-01-12 PROCEDURE — 84145 PROCALCITONIN (PCT): CPT

## 2024-01-12 PROCEDURE — 6360000002 HC RX W HCPCS: Performed by: UROLOGY

## 2024-01-12 PROCEDURE — 83605 ASSAY OF LACTIC ACID: CPT

## 2024-01-12 PROCEDURE — 85014 HEMATOCRIT: CPT

## 2024-01-12 PROCEDURE — 6360000002 HC RX W HCPCS: Performed by: NURSE PRACTITIONER

## 2024-01-12 PROCEDURE — 84443 ASSAY THYROID STIM HORMONE: CPT

## 2024-01-12 PROCEDURE — 85018 HEMOGLOBIN: CPT

## 2024-01-12 PROCEDURE — 85025 COMPLETE CBC W/AUTO DIFF WBC: CPT

## 2024-01-12 PROCEDURE — 80053 COMPREHEN METABOLIC PANEL: CPT

## 2024-01-12 PROCEDURE — C9113 INJ PANTOPRAZOLE SODIUM, VIA: HCPCS | Performed by: INTERNAL MEDICINE

## 2024-01-12 PROCEDURE — A4216 STERILE WATER/SALINE, 10 ML: HCPCS | Performed by: INTERNAL MEDICINE

## 2024-01-12 PROCEDURE — 80048 BASIC METABOLIC PNL TOTAL CA: CPT

## 2024-01-12 PROCEDURE — 80202 ASSAY OF VANCOMYCIN: CPT

## 2024-01-12 PROCEDURE — 36600 WITHDRAWAL OF ARTERIAL BLOOD: CPT

## 2024-01-12 PROCEDURE — 6370000000 HC RX 637 (ALT 250 FOR IP): Performed by: INTERNAL MEDICINE

## 2024-01-12 PROCEDURE — 36415 COLL VENOUS BLD VENIPUNCTURE: CPT

## 2024-01-12 PROCEDURE — 2580000003 HC RX 258: Performed by: NURSE PRACTITIONER

## 2024-01-12 PROCEDURE — 85027 COMPLETE CBC AUTOMATED: CPT

## 2024-01-12 PROCEDURE — 74018 RADEX ABDOMEN 1 VIEW: CPT

## 2024-01-12 PROCEDURE — 6360000002 HC RX W HCPCS: Performed by: INTERNAL MEDICINE

## 2024-01-12 PROCEDURE — 2060000000 HC ICU INTERMEDIATE R&B

## 2024-01-12 PROCEDURE — 2580000003 HC RX 258: Performed by: UROLOGY

## 2024-01-12 PROCEDURE — 82803 BLOOD GASES ANY COMBINATION: CPT

## 2024-01-12 RX ORDER — 0.9 % SODIUM CHLORIDE 0.9 %
500 INTRAVENOUS SOLUTION INTRAVENOUS ONCE
Status: COMPLETED | OUTPATIENT
Start: 2024-01-12 | End: 2024-01-12

## 2024-01-12 RX ORDER — 0.9 % SODIUM CHLORIDE 0.9 %
1000 INTRAVENOUS SOLUTION INTRAVENOUS ONCE
Status: COMPLETED | OUTPATIENT
Start: 2024-01-12 | End: 2024-01-12

## 2024-01-12 RX ORDER — SODIUM CHLORIDE 9 MG/ML
INJECTION, SOLUTION INTRAVENOUS CONTINUOUS
Status: DISCONTINUED | OUTPATIENT
Start: 2024-01-12 | End: 2024-01-13

## 2024-01-12 RX ADMIN — LEVOTHYROXINE SODIUM 25 MCG: 0.03 TABLET ORAL at 06:25

## 2024-01-12 RX ADMIN — MICAFUNGIN SODIUM 100 MG: 100 INJECTION, POWDER, LYOPHILIZED, FOR SOLUTION INTRAVENOUS at 15:08

## 2024-01-12 RX ADMIN — PIPERACILLIN AND TAZOBACTAM 3375 MG: 3; .375 INJECTION, POWDER, FOR SOLUTION INTRAVENOUS at 03:31

## 2024-01-12 RX ADMIN — SODIUM CHLORIDE: 9 INJECTION, SOLUTION INTRAVENOUS at 12:02

## 2024-01-12 RX ADMIN — SODIUM CHLORIDE: 9 INJECTION, SOLUTION INTRAVENOUS at 21:58

## 2024-01-12 RX ADMIN — Medication: at 21:58

## 2024-01-12 RX ADMIN — MEROPENEM 1000 MG: 1 INJECTION, POWDER, FOR SOLUTION INTRAVENOUS at 15:18

## 2024-01-12 RX ADMIN — SODIUM CHLORIDE 1000 ML: 9 INJECTION, SOLUTION INTRAVENOUS at 09:58

## 2024-01-12 RX ADMIN — SODIUM CHLORIDE 500 ML: 9 INJECTION, SOLUTION INTRAVENOUS at 09:04

## 2024-01-12 RX ADMIN — PIPERACILLIN AND TAZOBACTAM 3375 MG: 3; .375 INJECTION, POWDER, FOR SOLUTION INTRAVENOUS at 11:01

## 2024-01-12 RX ADMIN — SODIUM CHLORIDE, PRESERVATIVE FREE 40 MG: 5 INJECTION INTRAVENOUS at 09:40

## 2024-01-12 RX ADMIN — SODIUM CHLORIDE, POTASSIUM CHLORIDE, SODIUM LACTATE AND CALCIUM CHLORIDE: 600; 310; 30; 20 INJECTION, SOLUTION INTRAVENOUS at 06:26

## 2024-01-12 RX ADMIN — ONDANSETRON 4 MG: 2 INJECTION INTRAMUSCULAR; INTRAVENOUS at 08:44

## 2024-01-12 RX ADMIN — SODIUM CHLORIDE, PRESERVATIVE FREE 10 ML: 5 INJECTION INTRAVENOUS at 22:00

## 2024-01-12 RX ADMIN — Medication: at 11:02

## 2024-01-12 RX ADMIN — SODIUM CHLORIDE, PRESERVATIVE FREE 40 MG: 5 INJECTION INTRAVENOUS at 22:00

## 2024-01-12 RX ADMIN — SODIUM CHLORIDE: 9 INJECTION, SOLUTION INTRAVENOUS at 09:47

## 2024-01-12 NOTE — SIGNIFICANT EVENT
RAPID RESPONSE TEAM    Rapid response team requested to evaluate pt in room #2311 at 0845     Reason for rapid response:  Hematemesis, ill-appearing    Initial assessment:  Pt responsive to physical stimulation, coffee ground emesis present on patient chest. Pt is ill-appearing, slightly diaphoretic, pale. Pt breathing normally, no obvious distress. Zofran being given by primary RN at this time. Blood pressure 99/53 (63), , other vitals WNL.      Dr. Alvarenga contacted via phone, shortly at bedside. ICU and GI consulted, Dr. Mendiola and Mamadou Arnold, NP arriving at bedside. Orders received for the following: CXR, ABG, BMP, H&H, 1000mL NS bolus, lactic acid    Outcome:  pt to remain in room #2311. Low threshold for CCU transfer per team.     Please call with any questions or concerns    Blaze Motley RN  Rapid Response Team  Ext [phone]    Recent Results (from the past 8 hour(s))   CBC with Auto Differential    Collection Time: 01/12/24  9:16 AM   Result Value Ref Range    WBC 30.3 (H) 3.6 - 11.0 K/uL    RBC 3.10 (L) 3.80 - 5.20 M/uL    Hemoglobin 7.9 (L) 11.5 - 16.0 g/dL    Hematocrit 26.0 (L) 35.0 - 47.0 %    MCV 83.9 80.0 - 99.0 FL    MCH 25.5 (L) 26.0 - 34.0 PG    MCHC 30.4 30.0 - 36.5 g/dL    RDW 20.7 (H) 11.5 - 14.5 %    Platelets 467 (H) 150 - 400 K/uL    Nucleated RBCs 0.0 0  WBC    nRBC 0.00 0.00 - 0.01 K/uL    Neutrophils % 93 (H) 32 - 75 %    Lymphocytes % 3 (L) 12 - 49 %    Monocytes % 3 (L) 5 - 13 %    Eosinophils % 0 0 - 7 %    Basophils % 0 0 - 1 %    Immature Granulocytes 1 (H) 0.0 - 0.5 %    Neutrophils Absolute 28.1 (H) 1.8 - 8.0 K/UL    Lymphocytes Absolute 1.0 0.8 - 3.5 K/UL    Monocytes Absolute 0.9 0.0 - 1.0 K/UL    Eosinophils Absolute 0.0 0.0 - 0.4 K/UL    Basophils Absolute 0.1 0.0 - 0.1 K/UL    Absolute Immature Granulocyte 0.3 (H) 0.00 - 0.04 K/UL    Differential Type AUTOMATED     Comprehensive Metabolic Panel    Collection Time: 01/12/24  9:18 AM   Result Value Ref Range

## 2024-01-13 ENCOUNTER — APPOINTMENT (OUTPATIENT)
Facility: HOSPITAL | Age: 74
DRG: 853 | End: 2024-01-13
Attending: UROLOGY
Payer: MEDICARE

## 2024-01-13 ENCOUNTER — ANESTHESIA EVENT (OUTPATIENT)
Facility: HOSPITAL | Age: 74
End: 2024-01-13
Payer: MEDICARE

## 2024-01-13 PROBLEM — R65.20 SEVERE SEPSIS (HCC): Status: ACTIVE | Noted: 2023-12-10

## 2024-01-13 PROBLEM — I95.89 HYPOTENSION DUE TO HYPOVOLEMIA: Status: ACTIVE | Noted: 2024-01-13

## 2024-01-13 PROBLEM — E86.1 HYPOTENSION DUE TO HYPOVOLEMIA: Status: ACTIVE | Noted: 2024-01-13

## 2024-01-13 LAB
ALBUMIN SERPL-MCNC: 1.4 G/DL (ref 3.5–5)
ALBUMIN/GLOB SERPL: 0.4 (ref 1.1–2.2)
ALP SERPL-CCNC: 63 U/L (ref 45–117)
ALT SERPL-CCNC: <6 U/L (ref 12–78)
ANION GAP SERPL CALC-SCNC: 6 MMOL/L (ref 5–15)
AST SERPL-CCNC: 11 U/L (ref 15–37)
BACTERIA SPEC CULT: NORMAL
BASOPHILS # BLD: 0 K/UL (ref 0–0.1)
BASOPHILS NFR BLD: 0 % (ref 0–1)
BILIRUB SERPL-MCNC: 0.3 MG/DL (ref 0.2–1)
BUN SERPL-MCNC: 36 MG/DL (ref 6–20)
BUN/CREAT SERPL: 23 (ref 12–20)
CALCIUM SERPL-MCNC: 8.7 MG/DL (ref 8.5–10.1)
CHLORIDE SERPL-SCNC: 112 MMOL/L (ref 97–108)
CO2 SERPL-SCNC: 23 MMOL/L (ref 21–32)
CREAT SERPL-MCNC: 1.55 MG/DL (ref 0.55–1.02)
DIFFERENTIAL METHOD BLD: ABNORMAL
EOSINOPHIL # BLD: 0 K/UL (ref 0–0.4)
EOSINOPHIL NFR BLD: 0 % (ref 0–7)
ERYTHROCYTE [DISTWIDTH] IN BLOOD BY AUTOMATED COUNT: 21 % (ref 11.5–14.5)
GLOBULIN SER CALC-MCNC: 3.8 G/DL (ref 2–4)
GLUCOSE BLD STRIP.AUTO-MCNC: 109 MG/DL (ref 65–117)
GLUCOSE SERPL-MCNC: 111 MG/DL (ref 65–100)
HCT VFR BLD AUTO: 19 % (ref 35–47)
HCT VFR BLD AUTO: 27.4 % (ref 35–47)
HGB BLD-MCNC: 5.7 G/DL (ref 11.5–16)
HGB BLD-MCNC: 8.6 G/DL (ref 11.5–16)
HISTORY CHECK: NORMAL
IMM GRANULOCYTES # BLD AUTO: 0 K/UL (ref 0–0.04)
IMM GRANULOCYTES NFR BLD AUTO: 0 % (ref 0–0.5)
LYMPHOCYTES # BLD: 0.5 K/UL (ref 0.8–3.5)
LYMPHOCYTES NFR BLD: 2 % (ref 12–49)
MAGNESIUM SERPL-MCNC: 1.7 MG/DL (ref 1.6–2.4)
MCH RBC QN AUTO: 25 PG (ref 26–34)
MCHC RBC AUTO-ENTMCNC: 30 G/DL (ref 30–36.5)
MCV RBC AUTO: 83.3 FL (ref 80–99)
MONOCYTES # BLD: 0.2 K/UL (ref 0–1)
MONOCYTES NFR BLD: 1 % (ref 5–13)
NEUTS SEG # BLD: 23.1 K/UL (ref 1.8–8)
NEUTS SEG NFR BLD: 97 % (ref 32–75)
NRBC # BLD: 0 K/UL (ref 0–0.01)
NRBC BLD-RTO: 0 PER 100 WBC
PHOSPHATE SERPL-MCNC: 2.6 MG/DL (ref 2.6–4.7)
PLATELET # BLD AUTO: 335 K/UL (ref 150–400)
PMV BLD AUTO: 9 FL (ref 8.9–12.9)
POTASSIUM SERPL-SCNC: 2.9 MMOL/L (ref 3.5–5.1)
PROT SERPL-MCNC: 5.2 G/DL (ref 6.4–8.2)
RBC # BLD AUTO: 2.28 M/UL (ref 3.8–5.2)
RBC MORPH BLD: ABNORMAL
RBC MORPH BLD: ABNORMAL
SERVICE CMNT-IMP: NORMAL
SERVICE CMNT-IMP: NORMAL
SODIUM SERPL-SCNC: 141 MMOL/L (ref 136–145)
WBC # BLD AUTO: 23.8 K/UL (ref 3.6–11)

## 2024-01-13 PROCEDURE — 6360000002 HC RX W HCPCS: Performed by: GENERAL ACUTE CARE HOSPITAL

## 2024-01-13 PROCEDURE — 2580000003 HC RX 258: Performed by: INTERNAL MEDICINE

## 2024-01-13 PROCEDURE — 80053 COMPREHEN METABOLIC PANEL: CPT

## 2024-01-13 PROCEDURE — 36430 TRANSFUSION BLD/BLD COMPNT: CPT

## 2024-01-13 PROCEDURE — 70450 CT HEAD/BRAIN W/O DYE: CPT

## 2024-01-13 PROCEDURE — 76937 US GUIDE VASCULAR ACCESS: CPT

## 2024-01-13 PROCEDURE — 83735 ASSAY OF MAGNESIUM: CPT

## 2024-01-13 PROCEDURE — 85014 HEMATOCRIT: CPT

## 2024-01-13 PROCEDURE — P9016 RBC LEUKOCYTES REDUCED: HCPCS

## 2024-01-13 PROCEDURE — 6360000002 HC RX W HCPCS: Performed by: NURSE PRACTITIONER

## 2024-01-13 PROCEDURE — 6360000002 HC RX W HCPCS: Performed by: INTERNAL MEDICINE

## 2024-01-13 PROCEDURE — 2500000003 HC RX 250 WO HCPCS: Performed by: NURSE PRACTITIONER

## 2024-01-13 PROCEDURE — 84100 ASSAY OF PHOSPHORUS: CPT

## 2024-01-13 PROCEDURE — 85018 HEMOGLOBIN: CPT

## 2024-01-13 PROCEDURE — C9113 INJ PANTOPRAZOLE SODIUM, VIA: HCPCS | Performed by: INTERNAL MEDICINE

## 2024-01-13 PROCEDURE — 82962 GLUCOSE BLOOD TEST: CPT

## 2024-01-13 PROCEDURE — A4216 STERILE WATER/SALINE, 10 ML: HCPCS | Performed by: INTERNAL MEDICINE

## 2024-01-13 PROCEDURE — 30233N1 TRANSFUSION OF NONAUTOLOGOUS RED BLOOD CELLS INTO PERIPHERAL VEIN, PERCUTANEOUS APPROACH: ICD-10-PCS | Performed by: UROLOGY

## 2024-01-13 PROCEDURE — 36410 VNPNXR 3YR/> PHY/QHP DX/THER: CPT

## 2024-01-13 PROCEDURE — 36415 COLL VENOUS BLD VENIPUNCTURE: CPT

## 2024-01-13 PROCEDURE — 2060000000 HC ICU INTERMEDIATE R&B

## 2024-01-13 PROCEDURE — 85025 COMPLETE CBC W/AUTO DIFF WBC: CPT

## 2024-01-13 PROCEDURE — 2580000003 HC RX 258: Performed by: GENERAL ACUTE CARE HOSPITAL

## 2024-01-13 PROCEDURE — C1751 CATH, INF, PER/CENT/MIDLINE: HCPCS

## 2024-01-13 PROCEDURE — 05H933Z INSERTION OF INFUSION DEVICE INTO RIGHT BRACHIAL VEIN, PERCUTANEOUS APPROACH: ICD-10-PCS | Performed by: INTERNAL MEDICINE

## 2024-01-13 PROCEDURE — 2580000003 HC RX 258: Performed by: UROLOGY

## 2024-01-13 RX ORDER — SODIUM CHLORIDE 9 MG/ML
INJECTION, SOLUTION INTRAVENOUS PRN
Status: DISCONTINUED | OUTPATIENT
Start: 2024-01-13 | End: 2024-01-14

## 2024-01-13 RX ORDER — SODIUM CHLORIDE 0.9 % (FLUSH) 0.9 %
5-40 SYRINGE (ML) INJECTION EVERY 12 HOURS SCHEDULED
Status: DISCONTINUED | OUTPATIENT
Start: 2024-01-13 | End: 2024-01-26 | Stop reason: HOSPADM

## 2024-01-13 RX ORDER — ASPIRIN 300 MG/1
300 SUPPOSITORY RECTAL DAILY
Status: DISCONTINUED | OUTPATIENT
Start: 2024-01-14 | End: 2024-01-26 | Stop reason: HOSPADM

## 2024-01-13 RX ORDER — POLYETHYLENE GLYCOL 3350 17 G/17G
17 POWDER, FOR SOLUTION ORAL DAILY PRN
Status: DISCONTINUED | OUTPATIENT
Start: 2024-01-13 | End: 2024-01-26 | Stop reason: HOSPADM

## 2024-01-13 RX ORDER — SODIUM CHLORIDE 0.9 % (FLUSH) 0.9 %
5-40 SYRINGE (ML) INJECTION PRN
Status: CANCELLED | OUTPATIENT
Start: 2024-01-13

## 2024-01-13 RX ORDER — SODIUM CHLORIDE 9 MG/ML
INJECTION, SOLUTION INTRAVENOUS PRN
Status: DISCONTINUED | OUTPATIENT
Start: 2024-01-13 | End: 2024-01-26 | Stop reason: HOSPADM

## 2024-01-13 RX ORDER — HYDROMORPHONE HYDROCHLORIDE 1 MG/ML
0.25 INJECTION, SOLUTION INTRAMUSCULAR; INTRAVENOUS; SUBCUTANEOUS EVERY 5 MIN PRN
Status: CANCELLED | OUTPATIENT
Start: 2024-01-13

## 2024-01-13 RX ORDER — SODIUM CHLORIDE 0.9 % (FLUSH) 0.9 %
5-40 SYRINGE (ML) INJECTION PRN
Status: DISCONTINUED | OUTPATIENT
Start: 2024-01-13 | End: 2024-01-26 | Stop reason: HOSPADM

## 2024-01-13 RX ORDER — SODIUM CHLORIDE 0.9 % (FLUSH) 0.9 %
5-40 SYRINGE (ML) INJECTION EVERY 12 HOURS SCHEDULED
Status: CANCELLED | OUTPATIENT
Start: 2024-01-13

## 2024-01-13 RX ORDER — PROCHLORPERAZINE EDISYLATE 5 MG/ML
5 INJECTION INTRAMUSCULAR; INTRAVENOUS
Status: CANCELLED | OUTPATIENT
Start: 2024-01-13 | End: 2024-01-14

## 2024-01-13 RX ORDER — POTASSIUM CHLORIDE 7.45 MG/ML
10 INJECTION INTRAVENOUS
Status: COMPLETED | OUTPATIENT
Start: 2024-01-13 | End: 2024-01-13

## 2024-01-13 RX ORDER — FENTANYL CITRATE 50 UG/ML
50 INJECTION, SOLUTION INTRAMUSCULAR; INTRAVENOUS EVERY 5 MIN PRN
Status: CANCELLED | OUTPATIENT
Start: 2024-01-13

## 2024-01-13 RX ORDER — ONDANSETRON 4 MG/1
4 TABLET, ORALLY DISINTEGRATING ORAL EVERY 8 HOURS PRN
Status: DISCONTINUED | OUTPATIENT
Start: 2024-01-13 | End: 2024-01-26 | Stop reason: HOSPADM

## 2024-01-13 RX ORDER — ROSUVASTATIN CALCIUM 40 MG/1
40 TABLET, COATED ORAL NIGHTLY
Status: DISCONTINUED | OUTPATIENT
Start: 2024-01-13 | End: 2024-01-26 | Stop reason: HOSPADM

## 2024-01-13 RX ORDER — SODIUM CHLORIDE 9 MG/ML
INJECTION, SOLUTION INTRAVENOUS PRN
Status: CANCELLED | OUTPATIENT
Start: 2024-01-13

## 2024-01-13 RX ORDER — ENOXAPARIN SODIUM 100 MG/ML
40 INJECTION SUBCUTANEOUS DAILY
Status: DISCONTINUED | OUTPATIENT
Start: 2024-01-14 | End: 2024-01-26 | Stop reason: HOSPADM

## 2024-01-13 RX ORDER — POTASSIUM CHLORIDE 7.45 MG/ML
10 INJECTION INTRAVENOUS
Status: DISCONTINUED | OUTPATIENT
Start: 2024-01-13 | End: 2024-01-13

## 2024-01-13 RX ORDER — ASPIRIN 81 MG/1
81 TABLET, CHEWABLE ORAL DAILY
Status: DISCONTINUED | OUTPATIENT
Start: 2024-01-14 | End: 2024-01-26 | Stop reason: HOSPADM

## 2024-01-13 RX ORDER — ONDANSETRON 2 MG/ML
4 INJECTION INTRAMUSCULAR; INTRAVENOUS EVERY 6 HOURS PRN
Status: DISCONTINUED | OUTPATIENT
Start: 2024-01-13 | End: 2024-01-26 | Stop reason: HOSPADM

## 2024-01-13 RX ORDER — DEXTROSE MONOHYDRATE, SODIUM CHLORIDE, AND POTASSIUM CHLORIDE 50; 2.98; 4.5 G/1000ML; G/1000ML; G/1000ML
INJECTION, SOLUTION INTRAVENOUS CONTINUOUS
Status: DISCONTINUED | OUTPATIENT
Start: 2024-01-13 | End: 2024-01-14

## 2024-01-13 RX ADMIN — POTASSIUM CHLORIDE 10 MEQ: 7.46 INJECTION, SOLUTION INTRAVENOUS at 15:25

## 2024-01-13 RX ADMIN — SODIUM CHLORIDE, PRESERVATIVE FREE 10 ML: 5 INJECTION INTRAVENOUS at 22:34

## 2024-01-13 RX ADMIN — SODIUM CHLORIDE, PRESERVATIVE FREE 10 ML: 5 INJECTION INTRAVENOUS at 22:04

## 2024-01-13 RX ADMIN — DEXTROSE MONOHYDRATE, SODIUM CHLORIDE, AND POTASSIUM CHLORIDE: 50; 4.5; 2.98 INJECTION, SOLUTION INTRAVENOUS at 09:07

## 2024-01-13 RX ADMIN — POTASSIUM CHLORIDE 10 MEQ: 10 INJECTION, SOLUTION INTRAVENOUS at 14:12

## 2024-01-13 RX ADMIN — MEROPENEM 1000 MG: 1 INJECTION, POWDER, FOR SOLUTION INTRAVENOUS at 03:24

## 2024-01-13 RX ADMIN — POTASSIUM CHLORIDE 10 MEQ: 10 INJECTION, SOLUTION INTRAVENOUS at 12:52

## 2024-01-13 RX ADMIN — Medication: at 21:58

## 2024-01-13 RX ADMIN — Medication: at 09:16

## 2024-01-13 RX ADMIN — POTASSIUM CHLORIDE 10 MEQ: 10 INJECTION, SOLUTION INTRAVENOUS at 10:26

## 2024-01-13 RX ADMIN — SODIUM CHLORIDE, PRESERVATIVE FREE 40 MG: 5 INJECTION INTRAVENOUS at 09:15

## 2024-01-13 RX ADMIN — SODIUM CHLORIDE, PRESERVATIVE FREE 40 MG: 5 INJECTION INTRAVENOUS at 21:59

## 2024-01-13 RX ADMIN — MICAFUNGIN SODIUM 100 MG: 100 INJECTION, POWDER, LYOPHILIZED, FOR SOLUTION INTRAVENOUS at 09:15

## 2024-01-13 RX ADMIN — POTASSIUM CHLORIDE 10 MEQ: 10 INJECTION, SOLUTION INTRAVENOUS at 09:09

## 2024-01-13 RX ADMIN — MEROPENEM 1000 MG: 1 INJECTION, POWDER, FOR SOLUTION INTRAVENOUS at 14:31

## 2024-01-13 ASSESSMENT — PAIN SCALES - GENERAL: PAINLEVEL_OUTOF10: 0

## 2024-01-13 NOTE — PROCEDURES
MIDLINE INSERTION PROCEDURE    INDICATIONS: Limited or unsuitable vessels    The Midline procedure, risks and benefits were discussed with patient. All questions were answered. patient verbalized understanding and agreed to proceed. Midline education/instructions provided to patient    PROCEDURE DETAILS:  The patient was positioned and Ultrasound was used to confirm patency of the rightBrachial Vein prior to obtaining venous access. The arm was scrubbed with 2% chlorhexidine per guidelines, allowed to dry and a maximum barrier sterile field was established for the patient. The clinician was attired  with cap, mask, sterile gloves and sterile gown. After verifying the vein again under ultrasound, 1% lidocaine was injected at intended puncture site and the right Brachial Vein was then accessed with a 21 gauge single wall needle under direct sonographic guidance. Guidewire was advanced, the needle was removed, and a peel away sheath was placed. The catheter was trimmed and advanced through the peel away sheath. The sheath was removed, brisk blood return confirmed, the catheter was flushed with normal saline and capped. The catheter was stabilized on the skin with a securement device. An antimicrobial impregnated dressing was applied using aseptic technique.    Patient did tolerate the procedure well.    Catheter Type:   Insertion site: rightBrachial Vein  Catheter Length: 15 cm  External Length:0 cm  UAC: 30.5 cm  Midline occupies 31    MIDLINE: 4 FR  [employer] Midline  Reference #: [id number]  Lot#: [id number]  Expiration date: 03-    FINDINGS/CONCLUSIONS:  No signs of bleeding or symptoms of nerve irritation noted at time of procedure. Tip location is below the level of the axilla in the right Brachial Vein.    Midline is ready for immediate use.    Report given to bedside nurse:       Maude Ward, RN, BSN, CRNI, [place]-BC  Vascular Access Team

## 2024-01-14 ENCOUNTER — APPOINTMENT (OUTPATIENT)
Facility: HOSPITAL | Age: 74
DRG: 853 | End: 2024-01-14
Attending: UROLOGY
Payer: MEDICARE

## 2024-01-14 LAB
ABO + RH BLD: NORMAL
ALBUMIN SERPL-MCNC: 1.4 G/DL (ref 3.5–5)
ALBUMIN/GLOB SERPL: 0.4 (ref 1.1–2.2)
ALP SERPL-CCNC: 87 U/L (ref 45–117)
ALT SERPL-CCNC: <6 U/L (ref 12–78)
ANION GAP SERPL CALC-SCNC: 5 MMOL/L (ref 5–15)
AST SERPL-CCNC: 14 U/L (ref 15–37)
BACTERIA SPEC CULT: ABNORMAL
BACTERIA SPEC CULT: ABNORMAL
BILIRUB SERPL-MCNC: 0.3 MG/DL (ref 0.2–1)
BLD PROD TYP BPU: NORMAL
BLOOD BANK DISPENSE STATUS: NORMAL
BLOOD GROUP ANTIBODIES SERPL: NORMAL
BPU ID: NORMAL
BUN SERPL-MCNC: 33 MG/DL (ref 6–20)
BUN/CREAT SERPL: 27 (ref 12–20)
CALCIUM SERPL-MCNC: 8.5 MG/DL (ref 8.5–10.1)
CHLORIDE SERPL-SCNC: 115 MMOL/L (ref 97–108)
CHOLEST SERPL-MCNC: 121 MG/DL
CO2 SERPL-SCNC: 21 MMOL/L (ref 21–32)
CREAT SERPL-MCNC: 1.24 MG/DL (ref 0.55–1.02)
CROSSMATCH RESULT: NORMAL
ERYTHROCYTE [DISTWIDTH] IN BLOOD BY AUTOMATED COUNT: 18.6 % (ref 11.5–14.5)
EST. AVERAGE GLUCOSE BLD GHB EST-MCNC: ABNORMAL MG/DL
GLOBULIN SER CALC-MCNC: 3.4 G/DL (ref 2–4)
GLUCOSE BLD STRIP.AUTO-MCNC: 107 MG/DL (ref 65–117)
GLUCOSE SERPL-MCNC: 109 MG/DL (ref 65–100)
GRAM STN SPEC: ABNORMAL
GRAM STN SPEC: ABNORMAL
HBA1C MFR BLD: <3.8 % (ref 4–5.6)
HCT VFR BLD AUTO: 25.4 % (ref 35–47)
HDLC SERPL-MCNC: 21 MG/DL
HDLC SERPL: 5.8 (ref 0–5)
HGB BLD-MCNC: 8.3 G/DL (ref 11.5–16)
LDLC SERPL CALC-MCNC: 71 MG/DL (ref 0–100)
MAGNESIUM SERPL-MCNC: 1.6 MG/DL (ref 1.6–2.4)
MCH RBC QN AUTO: 27.2 PG (ref 26–34)
MCHC RBC AUTO-ENTMCNC: 32.7 G/DL (ref 30–36.5)
MCV RBC AUTO: 83.3 FL (ref 80–99)
NRBC # BLD: 0 K/UL (ref 0–0.01)
NRBC BLD-RTO: 0 PER 100 WBC
PATH REV BLD -IMP: NORMAL
PHOSPHATE SERPL-MCNC: 1.9 MG/DL (ref 2.6–4.7)
PLATELET # BLD AUTO: 326 K/UL (ref 150–400)
PMV BLD AUTO: 9.1 FL (ref 8.9–12.9)
POTASSIUM SERPL-SCNC: 4.1 MMOL/L (ref 3.5–5.1)
PROT SERPL-MCNC: 4.8 G/DL (ref 6.4–8.2)
RBC # BLD AUTO: 3.05 M/UL (ref 3.8–5.2)
SERVICE CMNT-IMP: ABNORMAL
SERVICE CMNT-IMP: NORMAL
SODIUM SERPL-SCNC: 141 MMOL/L (ref 136–145)
SPECIMEN EXP DATE BLD: NORMAL
TRIGL SERPL-MCNC: 145 MG/DL
UNIT DIVISION: 0
VANCOMYCIN SERPL-MCNC: 20.2 UG/ML
VLDLC SERPL CALC-MCNC: 29 MG/DL
WBC # BLD AUTO: 20.9 K/UL (ref 3.6–11)

## 2024-01-14 PROCEDURE — A4216 STERILE WATER/SALINE, 10 ML: HCPCS | Performed by: INTERNAL MEDICINE

## 2024-01-14 PROCEDURE — 6360000002 HC RX W HCPCS: Performed by: INTERNAL MEDICINE

## 2024-01-14 PROCEDURE — 2580000003 HC RX 258: Performed by: UROLOGY

## 2024-01-14 PROCEDURE — 80202 ASSAY OF VANCOMYCIN: CPT

## 2024-01-14 PROCEDURE — 6360000002 HC RX W HCPCS: Performed by: GENERAL ACUTE CARE HOSPITAL

## 2024-01-14 PROCEDURE — 83735 ASSAY OF MAGNESIUM: CPT

## 2024-01-14 PROCEDURE — C9113 INJ PANTOPRAZOLE SODIUM, VIA: HCPCS | Performed by: INTERNAL MEDICINE

## 2024-01-14 PROCEDURE — 83036 HEMOGLOBIN GLYCOSYLATED A1C: CPT

## 2024-01-14 PROCEDURE — 2580000003 HC RX 258: Performed by: INTERNAL MEDICINE

## 2024-01-14 PROCEDURE — 36415 COLL VENOUS BLD VENIPUNCTURE: CPT

## 2024-01-14 PROCEDURE — 74177 CT ABD & PELVIS W/CONTRAST: CPT

## 2024-01-14 PROCEDURE — 85027 COMPLETE CBC AUTOMATED: CPT

## 2024-01-14 PROCEDURE — 99222 1ST HOSP IP/OBS MODERATE 55: CPT | Performed by: INTERNAL MEDICINE

## 2024-01-14 PROCEDURE — 6360000004 HC RX CONTRAST MEDICATION: Performed by: GENERAL ACUTE CARE HOSPITAL

## 2024-01-14 PROCEDURE — 2060000000 HC ICU INTERMEDIATE R&B

## 2024-01-14 PROCEDURE — 80053 COMPREHEN METABOLIC PANEL: CPT

## 2024-01-14 PROCEDURE — 6370000000 HC RX 637 (ALT 250 FOR IP): Performed by: GENERAL ACUTE CARE HOSPITAL

## 2024-01-14 PROCEDURE — 2580000003 HC RX 258: Performed by: GENERAL ACUTE CARE HOSPITAL

## 2024-01-14 PROCEDURE — 82962 GLUCOSE BLOOD TEST: CPT

## 2024-01-14 PROCEDURE — 6370000000 HC RX 637 (ALT 250 FOR IP): Performed by: UROLOGY

## 2024-01-14 PROCEDURE — 84100 ASSAY OF PHOSPHORUS: CPT

## 2024-01-14 PROCEDURE — 80061 LIPID PANEL: CPT

## 2024-01-14 RX ORDER — MORPHINE SULFATE 2 MG/ML
1 INJECTION, SOLUTION INTRAMUSCULAR; INTRAVENOUS EVERY 6 HOURS
Status: DISCONTINUED | OUTPATIENT
Start: 2024-01-14 | End: 2024-01-26 | Stop reason: HOSPADM

## 2024-01-14 RX ORDER — DEXTROSE, SODIUM CHLORIDE, SODIUM LACTATE, POTASSIUM CHLORIDE, AND CALCIUM CHLORIDE 5; .6; .31; .03; .02 G/100ML; G/100ML; G/100ML; G/100ML; G/100ML
INJECTION, SOLUTION INTRAVENOUS CONTINUOUS
Status: DISCONTINUED | OUTPATIENT
Start: 2024-01-14 | End: 2024-01-25

## 2024-01-14 RX ORDER — FUROSEMIDE 10 MG/ML
20 INJECTION INTRAMUSCULAR; INTRAVENOUS DAILY
Status: DISCONTINUED | OUTPATIENT
Start: 2024-01-14 | End: 2024-01-26 | Stop reason: HOSPADM

## 2024-01-14 RX ADMIN — SODIUM CHLORIDE, PRESERVATIVE FREE 10 ML: 5 INJECTION INTRAVENOUS at 10:01

## 2024-01-14 RX ADMIN — IOPAMIDOL 100 ML: 755 INJECTION, SOLUTION INTRAVENOUS at 15:30

## 2024-01-14 RX ADMIN — ASPIRIN 300 MG: 300 SUPPOSITORY RECTAL at 09:52

## 2024-01-14 RX ADMIN — VANCOMYCIN HYDROCHLORIDE 750 MG: 750 INJECTION, POWDER, LYOPHILIZED, FOR SOLUTION INTRAVENOUS at 17:00

## 2024-01-14 RX ADMIN — MORPHINE SULFATE 1 MG: 2 INJECTION, SOLUTION INTRAMUSCULAR; INTRAVENOUS at 14:38

## 2024-01-14 RX ADMIN — MORPHINE SULFATE 1 MG: 2 INJECTION, SOLUTION INTRAMUSCULAR; INTRAVENOUS at 21:48

## 2024-01-14 RX ADMIN — SODIUM CHLORIDE, PRESERVATIVE FREE 10 ML: 5 INJECTION INTRAVENOUS at 21:49

## 2024-01-14 RX ADMIN — SODIUM CHLORIDE, PRESERVATIVE FREE 40 MG: 5 INJECTION INTRAVENOUS at 10:01

## 2024-01-14 RX ADMIN — Medication: at 10:00

## 2024-01-14 RX ADMIN — DOCUSATE SODIUM AND SENNOSIDES 1 TABLET: 8.6; 5 TABLET, FILM COATED ORAL at 21:48

## 2024-01-14 RX ADMIN — SODIUM CHLORIDE, SODIUM LACTATE, POTASSIUM CHLORIDE, CALCIUM CHLORIDE AND DEXTROSE MONOHYDRATE: 5; 600; 310; 30; 20 INJECTION, SOLUTION INTRAVENOUS at 14:36

## 2024-01-14 RX ADMIN — ENOXAPARIN SODIUM 40 MG: 100 INJECTION SUBCUTANEOUS at 10:00

## 2024-01-14 RX ADMIN — MEROPENEM 1000 MG: 1 INJECTION, POWDER, FOR SOLUTION INTRAVENOUS at 14:40

## 2024-01-14 RX ADMIN — POTASSIUM CHLORIDE: 2 INJECTION, SOLUTION, CONCENTRATE INTRAVENOUS at 05:45

## 2024-01-14 RX ADMIN — FUROSEMIDE 20 MG: 10 INJECTION, SOLUTION INTRAMUSCULAR; INTRAVENOUS at 14:48

## 2024-01-14 RX ADMIN — MICAFUNGIN SODIUM 100 MG: 100 INJECTION, POWDER, LYOPHILIZED, FOR SOLUTION INTRAVENOUS at 10:00

## 2024-01-14 RX ADMIN — Medication: at 22:25

## 2024-01-14 RX ADMIN — MEROPENEM 1000 MG: 1 INJECTION, POWDER, FOR SOLUTION INTRAVENOUS at 03:33

## 2024-01-14 RX ADMIN — SODIUM CHLORIDE, PRESERVATIVE FREE 40 MG: 5 INJECTION INTRAVENOUS at 21:48

## 2024-01-14 ASSESSMENT — PAIN SCALES - GENERAL: PAINLEVEL_OUTOF10: 0

## 2024-01-14 NOTE — ANESTHESIA PRE PROCEDURE
Abdominal:              PE comment: Deferred   Vascular: negative vascular ROS.         Other Findings:       Anesthesia Plan      general     ASA 3       Induction: intravenous.  BIS and arterial line  MIPS: Postoperative opioids intended and Prophylactic antiemetics administered.  Anesthetic plan and risks discussed with patient.      Plan discussed with CRNA.          Post-op pain plan if not by surgeon: continuous peripheral nerve block        Nabor Molina MD   1/13/2024

## 2024-01-15 ENCOUNTER — APPOINTMENT (OUTPATIENT)
Facility: HOSPITAL | Age: 74
DRG: 853 | End: 2024-01-15
Attending: UROLOGY
Payer: MEDICARE

## 2024-01-15 ENCOUNTER — APPOINTMENT (OUTPATIENT)
Facility: HOSPITAL | Age: 74
DRG: 853 | End: 2024-01-15
Attending: GENERAL ACUTE CARE HOSPITAL
Payer: MEDICARE

## 2024-01-15 ENCOUNTER — ANESTHESIA (OUTPATIENT)
Facility: HOSPITAL | Age: 74
End: 2024-01-15
Payer: MEDICARE

## 2024-01-15 PROBLEM — I63.9 CEREBROVASCULAR ACCIDENT (CVA) (HCC): Status: ACTIVE | Noted: 2024-01-15

## 2024-01-15 PROBLEM — G92.9 TOXIC ENCEPHALOPATHY: Status: ACTIVE | Noted: 2024-01-15

## 2024-01-15 LAB
ALBUMIN SERPL-MCNC: 1.4 G/DL (ref 3.5–5)
ALBUMIN/GLOB SERPL: 0.3 (ref 1.1–2.2)
ALP SERPL-CCNC: 94 U/L (ref 45–117)
ALT SERPL-CCNC: 7 U/L (ref 12–78)
ANION GAP SERPL CALC-SCNC: 5 MMOL/L (ref 5–15)
AST SERPL-CCNC: 14 U/L (ref 15–37)
BILIRUB SERPL-MCNC: 0.3 MG/DL (ref 0.2–1)
BUN SERPL-MCNC: 26 MG/DL (ref 6–20)
BUN/CREAT SERPL: 27 (ref 12–20)
CALCIUM SERPL-MCNC: 8.7 MG/DL (ref 8.5–10.1)
CHLORIDE SERPL-SCNC: 113 MMOL/L (ref 97–108)
CO2 SERPL-SCNC: 22 MMOL/L (ref 21–32)
CREAT SERPL-MCNC: 0.96 MG/DL (ref 0.55–1.02)
CRP SERPL-MCNC: 12.8 MG/DL (ref 0–0.6)
ECHO AV MEAN GRADIENT: 3 MMHG
ECHO AV MEAN VELOCITY: 0.8 M/S
ECHO AV PEAK GRADIENT: 6 MMHG
ECHO AV PEAK VELOCITY: 1.2 M/S
ECHO AV VTI: 24.3 CM
ECHO BSA: 1.91 M2
ECHO LA DIAMETER INDEX: 1.85 CM/M2
ECHO LA DIAMETER: 3.5 CM
ECHO LV EDV A4C: 56 ML
ECHO LV EDV INDEX A4C: 30 ML/M2
ECHO LV EJECTION FRACTION A4C: 53 %
ECHO LV ESV A4C: 26 ML
ECHO LV ESV INDEX A4C: 14 ML/M2
ECHO LV FRACTIONAL SHORTENING: 25 % (ref 28–44)
ECHO LV INTERNAL DIMENSION DIASTOLE INDEX: 2.12 CM/M2
ECHO LV INTERNAL DIMENSION DIASTOLIC: 4 CM (ref 3.9–5.3)
ECHO LV INTERNAL DIMENSION SYSTOLIC INDEX: 1.59 CM/M2
ECHO LV INTERNAL DIMENSION SYSTOLIC: 3 CM
ECHO LV IVSD: 1 CM (ref 0.6–0.9)
ECHO LV MASS 2D: 118.2 G (ref 67–162)
ECHO LV MASS INDEX 2D: 62.6 G/M2 (ref 43–95)
ECHO LV POSTERIOR WALL DIASTOLIC: 0.9 CM (ref 0.6–0.9)
ECHO LV RELATIVE WALL THICKNESS RATIO: 0.45
ECHO LVOT AREA: 3.5 CM2
ECHO LVOT DIAM: 2.1 CM
ECHO MV MAX VELOCITY: 0.8 M/S
ECHO MV MEAN GRADIENT: 1 MMHG
ECHO MV MEAN VELOCITY: 0.5 M/S
ECHO MV PEAK GRADIENT: 2 MMHG
ECHO MV VTI: 19.7 CM
ERYTHROCYTE [DISTWIDTH] IN BLOOD BY AUTOMATED COUNT: 19.1 % (ref 11.5–14.5)
ERYTHROCYTE [SEDIMENTATION RATE] IN BLOOD: 73 MM/HR (ref 0–30)
GLOBULIN SER CALC-MCNC: 4.3 G/DL (ref 2–4)
GLUCOSE BLD STRIP.AUTO-MCNC: 105 MG/DL (ref 65–117)
GLUCOSE BLD STRIP.AUTO-MCNC: 110 MG/DL (ref 65–117)
GLUCOSE BLD STRIP.AUTO-MCNC: 88 MG/DL (ref 65–117)
GLUCOSE SERPL-MCNC: 101 MG/DL (ref 65–100)
HCT VFR BLD AUTO: 27.9 % (ref 35–47)
HGB BLD-MCNC: 8.8 G/DL (ref 11.5–16)
MAGNESIUM SERPL-MCNC: 1.5 MG/DL (ref 1.6–2.4)
MCH RBC QN AUTO: 26.8 PG (ref 26–34)
MCHC RBC AUTO-ENTMCNC: 31.5 G/DL (ref 30–36.5)
MCV RBC AUTO: 85.1 FL (ref 80–99)
NRBC # BLD: 0.02 K/UL (ref 0–0.01)
NRBC BLD-RTO: 0.1 PER 100 WBC
PHOSPHATE SERPL-MCNC: 1.5 MG/DL (ref 2.6–4.7)
PLATELET # BLD AUTO: 394 K/UL (ref 150–400)
PMV BLD AUTO: 9.3 FL (ref 8.9–12.9)
POTASSIUM SERPL-SCNC: 4.2 MMOL/L (ref 3.5–5.1)
PROT SERPL-MCNC: 5.7 G/DL (ref 6.4–8.2)
RBC # BLD AUTO: 3.28 M/UL (ref 3.8–5.2)
SERVICE CMNT-IMP: NORMAL
SODIUM SERPL-SCNC: 140 MMOL/L (ref 136–145)
T4 FREE SERPL-MCNC: 1.2 NG/DL (ref 0.8–1.5)
WBC # BLD AUTO: 15.1 K/UL (ref 3.6–11)

## 2024-01-15 PROCEDURE — 83735 ASSAY OF MAGNESIUM: CPT

## 2024-01-15 PROCEDURE — 2580000003 HC RX 258: Performed by: UROLOGY

## 2024-01-15 PROCEDURE — A4216 STERILE WATER/SALINE, 10 ML: HCPCS | Performed by: INTERNAL MEDICINE

## 2024-01-15 PROCEDURE — 70553 MRI BRAIN STEM W/O & W/DYE: CPT

## 2024-01-15 PROCEDURE — 6360000002 HC RX W HCPCS: Performed by: INTERNAL MEDICINE

## 2024-01-15 PROCEDURE — 6360000002 HC RX W HCPCS: Performed by: GENERAL ACUTE CARE HOSPITAL

## 2024-01-15 PROCEDURE — 93308 TTE F-UP OR LMTD: CPT

## 2024-01-15 PROCEDURE — 84439 ASSAY OF FREE THYROXINE: CPT

## 2024-01-15 PROCEDURE — 2580000003 HC RX 258: Performed by: INTERNAL MEDICINE

## 2024-01-15 PROCEDURE — 6370000000 HC RX 637 (ALT 250 FOR IP): Performed by: GENERAL ACUTE CARE HOSPITAL

## 2024-01-15 PROCEDURE — 82962 GLUCOSE BLOOD TEST: CPT

## 2024-01-15 PROCEDURE — 2060000000 HC ICU INTERMEDIATE R&B

## 2024-01-15 PROCEDURE — A9579 GAD-BASE MR CONTRAST NOS,1ML: HCPCS | Performed by: GENERAL ACUTE CARE HOSPITAL

## 2024-01-15 PROCEDURE — 85027 COMPLETE CBC AUTOMATED: CPT

## 2024-01-15 PROCEDURE — 99233 SBSQ HOSP IP/OBS HIGH 50: CPT

## 2024-01-15 PROCEDURE — 99233 SBSQ HOSP IP/OBS HIGH 50: CPT | Performed by: INTERNAL MEDICINE

## 2024-01-15 PROCEDURE — 6360000004 HC RX CONTRAST MEDICATION: Performed by: GENERAL ACUTE CARE HOSPITAL

## 2024-01-15 PROCEDURE — 85652 RBC SED RATE AUTOMATED: CPT

## 2024-01-15 PROCEDURE — 70544 MR ANGIOGRAPHY HEAD W/O DYE: CPT

## 2024-01-15 PROCEDURE — 92610 EVALUATE SWALLOWING FUNCTION: CPT

## 2024-01-15 PROCEDURE — C9113 INJ PANTOPRAZOLE SODIUM, VIA: HCPCS | Performed by: INTERNAL MEDICINE

## 2024-01-15 PROCEDURE — 95816 EEG AWAKE AND DROWSY: CPT | Performed by: INTERNAL MEDICINE

## 2024-01-15 PROCEDURE — 36415 COLL VENOUS BLD VENIPUNCTURE: CPT

## 2024-01-15 PROCEDURE — 86140 C-REACTIVE PROTEIN: CPT

## 2024-01-15 PROCEDURE — 2580000003 HC RX 258: Performed by: GENERAL ACUTE CARE HOSPITAL

## 2024-01-15 PROCEDURE — 95816 EEG AWAKE AND DROWSY: CPT

## 2024-01-15 PROCEDURE — 80053 COMPREHEN METABOLIC PANEL: CPT

## 2024-01-15 PROCEDURE — 70547 MR ANGIOGRAPHY NECK W/O DYE: CPT

## 2024-01-15 PROCEDURE — 84100 ASSAY OF PHOSPHORUS: CPT

## 2024-01-15 PROCEDURE — 2500000003 HC RX 250 WO HCPCS: Performed by: GENERAL ACUTE CARE HOSPITAL

## 2024-01-15 PROCEDURE — 6370000000 HC RX 637 (ALT 250 FOR IP): Performed by: UROLOGY

## 2024-01-15 RX ORDER — MAGNESIUM SULFATE 1 G/100ML
1000 INJECTION INTRAVENOUS ONCE
Status: COMPLETED | OUTPATIENT
Start: 2024-01-15 | End: 2024-01-15

## 2024-01-15 RX ADMIN — MAGNESIUM SULFATE HEPTAHYDRATE 1000 MG: 1 INJECTION, SOLUTION INTRAVENOUS at 12:34

## 2024-01-15 RX ADMIN — GADOTERIDOL 15 ML: 279.3 INJECTION, SOLUTION INTRAVENOUS at 18:04

## 2024-01-15 RX ADMIN — SODIUM CHLORIDE, PRESERVATIVE FREE 10 ML: 5 INJECTION INTRAVENOUS at 20:44

## 2024-01-15 RX ADMIN — POTASSIUM PHOSPHATE 30 MMOL: 236; 224 INJECTION, SOLUTION INTRAVENOUS at 10:23

## 2024-01-15 RX ADMIN — SODIUM CHLORIDE, PRESERVATIVE FREE 40 MG: 5 INJECTION INTRAVENOUS at 09:11

## 2024-01-15 RX ADMIN — MEROPENEM 1000 MG: 1 INJECTION, POWDER, FOR SOLUTION INTRAVENOUS at 02:37

## 2024-01-15 RX ADMIN — MORPHINE SULFATE 1 MG: 2 INJECTION, SOLUTION INTRAMUSCULAR; INTRAVENOUS at 20:54

## 2024-01-15 RX ADMIN — SODIUM CHLORIDE, PRESERVATIVE FREE 10 ML: 5 INJECTION INTRAVENOUS at 20:43

## 2024-01-15 RX ADMIN — MICAFUNGIN SODIUM 100 MG: 100 INJECTION, POWDER, LYOPHILIZED, FOR SOLUTION INTRAVENOUS at 09:18

## 2024-01-15 RX ADMIN — Medication: at 09:29

## 2024-01-15 RX ADMIN — DOCUSATE SODIUM AND SENNOSIDES 1 TABLET: 8.6; 5 TABLET, FILM COATED ORAL at 20:57

## 2024-01-15 RX ADMIN — SODIUM CHLORIDE, SODIUM LACTATE, POTASSIUM CHLORIDE, CALCIUM CHLORIDE AND DEXTROSE MONOHYDRATE: 5; 600; 310; 30; 20 INJECTION, SOLUTION INTRAVENOUS at 04:09

## 2024-01-15 RX ADMIN — FUROSEMIDE 20 MG: 10 INJECTION, SOLUTION INTRAMUSCULAR; INTRAVENOUS at 09:09

## 2024-01-15 RX ADMIN — SODIUM CHLORIDE, PRESERVATIVE FREE 40 MG: 5 INJECTION INTRAVENOUS at 20:57

## 2024-01-15 RX ADMIN — ENOXAPARIN SODIUM 40 MG: 100 INJECTION SUBCUTANEOUS at 09:09

## 2024-01-15 RX ADMIN — MEROPENEM 1000 MG: 1 INJECTION, POWDER, FOR SOLUTION INTRAVENOUS at 15:51

## 2024-01-15 RX ADMIN — SODIUM CHLORIDE, PRESERVATIVE FREE 20 ML: 5 INJECTION INTRAVENOUS at 09:30

## 2024-01-15 RX ADMIN — ASPIRIN 300 MG: 300 SUPPOSITORY RECTAL at 16:02

## 2024-01-15 RX ADMIN — MORPHINE SULFATE 1 MG: 2 INJECTION, SOLUTION INTRAMUSCULAR; INTRAVENOUS at 02:34

## 2024-01-15 RX ADMIN — Medication: at 20:41

## 2024-01-15 RX ADMIN — SODIUM CHLORIDE, PRESERVATIVE FREE 10 ML: 5 INJECTION INTRAVENOUS at 09:11

## 2024-01-15 RX ADMIN — MORPHINE SULFATE 1 MG: 2 INJECTION, SOLUTION INTRAMUSCULAR; INTRAVENOUS at 09:07

## 2024-01-15 ASSESSMENT — PAIN SCALES - GENERAL
PAINLEVEL_OUTOF10: 0
PAINLEVEL_OUTOF10: 3
PAINLEVEL_OUTOF10: 0
PAINLEVEL_OUTOF10: 7

## 2024-01-15 NOTE — PROCEDURES
EEG REPORT    Patient Name: Nicolasa Macdonald  : 1950  Age: 73 y.o.    Ordering physician: Zac Lind Md  [address] [place] [address]  Date of EEG start time: 1/15/2024 at 10:56 AM  Date of EEG end time: 1/15/2024 at 11:27 AM  EEG procedure number: MRA 24-45  Diagnosis: Altered mental status  Interpreting physician: Phyllis Dunlap D.O.      Procedure: EEG    CLINICAL INDICATION: The patient is a 73 y.o. female who is being evaluated for baseline electro cerebral activities and to rule out seizure focus.      Current Facility-Administered Medications   Medication Dose Route Frequency    [START ON 2024] vancomycin (VANCOCIN) 500 mg in sodium chloride 0.9 % 100 mL IVPB (mini-bag)  500 mg IntraVENous Q24H    [START ON 2024] Vancomycin Level   Other Once    potassium phosphate 30 mmol in sodium chloride 0.9 % 500 mL IVPB  30 mmol IntraVENous Once    morphine (PF) injection 1 mg  1 mg IntraVENous Q6H    furosemide (LASIX) injection 20 mg  20 mg IntraVENous Daily    dextrose 5 % in lactated ringers infusion   IntraVENous Continuous    sodium chloride flush 0.9 % injection 5-40 mL  5-40 mL IntraVENous 2 times per day    sodium chloride flush 0.9 % injection 5-40 mL  5-40 mL IntraVENous PRN    0.9 % sodium chloride infusion   IntraVENous PRN    ondansetron (ZOFRAN-ODT) disintegrating tablet 4 mg  4 mg Oral Q8H PRN    Or    ondansetron (ZOFRAN) injection 4 mg  4 mg IntraVENous Q6H PRN    polyethylene glycol (GLYCOLAX) packet 17 g  17 g Oral Daily PRN    enoxaparin (LOVENOX) injection 40 mg  40 mg SubCUTAneous Daily    [Held by provider] rosuvastatin (CRESTOR) tablet 40 mg  40 mg Oral Nightly    aspirin chewable tablet 81 mg  81 mg Oral Daily    Or    aspirin suppository 300 mg  300 mg Rectal Daily    pantoprazole (PROTONIX) 40 mg in sodium chloride (PF) 0.9 % 10 mL injection  40 mg IntraVENous Q12H    micafungin (MYCAMINE) 100 mg in sodium chloride 0.9 % 100 mL IVPB (mini-bag)

## 2024-01-16 ENCOUNTER — APPOINTMENT (OUTPATIENT)
Facility: HOSPITAL | Age: 74
DRG: 853 | End: 2024-01-16
Attending: UROLOGY
Payer: MEDICARE

## 2024-01-16 PROBLEM — G93.40 ACUTE ENCEPHALOPATHY: Status: ACTIVE | Noted: 2024-01-16

## 2024-01-16 PROBLEM — B37.9 CANDIDA GLABRATA INFECTION: Status: ACTIVE | Noted: 2024-01-16

## 2024-01-16 PROBLEM — I67.83 POSTERIOR REVERSIBLE ENCEPHALOPATHY SYNDROME (PRES): Status: ACTIVE | Noted: 2024-01-16

## 2024-01-16 PROBLEM — R41.0 DELIRIUM: Status: ACTIVE | Noted: 2024-01-16

## 2024-01-16 LAB
ALBUMIN SERPL-MCNC: 1.2 G/DL (ref 3.5–5)
ALBUMIN/GLOB SERPL: 0.3 (ref 1.1–2.2)
ALP SERPL-CCNC: 71 U/L (ref 45–117)
ALT SERPL-CCNC: <6 U/L (ref 12–78)
ANION GAP SERPL CALC-SCNC: 4 MMOL/L (ref 5–15)
ANION GAP SERPL CALC-SCNC: 4 MMOL/L (ref 5–15)
APTT PPP: 28 SEC (ref 22.1–31)
AST SERPL-CCNC: 16 U/L (ref 15–37)
BASOPHILS # BLD: 0 K/UL (ref 0–0.1)
BASOPHILS NFR BLD: 0 % (ref 0–1)
BILIRUB SERPL-MCNC: 0.2 MG/DL (ref 0.2–1)
BUN SERPL-MCNC: 18 MG/DL (ref 6–20)
BUN SERPL-MCNC: 21 MG/DL (ref 6–20)
BUN/CREAT SERPL: 22 (ref 12–20)
BUN/CREAT SERPL: 25 (ref 12–20)
CALCIUM SERPL-MCNC: 8.1 MG/DL (ref 8.5–10.1)
CALCIUM SERPL-MCNC: 8.7 MG/DL (ref 8.5–10.1)
CHLORIDE SERPL-SCNC: 107 MMOL/L (ref 97–108)
CHLORIDE SERPL-SCNC: 110 MMOL/L (ref 97–108)
CO2 SERPL-SCNC: 25 MMOL/L (ref 21–32)
CO2 SERPL-SCNC: 27 MMOL/L (ref 21–32)
CREAT SERPL-MCNC: 0.82 MG/DL (ref 0.55–1.02)
CREAT SERPL-MCNC: 0.85 MG/DL (ref 0.55–1.02)
DIFFERENTIAL METHOD BLD: ABNORMAL
ECHO BSA: 1.91 M2
EOSINOPHIL # BLD: 0.2 K/UL (ref 0–0.4)
EOSINOPHIL NFR BLD: 1 % (ref 0–7)
ERYTHROCYTE [DISTWIDTH] IN BLOOD BY AUTOMATED COUNT: 19.4 % (ref 11.5–14.5)
ERYTHROCYTE [DISTWIDTH] IN BLOOD BY AUTOMATED COUNT: 19.4 % (ref 11.5–14.5)
GLOBULIN SER CALC-MCNC: 3.7 G/DL (ref 2–4)
GLUCOSE BLD STRIP.AUTO-MCNC: 95 MG/DL (ref 65–117)
GLUCOSE SERPL-MCNC: 100 MG/DL (ref 65–100)
GLUCOSE SERPL-MCNC: 152 MG/DL (ref 65–100)
HCT VFR BLD AUTO: 22.6 % (ref 35–47)
HCT VFR BLD AUTO: 28.4 % (ref 35–47)
HGB BLD-MCNC: 7.1 G/DL (ref 11.5–16)
HGB BLD-MCNC: 9.2 G/DL (ref 11.5–16)
IMM GRANULOCYTES # BLD AUTO: 0.1 K/UL (ref 0–0.04)
IMM GRANULOCYTES NFR BLD AUTO: 1 % (ref 0–0.5)
INR PPP: 1 (ref 0.9–1.1)
LYMPHOCYTES # BLD: 1.8 K/UL (ref 0.8–3.5)
LYMPHOCYTES NFR BLD: 14 % (ref 12–49)
MAGNESIUM SERPL-MCNC: 1.6 MG/DL (ref 1.6–2.4)
MAGNESIUM SERPL-MCNC: 1.6 MG/DL (ref 1.6–2.4)
MCH RBC QN AUTO: 26.6 PG (ref 26–34)
MCH RBC QN AUTO: 26.9 PG (ref 26–34)
MCHC RBC AUTO-ENTMCNC: 31.4 G/DL (ref 30–36.5)
MCHC RBC AUTO-ENTMCNC: 32.4 G/DL (ref 30–36.5)
MCV RBC AUTO: 83 FL (ref 80–99)
MCV RBC AUTO: 84.6 FL (ref 80–99)
MONOCYTES # BLD: 1 K/UL (ref 0–1)
MONOCYTES NFR BLD: 7 % (ref 5–13)
NEUTS SEG # BLD: 9.9 K/UL (ref 1.8–8)
NEUTS SEG NFR BLD: 77 % (ref 32–75)
NRBC # BLD: 0 K/UL (ref 0–0.01)
NRBC # BLD: 0 K/UL (ref 0–0.01)
NRBC BLD-RTO: 0 PER 100 WBC
NRBC BLD-RTO: 0 PER 100 WBC
PHOSPHATE SERPL-MCNC: 2.7 MG/DL (ref 2.6–4.7)
PLATELET # BLD AUTO: 336 K/UL (ref 150–400)
PLATELET # BLD AUTO: 452 K/UL (ref 150–400)
PMV BLD AUTO: 8.5 FL (ref 8.9–12.9)
PMV BLD AUTO: 8.7 FL (ref 8.9–12.9)
POTASSIUM SERPL-SCNC: 3.7 MMOL/L (ref 3.5–5.1)
POTASSIUM SERPL-SCNC: 4 MMOL/L (ref 3.5–5.1)
PROT SERPL-MCNC: 4.9 G/DL (ref 6.4–8.2)
PROTHROMBIN TIME: 10.7 SEC (ref 9–11.1)
RBC # BLD AUTO: 2.67 M/UL (ref 3.8–5.2)
RBC # BLD AUTO: 3.42 M/UL (ref 3.8–5.2)
SERVICE CMNT-IMP: NORMAL
SODIUM SERPL-SCNC: 136 MMOL/L (ref 136–145)
SODIUM SERPL-SCNC: 141 MMOL/L (ref 136–145)
THERAPEUTIC RANGE: NORMAL SECS (ref 58–77)
TROPONIN I SERPL HS-MCNC: 58 NG/L (ref 0–51)
VANCOMYCIN SERPL-MCNC: 17.5 UG/ML
WBC # BLD AUTO: 13 K/UL (ref 3.6–11)
WBC # BLD AUTO: 9.9 K/UL (ref 3.6–11)

## 2024-01-16 PROCEDURE — 84100 ASSAY OF PHOSPHORUS: CPT

## 2024-01-16 PROCEDURE — 6360000002 HC RX W HCPCS: Performed by: GENERAL ACUTE CARE HOSPITAL

## 2024-01-16 PROCEDURE — 82962 GLUCOSE BLOOD TEST: CPT

## 2024-01-16 PROCEDURE — A4216 STERILE WATER/SALINE, 10 ML: HCPCS | Performed by: INTERNAL MEDICINE

## 2024-01-16 PROCEDURE — 6370000000 HC RX 637 (ALT 250 FOR IP): Performed by: UROLOGY

## 2024-01-16 PROCEDURE — 2580000003 HC RX 258: Performed by: INTERNAL MEDICINE

## 2024-01-16 PROCEDURE — 85027 COMPLETE CBC AUTOMATED: CPT

## 2024-01-16 PROCEDURE — 85610 PROTHROMBIN TIME: CPT

## 2024-01-16 PROCEDURE — 2580000003 HC RX 258: Performed by: GENERAL ACUTE CARE HOSPITAL

## 2024-01-16 PROCEDURE — 84484 ASSAY OF TROPONIN QUANT: CPT

## 2024-01-16 PROCEDURE — 2060000000 HC ICU INTERMEDIATE R&B

## 2024-01-16 PROCEDURE — C9113 INJ PANTOPRAZOLE SODIUM, VIA: HCPCS | Performed by: INTERNAL MEDICINE

## 2024-01-16 PROCEDURE — 6370000000 HC RX 637 (ALT 250 FOR IP): Performed by: INTERNAL MEDICINE

## 2024-01-16 PROCEDURE — 2580000003 HC RX 258: Performed by: UROLOGY

## 2024-01-16 PROCEDURE — 6360000002 HC RX W HCPCS: Performed by: INTERNAL MEDICINE

## 2024-01-16 PROCEDURE — 99223 1ST HOSP IP/OBS HIGH 75: CPT | Performed by: NURSE PRACTITIONER

## 2024-01-16 PROCEDURE — 74018 RADEX ABDOMEN 1 VIEW: CPT

## 2024-01-16 PROCEDURE — 71045 X-RAY EXAM CHEST 1 VIEW: CPT

## 2024-01-16 PROCEDURE — 93970 EXTREMITY STUDY: CPT

## 2024-01-16 PROCEDURE — 99233 SBSQ HOSP IP/OBS HIGH 50: CPT | Performed by: INTERNAL MEDICINE

## 2024-01-16 PROCEDURE — 36415 COLL VENOUS BLD VENIPUNCTURE: CPT

## 2024-01-16 PROCEDURE — 6360000002 HC RX W HCPCS: Performed by: NURSE PRACTITIONER

## 2024-01-16 PROCEDURE — 80202 ASSAY OF VANCOMYCIN: CPT

## 2024-01-16 PROCEDURE — 83735 ASSAY OF MAGNESIUM: CPT

## 2024-01-16 PROCEDURE — 85730 THROMBOPLASTIN TIME PARTIAL: CPT

## 2024-01-16 PROCEDURE — 2580000003 HC RX 258: Performed by: NURSE PRACTITIONER

## 2024-01-16 PROCEDURE — 80053 COMPREHEN METABOLIC PANEL: CPT

## 2024-01-16 PROCEDURE — 85025 COMPLETE CBC W/AUTO DIFF WBC: CPT

## 2024-01-16 RX ORDER — MAGNESIUM SULFATE IN WATER 40 MG/ML
2000 INJECTION, SOLUTION INTRAVENOUS ONCE
Status: COMPLETED | OUTPATIENT
Start: 2024-01-16 | End: 2024-01-17

## 2024-01-16 RX ORDER — POTASSIUM CHLORIDE 7.45 MG/ML
10 INJECTION INTRAVENOUS
Status: COMPLETED | OUTPATIENT
Start: 2024-01-17 | End: 2024-01-17

## 2024-01-16 RX ORDER — SODIUM CHLORIDE, SODIUM LACTATE, POTASSIUM CHLORIDE, AND CALCIUM CHLORIDE .6; .31; .03; .02 G/100ML; G/100ML; G/100ML; G/100ML
1000 INJECTION, SOLUTION INTRAVENOUS ONCE
Status: COMPLETED | OUTPATIENT
Start: 2024-01-16 | End: 2024-01-17

## 2024-01-16 RX ADMIN — SODIUM CHLORIDE, SODIUM LACTATE, POTASSIUM CHLORIDE, CALCIUM CHLORIDE AND DEXTROSE MONOHYDRATE: 5; 600; 310; 30; 20 INJECTION, SOLUTION INTRAVENOUS at 22:53

## 2024-01-16 RX ADMIN — MICAFUNGIN SODIUM 100 MG: 100 INJECTION, POWDER, LYOPHILIZED, FOR SOLUTION INTRAVENOUS at 08:55

## 2024-01-16 RX ADMIN — PIPERACILLIN AND TAZOBACTAM 4500 MG: 4; .5 INJECTION, POWDER, LYOPHILIZED, FOR SOLUTION INTRAVENOUS at 02:12

## 2024-01-16 RX ADMIN — Medication: at 22:54

## 2024-01-16 RX ADMIN — DOCUSATE SODIUM AND SENNOSIDES 1 TABLET: 8.6; 5 TABLET, FILM COATED ORAL at 08:34

## 2024-01-16 RX ADMIN — Medication: at 08:14

## 2024-01-16 RX ADMIN — SODIUM CHLORIDE, SODIUM LACTATE, POTASSIUM CHLORIDE, CALCIUM CHLORIDE AND DEXTROSE MONOHYDRATE: 5; 600; 310; 30; 20 INJECTION, SOLUTION INTRAVENOUS at 01:21

## 2024-01-16 RX ADMIN — PIPERACILLIN AND TAZOBACTAM 3375 MG: 3; .375 INJECTION, POWDER, FOR SOLUTION INTRAVENOUS; PARENTERAL at 16:09

## 2024-01-16 RX ADMIN — SODIUM CHLORIDE, PRESERVATIVE FREE 10 ML: 5 INJECTION INTRAVENOUS at 23:13

## 2024-01-16 RX ADMIN — SODIUM CHLORIDE, PRESERVATIVE FREE 20 ML: 5 INJECTION INTRAVENOUS at 08:21

## 2024-01-16 RX ADMIN — SODIUM CHLORIDE, SODIUM LACTATE, POTASSIUM CHLORIDE, CALCIUM CHLORIDE AND DEXTROSE MONOHYDRATE: 5; 600; 310; 30; 20 INJECTION, SOLUTION INTRAVENOUS at 17:58

## 2024-01-16 RX ADMIN — MORPHINE SULFATE 1 MG: 2 INJECTION, SOLUTION INTRAMUSCULAR; INTRAVENOUS at 02:12

## 2024-01-16 RX ADMIN — MAGNESIUM SULFATE HEPTAHYDRATE 2000 MG: 40 INJECTION, SOLUTION INTRAVENOUS at 23:33

## 2024-01-16 RX ADMIN — PIPERACILLIN AND TAZOBACTAM 3375 MG: 3; .375 INJECTION, POWDER, FOR SOLUTION INTRAVENOUS; PARENTERAL at 08:21

## 2024-01-16 RX ADMIN — SODIUM CHLORIDE, PRESERVATIVE FREE 40 MG: 5 INJECTION INTRAVENOUS at 08:15

## 2024-01-16 RX ADMIN — PIPERACILLIN AND TAZOBACTAM 3375 MG: 3; .375 INJECTION, POWDER, FOR SOLUTION INTRAVENOUS; PARENTERAL at 23:44

## 2024-01-16 RX ADMIN — SODIUM CHLORIDE, PRESERVATIVE FREE 40 MG: 5 INJECTION INTRAVENOUS at 22:06

## 2024-01-16 RX ADMIN — LEVOTHYROXINE SODIUM 25 MCG: 0.03 TABLET ORAL at 08:52

## 2024-01-16 RX ADMIN — SODIUM CHLORIDE, POTASSIUM CHLORIDE, SODIUM LACTATE AND CALCIUM CHLORIDE 1000 ML: 600; 310; 30; 20 INJECTION, SOLUTION INTRAVENOUS at 22:10

## 2024-01-16 RX ADMIN — MORPHINE SULFATE 1 MG: 2 INJECTION, SOLUTION INTRAMUSCULAR; INTRAVENOUS at 22:06

## 2024-01-16 RX ADMIN — MORPHINE SULFATE 1 MG: 2 INJECTION, SOLUTION INTRAMUSCULAR; INTRAVENOUS at 08:35

## 2024-01-16 RX ADMIN — FUROSEMIDE 20 MG: 10 INJECTION, SOLUTION INTRAMUSCULAR; INTRAVENOUS at 08:18

## 2024-01-16 RX ADMIN — SODIUM CHLORIDE, PRESERVATIVE FREE 10 ML: 5 INJECTION INTRAVENOUS at 08:22

## 2024-01-16 ASSESSMENT — PAIN DESCRIPTION - LOCATION
LOCATION: GENERALIZED
LOCATION: GENERALIZED

## 2024-01-16 ASSESSMENT — PAIN SCALES - GENERAL
PAINLEVEL_OUTOF10: 0
PAINLEVEL_OUTOF10: 10
PAINLEVEL_OUTOF10: 10

## 2024-01-16 ASSESSMENT — PAIN DESCRIPTION - DESCRIPTORS: DESCRIPTORS: ACHING;DISCOMFORT;HEAVINESS;NAGGING

## 2024-01-16 NOTE — CONSULTS
[facility] INITIAL ASSESSMENT.      Name: Nicolasa Macdonald   : 1950   MRN: [id number]   Date: 2024        No chief complaint on file.        HPI:   73-year-old bedbound female with past medical history of hypertension, NICM (EF 20% ), hypothyroidism, who initially presented 12/10/23 for altered mental status at her assisted living facility.  She was admitted to the ICU and treated for right renal abscess /septic shock, NSTEMI.  She was started on Zosyn and underwent bilateral ureteral stent with improvement in her vasopressor need.  She was transferred to stepdown floor.  Blood cultures were positive for Proteus pansensitive. She was covid + .  She underwent right nephrectomy 1/10/2024.  IntraOp she suffered small bowel laceration which was repaired.  She has remained on Zosyn throughout the hospital stay.  She was started on vancomycin preop.  She had been complaining of nausea.  Rapid response was called this morning due to decreased mental status and an episode of vomiting concerning for hematemesis.  ICU was consulted.  Upon arrival, patient is lethargic but arousable to tactile stimulus.  She is protecting her airway and gag is intact.  Systolic is 100s over 50s with a heart rate in the 90s.  Brown vomit material noted in bed      Assessment/Plan:   Metabolic encephalopathy  Likely secondary to sepsis, possibly complicated by delirium associated with her prolonged hospitalization  - abg and metabolic panel ordered  Vomiting  No overt bright red blood seen  Decreased hemoglobin postop  Agree with twice daily Protonix and GI consult  Currently remaining hemodynamically stable    Hypotension  Sepsis versus intravascular volume depletion  Poor p.o. intake  Labs significant for NISREEN  Will follow-up with hemoglobin, transfuse for hemoglobin less than 7.  If patient not anemic, can consider albumin for volume resuscitation    3220 addendum patient reevaluated.  More responsive to 
  [facility]         NAME:Nicolasa Macdonald  MRN:[id number]   :1950     Patient seen  S/p right nephrectomy  Cr stable    Stop HYPOTONIC IVF  START LR    D/w nurse      Blood pressure 127/70, pulse 74, temperature 97 °F (36.1 °C), temperature source Rectal, resp. rate 18, height 1.702 m (5' 7\"), weight 77.1 kg (169 lb 15.6 oz), SpO2 100 %.    Patient Active Problem List   Diagnosis    Anemia    HLD (hyperlipidemia)    Hypothyroidism    Leukocytosis    Myocardiopathy (HCC)    Joint stiffness of both knees    Other chronic pain    Essential hypertension    Chronic cough    Loose stools    Septic shock (HCC)    Bacteremia    Renal carbuncle    Renal abscess, right               Kidney abscess [N15.1]  Renal abscess, right [N15.1]     Manan Bullard MD  [facility]  [facility]  [address], [address]  [place] [address]  Phone - [phone]         Fax - [phone] [address], [place] [address]  Phone - [phone]        Fax - [phone]                                                                           
Date of Consultation:  January 14, 2024    Referring Physician: MD Carolyne     Reason for Consultation:  AMS, abnormal head CT    No chief complaint on file.      History of Present Illness:   Nicolasa Macdonald is a 73 y.o. female with history of cardiomyopathy, hyperlipidemia, hypertension, thyroid disease who initially presented with altered mental status on 12/10 in the setting of right renal abscess, septic shock and NSTEMI.  She underwent right nephrectomy on 1/10 which was complicated by small bowel laceration status postrepair.  Course was complicated by hypotension as well as hematemesis and anemia.  She also has NISREEN, recent COVID infection.  She is on antibiotics currently.    She had persistent encephalopathy and therefore head CT was ordered which showed bilateral frontal and occipital hypodensities concerning for possible subacute infarct.  She had had a head CT on 12/10 when she was initially admitted which was unremarkable for acute findings.    Unfortunately the patient does not provide any history and does not engage with me during exam.    Past Medical History:   Diagnosis Date    Cardiomyopathy (HCC)     HLD (hyperlipidemia)     Hypertension     Kidney stone     Leukocytosis     Thyroid disease         Past Surgical History:   Procedure Laterality Date    CT DRAIN SOFT TISSUE ABSCESS  12/20/2023    CT DRAIN SOFT TISSUE ABSCESS 12/20/2023 [facility] RAD CT    CYSTOSCOPY Bilateral 12/10/2023    CYSTOSCOPY BILATERAL URETERAL STENT INSERTION performed by Nathan Garrett MD at [facility] MAIN OR    KIDNEY REMOVAL Right 1/10/2024    RIGHT RADICAL OPEN NEPHRECTOMY (E R A S) performed by Zac Lind MD at [facility] MAIN OR    ORTHOPEDIC SURGERY      spinal surgery, heel surgery    SMALL INTESTINE SURGERY N/A 1/10/2024    REPAIR SMALL BOWEL LACERATION performed by Danny Cortez MD at [facility] MAIN OR    VEIN SURGERY Right 1/10/2024    LIGATION OF RIGHT RENAL ARTERY performed by Saad Holder MD at [facility] MAIN OR    
Palliative Medicine  Patient Name: Nicolasa Macdonald  YOB: 1950  MRN: [id number]  Age: 73 y.o.  Gender: female      Received consult- unfortunately Ms Macdonald is not interactive today, appears quite sick.    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  She was quite distraught over this and has been trying get back on hospice.     She has been quite clear with her PCP in regards to her DNR status- appreciate urology re-instating this now that she is no longer in the OR.    Given how lengthy this hospitalization has been, and her complicated course- she might be appropriate for hospice at discharge.  Given this has been her wish for many months now- I will try and address this with her next week    NIRMAL Mooney - NP    
Palliative Medicine  Patient Name: Nicolasa Macdonald  YOB: 1950  MRN: [id number]  Age: 73 y.o.  Gender: female    Ms Macdonald remains unresponsive  Will reach out to her caregiver/mPOA tomorrow once we have the results of the MRI  Suspect hospice will be the next step given how activly Ms Macdonald was trying to be re-enrolled into their service over the last 2 years, but will speak to her mPOA to determine how she wants to proceed    NIRMAL Mooney NP      Received notification from attending and in reviewing her chart later today, that Ms Macdonald has episodes when she is awake and interactive!  She was completely unresponsive for me, so I think it is intermittent, but maybe she can talk to me tomorrow and we can determine how much more she wants    
Care  [] Home Based Palliative Care  [] Home Based Primary Care  [x] Hospice       ADVANCE CARE PLANNING:   [] [facility] Interdisciplinary Team has updated the ACP Navigator with Health Care Decision Maker and Patient Capacity      Primary Decision Maker: Fabian Magallon - [phone]  Confirm Advance Directive: None  Patient Would Like to Complete Advance Directive: No/refused    Current Code Status: DNR     Goals of Care: Goals of Care and Interventions  Patient/Health Care Proxy Stated Goals: Recovery from acute illness  Medical Interventions: Limited additional interventions       Please refer to Palliative Medicine ACP notes for further details.    PALLIATIVE ASSESSMENT:      Palliative Performance Scale (PPS):  PPS: 20    ECOG:   ECOG Status : Completely disabled [4]    Modified ESAS:  Modified-McWilliams Symptom Assessment Scale (ESAS)  Tiredness Score: 7  Depression Score: 3  Anxiety Score: Not anxious  Nausea Score: Not nauseated  Appetite Score: 6  Dyspnea Score: No shortness of breath    Clinical Pain Assessment (nonverbal scale for severity on nonverbal patients):           NVPS:  Adult Nonverbal Pain Scale (NVPS)  Face: No particular expression or smile  Activity (Movement): Laid quietly, normal position  Guarding: Lying quietly, no positioning of hands over areas of bod  Physiology (Vital Signs): Stable vital signs  Respiratory: Baseline RR/SpO2 compliant with ventilator  NVPS Score : 0    RDOS:         Vital Signs: Blood pressure 126/76, pulse 81, temperature 98.2 °F (36.8 °C), temperature source Oral, resp. rate 16, height 1.702 m (5' 7.01\"), weight 77.1 kg (169 lb 15.6 oz), SpO2 98 %, peak flow (!) 16 L/min.    PHYSICAL ASSESSMENT:   General: [x] Oriented x3  [] Well appearing  [] Intubated  [x]Ill appearing  []Other:  Mental Status: [] Normal mental status exam  [x] Drowsy  [] Confused  []Other:  Cardiovascular: [x] Regular rate/rhythm  [] Arrhythmia  [] Other:  Chest: [x] Effort 
12:19 AM   Result Value Ref Range    WBC 27.6 (H) 3.6 - 11.0 K/uL    RBC 3.02 (L) 3.80 - 5.20 M/uL    Hemoglobin 7.6 (L) 11.5 - 16.0 g/dL    Hematocrit 24.6 (L) 35.0 - 47.0 %    MCV 81.5 80.0 - 99.0 FL    MCH 25.2 (L) 26.0 - 34.0 PG    MCHC 30.9 30.0 - 36.5 g/dL    RDW 20.4 (H) 11.5 - 14.5 %    Platelets 446 (H) 150 - 400 K/uL    MPV 8.8 (L) 8.9 - 12.9 FL    Nucleated RBCs 0.0 0  WBC    nRBC 0.00 0.00 - 0.01 K/uL   CBC with Auto Differential    Collection Time: 01/12/24  9:16 AM   Result Value Ref Range    WBC 30.3 (H) 3.6 - 11.0 K/uL    RBC 3.10 (L) 3.80 - 5.20 M/uL    Hemoglobin 7.9 (L) 11.5 - 16.0 g/dL    Hematocrit 26.0 (L) 35.0 - 47.0 %    MCV 83.9 80.0 - 99.0 FL    MCH 25.5 (L) 26.0 - 34.0 PG    MCHC 30.4 30.0 - 36.5 g/dL    RDW 20.7 (H) 11.5 - 14.5 %    Platelets 467 (H) 150 - 400 K/uL    Nucleated RBCs 0.0 0  WBC    nRBC 0.00 0.00 - 0.01 K/uL    Neutrophils % 93 (H) 32 - 75 %    Lymphocytes % 3 (L) 12 - 49 %    Monocytes % 3 (L) 5 - 13 %    Eosinophils % 0 0 - 7 %    Basophils % 0 0 - 1 %    Immature Granulocytes 1 (H) 0.0 - 0.5 %    Neutrophils Absolute 28.1 (H) 1.8 - 8.0 K/UL    Lymphocytes Absolute 1.0 0.8 - 3.5 K/UL    Monocytes Absolute 0.9 0.0 - 1.0 K/UL    Eosinophils Absolute 0.0 0.0 - 0.4 K/UL    Basophils Absolute 0.1 0.0 - 0.1 K/UL    Absolute Immature Granulocyte 0.3 (H) 0.00 - 0.04 K/UL    Differential Type AUTOMATED     Comprehensive Metabolic Panel    Collection Time: 01/12/24  9:18 AM   Result Value Ref Range    Sodium 138 136 - 145 mmol/L    Potassium 3.6 3.5 - 5.1 mmol/L    Chloride 107 97 - 108 mmol/L    CO2 24 21 - 32 mmol/L    Anion Gap 7 5 - 15 mmol/L    Glucose 124 (H) 65 - 100 mg/dL    BUN 32 (H) 6 - 20 MG/DL    Creatinine 1.61 (H) 0.55 - 1.02 MG/DL    Bun/Cre Ratio 20 12 - 20      Est, Glom Filt Rate 34 (L) >60 ml/min/1.73m2    Calcium 9.1 8.5 - 10.1 MG/DL    Total Bilirubin 0.3 0.2 - 1.0 MG/DL    ALT 7 (L) 12 - 78 U/L    AST 12 (L) 15 - 37 U/L    Alk Phosphatase 68 45 
ABDOMINAL WALL: No mass or hernia. ADDITIONAL COMMENTS: N/A     1.  Bilateral double-J nephroureteral stents. Grossly unchanged in size presumed abscess in the right renal pelvis region. Unchanged mild right hydronephrosis. 2.  Decreasing right and resolved left pleural effusions. 3.  Incidental findings as above.     XR CHEST (2 VW)    Result Date: 12/26/2023  EXAM: XR CHEST (2 VW) INDICATION: fever, cough COMPARISON: Chest radiograph 12/10/2023, CT chest 12/10/2023 TECHNIQUE: PA and lateral chest views FINDINGS: Cardiomediastinal silhouette enlarged. Hazy opacity at the right lung base may reflect pneumonia or aspiration. Pleural spaces grossly clear..     Hazy opacity at the right lung base may reflect pneumonia or aspiration.      CT ABSCESS DRAINAGE SOFT TISSUE    Result Date: 12/20/2023  PROCEDURE:  CT GUIDED ASPIRATION HISTORY: NELLIE MCGILL is a 73 years old Female with right renal abscess. :  Shanelle Collins NP ATTENDING:  Minh Vaughn MD CONSENT:  After full discussion of the procedure, including risks, benefits and alternatives, both verbal and written consent were obtained. TECHNIQUE: A timeout was called to verify the correct patient, procedure, site and allergies. The patient was placed prone on the CT table.  CT dose reduction was achieved through use of a standardized protocol tailored for this examination and automatic exposure control for dose modulation.  Initial  images were obtained which again demonstrated a right renal fluid collection.  An appropriate site for aspiration was marked.  The skin was prepped and draped in sterile fashion.  1% lidocaine was utilized for local anesthesia.  A small dermatotomy was made.  Under CT guidance, an 18 gauge needle was advanced into the right renal fluid collection. Approximately 5 ml of green mucous fluid was aspirated.  The needle was removed.  Pressure was applied locally at the puncture site.  A dry sterile dressing was applied.  There

## 2024-01-17 LAB
ALBUMIN SERPL-MCNC: 1.3 G/DL (ref 3.5–5)
ALBUMIN/GLOB SERPL: 0.3 (ref 1.1–2.2)
ALP SERPL-CCNC: 81 U/L (ref 45–117)
ALT SERPL-CCNC: 6 U/L (ref 12–78)
ANION GAP SERPL CALC-SCNC: 3 MMOL/L (ref 5–15)
AST SERPL-CCNC: 16 U/L (ref 15–37)
BACTERIA SPEC CULT: NORMAL
BACTERIA SPEC CULT: NORMAL
BILIRUB SERPL-MCNC: 0.2 MG/DL (ref 0.2–1)
BUN SERPL-MCNC: 18 MG/DL (ref 6–20)
BUN/CREAT SERPL: 22 (ref 12–20)
CALCIUM SERPL-MCNC: 8.6 MG/DL (ref 8.5–10.1)
CHLORIDE SERPL-SCNC: 108 MMOL/L (ref 97–108)
CO2 SERPL-SCNC: 25 MMOL/L (ref 21–32)
CREAT SERPL-MCNC: 0.81 MG/DL (ref 0.55–1.02)
EKG DIAGNOSIS: NORMAL
EKG Q-T INTERVAL: 316 MS
EKG QRS DURATION: 106 MS
EKG QTC CALCULATION (BAZETT): 475 MS
EKG R AXIS: -29 DEGREES
EKG T AXIS: 5 DEGREES
EKG VENTRICULAR RATE: 136 BPM
ERYTHROCYTE [DISTWIDTH] IN BLOOD BY AUTOMATED COUNT: 18.9 % (ref 11.5–14.5)
GLOBULIN SER CALC-MCNC: 4.1 G/DL (ref 2–4)
GLUCOSE BLD STRIP.AUTO-MCNC: 101 MG/DL (ref 65–117)
GLUCOSE BLD STRIP.AUTO-MCNC: 108 MG/DL (ref 65–117)
GLUCOSE SERPL-MCNC: 117 MG/DL (ref 65–100)
HCT VFR BLD AUTO: 28.4 % (ref 35–47)
HGB BLD-MCNC: 8 G/DL (ref 11.5–16)
HGB BLD-MCNC: 9.1 G/DL (ref 11.5–16)
MCH RBC QN AUTO: 27.3 PG (ref 26–34)
MCHC RBC AUTO-ENTMCNC: 32 G/DL (ref 30–36.5)
MCV RBC AUTO: 85.3 FL (ref 80–99)
NRBC # BLD: 0 K/UL (ref 0–0.01)
NRBC BLD-RTO: 0 PER 100 WBC
PLATELET # BLD AUTO: 412 K/UL (ref 150–400)
PMV BLD AUTO: 8.3 FL (ref 8.9–12.9)
POTASSIUM SERPL-SCNC: 3.9 MMOL/L (ref 3.5–5.1)
PROT SERPL-MCNC: 5.4 G/DL (ref 6.4–8.2)
RBC # BLD AUTO: 3.33 M/UL (ref 3.8–5.2)
SERVICE CMNT-IMP: NORMAL
SODIUM SERPL-SCNC: 136 MMOL/L (ref 136–145)
WBC # BLD AUTO: 12.5 K/UL (ref 3.6–11)

## 2024-01-17 PROCEDURE — 36415 COLL VENOUS BLD VENIPUNCTURE: CPT

## 2024-01-17 PROCEDURE — A4216 STERILE WATER/SALINE, 10 ML: HCPCS | Performed by: INTERNAL MEDICINE

## 2024-01-17 PROCEDURE — 80053 COMPREHEN METABOLIC PANEL: CPT

## 2024-01-17 PROCEDURE — 6360000002 HC RX W HCPCS: Performed by: GENERAL ACUTE CARE HOSPITAL

## 2024-01-17 PROCEDURE — 82962 GLUCOSE BLOOD TEST: CPT

## 2024-01-17 PROCEDURE — 2580000003 HC RX 258: Performed by: UROLOGY

## 2024-01-17 PROCEDURE — C9113 INJ PANTOPRAZOLE SODIUM, VIA: HCPCS | Performed by: INTERNAL MEDICINE

## 2024-01-17 PROCEDURE — 2580000003 HC RX 258: Performed by: INTERNAL MEDICINE

## 2024-01-17 PROCEDURE — 2060000000 HC ICU INTERMEDIATE R&B

## 2024-01-17 PROCEDURE — 6360000002 HC RX W HCPCS: Performed by: INTERNAL MEDICINE

## 2024-01-17 PROCEDURE — 85027 COMPLETE CBC AUTOMATED: CPT

## 2024-01-17 PROCEDURE — 6370000000 HC RX 637 (ALT 250 FOR IP): Performed by: INTERNAL MEDICINE

## 2024-01-17 PROCEDURE — 99233 SBSQ HOSP IP/OBS HIGH 50: CPT | Performed by: INTERNAL MEDICINE

## 2024-01-17 PROCEDURE — 6370000000 HC RX 637 (ALT 250 FOR IP): Performed by: GENERAL ACUTE CARE HOSPITAL

## 2024-01-17 PROCEDURE — 2580000003 HC RX 258: Performed by: GENERAL ACUTE CARE HOSPITAL

## 2024-01-17 PROCEDURE — 6360000002 HC RX W HCPCS: Performed by: NURSE PRACTITIONER

## 2024-01-17 PROCEDURE — 6370000000 HC RX 637 (ALT 250 FOR IP): Performed by: UROLOGY

## 2024-01-17 RX ORDER — MORPHINE SULFATE 2 MG/ML
1 INJECTION, SOLUTION INTRAMUSCULAR; INTRAVENOUS EVERY 4 HOURS PRN
Status: DISCONTINUED | OUTPATIENT
Start: 2024-01-17 | End: 2024-01-26 | Stop reason: HOSPADM

## 2024-01-17 RX ADMIN — Medication: at 21:13

## 2024-01-17 RX ADMIN — DOCUSATE SODIUM AND SENNOSIDES 1 TABLET: 8.6; 5 TABLET, FILM COATED ORAL at 08:45

## 2024-01-17 RX ADMIN — SODIUM CHLORIDE, PRESERVATIVE FREE 10 ML: 5 INJECTION INTRAVENOUS at 08:56

## 2024-01-17 RX ADMIN — LEVOTHYROXINE SODIUM 25 MCG: 0.03 TABLET ORAL at 08:44

## 2024-01-17 RX ADMIN — PIPERACILLIN AND TAZOBACTAM 3375 MG: 3; .375 INJECTION, POWDER, FOR SOLUTION INTRAVENOUS; PARENTERAL at 16:10

## 2024-01-17 RX ADMIN — SODIUM CHLORIDE, PRESERVATIVE FREE 10 ML: 5 INJECTION INTRAVENOUS at 21:13

## 2024-01-17 RX ADMIN — SODIUM CHLORIDE: 9 INJECTION, SOLUTION INTRAVENOUS at 09:09

## 2024-01-17 RX ADMIN — POTASSIUM CHLORIDE 10 MEQ: 10 INJECTION, SOLUTION INTRAVENOUS at 02:22

## 2024-01-17 RX ADMIN — DOCUSATE SODIUM AND SENNOSIDES 1 TABLET: 8.6; 5 TABLET, FILM COATED ORAL at 21:12

## 2024-01-17 RX ADMIN — PIPERACILLIN AND TAZOBACTAM 3375 MG: 3; .375 INJECTION, POWDER, FOR SOLUTION INTRAVENOUS; PARENTERAL at 09:06

## 2024-01-17 RX ADMIN — Medication: at 09:09

## 2024-01-17 RX ADMIN — SODIUM CHLORIDE, SODIUM LACTATE, POTASSIUM CHLORIDE, CALCIUM CHLORIDE AND DEXTROSE MONOHYDRATE: 5; 600; 310; 30; 20 INJECTION, SOLUTION INTRAVENOUS at 12:22

## 2024-01-17 RX ADMIN — SODIUM CHLORIDE, PRESERVATIVE FREE 40 MG: 5 INJECTION INTRAVENOUS at 21:12

## 2024-01-17 RX ADMIN — MORPHINE SULFATE 1 MG: 2 INJECTION, SOLUTION INTRAMUSCULAR; INTRAVENOUS at 08:40

## 2024-01-17 RX ADMIN — FUROSEMIDE 20 MG: 10 INJECTION, SOLUTION INTRAMUSCULAR; INTRAVENOUS at 08:42

## 2024-01-17 RX ADMIN — MORPHINE SULFATE 1 MG: 2 INJECTION, SOLUTION INTRAMUSCULAR; INTRAVENOUS at 21:12

## 2024-01-17 RX ADMIN — POTASSIUM CHLORIDE 10 MEQ: 10 INJECTION, SOLUTION INTRAVENOUS at 03:48

## 2024-01-17 RX ADMIN — MICAFUNGIN SODIUM 100 MG: 100 INJECTION, POWDER, LYOPHILIZED, FOR SOLUTION INTRAVENOUS at 09:11

## 2024-01-17 RX ADMIN — MORPHINE SULFATE 1 MG: 2 INJECTION, SOLUTION INTRAMUSCULAR; INTRAVENOUS at 03:01

## 2024-01-17 RX ADMIN — SODIUM CHLORIDE, PRESERVATIVE FREE 40 MG: 5 INJECTION INTRAVENOUS at 08:43

## 2024-01-17 RX ADMIN — ASPIRIN 81 MG: 81 TABLET, CHEWABLE ORAL at 08:44

## 2024-01-17 RX ADMIN — MORPHINE SULFATE 1 MG: 2 INJECTION, SOLUTION INTRAMUSCULAR; INTRAVENOUS at 15:03

## 2024-01-17 ASSESSMENT — PAIN DESCRIPTION - LOCATION
LOCATION: GENERALIZED

## 2024-01-17 ASSESSMENT — PAIN SCALES - GENERAL
PAINLEVEL_OUTOF10: 7
PAINLEVEL_OUTOF10: 0
PAINLEVEL_OUTOF10: 8
PAINLEVEL_OUTOF10: 0
PAINLEVEL_OUTOF10: 7
PAINLEVEL_OUTOF10: 10

## 2024-01-17 NOTE — SIGNIFICANT EVENT
Called to room at approximately 2137 for tachycardia s/p vomiting.     Patient Vitals for the past 8 hrs:   BP Temp Temp src Pulse Resp SpO2   01/16/24 2145 (!) 145/99 -- -- (!) 128 21 --   01/16/24 2138 -- -- -- (!) 142 -- --   01/16/24 2137 -- -- -- (!) 150 -- --   01/16/24 2136 -- -- -- (!) 116 -- --   01/16/24 2135 -- -- -- (!) 101 -- --   01/16/24 2134 -- -- -- (!) 106 -- --   01/16/24 2133 -- -- -- 88 -- --   01/16/24 2132 -- -- -- (!) 103 -- --   01/16/24 2131 -- -- -- 89 -- --   01/16/24 1810 138/74 98 °F (36.7 °C) Oral 85 25 97 %       Intake/Output Summary (Last 24 hours) at 1/16/2024 2153  Last data filed at 1/16/2024 2152  Gross per 24 hour   Intake 1001.27 ml   Output 4140 ml   Net -3138.73 ml         BACKGROUND/ SITUATION:  Pt with reported recent nephrectomy, laceration during sx, lac repaired s/p laceration bowel repair, recently reported to have NG tube, per nursing pt pulled this out on her own volition few days ago, but is not reported to have been continued vomiting until this evening.    FINDINGS:  Nursing was at bedside when pt vomited, states vomited up brown liquid smelled like fecal matter. Reported immediately afterwards she developed tachycardia. Reported pt has been having mentation that has been waxing and waining at times. Pt A+Ox4 at this time, no acute focal deficits noted on exam, pt baseline bed bound per nursing and no increased lower extremity weakness reported. Pt denies any c/o chest pain, dyspnea. Mild nausea, per nursing pt doesn't appear to have aspirated as nursing was present during vomiting episode and pt was sitting upright in the bed. Pt isn't having any increased work of breathing and is on RA with o2 sats wnl. VSS otherwise than tachycardia 120-130s. Nursing also concerned for increased outpt from the LORETTA drain. I d/w nursing to please notify surgery for this concern, and they will also d/w them vomitus finding to notify. No acute distress noted at bedside. Pt

## 2024-01-17 NOTE — SIGNIFICANT EVENT
RAPID RESPONSE TEAM    Overhead rapid response paged to room # 2311/01  at 2130    Reason for rapid response: Tachycardia, vomiting     Initial assessment:   Patient is alert with complaints of N/V and abdominal pain. HR 140s, /99. Respirations even and unlabored, spo2 97% on RA. Abdomen soft, tender, staples present c/d/I. LORETTA drain in place with increased serosanguinous drainage, drainage also appears to be leaking around insertion site. 400 ml of brown bile noted in bedside canister.      NP [place] at bedside, orders received for the following Interventions:     - EKG- no ischemic changes per NP  - CBC, CMP, Mag, Trop, PT/INR  - 1L LR Bolus   - Mag 2g IVPB  - Potassium 10 mEq IVPB x2   - KUB  - NGT -- Per NP, Primary RN to notify surgery prior to NGT placement    Outcome:   HR 90s- NSR, NGT inserted by primary RN, pt to remain in room # 2311/01         Please call with any questions or concerns    Moira Ojeda RN  Rapid Response Team  Ext [phone]     Recent Results (from the past 8 hour(s))   POCT Glucose    Collection Time: 01/16/24  4:52 PM   Result Value Ref Range    POC Glucose 95 65 - 117 mg/dL    Performed by: Nery Camacho RN    Vascular duplex lower extremity venous bilateral    Collection Time: 01/16/24  7:28 PM   Result Value Ref Range    Body Surface Area 1.91 m2   EKG 12 Lead    Collection Time: 01/16/24  9:33 PM   Result Value Ref Range    Ventricular Rate 136 BPM    QRS Duration 106 ms    Q-T Interval 316 ms    QTc Calculation (Bazett) 475 ms    R Axis -29 degrees    T Axis 5 degrees    Diagnosis       Critical Test Result: STEMI  Supraventricular tachycardia  Inferior-posterior infarct (cited on or before 10-DEC-2023)  ACUTE MI / STEMI  Consider right ventricular involvement in acute inferior infarct  Abnormal ECG  When compared with ECG of 10-BETHANY-2024 10:51,  LA interval has decreased  Vent. rate has increased BY  67 BPM  Right bundle branch block is no longer present     CBC with Auto

## 2024-01-18 LAB
ALBUMIN SERPL-MCNC: 1.2 G/DL (ref 3.5–5)
ALBUMIN/GLOB SERPL: 0.3 (ref 1.1–2.2)
ALP SERPL-CCNC: 76 U/L (ref 45–117)
ALT SERPL-CCNC: 7 U/L (ref 12–78)
ANION GAP SERPL CALC-SCNC: 2 MMOL/L (ref 5–15)
AST SERPL-CCNC: 18 U/L (ref 15–37)
BILIRUB SERPL-MCNC: 0.2 MG/DL (ref 0.2–1)
BUN SERPL-MCNC: 16 MG/DL (ref 6–20)
BUN/CREAT SERPL: 20 (ref 12–20)
CALCIUM SERPL-MCNC: 8.3 MG/DL (ref 8.5–10.1)
CHLORIDE SERPL-SCNC: 107 MMOL/L (ref 97–108)
CO2 SERPL-SCNC: 29 MMOL/L (ref 21–32)
CREAT SERPL-MCNC: 0.81 MG/DL (ref 0.55–1.02)
ERYTHROCYTE [DISTWIDTH] IN BLOOD BY AUTOMATED COUNT: 18.8 % (ref 11.5–14.5)
GLOBULIN SER CALC-MCNC: 3.8 G/DL (ref 2–4)
GLUCOSE BLD STRIP.AUTO-MCNC: 172 MG/DL (ref 65–117)
GLUCOSE SERPL-MCNC: 97 MG/DL (ref 65–100)
HCT VFR BLD AUTO: 23.8 % (ref 35–47)
HGB BLD-MCNC: 7.5 G/DL (ref 11.5–16)
MCH RBC QN AUTO: 26.3 PG (ref 26–34)
MCHC RBC AUTO-ENTMCNC: 31.5 G/DL (ref 30–36.5)
MCV RBC AUTO: 83.5 FL (ref 80–99)
NRBC # BLD: 0 K/UL (ref 0–0.01)
NRBC BLD-RTO: 0 PER 100 WBC
PLATELET # BLD AUTO: 405 K/UL (ref 150–400)
PMV BLD AUTO: 8.5 FL (ref 8.9–12.9)
POTASSIUM SERPL-SCNC: 3.6 MMOL/L (ref 3.5–5.1)
PROT SERPL-MCNC: 5 G/DL (ref 6.4–8.2)
RBC # BLD AUTO: 2.85 M/UL (ref 3.8–5.2)
SERVICE CMNT-IMP: ABNORMAL
SODIUM SERPL-SCNC: 138 MMOL/L (ref 136–145)
WBC # BLD AUTO: 11.2 K/UL (ref 3.6–11)

## 2024-01-18 PROCEDURE — 80053 COMPREHEN METABOLIC PANEL: CPT

## 2024-01-18 PROCEDURE — 36415 COLL VENOUS BLD VENIPUNCTURE: CPT

## 2024-01-18 PROCEDURE — 6360000002 HC RX W HCPCS: Performed by: INTERNAL MEDICINE

## 2024-01-18 PROCEDURE — 2580000003 HC RX 258: Performed by: GENERAL ACUTE CARE HOSPITAL

## 2024-01-18 PROCEDURE — C9113 INJ PANTOPRAZOLE SODIUM, VIA: HCPCS | Performed by: INTERNAL MEDICINE

## 2024-01-18 PROCEDURE — 85027 COMPLETE CBC AUTOMATED: CPT

## 2024-01-18 PROCEDURE — 99232 SBSQ HOSP IP/OBS MODERATE 35: CPT | Performed by: NURSE PRACTITIONER

## 2024-01-18 PROCEDURE — 6370000000 HC RX 637 (ALT 250 FOR IP): Performed by: GENERAL ACUTE CARE HOSPITAL

## 2024-01-18 PROCEDURE — 99233 SBSQ HOSP IP/OBS HIGH 50: CPT | Performed by: INTERNAL MEDICINE

## 2024-01-18 PROCEDURE — 6370000000 HC RX 637 (ALT 250 FOR IP): Performed by: INTERNAL MEDICINE

## 2024-01-18 PROCEDURE — 2580000003 HC RX 258: Performed by: INTERNAL MEDICINE

## 2024-01-18 PROCEDURE — 82962 GLUCOSE BLOOD TEST: CPT

## 2024-01-18 PROCEDURE — A4216 STERILE WATER/SALINE, 10 ML: HCPCS | Performed by: INTERNAL MEDICINE

## 2024-01-18 PROCEDURE — 6360000002 HC RX W HCPCS: Performed by: GENERAL ACUTE CARE HOSPITAL

## 2024-01-18 PROCEDURE — 6370000000 HC RX 637 (ALT 250 FOR IP): Performed by: UROLOGY

## 2024-01-18 PROCEDURE — 2060000000 HC ICU INTERMEDIATE R&B

## 2024-01-18 RX ADMIN — SODIUM CHLORIDE, SODIUM LACTATE, POTASSIUM CHLORIDE, CALCIUM CHLORIDE AND DEXTROSE MONOHYDRATE: 5; 600; 310; 30; 20 INJECTION, SOLUTION INTRAVENOUS at 16:39

## 2024-01-18 RX ADMIN — PIPERACILLIN AND TAZOBACTAM 3375 MG: 3; .375 INJECTION, POWDER, FOR SOLUTION INTRAVENOUS; PARENTERAL at 09:28

## 2024-01-18 RX ADMIN — LEVOTHYROXINE SODIUM 25 MCG: 0.03 TABLET ORAL at 12:40

## 2024-01-18 RX ADMIN — MICAFUNGIN SODIUM 100 MG: 100 INJECTION, POWDER, LYOPHILIZED, FOR SOLUTION INTRAVENOUS at 09:22

## 2024-01-18 RX ADMIN — Medication: at 09:31

## 2024-01-18 RX ADMIN — FUROSEMIDE 20 MG: 10 INJECTION, SOLUTION INTRAMUSCULAR; INTRAVENOUS at 09:28

## 2024-01-18 RX ADMIN — SODIUM CHLORIDE 10 ML/HR: 9 INJECTION, SOLUTION INTRAVENOUS at 23:40

## 2024-01-18 RX ADMIN — MORPHINE SULFATE 1 MG: 2 INJECTION, SOLUTION INTRAMUSCULAR; INTRAVENOUS at 21:36

## 2024-01-18 RX ADMIN — SODIUM CHLORIDE, PRESERVATIVE FREE 40 MG: 5 INJECTION INTRAVENOUS at 21:36

## 2024-01-18 RX ADMIN — MORPHINE SULFATE 1 MG: 2 INJECTION, SOLUTION INTRAMUSCULAR; INTRAVENOUS at 02:39

## 2024-01-18 RX ADMIN — SODIUM CHLORIDE, SODIUM LACTATE, POTASSIUM CHLORIDE, CALCIUM CHLORIDE AND DEXTROSE MONOHYDRATE: 5; 600; 310; 30; 20 INJECTION, SOLUTION INTRAVENOUS at 02:38

## 2024-01-18 RX ADMIN — Medication: at 21:37

## 2024-01-18 RX ADMIN — PIPERACILLIN AND TAZOBACTAM 3375 MG: 3; .375 INJECTION, POWDER, FOR SOLUTION INTRAVENOUS; PARENTERAL at 16:02

## 2024-01-18 RX ADMIN — ASPIRIN 81 MG: 81 TABLET, CHEWABLE ORAL at 09:27

## 2024-01-18 RX ADMIN — PIPERACILLIN AND TAZOBACTAM 3375 MG: 3; .375 INJECTION, POWDER, FOR SOLUTION INTRAVENOUS; PARENTERAL at 00:31

## 2024-01-18 RX ADMIN — DOCUSATE SODIUM AND SENNOSIDES 1 TABLET: 8.6; 5 TABLET, FILM COATED ORAL at 21:36

## 2024-01-18 RX ADMIN — MORPHINE SULFATE 1 MG: 2 INJECTION, SOLUTION INTRAMUSCULAR; INTRAVENOUS at 09:27

## 2024-01-18 RX ADMIN — PIPERACILLIN AND TAZOBACTAM 3375 MG: 3; .375 INJECTION, POWDER, FOR SOLUTION INTRAVENOUS; PARENTERAL at 23:41

## 2024-01-18 RX ADMIN — SODIUM CHLORIDE, PRESERVATIVE FREE 10 ML: 5 INJECTION INTRAVENOUS at 21:37

## 2024-01-18 RX ADMIN — SODIUM CHLORIDE, PRESERVATIVE FREE 10 ML: 5 INJECTION INTRAVENOUS at 09:39

## 2024-01-18 RX ADMIN — MORPHINE SULFATE 1 MG: 2 INJECTION, SOLUTION INTRAMUSCULAR; INTRAVENOUS at 16:03

## 2024-01-18 RX ADMIN — DOCUSATE SODIUM AND SENNOSIDES 1 TABLET: 8.6; 5 TABLET, FILM COATED ORAL at 09:27

## 2024-01-18 RX ADMIN — SODIUM CHLORIDE, PRESERVATIVE FREE 40 MG: 5 INJECTION INTRAVENOUS at 09:27

## 2024-01-18 ASSESSMENT — PAIN DESCRIPTION - DESCRIPTORS: DESCRIPTORS: ACHING

## 2024-01-18 ASSESSMENT — PAIN SCALES - GENERAL
PAINLEVEL_OUTOF10: 10
PAINLEVEL_OUTOF10: 0
PAINLEVEL_OUTOF10: 0

## 2024-01-18 ASSESSMENT — PAIN DESCRIPTION - LOCATION: LOCATION: ABDOMEN

## 2024-01-19 ENCOUNTER — APPOINTMENT (OUTPATIENT)
Facility: HOSPITAL | Age: 74
DRG: 853 | End: 2024-01-19
Attending: UROLOGY
Payer: MEDICARE

## 2024-01-19 LAB
ALBUMIN SERPL-MCNC: 1.3 G/DL (ref 3.5–5)
ALBUMIN/GLOB SERPL: 0.3 (ref 1.1–2.2)
ALP SERPL-CCNC: 96 U/L (ref 45–117)
ALT SERPL-CCNC: 12 U/L (ref 12–78)
ANION GAP SERPL CALC-SCNC: 4 MMOL/L (ref 5–15)
AST SERPL-CCNC: 32 U/L (ref 15–37)
BILIRUB SERPL-MCNC: 0.4 MG/DL (ref 0.2–1)
BUN SERPL-MCNC: 13 MG/DL (ref 6–20)
BUN/CREAT SERPL: 17 (ref 12–20)
CALCIUM SERPL-MCNC: 8.3 MG/DL (ref 8.5–10.1)
CHLORIDE SERPL-SCNC: 104 MMOL/L (ref 97–108)
CO2 SERPL-SCNC: 28 MMOL/L (ref 21–32)
CREAT SERPL-MCNC: 0.76 MG/DL (ref 0.55–1.02)
ERYTHROCYTE [DISTWIDTH] IN BLOOD BY AUTOMATED COUNT: 18.5 % (ref 11.5–14.5)
GLOBULIN SER CALC-MCNC: 4.3 G/DL (ref 2–4)
GLUCOSE BLD STRIP.AUTO-MCNC: 116 MG/DL (ref 65–117)
GLUCOSE SERPL-MCNC: 125 MG/DL (ref 65–100)
HCT VFR BLD AUTO: 28.4 % (ref 35–47)
HGB BLD-MCNC: 9.2 G/DL (ref 11.5–16)
MCH RBC QN AUTO: 26.4 PG (ref 26–34)
MCHC RBC AUTO-ENTMCNC: 32.4 G/DL (ref 30–36.5)
MCV RBC AUTO: 81.6 FL (ref 80–99)
NRBC # BLD: 0 K/UL (ref 0–0.01)
NRBC BLD-RTO: 0 PER 100 WBC
PLATELET # BLD AUTO: 512 K/UL (ref 150–400)
PMV BLD AUTO: 8.1 FL (ref 8.9–12.9)
POTASSIUM SERPL-SCNC: 3.3 MMOL/L (ref 3.5–5.1)
PROT SERPL-MCNC: 5.6 G/DL (ref 6.4–8.2)
RBC # BLD AUTO: 3.48 M/UL (ref 3.8–5.2)
SERVICE CMNT-IMP: NORMAL
SODIUM SERPL-SCNC: 136 MMOL/L (ref 136–145)
WBC # BLD AUTO: 14.4 K/UL (ref 3.6–11)

## 2024-01-19 PROCEDURE — 2580000003 HC RX 258: Performed by: GENERAL ACUTE CARE HOSPITAL

## 2024-01-19 PROCEDURE — 6360000002 HC RX W HCPCS: Performed by: GENERAL ACUTE CARE HOSPITAL

## 2024-01-19 PROCEDURE — 2500000003 HC RX 250 WO HCPCS: Performed by: STUDENT IN AN ORGANIZED HEALTH CARE EDUCATION/TRAINING PROGRAM

## 2024-01-19 PROCEDURE — 6370000000 HC RX 637 (ALT 250 FOR IP): Performed by: UROLOGY

## 2024-01-19 PROCEDURE — 6360000002 HC RX W HCPCS: Performed by: INTERNAL MEDICINE

## 2024-01-19 PROCEDURE — 6360000002 HC RX W HCPCS: Performed by: STUDENT IN AN ORGANIZED HEALTH CARE EDUCATION/TRAINING PROGRAM

## 2024-01-19 PROCEDURE — 82962 GLUCOSE BLOOD TEST: CPT

## 2024-01-19 PROCEDURE — 74177 CT ABD & PELVIS W/CONTRAST: CPT

## 2024-01-19 PROCEDURE — 2060000000 HC ICU INTERMEDIATE R&B

## 2024-01-19 PROCEDURE — 76937 US GUIDE VASCULAR ACCESS: CPT

## 2024-01-19 PROCEDURE — 6370000000 HC RX 637 (ALT 250 FOR IP): Performed by: INTERNAL MEDICINE

## 2024-01-19 PROCEDURE — 85027 COMPLETE CBC AUTOMATED: CPT

## 2024-01-19 PROCEDURE — A4216 STERILE WATER/SALINE, 10 ML: HCPCS | Performed by: INTERNAL MEDICINE

## 2024-01-19 PROCEDURE — 2580000003 HC RX 258: Performed by: STUDENT IN AN ORGANIZED HEALTH CARE EDUCATION/TRAINING PROGRAM

## 2024-01-19 PROCEDURE — 51702 INSERT TEMP BLADDER CATH: CPT

## 2024-01-19 PROCEDURE — 2580000003 HC RX 258: Performed by: INTERNAL MEDICINE

## 2024-01-19 PROCEDURE — 36415 COLL VENOUS BLD VENIPUNCTURE: CPT

## 2024-01-19 PROCEDURE — 80053 COMPREHEN METABOLIC PANEL: CPT

## 2024-01-19 PROCEDURE — 6370000000 HC RX 637 (ALT 250 FOR IP): Performed by: GENERAL ACUTE CARE HOSPITAL

## 2024-01-19 PROCEDURE — 99233 SBSQ HOSP IP/OBS HIGH 50: CPT | Performed by: INTERNAL MEDICINE

## 2024-01-19 PROCEDURE — C9113 INJ PANTOPRAZOLE SODIUM, VIA: HCPCS | Performed by: INTERNAL MEDICINE

## 2024-01-19 PROCEDURE — 99231 SBSQ HOSP IP/OBS SF/LOW 25: CPT | Performed by: NURSE PRACTITIONER

## 2024-01-19 PROCEDURE — 6360000004 HC RX CONTRAST MEDICATION: Performed by: UROLOGY

## 2024-01-19 RX ORDER — METOPROLOL TARTRATE 1 MG/ML
2.5 INJECTION, SOLUTION INTRAVENOUS ONCE
Status: COMPLETED | OUTPATIENT
Start: 2024-01-19 | End: 2024-01-19

## 2024-01-19 RX ORDER — CARVEDILOL 3.12 MG/1
3.12 TABLET ORAL 2 TIMES DAILY WITH MEALS
Status: DISCONTINUED | OUTPATIENT
Start: 2024-01-19 | End: 2024-01-21

## 2024-01-19 RX ORDER — POTASSIUM CHLORIDE 7.45 MG/ML
10 INJECTION INTRAVENOUS
Status: COMPLETED | OUTPATIENT
Start: 2024-01-19 | End: 2024-01-19

## 2024-01-19 RX ORDER — 0.9 % SODIUM CHLORIDE 0.9 %
500 INTRAVENOUS SOLUTION INTRAVENOUS ONCE
Status: COMPLETED | OUTPATIENT
Start: 2024-01-19 | End: 2024-01-19

## 2024-01-19 RX ADMIN — SODIUM CHLORIDE: 9 INJECTION, SOLUTION INTRAVENOUS at 13:29

## 2024-01-19 RX ADMIN — SODIUM CHLORIDE, PRESERVATIVE FREE 10 ML: 5 INJECTION INTRAVENOUS at 23:20

## 2024-01-19 RX ADMIN — SODIUM CHLORIDE, PRESERVATIVE FREE 10 ML: 5 INJECTION INTRAVENOUS at 09:37

## 2024-01-19 RX ADMIN — MORPHINE SULFATE 1 MG: 2 INJECTION, SOLUTION INTRAMUSCULAR; INTRAVENOUS at 09:36

## 2024-01-19 RX ADMIN — METOPROLOL TARTRATE 2.5 MG: 5 INJECTION INTRAVENOUS at 11:28

## 2024-01-19 RX ADMIN — Medication: at 23:07

## 2024-01-19 RX ADMIN — SODIUM CHLORIDE, SODIUM LACTATE, POTASSIUM CHLORIDE, CALCIUM CHLORIDE AND DEXTROSE MONOHYDRATE: 5; 600; 310; 30; 20 INJECTION, SOLUTION INTRAVENOUS at 21:37

## 2024-01-19 RX ADMIN — IOPAMIDOL 100 ML: 755 INJECTION, SOLUTION INTRAVENOUS at 14:40

## 2024-01-19 RX ADMIN — SODIUM CHLORIDE, SODIUM LACTATE, POTASSIUM CHLORIDE, CALCIUM CHLORIDE AND DEXTROSE MONOHYDRATE: 5; 600; 310; 30; 20 INJECTION, SOLUTION INTRAVENOUS at 05:43

## 2024-01-19 RX ADMIN — FUROSEMIDE 20 MG: 10 INJECTION, SOLUTION INTRAMUSCULAR; INTRAVENOUS at 09:36

## 2024-01-19 RX ADMIN — MORPHINE SULFATE 1 MG: 2 INJECTION, SOLUTION INTRAMUSCULAR; INTRAVENOUS at 22:57

## 2024-01-19 RX ADMIN — LEVOTHYROXINE SODIUM 25 MCG: 0.03 TABLET ORAL at 05:28

## 2024-01-19 RX ADMIN — POTASSIUM CHLORIDE 10 MEQ: 10 INJECTION, SOLUTION INTRAVENOUS at 12:19

## 2024-01-19 RX ADMIN — MORPHINE SULFATE 1 MG: 2 INJECTION, SOLUTION INTRAMUSCULAR; INTRAVENOUS at 16:56

## 2024-01-19 RX ADMIN — PIPERACILLIN AND TAZOBACTAM 3375 MG: 3; .375 INJECTION, POWDER, FOR SOLUTION INTRAVENOUS; PARENTERAL at 13:30

## 2024-01-19 RX ADMIN — SODIUM CHLORIDE: 9 INJECTION, SOLUTION INTRAVENOUS at 22:51

## 2024-01-19 RX ADMIN — MORPHINE SULFATE 1 MG: 2 INJECTION, SOLUTION INTRAMUSCULAR; INTRAVENOUS at 05:27

## 2024-01-19 RX ADMIN — SODIUM CHLORIDE, PRESERVATIVE FREE 40 MG: 5 INJECTION INTRAVENOUS at 22:58

## 2024-01-19 RX ADMIN — POTASSIUM CHLORIDE 10 MEQ: 10 INJECTION, SOLUTION INTRAVENOUS at 13:20

## 2024-01-19 RX ADMIN — MICAFUNGIN SODIUM 100 MG: 100 INJECTION, POWDER, LYOPHILIZED, FOR SOLUTION INTRAVENOUS at 09:36

## 2024-01-19 RX ADMIN — SODIUM CHLORIDE, SODIUM LACTATE, POTASSIUM CHLORIDE, CALCIUM CHLORIDE AND DEXTROSE MONOHYDRATE: 5; 600; 310; 30; 20 INJECTION, SOLUTION INTRAVENOUS at 13:28

## 2024-01-19 RX ADMIN — PIPERACILLIN AND TAZOBACTAM 3375 MG: 3; .375 INJECTION, POWDER, FOR SOLUTION INTRAVENOUS; PARENTERAL at 22:51

## 2024-01-19 RX ADMIN — SODIUM CHLORIDE: 9 INJECTION, SOLUTION INTRAVENOUS at 13:26

## 2024-01-19 RX ADMIN — SODIUM CHLORIDE 500 ML: 9 INJECTION, SOLUTION INTRAVENOUS at 11:05

## 2024-01-19 RX ADMIN — SODIUM CHLORIDE, PRESERVATIVE FREE 40 MG: 5 INJECTION INTRAVENOUS at 09:36

## 2024-01-19 RX ADMIN — POTASSIUM CHLORIDE 10 MEQ: 10 INJECTION, SOLUTION INTRAVENOUS at 13:29

## 2024-01-19 ASSESSMENT — PAIN SCALES - GENERAL: PAINLEVEL_OUTOF10: 0

## 2024-01-20 LAB
ANION GAP SERPL CALC-SCNC: 4 MMOL/L (ref 5–15)
BUN SERPL-MCNC: 11 MG/DL (ref 6–20)
BUN/CREAT SERPL: 16 (ref 12–20)
CALCIUM SERPL-MCNC: 8.3 MG/DL (ref 8.5–10.1)
CHLORIDE SERPL-SCNC: 105 MMOL/L (ref 97–108)
CO2 SERPL-SCNC: 31 MMOL/L (ref 21–32)
CREAT SERPL-MCNC: 0.69 MG/DL (ref 0.55–1.02)
ERYTHROCYTE [DISTWIDTH] IN BLOOD BY AUTOMATED COUNT: 18.4 % (ref 11.5–14.5)
GLUCOSE SERPL-MCNC: 94 MG/DL (ref 65–100)
HCT VFR BLD AUTO: 23.3 % (ref 35–47)
HGB BLD-MCNC: 7.4 G/DL (ref 11.5–16)
MCH RBC QN AUTO: 26.6 PG (ref 26–34)
MCHC RBC AUTO-ENTMCNC: 31.8 G/DL (ref 30–36.5)
MCV RBC AUTO: 83.8 FL (ref 80–99)
NRBC # BLD: 0 K/UL (ref 0–0.01)
NRBC BLD-RTO: 0 PER 100 WBC
PLATELET # BLD AUTO: 421 K/UL (ref 150–400)
PMV BLD AUTO: 8.1 FL (ref 8.9–12.9)
POTASSIUM SERPL-SCNC: 3.5 MMOL/L (ref 3.5–5.1)
RBC # BLD AUTO: 2.78 M/UL (ref 3.8–5.2)
SODIUM SERPL-SCNC: 140 MMOL/L (ref 136–145)
WBC # BLD AUTO: 8.7 K/UL (ref 3.6–11)

## 2024-01-20 PROCEDURE — 6370000000 HC RX 637 (ALT 250 FOR IP): Performed by: STUDENT IN AN ORGANIZED HEALTH CARE EDUCATION/TRAINING PROGRAM

## 2024-01-20 PROCEDURE — 6360000002 HC RX W HCPCS: Performed by: INTERNAL MEDICINE

## 2024-01-20 PROCEDURE — 80048 BASIC METABOLIC PNL TOTAL CA: CPT

## 2024-01-20 PROCEDURE — C9113 INJ PANTOPRAZOLE SODIUM, VIA: HCPCS | Performed by: INTERNAL MEDICINE

## 2024-01-20 PROCEDURE — 6370000000 HC RX 637 (ALT 250 FOR IP): Performed by: UROLOGY

## 2024-01-20 PROCEDURE — 2060000000 HC ICU INTERMEDIATE R&B

## 2024-01-20 PROCEDURE — 6370000000 HC RX 637 (ALT 250 FOR IP): Performed by: INTERNAL MEDICINE

## 2024-01-20 PROCEDURE — 51702 INSERT TEMP BLADDER CATH: CPT

## 2024-01-20 PROCEDURE — 6360000002 HC RX W HCPCS: Performed by: GENERAL ACUTE CARE HOSPITAL

## 2024-01-20 PROCEDURE — 2580000003 HC RX 258: Performed by: GENERAL ACUTE CARE HOSPITAL

## 2024-01-20 PROCEDURE — 2580000003 HC RX 258: Performed by: UROLOGY

## 2024-01-20 PROCEDURE — A4216 STERILE WATER/SALINE, 10 ML: HCPCS | Performed by: INTERNAL MEDICINE

## 2024-01-20 PROCEDURE — 6370000000 HC RX 637 (ALT 250 FOR IP): Performed by: GENERAL ACUTE CARE HOSPITAL

## 2024-01-20 PROCEDURE — 36415 COLL VENOUS BLD VENIPUNCTURE: CPT

## 2024-01-20 PROCEDURE — 2580000003 HC RX 258: Performed by: INTERNAL MEDICINE

## 2024-01-20 PROCEDURE — 85027 COMPLETE CBC AUTOMATED: CPT

## 2024-01-20 RX ADMIN — DOCUSATE SODIUM AND SENNOSIDES 1 TABLET: 8.6; 5 TABLET, FILM COATED ORAL at 10:31

## 2024-01-20 RX ADMIN — Medication: at 10:42

## 2024-01-20 RX ADMIN — SODIUM CHLORIDE, PRESERVATIVE FREE 40 MG: 5 INJECTION INTRAVENOUS at 20:58

## 2024-01-20 RX ADMIN — MORPHINE SULFATE 1 MG: 2 INJECTION, SOLUTION INTRAMUSCULAR; INTRAVENOUS at 20:58

## 2024-01-20 RX ADMIN — SODIUM CHLORIDE, PRESERVATIVE FREE 40 MG: 5 INJECTION INTRAVENOUS at 10:32

## 2024-01-20 RX ADMIN — PIPERACILLIN AND TAZOBACTAM 3375 MG: 3; .375 INJECTION, POWDER, FOR SOLUTION INTRAVENOUS; PARENTERAL at 20:57

## 2024-01-20 RX ADMIN — SODIUM CHLORIDE, SODIUM LACTATE, POTASSIUM CHLORIDE, CALCIUM CHLORIDE AND DEXTROSE MONOHYDRATE: 5; 600; 310; 30; 20 INJECTION, SOLUTION INTRAVENOUS at 22:25

## 2024-01-20 RX ADMIN — ASPIRIN 81 MG: 81 TABLET, CHEWABLE ORAL at 10:30

## 2024-01-20 RX ADMIN — SODIUM CHLORIDE, PRESERVATIVE FREE 10 ML: 5 INJECTION INTRAVENOUS at 10:39

## 2024-01-20 RX ADMIN — CARVEDILOL 3.12 MG: 3.12 TABLET, FILM COATED ORAL at 10:30

## 2024-01-20 RX ADMIN — PIPERACILLIN AND TAZOBACTAM 3375 MG: 3; .375 INJECTION, POWDER, FOR SOLUTION INTRAVENOUS; PARENTERAL at 13:40

## 2024-01-20 RX ADMIN — LEVOTHYROXINE SODIUM 25 MCG: 0.03 TABLET ORAL at 11:10

## 2024-01-20 RX ADMIN — CARVEDILOL 3.12 MG: 3.12 TABLET, FILM COATED ORAL at 17:12

## 2024-01-20 RX ADMIN — MORPHINE SULFATE 1 MG: 2 INJECTION, SOLUTION INTRAMUSCULAR; INTRAVENOUS at 10:24

## 2024-01-20 RX ADMIN — Medication: at 21:01

## 2024-01-20 RX ADMIN — DOCUSATE SODIUM AND SENNOSIDES 1 TABLET: 8.6; 5 TABLET, FILM COATED ORAL at 20:58

## 2024-01-20 RX ADMIN — MICAFUNGIN SODIUM 100 MG: 100 INJECTION, POWDER, LYOPHILIZED, FOR SOLUTION INTRAVENOUS at 10:41

## 2024-01-20 RX ADMIN — PIPERACILLIN AND TAZOBACTAM 3375 MG: 3; .375 INJECTION, POWDER, FOR SOLUTION INTRAVENOUS; PARENTERAL at 05:17

## 2024-01-20 RX ADMIN — SODIUM CHLORIDE, SODIUM LACTATE, POTASSIUM CHLORIDE, CALCIUM CHLORIDE AND DEXTROSE MONOHYDRATE: 5; 600; 310; 30; 20 INJECTION, SOLUTION INTRAVENOUS at 11:07

## 2024-01-20 RX ADMIN — ROSUVASTATIN CALCIUM 40 MG: 40 TABLET, FILM COATED ORAL at 20:58

## 2024-01-20 RX ADMIN — MORPHINE SULFATE 1 MG: 2 INJECTION, SOLUTION INTRAMUSCULAR; INTRAVENOUS at 17:10

## 2024-01-20 RX ADMIN — FUROSEMIDE 20 MG: 10 INJECTION, SOLUTION INTRAMUSCULAR; INTRAVENOUS at 10:28

## 2024-01-20 ASSESSMENT — PAIN SCALES - GENERAL: PAINLEVEL_OUTOF10: 0

## 2024-01-21 LAB
ANION GAP SERPL CALC-SCNC: 6 MMOL/L (ref 5–15)
BUN SERPL-MCNC: 10 MG/DL (ref 6–20)
BUN/CREAT SERPL: 14 (ref 12–20)
CALCIUM SERPL-MCNC: 8.1 MG/DL (ref 8.5–10.1)
CHLORIDE SERPL-SCNC: 104 MMOL/L (ref 97–108)
CO2 SERPL-SCNC: 28 MMOL/L (ref 21–32)
CREAT SERPL-MCNC: 0.74 MG/DL (ref 0.55–1.02)
ERYTHROCYTE [DISTWIDTH] IN BLOOD BY AUTOMATED COUNT: 18.2 % (ref 11.5–14.5)
GLUCOSE SERPL-MCNC: 121 MG/DL (ref 65–100)
HCT VFR BLD AUTO: 26.1 % (ref 35–47)
HGB BLD-MCNC: 8.2 G/DL (ref 11.5–16)
MCH RBC QN AUTO: 26.5 PG (ref 26–34)
MCHC RBC AUTO-ENTMCNC: 31.4 G/DL (ref 30–36.5)
MCV RBC AUTO: 84.2 FL (ref 80–99)
NRBC # BLD: 0 K/UL (ref 0–0.01)
NRBC BLD-RTO: 0 PER 100 WBC
PLATELET # BLD AUTO: 553 K/UL (ref 150–400)
PMV BLD AUTO: 8.2 FL (ref 8.9–12.9)
POTASSIUM SERPL-SCNC: 3 MMOL/L (ref 3.5–5.1)
RBC # BLD AUTO: 3.1 M/UL (ref 3.8–5.2)
SODIUM SERPL-SCNC: 138 MMOL/L (ref 136–145)
WBC # BLD AUTO: 11.7 K/UL (ref 3.6–11)

## 2024-01-21 PROCEDURE — 6360000002 HC RX W HCPCS: Performed by: INTERNAL MEDICINE

## 2024-01-21 PROCEDURE — 6360000002 HC RX W HCPCS: Performed by: GENERAL ACUTE CARE HOSPITAL

## 2024-01-21 PROCEDURE — C9113 INJ PANTOPRAZOLE SODIUM, VIA: HCPCS | Performed by: INTERNAL MEDICINE

## 2024-01-21 PROCEDURE — 80048 BASIC METABOLIC PNL TOTAL CA: CPT

## 2024-01-21 PROCEDURE — 2580000003 HC RX 258: Performed by: INTERNAL MEDICINE

## 2024-01-21 PROCEDURE — 2580000003 HC RX 258: Performed by: GENERAL ACUTE CARE HOSPITAL

## 2024-01-21 PROCEDURE — 2060000000 HC ICU INTERMEDIATE R&B

## 2024-01-21 PROCEDURE — 6370000000 HC RX 637 (ALT 250 FOR IP): Performed by: NURSE PRACTITIONER

## 2024-01-21 PROCEDURE — 85027 COMPLETE CBC AUTOMATED: CPT

## 2024-01-21 PROCEDURE — 6370000000 HC RX 637 (ALT 250 FOR IP): Performed by: STUDENT IN AN ORGANIZED HEALTH CARE EDUCATION/TRAINING PROGRAM

## 2024-01-21 PROCEDURE — A4216 STERILE WATER/SALINE, 10 ML: HCPCS | Performed by: INTERNAL MEDICINE

## 2024-01-21 PROCEDURE — 6370000000 HC RX 637 (ALT 250 FOR IP): Performed by: GENERAL ACUTE CARE HOSPITAL

## 2024-01-21 PROCEDURE — 2580000003 HC RX 258: Performed by: NURSE PRACTITIONER

## 2024-01-21 PROCEDURE — 36415 COLL VENOUS BLD VENIPUNCTURE: CPT

## 2024-01-21 PROCEDURE — 6370000000 HC RX 637 (ALT 250 FOR IP): Performed by: INTERNAL MEDICINE

## 2024-01-21 RX ORDER — ROSUVASTATIN CALCIUM 40 MG/1
40 TABLET, COATED ORAL NIGHTLY
Qty: 30 TABLET | Refills: 3 | Status: SHIPPED | OUTPATIENT
Start: 2024-01-21

## 2024-01-21 RX ORDER — 0.9 % SODIUM CHLORIDE 0.9 %
500 INTRAVENOUS SOLUTION INTRAVENOUS ONCE
Status: COMPLETED | OUTPATIENT
Start: 2024-01-21 | End: 2024-01-21

## 2024-01-21 RX ORDER — CARVEDILOL 6.25 MG/1
6.25 TABLET ORAL 2 TIMES DAILY WITH MEALS
Status: DISCONTINUED | OUTPATIENT
Start: 2024-01-21 | End: 2024-01-26 | Stop reason: HOSPADM

## 2024-01-21 RX ORDER — PANTOPRAZOLE SODIUM 40 MG/1
40 TABLET, DELAYED RELEASE ORAL
Qty: 60 TABLET | Refills: 0 | Status: SHIPPED | OUTPATIENT
Start: 2024-01-21

## 2024-01-21 RX ORDER — ASPIRIN 81 MG/1
81 TABLET, CHEWABLE ORAL DAILY
Qty: 30 TABLET | Refills: 3 | Status: SHIPPED | OUTPATIENT
Start: 2024-01-21

## 2024-01-21 RX ORDER — POTASSIUM CHLORIDE 1.5 G/1.58G
40 POWDER, FOR SOLUTION ORAL ONCE
Status: COMPLETED | OUTPATIENT
Start: 2024-01-21 | End: 2024-01-21

## 2024-01-21 RX ADMIN — FUROSEMIDE 20 MG: 10 INJECTION, SOLUTION INTRAMUSCULAR; INTRAVENOUS at 08:38

## 2024-01-21 RX ADMIN — PIPERACILLIN AND TAZOBACTAM 3375 MG: 3; .375 INJECTION, POWDER, FOR SOLUTION INTRAVENOUS; PARENTERAL at 14:19

## 2024-01-21 RX ADMIN — SODIUM CHLORIDE, SODIUM LACTATE, POTASSIUM CHLORIDE, CALCIUM CHLORIDE AND DEXTROSE MONOHYDRATE: 5; 600; 310; 30; 20 INJECTION, SOLUTION INTRAVENOUS at 17:54

## 2024-01-21 RX ADMIN — MICAFUNGIN SODIUM 100 MG: 100 INJECTION, POWDER, LYOPHILIZED, FOR SOLUTION INTRAVENOUS at 08:48

## 2024-01-21 RX ADMIN — ROSUVASTATIN CALCIUM 40 MG: 40 TABLET, FILM COATED ORAL at 21:09

## 2024-01-21 RX ADMIN — PIPERACILLIN AND TAZOBACTAM 3375 MG: 3; .375 INJECTION, POWDER, FOR SOLUTION INTRAVENOUS; PARENTERAL at 04:31

## 2024-01-21 RX ADMIN — MORPHINE SULFATE 1 MG: 2 INJECTION, SOLUTION INTRAMUSCULAR; INTRAVENOUS at 16:22

## 2024-01-21 RX ADMIN — SODIUM CHLORIDE, PRESERVATIVE FREE 40 MG: 5 INJECTION INTRAVENOUS at 08:39

## 2024-01-21 RX ADMIN — LEVOTHYROXINE SODIUM 25 MCG: 0.03 TABLET ORAL at 08:40

## 2024-01-21 RX ADMIN — ASPIRIN 81 MG: 81 TABLET, CHEWABLE ORAL at 08:40

## 2024-01-21 RX ADMIN — PIPERACILLIN AND TAZOBACTAM 3375 MG: 3; .375 INJECTION, POWDER, FOR SOLUTION INTRAVENOUS; PARENTERAL at 21:14

## 2024-01-21 RX ADMIN — SODIUM CHLORIDE, PRESERVATIVE FREE 40 MG: 5 INJECTION INTRAVENOUS at 21:17

## 2024-01-21 RX ADMIN — Medication: at 21:09

## 2024-01-21 RX ADMIN — CARVEDILOL 6.25 MG: 6.25 TABLET, FILM COATED ORAL at 16:23

## 2024-01-21 RX ADMIN — MORPHINE SULFATE 1 MG: 2 INJECTION, SOLUTION INTRAMUSCULAR; INTRAVENOUS at 04:31

## 2024-01-21 RX ADMIN — CARVEDILOL 6.25 MG: 6.25 TABLET, FILM COATED ORAL at 11:36

## 2024-01-21 RX ADMIN — MORPHINE SULFATE 1 MG: 2 INJECTION, SOLUTION INTRAMUSCULAR; INTRAVENOUS at 11:35

## 2024-01-21 RX ADMIN — Medication: at 09:00

## 2024-01-21 RX ADMIN — POTASSIUM CHLORIDE 40 MEQ: 1.5 POWDER, FOR SOLUTION ORAL at 05:28

## 2024-01-21 RX ADMIN — SODIUM CHLORIDE 500 ML: 900 INJECTION INTRAVENOUS at 21:12

## 2024-01-21 ASSESSMENT — PAIN SCALES - GENERAL
PAINLEVEL_OUTOF10: 0
PAINLEVEL_OUTOF10: 2
PAINLEVEL_OUTOF10: 2

## 2024-01-21 ASSESSMENT — PAIN DESCRIPTION - DESCRIPTORS: DESCRIPTORS: ACHING

## 2024-01-21 ASSESSMENT — PAIN DESCRIPTION - LOCATION: LOCATION: ABDOMEN

## 2024-01-22 LAB
ANION GAP SERPL CALC-SCNC: 4 MMOL/L (ref 5–15)
BUN SERPL-MCNC: 10 MG/DL (ref 6–20)
BUN/CREAT SERPL: 13 (ref 12–20)
CALCIUM SERPL-MCNC: 7.7 MG/DL (ref 8.5–10.1)
CHLORIDE SERPL-SCNC: 106 MMOL/L (ref 97–108)
CO2 SERPL-SCNC: 29 MMOL/L (ref 21–32)
CREAT SERPL-MCNC: 0.77 MG/DL (ref 0.55–1.02)
ERYTHROCYTE [DISTWIDTH] IN BLOOD BY AUTOMATED COUNT: 18.2 % (ref 11.5–14.5)
GLUCOSE SERPL-MCNC: 103 MG/DL (ref 65–100)
HCT VFR BLD AUTO: 21.3 % (ref 35–47)
HGB BLD-MCNC: 6.8 G/DL (ref 11.5–16)
HISTORY CHECK: NORMAL
HISTORY CHECK: NORMAL
MCH RBC QN AUTO: 27 PG (ref 26–34)
MCHC RBC AUTO-ENTMCNC: 31.9 G/DL (ref 30–36.5)
MCV RBC AUTO: 84.5 FL (ref 80–99)
NRBC # BLD: 0 K/UL (ref 0–0.01)
NRBC BLD-RTO: 0 PER 100 WBC
PLATELET # BLD AUTO: 491 K/UL (ref 150–400)
PMV BLD AUTO: 8.4 FL (ref 8.9–12.9)
POTASSIUM SERPL-SCNC: 2.8 MMOL/L (ref 3.5–5.1)
RBC # BLD AUTO: 2.52 M/UL (ref 3.8–5.2)
SODIUM SERPL-SCNC: 139 MMOL/L (ref 136–145)
WBC # BLD AUTO: 10.8 K/UL (ref 3.6–11)

## 2024-01-22 PROCEDURE — 2580000003 HC RX 258: Performed by: INTERNAL MEDICINE

## 2024-01-22 PROCEDURE — 6360000002 HC RX W HCPCS: Performed by: GENERAL ACUTE CARE HOSPITAL

## 2024-01-22 PROCEDURE — 86900 BLOOD TYPING SEROLOGIC ABO: CPT

## 2024-01-22 PROCEDURE — 6370000000 HC RX 637 (ALT 250 FOR IP): Performed by: GENERAL ACUTE CARE HOSPITAL

## 2024-01-22 PROCEDURE — 6370000000 HC RX 637 (ALT 250 FOR IP): Performed by: NURSE PRACTITIONER

## 2024-01-22 PROCEDURE — 6360000002 HC RX W HCPCS: Performed by: NURSE PRACTITIONER

## 2024-01-22 PROCEDURE — C9113 INJ PANTOPRAZOLE SODIUM, VIA: HCPCS | Performed by: INTERNAL MEDICINE

## 2024-01-22 PROCEDURE — 6370000000 HC RX 637 (ALT 250 FOR IP): Performed by: INTERNAL MEDICINE

## 2024-01-22 PROCEDURE — 86901 BLOOD TYPING SEROLOGIC RH(D): CPT

## 2024-01-22 PROCEDURE — 0T2BX0Z CHANGE DRAINAGE DEVICE IN BLADDER, EXTERNAL APPROACH: ICD-10-PCS | Performed by: UROLOGY

## 2024-01-22 PROCEDURE — 80048 BASIC METABOLIC PNL TOTAL CA: CPT

## 2024-01-22 PROCEDURE — 2580000003 HC RX 258: Performed by: GENERAL ACUTE CARE HOSPITAL

## 2024-01-22 PROCEDURE — 36415 COLL VENOUS BLD VENIPUNCTURE: CPT

## 2024-01-22 PROCEDURE — 0TP98DZ REMOVAL OF INTRALUMINAL DEVICE FROM URETER, VIA NATURAL OR ARTIFICIAL OPENING ENDOSCOPIC: ICD-10-PCS | Performed by: UROLOGY

## 2024-01-22 PROCEDURE — 6360000002 HC RX W HCPCS: Performed by: INTERNAL MEDICINE

## 2024-01-22 PROCEDURE — 86923 COMPATIBILITY TEST ELECTRIC: CPT

## 2024-01-22 PROCEDURE — 86850 RBC ANTIBODY SCREEN: CPT

## 2024-01-22 PROCEDURE — 3600000005 HC SURGERY LEVEL 5 BASE: Performed by: UROLOGY

## 2024-01-22 PROCEDURE — 6370000000 HC RX 637 (ALT 250 FOR IP): Performed by: UROLOGY

## 2024-01-22 PROCEDURE — A4216 STERILE WATER/SALINE, 10 ML: HCPCS | Performed by: GENERAL ACUTE CARE HOSPITAL

## 2024-01-22 PROCEDURE — 2709999900 HC NON-CHARGEABLE SUPPLY: Performed by: UROLOGY

## 2024-01-22 PROCEDURE — 3600000015 HC SURGERY LEVEL 5 ADDTL 15MIN: Performed by: UROLOGY

## 2024-01-22 PROCEDURE — 2580000003 HC RX 258: Performed by: NURSE PRACTITIONER

## 2024-01-22 PROCEDURE — P9016 RBC LEUKOCYTES REDUCED: HCPCS

## 2024-01-22 PROCEDURE — 7100000000 HC PACU RECOVERY - FIRST 15 MIN: Performed by: UROLOGY

## 2024-01-22 PROCEDURE — A4216 STERILE WATER/SALINE, 10 ML: HCPCS | Performed by: INTERNAL MEDICINE

## 2024-01-22 PROCEDURE — 6370000000 HC RX 637 (ALT 250 FOR IP): Performed by: STUDENT IN AN ORGANIZED HEALTH CARE EDUCATION/TRAINING PROGRAM

## 2024-01-22 PROCEDURE — 2580000003 HC RX 258: Performed by: UROLOGY

## 2024-01-22 PROCEDURE — 2060000000 HC ICU INTERMEDIATE R&B

## 2024-01-22 PROCEDURE — 99233 SBSQ HOSP IP/OBS HIGH 50: CPT | Performed by: INTERNAL MEDICINE

## 2024-01-22 PROCEDURE — 36430 TRANSFUSION BLD/BLD COMPNT: CPT

## 2024-01-22 PROCEDURE — 85027 COMPLETE CBC AUTOMATED: CPT

## 2024-01-22 RX ORDER — SODIUM CHLORIDE 9 MG/ML
INJECTION, SOLUTION INTRAVENOUS PRN
Status: COMPLETED | OUTPATIENT
Start: 2024-01-22 | End: 2024-01-22

## 2024-01-22 RX ORDER — HEPARIN 100 UNIT/ML
300 SYRINGE INTRAVENOUS PRN
Status: DISCONTINUED | OUTPATIENT
Start: 2024-01-22 | End: 2024-01-26 | Stop reason: HOSPADM

## 2024-01-22 RX ORDER — POTASSIUM CHLORIDE 1.5 G/1.58G
40 POWDER, FOR SOLUTION ORAL EVERY 4 HOURS
Status: DISCONTINUED | OUTPATIENT
Start: 2024-01-22 | End: 2024-01-22

## 2024-01-22 RX ORDER — POTASSIUM CHLORIDE 7.45 MG/ML
10 INJECTION INTRAVENOUS ONCE
Status: COMPLETED | OUTPATIENT
Start: 2024-01-22 | End: 2024-01-22

## 2024-01-22 RX ORDER — SODIUM CHLORIDE 9 MG/ML
INJECTION, SOLUTION INTRAVENOUS PRN
Status: DISCONTINUED | OUTPATIENT
Start: 2024-01-22 | End: 2024-01-26 | Stop reason: HOSPADM

## 2024-01-22 RX ORDER — HEPARIN SODIUM,PORCINE/PF 10 UNIT/ML
3 SYRINGE (ML) INTRAVENOUS PRN
Status: CANCELLED | OUTPATIENT
Start: 2024-01-22

## 2024-01-22 RX ORDER — POTASSIUM CHLORIDE 1.5 G/1.58G
40 POWDER, FOR SOLUTION ORAL EVERY 4 HOURS
Status: COMPLETED | OUTPATIENT
Start: 2024-01-22 | End: 2024-01-22

## 2024-01-22 RX ADMIN — PIPERACILLIN AND TAZOBACTAM 3375 MG: 3; .375 INJECTION, POWDER, FOR SOLUTION INTRAVENOUS; PARENTERAL at 15:11

## 2024-01-22 RX ADMIN — MICAFUNGIN SODIUM 100 MG: 100 INJECTION, POWDER, LYOPHILIZED, FOR SOLUTION INTRAVENOUS at 09:44

## 2024-01-22 RX ADMIN — CARVEDILOL 6.25 MG: 6.25 TABLET, FILM COATED ORAL at 17:17

## 2024-01-22 RX ADMIN — POTASSIUM CHLORIDE 10 MEQ: 7.46 INJECTION, SOLUTION INTRAVENOUS at 06:55

## 2024-01-22 RX ADMIN — SODIUM CHLORIDE, PRESERVATIVE FREE 10 ML: 5 INJECTION INTRAVENOUS at 09:24

## 2024-01-22 RX ADMIN — CARVEDILOL 6.25 MG: 6.25 TABLET, FILM COATED ORAL at 09:24

## 2024-01-22 RX ADMIN — ROSUVASTATIN CALCIUM 40 MG: 40 TABLET, FILM COATED ORAL at 21:50

## 2024-01-22 RX ADMIN — DOCUSATE SODIUM AND SENNOSIDES 1 TABLET: 8.6; 5 TABLET, FILM COATED ORAL at 21:50

## 2024-01-22 RX ADMIN — Medication: at 21:51

## 2024-01-22 RX ADMIN — FUROSEMIDE 20 MG: 10 INJECTION, SOLUTION INTRAMUSCULAR; INTRAVENOUS at 09:23

## 2024-01-22 RX ADMIN — SODIUM CHLORIDE, PRESERVATIVE FREE 40 MG: 5 INJECTION INTRAVENOUS at 09:22

## 2024-01-22 RX ADMIN — SODIUM CHLORIDE, PRESERVATIVE FREE 10 ML: 5 INJECTION INTRAVENOUS at 21:58

## 2024-01-22 RX ADMIN — POTASSIUM CHLORIDE 40 MEQ: 1.5 POWDER, FOR SOLUTION ORAL at 14:51

## 2024-01-22 RX ADMIN — ASPIRIN 300 MG: 300 SUPPOSITORY RECTAL at 09:20

## 2024-01-22 RX ADMIN — MORPHINE SULFATE 1 MG: 2 INJECTION, SOLUTION INTRAMUSCULAR; INTRAVENOUS at 15:19

## 2024-01-22 RX ADMIN — PIPERACILLIN AND TAZOBACTAM 3375 MG: 3; .375 INJECTION, POWDER, FOR SOLUTION INTRAVENOUS; PARENTERAL at 21:30

## 2024-01-22 RX ADMIN — MORPHINE SULFATE 1 MG: 2 INJECTION, SOLUTION INTRAMUSCULAR; INTRAVENOUS at 09:36

## 2024-01-22 RX ADMIN — SODIUM CHLORIDE: 9 INJECTION, SOLUTION INTRAVENOUS at 11:23

## 2024-01-22 RX ADMIN — MORPHINE SULFATE 1 MG: 2 INJECTION, SOLUTION INTRAMUSCULAR; INTRAVENOUS at 21:50

## 2024-01-22 RX ADMIN — PIPERACILLIN AND TAZOBACTAM 3375 MG: 3; .375 INJECTION, POWDER, FOR SOLUTION INTRAVENOUS; PARENTERAL at 05:31

## 2024-01-22 RX ADMIN — POTASSIUM CHLORIDE 40 MEQ: 1.5 POWDER, FOR SOLUTION ORAL at 09:21

## 2024-01-22 RX ADMIN — SODIUM CHLORIDE, SODIUM LACTATE, POTASSIUM CHLORIDE, CALCIUM CHLORIDE AND DEXTROSE MONOHYDRATE: 5; 600; 310; 30; 20 INJECTION, SOLUTION INTRAVENOUS at 05:31

## 2024-01-22 RX ADMIN — LEVOTHYROXINE SODIUM 25 MCG: 0.03 TABLET ORAL at 09:33

## 2024-01-22 RX ADMIN — SODIUM CHLORIDE: 9 INJECTION, SOLUTION INTRAVENOUS at 15:09

## 2024-01-22 RX ADMIN — Medication: at 09:19

## 2024-01-22 RX ADMIN — SODIUM CHLORIDE, PRESERVATIVE FREE 40 MG: 5 INJECTION INTRAVENOUS at 21:58

## 2024-01-22 ASSESSMENT — PAIN SCALES - GENERAL
PAINLEVEL_OUTOF10: 7
PAINLEVEL_OUTOF10: 10
PAINLEVEL_OUTOF10: 1
PAINLEVEL_OUTOF10: 10
PAINLEVEL_OUTOF10: 5
PAINLEVEL_OUTOF10: 10

## 2024-01-22 ASSESSMENT — PAIN DESCRIPTION - LOCATION
LOCATION: HEAD
LOCATION: GENERALIZED
LOCATION: GENERALIZED
LOCATION: LEG
LOCATION: HIP
LOCATION: GENERALIZED

## 2024-01-22 ASSESSMENT — PAIN DESCRIPTION - ORIENTATION
ORIENTATION: RIGHT;LEFT;POSTERIOR
ORIENTATION: RIGHT

## 2024-01-22 ASSESSMENT — PAIN DESCRIPTION - DESCRIPTORS
DESCRIPTORS: ACHING

## 2024-01-22 ASSESSMENT — PAIN DESCRIPTION - PAIN TYPE
TYPE: ACUTE PAIN
TYPE: CHRONIC PAIN

## 2024-01-22 ASSESSMENT — PAIN SCALES - WONG BAKER: WONGBAKER_NUMERICALRESPONSE: 4

## 2024-01-22 NOTE — OP NOTE
[facility]  OPERATIVE REPORT    Name:  NELLIE MCGILL  MR#:  [id number]  :  1950  ACCOUNT #:  [id number]  DATE OF SERVICE:  01/10/2024    PREOPERATIVE DIAGNOSIS:  Right renal abscess.    POSTOPERATIVE DIAGNOSIS:  Right renal abscess.    PROCEDURE PERFORMED:  Open aortic exposure and ligation of right renal artery.    SURGEON:  Saad Holder MD    ASSISTANT SURGEONS:  Zac Lind MD, Boom Massey MD, who will dictate separately.    ANESTHESIA:  General.    COMPLICATIONS:  None.    SPECIMENS REMOVED:  Right kidney.    IMPLANTS:  none.    ESTIMATED BLOOD LOSS:  750 mL.    INDICATIONS FOR PROCEDURE:  The patient is a 73-year-old female with an abscess of the right kidney and I have been consulted both preoperatively and intraoperatively for involvement of the inferior vena cava with the right kidney and abscess.    PROCEDURE IN DETAIL:  Upon arrival to the operating room, the right kidney had already been exposed and it was densely adherent to the inferior vena cava with no clear tissue plane.  With the assistance of Dr. Lind, I dissected as much as possible the inferior edge, but was unable to clearly identify the right renal vein and right renal artery.  So, we elected to take a different approach and ligated the renal artery off the aorta.  The right upper quadrant incision was extended to the left side.  The peritoneal cavity was entered.  There were significant adhesions between the colon as well as the small bowel which were taken down sharply.  The ligament of Treitz was mobilized.  The tissue overlying the aorta was incised.  There was some bleeding that was controlled with Bovie electrocautery.  The aorta was dissected free.  The left renal vein was identified and mobilized.  The right renal artery was able to be dissected free and doubly clipped.  Next, attention was turned back to the right kidney where I assisted Dr. Lind, who was the primary surgeon, who removed the 
See Dr. Lind's primary operative note.  I was called for intraoperative consultation.  The small bowel had been injured with a clamp.  Prior to my arrival the primary team had repaired the enterotomy in a longitudinal fashion in 2 layers with 3-0 Vicryl's.  I scrubbed in and inspected the repair.  The repair looked excellent.  There was no nonviable tissue.  There was no evidence of stricture.  The bowel was patent and I was able to milk contents from proximal to distal.  I scrubbed out and turned the case back over to the primary team.  I will follow the patient for diet advance.  
[facility]  OPERATIVE REPORT    Name:  NELLIE MCGILL  MR#:  [id number]  :  1950  ACCOUNT #:  [id number]  DATE OF SERVICE:  2024    PREOPERATIVE DIAGNOSIS:  Left ureteral stone - resolved.    POSTOPERATIVE DIAGNOSIS:  Left ureteral stone - resolved.    PROCEDURE PERFORMED:  Flexible cystoscopy with left ureteral stent removal and Monahan catheter exchange.    SURGEON:  Zac Lind MD    ASSISTANT:  None.    ANESTHESIA:  None.    COMPLICATIONS:  None.    SPECIMENS REMOVED:  None.    ESTIMATED BLOOD LOSS:  None.    INDICATIONS:  She had bilateral ureteral stents placed on initial presentation.  On the right, she had a right XGP kidney subsequent nephrectomy.  On the left, she had a stone at the UPJ which on multiple subsequent imaging both plain and CT, the stone has no longer been visible and is presumed passed.  She is already on IV Zosyn.    PROCEDURE IN DETAILS:  Despite gentle maneuvering, we were unable to flex her legs or move them significantly without pain.  Therefore her catheter was removed and the assistant did the retraction of the labia.  She was cleaned off with Betadine and draped in a sterile fashion.  A flexible cystoscope with the grasper was used to enter the bladder which appeared grossly normal.  The stent was visualized, grasped and removed after which a 16-Maltese Monahan catheter was replaced with a balloon inflated with 10 mL of sterile water and the catheter sealed to a gravity drainage bag.  This completed the procedure which she tolerated well despite some discomfort, there were no complications.  She was stable on my departure. Operative findings left in a voice mail to her power of  and medical directed person Kay at [phone].      ZAC LIND MD      [id number]  D:  2024 13:51  T:  2024 16:02  JOB #:  [id number]  
the hilum.  It was obvious though that we could not separately identify the renal artery, renal vein or the IVC edge which was known to extend towards the kidney by CT scanning.  Therefore, after discussion and mutual agreement, we decided to have Dr. Holder, who will dictate this separately, approached the aorta through the root of the mesentery near the ligament of Treitz and identified the aorta, left renal vein and right renal artery.  Once the renal artery was identified it was double clipped and the dissection was then predirected towards the kidney.  I was able to dissect the upper pole fat and assumed adrenal which was never seen off the top of the kidney using the LigaSure for the most part.  We then addressed the hilum again.  It was obvious that we would not be able to separate the structures mentioned above safely.  Therefore, after complete discussion with both Dr. Massey and Dr. Holder, I made an incision in the renal parenchyma anteriorly where the abscess entry had occurred and extended that circumferentially within the kidney tissue; therefore on the kidney side above the hilar structures and the vena cava.  The last 2 centimeters of the adherent tissue alongside the vena cava  was treated with a Satinsky clamp after which the tissue was transected and the kidney delivered from the field.  Dr. Massey inspected this kidney off the field and there were several micro abscesses within the renal parenchyma as well as an extreme thick layer of adherent fat to the kidney.  I then used a 0 silk tie behind the Satinsky clamp in a running back and forth fashion, removed the clamp and was pleased to see no bleeding.  The renal fossa was irrigated out.  We then returned to the root of mesentery where we had placed a Surgicel on the minor bleeder and confirmed hemostasis.  There was also a very superficial serosal disruption in this adjacent small bowel which was reinspected and found to be not a problem.  A 19

## 2024-01-22 NOTE — ANESTHESIA POST-OP
TRANSFER - OUT REPORT:    Verbal report given to Chanel Viveros RN (name) on Nicolasa Macdonald  being transferred to 2311(unit) for routine post-op       Report consisted of patient’s Situation, Background, Assessment and   Recommendations(SBAR).     Information from the following report(s) Surgery Report, Intake/Output, MAR, Recent Results, and Cardiac Rhythm SR  was reviewed with the receiving nurse.    Opportunity for questions and clarification was provided.      Patient transported with:   Monitor  Registered Nurse  Tech

## 2024-01-22 NOTE — BRIEF OP NOTE
Brief Postoperative Note      Patient: Nicolasa Macdonald  YOB: 1950  MRN: [id number]    Date of Procedure: 1/22/2024  Pre-Op Diagnosis Codes:  left ureteral stone-resolved       Post-Op Diagnosis: Same       Procedure(s):  CYSTOSCOPY FLEXIBLE with removal of left urteral stent and Monahan exchange    Surgeon(s):  Zac Lind MD    Assistant:  * No surgical staff found *    Anesthesia: Local    Estimated Blood Loss (mL): 0     Complications: None    Specimens:   * No specimens in log *    Implants:  * No implants in log *      Drains:   Urinary Catheter 01/22/24 Monahan (Active)       [REMOVED] Closed/Suction Drain Left Abdomen Bulb (Removed)   Site Description Clean, dry & intact 01/18/24 0800   Dressing Status Other (Comment) 01/18/24 1600   Drainage Appearance Serosanguinous 01/18/24 1600   Drain Status Other (comment) 01/18/24 1600   Output (ml) 20 ml 01/18/24 1600       [REMOVED] NG/OG/NJ/NE Tube Nasogastric 14 fr Left nostril (Removed)   Surrounding Skin Clean, dry & intact 01/17/24 0730   Securement device Adhesive based fregoso 01/17/24 0730   Status Suction-low intermittent 01/17/24 0730   Placement Verified Gastric Contents;External Catheter Length 01/17/24 0300   NG/OG/NJ/NE External Measurement (cm) 65 cm 01/17/24 0730   Drainage Appearance Yellow 01/17/24 0730   Output (mL) 350 ml 01/17/24 0300       [REMOVED] Urinary Catheter 12/10/23 2 Way (Removed)   $ Urethral catheter insertion $ Not inserted for procedure 12/19/23 1926   Catheter Indications Urinary retention (acute or chronic), continuous bladder irrigation or bladder outlet obstruction 01/02/24 0830   Site Assessment No urethral drainage 01/02/24 0830   Urine Color Yellow 01/02/24 0830   Urine Appearance Cloudy 01/02/24 0830   Urine Odor Other (Comment) 01/01/24 2037   Collection Container Standard 01/02/24 0830   Securement Method Securing device (Describe) 01/02/24 0830   Catheter Care  Perineal wipes 01/02/24 1230   Catheter Best

## 2024-01-22 NOTE — PERIOP NOTE
12:15= pt has periods of confusion; telephone consents (surgery, Anesthesia, DNR Addendum) called to [facility] Magallon and verified with Estefany Ramirez RN.

## 2024-01-22 NOTE — PERIOP NOTE
TRANSFER - IN REPORT:    Verbal report received from Chanel PARMAR on Nicolasa Maintanis  being received from PCU Room  2311 for ordered procedure      Report consisted of patient's Situation, Background, Assessment and   Recommendations(SBAR).     Information from the following report(s) Nurse Handoff Report, Intake/Output, MAR, Recent Results, Med Rec Status, Cardiac Rhythm Sinus Tach with PVCs, and Procedure Verification was reviewed with the receiving nurse.    Opportunity for questions and clarification was provided.      Assessment completed upon patient's arrival to unit and care assumed.

## 2024-01-23 LAB
ABO + RH BLD: NORMAL
ANION GAP SERPL CALC-SCNC: 5 MMOL/L (ref 5–15)
BLD PROD TYP BPU: NORMAL
BLD PROD TYP BPU: NORMAL
BLOOD BANK DISPENSE STATUS: NORMAL
BLOOD BANK DISPENSE STATUS: NORMAL
BLOOD GROUP ANTIBODIES SERPL: NORMAL
BPU ID: NORMAL
BPU ID: NORMAL
BUN SERPL-MCNC: 9 MG/DL (ref 6–20)
BUN/CREAT SERPL: 12 (ref 12–20)
CALCIUM SERPL-MCNC: 8.2 MG/DL (ref 8.5–10.1)
CHLORIDE SERPL-SCNC: 108 MMOL/L (ref 97–108)
CO2 SERPL-SCNC: 29 MMOL/L (ref 21–32)
CREAT SERPL-MCNC: 0.76 MG/DL (ref 0.55–1.02)
CROSSMATCH RESULT: NORMAL
CROSSMATCH RESULT: NORMAL
ERYTHROCYTE [DISTWIDTH] IN BLOOD BY AUTOMATED COUNT: 17 % (ref 11.5–14.5)
GLUCOSE BLD STRIP.AUTO-MCNC: 101 MG/DL (ref 65–117)
GLUCOSE BLD STRIP.AUTO-MCNC: 103 MG/DL (ref 65–117)
GLUCOSE BLD STRIP.AUTO-MCNC: 109 MG/DL (ref 65–117)
GLUCOSE SERPL-MCNC: 100 MG/DL (ref 65–100)
HCT VFR BLD AUTO: 28 % (ref 35–47)
HGB BLD-MCNC: 9.1 G/DL (ref 11.5–16)
MCH RBC QN AUTO: 27 PG (ref 26–34)
MCHC RBC AUTO-ENTMCNC: 32.5 G/DL (ref 30–36.5)
MCV RBC AUTO: 83.1 FL (ref 80–99)
NRBC # BLD: 0 K/UL (ref 0–0.01)
NRBC BLD-RTO: 0 PER 100 WBC
PLATELET # BLD AUTO: 517 K/UL (ref 150–400)
PMV BLD AUTO: 8.2 FL (ref 8.9–12.9)
POTASSIUM SERPL-SCNC: 4.1 MMOL/L (ref 3.5–5.1)
RBC # BLD AUTO: 3.37 M/UL (ref 3.8–5.2)
SERVICE CMNT-IMP: NORMAL
SODIUM SERPL-SCNC: 142 MMOL/L (ref 136–145)
SPECIMEN EXP DATE BLD: NORMAL
UNIT DIVISION: 0
UNIT DIVISION: 0
WBC # BLD AUTO: 10.8 K/UL (ref 3.6–11)

## 2024-01-23 PROCEDURE — 6360000002 HC RX W HCPCS: Performed by: GENERAL ACUTE CARE HOSPITAL

## 2024-01-23 PROCEDURE — 6360000002 HC RX W HCPCS: Performed by: INTERNAL MEDICINE

## 2024-01-23 PROCEDURE — 2580000003 HC RX 258: Performed by: GENERAL ACUTE CARE HOSPITAL

## 2024-01-23 PROCEDURE — 2580000003 HC RX 258: Performed by: INTERNAL MEDICINE

## 2024-01-23 PROCEDURE — 85027 COMPLETE CBC AUTOMATED: CPT

## 2024-01-23 PROCEDURE — 6370000000 HC RX 637 (ALT 250 FOR IP): Performed by: UROLOGY

## 2024-01-23 PROCEDURE — 2060000000 HC ICU INTERMEDIATE R&B

## 2024-01-23 PROCEDURE — 36415 COLL VENOUS BLD VENIPUNCTURE: CPT

## 2024-01-23 PROCEDURE — 6370000000 HC RX 637 (ALT 250 FOR IP): Performed by: STUDENT IN AN ORGANIZED HEALTH CARE EDUCATION/TRAINING PROGRAM

## 2024-01-23 PROCEDURE — C9113 INJ PANTOPRAZOLE SODIUM, VIA: HCPCS | Performed by: INTERNAL MEDICINE

## 2024-01-23 PROCEDURE — 80048 BASIC METABOLIC PNL TOTAL CA: CPT

## 2024-01-23 PROCEDURE — A4216 STERILE WATER/SALINE, 10 ML: HCPCS | Performed by: INTERNAL MEDICINE

## 2024-01-23 PROCEDURE — 6370000000 HC RX 637 (ALT 250 FOR IP): Performed by: INTERNAL MEDICINE

## 2024-01-23 PROCEDURE — 6370000000 HC RX 637 (ALT 250 FOR IP): Performed by: GENERAL ACUTE CARE HOSPITAL

## 2024-01-23 PROCEDURE — 82962 GLUCOSE BLOOD TEST: CPT

## 2024-01-23 RX ADMIN — Medication: at 10:24

## 2024-01-23 RX ADMIN — MORPHINE SULFATE 1 MG: 2 INJECTION, SOLUTION INTRAMUSCULAR; INTRAVENOUS at 16:44

## 2024-01-23 RX ADMIN — SODIUM CHLORIDE, PRESERVATIVE FREE 40 MG: 5 INJECTION INTRAVENOUS at 10:24

## 2024-01-23 RX ADMIN — Medication: at 20:45

## 2024-01-23 RX ADMIN — SODIUM CHLORIDE, SODIUM LACTATE, POTASSIUM CHLORIDE, CALCIUM CHLORIDE AND DEXTROSE MONOHYDRATE: 5; 600; 310; 30; 20 INJECTION, SOLUTION INTRAVENOUS at 16:43

## 2024-01-23 RX ADMIN — PIPERACILLIN AND TAZOBACTAM 3375 MG: 3; .375 INJECTION, POWDER, FOR SOLUTION INTRAVENOUS; PARENTERAL at 14:31

## 2024-01-23 RX ADMIN — PIPERACILLIN AND TAZOBACTAM 3375 MG: 3; .375 INJECTION, POWDER, FOR SOLUTION INTRAVENOUS; PARENTERAL at 20:44

## 2024-01-23 RX ADMIN — SODIUM CHLORIDE, PRESERVATIVE FREE 10 ML: 5 INJECTION INTRAVENOUS at 20:45

## 2024-01-23 RX ADMIN — DOCUSATE SODIUM AND SENNOSIDES 1 TABLET: 8.6; 5 TABLET, FILM COATED ORAL at 20:44

## 2024-01-23 RX ADMIN — MICAFUNGIN SODIUM 100 MG: 100 INJECTION, POWDER, LYOPHILIZED, FOR SOLUTION INTRAVENOUS at 10:14

## 2024-01-23 RX ADMIN — DOCUSATE SODIUM AND SENNOSIDES 1 TABLET: 8.6; 5 TABLET, FILM COATED ORAL at 10:25

## 2024-01-23 RX ADMIN — MORPHINE SULFATE 1 MG: 2 INJECTION, SOLUTION INTRAMUSCULAR; INTRAVENOUS at 10:21

## 2024-01-23 RX ADMIN — SODIUM CHLORIDE, SODIUM LACTATE, POTASSIUM CHLORIDE, CALCIUM CHLORIDE AND DEXTROSE MONOHYDRATE: 5; 600; 310; 30; 20 INJECTION, SOLUTION INTRAVENOUS at 00:42

## 2024-01-23 RX ADMIN — ASPIRIN 81 MG: 81 TABLET, CHEWABLE ORAL at 10:24

## 2024-01-23 RX ADMIN — FUROSEMIDE 20 MG: 10 INJECTION, SOLUTION INTRAMUSCULAR; INTRAVENOUS at 10:23

## 2024-01-23 RX ADMIN — SODIUM CHLORIDE, PRESERVATIVE FREE 10 ML: 5 INJECTION INTRAVENOUS at 13:05

## 2024-01-23 RX ADMIN — CARVEDILOL 6.25 MG: 6.25 TABLET, FILM COATED ORAL at 10:25

## 2024-01-23 RX ADMIN — PIPERACILLIN AND TAZOBACTAM 3375 MG: 3; .375 INJECTION, POWDER, FOR SOLUTION INTRAVENOUS; PARENTERAL at 04:39

## 2024-01-23 RX ADMIN — ROSUVASTATIN CALCIUM 40 MG: 40 TABLET, FILM COATED ORAL at 20:44

## 2024-01-23 RX ADMIN — CARVEDILOL 6.25 MG: 6.25 TABLET, FILM COATED ORAL at 16:44

## 2024-01-23 RX ADMIN — MORPHINE SULFATE 1 MG: 2 INJECTION, SOLUTION INTRAMUSCULAR; INTRAVENOUS at 21:31

## 2024-01-23 RX ADMIN — MORPHINE SULFATE 1 MG: 2 INJECTION, SOLUTION INTRAMUSCULAR; INTRAVENOUS at 04:18

## 2024-01-23 RX ADMIN — LEVOTHYROXINE SODIUM 25 MCG: 0.03 TABLET ORAL at 06:10

## 2024-01-23 RX ADMIN — SODIUM CHLORIDE, PRESERVATIVE FREE 40 MG: 5 INJECTION INTRAVENOUS at 20:44

## 2024-01-23 ASSESSMENT — PAIN SCALES - GENERAL
PAINLEVEL_OUTOF10: 0
PAINLEVEL_OUTOF10: 0
PAINLEVEL_OUTOF10: 5
PAINLEVEL_OUTOF10: 0
PAINLEVEL_OUTOF10: 5
PAINLEVEL_OUTOF10: 8
PAINLEVEL_OUTOF10: 4

## 2024-01-23 ASSESSMENT — PAIN DESCRIPTION - LOCATION
LOCATION: HEAD
LOCATION: HIP
LOCATION: HIP
LOCATION: HEAD
LOCATION: HIP

## 2024-01-23 ASSESSMENT — PAIN DESCRIPTION - ORIENTATION
ORIENTATION: RIGHT;LEFT
ORIENTATION: RIGHT
ORIENTATION: RIGHT;LEFT

## 2024-01-23 ASSESSMENT — PAIN DESCRIPTION - DESCRIPTORS
DESCRIPTORS: ACHING

## 2024-01-24 LAB
ANION GAP SERPL CALC-SCNC: 3 MMOL/L (ref 5–15)
BUN SERPL-MCNC: 8 MG/DL (ref 6–20)
BUN/CREAT SERPL: 9 (ref 12–20)
CALCIUM SERPL-MCNC: 7.8 MG/DL (ref 8.5–10.1)
CHLORIDE SERPL-SCNC: 105 MMOL/L (ref 97–108)
CO2 SERPL-SCNC: 29 MMOL/L (ref 21–32)
CREAT SERPL-MCNC: 0.85 MG/DL (ref 0.55–1.02)
ERYTHROCYTE [DISTWIDTH] IN BLOOD BY AUTOMATED COUNT: 17 % (ref 11.5–14.5)
GLUCOSE SERPL-MCNC: 102 MG/DL (ref 65–100)
HCT VFR BLD AUTO: 26.6 % (ref 35–47)
HGB BLD-MCNC: 8.8 G/DL (ref 11.5–16)
MCH RBC QN AUTO: 27.8 PG (ref 26–34)
MCHC RBC AUTO-ENTMCNC: 33.1 G/DL (ref 30–36.5)
MCV RBC AUTO: 83.9 FL (ref 80–99)
NRBC # BLD: 0 K/UL (ref 0–0.01)
NRBC BLD-RTO: 0 PER 100 WBC
PLATELET # BLD AUTO: 476 K/UL (ref 150–400)
PMV BLD AUTO: 8.2 FL (ref 8.9–12.9)
POTASSIUM SERPL-SCNC: 3.5 MMOL/L (ref 3.5–5.1)
RBC # BLD AUTO: 3.17 M/UL (ref 3.8–5.2)
SODIUM SERPL-SCNC: 137 MMOL/L (ref 136–145)
WBC # BLD AUTO: 11.2 K/UL (ref 3.6–11)

## 2024-01-24 PROCEDURE — C9113 INJ PANTOPRAZOLE SODIUM, VIA: HCPCS | Performed by: INTERNAL MEDICINE

## 2024-01-24 PROCEDURE — A4216 STERILE WATER/SALINE, 10 ML: HCPCS | Performed by: INTERNAL MEDICINE

## 2024-01-24 PROCEDURE — 6370000000 HC RX 637 (ALT 250 FOR IP): Performed by: INTERNAL MEDICINE

## 2024-01-24 PROCEDURE — 2580000003 HC RX 258: Performed by: GENERAL ACUTE CARE HOSPITAL

## 2024-01-24 PROCEDURE — 6370000000 HC RX 637 (ALT 250 FOR IP): Performed by: GENERAL ACUTE CARE HOSPITAL

## 2024-01-24 PROCEDURE — 85027 COMPLETE CBC AUTOMATED: CPT

## 2024-01-24 PROCEDURE — 6360000002 HC RX W HCPCS: Performed by: INTERNAL MEDICINE

## 2024-01-24 PROCEDURE — 2060000000 HC ICU INTERMEDIATE R&B

## 2024-01-24 PROCEDURE — 92526 ORAL FUNCTION THERAPY: CPT

## 2024-01-24 PROCEDURE — 36415 COLL VENOUS BLD VENIPUNCTURE: CPT

## 2024-01-24 PROCEDURE — 2580000003 HC RX 258: Performed by: INTERNAL MEDICINE

## 2024-01-24 PROCEDURE — 6370000000 HC RX 637 (ALT 250 FOR IP): Performed by: UROLOGY

## 2024-01-24 PROCEDURE — 2580000003 HC RX 258: Performed by: STUDENT IN AN ORGANIZED HEALTH CARE EDUCATION/TRAINING PROGRAM

## 2024-01-24 PROCEDURE — 80048 BASIC METABOLIC PNL TOTAL CA: CPT

## 2024-01-24 PROCEDURE — 6360000002 HC RX W HCPCS: Performed by: STUDENT IN AN ORGANIZED HEALTH CARE EDUCATION/TRAINING PROGRAM

## 2024-01-24 PROCEDURE — 6370000000 HC RX 637 (ALT 250 FOR IP): Performed by: STUDENT IN AN ORGANIZED HEALTH CARE EDUCATION/TRAINING PROGRAM

## 2024-01-24 PROCEDURE — 6360000002 HC RX W HCPCS: Performed by: GENERAL ACUTE CARE HOSPITAL

## 2024-01-24 RX ORDER — CARVEDILOL 6.25 MG/1
6.25 TABLET ORAL 2 TIMES DAILY WITH MEALS
Qty: 60 TABLET | Refills: 0 | Status: SHIPPED | OUTPATIENT
Start: 2024-01-24

## 2024-01-24 RX ADMIN — SODIUM CHLORIDE, SODIUM LACTATE, POTASSIUM CHLORIDE, CALCIUM CHLORIDE AND DEXTROSE MONOHYDRATE: 5; 600; 310; 30; 20 INJECTION, SOLUTION INTRAVENOUS at 06:08

## 2024-01-24 RX ADMIN — PIPERACILLIN AND TAZOBACTAM 3375 MG: 3; .375 INJECTION, POWDER, FOR SOLUTION INTRAVENOUS; PARENTERAL at 21:16

## 2024-01-24 RX ADMIN — CARVEDILOL 6.25 MG: 6.25 TABLET, FILM COATED ORAL at 16:18

## 2024-01-24 RX ADMIN — SODIUM CHLORIDE, PRESERVATIVE FREE 40 MG: 5 INJECTION INTRAVENOUS at 21:18

## 2024-01-24 RX ADMIN — MORPHINE SULFATE 1 MG: 2 INJECTION, SOLUTION INTRAMUSCULAR; INTRAVENOUS at 04:12

## 2024-01-24 RX ADMIN — PIPERACILLIN AND TAZOBACTAM 3375 MG: 3; .375 INJECTION, POWDER, FOR SOLUTION INTRAVENOUS; PARENTERAL at 04:33

## 2024-01-24 RX ADMIN — PIPERACILLIN AND TAZOBACTAM 3375 MG: 3; .375 INJECTION, POWDER, FOR SOLUTION INTRAVENOUS; PARENTERAL at 14:32

## 2024-01-24 RX ADMIN — SODIUM CHLORIDE, SODIUM LACTATE, POTASSIUM CHLORIDE, CALCIUM CHLORIDE AND DEXTROSE MONOHYDRATE: 5; 600; 310; 30; 20 INJECTION, SOLUTION INTRAVENOUS at 20:18

## 2024-01-24 RX ADMIN — CARVEDILOL 6.25 MG: 6.25 TABLET, FILM COATED ORAL at 08:35

## 2024-01-24 RX ADMIN — ROSUVASTATIN CALCIUM 40 MG: 40 TABLET, FILM COATED ORAL at 21:18

## 2024-01-24 RX ADMIN — SODIUM CHLORIDE, PRESERVATIVE FREE 10 ML: 5 INJECTION INTRAVENOUS at 21:18

## 2024-01-24 RX ADMIN — MORPHINE SULFATE 1 MG: 2 INJECTION, SOLUTION INTRAMUSCULAR; INTRAVENOUS at 16:18

## 2024-01-24 RX ADMIN — FUROSEMIDE 20 MG: 10 INJECTION, SOLUTION INTRAMUSCULAR; INTRAVENOUS at 08:35

## 2024-01-24 RX ADMIN — ASPIRIN 81 MG: 81 TABLET, CHEWABLE ORAL at 08:35

## 2024-01-24 RX ADMIN — SODIUM CHLORIDE, PRESERVATIVE FREE 40 MG: 5 INJECTION INTRAVENOUS at 08:34

## 2024-01-24 RX ADMIN — MORPHINE SULFATE 1 MG: 2 INJECTION, SOLUTION INTRAMUSCULAR; INTRAVENOUS at 10:22

## 2024-01-24 RX ADMIN — Medication: at 21:24

## 2024-01-24 RX ADMIN — DOCUSATE SODIUM AND SENNOSIDES 1 TABLET: 8.6; 5 TABLET, FILM COATED ORAL at 21:18

## 2024-01-24 RX ADMIN — SODIUM CHLORIDE, PRESERVATIVE FREE 10 ML: 5 INJECTION INTRAVENOUS at 08:36

## 2024-01-24 RX ADMIN — MICAFUNGIN SODIUM 100 MG: 100 INJECTION, POWDER, LYOPHILIZED, FOR SOLUTION INTRAVENOUS at 10:26

## 2024-01-24 RX ADMIN — Medication: at 08:35

## 2024-01-24 RX ADMIN — LEVOTHYROXINE SODIUM 25 MCG: 0.03 TABLET ORAL at 05:58

## 2024-01-24 ASSESSMENT — PAIN SCALES - GENERAL
PAINLEVEL_OUTOF10: 0
PAINLEVEL_OUTOF10: 5

## 2024-01-24 ASSESSMENT — PAIN SCALES - WONG BAKER
WONGBAKER_NUMERICALRESPONSE: 6
WONGBAKER_NUMERICALRESPONSE: 0
WONGBAKER_NUMERICALRESPONSE: 2

## 2024-01-24 ASSESSMENT — PAIN DESCRIPTION - DESCRIPTORS: DESCRIPTORS: ACHING

## 2024-01-24 ASSESSMENT — PAIN DESCRIPTION - ORIENTATION: ORIENTATION: RIGHT

## 2024-01-24 ASSESSMENT — PAIN DESCRIPTION - LOCATION
LOCATION: ABDOMEN;HIP
LOCATION: GENERALIZED

## 2024-01-24 NOTE — PLAN OF CARE
Problem: Safety - Adult  Goal: Free from fall injury  1/19/2024 1633 by Kami Pringle RN  Outcome: Progressing  1/19/2024 0638 by Kiki Gama RN  Outcome: Progressing     Problem: ABCDS Injury Assessment  Goal: Absence of physical injury  1/19/2024 1633 by Kami Pringle RN  Outcome: Progressing  1/19/2024 0638 by Kiki Gama RN  Outcome: Progressing     Problem: Skin/Tissue Integrity  Goal: Absence of new skin breakdown  Description: 1.  Monitor for areas of redness and/or skin breakdown  2.  Assess vascular access sites hourly  3.  Every 4-6 hours minimum:  Change oxygen saturation probe site  4.  Every 4-6 hours:  If on nasal continuous positive airway pressure, respiratory therapy assess nares and determine need for appliance change or resting period.  1/19/2024 1633 by Kami Pringle RN  Outcome: Progressing  1/19/2024 0638 by Kiki Gama RN  Outcome: Progressing     Problem: Pain  Goal: Verbalizes/displays adequate comfort level or baseline comfort level  1/19/2024 1633 by Kami Pringle RN  Outcome: Progressing  1/19/2024 0638 by Kiki Gama RN  Outcome: Progressing     Problem: Discharge Planning  Goal: Discharge to home or other facility with appropriate resources  1/19/2024 1633 by Kami Pringle RN  Outcome: Progressing  1/19/2024 0638 by Kiki Gama RN  Outcome: Progressing     Problem: Chronic Conditions and Co-morbidities  Goal: Patient's chronic conditions and co-morbidity symptoms are monitored and maintained or improved  1/19/2024 1633 by Kami Pringle RN  Outcome: Progressing  1/19/2024 0638 by Kiki Gama RN  Outcome: Progressing     
  Problem: Safety - Adult  Goal: Free from fall injury  1/20/2024 0041 by Asiya Hobbs RN  Outcome: Progressing  1/19/2024 1633 by Kami Pringle RN  Outcome: Progressing     Problem: ABCDS Injury Assessment  Goal: Absence of physical injury  1/20/2024 0041 by Asiya Hobbs RN  Outcome: Progressing  1/19/2024 1633 by Kami Pringle RN  Outcome: Progressing     Problem: Skin/Tissue Integrity  Goal: Absence of new skin breakdown  Description: 1.  Monitor for areas of redness and/or skin breakdown  2.  Assess vascular access sites hourly  3.  Every 4-6 hours minimum:  Change oxygen saturation probe site  4.  Every 4-6 hours:  If on nasal continuous positive airway pressure, respiratory therapy assess nares and determine need for appliance change or resting period.  1/20/2024 0041 by Asiya Hobbs RN  Outcome: Progressing  1/19/2024 1633 by Kami Pringle RN  Outcome: Progressing     Problem: Pain  Goal: Verbalizes/displays adequate comfort level or baseline comfort level  1/20/2024 0041 by Asiya Hobbs RN  Outcome: Progressing  1/19/2024 1633 by Kami Pringle RN  Outcome: Progressing     Problem: Discharge Planning  Goal: Discharge to home or other facility with appropriate resources  1/20/2024 0041 by Asiya Hobbs RN  Outcome: Progressing  1/19/2024 1633 by Kami Pringle RN  Outcome: Progressing     
  Problem: Safety - Adult  Goal: Free from fall injury  1/24/2024 1546 by Fran Ley, RN  Outcome: Progressing     Problem: Skin/Tissue Integrity  Goal: Absence of new skin breakdown  Description: 1.  Monitor for areas of redness and/or skin breakdown  2.  Assess vascular access sites hourly  3.  Every 4-6 hours minimum:  Change oxygen saturation probe site  4.  Every 4-6 hours:  If on nasal continuous positive airway pressure, respiratory therapy assess nares and determine need for appliance change or resting period.  1/24/2024 1546 by Fran Ley, RN  Outcome: Progressing     Problem: Pain  Goal: Verbalizes/displays adequate comfort level or baseline comfort level  1/24/2024 1546 by Fran Ley, RN  Outcome: Progressing     
  Problem: Safety - Adult  Goal: Free from fall injury  Outcome: Progressing     Problem: ABCDS Injury Assessment  Goal: Absence of physical injury  Outcome: Progressing     Problem: Skin/Tissue Integrity  Goal: Absence of new skin breakdown  Description: 1.  Monitor for areas of redness and/or skin breakdown  2.  Assess vascular access sites hourly  3.  Every 4-6 hours minimum:  Change oxygen saturation probe site  4.  Every 4-6 hours:  If on nasal continuous positive airway pressure, respiratory therapy assess nares and determine need for appliance change or resting period.  Outcome: Progressing     Problem: Pain  Goal: Verbalizes/displays adequate comfort level or baseline comfort level  Outcome: Progressing     Problem: Discharge Planning  Goal: Discharge to home or other facility with appropriate resources  Outcome: Progressing     Problem: Chronic Conditions and Co-morbidities  Goal: Patient's chronic conditions and co-morbidity symptoms are monitored and maintained or improved  Outcome: Progressing     
  Problem: Safety - Adult  Goal: Free from fall injury  Outcome: Progressing     Problem: Pain  Goal: Verbalizes/displays adequate comfort level or baseline comfort level  Outcome: Progressing     Problem: Discharge Planning  Goal: Discharge to home or other facility with appropriate resources  Outcome: Progressing     
  Problem: Safety - Adult  Goal: Free from fall injury  Outcome: Progressing     Problem: Skin/Tissue Integrity  Goal: Absence of new skin breakdown  Description: 1.  Monitor for areas of redness and/or skin breakdown  2.  Assess vascular access sites hourly  3.  Every 4-6 hours minimum:  Change oxygen saturation probe site  4.  Every 4-6 hours:  If on nasal continuous positive airway pressure, respiratory therapy assess nares and determine need for appliance change or resting period.  Outcome: Progressing     Problem: Pain  Goal: Verbalizes/displays adequate comfort level or baseline comfort level  Outcome: Progressing     Problem: Discharge Planning  Goal: Discharge to home or other facility with appropriate resources  Outcome: Progressing     
Speech LAnguage Pathology EVALUATION    Patient: Nicolasa Macdonald (73 y.o. female)  Date: 1/15/2024  Primary Diagnosis: Kidney abscess [N15.1]  Renal abscess, right [N15.1]  Procedure(s) (LRB):  RIGHT RADICAL OPEN NEPHRECTOMY (E R A S) (Right)  LIGATION OF RIGHT RENAL ARTERY (Right)  REPAIR SMALL BOWEL LACERATION (N/A) 5 Days Post-Op   Precautions: Aspiration precautions                    ASSESSMENT :  Based on the objective data described below, the patient presents with a grossly functional oropharyngeal swallow with limited consistencies assessed. Patient's eyes remained closed throughout SLP evaluation, however patient responding to basic questions throughout. Patient with decreased command following for simple, 1-step oral motor commands. Patient with slow oral manipulation with ice chip and pureed trials, resulting in efficient oral clearance. Patient accepted thin liquid trials via straw. No overt difficulty or overt signs of aspiration observed.    Discussed SLP evaluation with MD. Will initiate Clear Liquid diet at this time. Recommend 1:1 assistance/supervision with PO intake and aspiration precautions outlined below.     Patient will benefit from skilled intervention to address the above impairments.     PLAN :  Recommendations and Planned Interventions:  Diet: Clear liquid diet per MD, when patient is alert/awake and actively accepting PO intake  -- Consider medication crushed in puree, if cleared by pharmacy  -- 1:1 assistance/supervision with PO intake  -- Aspiration precautions: upright for all PO, small bites/sips, single sips 1 at a time  -- Hold PO intake if patient becomes increasingly drowsy/lethargic       Acute SLP Services: Yes, patient will be followed by speech-language pathology 2x/week to address goals. Patient's rehabilitation potential is considered to be Good.    Discharge Recommendations: Continue to assess pending progress     SUBJECTIVE:   Patient stated, “2024.”    OBJECTIVE: 
Speech LAnguage Pathology TREATMENT/DISCHARGE    Patient: Nicolasa Macdonald (74 y.o. female)  Date: 1/24/2024  Primary Diagnosis: Kidney abscess [N15.1]  Renal abscess, right [N15.1]  Procedure(s) (LRB):  CYSTOSCOPY FLEXIBLE WITH STENT REMOVAL (Left) 2 Days Post-Op   Precautions:                     ASSESSMENT :  Patient continues to be followed by SLP. Patient assisted with lunch tray on this date. Patient demonstrated slow, but complete mastication with solids. Patient achieves efficient oral clearance given increased time. No overt difficulty observed with thin liquid and pureed consistencies. Patient cleared to advance to Easy to Chew/Thin Liquid diet from SLP perspective. Will defer to primary team for diet advancement as patient is medically appropriate.    Patient will be discharged from skilled speech-language pathology services at this time.     PLAN :  Recommendations and Planned Interventions:  -- Patient cleared to advance to Easy to Chew/Thin Liquid diet from SLP perspective; Will defer to primary team for diet advancement as patient is medically appropriate  -- Medication as tolerated  -- 1:1 assistance/supervision with PO intake  -- Aspiration precautions: upright for all PO, small bites/sips, single sips 1 at a time       Acute SLP Services: No, patient will be discharged from acute skilled speech-language pathology at this time.    Discharge Recommendations: No, additional SLP treatment not indicated at discharge     SUBJECTIVE:   Patient stated, “are there saltines over there?”    OBJECTIVE:     Past Medical History:   Diagnosis Date    Cardiomyopathy (HCC)     HLD (hyperlipidemia)     Hypertension     Kidney stone     Leukocytosis     Thyroid disease      Past Surgical History:   Procedure Laterality Date    CT DRAIN SOFT TISSUE ABSCESS  12/20/2023    CT DRAIN SOFT TISSUE ABSCESS 12/20/2023 [facility] RAD CT    CYSTOSCOPY Bilateral 12/10/2023    CYSTOSCOPY BILATERAL URETERAL STENT INSERTION performed by 
1600)  Discharge to home or other facility with appropriate resources:   Identify barriers to discharge with patient and caregiver   Arrange for needed discharge resources and transportation as appropriate   Identify discharge learning needs (meds, wound care, etc)   Refer to discharge planning if patient needs post-hospital services based on physician order or complex needs related to functional status, cognitive ability or social support system     Problem: Chronic Conditions and Co-morbidities  Goal: Patient's chronic conditions and co-morbidity symptoms are monitored and maintained or improved  Outcome: Progressing  Flowsheets (Taken 1/17/2024 1600)  Care Plan - Patient's Chronic Conditions and Co-Morbidity Symptoms are Monitored and Maintained or Improved:   Monitor and assess patient's chronic conditions and comorbid symptoms for stability, deterioration, or improvement   Collaborate with multidisciplinary team to address chronic and comorbid conditions and prevent exacerbation or deterioration   Update acute care plan with appropriate goals if chronic or comorbid symptoms are exacerbated and prevent overall improvement and discharge     
comorbid symptoms for stability, deterioration, or improvement   Collaborate with multidisciplinary team to address chronic and comorbid conditions and prevent exacerbation or deterioration   Update acute care plan with appropriate goals if chronic or comorbid symptoms are exacerbated and prevent overall improvement and discharge

## 2024-01-24 NOTE — ACP (ADVANCE CARE PLANNING)
Advance Care Planning     Advance Care Planning Inpatient Note  [facility] Department    Today's Date: 1/24/2024  Unit: [facility] 2 PROGRESSIVE CARE    Received request from IDT Member.  Upon review of chart and communication with care team, patient's decision making abilities are not in question.. Patient was/were present in the room during visit.    Goals of ACP Conversation:  Discuss advance care planning documents    Health Care Decision Makers:       Primary Decision Maker: Jenn Bowser - Niece/Nephew - [phone]  Summary:  Documented Next of Kin, per patient report     Advance Care Planning Documents (Patient Wishes):  Living Will/Advance Directive     Assessment:   was paged by  for patient in 2311 wo was requesting for an AMD consult. She requested, after document was reviewed, that she would like to complete the first part of the document at this time and completed the other sections later. Per her request, her niece, Jenn Pichardo was made her MPOA and primary decision maker. She asked to keep document and think through the second section before filling it out.  She was advised of  availability to assist complete it when she was ready.      Interventions:  Encouraged ongoing ACP conversation with future decision makers and loved ones    Care Preferences Communicated:   No    Outcomes/Plan:  ACP Discussion: Completed    Electronically signed by POWER Varela on 1/24/2024 at 4:45 PM

## 2024-01-25 LAB
ANION GAP SERPL CALC-SCNC: 3 MMOL/L (ref 5–15)
BUN SERPL-MCNC: 8 MG/DL (ref 6–20)
BUN/CREAT SERPL: 10 (ref 12–20)
CALCIUM SERPL-MCNC: 7.6 MG/DL (ref 8.5–10.1)
CHLORIDE SERPL-SCNC: 105 MMOL/L (ref 97–108)
CO2 SERPL-SCNC: 30 MMOL/L (ref 21–32)
CREAT SERPL-MCNC: 0.83 MG/DL (ref 0.55–1.02)
ERYTHROCYTE [DISTWIDTH] IN BLOOD BY AUTOMATED COUNT: 17.4 % (ref 11.5–14.5)
GLUCOSE SERPL-MCNC: 92 MG/DL (ref 65–100)
HCT VFR BLD AUTO: 24.7 % (ref 35–47)
HGB BLD-MCNC: 7.9 G/DL (ref 11.5–16)
MCH RBC QN AUTO: 27.1 PG (ref 26–34)
MCHC RBC AUTO-ENTMCNC: 32 G/DL (ref 30–36.5)
MCV RBC AUTO: 84.9 FL (ref 80–99)
NRBC # BLD: 0 K/UL (ref 0–0.01)
NRBC BLD-RTO: 0 PER 100 WBC
PLATELET # BLD AUTO: 427 K/UL (ref 150–400)
PMV BLD AUTO: 8.1 FL (ref 8.9–12.9)
POTASSIUM SERPL-SCNC: 3.4 MMOL/L (ref 3.5–5.1)
RBC # BLD AUTO: 2.91 M/UL (ref 3.8–5.2)
SODIUM SERPL-SCNC: 138 MMOL/L (ref 136–145)
WBC # BLD AUTO: 9.7 K/UL (ref 3.6–11)

## 2024-01-25 PROCEDURE — 2580000003 HC RX 258: Performed by: GENERAL ACUTE CARE HOSPITAL

## 2024-01-25 PROCEDURE — 36415 COLL VENOUS BLD VENIPUNCTURE: CPT

## 2024-01-25 PROCEDURE — A4216 STERILE WATER/SALINE, 10 ML: HCPCS | Performed by: INTERNAL MEDICINE

## 2024-01-25 PROCEDURE — 6370000000 HC RX 637 (ALT 250 FOR IP): Performed by: STUDENT IN AN ORGANIZED HEALTH CARE EDUCATION/TRAINING PROGRAM

## 2024-01-25 PROCEDURE — 6370000000 HC RX 637 (ALT 250 FOR IP): Performed by: INTERNAL MEDICINE

## 2024-01-25 PROCEDURE — 2580000003 HC RX 258: Performed by: INTERNAL MEDICINE

## 2024-01-25 PROCEDURE — 80048 BASIC METABOLIC PNL TOTAL CA: CPT

## 2024-01-25 PROCEDURE — 6370000000 HC RX 637 (ALT 250 FOR IP): Performed by: UROLOGY

## 2024-01-25 PROCEDURE — 6370000000 HC RX 637 (ALT 250 FOR IP): Performed by: GENERAL ACUTE CARE HOSPITAL

## 2024-01-25 PROCEDURE — 6360000002 HC RX W HCPCS: Performed by: GENERAL ACUTE CARE HOSPITAL

## 2024-01-25 PROCEDURE — 2060000000 HC ICU INTERMEDIATE R&B

## 2024-01-25 PROCEDURE — 85027 COMPLETE CBC AUTOMATED: CPT

## 2024-01-25 PROCEDURE — C9113 INJ PANTOPRAZOLE SODIUM, VIA: HCPCS | Performed by: INTERNAL MEDICINE

## 2024-01-25 PROCEDURE — 6360000002 HC RX W HCPCS: Performed by: INTERNAL MEDICINE

## 2024-01-25 RX ORDER — POTASSIUM CHLORIDE 750 MG/1
40 TABLET, FILM COATED, EXTENDED RELEASE ORAL ONCE
Status: COMPLETED | OUTPATIENT
Start: 2024-01-25 | End: 2024-01-25

## 2024-01-25 RX ADMIN — SODIUM CHLORIDE, PRESERVATIVE FREE 40 MG: 5 INJECTION INTRAVENOUS at 10:00

## 2024-01-25 RX ADMIN — FUROSEMIDE 20 MG: 10 INJECTION, SOLUTION INTRAMUSCULAR; INTRAVENOUS at 10:00

## 2024-01-25 RX ADMIN — LEVOTHYROXINE SODIUM 25 MCG: 0.03 TABLET ORAL at 06:38

## 2024-01-25 RX ADMIN — SODIUM CHLORIDE, PRESERVATIVE FREE 10 ML: 5 INJECTION INTRAVENOUS at 21:06

## 2024-01-25 RX ADMIN — ROSUVASTATIN CALCIUM 40 MG: 40 TABLET, FILM COATED ORAL at 21:06

## 2024-01-25 RX ADMIN — SODIUM CHLORIDE, SODIUM LACTATE, POTASSIUM CHLORIDE, CALCIUM CHLORIDE AND DEXTROSE MONOHYDRATE: 5; 600; 310; 30; 20 INJECTION, SOLUTION INTRAVENOUS at 10:14

## 2024-01-25 RX ADMIN — POTASSIUM CHLORIDE 40 MEQ: 750 TABLET, FILM COATED, EXTENDED RELEASE ORAL at 11:30

## 2024-01-25 RX ADMIN — SODIUM CHLORIDE, PRESERVATIVE FREE 10 ML: 5 INJECTION INTRAVENOUS at 10:01

## 2024-01-25 RX ADMIN — Medication: at 10:00

## 2024-01-25 RX ADMIN — MORPHINE SULFATE 1 MG: 2 INJECTION, SOLUTION INTRAMUSCULAR; INTRAVENOUS at 21:07

## 2024-01-25 RX ADMIN — DOCUSATE SODIUM AND SENNOSIDES 1 TABLET: 8.6; 5 TABLET, FILM COATED ORAL at 10:00

## 2024-01-25 RX ADMIN — CARVEDILOL 6.25 MG: 6.25 TABLET, FILM COATED ORAL at 16:02

## 2024-01-25 RX ADMIN — Medication: at 21:12

## 2024-01-25 RX ADMIN — SODIUM CHLORIDE, PRESERVATIVE FREE 40 MG: 5 INJECTION INTRAVENOUS at 21:06

## 2024-01-25 RX ADMIN — CARVEDILOL 6.25 MG: 6.25 TABLET, FILM COATED ORAL at 10:02

## 2024-01-25 RX ADMIN — ASPIRIN 300 MG: 300 SUPPOSITORY RECTAL at 09:59

## 2024-01-25 RX ADMIN — MORPHINE SULFATE 1 MG: 2 INJECTION, SOLUTION INTRAMUSCULAR; INTRAVENOUS at 16:02

## 2024-01-25 ASSESSMENT — PAIN SCALES - GENERAL
PAINLEVEL_OUTOF10: 5
PAINLEVEL_OUTOF10: 0

## 2024-01-25 ASSESSMENT — PAIN SCALES - WONG BAKER
WONGBAKER_NUMERICALRESPONSE: 2
WONGBAKER_NUMERICALRESPONSE: 2

## 2024-01-25 ASSESSMENT — PAIN DESCRIPTION - ORIENTATION: ORIENTATION: RIGHT;POSTERIOR;ANTERIOR

## 2024-01-25 ASSESSMENT — PAIN DESCRIPTION - DESCRIPTORS: DESCRIPTORS: ACHING

## 2024-01-25 ASSESSMENT — PAIN DESCRIPTION - LOCATION: LOCATION: HIP

## 2024-01-25 NOTE — ACP (ADVANCE CARE PLANNING)
Advance Care Planning     Advance Care Planning Inpatient Note  [facility] Department    Today's Date: 1/25/2024  Unit: [facility] 2 PROGRESSIVE CARE    Received request from IDT Member.  Upon review of chart and communication with care team, patient's decision making abilities are not in question.. Patient was/were present in the room during visit.    Goals of ACP Conversation:  Discuss advance care planning documents    Health Care Decision Makers:       Primary Decision Maker: Sherry Bowserdy - Niece/Nephew - [phone]  Summary:  Verified Healthcare Decision Maker     Advance Care Planning Documents (Patient Wishes):  Healthcare Power of /Advance Directive Appointment of Health Care Agent     Assessment:   was paged by  to assist patient with her AMD document. Only first session of document was completed, patient was unable to sign but she  was verbally agreeable to Jenn Bowser being her MPOA. , patient's nurse and  witnessed document.  A copy was made for her chart and an additional copy with original returned to patient in her room.       Interventions:  Assisted in the completion of documents according to patient's wishes at this time    Care Preferences Communicated:   No    Outcomes/Plan:  New advance directive completed.  Copy of advance directive given to staff to scan into medical record.    Electronically signed by POWER Varela on 1/25/2024 at 4:21 PM

## 2024-01-25 NOTE — DISCHARGE SUMMARY
Appreciate nephrology following, onofre escobar.      H/o HTN  Resume pta coreg.    Hypothyroidism  Resume home levothyroxine    Recent COVID 12/27  Not vaccinated but this is her second COVID infection.  Not hypoxic    Sacral and heel wounds / scars POA  Wound care help appreciated.      Discharge Exam:  Patient seen and examined by me on discharge day.  Pertinent Findings:  Patient Vitals for the past 24 hrs:   BP Temp Temp src Pulse Resp SpO2   01/25/24 1002 (!) 141/74 97.5 °F (36.4 °C) Oral 77 -- --   01/25/24 0800 -- 98.4 °F (36.9 °C) Oral -- -- --   01/25/24 0402 (!) 127/48 97.9 °F (36.6 °C) Oral 54 12 98 %   01/25/24 0356 123/69 98.2 °F (36.8 °C) Oral 68 11 96 %   01/24/24 2325 (!) 100/47 -- -- 65 11 --   01/24/24 2018 (!) 109/51 98.2 °F (36.8 °C) Axillary 55 14 94 %   01/24/24 1500 (!) 124/58 97.6 °F (36.4 °C) Oral 71 17 98 %       Gen:    Not in distress  Chest: Clear lungs  CVS:   Regular rhythm.  No edema  Abd:  Soft, not distended, not tender  Neuro:  awake, moving all exts    Discharge/Recent Laboratory Results:  Recent Labs     01/25/24 0227      K 3.4*      CO2 30   BUN 8   CREATININE 0.83   GLUCOSE 92   CALCIUM 7.6*     Recent Labs     01/25/24 0227   HGB 7.9*   HCT 24.7*   WBC 9.7   *       Discharge Medications:     Medication List        START taking these medications      aspirin 81 MG chewable tablet  Take 1 tablet by mouth daily     pantoprazole 40 MG tablet  Commonly known as: PROTONIX  Take 1 tablet by mouth 2 times daily (before meals)     rosuvastatin 40 MG tablet  Commonly known as: CRESTOR  Take 1 tablet by mouth nightly            CHANGE how you take these medications      carvedilol 6.25 MG tablet  Commonly known as: COREG  Take 1 tablet by mouth 2 times daily (with meals)  What changed:   medication strength  how much to take  when to take this            CONTINUE taking these medications      levothyroxine 25 MCG tablet  Commonly known as: SYNTHROID  TAKE ONE

## 2024-01-25 NOTE — CARE COORDINATION
CM sent a referral to [facility] via CC link per Rupert request and patient's approval.     Rohit Ulrich RN  Case Management  [phone]    
CM verified patient has a qualifying hospital stay for Skilled Nursing Facility.     KELLEN Ramirez    
JULIO CÉSAR spoke with Kay in regards to discharge planning.  Kay is her mPOA and caregiver at the group home.  Kay stated she would need to come do a bedside evaluation to assess the care needs of the pt before she can determine if she can accept back to the group home.  Kay stated she would be here tomorrow morning to assess the pt.    CM has reached out to other options such as the [facility] awaiting a return call, Holly Barrera stated her home is 4,000 which is more than pt income.  CM will continue to explore other options in the event pt not able to return to group home.    CM will continue to follow and assist with additional discharge needs.    Antonio Arnold, KELLEN  Ext [phone]  
Transition of Care Plan:    RUR: 18%  Prior Level of Functioning: needed some level of assistance  Disposition: undetermined  If SNF or IPR: Date FOC offered: na  Date FOC received:   Accepting facility:   Date authorization started with reference number:   Date authorization received and expires:   Follow up appointments: TBD  DME needed: TBD  Transportation at discharge: will most likely need assist  IM/IMM Medicare/ letter given: 1/10/24  Is patient a  and connected with VA? na   If yes, was [place] transfer form completed and VA notified?   Caregiver Contact: \"Kay\"  Fabian Magallon   Mobile:   [phone]     Discharge Caregiver contacted prior to discharge? Yes, if returning to group home  Care Conference needed? no  Barriers to discharge:  medical stability and place to go     CM reviewed chart. Patient came from a group home, but there has been a question on whether or not the home can accommodate her new level of care.  Kay, the owner of the home, was called by CM. She stated she will not be able to come until this evening to assess the patient. CM gave her phone number to contact the CM tomorrow. She felt she would have a decision in the morning.  CM will continue to follow.    Josefa Alonzo, RN  Care Manager  [phone]  
Transition of Care Plan:    RUR: 19% (High RUR)  Prior Level of Functioning: Assistance with the following:;Bathing;Dressing;Toileting;Feeding;Cooking;Housework;Shopping;Mobility   Disposition:  [facility].    If SNF or IPR: Date FOC offered: 01/15/2024  Date FOC received: 01/15/2024  Accepting facility:  [facility].    Date authorization started with reference number:   Date authorization received and expires:   Follow up appointments: Per SNF  DME needed: Per SNF  Transportation at discharge: Transportation needed  IM/IMM Medicare/ letter given: upon discharge  Is patient a  and connected with VA? na   If yes, was  transfer form completed and VA notified? na  Caregiver Contact: Fabian Magallon [phone]   Discharge Caregiver contacted prior to discharge? Will be contacted  Care Conference needed? no  Barriers to discharge:  Medical clearance     CM met with the patient's POA, Fabian Magallon [phone] who requested assistance with arranging the discharge plan. Also, she requested help with applying to Medicaid. The POA does not wish for the patient to return to the group home and would like her to be placed in a facility with hospice care if she is appropriate. The patient access was contacted by JULIO CÉSAR.    Medicaid screening: No house, no car; has a bank account with [employer] but no idea how much in it,   She has a long term care insurance, no aware of stocks, bonds, lands, She does not have a  home vaishali.       Rohit Ulrich RN  Case Management  [phone]    
Transition of Care Plan:    RUR: 19% (High RUR)  Prior Level of Functioning: Assistance with the following:;Bathing;Dressing;Toileting;Feeding;Cooking;Housework;Shopping;Mobility   Disposition:  [facility].    If SNF or IPR: Date FOC offered: 01/15/2024  Date FOC received: 01/15/2024  Accepting facility:  [facility].    Date authorization started with reference number:   Date authorization received and expires:   Follow up appointments: Per SNF  DME needed: Per SNF  Transportation at discharge: Transportation needed  IM/IMM Medicare/ letter given: upon discharge  Is patient a  and connected with VA? na   If yes, was  transfer form completed and VA notified? na  Caregiver Contact: Fabian Magallon [phone]   Discharge Caregiver contacted prior to discharge? Will be contacted  Care Conference needed? no  Barriers to discharge:  Medical clearance     The plan to d/c with hospice once IV abx can be either deescalated to PO or completed.  Care management will continue to be available to assist as transition of care planning needs arise.    Rohit Ulrich RN  Case Management  [phone]    
Transition of Care Plan:    RUR: 19% High  Prior Level of Functioning: Participating in therapy/skill-able  Disposition: Home with hospice vs LTC facility  If SNF or IPR: Date FOC offered: N/A  Date FOC received: N/A  Accepting facility: N/A  Date authorization started with reference number: N/A  Date authorization received and expires: N/A  Follow up appointments: PCP and follow ups where required  DME needed: none  Transportation at discharge: BLS  IM/IMM Medicare/ letter given: to be provided at discharge  Is patient a  and connected with VA? N/A   If yes, was  transfer form completed and VA notified?   Caregiver Contact: Jenn Bowser   [phone]   Discharge Caregiver contacted prior to discharge? To be notified  Care Conference needed? Not at this time  Barriers to discharge: placement    Chart reviewed, IDR completed    1300 - CM called POA Fabian Magallon 'Kay', [phone] to discuss discharge plan. She said that she is pregnant and unable to care for the patient in her current state, e.g., paraplegic. CM discussed with Rupert fenton from [facility] who said pt could return [facility] under the skilled side if skillable or if pt can be approved for Medicaid. CM discussed this with  director who said patient is over income for Medicaid.       1400 - CM spoke with the patient's niece, Jenn Bowser [phone] about placement for her aunt. CM Will go to bedside and call Jenn on speaker phone so she can talk with patient.     1520 - CM met with patient at bedside and face timed with patient and her niece, Jenn.  Jenn and family would like the patient to be moved closer to home ([place], [place]) and are interested in facilities there.  CM notified Jenn that the patient would be responsible for transportation costs.  Since the patient can no longer stay with Kay, and Kay is POA, the patient wanted to change her medical POA to Jenn.  CM spoke with Chaplain Wahl who came to bedside to 
leave at 2 p.m    Communication to SNF/Rehab:  Bedside RN,  MERLIN RN , has been notified to update the transition plan to the facility and call report [phone]  Discharge information has been updated on the AVS. And communicated to facility via [employer], or CC link.       Nursing Please include all hard scripts for controlled substances, med rec and dc summary, and AVS in packet.     Reviewed and confirmed with facility, [facility] Phone: [phone]  can manage the patient care needs for the following:     Nathan with (X) only those applicable:  Medication:  [x]Medications are available at the facility  []IV Antibiotics    []Controlled Substance - hard copies available sent.  []Weekly Labs    Equipment:  []CPAP/BiPAP  []Wound Vacuum  [][place] or Urinary Device  []PICC/Central Line  []Nebulizer  []Ventilator none   Treatment:  []Isolation (for MRSA, VRE, etc.)  []Surgical Drain Management  []Tracheostomy Care  []Dressing Changes  []Dialysis with transportation  []PEG Care  []Oxygen  []Daily Weights for Heart Failure none   Dietary:  []Any diet limitations  []Tube Feedings   []Total Parenteral Management (TPN) none   Financial Resources:  []Medicaid Application Completed    []UAI Completed and copy given to pt/family  and copy given to pt/family  []A screening has previously been completed.    []Level II Completed    [] Private pay individual who will not become   financially eligible for Medicaid within 6 months from admission to a [facility].     [] Individual refused to have screening conducted.     []Medicaid Application Completed    [x]The screening denied because it was determined individual did not need/did not qualify for nursing facility level of care.  [] Out of state residents seeking direct admission to a VA nursing facility.  [] Individuals who are inpatients of an out of state hospital, or in state or out of state veterans/ 
stable to d/c with [facility], however need to confirm disposition. Caregiver reports she can bring pt home, but needs more assistance. CM provided education re: Medicare Benefits and payor sources for private duty (Medicaid, private pay, or LTC insurance). Caregiver reports pt does not have Medicaid insurance, therefore likely would have to pay privately. Caregiver reports pt manages her own money and she would need to talk to pt directly to figure out the plan. Caregiver cannot come to hospital until Monday d/t coverage needed at Group Home, but can try calling pt's personal cell phone that is with her belongings. Caregiver also had several questions re: pt's ability to turn self in bed, pt's diet/restrictions, etc. CM provided education re: Hospice Care benefits through Medicare and if pt was placed at LTC facility, she would have to pay privately for room/board services. Caregiver reports she will need to talk to pt before they can come up with decision. CM attempted to call RN to discuss for update, informed unavailable. Will try to call again before end of the day.    5:18PM UPDATE  CM met with pt at bedside to discuss d/c planning. Pt alert & oriented, however instances of confusion. Pt confused on how to use nursing call bell, but also has difficulty moving her extremities, including her arms. CM provided education x3 on nursing call bell system. RN also presented to room during discussion to check on pt. Pt confirmed desire to d/c with [facility]. Unfortunately at this time, Caregiver reports they can only take her back if pt has additional in-home private duty care setup. Otherwise, pt will need to go to LTC facility. Pt reports she receives ~$3000/month in Social Security, but $2900 goes to Group Home so she only has $100/month to spend on any extra care/services. Pt does not feel at this time she can afford to pay for LTC placement or additional in-home care. Pt is unsure if she would be eligible for 
[phone]. Because the patient was Covid +, the group home refused to accept the patient as the caregiver is pregnant and she has another resident at high risk in the group home.     Advance Care Planning     Advance Care Planning     General Advance Care Planning (ACP) Conversation    Date of Conversation: 1/5/2024  Conducted with: Patient with Decision Making Capacity    Healthcare Decision Maker:    Primary Decision Maker: Fabian Magallon - [phone]  Click here to complete Healthcare Decision Makers including selection of the Healthcare Decision Maker Relationship (ie \"Primary\").   Today we documented Decision Maker(s) consistent with ACP documents on file.    Content/Action Overview:  Has ACP document(s) on file - reflects the patient's care preferences  Reviewed DNR/DNI and patient elects Full Code (Attempt Resuscitation)        Length of Voluntary ACP Conversation in minutes:  16 minutes    Rohit Ulrich, RN  Case Management  [phone]

## 2024-01-26 VITALS
RESPIRATION RATE: 16 BRPM | TEMPERATURE: 98.2 F | HEIGHT: 67 IN | OXYGEN SATURATION: 97 % | HEART RATE: 85 BPM | DIASTOLIC BLOOD PRESSURE: 85 MMHG | WEIGHT: 169.97 LBS | BODY MASS INDEX: 26.68 KG/M2 | SYSTOLIC BLOOD PRESSURE: 141 MMHG

## 2024-01-26 NOTE — PROGRESS NOTES
Urology Progress Note    Admit Date:  1/10/2024    Admit Dx: Kidney abscess [N15.1]  Renal abscess, right [N15.1]        SUBJECTIVE:     Remains critical overnight and unresponsive. MRI pending to eval prior stroke seen on CT. Cr and Hgb improved. WBC with slight improvement, afebrile.    OBJECTIVE:     Vitals:  Patient Vitals for the past 8 hrs:   BP Pulse Resp   01/14/24 0745 (!) 172/70 89 20   01/14/24 0730 (!) 175/76 85 21   01/14/24 0715 (!) 172/61 87 22   01/14/24 0700 (!) 171/59 82 --   01/14/24 0600 (!) 159/62 78 --   01/14/24 0500 (!) 130/44 69 --   01/14/24 0400 (!) 171/65 87 --   01/14/24 0314 (!) 154/58 82 --   01/14/24 0300 (!) 158/57 84 --   01/14/24 0200 (!) 169/64 91 --   [id number] 1901 - 01/14 0700  In: 2846.4 [I.V.:1649.6]  Out: 750 [Urine:500; Drains:250]     Last 8 hours  01/14 0701 - 01/14 1900  In: 644.6 [I.V.:544.6]  Out: -     Drain Output (last 8hr):  Closed/Suction Drain Left Abdomen Bulb-Output (ml): 50 ml      Physical Exam:   Nonresponsive, eyes open  Resp: normal effort  Abd: inc c/d/I with staples, no sign of infection. LORETTA with brown mucous discharge, low output. Abd soft, ND    Labs:  CBC:   Lab Results   Component Value Date/Time    HGB 8.3 01/14/2024 03:42 AM    HCT 25.4 01/14/2024 03:42 AM     BMP:   Lab Results   Component Value Date/Time    BUN 33 01/14/2024 03:42 AM             Assessment:     Procedure(s):  RIGHT RADICAL OPEN NEPHRECTOMY (E R A S)  LIGATION OF RIGHT RENAL ARTERY  REPAIR SMALL BOWEL LACERATION        Plan:     01/13  - likely septic shock. Abx per primary team  - acute anemia this am. Blood products ordered. Possible upper GI bleed, GI aware  - NISREEN with low UOP - nephrology following  - will remove dressing and topical anesthetic in am  - replace NGT with additional emesis  - appreciate hospitalist assistance for this patient    01/14  - septic shock. Continue abx. WBC slowly improving  - stoke on CT appears old and less likely to 
                      Urology Progress Note    Admit Date:  1/10/2024    Admit Dx: Kidney abscess [N15.1]  Renal abscess, right [N15.1]        SUBJECTIVE:     S/p open right nephrectomy for XGP kidney abscess and repair of small bowel laceration 1/10/23.  Prior admission last month with proteus bacteremia and imaging worked up right renal abscess unresolved after weeks of IV abx prompting scheduled nephrectomy and subsequent severe AMS/rule out code stroke over the weekend    1/15  DNR  No issues with hypotension past couple days  VSS or fevers   Large BM overnight per nursing   However she remains unresponsive, unable to follow commands or move spontaneously.   MRI today per neuro to eval prior stroke seen on CT.   Cr and Hgb stable   WBC with slight improvement, afebrile  Good UOP from escobar   Staples intact  Abd drain ~250ml output   GI evaluated pt last week for episode of coffee ground emesis 1/12, they are signing off, holding on NGT as she is having Bms    CT a/p 1/14 images reviewed:  Result Date: 1/14/2024  Right retroperitoneal fluid collection in the nephrectomy bed is inferior and anterior to the surgical drain, measuring 4.5 x 3.0 cm, concerning for postoperative abscess.         OBJECTIVE:     Vitals:  Patient Vitals for the past 8 hrs:   BP Temp Temp src Pulse Resp SpO2   01/15/24 0745 (!) 155/73 98.7 °F (37.1 °C) Axillary 74 17 --   01/15/24 0345 (!) 163/67 98 °F (36.7 °C) Oral 84 18 100 %   01/15/24 0304 -- -- -- -- 18 --   [id number] 1901 - 01/15 0700  In: 2007.1 [I.V.:1457.1]  Out: 3850 [Urine:3550; Drains:300]     Last 8 hours  No intake/output data recorded.    Drain Output (last 8hr):  Closed/Suction Drain Left Abdomen Bulb-Output (ml): 30 ml      Physical Exam:   Nonresponsive, eyes open, unable to move extremities or head  Resp: normal effort  Abd: inc c/d/I with staples, no sign of infection. LORETTA with s/s output l Abd soft, ND    Labs:  CBC:   Lab Results   Component Value Date/Time    
                      Urology Progress Note    Admit Date:  1/10/2024    Admit Dx: Kidney abscess [N15.1]  Renal abscess, right [N15.1]        SUBJECTIVE:     Still obtunded this am. Hgb and UOP low.      OBJECTIVE:     Vitals:  Patient Vitals for the past 8 hrs:   BP Temp Temp src Pulse Resp   01/13/24 0730 (!) 120/56 98.5 °F (36.9 °C) Axillary 84 --   01/13/24 0715 (!) 113/47 -- -- 78 --   01/13/24 0700 (!) 126/54 -- -- 88 15   01/13/24 0330 (!) 109/42 -- -- 71 15   01/13/24 0315 (!) 108/48 98.5 °F (36.9 °C) Axillary 76 13   01/13/24 0300 (!) 101/42 -- -- 71 10   01/13/24 0245 (!) 95/43 -- -- 69 13   01/13/24 0230 (!) 110/40 -- -- 70 10   01/13/24 0215 (!) 112/48 -- -- 73 11   01/13/24 0200 (!) 108/47 -- -- 73 (!) 9   [id number]/11 1901 - 01/13 0700  In: 2230.5 [I.V.:1165.8]  Out: 710 [Urine:450; Drains:260]     Last 8 hours  No intake/output data recorded.    Drain Output (last 8hr):  Closed/Suction Drain Left Abdomen Bulb-Output (ml): 100 ml      Physical Exam:   Gen: eyes open but not responsive  Resp: normal effort  Abd: inc c/d/I with staples.     Labs:  CBC:   Lab Results   Component Value Date/Time    HGB 5.7 01/13/2024 06:56 AM    HCT 19.0 01/13/2024 06:56 AM     BMP:   Lab Results   Component Value Date/Time    BUN 36 01/13/2024 06:56 AM             Assessment:     Procedure(s):  RIGHT RADICAL OPEN NEPHRECTOMY (E R A S)  LIGATION OF RIGHT RENAL ARTERY  REPAIR SMALL BOWEL LACERATION      Plan:     - likely septic shock. Abx per primary team  - acute anemia this am. Blood products ordered. Possible upper GI bleed, GI aware  - NISREEN with low UOP - nephrology following  - will remove dressing and topical anesthetic in am  - replace NGT with additional emesis  - appreciate hospitalist assistance for this patient        Signed By: Bud Gaona MD - January 13, 2024        
                      Urology Progress Note    Admit Date:  1/10/2024    Admit Dx: Kidney abscess [N15.1]  Renal abscess, right [N15.1]      SUBJECTIVE:     POD 6 open right nephrectomy for XGP kidney abscess and repair of small bowel laceration 1/10/23.  Prior admission last month with proteus bacteremia and imaging worked up right renal abscess unresolved after weeks of IV abx prompting scheduled nephrectomy and subsequent hemoptysis, severe AMS/rule out code stroke over the weekend. She is a DNR    1/15  DNR  No issues with hypotension past couple days  VSS or fevers   Large BM overnight per nursing   However she remains unresponsive, unable to follow commands or move spontaneously.   MRI today per neuro to eval prior stroke seen on CT.   Cr and Hgb stable   WBC with slight improvement, afebrile  Good UOP from escobar   Staples intact  Abd drain ~250ml output   GI evaluated pt last week for episode of coffee ground emesis 1/12, they are signing off, holding on NGT as she is having Bms    CT a/p 1/14 images reviewed:  Result Date: 1/14/2024  Right retroperitoneal fluid collection in the nephrectomy bed is inferior and anterior to the surgical drain, measuring 4.5 x 3.0 cm, concerning for postoperative abscess.    1/16  Remains afebrile, vss. Normotensive   +BS and BM, GS signed off  More conversational today, still not moving arms. She is alert and oriented and was cleared for diet from speech  Neurology rec reviewed, suspect PRES with subacute infarcts   Good UOP from escobar   Creat stable  Wbc normalized however another drop in hgb today 8.8->7.1 (GI signed off however hemoptysis was not worked up)  Abd Claudio drain now with minimal output 250ml->10ml yesterday           OBJECTIVE:     Vitals:  Patient Vitals for the past 8 hrs:   BP Temp Temp src Pulse Resp SpO2   01/16/24 0301 (!) 117/46 97.7 °F (36.5 °C) Axillary 60 14 100 %   [id number]/14 1901 - 01/16 0700  In: 4110.3 [P.O.:400; I.V.:2508.4]  Out: 6980 
      Hospitalist Progress Note    NAME: Nicolasa Macdonald   : 1950   MRN: [id number]     Date/Time: 2024 12:18 PM  Patient PCP: Mari Smart APRN - NP    JAELYN: tomorrow   Barriers: Hospice, CT    Assessment / Plan:     AMS, noted on , not POA  Subacute infarcts b/l + PRES  -CT head Bilateral frontal and occipital hypodensities indicative of subacute infarcts.   -Neuro consulted  -Tele monitor, no report of A Fib  Plan:  -MRI/A b/l CVA + PRES  -ECHO w normal EF, no thrombus/vegetations  -Asa. Statin when able to take po   -PT OT   -today pt is more verbal and oriented X3 and she is still interested in hospice. Requested to remove NGT and not interested in surgical interventions, not sure yet re Abx, labs and IVF. D/w Palliative, Hospice consulted.      Right renal abscess and right renal stone  Hx proteus bacteremia   S/P open right nephrectomy with repair of SB laceration 24  -admitted 12/10-, nephrolithiasis, b/l hydro, P. mirabilis bacteremia and complicated UTI, S/p severe sepsis, septic shock, S/p B/L ureteral stent placement , Surgery delayed due to COVID infection, DC on IV zosyn.  -admitted under Uro on 1/10 for right nephrectomy with ligation of the right renal artery on 1/10/2024. During the procedure she had a small bowel laceration that was repaired.   -Urology, vascular, Sx, Nephro, ID on board  -Sx following, off Marcaine infusion   -Tissue Cx: candida/proteus   Plan:  -continue Micafungin & zosyn for 2 weeks but may need longer depending on clinical course. Off vanco   -trial of CLD in AM is still stable and BS +   -morphine scheduled added   -LORETTA drain w ? Pus --> CT abd pelvis showed Right retroperitoneal fluid collection in the nephrectomy bed is inferior and anterior to the surgical drain, measuring 4.5 x 3.0 cm, concerning for  postoperative abscess. --> F/up w Uro to remove when final decision is made re Hospice  ID on board appreciate their input  Repeat CT scan 
      Hospitalist Progress Note    NAME: Nicolasa Macdonald   : 1950   MRN: [id number]     Date/Time: 2024 3:31 PM  Patient PCP: Mari Smart APRN - NP      Assessment / Plan:     AMS, noted on , not POA  Subacute infarcts b/l + PRES  -CT head Bilateral frontal and occipital hypodensities indicative of subacute infarcts.   -Neuro consulted  -Tele monitor, no report of A Fib  Plan:  -MRI/A b/l CVA + PRES  -ECHO w normal EF, no thrombus/vegetations  -Asa. Statin when able to take po   -PT OT   -today pt is more verbal and oriented X3 and she is still interested in hospice. Requested to remove NGT and not interested in surgical interventions, not sure yet re Abx, labs and IVF. D/w Palliative, Hospice consulted.        Right renal abscess and right renal stone  Hx proteus bacteremia   S/P open right nephrectomy with repair of SB laceration 24  -admitted 12/10-, nephrolithiasis, b/l hydro, P. mirabilis bacteremia and complicated UTI, S/p severe sepsis, septic shock, S/p B/L ureteral stent placement , Surgery delayed due to COVID infection, DC on IV zosyn.  -admitted under Uro on 1/10 for right nephrectomy with ligation of the right renal artery on 1/10/2024. During the procedure she had a small bowel laceration that was repaired.   -Urology, vascular, Sx, Nephro, ID on board  -Sx following, off Marcaine infusion   -Tissue Cx: candida/proteus   Plan:  -continue Micafungin & zosyn for 2 weeks but may need longer depending on clinical course. Off vanco   -trial of CLD in AM is still stable and BS +   -morphine scheduled added   -LORETTA drain w ? Pus --> CT abd pelvis showed Right retroperitoneal fluid collection in the nephrectomy bed is inferior and anterior to the surgical drain, measuring 4.5 x 3.0 cm, concerning for  postoperative abscess. --> F/up w Uro to remove when final decision is made re Hospice  Discussed with ID, appreciate their input, will repeat CT scan and hopefully de-escalated 
      Hospitalist Progress Note    NAME: Nicolasa Macdonald   : 1950   MRN: [id number]     Date/Time: 2024 2:22 PM  Patient PCP: Mari Smart APRN - NP    JAELYN:   Barriers:  stable for discharge  tomorrow     Assessment / Plan:     AMS, noted on , not POA  Subacute infarcts b/l + PRES  -CT head Bilateral frontal and occipital hypodensities indicative of subacute infarcts.   -Neuro consulted  -Tele monitor, no report of A Fib  Plan:  -MRI/A b/l CVA + PRES  -ECHO w normal EF, no thrombus/vegetations  -Asa. Statin   -PT OT     Right renal abscess and right renal stone  Hx proteus bacteremia   S/P open right nephrectomy with repair of SB laceration 24  -admitted 12/10-, nephrolithiasis, b/l hydro, P. mirabilis bacteremia and complicated UTI, S/p severe sepsis, septic shock, S/p B/L ureteral stent placement , Surgery delayed due to COVID infection, DC on IV zosyn.  -admitted under Uro on 1/10 for right nephrectomy with ligation of the right renal artery on 1/10/2024. During the procedure she had a small bowel laceration that was repaired.   -Urology, vascular, Sx, Nephro, ID on board  -Sx following, off Marcaine infusion   -Tissue Cx: candida/proteus   Plan:  -continue Micafungin & zosyn for 2 weeks but may need longer depending on clinical course. Off vanco   -trial of CLD in AM is still stable and BS +   -morphine scheduled added   -LORETTA drain w ? Pus --> CT abd pelvis showed Right retroperitoneal fluid collection in the nephrectomy bed is inferior and anterior to the surgical drain, measuring 4.5 x 3.0 cm, concerning for  postoperative abscess. --> F/up w Uro to remove when final decision is made re Hospice  ID on board appreciate their input  Repeat CT scan showed reduction in retroperitoneal fluid collection  Might switch to oral Levaquin once hospice is ready  If urethral stent removed    Hematemesis ? On , ? Gastritis/esophagitis  Anemia   -found with dark brown emesis 
      Hospitalist Progress Note    NAME: Nicolasa Macdonald   : 1950   MRN: [id number]     Date/Time: 2024 2:41 PM  Patient PCP: Mari Smart APRN - NP      Assessment / Plan:     Sepsis  Hypotension 99/53  Anasarca   AMS, noted on   -admitted for right nephrectomy with ligation of the right renal artery on 1/10/2024. During the procedure she had a small bowel laceration that was repaired.   -s/p NS bolus, continue IVF volume expansion.  BP improved 110/49  -continue zosyn.  IV vanco added  -lactic acid 1.7  -blood cultures  NG.    -stat CXR - right basilar atelectasis  -abd Xray: no ileus/SBO  -NPO given mental status  -CT head    Right renal abscess and right renal stone  Hx proteus bacteremia   S/P open right nephrectomy with repair of SB laceration 24  -Surgery delayed due to COVID infection.  Discharged with PICC and on IV zosyn  -continue zosyn and vancomycin  -Urology following  -ID consulted, tissue Cx: yeast/proteus   -Sx following, cont Marcaine infusion   -low threshold to repeat CT abd     Hematemesis ? On , ? Gastritis/esophagitis  Anemia   -found with dark brown emesis   -IV protonix  -keep Hb>7  -pt pulled out NGT   -consulted GI.  GIB vs obstruction.  No plans for scope at this time.     -low threshold to repeat CT abd   -will order one PRBC unit, on  d/w PCP Dr decker, phone [phone]     NISREEN  CKD2  Hypokalemia   -s/p NISREEN ATN last admission  -Cr bump 1.61 on   -UOP - minimal, oliguric  -IVF  -cont escobar   -nephrology following    H/o HTN  -hold PTA coreg due to sepsis    Hypothyroidism  -restart synthroid when able to take po    Recent COVID   -not vaccinated but this is her second COVID infection  -not hypoxic    Sacral and heel wounds / scars POA  -wound care help appreciated.  Photo documentation reviewed    Medical Decision Making:   I personally reviewed labs:  yes as below   I personally reviewed imaging: yes as below   Toxic drug 
      Hospitalist Progress Note    NAME: Nicolasa Macdonald   : 1950   MRN: [id number]     Date/Time: 2024 12:17 PM  Patient PCP: Mari Smart APRN - NP    JAELYN:   Barriers:  stable for discharge, need placement     Assessment / Plan:     AMS, noted on , not POA  Subacute infarcts b/l + PRES  -CT head Bilateral frontal and occipital hypodensities indicative of subacute infarcts.   -Neuro consulted  -Tele monitor, no report of A Fib  Plan:  -MRI/A b/l CVA + PRES  -ECHO w normal EF, no thrombus/vegetations  -Asa. Statin   -PT OT     Right renal abscess and right renal stone  Hx proteus bacteremia   S/P open right nephrectomy with repair of SB laceration 24  -admitted 12/10-, nephrolithiasis, b/l hydro, P. mirabilis bacteremia and complicated UTI, S/p severe sepsis, septic shock, S/p B/L ureteral stent placement , Surgery delayed due to COVID infection, DC on IV zosyn.  -admitted under Uro on 1/10 for right nephrectomy with ligation of the right renal artery on 1/10/2024. During the procedure she had a small bowel laceration that was repaired.   -Urology, vascular, Sx, Nephro, ID on board  -Sx following, off Marcaine infusion   -Tissue Cx: candida/proteus   Plan:  -continue Micafungin & zosyn for 2 weeks but may need longer depending on clinical course. Off vanco   -trial of CLD in AM is still stable and BS +   -morphine scheduled added   -LORETTA drain w ? Pus --> CT abd pelvis showed Right retroperitoneal fluid collection in the nephrectomy bed is inferior and anterior to the surgical drain, measuring 4.5 x 3.0 cm, concerning for  postoperative abscess. --> F/up w Uro to remove when final decision is made re Hospice  ID on board appreciate their input  Repeat CT scan showed reduction in retroperitoneal fluid collection  Might switch to oral Levaquin once hospice is ready  If urethral stent removed  Last dose of IV antibiotics today    Hematemesis ? On , ? 
      Hospitalist Progress Note    NAME: Nicolasa Macdonald   : 1950   MRN: [id number]     Date/Time: 2024 8:40 AM  Patient PCP: Mari Smart APRN - NP    JAELYN:   Barriers:  stent romval likely tomorrw before discharge,     Assessment / Plan:     AMS, noted on , not POA  Subacute infarcts b/l + PRES  -CT head Bilateral frontal and occipital hypodensities indicative of subacute infarcts.   -Neuro consulted  -Tele monitor, no report of A Fib  Plan:  -MRI/A b/l CVA + PRES  -ECHO w normal EF, no thrombus/vegetations  -Asa. Statin   -PT OT     Right renal abscess and right renal stone  Hx proteus bacteremia   S/P open right nephrectomy with repair of SB laceration 24  -admitted 12/10-, nephrolithiasis, b/l hydro, P. mirabilis bacteremia and complicated UTI, S/p severe sepsis, septic shock, S/p B/L ureteral stent placement , Surgery delayed due to COVID infection, DC on IV zosyn.  -admitted under Uro on 1/10 for right nephrectomy with ligation of the right renal artery on 1/10/2024. During the procedure she had a small bowel laceration that was repaired.   -Urology, vascular, Sx, Nephro, ID on board  -Sx following, off Marcaine infusion   -Tissue Cx: candida/proteus   Plan:  -continue Micafungin & zosyn for 2 weeks but may need longer depending on clinical course. Off vanco   -trial of CLD in AM is still stable and BS +   -morphine scheduled added   -LORETTA drain w ? Pus --> CT abd pelvis showed Right retroperitoneal fluid collection in the nephrectomy bed is inferior and anterior to the surgical drain, measuring 4.5 x 3.0 cm, concerning for  postoperative abscess. --> F/up w Uro to remove when final decision is made re Hospice  ID on board appreciate their input  Repeat CT scan showed reduction in retroperitoneal fluid collection  Might switch to oral Levaquin once hospice is ready  Plan for stent removal before discharge    Hematemesis ? On , ? Gastritis/esophagitis  Anemia 
      Hospitalist Progress Note    NAME: Nicolasa Macdonald   : 1950   MRN: [id number]     Date/Time: 2024 12:44 PM  Patient PCP: Mari Smart APRN - NP    Estimated discharge date:    Anticipated Disposition / Barriers:      Assessment / Plan:     Sepsis  Hypotension 99/53  -s/p NS bolus, continue IVF volume expansion.  BP improved 110/49  -continue zosyn.  IV vanco added  -lactic acid 1.7  -blood cultures  NG.    -stat CXR - right basilar atelectasis  -discussed with gen surgery, getting films of abdomen also    Right renal abscess and right renal stone  Hx proteus bacteremia   S/P open right nephrectomy with repair of SB laceration 24  -Surgery delayed due to COVID infection.  Discharged with PICC and on IV zosyn  -continue zosyn and vancomycin  -Urology following  -on FLD, advance to low fiber diet as tolerated  -ID consulted    Hematemesis ?  -found with dark brown emesis   -start IV protonix  -serial Hg  -consulted GI.  GIB vs obstruction.  No plans for scope at this time.     -discussed with Gen surgery and getting abd xrays.   CT if needed    NISREEN  CKD2  -s/p NISREEN ATN last admission  -Cr bump 1.61.   UOP - minimal, oliguric  -IVF  -nephrology following    HTN  -hold PTA coreg due to sepsis    Hypothyroidism  -restart synthroid    Recent COVID   -not vaccinated but this is her second COVID infection  -not hypoxic    Sacral and heel wounds / scars POA  -wound care help appreciated.  Photo documentation reviewed    Medical Decision Making:   I personally reviewed labs:  CBC, BMP  I personally reviewed imaging:  Toxic drug monitoring:   Discussed case with: , lead RN during today's interdisciplinary rounds.    Central Line: PICC Single Lumen 24 Right Brachial-Central Line Being Utilized: No    Code status: Full  Prophylaxis: SCD's    Subjective:     Chief Complaint / Reason for Physician Visit  Follow up of renal abscess, recent proteus bacteremia, COVID, HTN, 
      Hospitalist Progress Note    NAME: Nicolasa Macdonald   : 1950   MRN: [id number]     Date/Time: 1/15/2024 9:20 AM  Patient PCP: Mari Smart APRN - NP      Assessment / Plan:     AMS, noted on , not POA  Subacute infarcts b/l   -CT head Bilateral frontal and occipital hypodensities indicative of subacute infarcts.   -Neuro consulted  -Tele monitor, no report of A Fib  Plan:  -CAROL ANN, PCP agreed  -MRI/A  -Holter monitor on DC  -ECHO  -Asa. Statin when able to take po   -PT OT when cooperative   -BP goal normotensive     Right renal abscess and right renal stone  Hx proteus bacteremia   S/P open right nephrectomy with repair of SB laceration 24  -admitted 12/10-, nephrolithiasis, b/l hydro, P. mirabilis bacteremia and complicated UTI, S/p severe sepsis, septic shock, S/p B/L ureteral stent placement , Surgery delayed due to COVID infection, DC on IV zosyn.  -admitted under Uro on 1/10 for right nephrectomy with ligation of the right renal artery on 1/10/2024. During the procedure she had a small bowel laceration that was repaired.   -Urology, vascular, Sx, Nephro, ID on board  -Sx following, off Marcaine infusion   -Tissue Cx: candida/proteus   Plan:  -continue Micafungin, zosyn and vanco   -CLD if pt able to swallow. Need to consider artificial nutrition in next couple of days, PCP agreeable   -morphine scheduled added, pt looks uncomfortable   -LORETTA drain w ? Pus --> CT abd pelvis showed Right retroperitoneal fluid collection in the nephrectomy bed is inferior and anterior to the surgical drain, measuring 4.5 x 3.0 cm, concerning for  postoperative abscess.  -IR drainage     Hematemesis ? On , ? Gastritis/esophagitis  Anemia   -found with dark brown emesis   -pt pulled out NGT   -consulted GI.  GIB vs obstruction.  No plans for scope at this time.     -given one PRBC unit, on  d/w POA Dr decker, phone [phone]   Plan:   -IV protonix  -keep Hb>7    Sepsis  Hypotension 
      Hospitalist Progress Note    NAME: Nicolasa Macdonald   : 1950   MRN: [id number]     Date/Time: 2024 12:36 PM  Patient PCP: Mari Smart APRN - NP      Assessment / Plan:     AMS, noted on , not POA  Subacute infarcts b/l + PRES  -CT head Bilateral frontal and occipital hypodensities indicative of subacute infarcts.   -Neuro consulted  -Tele monitor, no report of A Fib  Plan:  -CAROL ANN, PCP agreed  -MRI/A b/l CVA + PRES  -Holter monitor on DC  -ECHO w normal EF, no thrombus/vegetations  -Asa. Statin when able to take po   -PT OT when cooperative   -BP goal normotensive   -today pt is more verbal and more oriented and she said she is interested in hospice seems more with it but does not know name of president, does not know name of the hospital. Was able to remember , name of POA, she know she had a stroke. When asked about what hospice is she said \"where people take care of people\"    Right renal abscess and right renal stone  Hx proteus bacteremia   S/P open right nephrectomy with repair of SB laceration 24  -admitted 12/10-, nephrolithiasis, b/l hydro, P. mirabilis bacteremia and complicated UTI, S/p severe sepsis, septic shock, S/p B/L ureteral stent placement , Surgery delayed due to COVID infection, DC on IV zosyn.  -admitted under Uro on 1/10 for right nephrectomy with ligation of the right renal artery on 1/10/2024. During the procedure she had a small bowel laceration that was repaired.   -Urology, vascular, Sx, Nephro, ID on board  -Sx following, off Marcaine infusion   -Tissue Cx: candida/proteus   Plan:  -continue Micafungin, zosyn and vanco   -CLD if pt able to swallow. Need to consider artificial nutrition in next couple of days, PCP agreeable   -morphine scheduled added, pt looks uncomfortable   -LORETTA drain w ? Pus --> CT abd pelvis showed Right retroperitoneal fluid collection in the nephrectomy bed is inferior and anterior to the surgical drain, measuring 4.5 x 3.0 
      Hospitalist Progress Note    NAME: Nicolasa Macdonald   : 1950   MRN: [id number]     Date/Time: 2024 12:34 PM  Patient PCP: Mari Smart APRN - NP    JAELYN: tomorrow   Barriers: Hospice arrangement, ready for discharge discussed with Kay- [phone]  patient MPOA    Assessment / Plan:     AMS, noted on , not POA  Subacute infarcts b/l + PRES  -CT head Bilateral frontal and occipital hypodensities indicative of subacute infarcts.   -Neuro consulted  -Tele monitor, no report of A Fib  Plan:  -MRI/A b/l CVA + PRES  -ECHO w normal EF, no thrombus/vegetations  -Asa. Statin when able to take po   -PT OT   -today pt is more verbal and oriented X3 and she is still interested in hospice. Requested to remove NGT and not interested in surgical interventions, not sure yet re Abx, labs and IVF. D/w Palliative, Hospice consulted.      Right renal abscess and right renal stone  Hx proteus bacteremia   S/P open right nephrectomy with repair of SB laceration 24  -admitted 12/10-, nephrolithiasis, b/l hydro, P. mirabilis bacteremia and complicated UTI, S/p severe sepsis, septic shock, S/p B/L ureteral stent placement , Surgery delayed due to COVID infection, DC on IV zosyn.  -admitted under Uro on 1/10 for right nephrectomy with ligation of the right renal artery on 1/10/2024. During the procedure she had a small bowel laceration that was repaired.   -Urology, vascular, Sx, Nephro, ID on board  -Sx following, off Marcaine infusion   -Tissue Cx: candida/proteus   Plan:  -continue Micafungin & zosyn for 2 weeks but may need longer depending on clinical course. Off vanco   -trial of CLD in AM is still stable and BS +   -morphine scheduled added   -LORETTA drain w ? Pus --> CT abd pelvis showed Right retroperitoneal fluid collection in the nephrectomy bed is inferior and anterior to the surgical drain, measuring 4.5 x 3.0 cm, concerning for  postoperative abscess. --> F/up w Uro to remove when final 
      Hospitalist Progress Note    NAME: Nicolasa Macdonald   : 1950   MRN: [id number]     Date/Time: 2024 9:39 PM  Patient PCP: Mari Smart APRN - NP      Assessment / Plan:     AMS, noted on , not POA  Subacute infarcts b/l + PRES  -CT head Bilateral frontal and occipital hypodensities indicative of subacute infarcts.   -Neuro consulted  -Tele monitor, no report of A Fib  Plan:  -MRI/A b/l CVA + PRES  -ECHO w normal EF, no thrombus/vegetations  -Asa. Statin when able to take po   -PT OT   -today pt is more verbal and oriented X3 and she is still interested in hospice. Requested to remove NGT and not interested in surgical interventions, not sure yet re Abx, labs and IVF. D/w Palliative, Hospice consulted.      Right renal abscess and right renal stone  Hx proteus bacteremia   S/P open right nephrectomy with repair of SB laceration 24  -admitted 12/10-, nephrolithiasis, b/l hydro, P. mirabilis bacteremia and complicated UTI, S/p severe sepsis, septic shock, S/p B/L ureteral stent placement , Surgery delayed due to COVID infection, DC on IV zosyn.  -admitted under Uro on 1/10 for right nephrectomy with ligation of the right renal artery on 1/10/2024. During the procedure she had a small bowel laceration that was repaired.   -Urology, vascular, Sx, Nephro, ID on board  -Sx following, off Marcaine infusion   -Tissue Cx: candida/proteus   Plan:  -continue Micafungin & zosyn for 2 weeks but may need longer depending on clinical course. Off vanco   -trial of CLD in AM is still stable and BS +   -morphine scheduled added   -LORETTA drain w ? Pus --> CT abd pelvis showed Right retroperitoneal fluid collection in the nephrectomy bed is inferior and anterior to the surgical drain, measuring 4.5 x 3.0 cm, concerning for  postoperative abscess. --> F/up w Uro to remove when final decision is made re Hospice    Hematemesis ? On , ? Gastritis/esophagitis  Anemia   -found with dark brown emesis 
      [facility]  Preoperative Instructions        Surgery Date 1/10/2024    Time of Arrival 1000am  Contact# [facility] [phone]    1. On the day of your surgery, please report to the Surgical Services Registration Desk and sign in at your designated time. The Surgery Center is located to the right of the Emergency Room.     2. You must have someone with you to drive you home. You should not drive a car for 24 hours following surgery. Please make arrangements for a friend or family member to stay with you for the first 24 hours after your surgery.    3. Do not have anything to eat or drink (including water, gum, mints, coffee, juice) after midnight ?1/9/2024  .?This may not apply to medications prescribed by your physician. ?(Please note below the special instructions with medications to take the morning of your procedure.)    4. We recommend you do not drink any alcoholic beverages for 24 hours before and after your surgery.    5. Contact your surgeon’s office for instructions on the following medications: non-steroidal anti-inflammatory drugs (i.e. Advil, Aleve), vitamins, and supplements. (Some surgeon’s will want you to stop these medications prior to surgery and others may allow you to take them)  **If you are currently taking Plavix, Coumadin, Aspirin and/or other blood-thinning agents, contact your surgeon for instructions.** Your surgeon will partner with the physician prescribing these medications to determine if it is safe to stop or if you need to continue taking.  Please do not stop taking these medications without instructions from your surgeon    6. Wear comfortable clothes.  Wear glasses instead of contacts.  Do not bring any money or jewelry. Please bring picture ID, insurance card, and any prearranged co-payment or hospital payment.  Do not wear make-up, particularly mascara the morning of your surgery.  Do not wear nail polish, particularly if you are 
    Patient: Nicolasa Macdonald MRN: [id number]  SSN: [id number]    YOB: 1950  Age: 73 y.o.  Sex: female        ADMITTED: 1/10/2024 to Zac Lind MD by aZc Lind MD for Kidney abscess [N15.1]  Renal abscess, right [N15.1]  POD# 2 Days Post-Op Procedure(s):  RIGHT RADICAL OPEN NEPHRECTOMY (E R A S)  LIGATION OF RIGHT RENAL ARTERY  REPAIR SMALL BOWEL LACERATION    Nicolasa Macdonald is doing very poor this morning. She is 2 days post-op procedure for difficult right radical open nephrectomy (E R A S), Ligation of right renal artery and repair of small bowel. Rapid response was called 1/12/24 on patient due to hematemesis described as coffee ground emesis and decreased mental status. Patient very lethargic. Minimally responsive to tactile stimulus this AM when entering room. This morning her vital signs she was noted to have hypotension with BP 90s/50s and tachycardic with HR .    Labs:  Creat 1.61 on 1/12/24 from 1.26 on 1/12/24 early AM, from 0.91 on 1/11/24 from 0.92 on 1/10/24.  Hgb 7.9 on 1/12/24 from 7.6 on 1/12/24 early AM, from 8.5 on 1/11/24 from 8.7 on 1/10/24.   WBC 30.3 on 1/12/24 from 27.6 on 1/12/24 early AM from 17.4 on 1/11/24 from 10.4 on 1/10/24.    UA 1/10/24 suspicious for ongoing infection. Urine Cx 1/10/24 notes no growth.     Hospital medicine team stepped in this morning and provided care in rapid response including fluid bolus, stat lab evaluation, VS monitoring as well as consulting ICU and GI.     Lactic Acid 1.7 on 1/12/24 @ 1031 hrs.     This patient presentation is sudden change from yesterday as patient was alert and oriented, verbally communicative with complaint of some back pain alone. No fevers or chills yesterday or overnight.     Urine output overnight decreased with 250 ml since 0330 hrs 1/12/24.     As of 1245 hrs 1/12/24 VS appear improved with HR 91, /49, Temp 98.2 and RR 16. On repeat rounding of patient both times she is verbalizing grunts or nods. 
    Patient: Nicolasa Macdonald MRN: [id number]  SSN: [id number]    YOB: 1950  Age: 73 y.o.  Sex: female        ADMITTED: 1/10/2024 to Zac Lind MD by Zac Lind MD for Kidney abscess [N15.1]  Renal abscess, right [N15.1]  POD# 1 Day Post-Op Procedure(s):  RIGHT RADICAL OPEN NEPHRECTOMY (E R A S)  LIGATION OF RIGHT RENAL ARTERY  REPAIR SMALL BOWEL LACERATION    Nicolasa Macdonald is doing fair today. Tolerating PO liquids. Some back pain that is tolerable at this time. Mobility diminished today. No n/v, fevers or chills. LORETTA drain with dark venous OP expected given difficult procedure. ID consulted for abx treatment. No abdominal distension. Emptying clear yellow urine in escobar bag.      Vitals: Temp (24hrs), Av °F (36.1 °C), Min:96.6 °F (35.9 °C), Max:97.3 °F (36.3 °C)    Blood pressure 114/64, pulse 94, temperature 97 °F (36.1 °C), temperature source Rectal, resp. rate 20, height 1.702 m (5' 7\"), weight 77.1 kg (169 lb 15.6 oz), SpO2 97 %.    Intake and Output:   190 -  0700  In: 4850 [I.V.:4500]  Out: 2300 [Urine:600; Drains:200]   0701 -  190  In: -   Out: 60 [Drains:60]  LORETTA Output lats 24 hrs: No data found.   LORETTA Output last 8 hrs: No data found.  BM over last 24 hrs: No data found.    Physical Exam  General: NAD, pleasant  Respiratory: no distress, breathing easily, room air  Abdomen: soft, no distention; non-tender to palpation; surgical incisions are clean, dry and intact.  : no CVA tenderness, emptying clear yellow UA in escobar bag  Neuro: Appropriate, no focal neurological deficits, some back pain minimal tolerable right now  Skin: warm, dry  Extremities: moves all, full ROM    Labs:  CBC:   Lab Results   Component Value Date/Time    WBC 17.4 2024 06:12 AM    HCT 28.7 2024 06:12 AM     2024 06:12 AM     BMP:   Lab Results   Component Value Date/Time     2024 06:12 AM    K 3.7 2024 06:12 AM     2024 06:12 AM    
    Patient: Nicolasa Macdonald MRN: [id number]  SSN: [id number]    YOB: 1950  Age: 74 y.o.  Sex: female        ADMITTED: 1/10/2024 to Massiel Ordonez MD by Zac Lind MD for Kidney abscess [N15.1]  Renal abscess, right [N15.1]  POD# 12 Days Post-Op Procedure(s):  RIGHT RADICAL OPEN NEPHRECTOMY (E R A S)  LIGATION OF RIGHT RENAL ARTERY  REPAIR SMALL BOWEL LACERATION    Nicolasa Macdonald is doing poor this morning she is very confused and drowsy not able to have interactive conversation.  Called her medical POA Fabian to discuss need for left ureteral stent removal.  Discussed with her no need for anesthesia it is a removal of the ureteral stent with a cystoscope..  There is concern for long-term stent becoming encrusted and there for being a source of infection.  CT scans have been reviewed by Dr. Lind he is confident there is no ureteral stone..    Vss afebrile  K 2.8  Cr 0.77  Hgb 6.8    Vitals: Temp (24hrs), Av.5 °F (36.4 °C), Min:97 °F (36.1 °C), Max:98 °F (36.7 °C)    Blood pressure 134/61, pulse 72, temperature 97.7 °F (36.5 °C), temperature source Axillary, resp. rate 19, height 1.702 m (5' 7.01\"), weight 77.1 kg (169 lb 15.6 oz), SpO2 93 %, peak flow (!) 16 L/min.    Intake and Output:   1901 -  0700  In: 3461.4 [I.V.:2916.4]  Out: 1300 [Urine:1300]  No intake/output data recorded.  LORETTA Output lats 24 hrs: No data found.   LORETTA Output last 8 hrs: No data found.  BM over last 24 hrs: No data found.    Exam:   Gen: NAD , confused  CV: extremities well perfused  Lungs: nonlabored respirations. Symmetric chest expansion  Ext: no edema      Labs:  CBC:   Lab Results   Component Value Date/Time    WBC 10.8 2024 05:17 AM    HCT 21.3 2024 05:17 AM     2024 05:17 AM     BMP:   Lab Results   Component Value Date/Time     2024 05:17 AM    K 2.8 2024 05:17 AM     2024 05:17 AM    CO2 29 2024 05:17 AM    BUN 10 2024 05:17 AM 
   GI Progress Note (Aly)  NAME:Nicolasa Macdonald   :1950   MRN:[id number]   ATTG: Audrey Barfield MD     PCP: Mari Smart APRN - NP  Date/Time:  2024 9:51 AM       ASSESSMENT:     73 y.o. female who was initially admitted 12/10/23 for AMS at her nursing facility, found to have a R renal abscess, septic shock, and NSTEMI.  She underwent R nephrectomy 1/10/24, c/b small bowel lacerationo s/p repair.  Patient had a witnessed coffee-ground emesis on  accompanied by mental status change  Ongoing treatment for sepsis 2/2 R renal abscess  CTbrain w/ subacute infarcts, neurology following  Patient goals of care w/ limit to escalation of some therapies      PLAN:     Stable H/H, ongoing treatment for sepsis, critically ill female  Recommend IV PPI BID  Continue to monitor stool output postoperatively  Nothing further to add from a GI standpoint.  Signing off.  Please reengage the consult team if we can be of additional assistance     Subjective:   Discussed with RN events overnight.  Patient having brown stools.  NG tube was dislodged at some point but had had minimal output.  Hgb yesterday morning was 5.7 but drawn off a bruised IV site due to difficult access, likely aberrant lab.  Transfused 1u pRBC's and Hgb has been 8.6 - 8.3 and stable.  On vanc/cefepime/meropenem/micafungin per ID recommendations; appears to have purulent fluid from LORETTA drain      Review of Systems:  Review of Systems   Unable to perform ROS: Mental status change        Objective:   VITALS:   Last 24hrs VS reviewed since prior progress note. Most recent are:  Vitals:    24 0745   BP: (!) 172/70   Pulse: 89   Resp: 20   Temp:    SpO2:        Intake/Output Summary (Last 24 hours) at 2024 0951  Last data filed at 2024 0804  Gross per 24 hour   Intake 3491.07 ml   Output 550 ml   Net 2941.07 ml     PHYSICAL EXAM:  General: Chronically ill appearing  Lungs:  No wheezing, rales, or rhonchi  Heart:  Regular 
  Physician Progress Note      PATIENT:               NELLIE MCGILL  CSN #:                  [id number]  :                       1950  ADMIT DATE:       1/10/2024 10:00 AM  DISCH DATE:  RESPONDING  PROVIDER #:        Massiel Ordonez MD          QUERY TEXT:    Pt admitted with Right renal abscess. Pt noted to have severe sepsis (ID pn   12-17. If possible, please document in progress notes and discharge summary   the present on admission status of ***:    The medical record reflects the following:  Risk Factors: Right renal abscess, S/p COVID infection   Clinical Indicators: ID pn 12--17- severe sepsis, wbc 10-27-30, lac 1.7,   procal 1.46, t 96.  IntraOp cultures 1/10-+ for light C glabrata, rare P   mirabilis  Treatment: S/p open right nephrectomy & repair of small bowel laceration 1/10,   micafungin , zosyn, vanc dc'd  Thanks, Jelly Woods RN/CDI [phone]  Options provided:  -- Yes, sepsis was present at the time of the order to admit to the hospital  -- No, sepsis was not present on admission and developed during the inpatient   stay  -- Other - I will add my own diagnosis  -- Disagree - Not applicable / Not valid  -- Disagree - Clinically unable to determine / Unknown  -- Refer to Clinical Documentation Reviewer    PROVIDER RESPONSE TEXT:    Yes, sepsis was present at the time of the order to admit to the hospital.    Query created by: Jelly Woods on 2024 1:50 PM      Electronically signed by:  Massiel Ordonez MD 2024 7:45 PM          
 2130 Pt began to vomit a feces like substance (400ML) after turning and HR went into the 150s.Called RR, got EKG, KUB, and did lab work. LORETTA drain began to put out more fluid.     2230 Advised by the overnight nocturnist Aishwarya Douglas NP and the RRN to notify the Surgery team of the events going on. Called and left a message for Gen Surg.    2238 Received a message from ROMEO Hickman informing him of the patients status of the emesis and the LORETTA drains and that we had called a RR.  A KUB was in process and did he want a NGT placed.  He stated no and that if she vomited again to place the NGT and as far as the LORETTA drains go that was Urology and there was no need to call them they would just check on them in the morning when they do rounds.    2249  Dr. Cortez called back and then said the had previously sign off on the patient and that since he had been reconsulted and since the patient was vomiting a NGT needed to be placed.            2310 NP put in orders for NGT and  NGT was placed.          See RRN and NP note for more      End of Shift Note    Bedside shift change report given to GHULAM Maxwell (oncoming nurse) by Kay Ulrich RN (offgoing nurse).  Report included the following information SBAR, Kardex, Intake/Output, MAR, Recent Results, and Med Rec Status    Shift worked:  night     Shift summary and any significant changes:     See above     Concerns for physician to address:       Zone phone for oncoming shift:          Activity:     Number times ambulated in hallways past shift: 0  Number of times OOB to chair past shift: 0    Cardiac:   Cardiac Monitoring: Yes           Access:  Current line(s): PIV and PICC     Genitourinary:   Urinary status: escobar    Respiratory:      Chronic home O2 use?: NO  Incentive spirometer at bedside: NO       GI:     Current diet:  ADULT ORAL NUTRITION SUPPLEMENT; Lunch, Dinner; Clear Liquid Oral Supplement  ADULT DIET; Full Liquid  Passing flatus: YES  Tolerating 
 Vascular Surgery Progress Note  Marce Burr ACNP-BC      Date:2024       Room:[address] [place] [address]  Patient Name:Nicolasa Macdonald     YOB: 1950     Age:73 y.o.    Subjective      Ms. Nicolasa Macdonald is a 73-year-old female with past medical history significant for thyroid disease, hypertension, hyperlipidemia, and cardiomyopathy.  She is a non-smoker.  She is was recently admitted to the hospital with septic shock secondary to Proteus bacteremia.  Radiological evaluation revealed a right renal abscess.      She is now admitted to the hospital following right nephrectomy with ligation of the right renal artery on 1/10/2024.  During the procedure she had a small bowel laceration that was repaired.    This a.m. she is lying flat in bed.  Her vital signs are stable.  She has a mild leukocytosis.  Her creatinine remains normal.  She is producing adequate urine.    Objective           Vitals Last 24 Hours:  TEMPERATURE:  Temp  Av.1 °F (36.2 °C)  Min: 96.6 °F (35.9 °C)  Max: 97.9 °F (36.6 °C)  RESPIRATIONS RANGE: Resp  Av.4  Min: 8  Max: 23  PULSE OXIMETRY RANGE: SpO2  Av.6 %  Min: 99 %  Max: 100 %  PULSE RANGE: Pulse  Av.6  Min: 55  Max: 97  BLOOD PRESSURE RANGE: Systolic (24hrs), Av , Min:91 , Max:177   ; Diastolic (24hrs), Av, Min:60, Max:75    I/O (24Hr):    Intake/Output Summary (Last 24 hours) at 2024 1013  Last data filed at 2024 0504  Gross per 24 hour   Intake 4850 ml   Output 2300 ml   Net 2550 ml     Objective:  General Appearance:  Comfortable.    Vital signs: (most recent): Blood pressure 127/70, pulse 74, temperature 97 °F (36.1 °C), temperature source Rectal, resp. rate 18, height 1.702 m (5' 7\"), weight 77.1 kg (169 lb 15.6 oz), SpO2 100 %.  Vital signs are normal.  No fever.    Output: Producing urine.    Lungs:  Normal effort and normal respiratory rate.    Heart: Normal rate.  Regular rhythm.    Abdomen: Abdomen is soft.    Extremities: Normal range of 
 on the floor rounding;attempted a visit with the patient. Ms. Macdonald was asleep when the  entered her room.  prayed silently.   services are available 24 hours a day as requested.     Rev. ROMEO Rajan [facility]   Paging Service [phone])  
 was paged by  to assist patient with her AMD document. Only first session of document was completed, patient was unable to sign but she  was verbally agreeable to Jenn Bowser being her MPOA. , patient's nurse and  witnessed document.  A copy was made for her chart and an additional copy with original returned to patient in her room.    Visited by: Chaplain Vish Moy M.Div., [facility].   Paging Service: [phone])  
-MRI pending completion of MRI Safety Screening Form.    -Patient cannot be scanned until this form is completed.  
0700. Bedside shift change report given to Slade RN (oncoming nurse) by Gita RN (offgoing nurse). Report included the following information Nurse Handoff Report, Index, Intake/Output, MAR, Recent Results, and Cardiac Rhythm NSR .     1900. Bedside shift change report given to José Miguel RN (oncoming nurse) by Slade Rn (offgoing nurse). Report included the following information Nurse Handoff Report, Index, Surgery Report, Intake/Output, MAR, Recent Results, and Cardiac Rhythm NSR .     
0700. Bedside shift change report given to Slade RN (oncoming nurse) by Susi RN (offgoing nurse). Report included the following information Nurse Handoff Report, Index, Intake/Output, MAR, Recent Results, and Cardiac Rhythm NSR-Sinus tach .     1208. Spoke with pharmacy regarding vancomycin dosing. MAR alert for high vanc dose. Will hold until I hear from pharmacy examining now.    1220. Pharmacy call returned, patient does not need additional vancomycin at this time based on lab work. Order discontinued and will be reassessed tomorrow.     Patient output from escobar remarkably low. MD aware.     1900. Bedside shift change report given to Rema RN (oncoming nurse) by Slade rn (offgoing nurse). Report included the following information Nurse Handoff Report, Index, Intake/Output, MAR, Recent Results, and Cardiac Rhythm NSR .     
0730 -Pt able to move RUE and LLE slightly, has full sensation throughout. Large incontinent bm noted. PT oriented x 3, disoriented to situation, follows commands, on room air. VS WNL, pt tachycardic in low 100s. C/O 10/10 pain.    0915 - Perfect serve sent to hospitalist regarding hgb.    0930 - Order received for transfusion. Scheduled morphine admin for 10/10 pain.    1050 - Report given to GHULAM Cruz from OR. Pt to have renal stent removed.  1130 - 1 unit prbcs started per order.    1400 - Pt returned to room following surgery. Report received from GHULAM Escudero from PACU.  Pt A & O 4, followng commands. Pt has new escobar placed by urology, sinus kira on monitor w/pvcs. Reassessment documented. See flow sheets.      1600 - Reassessment documented. See flow sheets.    Bedside and Verbal shift change report given to GHULAM Stephenson (oncoming nurse) by GHULAM Buchanan (offgoing nurse). Report included the following information Nurse Handoff Report, Index, Intake/Output, MAR, Recent Results, and Med Rec Status.     
0900  Pt had large watery, brown BM.  Pt refusing to be turned at this time.      1100  Pt not wanting to be repositioned at this time.  
1500. Bedside shift change report given to Slade RN (oncoming nurse) by Gunjan RN (offgoing nurse). Report included the following information Nurse Handoff Report, Index, Intake/Output, MAR, Recent Results, and Cardiac Rhythm NSR .     1750. Patient placed on isoair bed due to concerns for dai    1900. Bedside shift change report given to Susi RN (oncoming nurse) by Slade RN (offgoing nurse). Report included the following information Nurse Handoff Report, Index, Intake/Output, MAR, Recent Results, and Cardiac Rhythm NSR .     
1540: Bedside and verbal shift report completed by Alissa Harper, RN to Lu Larsen RN.     1600: Assessment completed. Pt repositioned in bed.     1800: Pt repositioned in bed. Phyllis Putnam MD at bedside to examine patient.     1940: Bedside and verbal shift report completed by Lu Larsen, RN to Kiki Gama RN.   
1900H: Bedside shift change report given to MARIA GUADALUPE PARMAR (oncoming nurse) by SULLY PARMAR (offgoing nurse). Report included the following information Nurse Handoff Report, Intake/Output, MAR, Recent Results, Med Rec Status, and Cardiac Rhythm SINUS RHYTHM .      End of Shift Note    Bedside shift change report given to CODY PRAMAR (oncoming nurse) by Maria Guadalupe Carlson RN (offgoing nurse).  Report included the following information SBAR, Intake/Output, MAR, Recent Results, Med Rec Status, and Cardiac Rhythm VENTRICULAR BIGEMINY    Shift worked:  1900H-0730H     Shift summary and any significant changes:     Corby-tachy syndrome noted in the telemonitor. Assessed patient, no report for chest pain nor discomfort. Pain management tolerated well.     Concerns for physician to address:       Zone phone for oncoming shift:          Activity:     Number times ambulated in hallways past shift: 0  Number of times OOB to chair past shift: 0    Cardiac:   Cardiac Monitoring: Yes           Access:  Current line(s): PIV and midline     Genitourinary:   Urinary status: escobar    Respiratory:      Chronic home O2 use?: NO  Incentive spirometer at bedside: N/A       GI:     Current diet:  ADULT DIET; Full Liquid  ADULT ORAL NUTRITION SUPPLEMENT; Lunch, Dinner; Standard High Calorie/High Protein Oral Supplement  Passing flatus: YES  Tolerating current diet: YES       Pain Management:   Patient states pain is manageable on current regimen: YES    Skin:     Interventions: specialty bed, float heels, foam dressing, PT/OT consult, internal/external urinary devices, and nutritional support    Patient Safety:  Fall Score:    Interventions: bed/chair alarm, assistive device (walker, cane. etc), gripper socks, and pt to call before getting OOB       Length of Stay:  Expected LOS: 14  Actual LOS: 14      Maria Guadalupe Carlson RN                           
1900h: Bedside shift change report given to MARIA GUADALUPE RN (oncoming nurse) by MERLIN RN (offgoing nurse). Report included the following information Nurse Handoff Report, Intake/Output, MAR, Recent Results, Med Rec Status, and Cardiac Rhythm SINUS RHYTHM .      -Patient for discharge to [facility]; All has been settled as per offgoing nurse, patient just need to be transported by [facility].    2100h: Will from Transport called, informed [facility] ETA at 2245h  0030H: [facility] came; shifted patient to stretcher; Discharged.     -Telephone Report given to Judit (Critical access hospital Staff)  
1900h: Bedside shift change report given to SEEMA PARMAR (oncoming nurse) by CLAYTON PARMAR (offgoing nurse). Report included the following information Nurse Handoff Report, Intake/Output, MAR, Recent Results, Med Rec Status, and Cardiac Rhythm SINUS BRADYCARDIA  .      End of Shift Note    Bedside shift change report given to MERLIN RN (oncoming nurse) by Seema Carlson RN (offgoing nurse).  Report included the following information SBAR, Intake/Output, MAR, Recent Results, Med Rec Status, and Cardiac Rhythm SINUS BRADYCARDIA    Shift worked:  1900H-0730H     Shift summary and any significant changes:     Uneventful night     Concerns for physician to address:       Zone phone for oncoming shift:          Activity:     Number times ambulated in hallways past shift: 0  Number of times OOB to chair past shift: 0    Cardiac:   Cardiac Monitoring: Yes           Access:  Current line(s): PIV     Genitourinary:   Urinary status: escobar    Respiratory:      Chronic home O2 use?: NO  Incentive spirometer at bedside: N/A       GI:     Current diet:  ADULT DIET; Full Liquid  ADULT ORAL NUTRITION SUPPLEMENT; Lunch, Dinner; Standard High Calorie/High Protein Oral Supplement  Passing flatus: YES  Tolerating current diet: YES       Pain Management:   Patient states pain is manageable on current regimen: YES    Skin:     Interventions: specialty bed, float heels, foam dressing, PT/OT consult, and internal/external urinary devices    Patient Safety:  Fall Score:    Interventions: bed/chair alarm, assistive device (walker, cane. etc), gripper socks, and pt to call before getting OOB       Length of Stay:  Expected LOS: 15  Actual LOS: 15      Seema Carlson RN                           
2000 - assessment complete, see flow sheet.  Pt is AA&O, delayed responses.  Legs are stiff with knees locked, unable to move them to command.  BUE weak to flaccid , pt has slight gross motor movement to hands, no fine motor skills noted at this time.  Pt given pancake call light and instructed on it's use.  Resp rate is even and unlabored, diminished throughout all fields.      2130 - PM medications administered per MAR.  Patient has had a very large, loose BM.    2200 - pt has been bathed, gown and linens changed.  [place] care completed, barrier cream applied to skin folds, Pt turned and positioned for comfort. Tolerated well    0000 - Reassessment complete, pt easily awakens, no c/o voiced at this time.        0400 Reassessment complete, see flow sheet, pt resting quietly with eyes closed, easily awakens, no c/o voiced at this time.  
2000.  Assessment complete, see flow sheet.  Patient is AA& O x3, answers questions appropriately, slow to respond to questions.  Resp rate is even and unlabored. VSS.      2130 - Patient fed approx 25% of her dinner, declines to eat more at this time.  Medications administered per MAR.  Pt tolerated well    2200- Pt turned and repositioned for comfort    0000 - resting quietly with eyes closed in NAD.  Turned and repositioned for comfort. VSS  
2138: Primary nurse Kay PARMAR calls out for assistance. At bedside observed pt vomiting large amount of brown emesis. HR sustaining 140-150. Primary RN also reporting sudden increase in LORETTA drain output. Called rapid response.     2240: Verbal orders received from Aishwarya Douglas NP to irrigate escobar once due to possible bladder distension observed on KUB. Will also deflate balloon and advance escobar to ensure correct placement.    2245: Removed 9cc water from indwelling escobar balloon; advanced escobar easily, pt tolerated well. Using sterile technique, irrigated escobar using 100mL sterile water. Removed 100mL of instilled water from escobar, no clots expressed. Escobar however is not draining urine. Informed NP.   
2245: Patient  accused us that we are not her nurses and not taking care of her. She refused her PO medicine. She wanted to know her CT scan report. Messaged to MS. Douglas about her condition.     End of Shift Note    Bedside shift change report given to Madison PARMAR (oncoming nurse) by Asiya Hobbs RN (offgoing nurse).  Report included the following information SBAR, MAR, and Cardiac Rhythm sinus tachy    Shift worked:  7p- 7.30a     Shift summary and any significant changes:     Patient was blaming that we are not taking care of her and we are not her nurses.  After she got her schedule morphine, she slept well.  Hold her 4 am Morphine as she was drowsy and was sleeping.       Concerns for physician to address:         Zone phone for oncoming shift:            Activity:     Number times ambulated in hallways past shift: 0  Number of times OOB to chair past shift: 0    Cardiac:   Cardiac Monitoring: Yes           Access:  Current line(s): PIV and midline     Genitourinary:   Urinary status: escobar    Respiratory:      Chronic home O2 use?: NO  Incentive spirometer at bedside: NO       GI:     Current diet:  ADULT ORAL NUTRITION SUPPLEMENT; Lunch, Dinner; Clear Liquid Oral Supplement  ADULT ORAL NUTRITION SUPPLEMENT; Breakfast; Clear Liquid Oral Supplement  ADULT DIET; Full Liquid  Passing flatus: YES  Tolerating current diet: YES       Pain Management:   Patient states pain is manageable on current regimen: YES    Skin:     Interventions: turn team, specialty bed, float heels, PT/OT consult, internal/external urinary devices, and nutritional support    Patient Safety:  Fall Score:    Interventions: bed/chair alarm, assistive device (walker, cane. etc), gripper socks, and pt to call before getting OOB       Length of Stay:  Expected LOS: 10  Actual LOS: 10      Asiya Hobbs RN                            
ABG results not crossing over to EPIC     Results at 1/12 @0939    pH 7.4  pCO2 33  P02 212  HCO3 22  BE -1  
Bedside and Verbal shift change report given to Adilene RN (oncoming nurse) by Rae RN (offgoing nurse). Report included the following information Nurse Handoff Report.           End of Shift Note    Bedside shift change report given to Rae RN (oncoming nurse) by Adilene Goldman RN (offgoing nurse).  Report included the following information SBAR    Shift worked:  7p-7a     Shift summary and any significant changes:     Pt was able to have  small conversation and request for things such as water or more blankets.  Pt only put out 450 for the night escobar     Concerns for physician to address:  See above     Zone phone for oncoming shift:          Activity:     Number times ambulated in hallways past shift: 0  Number of times OOB to chair past shift: 0    Cardiac:   Cardiac Monitoring: Yes           Access:  Current line(s): PIV and midline     Genitourinary:   Urinary status: voiding and escobar    Respiratory:      Chronic home O2 use?: NO  Incentive spirometer at bedside: N/A       GI:     Current diet:  ADULT DIET; Clear Liquid  Passing flatus: YES  Tolerating current diet: YES       Pain Management:   Patient states pain is manageable on current regimen: YES    Skin:     Interventions: specialty bed, float heels, and internal/external urinary devices    Patient Safety:  Fall Score:    Interventions: bed/chair alarm, gripper socks, and pt to call before getting OOB       Length of Stay:  Expected LOS: 12  Actual LOS: 6      Adilene Goldman RN                            
Bedside and Verbal shift change report given to GHULAM Mccann/GHULAM Braden (oncoming nurse) by GHULAM Hollins (offgoing nurse). Report included the following information Nurse Handoff Report, MAR, and Cardiac Rhythm Sinus tachycardia .     GHULAM Braden will chart on this pt.    2135 Pt requested for IVF to be discontinued because this is brainwashing her. Explained the purpose of this, reoriented pt.  
Bedside shift change report given to GHULAM Leigh (oncoming nurse) by GHULAM Leigh (offgoing nurse). Report included the following information Nurse Handoff Report.     
Bedside shift change report given to MARIA GUADALUPE PARMAR (oncoming nurse) by SOSA PARMAR (offgoing nurse). Report included the following information Nurse Handoff Report, Intake/Output, MAR, Recent Results, Med Rec Status, and Cardiac Rhythm SINUS RHYTHM .      End of Shift Note    Bedside shift change report given to SULLY PARMAR (oncoming nurse) by Maria Guadalupe Carlson RN (offgoing nurse).  Report included the following information SBAR, Intake/Output, MAR, Recent Results, Med Rec Status, and Cardiac Rhythm SINUS RHYTHM    Shift worked:  1900H-0730H     Shift summary and any significant changes:     Uneventful night     Concerns for physician to address:       Zone phone for oncoming shift:          Activity:     Number times ambulated in hallways past shift: 0  Number of times OOB to chair past shift: 0    Cardiac:   Cardiac Monitoring: Yes           Access:  Current line(s): PIV and midline     Genitourinary:   Urinary status: escobar    Respiratory:      Chronic home O2 use?: NO  Incentive spirometer at bedside: N/A       GI:     Current diet:  ADULT DIET; Full Liquid  ADULT ORAL NUTRITION SUPPLEMENT; Breakfast; Clear Liquid Oral Supplement  ADULT ORAL NUTRITION SUPPLEMENT; Lunch, Dinner; Clear Liquid Oral Supplement  DIET ONE TIME MESSAGE;  Passing flatus: YES  Tolerating current diet: YES       Pain Management:   Patient states pain is manageable on current regimen: YES    Skin:     Interventions: turn team, specialty bed, float heels, increase time out of bed, foam dressing, PT/OT consult, and internal/external urinary devices    Patient Safety:  Fall Score:    Interventions: bed/chair alarm, assistive device (walker, cane. etc), gripper socks, and pt to call before getting OOB       Length of Stay:  Expected LOS: 13  Actual LOS: 13      Maria Guadalupe Carlson RN                           
Bedside shift report given to GHULAM June @1351.   
Cele Pringle RN is documenting on this patient. I am agreeable with her documentation  
Comprehensive Nutrition Assessment    Type and Reason for Visit:  Initial, Wound    Nutrition Recommendations/Plan:   Diet advancement as medically feasible     Malnutrition Assessment:  Malnutrition Status:  Insufficient data (01/12/24 1144)      Nutrition Assessment:    Patient medically noted for renal abscess s/p right nephrectomy and repair of small bowel laceration. Patient with episode of hematemesis this morning. Made NPO and GI consulted. Plans for NGT for decompression. Chart review shows a 5% weight loss over the last month. MST isn't filled out. Will monitor ability to advance to PO diet vs need for nutrition support.     Nutrition Related Findings:    Na 135,    BM 1/11   Synthroid, Protonix, Zosyn, Senokot, Vanc   Wound Type: Unstageable (closed wounds?)       Current Nutrition Intake & Therapies:    Average Meal Intake: NPO     Diet NPO    Anthropometric Measures:  Height: 170.2 cm (5' 7\")  Ideal Body Weight (IBW): 135 lbs (61 kg)       Current Body Weight: 77.1 kg (169 lb 15.6 oz),   IBW.    Current BMI (kg/m2): 26.6                          BMI Categories: Overweight (BMI 25.0-29.9)    Estimated Daily Nutrient Needs:  Energy Requirements Based On: Formula  Weight Used for Energy Requirements: Current  Energy (kcal/day): 1715 kcals (BMR x 1.3AF)  Weight Used for Protein Requirements: Current  Protein (g/day): 77-93g (1.0-1.2 g/kg bw)  Method Used for Fluid Requirements: 1 ml/kcal  Fluid (ml/day): 1700 mL    Nutrition Diagnosis:   Inadequate protein-energy intake related to altered GI function as evidenced by NPO or clear liquid status due to medical condition    Nutrition Interventions:   Food and/or Nutrient Delivery: Start Oral Diet  Nutrition Education/Counseling: No recommendation at this time  Coordination of Nutrition Care: Continue to monitor while inpatient       Goals:     Goals: Initiate PO diet, by next RD assessment       Nutrition Monitoring and Evaluation: 
Comprehensive Nutrition Assessment    Type and Reason for Visit:  Reassess    Nutrition Recommendations/Plan:   Advance diet as tolerated  Continue current supplement      Malnutrition Assessment:  Malnutrition Status:  Moderate malnutrition (01/16/24 1038)    Context:  Acute Illness     Findings of the 6 clinical characteristics of malnutrition:  Energy Intake:  50% or less of estimated energy requirements for 5 or more days  Weight Loss:  5% over 1 month     Body Fat Loss:  Unable to assess     Muscle Mass Loss:  Mild muscle mass loss Clavicles (pectoralis & deltoids), Temples (temporalis)  Fluid Accumulation:  Mild Extremities   Strength:  Not Performed    Nutrition Assessment:    Chart reviewed; patient continues on clear liquids at this time. Breakfast tray appeared to be untouched. She had drank some of the ensure clear supplement. She reports an okay appetite; when asked if she liked the ensure clear she replied \"so-so.\" Asked if she thought she might like a milkshake better but she did not reply. Discussed during PCU rounds; likely discharge with hospice over the weekend. Would advance diet as medically feasible.     Nutrition Related Findings:    K+ 3.3,    1-2+ edema all extremities   Lasix, Synthroid, protonix, Zosyn, KCl, Senokot, D5% IVF   Wound Type: Unstageable (closed wounds?)       Current Nutrition Intake & Therapies:    Average Meal Intake: 1-25%  Average Supplements Intake: 51-75%  ADULT ORAL NUTRITION SUPPLEMENT; Lunch, Dinner; Clear Liquid Oral Supplement  ADULT DIET; Clear Liquid  ADULT ORAL NUTRITION SUPPLEMENT; Breakfast; Clear Liquid Oral Supplement    Anthropometric Measures:  Height: 170.2 cm (5' 7.01\")  Ideal Body Weight (IBW): 135 lbs (61 kg)       Current Body Weight: 77.1 kg (169 lb 15.6 oz),   IBW.    Current BMI (kg/m2): 26.6                          BMI Categories: Overweight (BMI 25.0-29.9)    Estimated Daily Nutrient Needs:  Energy Requirements Based On: Formula  Weight 
Comprehensive Nutrition Assessment    Type and Reason for Visit:  Reassess    Nutrition Recommendations/Plan:   Advance to a regular diet as tolerated   Adjust ONS to ensure enlive BID     Malnutrition Assessment:  Malnutrition Status:  Moderate malnutrition (01/16/24 1038)    Context:  Acute Illness     Findings of the 6 clinical characteristics of malnutrition:  Energy Intake:  50% or less of estimated energy requirements for 5 or more days  Weight Loss:  5% over 1 month     Body Fat Loss:  Unable to assess     Muscle Mass Loss:  Mild muscle mass loss Clavicles (pectoralis & deltoids), Temples (temporalis)  Fluid Accumulation:  Mild Extremities   Strength:  Not Performed    Nutrition Assessment:    Chart reviewed and patient discussed during PCU rounds. Plans for hospice on discharge but placement still pending. Receiving a full liquid diet. Patient unavailable at time of attempted visit with staff at bedside. Will adjust ONS to provide more kcal/protein per serving. Advance to solids as tolerated. Encourage intake of meals.     Nutrition Related Findings:    Labs reviewed   BM 1/22   1-2+ edema extremities   Lasix, Synthroid, Protonix, Zosyn, Rosuvastatin, Senokot   Wound Type: Unstageable (closed wounds?)       Current Nutrition Intake & Therapies:    Average Meal Intake: Unable to assess  Average Supplements Intake: Unable to assess  ADULT DIET; Full Liquid  ADULT ORAL NUTRITION SUPPLEMENT; Breakfast; Clear Liquid Oral Supplement  ADULT ORAL NUTRITION SUPPLEMENT; Lunch, Dinner; Clear Liquid Oral Supplement  DIET ONE TIME MESSAGE;    Anthropometric Measures:  Height: 170.2 cm (5' 7.01\")  Ideal Body Weight (IBW): 135 lbs (61 kg)       Current Body Weight: 77.1 kg (169 lb 15.6 oz),   IBW.    Current BMI (kg/m2): 26.6                          BMI Categories: Overweight (BMI 25.0-29.9)    Estimated Daily Nutrient Needs:  Energy Requirements Based On: Formula  Weight Used for Energy Requirements: Current  Energy 
Consult received  Seen by Dr Rico before.  Informed Dr Bullard who is covering for Dr Rico He said he will see the patient  
Creatinine stable, good UOP    Urine OP from solitary kidney and stable creatinine indicate remaining left kidney is doing well.    Urology will sign-off, please call with questions   
Date of Consultation:  January 16, 2024      No chief complaint on file.      History of Present Illness:   Nicolasa Macdonald is a 73 y.o. female with history of cardiomyopathy, hyperlipidemia, hypertension, thyroid disease who initially presented with altered mental status on 12/10 in the setting of right renal abscess, septic shock and NSTEMI.  She underwent right nephrectomy on 1/10 which was complicated by small bowel laceration status postrepair.  Course was complicated by hypotension as well as hematemesis and anemia.  She also has NISREEN, recent COVID infection.  She is on antibiotics currently.    Due to persistent encephalopathy, head CT was ordered showing bilateral frontal and occipital hypodensities concerning for possible subacute infarct versus PRES.     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    This morning she is able to communicate with me.  She has not been moving her arms, states that she will try today.   Denies any headache.  States that she is feeling well.  Denies any vision issues.  States that her arms are in pain which is why she is not moving them.    Past Medical History:   Diagnosis Date    Cardiomyopathy (HCC)     HLD (hyperlipidemia)     Hypertension     Kidney stone     Leukocytosis     Thyroid disease         Past Surgical History:   Procedure Laterality Date    CT DRAIN SOFT TISSUE ABSCESS  12/20/2023    CT DRAIN SOFT TISSUE ABSCESS 12/20/2023 [facility] RAD CT    CYSTOSCOPY Bilateral 12/10/2023    CYSTOSCOPY BILATERAL URETERAL STENT INSERTION performed by Nathan Garrett MD at [facility] MAIN OR    KIDNEY REMOVAL Right 1/10/2024    RIGHT RADICAL OPEN NEPHRECTOMY (E R A S) performed by Zac Lind MD at [facility] MAIN OR    ORTHOPEDIC 
End of Shift Note    Bedside shift change report given to Arturo (oncoming nurse) by SULLY MARAVILLA RN (offgoing nurse).  Report included the following information SBAR, Kardex, ED Summary, Intake/Output, MAR, Recent Results, Cardiac Rhythm NSR, and Pre Procedure Checklist    Shift worked:  7-7       Shift summary and any significant changes:     Pt main problem is not always wanting to eat.  Refused an breakfast but mid morning at 1/2 then at lunch ate 1/2 a, But dinner did not eat.  Having loose stool large amounts entire wound care and bed change around 1500 pm  Pt for debridement in am       Concerns for physician to address:  Cont to assess       Zone phone for oncoming shift:          Activity:     Number times ambulated in hallways past shift: {Numbers; 0-5:709360}  Number of times OOB to chair past shift: {Numbers; 0-5:698444}    Cardiac:   Cardiac Monitoring: {YES/NO:27975}           Access:  Current line(s): {Access:23066}     Genitourinary:   Urinary status: {:73170}    Respiratory:      Chronic home O2 use?: {YES/NO/NA:54667}  Incentive spirometer at bedside: {YES/NO/NA:44129}       GI:     Current diet:  ADULT DIET; Full Liquid  ADULT ORAL NUTRITION SUPPLEMENT; Lunch, Dinner; Standard High Calorie/High Protein Oral Supplement  Passing flatus: {YES/NO:40699}  Tolerating current diet: {YES/NO:11945}       Pain Management:   Patient states pain is manageable on current regimen: {YES/NO/NA:87421}    Skin:     Interventions: {dai interventions:93523}    Patient Safety:  Fall Score:    Interventions: {fall interventions:95430}       Length of Stay:  Expected LOS: 14  Actual LOS: 13      SULLY MARAVILLA, RN                            
End of Shift Note    Bedside shift change report given to GHULAM Echols (oncoming nurse) by Lokesh Castillo RN (offgoing nurse).  Report included the following information SBAR, MAR, Recent Results, and Cardiac Rhythm SB    Shift worked:  0632-1338     Shift summary and any significant changes:     Pt bradycardic through shift. HR 50-70's.  Pt blood pressure soft and received 500ml bolus. Pt stated not in pain through night so scheduled morphine iv held due to blood pressures. AM labs showed HGB 6.8 and K 2.8. Orders placed for transfusion and replacement by on call NP.     Concerns for physician to address:  See above     Zone phone for oncoming shift:   1148       Activity:     Number times ambulated in hallways past shift: 0  Number of times OOB to chair past shift: 0    Cardiac:   Cardiac Monitoring: Yes           Access:  Current line(s): Midline, PIV    Genitourinary:   Urinary status: voiding and escobar    Respiratory:      Chronic home O2 use?: YES  Incentive spirometer at bedside: YES       GI:     Current diet:  ADULT ORAL NUTRITION SUPPLEMENT; Lunch, Dinner; Clear Liquid Oral Supplement  ADULT ORAL NUTRITION SUPPLEMENT; Breakfast; Clear Liquid Oral Supplement  ADULT DIET; Full Liquid  Passing flatus: YES  Tolerating current diet: YES       Pain Management:   Patient states pain is manageable on current regimen: YES    Skin:     Interventions: turn team, float heels, foam dressing, PT/OT consult, and limit briefs    Patient Safety:  Fall Score:    Interventions: bed/chair alarm       Length of Stay:  Expected LOS: 11  Actual LOS: 12      Lokesh Castillo RN                            
End of Shift Note    Bedside shift change report given to GHULAM Head (oncoming nurse) by Sarmad James RN (offgoing nurse).  Report included the following information SBAR, ED Summary, Procedure Summary, Intake/Output, MAR, Recent Results, Med Rec Status, Cardiac Rhythm  , and Alarm Parameters     Shift worked:  7a-7p     Shift summary and any significant changes:     Patient began talking and MRI completed. \"States thinking about moving upper extremities.\" Diet added, no issues with swallowing     Concerns for physician to address:  none     Zone phone for oncoming shift:   2011       Activity:     Number times ambulated in hallways past shift: 0  Number of times OOB to chair past shift: 0 bedboud at baseline    Cardiac:   Cardiac Monitoring: Yes           Access:  Current line(s): PIV and midline     Genitourinary:   Urinary status: escobar    Respiratory:      Chronic home O2 use?: NO  Incentive spirometer at bedside: N/A       GI:     Current diet:  ADULT DIET; Clear Liquid  Passing flatus: YES  Tolerating current diet: YES       Pain Management:   Patient states pain is manageable on current regimen: YES    Skin:     Interventions: specialty bed, float heels, foam dressing, PT/OT consult, limit briefs, internal/external urinary devices, and nutritional support    Patient Safety:  Fall Score:    Interventions: bed/chair alarm, assistive device (walker, cane. etc), gripper socks, pt to call before getting OOB, and stay with me (per policy)       Length of Stay:  Expected LOS: 12  Actual LOS: 5      Sarmad James RN                           
End of Shift Note    Bedside shift change report given to GHULAM Mcbride (oncoming nurse) by Hermes Su RN (offgoing nurse).  Report included the following information SBAR, Kardex, ED Summary, OR Summary, Procedure Summary, Intake/Output, MAR, Recent Results, Med Rec Status, and Cardiac Rhythm SR/SB    Shift worked:  7p-7a     Shift summary and any significant changes:     Patient remains very weak, non verbal, pain management with morphine 1 mg continues for post surgical wound. Staples to abdomen intact with no drainage noted. Lung field clear, abdomen soft and non tender, bowel sound present x4. Staff provides frequent turning and repositioning, HOB elevated, bed in lowest position and call light within reach.     Concerns for physician to address:        Zone phone for oncoming shift:   [phone]         Activity:     Number times ambulated in hallways past shift: 0  Number of times OOB to chair past shift: 0    Cardiac:   Cardiac Monitoring: Yes           Access:  Current line(s): PIV and midline     Genitourinary:   Urinary status: escobar    Respiratory:      Chronic home O2 use?: NO  Incentive spirometer at bedside: NO       GI:     Current diet:  Diet NPO  Passing flatus: YES  Tolerating current diet: YES       Pain Management:   Patient states pain is manageable on current regimen: YES    Skin:     Interventions: specialty bed, float heels, PT/OT consult, and internal/external urinary devices    Patient Safety:  Fall Score:    Interventions: bed/chair alarm, assistive device (walker, cane. etc), gripper socks, and pt to call before getting OOB       Length of Stay:  Expected LOS: 6  Actual LOS: 5      Hermes Su RN                           
End of Shift Note    Bedside shift change report given to GHULAM Mccann (oncoming nurse) by Kami Pringle RN (offgoing nurse).  Report included the following information SBAR, Recent Results, and Cardiac Rhythm      Shift worked:  7a-7p     Shift summary and any significant changes:    Pt pleasant and agreeable for entire shift, bedbound, repositioned q2.Pt tolerating zosyn regimen and IV potassium. Metoprolol and saline bolus given for HR >120s, returned to baseline 90s for rest of shift. Pt had CT of abdomen/pelvis - free fluid in abdomen, cholelithiasis, decreased fluid to right renal fossa, bilateral pleural effusions noted.    Concerns for physician to address:  Hospice?     Zone phone for oncoming shift:         Activity:     Number times ambulated in hallways past shift: 0  Number of times OOB to chair past shift: 0    Cardiac:   Cardiac Monitoring: Yes           Access:  Current line(s): PIV and midline     Genitourinary:   Urinary status: escobar    Respiratory:      Chronic home O2 use?: NO  Incentive spirometer at bedside: NO       GI:     Current diet:  ADULT ORAL NUTRITION SUPPLEMENT; Lunch, Dinner; Clear Liquid Oral Supplement  ADULT ORAL NUTRITION SUPPLEMENT; Breakfast; Clear Liquid Oral Supplement  ADULT DIET; Full Liquid  Passing flatus: YES  Tolerating current diet: YES       Pain Management:   Patient states pain is manageable on current regimen: YES    Skin:     Interventions: specialty bed and float heels    Patient Safety:  Fall Score:    Interventions: bed/chair alarm       Length of Stay:  Expected LOS: 10  Actual LOS: 9      Kami Pringle RN                           
End of Shift Note    Bedside shift change report given to GHULAM Pierre (oncoming nurse) by Lokesh Castillo RN (offgoing nurse).  Report included the following information SBAR, MAR, Recent Results, and Cardiac Rhythm Afib    Shift worked:  9951-7615     Shift summary and any significant changes:     Pt more calm through shift and following commands of staff. Pt am labs showed 3.0 of morphine.  Reported to on call and replaced.     Concerns for physician to address:  See above     Zone phone for oncoming shift:   [phone]       Activity:     Number times ambulated in hallways past shift: 0  Number of times OOB to chair past shift: 0    Cardiac:   Cardiac Monitoring: Yes           Access:  Current line(s): PIV and PICC     Genitourinary:   Urinary status: voiding and escobar    Respiratory:      Chronic home O2 use?: No  Incentive spirometer at bedside: No       GI:     Current diet:  ADULT ORAL NUTRITION SUPPLEMENT; Lunch, Dinner; Clear Liquid Oral Supplement  ADULT ORAL NUTRITION SUPPLEMENT; Breakfast; Clear Liquid Oral Supplement  ADULT DIET; Full Liquid  Passing flatus: YES  Tolerating current diet: YES       Pain Management:   Patient states pain is manageable on current regimen: YES    Skin:     Interventions: turn team, float heels, foam dressing, and limit briefs    Patient Safety:  Fall Score:    Interventions: bed/chair alarm       Length of Stay:  Expected LOS: 13  Actual LOS: 11      Lokesh Castillo RN                            
End of Shift Note    Bedside shift change report given to GHULAM Villanueva (oncoming nurse) by Sarmad James RN (offgoing nurse).  Report included the following information SBAR, OR Summary, Procedure Summary, Intake/Output, MAR, Recent Results, Med Rec Status, Cardiac Rhythm  , and Alarm Parameters     Shift worked:  7a-7p     Shift summary and any significant changes:    Patient had good day, more alert, hospice consult,      Concerns for physician to address:  none     Zone phone for oncoming shift:   [phone]       Activity:     Number times ambulated in hallways past shift: 0  Number of times OOB to chair past shift: 0    Cardiac:   Cardiac Monitoring: Yes           Access:  Current line(s): PIV and midline     Genitourinary:   Urinary status: escobar    Respiratory:      Chronic home O2 use?: NO  Incentive spirometer at bedside: N/A       GI:     Current diet:  ADULT ORAL NUTRITION SUPPLEMENT; Lunch, Dinner; Clear Liquid Oral Supplement  ADULT DIET; Full Liquid  Passing flatus: YES  Tolerating current diet: YES       Pain Management:   Patient states pain is manageable on current regimen: YES    Skin:     Interventions: specialty bed, float heels, foam dressing, PT/OT consult, limit briefs, internal/external urinary devices, and nutritional support    Patient Safety:  Fall Score:    Interventions: bed/chair alarm, assistive device (walker, cane. etc), gripper socks, pt to call before getting OOB, and stay with me (per policy)       Length of Stay:  Expected LOS: 12  Actual LOS: 6      Sarmad James RN                            
End of Shift Note    Bedside shift change report given to Gunjan Hernandez RN (oncoming nurse) by Kiki Gama RN (offgoing nurse).  Report included the following information Kardex, OR Summary, Procedure Summary, Intake/Output, Recent Results, Med Rec Status, and Cardiac Rhythm      Shift worked:  7P     Shift summary and any significant changes:     Increase in heart rate      Concerns for physician to address:       Zone phone for oncoming shift:          Activity:     Number times ambulated in hallways past shift: 0  Number of times OOB to chair past shift: 0    Cardiac:   Cardiac Monitoring: Yes           Access:  Current line(s): PIV and midline     Genitourinary:   Urinary status: escobar    Respiratory:      Chronic home O2 use?: NO  Incentive spirometer at bedside: NO       GI:     Current diet:  ADULT ORAL NUTRITION SUPPLEMENT; Lunch, Dinner; Clear Liquid Oral Supplement  ADULT DIET; Clear Liquid  ADULT ORAL NUTRITION SUPPLEMENT; Breakfast; Clear Liquid Oral Supplement  Passing flatus: YES  Tolerating current diet: YES       Pain Management:   Patient states pain is manageable on current regimen: YES    Skin:     Interventions: turn team, specialty bed, float heels, limit briefs, and internal/external urinary devices    Patient Safety:  Fall Score:    Interventions: bed/chair alarm, gripper socks, and pt to call before getting OOB       Length of Stay:  Expected LOS: 12  Actual LOS: 9      Kiki Gama RN                            
End of Shift Note    Bedside shift change report given to Luciana Jenkins RN  (oncoming nurse) by Kiki Gama RN (offgoing nurse).  Report included the following information Kardex, ED Summary, Procedure Summary, MAR, Recent Results, Cardiac Rhythm  , and Alarm Parameters     Shift worked:  7p       Shift summary and any significant changes:     N/a     Concerns for physician to address:  N/a     Zone phone for oncoming shift:   [phone]       Activity:     Number times ambulated in hallways past shift: 1  Number of times OOB to chair past shift: 1    Cardiac:   Cardiac Monitoring: Yes           Access:  Current line(s): PIV     Genitourinary:   Urinary status: voiding    Respiratory:      Chronic home O2 use?: NO  Incentive spirometer at bedside: YES       GI:     Current diet:  ADULT ORAL NUTRITION SUPPLEMENT; Lunch, Dinner; Clear Liquid Oral Supplement  ADULT DIET; Clear Liquid  ADULT ORAL NUTRITION SUPPLEMENT; Breakfast; Clear Liquid Oral Supplement  Passing flatus: YES  Tolerating current diet: YES       Pain Management:   Patient states pain is manageable on current regimen: YES    Skin:     Interventions: specialty bed, increase time out of bed, and limit briefs    Patient Safety:  Fall Score:    Interventions: bed/chair alarm, gripper socks, pt to call before getting OOB, and stay with me (per policy)       Length of Stay:  Expected LOS: 12  Actual LOS: 8      Kiki Gama RN                            
End of Shift Note    Bedside shift change report given to Seema (oncoming nurse) by Merlin Lang, RN (offgoing nurse).  Report included the following information SBAR, Procedure Summary, Intake/Output, MAR, and Recent Results    Shift worked:  7a-7p     Shift summary and any significant changes:     Pt had several loose stools- greenish brownish. Unable to move any extremities. Appetite still poor.   Good urine output: ~1700mL     Concerns for physician to address:      Zone phone for oncoming shift:          Activity:     Number times ambulated in hallways past shift: 0  Number of times OOB to chair past shift: 0    Cardiac:   Cardiac Monitoring: Yes           Access:  Current line(s): PIV and midline     Genitourinary:   Urinary status: escobar    Respiratory:      Chronic home O2 use?: YES  Incentive spirometer at bedside: YES       GI:     Current diet:  ADULT ORAL NUTRITION SUPPLEMENT; Lunch, Dinner; Standard High Calorie/High Protein Oral Supplement  ADULT DIET; Easy to Chew  Passing flatus: YES  Tolerating current diet: YES       Pain Management:   Patient states pain is manageable on current regimen: YES    Skin:     Interventions: turn team, specialty bed, float heels, and internal/external urinary devices    Patient Safety:  Fall Score:    Interventions: bed/chair alarm and gripper socks       Length of Stay:  Expected LOS: 15  Actual LOS: 15      Merlin Lang, RN                            
Events noted and d/w Dr. Barfield who states she desires Hospice.  She is alert and appropriate today for our conversation.  She confirms strong desire for DNR status and Hospice services.  No questions or requests at this time.  Still following.  
Events noted.  Encephalopathic.  CT done over the weekend shows normal caliber small bowel.  No evidence of stricture.  Small fluid collection not particularly impressive looking.    Assessment and plan: GI tract functioning.  No evidence of stricture.  Will sign off.  Please call with any concerns.  
Feels ok.  Pain at times but not using much pain meds.  No flatus.      AVSS  Abd soft, ND, quiet, approp TTP    AP/  s/p repair of enterotomy by urology which I inspected in the OR.  Looked great.    - Would cont clears until flatus.    - thereafter, low-reside (low fiber) diet for 2 weeks with stool softener to supplement.    - will follow.    
Hospice Liaison Note: this RN bedside to meet with pt and discuss hospice services. Pt is minimally responsive, did lift her head slightly but did not arouse for me. She looks to be actively transitioning - is cold to touch, grey and ashen in color. She does appear to be resting comfortably and in no distress. I will reach out to [facility] today to update her on clinical status.     Addendum 1500: pt now awake, fully alert and oriented. She does express desire for hospice but would like to continue treatment while at the hospital to include IV abx and IV fluids. Her main issue is pain control - she reported pain 10/10 but said the IV Morphine helps. RN was waiting for me to talk w/her before administering. Updated palliative NP, Jessy on our discussion and bedside RN. We will follow peripherally while goals and dispo evolve.   
Hospice RN Note: consult received and discussed with palliative NP, Jessy. Will follow up with pt tomorrow regarding hospice discussion.   
I have discussed with the  PCP Dr decker, phone [phone]   the rationale for blood component transfusion; its benefits in treating or preventing fatigue, organ damage, or death; and its risk which includes mild transfusion reactions, rare risk of blood borne infection, or more serious but rare reactions. I have discussed the alternatives to transfusion, including the risk and consequences of not receiving transfusion. The  PCP Dr decker, phone [phone]   had an opportunity to ask questions and had agreed to proceed with transfusion of blood components.    Audrey Barfield MD      
Infectious Disease progress        Impression    Severe sepsis  Hypotension  Leukocytosis improving   WBC 28.9, now 15.1    Right renal abscess  R/renal calculus  S/p open right nephrectomy & repair of small bowel laceration 1/10  IntraOp cultures 1/10-+ for light C glabrata, rare P mirabilis  Urine culture 1/10-NG  Blood cultures-1/11-NG  CT abd/pelvis 1/14 +R/retroperitoneal fluid collection in nephrectomy bed  4.5 x 3.0 cm concerning for post op abscess.  Urology following, IR drainage if abscess worsens.    S/p emesis/ hematemesis  S/p NGT placement, Protonix IV      Change in mental status  Subacute bilateral frontal and occipital hypodensities noted  On CT head.  For MRI brain    NISREEN on CKD 2  Improving  Cr 0.96      P. mirabilis bacteremia & complicated UTI  S/p severe sepsis, septic shock  Admission 12/10-1/2  Bilateral renal calculi & bilateral hydronephrosis  Blood cultures 12/10+ for P.mirabilis 3/4  Urine culture 12/10+ for >100,000 P mirabilis  S/p B/L ureteral stent placement 12/11  S/p renal aspirate 12/20 also + for P mirabilis   MRSA screen negative      S/p COVID infection  COVID-positive on 12/27  Nephrectomy postponed to 1/10        Plan  Continue micafungin for now  De escalate therapy to Zosyn iv  DC vancomycin  Adequate fluid resuscitation  ID service to follow.        Extensive review of chart notes, labs, imaging, cultures done  Additionally review of done: Recent reports-Labs, cultures, imaging  D/w - RN    Patient seen today laying in bed.   Not interactive.LORETTA draining more serous fluid  Afebrile.  D/w RN events of the day.  D/w Dr Clark    Past Medical History:   Diagnosis Date    Cardiomyopathy (HCC)     HLD (hyperlipidemia)     Hypertension     Kidney stone     Leukocytosis     Thyroid disease        Past Surgical History:   Procedure Laterality Date    CT DRAIN SOFT TISSUE ABSCESS  12/20/2023    CT DRAIN SOFT TISSUE ABSCESS 12/20/2023 [facility] RAD CT    CYSTOSCOPY Bilateral 12/10/2023 
Infectious Disease progress        Impression    Severe sepsis  Hypotension  Leukocytosis resolved   WBC 9.9    Right renal abscess  R/renal calculus  S/p open right nephrectomy & repair of small bowel laceration 1/10  IntraOp cultures 1/10-+ for light C glabrata, rare P mirabilis  Urine culture 1/10-NG  Blood cultures-1/11-NG  CT abd/pelvis 1/14 +R/retroperitoneal fluid collection in nephrectomy bed  4.5 x 3.0 cm concerning for post op abscess.  Urology following, IR drainage if abscess worsens.    S/p emesis/ hematemesis  S/p NGT placement, Protonix IV      Change in mental status  Subacute bilateral frontal and occipital hypodensities noted  On CT head.  MRI brain  + for patchy areas of subacute vs acute infarctions involving  Bilateral PCA territories, bilateral frontoparietal lobes.?  Embolic  vs PRES.    NISREEN on CKD 2  Improving  Cr 0.85      P. mirabilis bacteremia & complicated UTI  S/p severe sepsis, septic shock  Admission 12/10-1/2  Bilateral renal calculi & bilateral hydronephrosis  Blood cultures 12/10+ for P.mirabilis 3/4  Urine culture 12/10+ for >100,000 P mirabilis  S/p B/L ureteral stent placement 12/11  S/p renal aspirate 12/20 also + for P mirabilis   MRSA screen negative      S/p COVID infection  COVID-positive on 12/27  Nephrectomy postponed to 1/10        Plan  Continue Zosyn,  Micafungin for now  Duration of antimicrobial therapy is for 2 weeks but may change per clinical   course    ID service to follow.        Extensive review of chart notes, labs, imaging, cultures done  Additionally review of done: Recent reports-Labs, cultures, imaging  D/w - RN    Patient seen today laying in bed.   Awake, talking.LORETTA draining more serous fluid  Afebrile.  D/w RN events of the day.  D/w Dr Clark    Past Medical History:   Diagnosis Date    Cardiomyopathy (HCC)     HLD (hyperlipidemia)     Hypertension     Kidney stone     Leukocytosis     Thyroid disease        Past Surgical History:   Procedure 
Infectious Disease progress        Impression    Severe sepsis  Hypotension  Resolved    Leukocytosis persistent  WBC 14.4    Right renal abscess  R/renal calculus  S/p open right nephrectomy & repair of small bowel laceration 1/10  IntraOp cultures 1/10-+ for light C glabrata, rare P mirabilis  Urine culture 1/10-NG  Blood cultures-1/11-NG  CT abd/pelvis 1/14 +R/retroperitoneal fluid collection in nephrectomy bed  4.5 x 3.0 cm concerning for post op abscess.  Urology following, IR drainage if abscess worsens.  Repeat CT+ for 4 x 2.9 cm collection, slightly reduced in size    S/p emesis/ hematemesis  S/p NGT placement, Protonix IV      Change in mental status  Improving  Subacute bilateral frontal and occipital hypodensities noted  On CT head.  MRI brain  + for patchy areas of subacute vs acute infarctions involving  Bilateral PCA territories, bilateral frontoparietal lobes.?  Embolic  vs PRES.    NISREEN on CKD 2  Improving  Cr 0.81      P. mirabilis bacteremia & complicated UTI  S/p severe sepsis, septic shock  Admission 12/10-1/2  Bilateral renal calculi & bilateral hydronephrosis  Blood cultures 12/10+ for P.mirabilis 3/4  Urine culture 12/10+ for >100,000 P mirabilis  S/p B/L ureteral stent placement 12/11  S/p renal aspirate 12/20 also + for P mirabilis   MRSA screen negative      S/p COVID infection  COVID-positive on 12/27  Nephrectomy postponed to 1/10        Plan  Continue Zosyn, Micafungin (D 7 )  Planned duration of IV antimicrobials was to be 2 weeks end date 1/24.  Patient has clinically improved, & desires to go back on  Hospice.  Continue on IV therapy until discharge  If discharging over the weekend -DC on Levaquin 500 mg po daily x 7 days    Please contact ID on call with questions, concerns over the weekend.      Extensive review of chart notes, labs, imaging, cultures done  Additionally review of done: Recent report Labs, cultures, imaging  D/w - RN    Patient seen today laying in bed.   Awake, 
Multiple calls made to [facility] [phone] - to confirm transportation and to complete ph assessment. Gave them TOA 1000am for surgery time 1300 due to patient needing additional Pre-op testing prior to surgery.   Asked to be xferred to nursing station to complete assessment and give instructions. Xferred with no answer - called back and was xferred again with no answer - called back again and was immediately xferred with no answer.     Pre-op instructions being faxed to main fax# for [facility] [phone]  
NSR on tele. 2-assist in bed. IV abx per MAR. Chronic escoabr without complication. D5 LR gtt per MAR. Plan for discharge pending placement. GHULAM Mireles.  
Nephrology Progress Note  [facility] [place] [facility]  [address], [address]  [place] [address]  Phone - [phone]  Fax - [phone]                 Patient: Nicolasa Macdonald                     YOB: 1950        Date- 1/15/2024                                     Admit Date: 1/10/2024   CC: Follow up for ckd, renal stone          IMPRESSION & PLAN:   S/p Open right nephrectomy with repair of small bowel laceration.   Septic shock  Upper GI bleed  Acute blood loss anemia  H/o right renal abscess  Hyponatremia  H/o NISREEN  due to ATN- sepsis +/- hydronephrosis  Renal stone  Ckd 2- bl cr 0.96 in jan 2023  H/o Proteus bacteremia   H/o BL hydronephrosis with stones--S/p 12/11 B/L ureteral stent placemen  Bilateral CVA  Encephalopathy      PLAN-  Creatinine back to baseline  Urine output remains nonoliguric  Continue IV fluids for now  Will need a plan for nutrition NG tube versus TPN  Renal team will sign off call if needed     Subjective:  Interval History:   -Seen and examined today. Unresponsive to tactile stimuli- Monahan with clear urine. BP improved. Creatinine improved    Objective:   Vitals:    01/15/24 0345 01/15/24 0745 01/15/24 0931 01/15/24 0932   BP: (!) 163/67 (!) 155/73 (!) 155/73 (!) 155/73   Pulse: 84 74     Resp: 18 17     Temp: 98 °F (36.7 °C) 98.7 °F (37.1 °C)     TempSrc: Oral Axillary     SpO2: 100%      Weight:   77.1 kg (169 lb 15.6 oz) 77.1 kg (169 lb 15.6 oz)   Height:   1.702 m (5' 7.01\") 1.702 m (5' 7.01\")      I/O last 3 completed shifts:  In: 2007.1 [I.V.:1457.1; IV Piggyback:550.1]  Out: 3850 [Urine:3550; Drains:300]  No intake/output data recorded.      Physical exam:  GEN: Lethargic, noncommunicative  NECK- no mass  RESP: No wheezing, decreased BS b/l  CVS: S1,S2  RRR  NEURO: Unable to assess  EXT: trace LE edema  : Monahan catheter    Chart reviewed.         Pertinent Notes reviewed.     Data Review 
Nephrology Progress Note  [facility] [place] [facility]  [address], [address]  [place] [address]  Phone - [phone]  Fax - [phone]                 Patient: Nicolasa Macdonald                     YOB: 1950        Date- 1/12/2024                                     Admit Date: 1/10/2024   CC: Follow up for ckd, renal stone          IMPRESSION & PLAN:   S/p Open right nephrectomy with repair of small bowel laceration.   Septic shock  Upper GI bleed  Acute blood loss anemia  H/o right renal abscess  Hyponatremia  H/o NISREEN  due to ATN- sepsis +/- hydronephrosis  Renal stone  Ckd 2- bl cr 0.96 in jan 2023  H/o Proteus bacteremia   H/o BL hydronephrosis with stones--S/p 12/11 B/L ureteral stent placemen      PLAN-  Ordered 1 L bolus normal saline  Continue with normal saline 125 mL/h after bolus  Repeat BMP again at noon  Discussed with rapid response team and also Dr. Alvarenga  She will  need higher level of care if blood pressure continues to stay labile.     Subjective:  Interval History:   -Seen and examined today  -Lethargic not much communicative  -Blood pressure labile  -Creatinine 1.2-->1.6  -Urine output is oliguric overnight    Objective:   Vitals:    01/11/24 2345 01/12/24 0333 01/12/24 0745 01/12/24 1048   BP: (!) 104/58 117/78 (!) 99/53 (!) 110/49   Pulse: (!) 108 (!) 124 (!) 106 91   Resp: 18 21 20 16   Temp: 97 °F (36.1 °C) 98.2 °F (36.8 °C)  98.2 °F (36.8 °C)   TempSrc: Oral Axillary  Axillary   SpO2: 97%   97%   Weight:       Height:          I/O last 3 completed shifts:  In: 113.4 [I.V.:63.4; IV Piggyback:50]  Out: 610 [Urine:250; Drains:360]  I/O this shift:  In: 1980.5 [I.V.:1165.8; IV Piggyback:814.7]  Out: -       Physical exam:    GEN: Lethargic, noncommunicative  NECK- no mass  RESP: No wheezing, decreased BS b/l  CVS: S1,S2  RRR  NEURO: Unable to assess  EXT: No Edema   : Monahan catheter    Chart reviewed.       
Nephrology Progress Note  [facility] / [place] [facility]  [address]  [place] [address]  Phone - [phone]  Fax - [phone]                 Patient: Nicolasa Macdonald                     YOB: 1950        Date- 1/11/2024                                     Admit Date: 1/10/2024   CC: Follow up for ckd, renal stone          IMPRESSION & PLAN:   S/p Open right nephrectomy with repair of small bowel laceration.   H/o right renal abscess  Hyponatremia  H/o NISREEN  due to ATN- sepsis +/- hydronephrosis  Renal stone  Ckd 2- bl cr 0.96 in jan 2023  H/o Proteus bacteremia   Anemia due to chronic dis  H/o BL hydronephrosis with stones--S/p 12/11 B/L ureteral stent placemen      PLAN-  Stop .45 nacl  Start LR  Follow bmp  Abx per ID   I expect for cr to rise slightly with right nephrectomy     Subjective:  Interval History:   -  she is admitted after right nephrectomy  Na low  She has undergone Open right nephrectomy with repair of small bowel laceration.     Objective:   Vitals:    01/11/24 0500 01/11/24 0518 01/11/24 0737 01/11/24 1051   BP: 127/70   114/64   Pulse: 74   94   Resp: 15 18 18 20   Temp: 97 °F (36.1 °C)      TempSrc: Rectal      SpO2:    97%   Weight:       Height:          I/O last 3 completed shifts:  In: 4850 [I.V.:4500; IV Piggyback:350]  Out: 2300 [Urine:600; Drains:200; Blood:1500]  No intake/output data recorded.      Physical exam:    GEN: NAD  NECK- no mass  RESP: No wheezing, decreased BS b/l  CVS: S1,S2  RRR  NEURO: Normal speech  EXT: No Edema   PSYCH: Normal Mood    Chart reviewed.         Pertinent Notes reviewed.     Data Review :  Lab Results   Component Value Date/Time     01/11/2024 06:12 AM    K 3.7 01/11/2024 06:12 AM     01/11/2024 06:12 AM    CO2 18 01/11/2024 06:12 AM    BUN 10 01/11/2024 06:12 AM    CREATININE 0.91 01/11/2024 06:12 AM    GLUCOSE 149 01/11/2024 06:12 AM    CALCIUM 
Nephrology Progress Note  [facility] / [place] Office  [address], [address]  [place] [address]  Phone - [phone]  Fax - [phone]                 Patient: Nicolasa Macdonald                     YOB: 1950        Date- 1/13/2024                                     Admit Date: 1/10/2024   CC: Follow up for ckd, renal stone          IMPRESSION & PLAN:   S/p Open right nephrectomy with repair of small bowel laceration.   Septic shock  Upper GI bleed  Acute blood loss anemia  H/o right renal abscess  Hyponatremia  H/o NISREEN  due to ATN- sepsis +/- hydronephrosis  Renal stone  Ckd 2- bl cr 0.96 in jan 2023  H/o Proteus bacteremia   H/o BL hydronephrosis with stones--S/p 12/11 B/L ureteral stent placemen      PLAN-  Will replace k with IV infusion  Recommend transfusion-primary team aware  No need for acute RRT at this time  Continue with daily labs     Subjective:  Interval History:   -Seen and examined today. Unresponsive to tactile stimuli- Monahan with clear urine. BP improved. Creatinine unchanged.Reviewed palliative care notes      Objective:   Vitals:    01/13/24 0330 01/13/24 0700 01/13/24 0715 01/13/24 0730   BP: (!) 109/42 (!) 126/54 (!) 113/47 (!) 120/56   Pulse: 71 88 78 84   Resp: 15 15     Temp:    98.5 °F (36.9 °C)   TempSrc:    Axillary   SpO2:       Weight:       Height:          I/O last 3 completed shifts:  In: 2230.5 [I.V.:1165.8; IV Piggyback:1064.7]  Out: 710 [Urine:450; Drains:260]  No intake/output data recorded.      Physical exam:  GEN: Lethargic, noncommunicative  NECK- no mass  RESP: No wheezing, decreased BS b/l  CVS: S1,S2  RRR  NEURO: Unable to assess  EXT: trace LE edema  : Monahan catheter    Chart reviewed.         Pertinent Notes reviewed.     Data Review :  Lab Results   Component Value Date/Time     01/13/2024 06:56 AM    K 2.9 01/13/2024 06:56 AM     01/13/2024 06:56 AM    CO2 23 
Nursing notified patient able to answer A+Ox4 questions, but is acting paranoid and stating to the nurse she doesn't want ivf as it is causing her to be brain washed, and refusing her meds, then accusing the nursing staff of nobody taking care of her. Then demanding to see ct scan results to nursing. No other concerns reported. I did review recent notes, including palliative care note, which indicated pt would like to transition to hospice care on d/c once antbx can be deescalated to PO. VSS. No report of pt pulling lines, attempting to hurt herself or staff presently. Nursing aware to continue to monitor and will notify me if further concerns overnight. Patient denies any further complaints or concerns. No acute distress reported. Nursing to notify Hospitalist for further/continued concerns. Will remain available overnight for further concerns if nursing/patient needs. Will defer further evaluation/management to the day shift team.    Non-billable note.    
Nursing notified patient bp 79/46 recheck 78/49, asymptomatic. No other concerns reported.  VSS. Ordered Ns bolus 500ml x1 now, asked nursing to please notify me of recheck bp prior to and after giving the bolus and further changes. Patient denies any further complaints or concerns. No acute distress reported. Nursing to notify Hospitalist for further/continued concerns. Will remain available overnight for further concerns if nursing/patient needs. Will defer further evaluation/management to the day shift team.    Update:  Bp improved, hr running 50-60s occasionally dipping into the 40s per nursing, on telemetry monitor I personally reviewed appears sinus kira with occasional pvcs, asymptomatic otherwise. No other concerns reported. Nursing to continue to monitor as long as remains asymptomatic. Wbc mild increase today labs, ID already on board and pt on 2 antbx. Will defer further mgmt to dayshift. Will remain available overnight for further concerns.    Update:  Nursing reported K 2.8, hgb 6.8, asymptomatic. Potassium kcl 1 run iv, and 40meq po x2 doses. Nursing checked and reported paper transfusion consent has been signed. Appears hospice has seen but pt wasn't a candidate. Transfusion 1 unit prbcs with transfusion protocol, with T&C stat ordered as prior . Appreciate nursing assistance with this patient.    Non-billable note.    
Nursing notified patient k 3.0, asymptomatic. No other concerns reported. VSS. Ordered kcl 40meq po x1. Patient denies any further complaints or concerns. No acute distress reported. Nursing to notify Hospitalist for further/continued concerns. Will remain available overnight for further concerns if nursing/patient needs. Will defer further evaluation/management to the day shift team.    Non-billable note.    
Order received and chart reviewed. Pt has been bedbound and non-ambulatory for 4 years. Also noted possible discussion of hospice. Inappropriate for acute PT services. Will sign off. .  
Orders received, chart reviewed and attempted to see pt for OT services.  Pt has severe swelling in bilateral UE and with attempt at any gentle range of motion pt grimaces, moans and shakes her head no.  Pt was barely able to tolerate ROM of BUE off pillows.  Pt didn't verbalize.  Noted that pt has been bed bound for 4 years and is non ambulatory. Per chart review she lives in a group home with all needs met for pt. OT services aren't needed at this time.   
Paged and told that patient vomited about an hour ago and a rapid response was called. She is reportedly now not distressed and there has been no further vomiting.     I explained that general Surgery had signed off yesterday after return of bowel function.  I explained that the treatment for vomiting would be placement of an NG tube.  I asked for the name of the provider that recommended calling us so that I could speak with them a bout the current clinical situation.  The bedside nurse explained that it was actually one of the rapid response nurses that suggested calling us.    I reiterated that the appropriate intervention, if there is concern for vomiting, would be placing an NG tube and asked that the primary team contact me if there were other concerns.  
Palliative Medicine  Patient Name: Nicolasa Macdonald  YOB: 1950  MRN: [id number]  Age: 74 y.o.  Gender: female    Date of Initial Consult: 1/16/24  Date of Service: 1/18/2024  Time: 11:26 AM  Provider: NIRMAL Mooney NP  Hospital Day: 9  Admit Date: 1/10/2024  Referring Provider: Erika Mendiola     Reasons for Consultation:  Goals of Care    HISTORY OF PRESENT ILLNESS (HPI):   Nicolasa Macdonald is a 74 y.o. female with a past medical history of being bedbound at baseline, incontinence, cardiomyopathy, right renal abscess, HTN, and hypothyroidism, who was admitted on 1/10/2024 for elective surgery for right renal abscess and XGP kidney for open right nephrectomy.  It was delayed in December due to having COVID.  She underwent an open right nephrectomy with repair of a small bowel lac and ligation of the right renal artery on 1/10/24.  Immediately post op she was doing fair, tolerating PO liquids, interactive.  2 days post op she look quite poor.  She had hematemesis and was poorly responsive- minimal response to tactile stimulation.  She was also hypotensive and tachycardic. With some fluids, she became hemodynamically stable but her mentation did not improve.  GI was consulted, and eventually neuro as well.    Over the next few days she has started to regain her mentation but her presentation remains quite odd- she can feel her hands but does not withdraw to pain, and will not move her arms at all.  She is slow in her responses, but appropriate, and even has independent thought- asking questions rather than just answering them    Psychosocial: Has family, but is generally a private person. Has lived in a group home since 2020 and is very close with the owner Kay.  She was previously on hospice but graduated in 2020, and has been trying to get back on hospice since then      PALLIATIVE DIAGNOSES:    Goals of care  DNR Discussion  Frailty  Lethargy  Physical Debility  Palliative 
Palliative Medicine  Patient Name: Nicolasa Macdonald  YOB: 1950  MRN: [id number]  Age: 74 y.o.  Gender: female    Date of Initial Consult: 1/16/24  Date of Service: 1/19/2024  Time: 1:40 PM  Provider: NIRMAL Mooney NP  Hospital Day: 10  Admit Date: 1/10/2024  Referring Provider: Erika Mendiola     Reasons for Consultation:  Goals of Care    HISTORY OF PRESENT ILLNESS (HPI):   Nicolasa Macdonald is a 74 y.o. female with a past medical history of being bedbound at baseline, incontinence, cardiomyopathy, right renal abscess, HTN, and hypothyroidism, who was admitted on 1/10/2024 for elective surgery for right renal abscess and XGP kidney for open right nephrectomy.  It was delayed in December due to having COVID.  She underwent an open right nephrectomy with repair of a small bowel lac and ligation of the right renal artery on 1/10/24.  Immediately post op she was doing fair, tolerating PO liquids, interactive.  2 days post op she look quite poor.  She had hematemesis and was poorly responsive- minimal response to tactile stimulation.  She was also hypotensive and tachycardic. With some fluids, she became hemodynamically stable but her mentation did not improve.  GI was consulted, and eventually neuro as well.    Over the next few days she has started to regain her mentation but her presentation remains quite odd- she can feel her hands but does not withdraw to pain, and will not move her arms at all.  She is slow in her responses, but appropriate, and even has independent thought- asking questions rather than just answering them    Psychosocial: Has family, but is generally a private person. Has lived in a group home since 2020 and is very close with the owner Kay.  She was previously on hospice but graduated in 2020, and has been trying to get back on hospice since then      PALLIATIVE DIAGNOSES:    Goals of care  DNR Discussion  Frailty  Lethargy  Physical Debility  Palliative 
Pharmacy Antimicrobial Kinetic Dosing    Indication for Antimicrobials: Right renal abscess    Current Regimen of Each Antimicrobial:  Vancomycin (pharmacy to dose); Start Date ; Day # 1  Zosyn 3.375 g IV q8h; Start Date ; Day #1    Previous Antimicrobial Therapy:  Vancomycin - pre-op on 1/10  Zosyn - pre-op on 1/10     Goal Level: Vancomycin -600    Date Dose & Interval Measured (mcg/mL) Predicted AUC                       Significant Cultures:   - 1/10: Anaerobic Cx - pending  - 1/10: MRSA Screening - pending  - 1/10: Tissue Cx - pending  - 1/10: Ucx - no growth (final)    Labs:  Recent Labs     Units 24  0612 01/10/24  1155   CREATININE MG/DL 0.91 0.92   BUN MG/DL 10 9   WBC K/uL 17.4* 10.4     Temp (24hrs), Av.1 °F (36.2 °C), Min:96.6 °F (35.9 °C), Max:97.9 °F (36.6 °C)      Conditions for Dosing Consideration: None    Creatinine Clearance (mL/min): Estimated Creatinine Clearance: 61 mL/min (based on SCr of 0.91 mg/dL).       Impression/Plan:   Patient received vancomycin 1500 mg and zosyn 3.375 g on 1/10 as pre-operative IV antibiotics  Continuing vancomycin due to concern for infection caused by renal abscess  Patient is currently afebrile with elevated WBC and poor urinary output  Vancomycin 1500 mg x 1 followed by Vancomycin 750 mg q12h as maintenance; this will get est AUC of 513 and trough 16 mg/L  Vanc level - will draw on  at 11 AM  Continue zosyn  Antimicrobial stop date : pending     Pharmacy will follow daily and adjust medications as appropriate for renal function and/or serum levels.    Thank you,  Bart Fitch [facility]   
Pharmacy Antimicrobial Kinetic Dosing    Indication for Antimicrobials: S/P Right Nephrectomy with repair of small bowel laceration    Current Regimen of Each Antimicrobial:  Vancomycin consult; Start Date 1/10; Day # 3  Zosyn 3.375 g IV q8h; Start Date 1/10; Day #3    Previous Antimicrobial Therapy:  Vancomycin 1500mg IV pre-op  Zosyn 3.375gm IV 1/10 pre-op    Goal Level: Vancomycin -600    Date Dose & Interval Measured (mcg/mL) Predicted AUC    10:31  Vancomycin 750 mg q12h  33.2 1289                 Significant Cultures:   1/10 Anaerobic Cx - no anaerobe growth (prelim)  1/10 MRSA Screening - negative (final)  1/10 Tissue Cx - no growth (prelim)  1/10 Ucx - no growth (final)   Bcx x 2 - no growth (prelim)    Labs:  Recent Labs     Units 24  0918 24  0916 24  0019 24  0612 01/10/24  1155   CREATININE MG/DL 1.61*  --  1.26* 0.91 0.92   BUN MG/DL 32*  --  26* 10 9   WBC K/uL  --  30.3* 27.6* 17.4* 10.4     Temp (24hrs), Av.6 °F (36.4 °C), Min:97 °F (36.1 °C), Max:98.2 °F (36.8 °C)      Conditions for Dosing Consideration: None    Creatinine Clearance (mL/min): Estimated Creatinine Clearance: 34 mL/min (A) (based on SCr of 1.61 mg/dL (H)).       Impression/Plan:   Persistent leukocytosis, worsening Scr and afebrile, Cx pending  ID consulted - ID is awaiting intra operartive Cx, and plansto d/c vancomycin on  if MRSA negative and cont Zosyn  Vanc dosing placeholder was entered and Vanc redosing held  considering Scr increase 1/12 am  I have adjusted Zosyn to q12h renal dosing considering Scr increase 1/12 am and will re-evaluation   Antimicrobial stop date - pending     Pharmacy will follow daily and adjust medications as appropriate for renal function and/or serum levels.    Thank you,  MANDA JUAN [facility]           
Pharmacy Antimicrobial Kinetic Dosing    Indication for Antimicrobials: S/P Right Nephrectomy with repair of small bowel laceration    Current Regimen of Each Antimicrobial:  Vancomycin renal dosing; Start Date 1/10; Day # 3  Micafungin 100mg IV q24h - started 1 day 1  Meropenem 1000mg IV q8h - started 1 day 1    Previous Antimicrobial Therapy:  Zosyn 3.375 g IV q8h; Start Date 1/10; Day #3  Vancomycin 1500mg IV pre-op  Zosyn 3.375gm IV 1/10 pre-op    Goal Level: Vancomycin -600    Date Dose & Interval Measured (mcg/mL) Predicted AUC    10:31  Vancomycin 750 mg q12h  33.2 1289                 Significant Cultures:   1/10 Anaerobic Cx - no anaerobe growth (prelim)  1/10 MRSA Screening - negative (final)  1/10 Tissue Cx - no growth (prelim)  1/10 Ucx - no growth (final)   Bcx x 2 - no growth (prelim)    Labs:  Recent Labs     Units 24  0918 24  0916 24  0019 24  0612 01/10/24  1155   CREATININE MG/DL 1.61*  --  1.26* 0.91 0.92   BUN MG/DL 32*  --  26* 10 9   WBC K/uL  --  30.3* 27.6* 17.4* 10.4     Temp (24hrs), Av.7 °F (36.5 °C), Min:97 °F (36.1 °C), Max:98.2 °F (36.8 °C)      Conditions for Dosing Consideration: None    Creatinine Clearance (mL/min): Estimated Creatinine Clearance: 34 mL/min (A) (based on SCr of 1.61 mg/dL (H)).       Impression/Plan:   Persistent leukocytosis, worsening Scr and afebrile, Cx pending  ID consulted - ID is awaiting intra operative Cx, and plans to d/c vancomycin on  if MRSA negative and cont Zosyn  Vanc dosing placeholder was entered and Vanc redosing held  considering Scr increase  am  ID changed Zosyn to Meropenem; adjusted dosing to 1000mg IV q12h renal dosing  Antimicrobial stop date - pending     Pharmacy will follow daily and adjust medications as appropriate for renal function and/or serum levels.    Thank you,  MANDA JUAN [facility]             
RR called this AM for \"hematemesis.\"      Tachycardic, sats ok.    Abd soft, ND, hypoactive BS.      KUB \"Gas pattern is otherwise unremarkable.\"    AP/  s/p repair of enterotomy by urology which I inspected in the OR.  Looked great.  Appeared patent.    - NPO until active flatus, alert.    - NG and CT tube if vomits again.    - HOB >30 degrees.    
Remote review of CT and chart.  On ASA.  CT improved.  Full liquids and advance as tolerated.  Abx per ID but I think OK for po. Will need local cysto and left stent removal ?Monday (no evidence of left renal stone initially present on admission on multiple studies since altho I have no idea when or how it passed) if she agrees.  
Responding to RRT for hematemesis    BP 99/53  -124  Patient lethargic  Last Hg 7.6 (midnight)      Bolus NS 500cc x 1  Start NS infusion  Stat CBC CMP  Start IV protonix   Consult GI  Consult Intensivist   Updated Dr Lind earlier and discussed with Urology team at beside      Rasheed Alvarenga MD  Hospitalist      I have provided    35    minutes of critical care time.  During this entire length of time I was immediately available to the patient.      The reason for providing this level of medical care was due to a critical illness that impaired one or more vital organ systems, such that there was a high probability of imminent or life threatening deterioration in the patient's condition. This care involved high complexity decision making which includes reviewing the patient's past medical records, current laboratory results, and actual Xray films in order to assess, support vital system function, and to treat this degree of vital organ system failure, and to prevent further life threatening deterioration of the patient’s condition.        
Routine EEG completed.  
Speech Pathology Note    Chart reviewed and discussed with RN. Patient continues to be minimally responsive and not appropriate for swallow evaluation. Note patient is NPO with no alternate means of nutrition. Will continue to defer and will follow up as patient is medically appropriate. Thank you.     Andrés Karimi M.S., CCC-SLP  
Speech Pathology Note    Chart reviewed and discussed with RN. RRT called overnight for tachycardia and vomiting. Patient to be made NPO, per RN. NGT now placed. Will hold SLP visit and will follow up as patient is medically appropriate. Thank you.    Andrés Karimi M.S., CCC-SLP  
Speech Pathology Note    SLP orders received. Chart reviewed and discussed with RN. Patient is minimally responsive and only moaning out, per RN. Will defer and will follow up when patient is medically appropriate. Thank you.    Andrés Karimi M.S., CCC-SLP    
Spiritual Care Assessment/Progress Note  [facility]    Name: Nicolasa Macdonald MRN: [id number]    Age: 74 y.o.     Sex: female   Language: English     Date: 1/24/2024            Total Time Calculated: 45 min              Spiritual Assessment begun in [facility] 2 PROGRESSIVE CARE  Service Provided For:: Patient  Referral/Consult From:: Multi-disciplinary team  Encounter Overview/Reason : Advance Care Planning    Spiritual beliefs:      [x] Involved in a uriah tradition/spiritual practice:      [] Supported by a uriah community:      [] Claims no spiritual orientation:      [] Seeking spiritual identity:           [] Adheres to an individual form of spirituality:      [] Not able to assess:                Identified resources for coping and support system:   Support System: Family members       [x] Prayer                  [] Devotional reading               [] Music                  [] Guided Imagery     [] Pet visits                                        [] Other: (COMMENT)     Specific area/focus of visit   Encounter:    Crisis:    Spiritual/Emotional needs: Type: Spiritual Support  Ritual, Rites and Sacraments:    Grief, Loss, and Adjustments:    Ethics/Mediation:    Behavioral Health:    Palliative Care:    Advance Care Planning: Type: ACP conversation    Plan/Referrals: No future visits requested    Narrative:    was paged by  for patient in 2311 wo was requesting for an AMD consult. She requested, after document was reviewed, that she would like to complete the first part of the document at this time and completed the other sections later. Per her request, her niece, Jenn Pichardo was made her MPOA and primary decision maker. She asked to keep document and think through the second section before filling it out.  She was advised of  availability to assist complete it when she was ready.   Patient shared that she is L[employer]-day and spoke about her little [employer], where she felt 
Surgery Progress    Patient seen and examined.    24 hour events include consultation by neurology with CT head demonstrating subacute stroke.  Patient continues to be not interactive.  Continues to appear ill.    She has had several more bowel movements with no emesis events.    Palliative care on board.    Team is planning repeat CT scan as LORETTA drain output continues to be purulent.      A/P: Doing well from a bowel injury repair standpoint.  Overall poor condition and prognosis, defer to primary team.    Agustin Velez MD  General Surgery    
Surgery Progress    Patient seen and examined.    Critically low hemoglobin this morning at 5.7.  No good IV access.  IV team currently working on obtaining more adequate access.    Patient is obtunded, not interactive.  Appears very ill.    She did have a bowel movement overnight, no emesis events.    Palliative team per note working on potentially reinstating hospice upon discharge next week.    A/P: From a general surgery standpoint, evidence of bowel movement is encouraging.  Leukocytosis is downtrending, if remains elevated, low threshold to rescan.  However, in setting of overall prognosis, further surgery may be not an option even with a complication.    Trend CBC  Low threshold to repeat CT scan  Otherwise per primary team    Agustin Velez MD  General Surgery    
[facility]  Good Help to Those in Need  [phone]    Nursing Note   Patient Name: Nicolasa Macdonald  YOB: 1950  Age: 74 y.o.    [facility] RN Note:      Patient does not meet criteria for GIP hospice level of care but is appropriate for Routine level of hospice care at home or facility.      Spoke with Gunjan ANGELA yesterday.  This writer and JULIO CÉSAR have spoken with Kay/MPOA and caregiver at patient's group home and are aware that Kay is requesting additional help/care giving be in place before patient can return to group home.  Case management will be following up with Kay tomorrow.  [facility] is available to service patient at group home.  If there are any questions in relation to hospice, please call this writer or the main [facility] number (see below).    ARLEEN BonnerN, RN, [facility]  Hospice Liaison  [phone] (mobile)  [phone] (main [facility] number)  Available on J[employer]    
afebrile, vss. Normotensive  She is more conversational still today. Alert and oriented. Not moving arms or legs.   Good UOP from escobar  Creat stable  WBC decreased to 11.2 on 24 from 12.5 on 24.   Hgb 7.5 on 24 from 9.1 on 24  No complaints of pain this morning.      Vitals: Temp (24hrs), Av.8 °F (36.6 °C), Min:96.9 °F (36.1 °C), Max:98.3 °F (36.8 °C)    Blood pressure (!) 112/53, pulse 95, temperature 98.2 °F (36.8 °C), temperature source Oral, resp. rate 19, height 1.702 m (5' 7.01\"), weight 77.1 kg (169 lb 15.6 oz), SpO2 99 %, peak flow (!) 16 L/min.    Intake and Output:   1901 -  0700  In: 4361 [P.O.:240; I.V.:3551.2]  Out: 2870 [Urine:1950; Drains:570]  No intake/output data recorded.  LORETTA Output lats 24 hrs: No data found.   LORETTA Output last 8 hrs: No data found.  BM over last 24 hrs: No data found.    Physical Exam  General: NAD, pleasant  Respiratory: no distress, breathing easily, room air  Abdomen: soft, no distention; non-tender to palpation  : no CVA tenderness, emptying clear yellow urine into escobar bag  Neuro: Appropriate, no focal neurological deficits  Skin: warm, dry  Extremities: not moving legs or arms.    Labs:  CBC:   Lab Results   Component Value Date/Time    WBC 11.2 2024 04:07 AM    HCT 23.8 2024 04:07 AM     2024 04:07 AM     BMP:   Lab Results   Component Value Date/Time     2024 04:07 AM    K 3.6 2024 04:07 AM     2024 04:07 AM    CO2 29 2024 04:07 AM    BUN 16 2024 04:07 AM     Assessment/Plan:   Procedure(s):  RIGHT RADICAL OPEN NEPHRECTOMY (E R A S)  LIGATION OF RIGHT RENAL ARTERY  REPAIR SMALL BOWEL LACERATION     Post op AMS- neurology work up. PRES, with subacute infarcts and ?hemorrhagic conversion, minimal.      Rule out sepsis- BC NG , CXR- right atelectasis, CT  new 4cm right RP abscess in nephrectomy bed. On empiric antifungal, zosyn and vanco.     2024  - Continue 
clearance  Continue Meropenem renal dosing  Antimicrobial stop date - pending     Pharmacy will follow daily and adjust medications as appropriate for renal function and/or serum levels.    Thank you,  MANDA JUAN [facility]                 
g/kg bw)  Method Used for Fluid Requirements: 1 ml/kcal  Fluid (ml/day): 1700 mL    Nutrition Diagnosis:   Inadequate protein-energy intake related to cognitive or neurological impairment, altered GI function as evidenced by NPO or clear liquid status due to medical condition, intake 0-25%    Nutrition Interventions:   Food and/or Nutrient Delivery: Modify Current Diet, Start Oral Nutrition Supplement  Nutrition Education/Counseling: No recommendation at this time  Coordination of Nutrition Care: Continue to monitor while inpatient       Goals:  Previous Goal Met: Goal(s) Achieved  Goals: PO intake 50% or greater, by next RD assessment       Nutrition Monitoring and Evaluation:   Behavioral-Environmental Outcomes: None Identified  Food/Nutrient Intake Outcomes: Diet Advancement/Tolerance, Food and Nutrient Intake, Supplement Intake  Physical Signs/Symptoms Outcomes: Biochemical Data, Weight, GI Status    Discharge Planning:    Too soon to determine     Pavithra Mascorro RD  Contact: ext [phone]    
no acute distress    EENT:  EOMI. Anicteric sclerae. MMM  Resp:  CTA bilaterally, no wheezing or rales.  No accessory muscle use  CV:  Regular  rhythm,  No edema  GI:  Soft, Non distended, Non tender.  +Bowel sounds  Neurologic:  Alert and oriented X 3, normal speech,   Psych:   Good insight. Not anxious nor agitated  Skin:  No rashes.  No jaundice    Reviewed most current lab test results and cultures  YES  Reviewed most current radiology test results   YES  Review and summation of old records today    NO  Reviewed patient's current orders and MAR    YES  PMH/SH reviewed - no change compared to H&P    ________________________________________________________________________  Care Plan discussed with:    Comments   Patient x    Family      RN     Care Manager     Consultant                        Multidiciplinary team rounds were held today with , nursing, pharmacist and clinical coordinator.  Patient's plan of care was discussed; medications were reviewed and discharge planning was addressed.     ________________________________________________________________________  Total NON critical care TIME:   35  Minutes    Total CRITICAL CARE TIME Spent:   Minutes non procedure based      Comments   >50% of visit spent in counseling and coordination of care x     This includes time during multidisciplinary rounds if indicated above   ________________________________________________________________________  Rasheed Alvarenga MD     Procedures: see electronic medical records for all procedures/Xrays and details which were not copied into this note but were reviewed prior to creation of Plan.      LABS:  I reviewed today's most current labs and imaging studies.  Pertinent labs include:  Recent Labs     01/10/24  1155 01/10/24  1825 01/11/24  0612   WBC 10.4  --  17.4*   HGB 12.5 8.7* 8.5*   HCT 41.4 28.8* 28.7*   *  --  329     Recent Labs     01/10/24  1155 01/10/24  1253 01/11/24  0612     --  133*   K 4.9 
and MAR    YES  PMH/SH reviewed - no change compared to H&P                Total NON critical care TIME:  55   Minutes    Total CRITICAL CARE TIME Spent:   Minutes non procedure based                Comments   >50% of visit spent in counseling and coordination of care x     This includes time during multidisciplinary rounds if indicated above   ________________________________________________________________________  Audrey Barfield MD     Procedures: see electronic medical records for all procedures/Xrays and details which were not copied into this note but were reviewed prior to creation of Plan.      LABS:  I reviewed today's most current labs and imaging studies.  Pertinent labs include:  Recent Labs     01/12/24  0916 01/12/24  1355 01/13/24  0656 01/13/24  1535 01/14/24  0342   WBC 30.3*  --  23.8*  --  20.9*   HGB 7.9*   < > 5.7* 8.6* 8.3*   HCT 26.0*   < > 19.0* 27.4* 25.4*   *  --  335  --  326    < > = values in this interval not displayed.       Recent Labs     01/12/24  0918 01/12/24  1355 01/13/24  0656 01/14/24  0342    138 141 141   K 3.6 3.2* 2.9* 4.1    109* 112* 115*   CO2 24 23 23 21   BUN 32* 31* 36* 33*   MG  --   --  1.7 1.6   PHOS  --   --  2.6 1.9*   ALT 7*  --  <6* <6*       
rhythm,  b/l UE and LE edema  GI/:  Soft, Non distended, Non tender.  +Bowel sounds, LORETTA drain, surgical wound clean/intact. Monahan    Neurologic:  AAOX4, verbal, no slurred speech, sensation intact of UE but power 1/5.   Skin:  No rashes.  No jaundice.  RUE midline site intact        Reviewed most current lab test results and cultures  YES  Reviewed most current radiology test results   YES  Review and summation of old records today    NO  Reviewed patient's current orders and MAR    YES  PMH/SH reviewed - no change compared to H&P                Total NON critical care TIME:     Minutes    Total CRITICAL CARE TIME Spent:   Minutes non procedure based                Comments   >50% of visit spent in counseling and coordination of care x     This includes time during multidisciplinary rounds if indicated above   ________________________________________________________________________  Massiel Ordonez MD     Procedures: see electronic medical records for all procedures/Xrays and details which were not copied into this note but were reviewed prior to creation of Plan.      LABS:  I reviewed today's most current labs and imaging studies.  Pertinent labs include:  Recent Labs     01/21/24 0330 01/22/24 0517 01/23/24  0553   WBC 11.7* 10.8 10.8   HGB 8.2* 6.8* 9.1*   HCT 26.1* 21.3* 28.0*   * 491* 517*       Recent Labs     01/21/24 0330 01/22/24  0517 01/23/24  0553    139 142   K 3.0* 2.8* 4.1    106 108   CO2 28 29 29   BUN 10 10 9       
dose modulation.    FINDINGS:  Hypodensities in the frontal lobes bilaterally as well as the occipital lobes  suggestive of subacute infarcts. No midline shift or mass effect. No evidence of  acute hemorrhage. Mucosal thickening of the right maxillary sinus. Basal  cisterns are clear.    Impression  Bilateral frontal and occipital hypodensities indicative of subacute infarcts.  No evidence of hemorrhage.      CT ABDOMEN WO IV CONTRAST 01/10/2024    Narrative  EXAM: CT ABDOMEN WO CONTRAST    INDICATION: Prior to surgery    COMPARISON: CT abdomen pelvis 12/19/2023    CONTRAST:  None.    TECHNIQUE:  Thin axial images were obtained through the abdomen only. Coronal and sagittal  reformats were generated. Oral contrast was not administered. CT dose reduction  was achieved through use of a standardized protocol tailored for this  examination and automatic exposure control for dose modulation.    The absence of intravenous contrast material reduces the sensitivity for  evaluation of the vasculature and solid organs.    FINDINGS:  LOWER THORAX: Cardiomegaly. Coronary artery calcifications. Mitral annulus  calcifications. Decreasing small right and resolved left pleural effusions.  LIVER: No definite mass.  BILIARY TREE: Cholelithiasis. CBD is not dilated.  SPLEEN: within normal limits.  PANCREAS: No focal abnormality.  ADRENALS: Unremarkable.  KIDNEYS/URETERS: Bilateral double-J nephroureteral stents. Grossly unchanged in  size approximately 6.9 cm x 3.4 cm x 4.0 cm thick-walled amorphous complex fluid  and gas collection at the right renal pelvis, again concerning for abscess.  Unchanged mild right hydronephrosis. Several small nonobstructing right upper  pole renal calculi.  STOMACH: Unremarkable.  INCLUDED SMALL BOWEL AND COLON: No dilatation or wall thickening.  APPENDIX: Normal.  PERITONEUM: No ascites or pneumoperitoneum.  RETROPERITONEUM: No lymphadenopathy. Atherosclerosis without aneurysm.  BONES: No destructive 
jaundice.  Extremities: No edema  LORETTA + draining        Results       Procedure Component Value Units Date/Time    Culture, Blood 1 [[id number]] Collected: 01/11/24 1155    Order Status: Completed Specimen: Blood Updated: 01/17/24 0812     Special Requests --        NO SPECIAL REQUESTS  ARM       Culture NO GROWTH 6 DAYS       Culture, Blood 2 [[id number]] Collected: 01/11/24 1144    Order Status: Completed Specimen: Blood Updated: 01/17/24 0812     Special Requests --        NO SPECIAL REQUESTS  LEFT  ARM       Culture NO GROWTH 6 DAYS       Culture, Anaerobic [[id number]] Collected: 01/10/24 1424    Order Status: Completed Specimen: Abscess Updated: 01/13/24 0754     Special Requests NO SPECIAL REQUESTS        Culture NO ANAEROBES ISOLATED       Culture, Tissue [[id number]]  (Abnormal)  (Susceptibility) Collected: 01/10/24 1424    Order Status: Completed Specimen: Tissue Updated: 01/14/24 0731     Special Requests NO SPECIAL REQUESTS        Gram stain RARE WBCS SEEN         NO ORGANISMS SEEN        Culture RARE Proteus mirabilis         LIGHT Candida glabrata       Susceptibility        Proteus mirabilis      BACTERIAL SUSCEPTIBILITY PANEL GINI      amikacin <=2 ug/mL Sensitive      ampicillin <=2 ug/mL Sensitive      ampicillin-sulbactam <=2 ug/mL Sensitive      ceFAZolin 8 ug/mL Sensitive      cefepime <=1 ug/mL Sensitive      cefOXitin <=4 ug/mL Sensitive      cefTAZidime <=1 ug/mL Sensitive      cefTRIAXone <=1 ug/mL Sensitive      ciprofloxacin <=0.25 ug/mL Sensitive      gentamicin <=1 ug/mL Sensitive      levofloxacin <=0.12 ug/mL Sensitive      meropenem <=0.25 ug/mL Sensitive      piperacillin-tazobactam <=4 ug/mL Sensitive      tobramycin <=1 ug/mL Sensitive      trimethoprim-sulfamethoxazole <=20 ug/mL Sensitive                           Culture, MRSA, Screening [[id number]] Collected: 01/10/24 1049    Order Status: Completed Specimen: Nares Updated: 01/11/24 1604     Special Requests NO SPECIAL REQUESTS  
calcifications. Mitral annulus calcifications. Decreasing small right and resolved left pleural effusions. LIVER: No definite mass. BILIARY TREE: Cholelithiasis. CBD is not dilated. SPLEEN: within normal limits. PANCREAS: No focal abnormality. ADRENALS: Unremarkable. KIDNEYS/URETERS: Bilateral double-J nephroureteral stents. Grossly unchanged in size approximately 6.9 cm x 3.4 cm x 4.0 cm thick-walled amorphous complex fluid and gas collection at the right renal pelvis, again concerning for abscess. Unchanged mild right hydronephrosis. Several small nonobstructing right upper pole renal calculi. STOMACH: Unremarkable. INCLUDED SMALL BOWEL AND COLON: No dilatation or wall thickening. APPENDIX: Normal. PERITONEUM: No ascites or pneumoperitoneum. RETROPERITONEUM: No lymphadenopathy. Atherosclerosis without aneurysm. BONES: No destructive bone lesion. Degenerative changes. ABDOMINAL WALL: No mass or hernia. ADDITIONAL COMMENTS: N/A     1.  Bilateral double-J nephroureteral stents. Grossly unchanged in size presumed abscess in the right renal pelvis region. Unchanged mild right hydronephrosis. 2.  Decreasing right and resolved left pleural effusions. 3.  Incidental findings as above.     XR CHEST (2 VW)    Result Date: 12/26/2023  EXAM: XR CHEST (2 VW) INDICATION: fever, cough COMPARISON: Chest radiograph 12/10/2023, CT chest 12/10/2023 TECHNIQUE: PA and lateral chest views FINDINGS: Cardiomediastinal silhouette enlarged. Hazy opacity at the right lung base may reflect pneumonia or aspiration. Pleural spaces grossly clear..     Hazy opacity at the right lung base may reflect pneumonia or aspiration.      CT ABSCESS DRAINAGE SOFT TISSUE    Result Date: 12/20/2023  PROCEDURE:  CT GUIDED ASPIRATION HISTORY: NELLIE MCGILL is a 73 years old Female with right renal abscess. :  Shanelle Collins NP ATTENDING:  Minh Vaughn MD CONSENT:  After full discussion of the procedure, including risks, benefits and alternatives, 
otherwise  unremarkable.    Impression  Left ureteral stent appears to be in place.      CT ABDOMEN WO CONTRAST Additional Contrast? None    Result Date: 1/10/2024  EXAM: CT ABDOMEN WO CONTRAST INDICATION: Prior to surgery COMPARISON: CT abdomen pelvis 12/19/2023 CONTRAST:  None. TECHNIQUE: Thin axial images were obtained through the abdomen only. Coronal and sagittal reformats were generated. Oral contrast was not administered. CT dose reduction was achieved through use of a standardized protocol tailored for this examination and automatic exposure control for dose modulation. The absence of intravenous contrast material reduces the sensitivity for evaluation of the vasculature and solid organs. FINDINGS: LOWER THORAX: Cardiomegaly. Coronary artery calcifications. Mitral annulus calcifications. Decreasing small right and resolved left pleural effusions. LIVER: No definite mass. BILIARY TREE: Cholelithiasis. CBD is not dilated. SPLEEN: within normal limits. PANCREAS: No focal abnormality. ADRENALS: Unremarkable. KIDNEYS/URETERS: Bilateral double-J nephroureteral stents. Grossly unchanged in size approximately 6.9 cm x 3.4 cm x 4.0 cm thick-walled amorphous complex fluid and gas collection at the right renal pelvis, again concerning for abscess. Unchanged mild right hydronephrosis. Several small nonobstructing right upper pole renal calculi. STOMACH: Unremarkable. INCLUDED SMALL BOWEL AND COLON: No dilatation or wall thickening. APPENDIX: Normal. PERITONEUM: No ascites or pneumoperitoneum. RETROPERITONEUM: No lymphadenopathy. Atherosclerosis without aneurysm. BONES: No destructive bone lesion. Degenerative changes. ABDOMINAL WALL: No mass or hernia. ADDITIONAL COMMENTS: N/A     1.  Bilateral double-J nephroureteral stents. Grossly unchanged in size presumed abscess in the right renal pelvis region. Unchanged mild right hydronephrosis. 2.  Decreasing right and resolved left pleural effusions. 3.  Incidental findings 
table.  CT dose reduction was achieved through use of a standardized protocol tailored for this examination and automatic exposure control for dose modulation.  Initial  images were obtained which again demonstrated a right renal fluid collection.  An appropriate site for aspiration was marked.  The skin was prepped and draped in sterile fashion.  1% lidocaine was utilized for local anesthesia.  A small dermatotomy was made.  Under CT guidance, an 18 gauge needle was advanced into the right renal fluid collection. Approximately 5 ml of green mucous fluid was aspirated.  The needle was removed.  Pressure was applied locally at the puncture site.  A dry sterile dressing was applied.  There were no immediate complications.  The patient tolerated the procedure without difficulty.  SEDATION: Moderate intravenous conscious sedation was supervised by Dr. Vaughn.  The patient was independently monitored by a registered nurse assigned to the Department of Radiology using automated blood pressure, ECG and pulse oximetry. The detailed conscious sedation record is stored in the hospital information system. Medication: Versed:  3 mg Fentanyl:  75 mcg Intraprocedure time:  25 minutes COMPLICATIONS:  None ESTIMATED BLOOD LOSS:  < 5 ml SPECIMENS:  Fluid sent for studies DLP:  706.51 mGy*cm     Technically successful CT guided aspiration of the right renal fluid collection yielding thick purulent fluid. Sample sent for culture. Collection too small and loculated for drainage catheter placement.     CT ABDOMEN PELVIS W IV CONTRAST Additional Contrast? Radiologist Recommendation    Result Date: 12/19/2023  EXAM: CT ABDOMEN PELVIS W IV CONTRAST INDICATION: re-evaluate XGP kidney COMPARISON: CT abdomen pelvis from 12/10/2023 CONTRAST: 100 mL of Isovue-370. ORAL CONTRAST: None TECHNIQUE: Following intravenous administration of contrast, thin axial images were obtained through the abdomen and pelvis. Coronal and sagittal

## 2024-02-01 ENCOUNTER — OFFICE VISIT (OUTPATIENT)
Facility: CLINIC | Age: 74
End: 2024-02-01

## 2024-02-01 DIAGNOSIS — R41.82 ALTERED MENTAL STATUS, UNSPECIFIED ALTERED MENTAL STATUS TYPE: Primary | ICD-10-CM

## 2024-02-01 DIAGNOSIS — I42.9 CARDIOMYOPATHY, UNSPECIFIED TYPE (HCC): ICD-10-CM

## 2024-02-01 DIAGNOSIS — E03.9 HYPOTHYROIDISM, UNSPECIFIED TYPE: ICD-10-CM

## 2024-02-01 DIAGNOSIS — I10 ESSENTIAL HYPERTENSION: ICD-10-CM

## 2024-02-01 NOTE — PROGRESS NOTES
PLACE OF SERVICE:  [facility] [address] [id number]    SKILLED VISIT    2/1/2024    Chief Complaint:  acuity rounding .    HPI  Provider seeing resident today after I had therapy and NP from [facility] spoke to me about the change in [place].   Cindy she was admitted to the facility after an episode  altered mental status. Followed by hospitalization while at  hospital found to have right renal abscess and bilateral hydronephrosis. Patient was initially started on IV vancomycin and Zosyn and became septic requiring IV fluids and ICU care. On ICU patient is on vasopressors which she stabilized eventually transferred out. Urine cultures grew Proteus. Patient did undergo right renal abscess aspiration 12/20 and plans for nephrectomy were postponed due to her COVID-19 positive status. Patient was continue IV Zosyn with  plans return to the hospital at a later time for the nephrectomy.  While at the hospital she had AMS not prior to admission of her procedure, . CT of the head showed bilateral frontal and occipital hypodensities indicative of subacute  infarct. MRI brain showed patchy areas of subacute versus acute infarction. She was discharge back at the facility with neurology follow up and possible transition to hospice. Staff to follow up on her neurology follow up . She was in bed today ,she is AAOX1 only to self, and remains a poor historian. She is now a feeder and now dependent for total care. Her VS remains stable, she remains afebrile, no fever or temperature reported. Therapy have been following patient however she is making poor progress ,per therapy notes she has decreased functional capacity, decreased ROM, fine motor control deficits, gross motor coordination deficits and pain.  She has history of high blood pressure blood pressure trends reviewed remained stable on current regimen she has history of hypothyroidism will order TSH level for close monitoring.

## 2024-02-12 ENCOUNTER — OFFICE VISIT (OUTPATIENT)
Facility: CLINIC | Age: 74
End: 2024-02-12
Payer: MEDICARE

## 2024-02-12 DIAGNOSIS — R53.1 GENERALIZED WEAKNESS: ICD-10-CM

## 2024-02-12 DIAGNOSIS — A41.9 SEPSIS WITHOUT ACUTE ORGAN DYSFUNCTION, DUE TO UNSPECIFIED ORGANISM (HCC): Primary | ICD-10-CM

## 2024-02-12 DIAGNOSIS — D64.9 ANEMIA, UNSPECIFIED TYPE: ICD-10-CM

## 2024-02-12 DIAGNOSIS — I10 PRIMARY HYPERTENSION: ICD-10-CM

## 2024-02-12 DIAGNOSIS — N17.9 ACUTE RENAL FAILURE, UNSPECIFIED ACUTE RENAL FAILURE TYPE (HCC): ICD-10-CM

## 2024-02-12 PROCEDURE — G8484 FLU IMMUNIZE NO ADMIN: HCPCS | Performed by: INTERNAL MEDICINE

## 2024-02-12 PROCEDURE — 1123F ACP DISCUSS/DSCN MKR DOCD: CPT | Performed by: INTERNAL MEDICINE

## 2024-02-12 PROCEDURE — 99309 SBSQ NF CARE MODERATE MDM 30: CPT | Performed by: INTERNAL MEDICINE

## 2024-02-12 NOTE — PROGRESS NOTES
PLACE OF SERVICE:  [place] [id number] [id number]    SKILLED VISIT    2/12/2024    Chief Complaint: Follow-up on recent nephrectomy, recent bacteremia, generalized weakness, GI bleed with anemia and to check progress    HPI : Nicolasa Macdonald is a 74 y.o. female who has been admitted to [facility] for skilled rehab after recent nephrectomy.  She has also history of recent GI bleed and is status post blood transfusion.  She is seen for follow-up.  Patient with PT OT and has been making progress.  Therapy notes reviewed.  No falls reported.  She does not complain of any pain.  No recurrence of GI bleed noted.  Hemoglobin has improved to 9.9.  She has recent history of NSTEMI. remains stable with no angina.  Blood pressure continues to run in normal range.  She remains afebrile with no signs symptom suggestive of infection.    PMH: Chronic kidney disease, cardiomyopathy, hypertension, hypothyroidism, anemia, GI bleed,    ROS:   The following system review was negative:  Constitutional; Respiratory; Cardiovascular; Genitourinary; Gastrointestinal; Psychiatric; Ear-Nose-Throat; Musculoskeletal; Neurologic; Endocrine; Hematologic; Skin; Eyes; denies any    Medications: Reviewed in EMR and assessment and plan    Social History / Family History:  Reviewed       Vitals: Blood pressure 114/65 pulse 56 respiration 18 temperature 98.1 pulse ox 96%          Exam:  Constitutional: No acute distress;   Eyes: Sclera clear, PERRLA;   Ears/nose/mouth/throat:mmm, OP clear, trachea midline;  Cardiovascular: RRR,nml S1 and S2, no rubs murmurs or gallops, no edema, no cyanosis;   Respiratory: Clear to auscultation, symmetric, no respiratory distress;  Gastrointestinal: Abdomen soft, NT, ND, no masses, normal bowel sounds;  Neurologic: Cranial nerves II through VII grossly intact, no speech or motor deficits A&O in time place and person  Skin: No rash, warm and dry;  Musculoskeletal: No erythema

## 2024-02-13 ENCOUNTER — OFFICE VISIT (OUTPATIENT)
Facility: CLINIC | Age: 74
End: 2024-02-13
Payer: MEDICARE

## 2024-02-13 DIAGNOSIS — I10 HYPERTENSION, UNSPECIFIED TYPE: ICD-10-CM

## 2024-02-13 DIAGNOSIS — M62.9 DISORDER OF MUSCLE: Primary | ICD-10-CM

## 2024-02-13 DIAGNOSIS — N39.0 URINARY TRACT INFECTION WITHOUT HEMATURIA, SITE UNSPECIFIED: ICD-10-CM

## 2024-02-13 PROCEDURE — G8484 FLU IMMUNIZE NO ADMIN: HCPCS

## 2024-02-13 PROCEDURE — 1123F ACP DISCUSS/DSCN MKR DOCD: CPT

## 2024-02-13 PROCEDURE — 99309 SBSQ NF CARE MODERATE MDM 30: CPT

## 2024-02-13 NOTE — PROGRESS NOTES
PLACE OF SERVICE:  [facility] [address] [id number]    SKILLED VISIT    2/13/2024    Chief Complaint:  decreased muscle tone.    HPI  Provider seeing patient today after patient continues to have significant decline in function abilities with significant weakness in all her extremities.  She has decreased range of motion to bilateral lower extremity her bilateral upper extremity severely contracted.  Per PT OT notes she has limited mobility.  Per PT: : Pt received semi fowlers in bed and agreeable to therapy. Performed PROM/stretching to BLEs for improved  positioning and pressure relief. Pt c/o pain with mid range hip flexion  Per OT:Attempting to have pt engage in self feeding wth built up utensil. Pt unable to grasp utensil today to engage. Pt  given increased time for attempting to engage RUE.  PER ST: Patient completed orientation tasks with moderate verbal and visual cues. Resident completed reasoning tasks with 50% accuracy using moderate verbal and visual prompts.  She has completed her treatment with Macrobid for UTI no urinary symptoms reported no signs and symptoms suggestive of infection at this time.  She remains afebrile no fever no temperature reported.  Monahan drains sedimented yellow to minnie urine.She has history of high blood pressure blood pressure trends reviewed remained stable.  Long-term power plan for family is to take her to [place] once all her paperwork has been completed  to assist family with the completion of the paperwork and help patient transition.  PMH:     Chronic kidney disease  Cardiomyopathy  Hypertension  Hypothyroidism  Recent COVID  Sacral and heel wounds    ROS:   The following system review was negative:  Constitutional; Respiratory; Cardiovascular; Genitourinary; Gastrointestinal; Psychiatric; Ear-Nose-Throat; Musculoskeletal; Neurologic; Endocrine; Hematologic; Skin; Eyes; denies any    Medications: Reviewed in PCC

## 2024-02-14 ENCOUNTER — OFFICE VISIT (OUTPATIENT)
Facility: CLINIC | Age: 74
End: 2024-02-14
Payer: MEDICARE

## 2024-02-14 DIAGNOSIS — N17.9 ACUTE RENAL FAILURE, UNSPECIFIED ACUTE RENAL FAILURE TYPE (HCC): ICD-10-CM

## 2024-02-14 DIAGNOSIS — A41.9 SEPSIS WITHOUT ACUTE ORGAN DYSFUNCTION, DUE TO UNSPECIFIED ORGANISM (HCC): Primary | ICD-10-CM

## 2024-02-14 DIAGNOSIS — R53.1 GENERALIZED WEAKNESS: ICD-10-CM

## 2024-02-14 DIAGNOSIS — D64.9 ANEMIA, UNSPECIFIED TYPE: ICD-10-CM

## 2024-02-14 DIAGNOSIS — I10 PRIMARY HYPERTENSION: ICD-10-CM

## 2024-02-14 PROCEDURE — G8484 FLU IMMUNIZE NO ADMIN: HCPCS | Performed by: INTERNAL MEDICINE

## 2024-02-14 PROCEDURE — 99309 SBSQ NF CARE MODERATE MDM 30: CPT | Performed by: INTERNAL MEDICINE

## 2024-02-14 PROCEDURE — 1123F ACP DISCUSS/DSCN MKR DOCD: CPT | Performed by: INTERNAL MEDICINE

## 2024-02-14 NOTE — PROGRESS NOTES
PLACE OF SERVICE:  [place] [id number] [id number]    SKILLED VISIT    2/14/2024    Chief Complaint: Follow-up on recent nephrectomy, recent bacteremia, generalized weakness, GI bleed with anemia and to check progress    HPI : Nicolasa Macdonald is a 74 y.o. female who has been admitted to [facility] for skilled rehab after recent nephrectomy.  She has also history of recent GI bleed and is status post blood transfusion.  She is seen for follow-up.  Patient with PT OT and has been participating.  Therapy notes reviewed.  Poor progress.  No falls reported.  She does not complain of any pain.  No recurrence of GI bleed noted.  Hemoglobin has improved to 9.9.  She has recent history of NSTEMI. remains stable with no angina.  Blood pressure continues to run in normal range.  She remains afebrile with no signs symptom suggestive of infection.    PMH: Chronic kidney disease, cardiomyopathy, hypertension, hypothyroidism, anemia, GI bleed,    ROS:   The following system review was negative:  Constitutional; Respiratory; Cardiovascular; Genitourinary; Gastrointestinal; Psychiatric; Ear-Nose-Throat; Musculoskeletal; Neurologic; Endocrine; Hematologic; Skin; Eyes; denies any    Medications: Reviewed in EMR and assessment and plan    Social History / Family History:  Reviewed       Vitals: Blood pressure 120/60 pulse is 80 respiration 18 temperature 98.2 pulse ox 96%      Exam:  Constitutional: No acute distress;   Eyes: Sclera clear, PERRLA;   Ears/nose/mouth/throat:mmm, OP clear, trachea midline;  Cardiovascular: RRR,nml S1 and S2, no rubs murmurs or gallops, no edema, no cyanosis;   Respiratory: Clear to auscultation, symmetric, no respiratory distress;  Gastrointestinal: Abdomen soft, NT, ND, no masses, normal bowel sounds;  Neurologic: Cranial nerves II through VII grossly intact, no speech or motor deficits A&O in time place and person  Skin: No rash, warm and dry;  Musculoskeletal: No

## 2024-02-16 ENCOUNTER — OFFICE VISIT (OUTPATIENT)
Facility: CLINIC | Age: 74
End: 2024-02-16

## 2024-02-16 DIAGNOSIS — I10 PRIMARY HYPERTENSION: ICD-10-CM

## 2024-02-16 DIAGNOSIS — N17.9 ACUTE RENAL FAILURE, UNSPECIFIED ACUTE RENAL FAILURE TYPE (HCC): ICD-10-CM

## 2024-02-16 DIAGNOSIS — A41.9 SEPSIS WITHOUT ACUTE ORGAN DYSFUNCTION, DUE TO UNSPECIFIED ORGANISM (HCC): Primary | ICD-10-CM

## 2024-02-16 DIAGNOSIS — D64.9 ANEMIA, UNSPECIFIED TYPE: ICD-10-CM

## 2024-02-16 DIAGNOSIS — R53.1 GENERALIZED WEAKNESS: ICD-10-CM

## 2024-02-16 NOTE — PROGRESS NOTES
PLACE OF SERVICE:  [place] [id number] [id number]    SKILLED VISIT    2/16/2024    Chief Complaint: Follow-up on recent nephrectomy, recent bacteremia, generalized weakness, GI bleed with anemia and to check progress    HPI : Nicolasa Macdonald is a 74 y.o. female who has been admitted to [facility] for skilled rehab after recent nephrectomy.  She has also history of recent GI bleed and is status post blood transfusion.  She is seen for follow-up.  Continues to work with PT OT and has been participating.  Therapy notes reviewed.  Poor progress.  No falls reported.  She does not complain of any pain.  No recurrence of GI bleed noted.  Hemoglobin has improved to 9.9.  She has recent history of NSTEMI. remains stable with no angina.  Blood pressure continues to run in normal range.  She remains afebrile with no signs symptom suggestive of infection.  No falls reported.  She is still interested in hospice after discharge    PMH: Chronic kidney disease, cardiomyopathy, hypertension, hypothyroidism, anemia, GI bleed,    ROS:   The following system review was negative:  Constitutional; Respiratory; Cardiovascular; Genitourinary; Gastrointestinal; Psychiatric; Ear-Nose-Throat; Musculoskeletal; Neurologic; Endocrine; Hematologic; Skin; Eyes; denies any    Medications: Reviewed in EMR and assessment and plan    Social History / Family History:  Reviewed       Vitals: Blood pressure 120/72 pulse 80 respiration 18 temperature 98      Exam:  Constitutional: No acute distress;   Eyes: Sclera clear, PERRLA;   Ears/nose/mouth/throat:mmm, OP clear, trachea midline;  Cardiovascular: RRR,nml S1 and S2, no rubs murmurs or gallops, no edema, no cyanosis;   Respiratory: Clear to auscultation, symmetric, no respiratory distress;  Gastrointestinal: Abdomen soft, NT, ND, no masses, normal bowel sounds;  Neurologic: Cranial nerves II through VII grossly intact, no speech or motor deficits A&O in time place

## 2024-02-19 ENCOUNTER — OFFICE VISIT (OUTPATIENT)
Facility: CLINIC | Age: 74
End: 2024-02-19
Payer: MEDICARE

## 2024-02-19 DIAGNOSIS — N17.9 ACUTE RENAL FAILURE, UNSPECIFIED ACUTE RENAL FAILURE TYPE (HCC): ICD-10-CM

## 2024-02-19 DIAGNOSIS — A41.9 SEPSIS WITHOUT ACUTE ORGAN DYSFUNCTION, DUE TO UNSPECIFIED ORGANISM (HCC): Primary | ICD-10-CM

## 2024-02-19 DIAGNOSIS — D64.9 ANEMIA, UNSPECIFIED TYPE: ICD-10-CM

## 2024-02-19 DIAGNOSIS — I10 PRIMARY HYPERTENSION: ICD-10-CM

## 2024-02-19 DIAGNOSIS — R53.1 GENERALIZED WEAKNESS: ICD-10-CM

## 2024-02-19 PROCEDURE — G8484 FLU IMMUNIZE NO ADMIN: HCPCS | Performed by: INTERNAL MEDICINE

## 2024-02-19 PROCEDURE — 1123F ACP DISCUSS/DSCN MKR DOCD: CPT | Performed by: INTERNAL MEDICINE

## 2024-02-19 PROCEDURE — 99309 SBSQ NF CARE MODERATE MDM 30: CPT | Performed by: INTERNAL MEDICINE

## 2024-02-19 NOTE — PROGRESS NOTES
PLACE OF SERVICE:  [place] [id number] [id number]    SKILLED VISIT    2/19/2024    Chief Complaint: Follow-up on recent nephrectomy, recent bacteremia, generalized weakness, GI bleed with anemia and to check progress    HPI : Nicolasa Macdonald is a 74 y.o. female who has been admitted to [facility] for skilled rehab after recent nephrectomy.  She has also history of recent GI bleed and is status post blood transfusion.  She is seen for follow-up.  Continues to work with PT OT and has been participating.  Therapy notes reviewed.  Continues to have poor progress.  Lack of motivation and energy.  Has expressed desire to go under hospice care.  No falls reported.  She does not complain of any pain.  No recurrence of GI bleed noted.  Hemoglobin has improved to 9.9.  She has recent history of NSTEMI. remains stable with no angina.  Blood pressure continues to run in normal range.  She remains afebrile with no signs symptom suggestive of infection.  No falls reported.  She is still interested in hospice after discharge    PMH: Chronic kidney disease, cardiomyopathy, hypertension, hypothyroidism, anemia, GI bleed,    ROS:   The following system review was negative:  Constitutional; Respiratory; Cardiovascular; Genitourinary; Gastrointestinal; Psychiatric; Ear-Nose-Throat; Musculoskeletal; Neurologic; Endocrine; Hematologic; Skin; Eyes; denies any    Medications: Reviewed in EMR and assessment and plan    Social History / Family History:  Reviewed       Vitals: Blood pressure 112/70 pulse 80 respiration 18 temperature 98.1      Exam:  Constitutional: No acute distress;   Eyes: Sclera clear, PERRLA;   Ears/nose/mouth/throat:mmm, OP clear, trachea midline;  Cardiovascular: RRR,nml S1 and S2, no rubs murmurs or gallops, no edema, no cyanosis;   Respiratory: Clear to auscultation, symmetric, no respiratory distress;  Gastrointestinal: Abdomen soft, NT, ND, no masses, normal bowel

## 2024-02-20 ENCOUNTER — CLINICAL DOCUMENTATION (OUTPATIENT)
Facility: CLINIC | Age: 74
End: 2024-02-20

## 2024-02-20 NOTE — PROGRESS NOTES
[facility] - Plan of Care  [address], [address]  [place] [address]    [facility] Team Members: Sherice Davis MD; Hermila Jorge NP; Charlotte Smart NP; Gavi Burgos, GHULAM; Ramirez Crowley, RN; Ashli Edouard, RN; Janae Gao LCSW    Nicolasa Macdonald  1950 / [id number]  female    Date of Initial Visit (Start of Care): 6/17/22    Diagnoses  Patient Active Problem List   Diagnosis    Anemia    HLD (hyperlipidemia)    Hypothyroidism    Leukocytosis    Myocardiopathy (HCC)    Joint stiffness of both knees    Other chronic pain    Essential hypertension    Chronic cough    Loose stools    Septic shock (HCC)    Bacteremia due to Proteus species    Renal carbuncle    Renal abscess, right    Gram negative sepsis (HCC)    Proteus mirabilis infection    Renal calculus, right    Bilateral hydronephrosis    S/P ureteral stent placement    COVID-19 virus infection    Hypotension due to hypovolemia    Toxic encephalopathy    Ischemic stroke of frontal lobe (HCC)    Delirium    PRES (posterior reversible encephalopathy syndrome)    Acute encephalopathy    Candida glabrata infection    Altered mental state       Advance Care Planning:    Code Status: Prior        Primary Decision Maker: Jenn Bowser - Niece/Nephew - [phone]       2/20/2024    11:33 AM   Demographics   Marital Status        DME/Supplies:  Hospital Bed, Lanette Lift, and Wheelchair (motorized)     Allergies   Allergen Reactions    Antihistamines, Diphenhydramine-Type Other (See Comments)     reported on referral packet       Nutritional Requirements:   Oral with supported feeding    Functional/Activity Level:  Bedbound only    Safety Measures:   Aspiration risk, Fall risk, and Self-care deficit    Acuity Level Rating: Medium    Current Outpatient Medications   Medication Sig    carvedilol (COREG) 6.25 MG tablet Take 1 tablet by mouth 2 times daily (with meals)    aspirin 81 MG chewable tablet Take 1 tablet by mouth daily

## 2024-02-21 ENCOUNTER — SOCIAL WORK (OUTPATIENT)
Facility: CLINIC | Age: 74
End: 2024-02-21

## 2024-02-21 ENCOUNTER — OFFICE VISIT (OUTPATIENT)
Facility: CLINIC | Age: 74
End: 2024-02-21
Payer: MEDICARE

## 2024-02-21 DIAGNOSIS — N17.9 ACUTE RENAL FAILURE, UNSPECIFIED ACUTE RENAL FAILURE TYPE (HCC): ICD-10-CM

## 2024-02-21 DIAGNOSIS — I10 PRIMARY HYPERTENSION: ICD-10-CM

## 2024-02-21 DIAGNOSIS — N39.0 URINARY TRACT INFECTION WITHOUT HEMATURIA, SITE UNSPECIFIED: ICD-10-CM

## 2024-02-21 DIAGNOSIS — I42.9 CARDIOMYOPATHY, UNSPECIFIED TYPE (HCC): ICD-10-CM

## 2024-02-21 DIAGNOSIS — A41.9 SEPSIS WITHOUT ACUTE ORGAN DYSFUNCTION, DUE TO UNSPECIFIED ORGANISM (HCC): ICD-10-CM

## 2024-02-21 DIAGNOSIS — I95.2 HYPOTENSION DUE TO DRUGS: Primary | ICD-10-CM

## 2024-02-21 DIAGNOSIS — R53.1 GENERALIZED WEAKNESS: ICD-10-CM

## 2024-02-21 DIAGNOSIS — D64.9 ANEMIA, UNSPECIFIED TYPE: ICD-10-CM

## 2024-02-21 PROCEDURE — 99309 SBSQ NF CARE MODERATE MDM 30: CPT | Performed by: INTERNAL MEDICINE

## 2024-02-21 PROCEDURE — G8484 FLU IMMUNIZE NO ADMIN: HCPCS | Performed by: INTERNAL MEDICINE

## 2024-02-21 PROCEDURE — 1123F ACP DISCUSS/DSCN MKR DOCD: CPT | Performed by: INTERNAL MEDICINE

## 2024-02-21 SDOH — HEALTH STABILITY: PHYSICAL HEALTH: ON AVERAGE, HOW MANY MINUTES DO YOU ENGAGE IN EXERCISE AT THIS LEVEL?: 0 MIN

## 2024-02-21 SDOH — ECONOMIC STABILITY: TRANSPORTATION INSECURITY
IN THE PAST 12 MONTHS, HAS THE LACK OF TRANSPORTATION KEPT YOU FROM MEDICAL APPOINTMENTS OR FROM GETTING MEDICATIONS?: NO

## 2024-02-21 SDOH — ECONOMIC STABILITY: TRANSPORTATION INSECURITY
IN THE PAST 12 MONTHS, HAS LACK OF TRANSPORTATION KEPT YOU FROM MEETINGS, WORK, OR FROM GETTING THINGS NEEDED FOR DAILY LIVING?: NO

## 2024-02-21 SDOH — ECONOMIC STABILITY: INCOME INSECURITY: HOW HARD IS IT FOR YOU TO PAY FOR THE VERY BASICS LIKE FOOD, HOUSING, MEDICAL CARE, AND HEATING?: NOT HARD AT ALL

## 2024-02-21 SDOH — ECONOMIC STABILITY: INCOME INSECURITY: IN THE LAST 12 MONTHS, WAS THERE A TIME WHEN YOU WERE NOT ABLE TO PAY THE MORTGAGE OR RENT ON TIME?: NO

## 2024-02-21 SDOH — ECONOMIC STABILITY: FOOD INSECURITY: WITHIN THE PAST 12 MONTHS, YOU WORRIED THAT YOUR FOOD WOULD RUN OUT BEFORE YOU GOT MONEY TO BUY MORE.: NEVER TRUE

## 2024-02-21 SDOH — ECONOMIC STABILITY: FOOD INSECURITY: WITHIN THE PAST 12 MONTHS, THE FOOD YOU BOUGHT JUST DIDN'T LAST AND YOU DIDN'T HAVE MONEY TO GET MORE.: NEVER TRUE

## 2024-02-21 SDOH — HEALTH STABILITY: PHYSICAL HEALTH: ON AVERAGE, HOW MANY DAYS PER WEEK DO YOU ENGAGE IN MODERATE TO STRENUOUS EXERCISE (LIKE A BRISK WALK)?: 0 DAYS

## 2024-02-21 SDOH — ECONOMIC STABILITY: HOUSING INSECURITY: IN THE LAST 12 MONTHS, HOW MANY PLACES HAVE YOU LIVED?: 1

## 2024-02-21 SDOH — ECONOMIC STABILITY: HOUSING INSECURITY
IN THE LAST 12 MONTHS, WAS THERE A TIME WHEN YOU DID NOT HAVE A STEADY PLACE TO SLEEP OR SLEPT IN A SHELTER (INCLUDING NOW)?: NO

## 2024-02-21 NOTE — PROGRESS NOTES
Social Determinants of Health screening updated.     KELLEN Garcia, LCSW  Licensed Clinical   [facility]  (O) [phone]  (C) [phone]

## 2024-02-21 NOTE — PROGRESS NOTES
PLACE OF SERVICE:  [place] [id number] [id number]    SKILLED VISIT    2/21/2024    Chief Complaint: Low blood pressure  Follow-up on recent nephrectomy, recent bacteremia, generalized weakness, GI bleed with anemia and to check progress    HPI : Nicolasa Macdonald is a 74 y.o. female who has been admitted to [facility] for skilled rehab after recent nephrectomy.  She has also history of recent GI bleed and is status post blood transfusion.  She is seen for follow-up.  Continues to work with PT OT and has been participating.  Therapy notes reviewed.  Continues to have poor progress.  Lack of motivation and energy.  Has expressed desire to go under hospice care.  No falls reported.  She does not complain of any pain.  She has never been any respiratory distress  No recurrence of GI bleed noted.  Hemoglobin has improved to 9.9.  She has recent history of NSTEMI. remains stable with no angina.  Blood pressure recently has been running on the low side  She remains afebrile with no signs symptom suggestive of infection.  No falls reported.  She is still interested in hospice after discharge    PMH: Chronic kidney disease, cardiomyopathy, hypertension, hypothyroidism, anemia, GI bleed,    ROS:   The following system review was negative:  Constitutional; Respiratory; Cardiovascular; Genitourinary; Gastrointestinal; Psychiatric; Ear-Nose-Throat; Musculoskeletal; Neurologic; Endocrine; Hematologic; Skin; Eyes; denies any    Medications: Reviewed in EMR and assessment and plan    Social History / Family History:  Reviewed       Vitals: Blood pressure 103/56 pulse is 90 respiration 18 temperature 98.5 pulse ox 95%      Exam:  Constitutional: No acute distress;   Eyes: Sclera clear, PERRLA;   Ears/nose/mouth/throat:mmm, OP clear, trachea midline;  Cardiovascular: RRR,nml S1 and S2, no rubs murmurs or gallops, no edema, no cyanosis;   Respiratory: Clear to auscultation, symmetric, no

## 2024-02-22 ENCOUNTER — APPOINTMENT (OUTPATIENT)
Facility: HOSPITAL | Age: 74
End: 2024-02-22
Payer: MEDICARE

## 2024-02-22 ENCOUNTER — HOSPITAL ENCOUNTER (EMERGENCY)
Facility: HOSPITAL | Age: 74
Discharge: NURSING, REHAB OR LONG-TERM CARE | End: 2024-02-23
Attending: EMERGENCY MEDICINE
Payer: MEDICARE

## 2024-02-22 ENCOUNTER — OFFICE VISIT (OUTPATIENT)
Facility: CLINIC | Age: 74
End: 2024-02-22
Payer: MEDICARE

## 2024-02-22 DIAGNOSIS — L89.90 PRESSURE ULCERS OF SKIN OF MULTIPLE TOPOGRAPHIC SITES: Primary | ICD-10-CM

## 2024-02-22 DIAGNOSIS — R60.9 EDEMA, UNSPECIFIED TYPE: ICD-10-CM

## 2024-02-22 DIAGNOSIS — Z71.89 ADVANCE CARE PLANNING: Primary | ICD-10-CM

## 2024-02-22 DIAGNOSIS — R63.0 POOR APPETITE: ICD-10-CM

## 2024-02-22 DIAGNOSIS — N30.00 ACUTE CYSTITIS WITHOUT HEMATURIA: ICD-10-CM

## 2024-02-22 DIAGNOSIS — R60.1 ANASARCA: ICD-10-CM

## 2024-02-22 DIAGNOSIS — T14.90XA WOUNDS AND INJURIES: ICD-10-CM

## 2024-02-22 LAB
ALBUMIN SERPL-MCNC: 1.4 G/DL (ref 3.5–5)
ALBUMIN/GLOB SERPL: 0.3 (ref 1.1–2.2)
ALP SERPL-CCNC: 138 U/L (ref 45–117)
ALT SERPL-CCNC: 23 U/L (ref 12–78)
ANION GAP SERPL CALC-SCNC: 3 MMOL/L (ref 5–15)
APPEARANCE UR: ABNORMAL
AST SERPL-CCNC: 30 U/L (ref 15–37)
BACTERIA URNS QL MICRO: ABNORMAL /HPF
BASOPHILS # BLD: 0 K/UL (ref 0–0.1)
BASOPHILS NFR BLD: 0 % (ref 0–1)
BILIRUB SERPL-MCNC: 0.3 MG/DL (ref 0.2–1)
BILIRUB UR QL: NEGATIVE
BUN SERPL-MCNC: 41 MG/DL (ref 6–20)
BUN/CREAT SERPL: 49 (ref 12–20)
CALCIUM SERPL-MCNC: 8.9 MG/DL (ref 8.5–10.1)
CHLORIDE SERPL-SCNC: 102 MMOL/L (ref 97–108)
CO2 SERPL-SCNC: 29 MMOL/L (ref 21–32)
COLOR UR: ABNORMAL
COMMENT:: NORMAL
CREAT SERPL-MCNC: 0.83 MG/DL (ref 0.55–1.02)
DIFFERENTIAL METHOD BLD: ABNORMAL
EOSINOPHIL # BLD: 0.2 K/UL (ref 0–0.4)
EOSINOPHIL NFR BLD: 1 % (ref 0–7)
EPITH CASTS URNS QL MICRO: ABNORMAL /LPF
ERYTHROCYTE [DISTWIDTH] IN BLOOD BY AUTOMATED COUNT: 15.5 % (ref 11.5–14.5)
GLOBULIN SER CALC-MCNC: 4.9 G/DL (ref 2–4)
GLUCOSE SERPL-MCNC: 97 MG/DL (ref 65–100)
GLUCOSE UR STRIP.AUTO-MCNC: NEGATIVE MG/DL
HCT VFR BLD AUTO: 27.6 % (ref 35–47)
HGB BLD-MCNC: 8.9 G/DL (ref 11.5–16)
HGB UR QL STRIP: ABNORMAL
HYALINE CASTS URNS QL MICRO: ABNORMAL /LPF (ref 0–5)
IMM GRANULOCYTES # BLD AUTO: 0.1 K/UL (ref 0–0.04)
IMM GRANULOCYTES NFR BLD AUTO: 0 % (ref 0–0.5)
KETONES UR QL STRIP.AUTO: NEGATIVE MG/DL
LEUKOCYTE ESTERASE UR QL STRIP.AUTO: ABNORMAL
LYMPHOCYTES # BLD: 2.1 K/UL (ref 0.8–3.5)
LYMPHOCYTES NFR BLD: 15 % (ref 12–49)
MCH RBC QN AUTO: 26.9 PG (ref 26–34)
MCHC RBC AUTO-ENTMCNC: 32.2 G/DL (ref 30–36.5)
MCV RBC AUTO: 83.4 FL (ref 80–99)
MONOCYTES # BLD: 0.8 K/UL (ref 0–1)
MONOCYTES NFR BLD: 6 % (ref 5–13)
NEUTS SEG # BLD: 11.1 K/UL (ref 1.8–8)
NEUTS SEG NFR BLD: 78 % (ref 32–75)
NITRITE UR QL STRIP.AUTO: NEGATIVE
NRBC # BLD: 0 K/UL (ref 0–0.01)
NRBC BLD-RTO: 0 PER 100 WBC
PH UR STRIP: 5.5 (ref 5–8)
PLATELET # BLD AUTO: 576 K/UL (ref 150–400)
PMV BLD AUTO: 8.5 FL (ref 8.9–12.9)
POTASSIUM SERPL-SCNC: 3.7 MMOL/L (ref 3.5–5.1)
PROT SERPL-MCNC: 6.3 G/DL (ref 6.4–8.2)
PROT UR STRIP-MCNC: 30 MG/DL
RBC # BLD AUTO: 3.31 M/UL (ref 3.8–5.2)
RBC #/AREA URNS HPF: ABNORMAL /HPF (ref 0–5)
SODIUM SERPL-SCNC: 134 MMOL/L (ref 136–145)
SP GR UR REFRACTOMETRY: 1.02 (ref 1–1.03)
SPECIMEN HOLD: NORMAL
SPECIMEN HOLD: NORMAL
UROBILINOGEN UR QL STRIP.AUTO: 0.2 EU/DL (ref 0.2–1)
WBC # BLD AUTO: 14.3 K/UL (ref 3.6–11)
WBC URNS QL MICRO: >100 /HPF (ref 0–4)

## 2024-02-22 PROCEDURE — 36415 COLL VENOUS BLD VENIPUNCTURE: CPT

## 2024-02-22 PROCEDURE — 80053 COMPREHEN METABOLIC PANEL: CPT

## 2024-02-22 PROCEDURE — 99310 SBSQ NF CARE HIGH MDM 45: CPT

## 2024-02-22 PROCEDURE — 85025 COMPLETE CBC W/AUTO DIFF WBC: CPT

## 2024-02-22 PROCEDURE — G8484 FLU IMMUNIZE NO ADMIN: HCPCS

## 2024-02-22 PROCEDURE — 1123F ACP DISCUSS/DSCN MKR DOCD: CPT

## 2024-02-22 PROCEDURE — 2580000003 HC RX 258: Performed by: EMERGENCY MEDICINE

## 2024-02-22 PROCEDURE — 99284 EMERGENCY DEPT VISIT MOD MDM: CPT

## 2024-02-22 PROCEDURE — 71045 X-RAY EXAM CHEST 1 VIEW: CPT

## 2024-02-22 PROCEDURE — 81001 URINALYSIS AUTO W/SCOPE: CPT

## 2024-02-22 RX ORDER — LEVOFLOXACIN 500 MG/1
500 TABLET, FILM COATED ORAL DAILY
Qty: 5 TABLET | Refills: 0 | Status: SHIPPED | OUTPATIENT
Start: 2024-02-22 | End: 2024-02-27

## 2024-02-22 RX ORDER — METRONIDAZOLE 500 MG/1
500 TABLET ORAL 2 TIMES DAILY
Qty: 40 TABLET | Refills: 0 | Status: SHIPPED | OUTPATIENT
Start: 2024-02-22 | End: 2024-03-13

## 2024-02-22 RX ORDER — 0.9 % SODIUM CHLORIDE 0.9 %
500 INTRAVENOUS SOLUTION INTRAVENOUS ONCE
Status: COMPLETED | OUTPATIENT
Start: 2024-02-22 | End: 2024-02-22

## 2024-02-22 RX ADMIN — SODIUM CHLORIDE 500 ML: 9 INJECTION, SOLUTION INTRAVENOUS at 21:46

## 2024-02-22 ASSESSMENT — PAIN - FUNCTIONAL ASSESSMENT: PAIN_FUNCTIONAL_ASSESSMENT: ADULT NONVERBAL PAIN SCALE (NPVS)

## 2024-02-22 NOTE — PROGRESS NOTES
PLACE OF SERVICE:  [facility] [address] [place] [address]    SKILLED VISIT      Chief Complaint:  CALLED POA  to discuss comfort care options.     HPI  Provider seeing resident today For  continues decline in her mental status and  a decline in her physical status.  Resident had great decline in both her physical and mental status after she had the planned nephrectomy.  During the hospital admission for procedure she had some acute mental status change and the CT of the head showed bilateral frontal occipital hypodensities which are likely subacute infarct. .  When she came back to the facility she continued to decline.  She did not participate much with therapy.  She started having upper and lower extremity contractures.  And increasing stiffness to her extremities , In addition couple days ago when I had seen her she had s dependent edema on her left upper extremity.  Ordered staff to elevate the upper extremity.  Today her bilateral lower extremity has +3 pitting edema going all the way up to her thighs.  In addition staff also commended that her appetite has greatly decreased.  Furthermore patient's wounds are worsening given her poor nutritional status and they are getting hard to heal.  Making her at risk for getting septic .today during the exam she was grimacing and crying continuously when asked her if she was in pain she would not answer any questions.overall her condition declining significantly and she is at risk for decompensation and rehospitalization. . I called the niece to discuss possible comfort care options.  Of note adrianna was working on taking the patient to [place] on hospice.  I educated the niece that given her current condition she is declining rapidly.  And comfort care would be in favor for her current condition.  I also told the niece that her ride to [place] given her current condition as it is not a realistic option for the patient.  I went

## 2024-02-22 NOTE — ED PROVIDER NOTES
facility.  Will provide a prescription for the foam boots as well as the flagyl prescription. [IO]   2301 11:01 PM  Change of shift.  Care of patient signed over to Dr. Bunn.  Bedside handoff complete. Awaiting urinalysis.    [IO]      ED Course User Index  [IO] Iman Miller MD           CONSULTS:  None    PROCEDURES:  Unless otherwise noted below, none     Procedures      FINAL IMPRESSION      1. Pressure ulcers of skin of multiple topographic sites    2. Anasarca    3. Acute cystitis without hematuria          DISPOSITION/PLAN   DISPOSITION Decision To Discharge 02/22/2024 11:16:18 PM      PATIENT REFERRED TO:  Mari Smart APRN - NP  [address] [id number] [phone]    Schedule an appointment as soon as possible for a visit         DISCHARGE MEDICATIONS:  Discharge Medication List as of 2/23/2024  2:34 AM        START taking these medications    Details   metroNIDAZOLE (FLAGYL) 500 MG tablet Apply 1 tablet topically in the morning and at bedtime for 20 days Crush the tablets and sprinkle onto sacral wound, Disp-40 tablet, R-0Print      levoFLOXacin (LEVAQUIN) 500 MG tablet Take 1 tablet by mouth daily for 5 days, Disp-5 tablet, R-0Print               (Please note that portions of this note were completed with a voice recognition program.  Efforts were made to edit the dictations but occasionally words are mis-transcribed.)    Iman Miller MD (electronically signed)  Emergency Attending Physician / Physician Assistant / Nurse Practitioner              Iman Miller MD  02/25/24 [id number]

## 2024-02-22 NOTE — ED TRIAGE NOTES
Patient arrives from [facility] for edema of all four extremities. Patient's family states at baseline has pitting edema but today it has worsened over the last five hours prior to arrival. Patient alert and oriented to self only and bed bound at baseline.

## 2024-02-23 ENCOUNTER — OFFICE VISIT (OUTPATIENT)
Facility: CLINIC | Age: 74
End: 2024-02-23

## 2024-02-23 VITALS
RESPIRATION RATE: 18 BRPM | SYSTOLIC BLOOD PRESSURE: 118 MMHG | OXYGEN SATURATION: 97 % | HEART RATE: 94 BPM | DIASTOLIC BLOOD PRESSURE: 73 MMHG | TEMPERATURE: 97.5 F | WEIGHT: 174.6 LBS | BODY MASS INDEX: 27.34 KG/M2

## 2024-02-23 DIAGNOSIS — D64.9 ANEMIA, UNSPECIFIED TYPE: ICD-10-CM

## 2024-02-23 DIAGNOSIS — I42.9 CARDIOMYOPATHY, UNSPECIFIED TYPE (HCC): ICD-10-CM

## 2024-02-23 DIAGNOSIS — N17.9 ACUTE RENAL FAILURE, UNSPECIFIED ACUTE RENAL FAILURE TYPE (HCC): ICD-10-CM

## 2024-02-23 DIAGNOSIS — N39.0 URINARY TRACT INFECTION WITHOUT HEMATURIA, SITE UNSPECIFIED: Primary | ICD-10-CM

## 2024-02-23 DIAGNOSIS — A41.9 SEPSIS WITHOUT ACUTE ORGAN DYSFUNCTION, DUE TO UNSPECIFIED ORGANISM (HCC): ICD-10-CM

## 2024-02-23 DIAGNOSIS — L89.150 PRESSURE INJURY OF SACRAL REGION, UNSTAGEABLE (HCC): ICD-10-CM

## 2024-02-23 DIAGNOSIS — I10 PRIMARY HYPERTENSION: ICD-10-CM

## 2024-02-23 DIAGNOSIS — R53.1 GENERALIZED WEAKNESS: ICD-10-CM

## 2024-02-23 NOTE — PROGRESS NOTES
PLACE OF SERVICE:  [facility] [address] [id number]    SKILLED VISIT    2/23/2024    Chief Complaint: UTI  Follow-up on recent nephrectomy, recent bacteremia, generalized weakness, GI bleed with anemia and to check progress    HPI : Nicolasa Macdonald is a 74 y.o. female who has been admitted to [facility] for skilled rehab after recent nephrectomy.  She has also history of recent GI bleed and is status post blood transfusion.  She is seen for follow-up.  Yesterday she was sent to the hospital not sure why but she was diagnosed with UTI and recommendation admission to hospice.  She has been started on Levaquin for UTI and topical Flagyl for her sacral wound.  Continues to work with PT OT and has been participating.  Therapy notes reviewed.  Continues to have poor progress.  Lack of motivation and energy.  Has expressed desire to go under hospice care but is waiting for her sister to take her to [place] where she will be   admitted to hospice .staff tells me that the hospital has determined that she is safe to travel to [place].  No falls reported.  She does not complain of any pain.  She has never been any respiratory distress  No recurrence of GI bleed noted.  Hemoglobin has improved to 9.9.  She has recent history of NSTEMI. remains stable with no angina.  Blood pressure recently has been running on the low side  She remains afebrile with no signs symptom suggestive of infection.  No falls reported.  She is still interested in hospice after discharge    PMH: Chronic kidney disease, cardiomyopathy, hypertension, hypothyroidism, anemia, GI bleed,    ROS:   The following system review was negative:  Constitutional; Respiratory; Cardiovascular; Genitourinary; Gastrointestinal; Psychiatric; Ear-Nose-Throat; Musculoskeletal; Neurologic; Endocrine; Hematologic; Skin; Eyes; denies any    Medications: Reviewed in EMR and assessment and plan    Social History / Family History:  Reviewed

## 2024-02-23 NOTE — ED NOTES
74-year-old female received in turnover pending urinalysis.  Patient is apparently transitioning to hospice care.  Presents for increased swelling/anasarca.  Urinalysis is consistent with an action.  Will treat with Levaquin as discussed in turnover due to [place] in place.     Manny Bunn MD  02/22/24 [id number]

## 2024-02-23 NOTE — ED NOTES
Pt discharging at this time via hospital to home transportation. VSS; pt leaving via stretcher. Discharge paperwork sent with patient.

## 2024-02-26 ENCOUNTER — OFFICE VISIT (OUTPATIENT)
Facility: CLINIC | Age: 74
End: 2024-02-26
Payer: MEDICARE

## 2024-02-26 DIAGNOSIS — A41.9 SEPSIS WITHOUT ACUTE ORGAN DYSFUNCTION, DUE TO UNSPECIFIED ORGANISM (HCC): ICD-10-CM

## 2024-02-26 DIAGNOSIS — L89.150 PRESSURE INJURY OF SACRAL REGION, UNSTAGEABLE (HCC): ICD-10-CM

## 2024-02-26 DIAGNOSIS — I10 PRIMARY HYPERTENSION: ICD-10-CM

## 2024-02-26 DIAGNOSIS — D64.9 ANEMIA, UNSPECIFIED TYPE: ICD-10-CM

## 2024-02-26 DIAGNOSIS — I42.9 CARDIOMYOPATHY, UNSPECIFIED TYPE (HCC): ICD-10-CM

## 2024-02-26 DIAGNOSIS — N39.0 URINARY TRACT INFECTION WITHOUT HEMATURIA, SITE UNSPECIFIED: Primary | ICD-10-CM

## 2024-02-26 DIAGNOSIS — N17.9 ACUTE RENAL FAILURE, UNSPECIFIED ACUTE RENAL FAILURE TYPE (HCC): ICD-10-CM

## 2024-02-26 DIAGNOSIS — R53.1 GENERALIZED WEAKNESS: ICD-10-CM

## 2024-02-26 PROCEDURE — 1123F ACP DISCUSS/DSCN MKR DOCD: CPT | Performed by: INTERNAL MEDICINE

## 2024-02-26 PROCEDURE — 99309 SBSQ NF CARE MODERATE MDM 30: CPT | Performed by: INTERNAL MEDICINE

## 2024-02-26 PROCEDURE — G8484 FLU IMMUNIZE NO ADMIN: HCPCS | Performed by: INTERNAL MEDICINE

## 2024-02-26 NOTE — PROGRESS NOTES
PLACE OF SERVICE:  [facility] and [place] [id number] [phone]    SKILLED VISIT    2/26/2024    Chief Complaint: Follow-up on recent UTI, nephrectomy, recent bacteremia, generalized weakness, GI bleed with anemia and to check progress    HPI : Nicolasa Macdonald is a 74 y.o. female who has been admitted to [facility] for skilled rehab after recent nephrectomy.  She has also history of recent GI bleed and is status post blood transfusion.  She was recently sent to the ER where she was diagnosed with UTI and started on Levaquin.  She is seen for follow-up.  Remains afebrile.  Does not complain of any urinary symptoms  Continues to work with PT OT and has been participating.  Therapy notes reviewed.  Continues to have poor progress.  Lack of motivation and energy.  Has expressed desire to go under hospice care but is waiting for her sister to take her to [place] where she will be   admitted to hospice .staff tells me that the hospital has determined that she is safe to travel to [place].  No falls reported.  She does not complain of any pain.  She has never been any respiratory distress  No recurrence of GI bleed noted.  Hemoglobin has improved to 9.9.  She has recent history of NSTEMI. remains stable with no angina.  Blood pressure recently has been running on the low side  She remains afebrile with no signs symptom suggestive of infection.  No falls reported.  She is still interested in hospice after discharge    PMH: Chronic kidney disease, cardiomyopathy, hypertension, hypothyroidism, anemia, GI bleed,    ROS:   The following system review was negative:  Constitutional; Respiratory; Cardiovascular; Genitourinary; Gastrointestinal; Psychiatric; Ear-Nose-Throat; Musculoskeletal; Neurologic; Endocrine; Hematologic; Skin; Eyes; denies any    Medications: Reviewed in EMR and assessment and plan    Social History / Family History:  Reviewed       Vitals: Blood pressure 146/55 pulse 93 temperature

## 2024-02-27 ENCOUNTER — OFFICE VISIT (OUTPATIENT)
Facility: CLINIC | Age: 74
End: 2024-02-27
Payer: MEDICARE

## 2024-02-27 ENCOUNTER — TELEPHONE (OUTPATIENT)
Facility: CLINIC | Age: 74
End: 2024-02-27

## 2024-02-27 DIAGNOSIS — Z71.89 ADVANCE CARE PLANNING: ICD-10-CM

## 2024-02-27 DIAGNOSIS — R60.1 ANASARCA: Primary | ICD-10-CM

## 2024-02-27 PROCEDURE — G8484 FLU IMMUNIZE NO ADMIN: HCPCS

## 2024-02-27 PROCEDURE — 1123F ACP DISCUSS/DSCN MKR DOCD: CPT

## 2024-02-27 PROCEDURE — 99309 SBSQ NF CARE MODERATE MDM 30: CPT

## 2024-02-27 NOTE — PROGRESS NOTES
PLACE OF SERVICE:  [facility] [address] [place] [address]    SKILLED VISIT      Chief Complaint:  . Phone call with POA for DNR and Comfort care     HPI  Provider once again reaching out to me is sending to see if she was available to give me ok to make patient DNR.  In addition given patient's current medical condition and her comorbidities she remains at high risk for decompensation and rehospitalization.  Her niece has been approached several times for comfort care options.  However she was on vacation last week and did not want to make any decisions.  And told me that this afternoon when I called her that she will be coming to [place] from [place] tomorrow afternoon and will be in the facility somewhere around 1:00.  And has told me that she is going to make her decision on whether she wants the patient to be DNR full code  and also let me know if making patient comfort care.  For continue aggressive treatment at this time.  Patient remains in bed she is not in any acute distress.  She has very limited movement with therapy.  However she is very contracted and very stiff.  Last week I started low-dose baclofen.  Without much improvement.  Therefore. At this time increasing her baclofen to 5 x 3 times a day closely.She also has generalized anasarca. Her Kidney functions and electrolytes  at this time stable , will treat her with lasix and follow up with labs.  She currently remains afebrile no fever no temperature reported no signs and symptoms of infection.  No falls have been reported.  She is not in any acute respiratory distress at this time lungs clear no wheezing noted bowel sounds are present no abdominal distention tenderness noted.     PMH:   Right renal abscess and right renal stone s/p right nephrectomy with repair of SP laceration 1/11/2024. Proteus Mirabella's bacteremia and complicated UTI S/p severe sepsis, septic shock CVA Anemia with possible upper GI

## 2024-02-27 NOTE — TELEPHONE ENCOUNTER
Telephone call to patient's phone, straight to , left message requesting call back.  Telephone call to Nithya, former adult home caregiver.  She states pt was at [facility] and she has attempted to reach her there but has been unable to get through.  She states last plan she was aware of was for patient to move to a facility in [place] near her niece Jenn.   104.9

## 2024-02-28 ENCOUNTER — OFFICE VISIT (OUTPATIENT)
Facility: CLINIC | Age: 74
End: 2024-02-28
Payer: MEDICARE

## 2024-02-28 DIAGNOSIS — N39.0 URINARY TRACT INFECTION WITHOUT HEMATURIA, SITE UNSPECIFIED: Primary | ICD-10-CM

## 2024-02-28 DIAGNOSIS — R53.1 GENERALIZED WEAKNESS: ICD-10-CM

## 2024-02-28 DIAGNOSIS — N17.9 ACUTE RENAL FAILURE, UNSPECIFIED ACUTE RENAL FAILURE TYPE (HCC): ICD-10-CM

## 2024-02-28 DIAGNOSIS — A41.9 SEPSIS WITHOUT ACUTE ORGAN DYSFUNCTION, DUE TO UNSPECIFIED ORGANISM (HCC): ICD-10-CM

## 2024-02-28 DIAGNOSIS — D64.9 ANEMIA, UNSPECIFIED TYPE: ICD-10-CM

## 2024-02-28 DIAGNOSIS — I10 PRIMARY HYPERTENSION: ICD-10-CM

## 2024-02-28 PROCEDURE — 99309 SBSQ NF CARE MODERATE MDM 30: CPT | Performed by: INTERNAL MEDICINE

## 2024-02-28 PROCEDURE — G8484 FLU IMMUNIZE NO ADMIN: HCPCS | Performed by: INTERNAL MEDICINE

## 2024-02-28 PROCEDURE — 1123F ACP DISCUSS/DSCN MKR DOCD: CPT | Performed by: INTERNAL MEDICINE

## 2024-02-28 NOTE — PROGRESS NOTES
History / Family History:  Reviewed       Vitals: Blood pressure 110/60 pulse 80 respiration 18 temperature 97.9 pulse ox 99%      Exam:  Constitutional: No acute distress;   Eyes: Sclera clear, PERRLA;   Ears/nose/mouth/throat:mmm, OP clear, trachea midline;  Cardiovascular: RRR,nml S1 and S2, no rubs murmurs or gallops, no edema, no cyanosis;   Respiratory: Clear to auscultation, symmetric, no respiratory distress;  Gastrointestinal: Abdomen soft, NT, ND, no masses, normal bowel sounds;  Neurologic: Cranial nerves II through VII grossly intact, no speech or motor deficits A&O in time place and person  Skin: No rash, warm and dry; sacral wound currently covered with dressing  Musculoskeletal: No erythema swelling or joint tenderness, extremities without cyanosis, neck supple, ROM intact spine and extremities;  Psychiatric: Not agitated.  Appropriate affect, mood, judgment and insight.  Genitourinary: No suprapubic tenderness or flank tenderness  Heme, lymph, immuno: No pallor;     Labs:      Assessment/Plans:    Diagnosis Orders   1. Sepsis without acute organ dysfunction, due to unspecified organism ([facility])     S/p open right nephrectomy.  Treated with IV antibiotics for 2 weeks which he completed now.  Currently on Levaquin for UTI started in the hospital .  Monitor symptoms.  Complete the course   2. Anemia, unspecified type     Most recent hemoglobin 9.9.  No recurrence of GI bleed.  Monitor hemoglobin periodically   3. Primary hypertension     Blood pressure running better range since carvedilol was discontinued.  Continue to monitor.   4. Generalized weakness     PT OT.  Making progress.  Progress has been slow due to failed health.  Expresses desire to go under hospice after discharge.  I have already spoken with her power of  about that.  Family has not made a final decision yet   5. Acute renal failure, unspecified acute renal failure type ([facility])     Follow-up blood work showed normal kidney function

## 2024-02-29 ENCOUNTER — OFFICE VISIT (OUTPATIENT)
Facility: CLINIC | Age: 74
End: 2024-02-29

## 2024-02-29 DIAGNOSIS — Z71.89 ACP (ADVANCE CARE PLANNING): Primary | ICD-10-CM

## 2024-02-29 NOTE — PROGRESS NOTES
PLACE OF SERVICE:  [facility] [address] [place] [address]    SKILLED VISIT      Chief Complaint:  ACP-comfort care papers signed.     HPI  Provider seeing resident today in her room with family at bedside.  Provided resident with family to come and consider CODE STATUS changed from full code to DNR and also to consider comfort care on patient. Patient's condition gradually after she had a provide procedure done for nephrectomy at the hospital.  She returned back to the SNF with mental status changes.  CT of the head had shown bilateral frontal subdural hydronephrosis likely subacute infarct MRI confirmed areas of subacute versus acute infarction.   Her Participation with therapy was very poor, and she had bilateral upper extremities contractures ,she was started on low dose baclofen without much improvememt, have increased her baclofen now.   She was also noted to have general anasarca.spoke to niece who wanted her to to ER last week for a second evaluation.    Patient returned back with orders for antibiotics to treat UTI and wound care .  She will continue to decline the facility.  Appetite decreased.  Today.  Spoke with the patient and explained to her CODE STATUS versus DNR she states she understands and wanted to be DNR. DNR Form signed and uploaded. In addition explained to her comfort care options.  She and family agreed to be comfort care.  With patient is not having any more labs drawn normal IV fluids IV hydration IV medications no feeding tubes no dialysis do not hospitalize ,at this time she will  continue to take all her medications as long as she is able to swallow them.  Will discontinue her medications when she has difficulty swallowing.  I also added  her pain medication and scheduled oxycodone 5 mg  every 4 hours , with hold for sedation parameters . will adjust and make changes as needed.  At this time patient remains afebrile she remains comfortable this

## 2024-03-01 ENCOUNTER — OFFICE VISIT (OUTPATIENT)
Facility: CLINIC | Age: 74
End: 2024-03-01

## 2024-03-01 DIAGNOSIS — D64.9 ANEMIA, UNSPECIFIED TYPE: ICD-10-CM

## 2024-03-01 DIAGNOSIS — N39.0 URINARY TRACT INFECTION WITHOUT HEMATURIA, SITE UNSPECIFIED: Primary | ICD-10-CM

## 2024-03-01 DIAGNOSIS — I42.9 CARDIOMYOPATHY, UNSPECIFIED TYPE (HCC): ICD-10-CM

## 2024-03-01 DIAGNOSIS — A41.9 SEPSIS WITHOUT ACUTE ORGAN DYSFUNCTION, DUE TO UNSPECIFIED ORGANISM (HCC): ICD-10-CM

## 2024-03-01 DIAGNOSIS — N17.9 ACUTE RENAL FAILURE, UNSPECIFIED ACUTE RENAL FAILURE TYPE (HCC): ICD-10-CM

## 2024-03-01 DIAGNOSIS — I10 PRIMARY HYPERTENSION: ICD-10-CM

## 2024-03-01 DIAGNOSIS — R53.1 GENERALIZED WEAKNESS: ICD-10-CM

## 2024-03-01 NOTE — PROGRESS NOTES
PLACE OF SERVICE:  [facility] [address] [place] [address]    ReCertification    3/1/2024    Chief Complaint: 30-day visit for recertification  Follow-up on recent UTI, nephrectomy, recent bacteremia, generalized weakness, GI bleed with anemia and to check progress    HPI : Nicolasa Macdonald is a 74 y.o. female who has been admitted to [facility] for skilled rehab after recent nephrectomy.  She has also history of recent GI bleed and is status post blood transfusion.  She was recently sent to the ER where she was diagnosed with UTI and started on Levaquin.  She is seen for 30-day visit for recertification  Remains afebrile.  Does not complain of any urinary symptoms.  No nausea vomiting or any other symptoms suggestive of systemic infection at present  Continues to work with PT OT and has been participating.  Therapy notes reviewed.  Continues to have poor progress.  Lack of motivation and energy.  Has expressed desire to go under hospice care but is waiting for her sister to take her to [place] where she will be   admitted to hospice .staff tells me that the hospital has determined that she is safe to travel to [place].  No falls reported.  She does not complain of any pain.  She has never been any respiratory distress  No recurrence of GI bleed noted.  Hemoglobin has improved to 9.9.  She has recent history of NSTEMI. remains stable with no angina.  Blood pressure has been running low previously running in normal range now  She remains afebrile with no signs symptom suggestive of infection.  No falls reported.  She is still interested in hospice after discharge    PMH: Chronic kidney disease, cardiomyopathy, hypertension, hypothyroidism, anemia, GI bleed,    ROS:   The following system review was negative:  Constitutional; Respiratory; Cardiovascular; Genitourinary; Gastrointestinal; Psychiatric; Ear-Nose-Throat; Musculoskeletal; Neurologic; Endocrine; Hematologic; Skin; Eyes; denies

## 2024-03-04 ENCOUNTER — OFFICE VISIT (OUTPATIENT)
Facility: CLINIC | Age: 74
End: 2024-03-04
Payer: MEDICARE

## 2024-03-04 DIAGNOSIS — Z71.89 ACP (ADVANCE CARE PLANNING): Primary | ICD-10-CM

## 2024-03-04 PROCEDURE — G8484 FLU IMMUNIZE NO ADMIN: HCPCS

## 2024-03-04 PROCEDURE — 1123F ACP DISCUSS/DSCN MKR DOCD: CPT

## 2024-03-04 PROCEDURE — 99309 SBSQ NF CARE MODERATE MDM 30: CPT

## 2024-03-04 NOTE — PROGRESS NOTES
PLACE OF SERVICE:  [facility] [address] [place] [address]    SKILLED VISIT      Chief Complaint:  follow up on comfort care.     HPI  Provider seeing resident for follow up on her comfort care status, she was in her room, AAOX2 with mentation at baseline, . She told me she was in pain. 7/10. She appeared to be anxious, and was crying intermittently when I was there. Her VS remain stable, she remains afebeile , no fever or temperature have been reported. No reported from staff ,stating that she is not swallowing  her medications.  Lungs diminished bilaterally no wheezing noted no shortness of breath.  Oxygen saturation 95% on room air.  Heart sounds present no abdominal distention tenderness noted.  No nausea vomiting reported.  She continues to have generalized anasarca.   No other acute events reported no falls reported vital signs remained stable she is afebrile temperature reported we will continue to monitor patient.  PMH:   Right renal stone status post nephrectomy    Status post laceration UTI sepsis resolved CVA anemia upper GI bleed    ROS:   The following system review was negative:  Constitutional; Respiratory; Cardiovascular; Genitourinary; Gastrointestinal; Psychiatric; Ear-Nose-Throat; Musculoskeletal; Neurologic; Endocrine; Hematologic; Skin; Eyes; denies any    Medications: Reviewed in [facility] EMR and assessment /  plan        Vitals:   Blood pressure 133/70 temperature 98.2 pulse 74 respiration 18 oxygen 96%      Exam:  Constitutional: No acute distress;   Eyes: Sclera clear, PERRLA;   Ears/nose/mouth/throat:mmm, OP clear, trachea midline;  Cardiovascular: RRR,nml S1 and S2, no rubs murmurs or gallops, no edema, no cyanosis;   Respiratory: Diminished bilaterally to auscultation, symmetric, no respiratory distress;  Gastrointestinal: Abdomen soft, NT, ND, no masses, normal bowel sounds;  Neurologic: Cranial nerves II through VII grossly intact, no speech or motor

## 2024-03-05 ENCOUNTER — OFFICE VISIT (OUTPATIENT)
Facility: CLINIC | Age: 74
End: 2024-03-05
Payer: MEDICARE

## 2024-03-05 DIAGNOSIS — N17.9 ACUTE RENAL FAILURE, UNSPECIFIED ACUTE RENAL FAILURE TYPE (HCC): ICD-10-CM

## 2024-03-05 DIAGNOSIS — R53.1 GENERALIZED WEAKNESS: ICD-10-CM

## 2024-03-05 DIAGNOSIS — I42.9 CARDIOMYOPATHY, UNSPECIFIED TYPE (HCC): ICD-10-CM

## 2024-03-05 DIAGNOSIS — A41.9 SEPSIS WITHOUT ACUTE ORGAN DYSFUNCTION, DUE TO UNSPECIFIED ORGANISM (HCC): ICD-10-CM

## 2024-03-05 DIAGNOSIS — I10 PRIMARY HYPERTENSION: ICD-10-CM

## 2024-03-05 DIAGNOSIS — D64.9 ANEMIA, UNSPECIFIED TYPE: ICD-10-CM

## 2024-03-05 DIAGNOSIS — N39.0 URINARY TRACT INFECTION WITHOUT HEMATURIA, SITE UNSPECIFIED: Primary | ICD-10-CM

## 2024-03-05 PROCEDURE — 99309 SBSQ NF CARE MODERATE MDM 30: CPT | Performed by: INTERNAL MEDICINE

## 2024-03-05 PROCEDURE — G8484 FLU IMMUNIZE NO ADMIN: HCPCS | Performed by: INTERNAL MEDICINE

## 2024-03-05 PROCEDURE — 1123F ACP DISCUSS/DSCN MKR DOCD: CPT | Performed by: INTERNAL MEDICINE

## 2024-03-05 NOTE — PROGRESS NOTES
any    Medications: Reviewed in EMR and assessment and plan    Social History / Family History:  Reviewed       Vitals: Blood pressure 137/77 pulse 80 respirate 18 temperature 98.6      Exam:  Constitutional: No acute distress;   Eyes: Sclera clear, PERRLA;   Ears/nose/mouth/throat:mmm, OP clear, trachea midline;  Cardiovascular: RRR,nml S1 and S2, no rubs murmurs or gallops, no edema, no cyanosis;   Respiratory: Clear to auscultation, symmetric, no respiratory distress;  Gastrointestinal: Abdomen soft, NT, ND, no masses, normal bowel sounds;  Neurologic: Cranial nerves II through VII grossly intact, no speech or motor deficits A&O in time place and person  Skin: No rash, warm and dry; sacral wound currently covered with dressing  Musculoskeletal: No erythema swelling or joint tenderness, extremities without cyanosis, neck supple, ROM intact spine and extremities;  Psychiatric: Not agitated.  Appropriate affect, mood, judgment and insight.  Genitourinary: No suprapubic tenderness or flank tenderness  Heme, lymph, immuno: No pallor;     Labs:      Assessment/Plans:    Diagnosis Orders   1. Sepsis without acute organ dysfunction, due to unspecified organism ([facility])     S/p open right nephrectomy.  Treated with IV antibiotics for 2 weeks which he completed now.  Was discharged on Levaquin which she has completed now.  Remains afebrile.  No urinary tract symptoms   2. Anemia, unspecified type     Most recent hemoglobin 9.9.  No recurrence of GI bleed.  Monitor hemoglobin periodically   3. Primary hypertension     Blood pressure running better range since carvedilol was discontinued.  Continue to monitor.   4. Generalized weakness     Remains dependent for care.  Has reached maximum benefit of potential with rehab and therapy has been discontinued.   5. Acute renal failure, unspecified acute renal failure type (HCC)     Follow-up blood work showed normal kidney function               Moises Brandt MD

## 2024-03-07 ENCOUNTER — OFFICE VISIT (OUTPATIENT)
Facility: CLINIC | Age: 74
End: 2024-03-07
Payer: MEDICARE

## 2024-03-07 DIAGNOSIS — A41.9 SEPSIS WITHOUT ACUTE ORGAN DYSFUNCTION, DUE TO UNSPECIFIED ORGANISM (HCC): Primary | ICD-10-CM

## 2024-03-07 DIAGNOSIS — N17.9 ACUTE RENAL FAILURE, UNSPECIFIED ACUTE RENAL FAILURE TYPE (HCC): ICD-10-CM

## 2024-03-07 DIAGNOSIS — I42.9 CARDIOMYOPATHY, UNSPECIFIED TYPE (HCC): ICD-10-CM

## 2024-03-07 DIAGNOSIS — R53.1 GENERALIZED WEAKNESS: ICD-10-CM

## 2024-03-07 DIAGNOSIS — D64.9 ANEMIA, UNSPECIFIED TYPE: ICD-10-CM

## 2024-03-07 DIAGNOSIS — I10 PRIMARY HYPERTENSION: ICD-10-CM

## 2024-03-07 PROCEDURE — G8484 FLU IMMUNIZE NO ADMIN: HCPCS | Performed by: INTERNAL MEDICINE

## 2024-03-07 PROCEDURE — 99309 SBSQ NF CARE MODERATE MDM 30: CPT | Performed by: INTERNAL MEDICINE

## 2024-03-07 PROCEDURE — 1123F ACP DISCUSS/DSCN MKR DOCD: CPT | Performed by: INTERNAL MEDICINE

## 2024-03-07 NOTE — PROGRESS NOTES
denies any    Medications: Reviewed in EMR and assessment and plan    Social History / Family History:  Reviewed       Vitals: Blood pressure 140/87 pulse is 90 respiration 16 temperature 97.2 pulse ox 97%      Exam:  Constitutional: No acute distress;   Eyes: Sclera clear, PERRLA;   Ears/nose/mouth/throat:mmm, OP clear, trachea midline;  Cardiovascular: RRR,nml S1 and S2, no rubs murmurs or gallops, 1+ edema lower extremities, no cyanosis;   Respiratory: Clear to auscultation, symmetric, no respiratory distress;  Gastrointestinal: Abdomen soft, NT, ND, no masses, normal bowel sounds;  Neurologic: Cranial nerves II through VII grossly intact, no speech or motor deficits A&O in time place and person  Skin: No rash, warm and dry; sacral wound currently covered with dressing  Musculoskeletal: No erythema swelling or joint tenderness, extremities without cyanosis, neck supple, ROM intact spine and extremities;  Psychiatric: Not agitated.  Appropriate affect, mood, judgment and insight.  Genitourinary: No suprapubic tenderness or flank tenderness  Heme, lymph, immuno: No pallor;     Labs:      Assessment/Plans:    Diagnosis Orders   1. Sepsis without acute organ dysfunction, due to unspecified organism ([facility])     S/p open right nephrectomy.  Treated with IV antibiotics for 2 weeks which he completed now.  Was discharged on Levaquin which she has completed now.  Remains afebrile.  No urinary tract symptoms  Prognosis remains poor   2. Anemia, unspecified type     Most recent hemoglobin 9.9.  No recurrence of GI bleed.  Monitor hemoglobin periodically   3. Primary hypertension     Blood pressure running better range since carvedilol was discontinued.  Continue to monitor.   4. Generalized weakness     Remains dependent for care.  Has reached maximum benefit of potential with rehab and therapy has been discontinued.  Consider hospice care   5. Acute renal failure, unspecified acute renal failure type ([facility])     Follow-up

## 2024-03-08 ENCOUNTER — OFFICE VISIT (OUTPATIENT)
Facility: CLINIC | Age: 74
End: 2024-03-08

## 2024-03-08 DIAGNOSIS — Z71.89 ACP (ADVANCE CARE PLANNING): Primary | ICD-10-CM

## 2024-03-08 NOTE — PROGRESS NOTES
PLACE OF SERVICE:  [facility] [address] [place] [address]    SKILLED VISIT      Chief Complaint:  family request phone call from provider.     [facility]  Provider seeing resident today after family member requested phone call with provider.  I called the power of  which is patient's niece.  She wanted to go over her medical status/condition.  She was upset that she was not informed and patient was started on morphine at lorazepam.  I explained to her that I had started her on both [facility] and [facility] because patient was not responding well to oxycodone for pain management.  Patient was grimacing in pain when I presented to listen to her bowels or listen to her heart.  Of note she is very contracted with her hands after her stroke.  She is on baclofen 5 mg , 3 times a day .  Today as well when I went to see patient she was resting however she  her she was resting with her eyes  closed and when I  called her name she was awake,  and told me she was in pain \" it hurts \" would not tell me where.  Patient is currently on scheduled morphine every 6 hours with parameters to hold for sedation.  I have requested staff to ensure that she gets her pain medications on time. She is also on lorazepam as needed for restlessness and anxiety.  I educated niece the goals of comfort care,  keep her comfortable to the pain restless.  She verbalized understanding.  And requested that staff keep an open communication channels that she has been made aware every time there is a medication change.  I reported this situation to management and let them know that they need to keep communicating with her to let her know whenever there is a medication change and update her.  She also told me that she will still plans to take her on closer to where she lives in [place], had had requested medical records.  I followed up with medical records to ensure that medical records release her

## 2024-03-12 ENCOUNTER — OFFICE VISIT (OUTPATIENT)
Facility: CLINIC | Age: 74
End: 2024-03-12
Payer: MEDICARE

## 2024-03-12 DIAGNOSIS — A41.9 SEPSIS WITHOUT ACUTE ORGAN DYSFUNCTION, DUE TO UNSPECIFIED ORGANISM (HCC): Primary | ICD-10-CM

## 2024-03-12 DIAGNOSIS — N39.0 URINARY TRACT INFECTION WITHOUT HEMATURIA, SITE UNSPECIFIED: ICD-10-CM

## 2024-03-12 DIAGNOSIS — R53.1 GENERALIZED WEAKNESS: ICD-10-CM

## 2024-03-12 DIAGNOSIS — N17.9 ACUTE RENAL FAILURE, UNSPECIFIED ACUTE RENAL FAILURE TYPE (HCC): ICD-10-CM

## 2024-03-12 DIAGNOSIS — D64.9 ANEMIA, UNSPECIFIED TYPE: ICD-10-CM

## 2024-03-12 DIAGNOSIS — I42.9 CARDIOMYOPATHY, UNSPECIFIED TYPE (HCC): ICD-10-CM

## 2024-03-12 DIAGNOSIS — I10 PRIMARY HYPERTENSION: ICD-10-CM

## 2024-03-12 PROCEDURE — 99309 SBSQ NF CARE MODERATE MDM 30: CPT | Performed by: INTERNAL MEDICINE

## 2024-03-12 PROCEDURE — G8484 FLU IMMUNIZE NO ADMIN: HCPCS | Performed by: INTERNAL MEDICINE

## 2024-03-12 PROCEDURE — 1123F ACP DISCUSS/DSCN MKR DOCD: CPT | Performed by: INTERNAL MEDICINE

## 2024-03-12 NOTE — PROGRESS NOTES
PLACE OF SERVICE:  [facility] [place] [id number] [id number]    Skilled follow-up    3/12/2024    Chief Complaint:   Follow-up on recent nephrectomy, bacteremia, generalized weakness, GI bleed with anemia and to check progress    HPI : Nicolasa Macdonald is a 74 y.o. female who has been admitted to [facility] for skilled rehab after recent nephrectomy.  She has also history of recent GI bleed and is status post blood transfusion.  She was recently sent to the ER where she was diagnosed with UTI and started on Levaquin.  She is seen for 30-day visit for recertification  Remains afebrile.  Does not complain of any urinary symptoms.  No nausea vomiting or any other symptoms suggestive of systemic infection at present  She has not made any progress with therapy and therapy has been discontinued now.  She has expressed desire to go under hospice care but is waiting for her sister to take her to [place] where she will be   admitted to hospice .staff tells me that the hospital has determined that she is safe to travel to [place].  My nurse petitioner spoke with family yesterday about working on finding a place for her near the family in [place].    CODE STATUS remains DNR.  No falls reported.  She does not complain of any pain.  She denies any respiratory distress  No recurrence of GI bleed noted.  Hemoglobin has improved to 9.9.  She has recent history of NSTEMI. remains stable with no angina.  Blood pressure has been running low previously running in normal range now  She remains afebrile with no signs symptom suggestive of infection.  No falls reported.  She is still interested in hospice after discharge    PMH: Chronic kidney disease, cardiomyopathy, hypertension, hypothyroidism, anemia, GI bleed,    ROS:   The following system review was negative:  Constitutional; Respiratory; Cardiovascular; Genitourinary; Gastrointestinal; Psychiatric; Ear-Nose-Throat; Musculoskeletal; Neurologic;

## 2024-03-13 ENCOUNTER — APPOINTMENT (OUTPATIENT)
Facility: HOSPITAL | Age: 74
DRG: 951 | End: 2024-03-13
Payer: MEDICARE

## 2024-03-13 ENCOUNTER — OFFICE VISIT (OUTPATIENT)
Facility: CLINIC | Age: 74
End: 2024-03-13

## 2024-03-13 ENCOUNTER — HOSPITAL ENCOUNTER (INPATIENT)
Facility: HOSPITAL | Age: 74
LOS: 1 days | Discharge: HOSPICE | DRG: 951 | End: 2024-03-15
Attending: STUDENT IN AN ORGANIZED HEALTH CARE EDUCATION/TRAINING PROGRAM | Admitting: FAMILY MEDICINE
Payer: MEDICARE

## 2024-03-13 DIAGNOSIS — R56.9 SEIZURE-LIKE ACTIVITY (HCC): Primary | ICD-10-CM

## 2024-03-13 DIAGNOSIS — I48.91 ATRIAL FIBRILLATION WITH RAPID VENTRICULAR RESPONSE (HCC): Primary | ICD-10-CM

## 2024-03-13 DIAGNOSIS — Z51.5 ENCOUNTER FOR HOSPICE CARE: ICD-10-CM

## 2024-03-13 LAB
ALBUMIN SERPL-MCNC: 1.4 G/DL (ref 3.5–5)
ALBUMIN/GLOB SERPL: 0.3 (ref 1.1–2.2)
ALP SERPL-CCNC: 144 U/L (ref 45–117)
ALT SERPL-CCNC: 15 U/L (ref 12–78)
ANION GAP SERPL CALC-SCNC: 9 MMOL/L (ref 5–15)
AST SERPL-CCNC: 30 U/L (ref 15–37)
BASOPHILS # BLD: 0 K/UL (ref 0–0.1)
BASOPHILS NFR BLD: 0 % (ref 0–1)
BILIRUB SERPL-MCNC: 0.4 MG/DL (ref 0.2–1)
BUN SERPL-MCNC: 56 MG/DL (ref 6–20)
BUN/CREAT SERPL: 43 (ref 12–20)
CALCIUM SERPL-MCNC: 9.2 MG/DL (ref 8.5–10.1)
CHLORIDE SERPL-SCNC: 101 MMOL/L (ref 97–108)
CO2 SERPL-SCNC: 25 MMOL/L (ref 21–32)
CREAT SERPL-MCNC: 1.3 MG/DL (ref 0.55–1.02)
DIFFERENTIAL METHOD BLD: ABNORMAL
EKG DIAGNOSIS: NORMAL
EKG Q-T INTERVAL: 328 MS
EKG QRS DURATION: 108 MS
EKG QTC CALCULATION (BAZETT): 485 MS
EKG R AXIS: -64 DEGREES
EKG T AXIS: 81 DEGREES
EKG VENTRICULAR RATE: 132 BPM
EOSINOPHIL # BLD: 0 K/UL (ref 0–0.4)
EOSINOPHIL NFR BLD: 0 % (ref 0–7)
ERYTHROCYTE [DISTWIDTH] IN BLOOD BY AUTOMATED COUNT: 17.8 % (ref 11.5–14.5)
GLOBULIN SER CALC-MCNC: 5.5 G/DL (ref 2–4)
GLUCOSE SERPL-MCNC: 124 MG/DL (ref 65–100)
HCT VFR BLD AUTO: 28.1 % (ref 35–47)
HGB BLD-MCNC: 9.2 G/DL (ref 11.5–16)
IMM GRANULOCYTES # BLD AUTO: 0 K/UL (ref 0–0.04)
IMM GRANULOCYTES NFR BLD AUTO: 0 % (ref 0–0.5)
LYMPHOCYTES # BLD: 2.6 K/UL (ref 0.8–3.5)
LYMPHOCYTES NFR BLD: 18 % (ref 12–49)
MCH RBC QN AUTO: 27.4 PG (ref 26–34)
MCHC RBC AUTO-ENTMCNC: 32.7 G/DL (ref 30–36.5)
MCV RBC AUTO: 83.6 FL (ref 80–99)
MONOCYTES # BLD: 1 K/UL (ref 0–1)
MONOCYTES NFR BLD: 7 % (ref 5–13)
NEUTS SEG # BLD: 10.9 K/UL (ref 1.8–8)
NEUTS SEG NFR BLD: 75 % (ref 32–75)
NRBC # BLD: 0 K/UL (ref 0–0.01)
NRBC BLD-RTO: 0 PER 100 WBC
PLATELET # BLD AUTO: 738 K/UL (ref 150–400)
PMV BLD AUTO: 8.3 FL (ref 8.9–12.9)
POTASSIUM SERPL-SCNC: 4.6 MMOL/L (ref 3.5–5.1)
PROT SERPL-MCNC: 6.9 G/DL (ref 6.4–8.2)
RBC # BLD AUTO: 3.36 M/UL (ref 3.8–5.2)
RBC MORPH BLD: ABNORMAL
RBC MORPH BLD: ABNORMAL
SODIUM SERPL-SCNC: 135 MMOL/L (ref 136–145)
WBC # BLD AUTO: 14.5 K/UL (ref 3.6–11)

## 2024-03-13 PROCEDURE — 93005 ELECTROCARDIOGRAM TRACING: CPT | Performed by: STUDENT IN AN ORGANIZED HEALTH CARE EDUCATION/TRAINING PROGRAM

## 2024-03-13 PROCEDURE — 96376 TX/PRO/DX INJ SAME DRUG ADON: CPT

## 2024-03-13 PROCEDURE — 93010 ELECTROCARDIOGRAM REPORT: CPT | Performed by: INTERNAL MEDICINE

## 2024-03-13 PROCEDURE — 96374 THER/PROPH/DIAG INJ IV PUSH: CPT

## 2024-03-13 PROCEDURE — 99285 EMERGENCY DEPT VISIT HI MDM: CPT

## 2024-03-13 PROCEDURE — 6360000002 HC RX W HCPCS: Performed by: STUDENT IN AN ORGANIZED HEALTH CARE EDUCATION/TRAINING PROGRAM

## 2024-03-13 PROCEDURE — 36415 COLL VENOUS BLD VENIPUNCTURE: CPT

## 2024-03-13 PROCEDURE — 85025 COMPLETE CBC W/AUTO DIFF WBC: CPT

## 2024-03-13 PROCEDURE — 80053 COMPREHEN METABOLIC PANEL: CPT

## 2024-03-13 RX ORDER — MORPHINE SULFATE 4 MG/ML
4 INJECTION, SOLUTION INTRAMUSCULAR; INTRAVENOUS EVERY 4 HOURS PRN
Status: DISCONTINUED | OUTPATIENT
Start: 2024-03-13 | End: 2024-03-14

## 2024-03-13 RX ORDER — MORPHINE SULFATE 4 MG/ML
4 INJECTION, SOLUTION INTRAMUSCULAR; INTRAVENOUS
Status: DISCONTINUED | OUTPATIENT
Start: 2024-03-13 | End: 2024-03-14

## 2024-03-13 RX ORDER — DILTIAZEM HYDROCHLORIDE 5 MG/ML
10 INJECTION INTRAVENOUS
Status: DISCONTINUED | OUTPATIENT
Start: 2024-03-13 | End: 2024-03-13

## 2024-03-13 RX ORDER — MORPHINE SULFATE 2 MG/ML
2 INJECTION, SOLUTION INTRAMUSCULAR; INTRAVENOUS
Status: DISCONTINUED | OUTPATIENT
Start: 2024-03-13 | End: 2024-03-14

## 2024-03-13 RX ADMIN — MORPHINE SULFATE 4 MG: 4 INJECTION, SOLUTION INTRAMUSCULAR; INTRAVENOUS at 17:38

## 2024-03-13 RX ADMIN — MORPHINE SULFATE 2 MG: 2 INJECTION, SOLUTION INTRAMUSCULAR; INTRAVENOUS at 21:25

## 2024-03-13 ASSESSMENT — LIFESTYLE VARIABLES
HOW MANY STANDARD DRINKS CONTAINING ALCOHOL DO YOU HAVE ON A TYPICAL DAY: PATIENT UNABLE TO ANSWER
HOW OFTEN DO YOU HAVE A DRINK CONTAINING ALCOHOL: PATIENT UNABLE TO ANSWER

## 2024-03-13 NOTE — ED TRIAGE NOTES
Pt arrives via H2H from [facility] after niece called with complaints of seizure like activity. Niece was not present during episode of seizure like activity, per EMS, nurse caring for [facility] at [facility] denied witnessing any episode of seizure like activity. Pt's roommate's family witnessed seizure activity per nurse at [facility]. EMS reports no hx of seizures. Pt appears at baseline, AxOx1, GCS 10.

## 2024-03-13 NOTE — ED PROVIDER NOTES
[facility] EMERGENCY DEP  EMERGENCY DEPARTMENT ENCOUNTER      Pt Name: Nicolasa Macdonald  MRN: [id number]  Birthdate 1950  Date of evaluation: 3/13/2024  Provider: Alcon Wong MD    CHIEF COMPLAINT       Chief Complaint   Patient presents with    Seizures         HISTORY OF PRESENT ILLNESS   (Location/Symptom, Timing/Onset, Context/Setting, Quality, Duration, Modifying Factors, Severity)  Note limiting factors.   Patient is a 74-year-old female arriving from [facility] after the niece of the patient who is POA states that there was concerns that she was having seizure-like activity prior to arrival.  Patient has no history of seizures nursing at [facility] denies witnessing any seizure-like activity.  Patient is currently at her baseline alert and oriented x 1 patient found to be at baseline.  Patient's niece who is POA states that patient is supposed to be in hospice and the goal is to get patient to [place].  Patient's niece is requesting no escalation of care the reason she was sent here was essentially for placement into a hospice facility.            Review of External Medical Records:     Nursing Notes were reviewed.    REVIEW OF SYSTEMS    (2-9 systems for level 4, 10 or more for level 5)     Review of Systems    Except as noted above the remainder of the review of systems was reviewed and negative.       PAST MEDICAL HISTORY     Past Medical History:   Diagnosis Date    Cardiomyopathy (HCC)     HLD (hyperlipidemia)     Hypertension     Kidney stone     Leukocytosis     Thyroid disease          SURGICAL HISTORY       Past Surgical History:   Procedure Laterality Date    CT DRAIN SOFT TISSUE ABSCESS  12/20/2023    CT DRAIN SOFT TISSUE ABSCESS 12/20/2023 [facility]    CYSTOSCOPY Bilateral 12/10/2023    CYSTOSCOPY BILATERAL URETERAL STENT INSERTION performed by Nathan Garrett MD at [facility] MA[facility]    CYSTOSCOPY Left 1/22/2024    CYSTOSCOPY FLEXIBLE WITH STENT REMOVAL performed by Zac Lind MD at

## 2024-03-13 NOTE — CARE COORDINATION
CM was consulted to assist pt's family with placement concerns and questions. Pt arrived by BLS with H2H from [facility]. ED nurse provided CM details from conversation she had with pt's niece/POA, Jenn Bowser [phone]. Jenn lives in [facility]. There [place] no family in [place]. Jenn reported not being satisfied with care pt was receiving at the facility. Pt was in facility as a SNF pt and facility had begun comfort care at direction of family. Family did not feel prepared for pt's rapid decline. Pt was sent to [facility] due to pt's roommate's family witnessing what they described as seizure like activity.     [facility] ED nurse provided clinical assessment information to Jenn and Jenn discussed it with her family. They have decided to move forward with comfort-hospice care for pt and requested a [facility] hospice consult with  Hospice. If pt is not GIP appropriate pt's family would like information about facilities [facility] contracts with for community hospice services.    CM emailed Jenn SNF/LTC facility list and a hospice agency list for complete freedom of choice planning. CM directed Jenn to discuss billing with LTC facilities. CM will remain available to answer family's general SULMA questions.   [email or web address]    ED CM to follow tomorrow for SULMA support and planning.  KELLEN Leone   or kim Overton Serve

## 2024-03-13 NOTE — ED NOTES
Per family member/kianna jacobs who is out of state. She would like sometime to confirm pt paperwork for comfort care and what interventions she requests going forward. Family member to call back ER. Dr mccann made aware orders will not be completed until family member calls back ER.

## 2024-03-14 PROBLEM — A41.9 SEPSIS (HCC): Status: ACTIVE | Noted: 2024-03-14

## 2024-03-14 PROCEDURE — 1100000000 HC RM PRIVATE

## 2024-03-14 PROCEDURE — 6360000002 HC RX W HCPCS: Performed by: NURSE PRACTITIONER

## 2024-03-14 PROCEDURE — 6360000002 HC RX W HCPCS: Performed by: STUDENT IN AN ORGANIZED HEALTH CARE EDUCATION/TRAINING PROGRAM

## 2024-03-14 PROCEDURE — 96375 TX/PRO/DX INJ NEW DRUG ADDON: CPT

## 2024-03-14 PROCEDURE — 96376 TX/PRO/DX INJ SAME DRUG ADON: CPT

## 2024-03-14 RX ORDER — DIAZEPAM 5 MG/ML
5 INJECTION, SOLUTION INTRAMUSCULAR; INTRAVENOUS EVERY 4 HOURS
Status: DISCONTINUED | OUTPATIENT
Start: 2024-03-14 | End: 2024-03-15 | Stop reason: HOSPADM

## 2024-03-14 RX ORDER — HYDROMORPHONE HYDROCHLORIDE 1 MG/ML
1 INJECTION, SOLUTION INTRAMUSCULAR; INTRAVENOUS; SUBCUTANEOUS
Status: DISCONTINUED | OUTPATIENT
Start: 2024-03-14 | End: 2024-03-15 | Stop reason: HOSPADM

## 2024-03-14 RX ORDER — DIAZEPAM 5 MG/ML
5 INJECTION, SOLUTION INTRAMUSCULAR; INTRAVENOUS EVERY 4 HOURS PRN
Status: DISCONTINUED | OUTPATIENT
Start: 2024-03-14 | End: 2024-03-14

## 2024-03-14 RX ORDER — MIDAZOLAM HYDROCHLORIDE 2 MG/2ML
2 INJECTION, SOLUTION INTRAMUSCULAR; INTRAVENOUS
Status: DISCONTINUED | OUTPATIENT
Start: 2024-03-14 | End: 2024-03-15 | Stop reason: HOSPADM

## 2024-03-14 RX ORDER — ACETAMINOPHEN 650 MG/1
650 SUPPOSITORY RECTAL EVERY 4 HOURS PRN
Status: DISCONTINUED | OUTPATIENT
Start: 2024-03-14 | End: 2024-03-15 | Stop reason: HOSPADM

## 2024-03-14 RX ORDER — GLYCOPYRROLATE 0.2 MG/ML
0.2 INJECTION INTRAMUSCULAR; INTRAVENOUS EVERY 4 HOURS PRN
Status: DISCONTINUED | OUTPATIENT
Start: 2024-03-14 | End: 2024-03-15 | Stop reason: HOSPADM

## 2024-03-14 RX ORDER — BISACODYL 10 MG
10 SUPPOSITORY, RECTAL RECTAL DAILY PRN
Status: DISCONTINUED | OUTPATIENT
Start: 2024-03-14 | End: 2024-03-15 | Stop reason: HOSPADM

## 2024-03-14 RX ORDER — HYDROMORPHONE HYDROCHLORIDE 1 MG/ML
1 INJECTION, SOLUTION INTRAMUSCULAR; INTRAVENOUS; SUBCUTANEOUS EVERY 4 HOURS
Status: DISCONTINUED | OUTPATIENT
Start: 2024-03-14 | End: 2024-03-15 | Stop reason: HOSPADM

## 2024-03-14 RX ORDER — SODIUM CHLORIDE 0.9 % (FLUSH) 0.9 %
5-40 SYRINGE (ML) INJECTION PRN
Status: DISCONTINUED | OUTPATIENT
Start: 2024-03-14 | End: 2024-03-15 | Stop reason: HOSPADM

## 2024-03-14 RX ADMIN — HYDROMORPHONE HYDROCHLORIDE 1 MG: 1 INJECTION, SOLUTION INTRAMUSCULAR; INTRAVENOUS; SUBCUTANEOUS at 15:09

## 2024-03-14 RX ADMIN — GLYCOPYRROLATE 0.2 MG: 0.2 INJECTION INTRAMUSCULAR; INTRAVENOUS at 10:37

## 2024-03-14 RX ADMIN — HYDROMORPHONE HYDROCHLORIDE 1 MG: 1 INJECTION, SOLUTION INTRAMUSCULAR; INTRAVENOUS; SUBCUTANEOUS at 10:37

## 2024-03-14 RX ADMIN — MORPHINE SULFATE 4 MG: 4 INJECTION, SOLUTION INTRAMUSCULAR; INTRAVENOUS at 08:20

## 2024-03-14 RX ADMIN — HYDROMORPHONE HYDROCHLORIDE 1 MG: 1 INJECTION, SOLUTION INTRAMUSCULAR; INTRAVENOUS; SUBCUTANEOUS at 22:25

## 2024-03-14 RX ADMIN — Medication 5 MG: at 19:26

## 2024-03-14 RX ADMIN — MORPHINE SULFATE 4 MG: 4 INJECTION, SOLUTION INTRAMUSCULAR; INTRAVENOUS at 06:09

## 2024-03-14 RX ADMIN — HYDROMORPHONE HYDROCHLORIDE 1 MG: 1 INJECTION, SOLUTION INTRAMUSCULAR; INTRAVENOUS; SUBCUTANEOUS at 19:26

## 2024-03-14 RX ADMIN — Medication 5 MG: at 15:09

## 2024-03-14 RX ADMIN — Medication 5 MG: at 22:25

## 2024-03-14 RX ADMIN — Medication 5 MG: at 10:37

## 2024-03-14 ASSESSMENT — PAIN SCALES - GENERAL
PAINLEVEL_OUTOF10: 10
PAINLEVEL_OUTOF10: 5

## 2024-03-14 ASSESSMENT — PAIN DESCRIPTION - LOCATION
LOCATION: GENERALIZED
LOCATION: GENERALIZED

## 2024-03-14 NOTE — PLAN OF CARE
discharged from inpatient hospice upon request or when services are no longer needed and transitioned to the next level of care during the inpatient hospice stay.    Outcome: Progressing    each shift during the inpatient hospice stay.      Outcome: Progressing    scale within 1 hour of receiving pain medication during the inpatient hospice stay.    Outcome: Progressing

## 2024-03-14 NOTE — PROGRESS NOTES
PLACE OF SERVICE:  {Required Place of Service:40103}    SKILLED VISIT      Chief Complaint:  ***    HPI  ***    PMH:   ***    ROS:   The following system review was negative:  Constitutional; Respiratory; Cardiovascular; Genitourinary; Gastrointestinal; Psychiatric; Ear-Nose-Throat; Musculoskeletal; Neurologic; Endocrine; Hematologic; Skin; Eyes; denies any    Medications: Reviewed in [facility] EMR and assessment /  plan    Social History / Family History:  ***       Vitals:   ***      Exam:  Constitutional: No acute distress;   Eyes: Sclera clear, PERRLA;   Ears/nose/mouth/throat:mmm, OP clear, trachea midline;  Cardiovascular: RRR,nml S1 and S2, no rubs murmurs or gallops, no edema, no cyanosis;   Respiratory: Clear to auscultation, symmetric, no respiratory distress;  Gastrointestinal: Abdomen soft, NT, ND, no masses, normal bowel sounds;  Neurologic: Cranial nerves II through VII grossly intact, no speech or motor deficits A&O in time place and person  Skin: No rash, warm and dry;  Musculoskeletal: No erythema swelling or joint tenderness, extremities without cyanosis, neck supple, ROM intact spine and extremities;  Psychiatric: Not agitated.  Appropriate affect, mood, judgment and insight.  Genitourinary: No suprapubic tenderness or flank tenderness  Heme, lymph, immuno: No pallor;       Labs:    ***    Assessment/Plans:   ***      NIRMAL Islas NP  
increase her baclofen for the muscle stiffness.The niece is power of  and requested patient to be sent to the hospital for further evaluation.  Patient sent to the hospital for further evaluation.  At this time patient admitted to the hospital under [facility] hospice.    NIRMAL Islas - NP

## 2024-03-14 NOTE — ED PROVIDER NOTES
Assumed care of this 74-year-old female, enrolled in hospice care here in A-fib with RVR, likely septic.  Per signout team,  patient should not receive any life-saving measures, no pressors, no antibiotics, no IV fluids.  Comfort measures only.  Nielijah had reportedly referred her to [facility] in order to have her transported to [place].  Case management is aware of this and will provide recommendations in the morning.  Per signout team, patient may  on shift and I was advised not to intervene.    Reassessment: Patient's vital signs remain tenuous.  Case management remains involved in assisting with palliative care and hospice options.  Will transfer care to oncoming team with recommendation to follow-up with case management for recommendations.     Haile Stoll MD  [phone]

## 2024-03-14 NOTE — ED NOTES
Verbal shift change report given to Rachid (oncoming nurse) by Petey (offgoing nurse). Report included the following information ED SBAR.

## 2024-03-14 NOTE — ED NOTES
Bedside and Verbal shift change report given to GHULAM Angelo (oncoming nurse) by GHULAM Porter (offgoing nurse). Report included the following information Nurse Handoff Report, ED Encounter Summary, ED SBAR, Adult Overview, MAR, and Neuro Assessment.

## 2024-03-14 NOTE — ED NOTES
Assumed care of pt at this time. Pt confused, unable to have meaningful conversation. Pt does complain of pain. Monitor, bp cuff and pulse ox in place.

## 2024-03-15 VITALS
HEART RATE: 109 BPM | OXYGEN SATURATION: 100 % | TEMPERATURE: 97.7 F | DIASTOLIC BLOOD PRESSURE: 74 MMHG | SYSTOLIC BLOOD PRESSURE: 112 MMHG | RESPIRATION RATE: 20 BRPM

## 2024-03-15 PROBLEM — R52 GENERALIZED PAIN: Status: ACTIVE | Noted: 2024-03-15

## 2024-03-15 PROBLEM — Z51.5 HOSPICE CARE PATIENT: Status: ACTIVE | Noted: 2024-03-15

## 2024-03-15 PROBLEM — R45.1 RESTLESSNESS: Status: ACTIVE | Noted: 2024-01-01

## 2024-03-15 PROBLEM — A41.9 SYSTEMIC INFECTION (HCC): Status: ACTIVE | Noted: 2024-01-01

## 2024-03-15 LAB
SARS-COV-2 RDRP RESP QL NAA+PROBE: NOT DETECTED
SOURCE: NORMAL

## 2024-03-15 PROCEDURE — 87635 SARS-COV-2 COVID-19 AMP PRB: CPT

## 2024-03-15 PROCEDURE — 6360000002 HC RX W HCPCS: Performed by: NURSE PRACTITIONER

## 2024-03-15 RX ADMIN — Medication 5 MG: at 10:05

## 2024-03-15 RX ADMIN — HYDROMORPHONE HYDROCHLORIDE 1 MG: 1 INJECTION, SOLUTION INTRAMUSCULAR; INTRAVENOUS; SUBCUTANEOUS at 10:05

## 2024-03-15 RX ADMIN — HYDROMORPHONE HYDROCHLORIDE 1 MG: 1 INJECTION, SOLUTION INTRAMUSCULAR; INTRAVENOUS; SUBCUTANEOUS at 06:41

## 2024-03-15 RX ADMIN — Medication 5 MG: at 02:35

## 2024-03-15 RX ADMIN — HYDROMORPHONE HYDROCHLORIDE 1 MG: 1 INJECTION, SOLUTION INTRAMUSCULAR; INTRAVENOUS; SUBCUTANEOUS at 04:01

## 2024-03-15 RX ADMIN — Medication 5 MG: at 06:42

## 2024-03-15 RX ADMIN — HYDROMORPHONE HYDROCHLORIDE 1 MG: 1 INJECTION, SOLUTION INTRAMUSCULAR; INTRAVENOUS; SUBCUTANEOUS at 02:35

## 2024-03-15 ASSESSMENT — PAIN SCALES - GENERAL
PAINLEVEL_OUTOF10: 10

## 2024-03-15 ASSESSMENT — PAIN DESCRIPTION - LOCATION
LOCATION: GENERALIZED
LOCATION: OTHER (COMMENT)

## 2024-03-15 ASSESSMENT — PAIN DESCRIPTION - DESCRIPTORS: DESCRIPTORS: ACHING

## 2024-03-15 NOTE — PROGRESS NOTES
[facility]  Good Help to Those in Need  [phone]     Patient Name: Nicolasa Macdonald  YOB: 1950  Age: 74 y.o.    [facility] Hospice RN Note:  Hospice consult received, reviewing chart. Will follow up with Unit Nurse and Care Manager to discuss plan of care, patient status and discharge disposition within the hour.     I spoke with her niece Jenn who lives in [place] and will call her after I evaluate her. If she meets criteria for GIP will send consents via DocuSign    Thank you for the opportunity to be of service to this patient.     Zahra Pino RN  Clinical Nurse Liaison  [facility]  [phone] Mobile  [phone] Office   Available on Perfect Serve     
Patient discharged to hospice house. At time of discharge patient alert and oriented x2, IV 22 R forearm in place but positional, on 2L oxygen. Report called to nurse at hospice house. Attempted to get another IV in place before discharge but unable due to limited venous access--patients arms contracted/edematous. All belongings sent with patient (glasses and pink blanket).   
[facility]  Good Help to Those in Need  [phone]    Patient Name: Nicolasa Macdonald  YOB: 1950  Age: 74 y.o.    [facility] Hospice RN Note:  Hospice consult noted. Chart reviewed. Plan of care discussed with patients nurse & care manager.   In to meet with patient.   Discussed Hospice philosophy, general plan of care, levels of care, services and on call procedures.  Patient is alert and verbal with confusion noted.  Tearful and in pain.  Reviewed with Dr Miller who feels she is at risk of rapid decline and agrees with GIP hospice admission.  There are no beds at [facility], plan will be to admit GIP at [facility] and transfer to [facility] when a bed is available  Dr Stoll notified.  CTI from  Dr Miller for sepsis  Hospice orders from Cheryl Benoit NP CM made aware of the plan  DocuSign with adrianna/ARGELIA Bowser      Thank you for the opportunity to be of service to this patient.     Zahra Pino RN  Clinical Nurse Liaison  [facility]  [phone] Mobile  [phone] Office   Available on Perfect Serve     
[facility]  Good Help to Those in Need  [phone]    Social Work Admission Note  Patient Name: Nicolasa Macdonald  YOB: 1950  Age: 74 y.o.    Date of Visit: 03/14/24  Facility of Care: [facility]  Patient Room: [facility]     Hospice Attending: Elieser Miller MD  Hospice Diagnosis: Sepsis (HCC) [A41.9]    Level of Care:    [x]  GIP    []  Respite   []  Routine    Consents/NCD Documentation:     Consents Reviewed:   []  Yes  [x]  No, completed by other hospice staff member.    Person Reviewed/Signed with:  []  Patient   []  Pts NOK/MPOA  Name:     Right to NCD Reviewed:   []  Yes  []  No, completed by other hospice staff member.    NCD Requested:   []  Yes  []  No    Admission Nurse/Intake Notified NCD was requested:  []  Yes  []  No  []  Not requested    Planned Start of Care Date:     Hospice Witness Representative:    NARRATIVE   Pt is a 74 year old female admitted to inpatient hospice on 3/14/2024 with a hospice diagnosis of Sepsis.  Pt was admitted to hospital on 3/13/2024 for evaluation post seizure-like activity.  Prior to hospitalization, pt was at [facility] for SNF care.  Pt's adrianna Barajas is MPOA and primary contact.  Joint visit with Roberto LILLY.  At time of visit, pt resting in bed and appearing to be peaceful.  Pt was pleasant but needed cueing and time to respond to this JOANNSW's questions.  Pt reported she ate lunch, however the tray in front of pt was untouched.  Pt reported no needs.  JOANNSW and Roberto LILLY called pt's adrianna/ARGELIA Barajas to introduce role.  Jenn expressed appreciation for care and support.  Jenn requested numbers for hospital unit and to contact hospice team.  Jenn working on finalizing pt's final arrangements.  SW explained hospital's procedures when a hospice pt dies.  [facility] informed Jenn that pt may transfer to [facility] for continuation of care.  Jenn agreeable to this.  Jenn reported pt expressed pt wants to remain in [place] and be buried in 
[place]     Nutrition  Diet: soft  Appetite:   [] Good   [] Fair   [x] Poor due to dysphagia  [] Tube Feeding       [] Voiding  [] Incontinent   [x] Monahan    Musculoskeletal  [x] Balance/[place] Unsteady   [x] Weak   Strength:    [] Normal    [x] Limited    [] Decreasing   Activities:    [] Up as tolerated   [x] Bedridden    [] Specify:    SAFETY  [x] 24 hr. Caregiver   [x] Side rails ?    [x] Hospital bed   [x] Reviewed Falls & Safety       ALLERGIES AND MEDICATIONS     Allergies:   Allergies   Allergen Reactions    Antihistamines, Diphenhydramine-Type Other (See Comments)     reported on referral packet       Current Facility-Administered Medications   Medication Dose Route Frequency    HYDROmorphone HCl PF (DILAUDID) injection 1 mg  1 mg IntraVENous Q15 Min PRN    midazolam PF (VERSED) injection 2 mg  2 mg IntraVENous Q15 Min PRN    sodium chloride flush 0.9 % injection 5-40 mL  5-40 mL IntraVENous PRN    bisacodyl (DULCOLAX) suppository 10 mg  10 mg Rectal Daily PRN    glycopyrrolate (ROBINUL) injection 0.2 mg  0.2 mg IntraVENous Q4H PRN    diazePAM (VALIUM) injection 5 mg  5 mg IntraVENous Q4H    HYDROmorphone HCl PF (DILAUDID) injection 1 mg  1 mg IntraVENous Q4H    acetaminophen (TYLENOL) suppository 650 mg  650 mg Rectal Q4H PRN          Visit Time In: 0905  Visit Time Out: 0930   
Palliative Performance Scale (PPS): 30                PHYSICAL EXAM       Wt Readings from Last 3 Encounters:   02/22/24 79.2 kg (174 lb 9.7 oz)   01/15/24 77.1 kg (169 lb 15.6 oz)   12/14/23 80.8 kg (178 lb 2.1 oz)       BP: (!) 73/48, Pain 0-10: Pain Level: 5;     Supplemental O2         [] Yes                        [x] NO  Last bowel movement:      Currently this patient has:  [x] Peripheral IV          [] PICC           [] PORT        [] ICD               [x] Monahan Catheter       [] NG Tube     [] PEG Tube               [] Rectal Tube           [] Drain  [] Other:      Constitutional: alert; frail; chronically ill-appearing  Eyes: sclera anicteric; no injection  ENMT: oral mucosa moist  Cardiovascular: irregular; tachycardic; radial pulses intact; pedal pulses weak; bilateral LE dry/flaky with hyperpigmentation consistent with chronic PVD; lymphedema  Respiratory: increased work of breathing; breath sounds diminished  Gastrointestinal: +bowel sounds; soft  Musculoskeletal: bilateral hands contracted  Skin: thin/fragile; foam dressing to L heel; foam dressing to lateral RLE; scabbed ulceration to R 2nd toe  Neurologic: oriented to self; able to answer some yes/no questions and give brief responses; speech mostly nonsensical  Psychiatric: anxious/agitated; verbalizing pain        Pertinent Lab and or Imaging Tests:  Lab Results   Component Value Date     (L) 03/13/2024    K 4.6 03/13/2024     03/13/2024    CO2 25 03/13/2024    BUN 56 (H) 03/13/2024    CREATININE 1.30 (H) 03/13/2024    GLUCOSE 124 (H) 03/13/2024    CALCIUM 9.2 03/13/2024    PROT 6.9 03/13/2024    LABALBU 1.4 (L) 03/13/2024    BILITOT 0.4 03/13/2024    ALKPHOS 144 (H) 03/13/2024    AST 30 03/13/2024    ALT 15 03/13/2024    LABGLOM 44 (L) 03/13/2024    GFRAA >60 06/20/2022    AGRATIO 0.3 (L) 03/13/2024    GLOB 5.5 (H) 03/13/2024     NIRMAL Mojica - NP    
[facility]  [phone] [place]  [phone] Office   Available on Perfect Serve

## 2024-03-15 NOTE — DISCHARGE SUMMARY
Hospice Discharge Summary    [facility]  Good Help to Those in Need        Date of Admission: 3/13/2024  Date of Discharge: 3/15/2024    Nicolasa Macdonald is a 74 y.o. year old who was admitted to [facility] at [facility] with a Hospice diagnosis of Encounter for hospice care [Z51.5]  Atrial fibrillation with rapid ventricular response (HCC) [I48.91]  Sepsis (HCC) [A41.9].        The patient was discharged to [facility] for ongoing Hospice care.

## 2024-03-31 ENCOUNTER — HOME CARE VISIT (OUTPATIENT)
Age: 74
End: 2024-03-31
Payer: MEDICARE

## 2024-10-29 NOTE — HOSPICE
Assumed care of patient from DEANGELO Henderson, patient is AAOX1, room air, vitals WNL, heavy sleeper, bilateral AKA, wound on left inner thigh with foam, clean dry and intact, T, TH, Sat dialysis patient, still produces urine, diaper in place, left subclavian HD cath, small sacral pressure injury MACKENZIE with triad, no current complaints of pain or discomfort, swallows pills whole, all safety precautions are in place, call bell and tray table are within reach of the patient and no additional questions or concerns from the patient at this time.    Hospice RN and  Andrae GAUTAM visit with patient for mattress swap and wound care. HME arrived to re-install patient's air loss mattress because attempt of Joerns mattress was unsuccessful. Patient moved from bed with [facility] lift, mattress swapped out adn patient put back to bed. E technician Julissa Rangel explained mattress should inflate to full pressure (per patient's preference) then switch control panel to \"Static\" to keep mattress in this mode. Orange indicator lights do not indicate a problem/malfunction. Patient states comfort with mattress. Wound care completed by RN. Med rec completed, will order needed meds for refill - Coreg and Senna.   No supplies needed at this visit

## (undated) DEVICE — MAJOR LAPAROTOMY-MRMC: Brand: MEDLINE INDUSTRIES, INC.

## (undated) DEVICE — SOLUTION IRRIGATION STRL H2O 1000 ML UROMATIC CONTAINER

## (undated) DEVICE — PAD,NON-ADHERENT,3X8,STERILE,LF,1/PK: Brand: MEDLINE

## (undated) DEVICE — DRAPE FLD WRM W44XL66IN C6L FOR INTRATEMP SYS THERMABASIN

## (undated) DEVICE — TR BAND RADIAL ARTERY COMPRESSION DEVICE: Brand: TR BAND

## (undated) DEVICE — 3M™ IOBAN™ 2 ANTIMICROBIAL INCISE DRAPE 6648EZ: Brand: IOBAN™ 2

## (undated) DEVICE — BLADE,CARBON-STEEL,15,STRL,DISPOSABLE,TB: Brand: MEDLINE

## (undated) DEVICE — RADIFOCUS OPTITORQUE ANGIOGRAPHIC CATHETER: Brand: OPTITORQUE

## (undated) DEVICE — GLOVE ORANGE PI 7 1/2   MSG9075

## (undated) DEVICE — GLIDESHEATH SLENDER ACCESS KIT: Brand: GLIDESHEATH SLENDER

## (undated) DEVICE — SUTURE PDS II SZ 1 L27IN ABSRB VLT CT-1 L36MM 1/2 CIR Z341H

## (undated) DEVICE — Device

## (undated) DEVICE — SUTURE PROL SZ 5-0 L36IN NONABSORBABLE BLU L17MM RB-1 1/2 8556H

## (undated) DEVICE — SUTURE NONABSORBABLE MONOFILAMENT 5-0 C-1 1X24 IN PROLENE 8725H

## (undated) DEVICE — SOLUTION IRRIG 1000ML STRL H2O USP PLAS POUR BTL

## (undated) DEVICE — POOLE SUCTION HANDLE W/TUBING: Brand: CARDINAL HEALTH

## (undated) DEVICE — SPONGE GZ W4XL4IN COT 12 PLY TYP VII WVN C FLD DSGN STERILE

## (undated) DEVICE — SUTURE PERMA-HAND SZ 2-0 L30IN NONABSORBABLE BLK L26MM SH K833H

## (undated) DEVICE — 3M™ TEGADERM™ TRANSPARENT FILM DRESSING FRAME STYLE, 1626W, 4 IN X 4-3/4 IN (10 CM X 12 CM), 50/CT 4CT/CASE: Brand: 3M™ TEGADERM™

## (undated) DEVICE — HI-TORQUE VERSACORE FLOPPY GUIDE WIRE SYSTEM 145 CM: Brand: HI-TORQUE VERSACORE

## (undated) DEVICE — PART NUMBER 108260, VTI 8 MHZ DISPOSABLE DOPPLER PROBE, STRAIGHT, BOX: Brand: VTI 8 MHZ DISPOSABLE DOPPLER PROBE, STRAIGHT, BOX

## (undated) DEVICE — SYRINGE ANGIO 10 CC BRL STD PRNT POLYCARB LT BLU MEDALLION

## (undated) DEVICE — KIT INFUS PMP 270ML 4ML/HR 2ML/SITE SOAK CATH L2.5IN

## (undated) DEVICE — OPEN-END URETERAL CATHETER: Brand: COOK

## (undated) DEVICE — KIT ANGIOGRAPHY CUST MRMC

## (undated) DEVICE — VESSELL LOOP RED MAXI

## (undated) DEVICE — SUTURE PROL SZ 4-0 L36IN NONABSORBABLE BLU L26MM SH 1/2 CIR 8521H

## (undated) DEVICE — SUTURE PLN GUT SZ 2-0 L27IN ABSRB YELLOWISH TAN L70MM XLH 53T

## (undated) DEVICE — KIT ACCS INTRO 4FR L10CM NDL 21GA L7CM GWIRE L40CM

## (undated) DEVICE — SPLINT WR POS F/ARTERIAL ACC -- BX/10

## (undated) DEVICE — CLIP LIG M BLU TI HRT SHP WIRE HORZ 600 PER BX

## (undated) DEVICE — CATHETER ANGIO JR4 AD 5 FRX100 CM 25 CM PERFORMA

## (undated) DEVICE — PACK PROCEDURE SURG HRT CATH

## (undated) DEVICE — SUTURE PDS II SZ 1 L96IN ABSRB VLT XLH L70MM 1/2 CIR Z881G

## (undated) DEVICE — SPONGE GZ W4XL4IN COT RADPQ HIGHLY ABSRB

## (undated) DEVICE — TUBING, SUCTION, 1/4" X 10', STRAIGHT: Brand: MEDLINE

## (undated) DEVICE — CATHETER,FOLEY,100% SIL,COUDE,16FR 10ML: Brand: MEDLINE

## (undated) DEVICE — SPONGE: SPECIALTY PEANUT XR 100/CS: Brand: MEDICAL ACTION INDUSTRIES

## (undated) DEVICE — GLOVE SURG 8 PF POLYMER COAT WHT STRL SIGN LTX ESSENTIAL LTX

## (undated) DEVICE — SUTURE ETHLN SZ 2-0 L18IN NONABSORBABLE BLK L19MM PS-2 PRIM 593H

## (undated) DEVICE — SEALER ENDOSCP NANO COAT OPN DIV CRV L JAW LIGASURE IMPACT

## (undated) DEVICE — BLADE ES L6IN ELASTOMERIC COAT EXT DURABLE BEND UPTO 90DEG

## (undated) DEVICE — GOWN,SIRUS,NONRNF,SETINSLV,XL,20/CS: Brand: MEDLINE

## (undated) DEVICE — AGENT HEMSTAT W4XL8IN OXIDIZED REGENERATED CELOS ABSRB

## (undated) DEVICE — TUBING IRRIG L77IN DIA0.241IN L BOR FOR CYSTO W/ NVENT

## (undated) DEVICE — TUBING PRSS MON L6IN PVC M FEM CONN

## (undated) DEVICE — CYSTO MRMC: Brand: MEDLINE INDUSTRIES, INC.

## (undated) DEVICE — CANISTER, RIGID, 3000CC: Brand: MEDLINE INDUSTRIES, INC.

## (undated) DEVICE — SYR POWER 150ML 8IN FILL TUBE --

## (undated) DEVICE — SUTURE VCRL SZ 3-0 L27IN ABSRB VLT SH L26MM 1/2 CIR J784G

## (undated) DEVICE — CONTAINER SPEC ANAERB VACTNR

## (undated) DEVICE — APPLICATOR MEDICATED 10.5 CC SOLUTION HI LT ORNG CHLORAPREP

## (undated) DEVICE — CATHETER ETER ANGIO L110CM OD5FR ID046IN L75CM 038IN 145DEG CARD

## (undated) DEVICE — GUIDEWIRE VASC L260CM 0.035IN J TIP L3MM PTFE FIX COR NAMIC

## (undated) DEVICE — SOLUTION IRRIG 3000ML 0.9% SOD CHL USP UROMATIC PLAS CONT

## (undated) DEVICE — GUIDEWIRE ENDOSCP L150CM DIA0.035IN TIP L15CM BENT PTFE

## (undated) DEVICE — SWAB CULT LIQ STUART AGR AERB MOD IN BRK SGL RAYON TIP PLAS

## (undated) DEVICE — SUTURE PERMAHAND SZ 0 L30IN NONABSORBABLE BLK SILK BRAID A306H

## (undated) DEVICE — SUTURE PERMAHAND SZ 2-0 L30IN NONABSORBABLE BLK SILK W/O A305H

## (undated) DEVICE — SUTURE PERMAHAND SZ 0 L30IN NONABSORBABLE BLK L26MM SH 1/2 K834H

## (undated) DEVICE — GOWN,SIRUS,FABRNF,XL,20/CS: Brand: MEDLINE

## (undated) DEVICE — AEGIS 1" DISK 4MM HOLE, PEEL OPEN: Brand: MEDLINE